# Patient Record
Sex: FEMALE | Race: WHITE | NOT HISPANIC OR LATINO | Employment: OTHER | URBAN - METROPOLITAN AREA
[De-identification: names, ages, dates, MRNs, and addresses within clinical notes are randomized per-mention and may not be internally consistent; named-entity substitution may affect disease eponyms.]

---

## 2017-01-09 ENCOUNTER — COMMUNICATION - HEALTHEAST (OUTPATIENT)
Dept: NURSING | Facility: CLINIC | Age: 72
End: 2017-01-09

## 2017-01-10 ENCOUNTER — COMMUNICATION - HEALTHEAST (OUTPATIENT)
Dept: INTERNAL MEDICINE | Facility: CLINIC | Age: 72
End: 2017-01-10

## 2017-01-10 DIAGNOSIS — E87.6 HYPOPOTASSEMIA: ICD-10-CM

## 2017-01-11 ENCOUNTER — AMBULATORY - HEALTHEAST (OUTPATIENT)
Dept: LAB | Facility: CLINIC | Age: 72
End: 2017-01-11

## 2017-01-11 ENCOUNTER — COMMUNICATION - HEALTHEAST (OUTPATIENT)
Dept: INTERNAL MEDICINE | Facility: CLINIC | Age: 72
End: 2017-01-11

## 2017-01-11 DIAGNOSIS — I48.0 PAROXYSMAL ATRIAL FIBRILLATION (H): ICD-10-CM

## 2017-01-11 DIAGNOSIS — I48.91 ATRIAL FIBRILLATION (H): ICD-10-CM

## 2017-02-05 ENCOUNTER — COMMUNICATION - HEALTHEAST (OUTPATIENT)
Dept: INTERNAL MEDICINE | Facility: CLINIC | Age: 72
End: 2017-02-05

## 2017-02-05 DIAGNOSIS — E78.5 HYPERLIPIDEMIA: ICD-10-CM

## 2017-02-15 ENCOUNTER — AMBULATORY - HEALTHEAST (OUTPATIENT)
Dept: LAB | Facility: CLINIC | Age: 72
End: 2017-02-15

## 2017-02-15 ENCOUNTER — COMMUNICATION - HEALTHEAST (OUTPATIENT)
Dept: INTERNAL MEDICINE | Facility: CLINIC | Age: 72
End: 2017-02-15

## 2017-02-15 DIAGNOSIS — I89.0 ACQUIRED LYMPHEDEMA OF LEG: ICD-10-CM

## 2017-02-15 DIAGNOSIS — I48.91 ATRIAL FIBRILLATION (H): ICD-10-CM

## 2017-02-15 DIAGNOSIS — I48.0 PAROXYSMAL ATRIAL FIBRILLATION (H): ICD-10-CM

## 2017-02-15 DIAGNOSIS — I87.303 VENOUS HYPERTENSION OF LOWER EXTREMITY, BILATERAL: ICD-10-CM

## 2017-02-15 DIAGNOSIS — I89.0 OTHER LYMPHEDEMA: ICD-10-CM

## 2017-02-20 ENCOUNTER — COMMUNICATION - HEALTHEAST (OUTPATIENT)
Dept: INTERNAL MEDICINE | Facility: CLINIC | Age: 72
End: 2017-02-20

## 2017-02-20 DIAGNOSIS — I48.0 PAROXYSMAL ATRIAL FIBRILLATION (H): ICD-10-CM

## 2017-03-15 ENCOUNTER — COMMUNICATION - HEALTHEAST (OUTPATIENT)
Dept: INTERNAL MEDICINE | Facility: CLINIC | Age: 72
End: 2017-03-15

## 2017-03-15 ENCOUNTER — AMBULATORY - HEALTHEAST (OUTPATIENT)
Dept: LAB | Facility: CLINIC | Age: 72
End: 2017-03-15

## 2017-03-15 DIAGNOSIS — I48.0 PAROXYSMAL ATRIAL FIBRILLATION (H): ICD-10-CM

## 2017-03-29 ENCOUNTER — COMMUNICATION - HEALTHEAST (OUTPATIENT)
Dept: INTERNAL MEDICINE | Facility: CLINIC | Age: 72
End: 2017-03-29

## 2017-03-29 ENCOUNTER — AMBULATORY - HEALTHEAST (OUTPATIENT)
Dept: LAB | Facility: CLINIC | Age: 72
End: 2017-03-29

## 2017-03-29 DIAGNOSIS — I48.0 PAROXYSMAL ATRIAL FIBRILLATION (H): ICD-10-CM

## 2017-04-05 ENCOUNTER — COMMUNICATION - HEALTHEAST (OUTPATIENT)
Dept: INTERNAL MEDICINE | Facility: CLINIC | Age: 72
End: 2017-04-05

## 2017-04-05 DIAGNOSIS — E11.9 DIABETES (H): ICD-10-CM

## 2017-04-11 ENCOUNTER — COMMUNICATION - HEALTHEAST (OUTPATIENT)
Dept: INTERNAL MEDICINE | Facility: CLINIC | Age: 72
End: 2017-04-11

## 2017-04-11 DIAGNOSIS — M89.9 DISORDER OF BONE AND CARTILAGE, UNSPECIFIED: ICD-10-CM

## 2017-04-11 DIAGNOSIS — M94.9 DISORDER OF BONE AND CARTILAGE, UNSPECIFIED: ICD-10-CM

## 2017-04-12 ENCOUNTER — AMBULATORY - HEALTHEAST (OUTPATIENT)
Dept: LAB | Facility: CLINIC | Age: 72
End: 2017-04-12

## 2017-04-12 ENCOUNTER — COMMUNICATION - HEALTHEAST (OUTPATIENT)
Dept: INTERNAL MEDICINE | Facility: CLINIC | Age: 72
End: 2017-04-12

## 2017-04-12 DIAGNOSIS — I48.0 PAROXYSMAL ATRIAL FIBRILLATION (H): ICD-10-CM

## 2017-04-24 ENCOUNTER — COMMUNICATION - HEALTHEAST (OUTPATIENT)
Dept: INTERNAL MEDICINE | Facility: CLINIC | Age: 72
End: 2017-04-24

## 2017-04-24 DIAGNOSIS — I48.91 ATRIAL FIBRILLATION (H): ICD-10-CM

## 2017-04-26 ENCOUNTER — AMBULATORY - HEALTHEAST (OUTPATIENT)
Dept: LAB | Facility: CLINIC | Age: 72
End: 2017-04-26

## 2017-04-26 ENCOUNTER — COMMUNICATION - HEALTHEAST (OUTPATIENT)
Dept: INTERNAL MEDICINE | Facility: CLINIC | Age: 72
End: 2017-04-26

## 2017-04-26 DIAGNOSIS — I48.0 PAROXYSMAL ATRIAL FIBRILLATION (H): ICD-10-CM

## 2017-04-26 DIAGNOSIS — I48.91 ATRIAL FIBRILLATION (H): ICD-10-CM

## 2017-05-05 ENCOUNTER — COMMUNICATION - HEALTHEAST (OUTPATIENT)
Dept: INTERNAL MEDICINE | Facility: CLINIC | Age: 72
End: 2017-05-05

## 2017-05-05 ENCOUNTER — AMBULATORY - HEALTHEAST (OUTPATIENT)
Dept: LAB | Facility: CLINIC | Age: 72
End: 2017-05-05

## 2017-05-05 DIAGNOSIS — I48.91 ATRIAL FIBRILLATION (H): ICD-10-CM

## 2017-05-05 DIAGNOSIS — I48.0 PAROXYSMAL ATRIAL FIBRILLATION (H): ICD-10-CM

## 2017-05-09 ENCOUNTER — AMBULATORY - HEALTHEAST (OUTPATIENT)
Dept: LAB | Facility: CLINIC | Age: 72
End: 2017-05-09

## 2017-05-09 ENCOUNTER — COMMUNICATION - HEALTHEAST (OUTPATIENT)
Dept: INTERNAL MEDICINE | Facility: CLINIC | Age: 72
End: 2017-05-09

## 2017-05-09 DIAGNOSIS — I48.91 ATRIAL FIBRILLATION (H): ICD-10-CM

## 2017-05-09 DIAGNOSIS — I48.0 PAROXYSMAL ATRIAL FIBRILLATION (H): ICD-10-CM

## 2017-05-12 ENCOUNTER — RECORDS - HEALTHEAST (OUTPATIENT)
Dept: ADMINISTRATIVE | Facility: OTHER | Age: 72
End: 2017-05-12

## 2017-05-17 ENCOUNTER — COMMUNICATION - HEALTHEAST (OUTPATIENT)
Dept: INTERNAL MEDICINE | Facility: CLINIC | Age: 72
End: 2017-05-17

## 2017-05-17 DIAGNOSIS — E78.5 HYPERLIPIDEMIA: ICD-10-CM

## 2017-05-23 ENCOUNTER — COMMUNICATION - HEALTHEAST (OUTPATIENT)
Dept: INTERNAL MEDICINE | Facility: CLINIC | Age: 72
End: 2017-05-23

## 2017-05-23 ENCOUNTER — AMBULATORY - HEALTHEAST (OUTPATIENT)
Dept: LAB | Facility: CLINIC | Age: 72
End: 2017-05-23

## 2017-05-23 DIAGNOSIS — I48.91 ATRIAL FIBRILLATION (H): ICD-10-CM

## 2017-05-23 DIAGNOSIS — I48.0 PAROXYSMAL ATRIAL FIBRILLATION (H): ICD-10-CM

## 2017-06-06 ENCOUNTER — COMMUNICATION - HEALTHEAST (OUTPATIENT)
Dept: INTERNAL MEDICINE | Facility: CLINIC | Age: 72
End: 2017-06-06

## 2017-06-06 DIAGNOSIS — I48.91 ATRIAL FIBRILLATION (H): ICD-10-CM

## 2017-06-13 ENCOUNTER — COMMUNICATION - HEALTHEAST (OUTPATIENT)
Dept: INTERNAL MEDICINE | Facility: CLINIC | Age: 72
End: 2017-06-13

## 2017-06-13 ENCOUNTER — AMBULATORY - HEALTHEAST (OUTPATIENT)
Dept: LAB | Facility: CLINIC | Age: 72
End: 2017-06-13

## 2017-06-13 DIAGNOSIS — I48.0 PAROXYSMAL ATRIAL FIBRILLATION (H): ICD-10-CM

## 2017-06-13 DIAGNOSIS — I48.91 ATRIAL FIBRILLATION (H): ICD-10-CM

## 2017-07-10 ENCOUNTER — COMMUNICATION - HEALTHEAST (OUTPATIENT)
Dept: NURSING | Facility: CLINIC | Age: 72
End: 2017-07-10

## 2017-07-11 ENCOUNTER — COMMUNICATION - HEALTHEAST (OUTPATIENT)
Dept: INTERNAL MEDICINE | Facility: CLINIC | Age: 72
End: 2017-07-11

## 2017-07-11 DIAGNOSIS — M89.9 DISORDER OF BONE AND CARTILAGE, UNSPECIFIED: ICD-10-CM

## 2017-07-11 DIAGNOSIS — E11.9 DIABETES (H): ICD-10-CM

## 2017-07-11 DIAGNOSIS — M94.9 DISORDER OF BONE AND CARTILAGE, UNSPECIFIED: ICD-10-CM

## 2017-07-14 ENCOUNTER — AMBULATORY - HEALTHEAST (OUTPATIENT)
Dept: LAB | Facility: CLINIC | Age: 72
End: 2017-07-14

## 2017-07-14 ENCOUNTER — COMMUNICATION - HEALTHEAST (OUTPATIENT)
Dept: FAMILY MEDICINE | Facility: CLINIC | Age: 72
End: 2017-07-14

## 2017-07-14 DIAGNOSIS — I48.91 ATRIAL FIBRILLATION (H): ICD-10-CM

## 2017-07-14 DIAGNOSIS — I48.0 PAROXYSMAL ATRIAL FIBRILLATION (H): ICD-10-CM

## 2017-08-04 ENCOUNTER — OFFICE VISIT - HEALTHEAST (OUTPATIENT)
Dept: CARDIOLOGY | Facility: CLINIC | Age: 72
End: 2017-08-04

## 2017-08-04 DIAGNOSIS — I10 ESSENTIAL HYPERTENSION: ICD-10-CM

## 2017-08-04 DIAGNOSIS — I48.0 PAROXYSMAL ATRIAL FIBRILLATION (H): ICD-10-CM

## 2017-08-04 ASSESSMENT — MIFFLIN-ST. JEOR: SCORE: 1898.85

## 2017-08-08 LAB
ATRIAL RATE - MUSE: 250 BPM
DIASTOLIC BLOOD PRESSURE - MUSE: NORMAL MMHG
INTERPRETATION ECG - MUSE: NORMAL
P AXIS - MUSE: NORMAL DEGREES
PR INTERVAL - MUSE: NORMAL MS
QRS DURATION - MUSE: 136 MS
QT - MUSE: 426 MS
QTC - MUSE: 469 MS
R AXIS - MUSE: 52 DEGREES
SYSTOLIC BLOOD PRESSURE - MUSE: NORMAL MMHG
T AXIS - MUSE: -63 DEGREES
VENTRICULAR RATE- MUSE: 73 BPM

## 2017-08-10 ENCOUNTER — HOSPITAL ENCOUNTER (OUTPATIENT)
Dept: CARDIOLOGY | Facility: HOSPITAL | Age: 72
Discharge: HOME | End: 2017-08-10
Attending: INTERNAL MEDICINE

## 2017-08-10 DIAGNOSIS — I48.0 PAROXYSMAL ATRIAL FIBRILLATION (H): ICD-10-CM

## 2017-08-18 ENCOUNTER — COMMUNICATION - HEALTHEAST (OUTPATIENT)
Dept: NURSING | Facility: CLINIC | Age: 72
End: 2017-08-18

## 2017-08-29 ENCOUNTER — AMBULATORY - HEALTHEAST (OUTPATIENT)
Dept: LAB | Facility: CLINIC | Age: 72
End: 2017-08-29

## 2017-08-29 ENCOUNTER — COMMUNICATION - HEALTHEAST (OUTPATIENT)
Dept: INTERNAL MEDICINE | Facility: CLINIC | Age: 72
End: 2017-08-29

## 2017-08-29 DIAGNOSIS — I48.0 PAROXYSMAL ATRIAL FIBRILLATION (H): ICD-10-CM

## 2017-08-29 DIAGNOSIS — I48.91 ATRIAL FIBRILLATION (H): ICD-10-CM

## 2017-09-12 ENCOUNTER — AMBULATORY - HEALTHEAST (OUTPATIENT)
Dept: LAB | Facility: CLINIC | Age: 72
End: 2017-09-12

## 2017-09-12 ENCOUNTER — COMMUNICATION - HEALTHEAST (OUTPATIENT)
Dept: INTERNAL MEDICINE | Facility: CLINIC | Age: 72
End: 2017-09-12

## 2017-09-12 DIAGNOSIS — I48.91 ATRIAL FIBRILLATION (H): ICD-10-CM

## 2017-09-12 DIAGNOSIS — I48.0 PAROXYSMAL ATRIAL FIBRILLATION (H): ICD-10-CM

## 2017-09-13 ENCOUNTER — RECORDS - HEALTHEAST (OUTPATIENT)
Dept: MAMMOGRAPHY | Facility: CLINIC | Age: 72
End: 2017-09-13

## 2017-09-13 DIAGNOSIS — Z12.31 ENCOUNTER FOR SCREENING MAMMOGRAM FOR MALIGNANT NEOPLASM OF BREAST: ICD-10-CM

## 2017-09-16 ENCOUNTER — COMMUNICATION - HEALTHEAST (OUTPATIENT)
Dept: CARDIOLOGY | Facility: CLINIC | Age: 72
End: 2017-09-16

## 2017-09-16 DIAGNOSIS — I48.91 ATRIAL FIBRILLATION (H): ICD-10-CM

## 2017-10-08 ENCOUNTER — COMMUNICATION - HEALTHEAST (OUTPATIENT)
Dept: INTERNAL MEDICINE | Facility: CLINIC | Age: 72
End: 2017-10-08

## 2017-10-08 DIAGNOSIS — E11.9 DIABETES (H): ICD-10-CM

## 2017-10-08 DIAGNOSIS — M94.9 DISORDER OF BONE AND CARTILAGE: ICD-10-CM

## 2017-10-08 DIAGNOSIS — M89.9 DISORDER OF BONE AND CARTILAGE: ICD-10-CM

## 2017-10-10 ENCOUNTER — COMMUNICATION - HEALTHEAST (OUTPATIENT)
Dept: INTERNAL MEDICINE | Facility: CLINIC | Age: 72
End: 2017-10-10

## 2017-10-10 ENCOUNTER — AMBULATORY - HEALTHEAST (OUTPATIENT)
Dept: LAB | Facility: CLINIC | Age: 72
End: 2017-10-10

## 2017-10-10 DIAGNOSIS — I48.91 ATRIAL FIBRILLATION (H): ICD-10-CM

## 2017-10-10 DIAGNOSIS — I48.0 PAROXYSMAL ATRIAL FIBRILLATION (H): ICD-10-CM

## 2017-10-24 ENCOUNTER — COMMUNICATION - HEALTHEAST (OUTPATIENT)
Dept: INTERNAL MEDICINE | Facility: CLINIC | Age: 72
End: 2017-10-24

## 2017-10-24 ENCOUNTER — AMBULATORY - HEALTHEAST (OUTPATIENT)
Dept: LAB | Facility: CLINIC | Age: 72
End: 2017-10-24

## 2017-10-24 DIAGNOSIS — I48.91 ATRIAL FIBRILLATION (H): ICD-10-CM

## 2017-10-24 DIAGNOSIS — I48.0 PAROXYSMAL ATRIAL FIBRILLATION (H): ICD-10-CM

## 2017-11-09 ENCOUNTER — AMBULATORY - HEALTHEAST (OUTPATIENT)
Dept: LAB | Facility: CLINIC | Age: 72
End: 2017-11-09

## 2017-11-09 ENCOUNTER — COMMUNICATION - HEALTHEAST (OUTPATIENT)
Dept: INTERNAL MEDICINE | Facility: CLINIC | Age: 72
End: 2017-11-09

## 2017-11-09 DIAGNOSIS — I48.91 ATRIAL FIBRILLATION (H): ICD-10-CM

## 2017-11-09 DIAGNOSIS — I48.0 PAROXYSMAL ATRIAL FIBRILLATION (H): ICD-10-CM

## 2017-11-22 ENCOUNTER — AMBULATORY - HEALTHEAST (OUTPATIENT)
Dept: LAB | Facility: CLINIC | Age: 72
End: 2017-11-22

## 2017-11-22 ENCOUNTER — COMMUNICATION - HEALTHEAST (OUTPATIENT)
Dept: INTERNAL MEDICINE | Facility: CLINIC | Age: 72
End: 2017-11-22

## 2017-11-22 DIAGNOSIS — I48.0 PAROXYSMAL ATRIAL FIBRILLATION (H): ICD-10-CM

## 2017-11-22 DIAGNOSIS — I48.91 ATRIAL FIBRILLATION (H): ICD-10-CM

## 2017-12-05 ENCOUNTER — COMMUNICATION - HEALTHEAST (OUTPATIENT)
Dept: INTERNAL MEDICINE | Facility: CLINIC | Age: 72
End: 2017-12-05

## 2017-12-05 DIAGNOSIS — E78.5 HYPERLIPIDEMIA: ICD-10-CM

## 2017-12-06 ENCOUNTER — COMMUNICATION - HEALTHEAST (OUTPATIENT)
Dept: INTERNAL MEDICINE | Facility: CLINIC | Age: 72
End: 2017-12-06

## 2017-12-06 ENCOUNTER — AMBULATORY - HEALTHEAST (OUTPATIENT)
Dept: LAB | Facility: CLINIC | Age: 72
End: 2017-12-06

## 2017-12-06 DIAGNOSIS — I48.91 ATRIAL FIBRILLATION (H): ICD-10-CM

## 2017-12-06 DIAGNOSIS — I48.0 PAROXYSMAL ATRIAL FIBRILLATION (H): ICD-10-CM

## 2017-12-13 ENCOUNTER — OFFICE VISIT - HEALTHEAST (OUTPATIENT)
Dept: INTERNAL MEDICINE | Facility: CLINIC | Age: 72
End: 2017-12-13

## 2017-12-13 DIAGNOSIS — N18.1 TYPE 2 DIABETES MELLITUS WITH STAGE 1 CHRONIC KIDNEY DISEASE, WITHOUT LONG-TERM CURRENT USE OF INSULIN (H): ICD-10-CM

## 2017-12-13 DIAGNOSIS — E11.22 TYPE 2 DIABETES MELLITUS WITH STAGE 1 CHRONIC KIDNEY DISEASE, WITHOUT LONG-TERM CURRENT USE OF INSULIN (H): ICD-10-CM

## 2017-12-13 DIAGNOSIS — I10 ESSENTIAL HYPERTENSION: ICD-10-CM

## 2017-12-13 LAB
CHOLEST SERPL-MCNC: 125 MG/DL
FASTING STATUS PATIENT QL REPORTED: NO
HBA1C MFR BLD: 5.3 % (ref 3.5–6)
HDLC SERPL-MCNC: 30 MG/DL
LDLC SERPL CALC-MCNC: 56 MG/DL
TRIGL SERPL-MCNC: 193 MG/DL

## 2017-12-14 ENCOUNTER — COMMUNICATION - HEALTHEAST (OUTPATIENT)
Dept: INTERNAL MEDICINE | Facility: CLINIC | Age: 72
End: 2017-12-14

## 2018-01-02 ENCOUNTER — COMMUNICATION - HEALTHEAST (OUTPATIENT)
Dept: INTERNAL MEDICINE | Facility: CLINIC | Age: 73
End: 2018-01-02

## 2018-01-02 DIAGNOSIS — I48.91 ATRIAL FIBRILLATION (H): ICD-10-CM

## 2018-01-02 DIAGNOSIS — E87.6 HYPOPOTASSEMIA: ICD-10-CM

## 2018-01-02 DIAGNOSIS — E11.9 DIABETES (H): ICD-10-CM

## 2018-01-03 ENCOUNTER — COMMUNICATION - HEALTHEAST (OUTPATIENT)
Dept: INTERNAL MEDICINE | Facility: CLINIC | Age: 73
End: 2018-01-03

## 2018-01-03 DIAGNOSIS — I48.91 ATRIAL FIBRILLATION (H): ICD-10-CM

## 2018-01-05 ENCOUNTER — COMMUNICATION - HEALTHEAST (OUTPATIENT)
Dept: INTERNAL MEDICINE | Facility: CLINIC | Age: 73
End: 2018-01-05

## 2018-01-09 ENCOUNTER — COMMUNICATION - HEALTHEAST (OUTPATIENT)
Dept: INTERNAL MEDICINE | Facility: CLINIC | Age: 73
End: 2018-01-09

## 2018-01-09 ENCOUNTER — AMBULATORY - HEALTHEAST (OUTPATIENT)
Dept: LAB | Facility: CLINIC | Age: 73
End: 2018-01-09

## 2018-01-09 DIAGNOSIS — I48.91 ATRIAL FIBRILLATION (H): ICD-10-CM

## 2018-01-09 LAB — INR PPP: 2.6 (ref 0.9–1.1)

## 2018-01-14 ENCOUNTER — COMMUNICATION - HEALTHEAST (OUTPATIENT)
Dept: INTERNAL MEDICINE | Facility: CLINIC | Age: 73
End: 2018-01-14

## 2018-01-14 DIAGNOSIS — M94.9 DISORDER OF BONE AND CARTILAGE: ICD-10-CM

## 2018-01-14 DIAGNOSIS — M89.9 DISORDER OF BONE AND CARTILAGE: ICD-10-CM

## 2018-01-25 ENCOUNTER — COMMUNICATION - HEALTHEAST (OUTPATIENT)
Dept: INTERNAL MEDICINE | Facility: CLINIC | Age: 73
End: 2018-01-25

## 2018-01-25 DIAGNOSIS — E87.6 HYPOPOTASSEMIA: ICD-10-CM

## 2018-02-06 ENCOUNTER — COMMUNICATION - HEALTHEAST (OUTPATIENT)
Dept: INTERNAL MEDICINE | Facility: CLINIC | Age: 73
End: 2018-02-06

## 2018-02-06 ENCOUNTER — AMBULATORY - HEALTHEAST (OUTPATIENT)
Dept: LAB | Facility: CLINIC | Age: 73
End: 2018-02-06

## 2018-02-06 DIAGNOSIS — I48.91 ATRIAL FIBRILLATION (H): ICD-10-CM

## 2018-02-06 LAB — INR PPP: 2.5 (ref 0.9–1.1)

## 2018-02-08 ENCOUNTER — COMMUNICATION - HEALTHEAST (OUTPATIENT)
Dept: INTERNAL MEDICINE | Facility: CLINIC | Age: 73
End: 2018-02-08

## 2018-02-08 DIAGNOSIS — I87.303 VENOUS HYPERTENSION OF LOWER EXTREMITY, BILATERAL: ICD-10-CM

## 2018-02-08 DIAGNOSIS — I89.0 ACQUIRED LYMPHEDEMA OF LEG: ICD-10-CM

## 2018-03-06 ENCOUNTER — COMMUNICATION - HEALTHEAST (OUTPATIENT)
Dept: INTERNAL MEDICINE | Facility: CLINIC | Age: 73
End: 2018-03-06

## 2018-03-06 DIAGNOSIS — E78.5 HYPERLIPIDEMIA: ICD-10-CM

## 2018-03-07 ENCOUNTER — AMBULATORY - HEALTHEAST (OUTPATIENT)
Dept: LAB | Facility: CLINIC | Age: 73
End: 2018-03-07

## 2018-03-07 ENCOUNTER — COMMUNICATION - HEALTHEAST (OUTPATIENT)
Dept: NURSING | Facility: CLINIC | Age: 73
End: 2018-03-07

## 2018-03-07 DIAGNOSIS — I48.91 ATRIAL FIBRILLATION (H): ICD-10-CM

## 2018-03-07 LAB — INR PPP: 2.9 (ref 0.9–1.1)

## 2018-03-09 ENCOUNTER — RECORDS - HEALTHEAST (OUTPATIENT)
Dept: ADMINISTRATIVE | Facility: OTHER | Age: 73
End: 2018-03-09

## 2018-03-21 ENCOUNTER — COMMUNICATION - HEALTHEAST (OUTPATIENT)
Dept: CARDIOLOGY | Facility: CLINIC | Age: 73
End: 2018-03-21

## 2018-03-21 DIAGNOSIS — I48.91 ATRIAL FIBRILLATION (H): ICD-10-CM

## 2018-03-30 ENCOUNTER — COMMUNICATION - HEALTHEAST (OUTPATIENT)
Dept: INTERNAL MEDICINE | Facility: CLINIC | Age: 73
End: 2018-03-30

## 2018-03-30 DIAGNOSIS — E11.9 DIABETES (H): ICD-10-CM

## 2018-04-10 ENCOUNTER — COMMUNICATION - HEALTHEAST (OUTPATIENT)
Dept: INTERNAL MEDICINE | Facility: CLINIC | Age: 73
End: 2018-04-10

## 2018-04-10 DIAGNOSIS — M94.9 DISORDER OF BONE AND CARTILAGE: ICD-10-CM

## 2018-04-10 DIAGNOSIS — M89.9 DISORDER OF BONE AND CARTILAGE: ICD-10-CM

## 2018-04-19 ENCOUNTER — AMBULATORY - HEALTHEAST (OUTPATIENT)
Dept: LAB | Facility: CLINIC | Age: 73
End: 2018-04-19

## 2018-04-19 ENCOUNTER — COMMUNICATION - HEALTHEAST (OUTPATIENT)
Dept: ANTICOAGULATION | Facility: CLINIC | Age: 73
End: 2018-04-19

## 2018-04-19 DIAGNOSIS — I48.91 ATRIAL FIBRILLATION (H): ICD-10-CM

## 2018-04-19 LAB — INR PPP: 2.9 (ref 0.9–1.1)

## 2018-05-17 ENCOUNTER — COMMUNICATION - HEALTHEAST (OUTPATIENT)
Dept: INTERNAL MEDICINE | Facility: CLINIC | Age: 73
End: 2018-05-17

## 2018-05-17 DIAGNOSIS — E87.6 HYPOPOTASSEMIA: ICD-10-CM

## 2018-05-31 ENCOUNTER — COMMUNICATION - HEALTHEAST (OUTPATIENT)
Dept: ANTICOAGULATION | Facility: CLINIC | Age: 73
End: 2018-05-31

## 2018-05-31 ENCOUNTER — AMBULATORY - HEALTHEAST (OUTPATIENT)
Dept: LAB | Facility: CLINIC | Age: 73
End: 2018-05-31

## 2018-05-31 DIAGNOSIS — I48.91 ATRIAL FIBRILLATION (H): ICD-10-CM

## 2018-05-31 LAB — INR PPP: 3.8 (ref 0.9–1.1)

## 2018-06-01 ENCOUNTER — COMMUNICATION - HEALTHEAST (OUTPATIENT)
Dept: INTERNAL MEDICINE | Facility: CLINIC | Age: 73
End: 2018-06-01

## 2018-06-01 DIAGNOSIS — E78.5 HYPERLIPIDEMIA: ICD-10-CM

## 2018-06-18 ENCOUNTER — COMMUNICATION - HEALTHEAST (OUTPATIENT)
Dept: INTERNAL MEDICINE | Facility: CLINIC | Age: 73
End: 2018-06-18

## 2018-06-18 ENCOUNTER — AMBULATORY - HEALTHEAST (OUTPATIENT)
Dept: LAB | Facility: CLINIC | Age: 73
End: 2018-06-18

## 2018-06-18 ENCOUNTER — COMMUNICATION - HEALTHEAST (OUTPATIENT)
Dept: ANTICOAGULATION | Facility: CLINIC | Age: 73
End: 2018-06-18

## 2018-06-18 ENCOUNTER — OFFICE VISIT - HEALTHEAST (OUTPATIENT)
Dept: INTERNAL MEDICINE | Facility: CLINIC | Age: 73
End: 2018-06-18

## 2018-06-18 DIAGNOSIS — E11.22 TYPE 2 DIABETES MELLITUS WITH STAGE 1 CHRONIC KIDNEY DISEASE, WITHOUT LONG-TERM CURRENT USE OF INSULIN (H): ICD-10-CM

## 2018-06-18 DIAGNOSIS — I48.91 ATRIAL FIBRILLATION (H): ICD-10-CM

## 2018-06-18 DIAGNOSIS — N18.1 TYPE 2 DIABETES MELLITUS WITH STAGE 1 CHRONIC KIDNEY DISEASE, WITHOUT LONG-TERM CURRENT USE OF INSULIN (H): ICD-10-CM

## 2018-06-18 LAB
HBA1C MFR BLD: 5.5 % (ref 3.5–6)
INR PPP: 2.7 (ref 0.9–1.1)

## 2018-07-09 ENCOUNTER — COMMUNICATION - HEALTHEAST (OUTPATIENT)
Dept: INTERNAL MEDICINE | Facility: CLINIC | Age: 73
End: 2018-07-09

## 2018-07-10 ENCOUNTER — AMBULATORY - HEALTHEAST (OUTPATIENT)
Dept: LAB | Facility: CLINIC | Age: 73
End: 2018-07-10

## 2018-07-10 ENCOUNTER — COMMUNICATION - HEALTHEAST (OUTPATIENT)
Dept: ANTICOAGULATION | Facility: CLINIC | Age: 73
End: 2018-07-10

## 2018-07-10 DIAGNOSIS — I48.91 ATRIAL FIBRILLATION (H): ICD-10-CM

## 2018-07-10 LAB — INR PPP: 2.9 (ref 0.9–1.1)

## 2018-07-29 ENCOUNTER — COMMUNICATION - HEALTHEAST (OUTPATIENT)
Dept: INTERNAL MEDICINE | Facility: CLINIC | Age: 73
End: 2018-07-29

## 2018-07-29 DIAGNOSIS — I48.91 ATRIAL FIBRILLATION (H): ICD-10-CM

## 2018-08-14 ENCOUNTER — COMMUNICATION - HEALTHEAST (OUTPATIENT)
Dept: ANTICOAGULATION | Facility: CLINIC | Age: 73
End: 2018-08-14

## 2018-08-16 ENCOUNTER — COMMUNICATION - HEALTHEAST (OUTPATIENT)
Dept: ANTICOAGULATION | Facility: CLINIC | Age: 73
End: 2018-08-16

## 2018-08-16 ENCOUNTER — AMBULATORY - HEALTHEAST (OUTPATIENT)
Dept: LAB | Facility: CLINIC | Age: 73
End: 2018-08-16

## 2018-08-16 DIAGNOSIS — I48.91 ATRIAL FIBRILLATION (H): ICD-10-CM

## 2018-08-16 LAB — INR PPP: 3.7 (ref 0.9–1.1)

## 2018-08-30 ENCOUNTER — AMBULATORY - HEALTHEAST (OUTPATIENT)
Dept: LAB | Facility: CLINIC | Age: 73
End: 2018-08-30

## 2018-08-30 ENCOUNTER — COMMUNICATION - HEALTHEAST (OUTPATIENT)
Dept: ANTICOAGULATION | Facility: CLINIC | Age: 73
End: 2018-08-30

## 2018-08-30 DIAGNOSIS — I48.91 ATRIAL FIBRILLATION (H): ICD-10-CM

## 2018-08-30 LAB — INR PPP: 2.2 (ref 0.9–1.1)

## 2018-09-12 ENCOUNTER — COMMUNICATION - HEALTHEAST (OUTPATIENT)
Dept: ANTICOAGULATION | Facility: CLINIC | Age: 73
End: 2018-09-12

## 2018-09-12 ENCOUNTER — AMBULATORY - HEALTHEAST (OUTPATIENT)
Dept: LAB | Facility: CLINIC | Age: 73
End: 2018-09-12

## 2018-09-12 DIAGNOSIS — I48.91 ATRIAL FIBRILLATION (H): ICD-10-CM

## 2018-09-12 LAB — INR PPP: 1.8 (ref 0.9–1.1)

## 2018-09-14 ENCOUNTER — COMMUNICATION - HEALTHEAST (OUTPATIENT)
Dept: CARDIOLOGY | Facility: CLINIC | Age: 73
End: 2018-09-14

## 2018-09-14 DIAGNOSIS — I48.91 ATRIAL FIBRILLATION (H): ICD-10-CM

## 2018-09-20 ENCOUNTER — RECORDS - HEALTHEAST (OUTPATIENT)
Dept: MAMMOGRAPHY | Facility: CLINIC | Age: 73
End: 2018-09-20

## 2018-09-20 DIAGNOSIS — Z12.31 ENCOUNTER FOR SCREENING MAMMOGRAM FOR MALIGNANT NEOPLASM OF BREAST: ICD-10-CM

## 2018-09-26 ENCOUNTER — AMBULATORY - HEALTHEAST (OUTPATIENT)
Dept: LAB | Facility: CLINIC | Age: 73
End: 2018-09-26

## 2018-09-26 ENCOUNTER — COMMUNICATION - HEALTHEAST (OUTPATIENT)
Dept: ANTICOAGULATION | Facility: CLINIC | Age: 73
End: 2018-09-26

## 2018-09-26 DIAGNOSIS — I48.91 ATRIAL FIBRILLATION (H): ICD-10-CM

## 2018-09-26 LAB — INR PPP: 3.6 (ref 0.9–1.1)

## 2018-09-29 ENCOUNTER — COMMUNICATION - HEALTHEAST (OUTPATIENT)
Dept: INTERNAL MEDICINE | Facility: CLINIC | Age: 73
End: 2018-09-29

## 2018-09-29 DIAGNOSIS — M89.9 DISORDER OF BONE AND CARTILAGE: ICD-10-CM

## 2018-09-29 DIAGNOSIS — M94.9 DISORDER OF BONE AND CARTILAGE: ICD-10-CM

## 2018-10-17 ENCOUNTER — AMBULATORY - HEALTHEAST (OUTPATIENT)
Dept: LAB | Facility: CLINIC | Age: 73
End: 2018-10-17

## 2018-10-17 ENCOUNTER — COMMUNICATION - HEALTHEAST (OUTPATIENT)
Dept: ANTICOAGULATION | Facility: CLINIC | Age: 73
End: 2018-10-17

## 2018-10-17 DIAGNOSIS — I48.91 ATRIAL FIBRILLATION (H): ICD-10-CM

## 2018-10-17 LAB — INR PPP: 1.9 (ref 0.9–1.1)

## 2018-10-31 ENCOUNTER — COMMUNICATION - HEALTHEAST (OUTPATIENT)
Dept: ANTICOAGULATION | Facility: CLINIC | Age: 73
End: 2018-10-31

## 2018-10-31 ENCOUNTER — AMBULATORY - HEALTHEAST (OUTPATIENT)
Dept: LAB | Facility: CLINIC | Age: 73
End: 2018-10-31

## 2018-10-31 DIAGNOSIS — I48.91 ATRIAL FIBRILLATION (H): ICD-10-CM

## 2018-10-31 LAB — INR PPP: 2.4 (ref 0.9–1.1)

## 2018-11-10 ENCOUNTER — COMMUNICATION - HEALTHEAST (OUTPATIENT)
Dept: INTERNAL MEDICINE | Facility: CLINIC | Age: 73
End: 2018-11-10

## 2018-11-10 DIAGNOSIS — I48.91 ATRIAL FIBRILLATION (H): ICD-10-CM

## 2018-11-14 ENCOUNTER — AMBULATORY - HEALTHEAST (OUTPATIENT)
Dept: LAB | Facility: CLINIC | Age: 73
End: 2018-11-14

## 2018-11-14 ENCOUNTER — COMMUNICATION - HEALTHEAST (OUTPATIENT)
Dept: ANTICOAGULATION | Facility: CLINIC | Age: 73
End: 2018-11-14

## 2018-11-14 DIAGNOSIS — I48.91 ATRIAL FIBRILLATION (H): ICD-10-CM

## 2018-11-14 LAB — INR PPP: 3.6 (ref 0.9–1.1)

## 2018-11-16 ENCOUNTER — COMMUNICATION - HEALTHEAST (OUTPATIENT)
Dept: INTERNAL MEDICINE | Facility: CLINIC | Age: 73
End: 2018-11-16

## 2018-11-16 DIAGNOSIS — E78.5 HYPERLIPIDEMIA: ICD-10-CM

## 2018-11-16 DIAGNOSIS — I89.0 ACQUIRED LYMPHEDEMA OF LEG: ICD-10-CM

## 2018-11-16 DIAGNOSIS — I87.303 VENOUS HYPERTENSION OF LOWER EXTREMITY, BILATERAL: ICD-10-CM

## 2018-11-26 ENCOUNTER — COMMUNICATION - HEALTHEAST (OUTPATIENT)
Dept: ANTICOAGULATION | Facility: CLINIC | Age: 73
End: 2018-11-26

## 2018-11-26 DIAGNOSIS — I48.91 ATRIAL FIBRILLATION (H): ICD-10-CM

## 2018-11-28 ENCOUNTER — AMBULATORY - HEALTHEAST (OUTPATIENT)
Dept: LAB | Facility: CLINIC | Age: 73
End: 2018-11-28

## 2018-11-28 ENCOUNTER — COMMUNICATION - HEALTHEAST (OUTPATIENT)
Dept: ANTICOAGULATION | Facility: CLINIC | Age: 73
End: 2018-11-28

## 2018-11-28 DIAGNOSIS — I48.91 ATRIAL FIBRILLATION (H): ICD-10-CM

## 2018-11-28 LAB — INR PPP: 2.6 (ref 0.9–1.1)

## 2018-12-04 ENCOUNTER — OFFICE VISIT - HEALTHEAST (OUTPATIENT)
Dept: CARDIOLOGY | Facility: CLINIC | Age: 73
End: 2018-12-04

## 2018-12-04 DIAGNOSIS — I48.0 PAROXYSMAL ATRIAL FIBRILLATION (H): ICD-10-CM

## 2018-12-04 DIAGNOSIS — I10 ESSENTIAL HYPERTENSION: ICD-10-CM

## 2018-12-04 LAB
ATRIAL RATE - MUSE: 68 BPM
DIASTOLIC BLOOD PRESSURE - MUSE: NORMAL MMHG
INTERPRETATION ECG - MUSE: NORMAL
P AXIS - MUSE: NORMAL DEGREES
PR INTERVAL - MUSE: NORMAL MS
QRS DURATION - MUSE: 132 MS
QT - MUSE: 420 MS
QTC - MUSE: 469 MS
R AXIS - MUSE: 37 DEGREES
SYSTOLIC BLOOD PRESSURE - MUSE: NORMAL MMHG
T AXIS - MUSE: -71 DEGREES
VENTRICULAR RATE- MUSE: 75 BPM

## 2018-12-04 ASSESSMENT — MIFFLIN-ST. JEOR: SCORE: 1917.9

## 2018-12-10 ENCOUNTER — HOSPITAL ENCOUNTER (OUTPATIENT)
Dept: CARDIOLOGY | Facility: HOSPITAL | Age: 73
Discharge: HOME | End: 2018-12-10
Attending: INTERNAL MEDICINE

## 2018-12-10 DIAGNOSIS — I48.0 PAROXYSMAL ATRIAL FIBRILLATION (H): ICD-10-CM

## 2018-12-10 ASSESSMENT — MIFFLIN-ST. JEOR: SCORE: 1917.9

## 2018-12-11 LAB
AORTIC ROOT: 3.9 CM
ASCENDING AORTA: 3.9 CM
BSA FOR ECHO PROCEDURE: 2.56 M2
CV BLOOD PRESSURE: ABNORMAL MMHG
CV ECHO HEIGHT: 66 IN
CV ECHO WEIGHT: 310 LBS
DOP CALC LVOT AREA: 3.46 CM2
DOP CALC LVOT DIAMETER: 2.1 CM
DOP CALC LVOT PEAK VEL: 91.7 CM/S
DOP CALC LVOT STROKE VOLUME: 57.8 CM3
DOP CALCLVOT PEAK VEL VTI: 16.7 CM
EJECTION FRACTION: 58 % (ref 55–75)
FRACTIONAL SHORTENING: 29.4 % (ref 28–44)
INTERVENTRICULAR SEPTUM IN END DIASTOLE: 1.19 CM (ref 0.6–0.9)
IVS/PW RATIO: 0.9
LA AREA 1: 33 CM2
LA AREA 2: 21 CM2
LEFT ATRIUM LENGTH: 7.2 CM
LEFT ATRIUM SIZE: 4.9 CM
LEFT ATRIUM VOLUME INDEX: 32 ML/M2
LEFT ATRIUM VOLUME: 81.8 ML
LEFT VENTRICLE CARDIAC INDEX: 2 L/MIN/M2
LEFT VENTRICLE CARDIAC OUTPUT: 5.1 L/MIN
LEFT VENTRICLE DIASTOLIC VOLUME INDEX: 33.8 CM3/M2 (ref 29–61)
LEFT VENTRICLE DIASTOLIC VOLUME: 86.5 CM3 (ref 46–106)
LEFT VENTRICLE HEART RATE: 88 BPM
LEFT VENTRICLE MASS INDEX: 103.8 G/M2
LEFT VENTRICLE SYSTOLIC VOLUME INDEX: 14.3 CM3/M2 (ref 8–24)
LEFT VENTRICLE SYSTOLIC VOLUME: 36.6 CM3 (ref 14–42)
LEFT VENTRICULAR INTERNAL DIMENSION IN DIASTOLE: 5.21 CM (ref 3.8–5.2)
LEFT VENTRICULAR INTERNAL DIMENSION IN SYSTOLE: 3.68 CM (ref 2.2–3.5)
LEFT VENTRICULAR MASS: 265.7 G
LEFT VENTRICULAR OUTFLOW TRACT MEAN GRADIENT: 1 MMHG
LEFT VENTRICULAR OUTFLOW TRACT MEAN VELOCITY: 55.6 CM/S
LEFT VENTRICULAR OUTFLOW TRACT PEAK GRADIENT: 3 MMHG
LEFT VENTRICULAR POSTERIOR WALL IN END DIASTOLE: 1.32 CM (ref 0.6–0.9)
LV STROKE VOLUME INDEX: 22.6 ML/M2
MITRAL VALVE DECELERATION SLOPE: 7640 MM/S2
MITRAL VALVE PRESSURE HALF-TIME: 46 MS
MV AVERAGE E/E' RATIO: 11 CM/S
MV DECELERATION TIME: 159 MS
MV E'TISSUE VEL-LAT: 14.1 CM/S
MV E'TISSUE VEL-MED: 7.72 CM/S
MV LATERAL E/E' RATIO: 8.5
MV MEDIAL E/E' RATIO: 15.5
MV PEAK E VELOCITY: 120 CM/S
MV VALVE AREA PRESSURE 1/2 METHOD: 4.8 CM2
NUC REST DIASTOLIC VOLUME INDEX: 4960 LBS
NUC REST SYSTOLIC VOLUME INDEX: 66 IN
TRICUSPID VALVE ANULAR PLANE SYSTOLIC EXCURSION: 2.2 CM

## 2018-12-12 ENCOUNTER — COMMUNICATION - HEALTHEAST (OUTPATIENT)
Dept: ANTICOAGULATION | Facility: CLINIC | Age: 73
End: 2018-12-12

## 2018-12-12 ENCOUNTER — AMBULATORY - HEALTHEAST (OUTPATIENT)
Dept: LAB | Facility: CLINIC | Age: 73
End: 2018-12-12

## 2018-12-12 DIAGNOSIS — I48.91 ATRIAL FIBRILLATION (H): ICD-10-CM

## 2018-12-12 LAB — INR PPP: 2.7 (ref 0.9–1.1)

## 2018-12-14 ENCOUNTER — COMMUNICATION - HEALTHEAST (OUTPATIENT)
Dept: INTERNAL MEDICINE | Facility: CLINIC | Age: 73
End: 2018-12-14

## 2018-12-14 ENCOUNTER — COMMUNICATION - HEALTHEAST (OUTPATIENT)
Dept: CARDIOLOGY | Facility: CLINIC | Age: 73
End: 2018-12-14

## 2018-12-14 DIAGNOSIS — I48.91 ATRIAL FIBRILLATION (H): ICD-10-CM

## 2018-12-31 ENCOUNTER — COMMUNICATION - HEALTHEAST (OUTPATIENT)
Dept: INTERNAL MEDICINE | Facility: CLINIC | Age: 73
End: 2018-12-31

## 2018-12-31 DIAGNOSIS — E87.6 HYPOPOTASSEMIA: ICD-10-CM

## 2019-01-01 ENCOUNTER — COMMUNICATION - HEALTHEAST (OUTPATIENT)
Dept: ONCOLOGY | Facility: HOSPITAL | Age: 74
End: 2019-01-01

## 2019-01-01 ENCOUNTER — AMBULATORY - HEALTHEAST (OUTPATIENT)
Dept: ONCOLOGY | Facility: HOSPITAL | Age: 74
End: 2019-01-01

## 2019-01-01 ENCOUNTER — OFFICE VISIT - HEALTHEAST (OUTPATIENT)
Dept: GERIATRICS | Facility: CLINIC | Age: 74
End: 2019-01-01

## 2019-01-01 ENCOUNTER — COMMUNICATION - HEALTHEAST (OUTPATIENT)
Dept: ANTICOAGULATION | Facility: CLINIC | Age: 74
End: 2019-01-01

## 2019-01-01 ENCOUNTER — RECORDS - HEALTHEAST (OUTPATIENT)
Dept: LAB | Facility: CLINIC | Age: 74
End: 2019-01-01

## 2019-01-01 ENCOUNTER — HOSPITAL ENCOUNTER (OUTPATIENT)
Dept: SURGERY | Facility: CLINIC | Age: 74
Discharge: HOME | End: 2019-12-13
Attending: SPECIALIST

## 2019-01-01 DIAGNOSIS — I48.91 ATRIAL FIBRILLATION, UNSPECIFIED TYPE (H): ICD-10-CM

## 2019-01-01 DIAGNOSIS — T14.8XXA HEMATOMA: ICD-10-CM

## 2019-01-01 DIAGNOSIS — Z17.0 MALIGNANT NEOPLASM OF UPPER-OUTER QUADRANT OF LEFT BREAST IN FEMALE, ESTROGEN RECEPTOR POSITIVE (H): ICD-10-CM

## 2019-01-01 DIAGNOSIS — R63.5 WEIGHT GAIN: ICD-10-CM

## 2019-01-01 DIAGNOSIS — R06.2 WHEEZING: ICD-10-CM

## 2019-01-01 DIAGNOSIS — R60.9 EDEMA, UNSPECIFIED TYPE: ICD-10-CM

## 2019-01-01 DIAGNOSIS — Z79.899 ENCOUNTER FOR MONITORING CARDIOTOXIC DRUG THERAPY: ICD-10-CM

## 2019-01-01 DIAGNOSIS — Z51.81 ENCOUNTER FOR MONITORING CARDIOTOXIC DRUG THERAPY: ICD-10-CM

## 2019-01-01 DIAGNOSIS — Z98.890 STATUS POST LEFT BREAST LUMPECTOMY: ICD-10-CM

## 2019-01-01 DIAGNOSIS — R06.02 SOB (SHORTNESS OF BREATH): ICD-10-CM

## 2019-01-01 DIAGNOSIS — R52 PAIN MANAGEMENT: ICD-10-CM

## 2019-01-01 DIAGNOSIS — C50.412 MALIGNANT NEOPLASM OF UPPER-OUTER QUADRANT OF LEFT BREAST IN FEMALE, ESTROGEN RECEPTOR POSITIVE (H): ICD-10-CM

## 2019-01-01 DIAGNOSIS — I87.8 VENOUS STASIS: ICD-10-CM

## 2019-01-01 DIAGNOSIS — R06.09 DYSPNEA ON EXERTION: ICD-10-CM

## 2019-01-01 DIAGNOSIS — Z48.89 POSTOPERATIVE VISIT: ICD-10-CM

## 2019-01-01 DIAGNOSIS — R21 RASH: ICD-10-CM

## 2019-01-01 LAB
ANION GAP SERPL CALCULATED.3IONS-SCNC: 10 MMOL/L (ref 5–18)
BUN SERPL-MCNC: 17 MG/DL (ref 8–28)
CALCIUM SERPL-MCNC: 9.3 MG/DL (ref 8.5–10.5)
CHLORIDE BLD-SCNC: 104 MMOL/L (ref 98–107)
CO2 SERPL-SCNC: 27 MMOL/L (ref 22–31)
CREAT SERPL-MCNC: 0.8 MG/DL (ref 0.6–1.1)
GFR SERPL CREATININE-BSD FRML MDRD: >60 ML/MIN/1.73M2
GLUCOSE BLD-MCNC: 131 MG/DL (ref 70–125)
HGB BLD-MCNC: 11.5 G/DL (ref 12–16)
INR PPP: 1.55 (ref 0.9–1.1)
INR PPP: 1.62 (ref 0.9–1.1)
INR PPP: 1.98 (ref 0.9–1.1)
POTASSIUM BLD-SCNC: 4.1 MMOL/L (ref 3.5–5)
SODIUM SERPL-SCNC: 141 MMOL/L (ref 136–145)

## 2019-01-09 ENCOUNTER — AMBULATORY - HEALTHEAST (OUTPATIENT)
Dept: LAB | Facility: CLINIC | Age: 74
End: 2019-01-09

## 2019-01-09 ENCOUNTER — COMMUNICATION - HEALTHEAST (OUTPATIENT)
Dept: ANTICOAGULATION | Facility: CLINIC | Age: 74
End: 2019-01-09

## 2019-01-09 DIAGNOSIS — I48.91 ATRIAL FIBRILLATION (H): ICD-10-CM

## 2019-01-09 LAB — INR PPP: 2.1 (ref 0.9–1.1)

## 2019-02-06 ENCOUNTER — AMBULATORY - HEALTHEAST (OUTPATIENT)
Dept: LAB | Facility: CLINIC | Age: 74
End: 2019-02-06

## 2019-02-06 ENCOUNTER — COMMUNICATION - HEALTHEAST (OUTPATIENT)
Dept: ANTICOAGULATION | Facility: CLINIC | Age: 74
End: 2019-02-06

## 2019-02-06 DIAGNOSIS — I48.91 ATRIAL FIBRILLATION (H): ICD-10-CM

## 2019-02-06 LAB — INR PPP: 2 (ref 0.9–1.1)

## 2019-02-10 ENCOUNTER — COMMUNICATION - HEALTHEAST (OUTPATIENT)
Dept: INTERNAL MEDICINE | Facility: CLINIC | Age: 74
End: 2019-02-10

## 2019-02-10 DIAGNOSIS — M94.9 DISORDER OF BONE AND CARTILAGE: ICD-10-CM

## 2019-02-10 DIAGNOSIS — M89.9 DISORDER OF BONE AND CARTILAGE: ICD-10-CM

## 2019-02-14 ENCOUNTER — COMMUNICATION - HEALTHEAST (OUTPATIENT)
Dept: INTERNAL MEDICINE | Facility: CLINIC | Age: 74
End: 2019-02-14

## 2019-02-14 DIAGNOSIS — M94.9 DISORDER OF BONE AND CARTILAGE: ICD-10-CM

## 2019-02-14 DIAGNOSIS — M89.9 DISORDER OF BONE AND CARTILAGE: ICD-10-CM

## 2019-03-06 ENCOUNTER — AMBULATORY - HEALTHEAST (OUTPATIENT)
Dept: LAB | Facility: CLINIC | Age: 74
End: 2019-03-06

## 2019-03-06 ENCOUNTER — COMMUNICATION - HEALTHEAST (OUTPATIENT)
Dept: ANTICOAGULATION | Facility: CLINIC | Age: 74
End: 2019-03-06

## 2019-03-06 DIAGNOSIS — I48.91 ATRIAL FIBRILLATION (H): ICD-10-CM

## 2019-03-06 LAB — INR PPP: 3.9 (ref 0.9–1.1)

## 2019-03-08 ENCOUNTER — RECORDS - HEALTHEAST (OUTPATIENT)
Dept: ADMINISTRATIVE | Facility: OTHER | Age: 74
End: 2019-03-08

## 2019-03-08 LAB — RETINOPATHY: NEGATIVE

## 2019-03-14 ENCOUNTER — RECORDS - HEALTHEAST (OUTPATIENT)
Dept: HEALTH INFORMATION MANAGEMENT | Facility: CLINIC | Age: 74
End: 2019-03-14

## 2019-03-20 ENCOUNTER — COMMUNICATION - HEALTHEAST (OUTPATIENT)
Dept: ANTICOAGULATION | Facility: CLINIC | Age: 74
End: 2019-03-20

## 2019-03-20 ENCOUNTER — AMBULATORY - HEALTHEAST (OUTPATIENT)
Dept: LAB | Facility: CLINIC | Age: 74
End: 2019-03-20

## 2019-03-20 DIAGNOSIS — I48.91 ATRIAL FIBRILLATION (H): ICD-10-CM

## 2019-03-20 LAB — INR PPP: 2.9 (ref 0.9–1.1)

## 2019-04-03 ENCOUNTER — AMBULATORY - HEALTHEAST (OUTPATIENT)
Dept: LAB | Facility: CLINIC | Age: 74
End: 2019-04-03

## 2019-04-03 ENCOUNTER — COMMUNICATION - HEALTHEAST (OUTPATIENT)
Dept: INTERNAL MEDICINE | Facility: CLINIC | Age: 74
End: 2019-04-03

## 2019-04-03 ENCOUNTER — COMMUNICATION - HEALTHEAST (OUTPATIENT)
Dept: ANTICOAGULATION | Facility: CLINIC | Age: 74
End: 2019-04-03

## 2019-04-03 DIAGNOSIS — I48.91 ATRIAL FIBRILLATION (H): ICD-10-CM

## 2019-04-03 DIAGNOSIS — E11.9 TYPE 2 DIABETES MELLITUS (H): ICD-10-CM

## 2019-04-03 LAB
CREAT UR-MCNC: 35 MG/DL
HBA1C MFR BLD: 5.9 % (ref 3.5–6)
INR PPP: 2.3 (ref 0.9–1.1)
MICROALBUMIN UR-MCNC: <0.5 MG/DL (ref 0–1.99)
MICROALBUMIN/CREAT UR: NORMAL MG/G

## 2019-05-01 ENCOUNTER — AMBULATORY - HEALTHEAST (OUTPATIENT)
Dept: LAB | Facility: CLINIC | Age: 74
End: 2019-05-01

## 2019-05-01 ENCOUNTER — COMMUNICATION - HEALTHEAST (OUTPATIENT)
Dept: ANTICOAGULATION | Facility: CLINIC | Age: 74
End: 2019-05-01

## 2019-05-01 DIAGNOSIS — I48.91 ATRIAL FIBRILLATION (H): ICD-10-CM

## 2019-05-01 LAB — INR PPP: 3.4 (ref 0.9–1.1)

## 2019-05-15 ENCOUNTER — AMBULATORY - HEALTHEAST (OUTPATIENT)
Dept: LAB | Facility: CLINIC | Age: 74
End: 2019-05-15

## 2019-05-15 ENCOUNTER — COMMUNICATION - HEALTHEAST (OUTPATIENT)
Dept: ANTICOAGULATION | Facility: CLINIC | Age: 74
End: 2019-05-15

## 2019-05-15 DIAGNOSIS — I48.91 ATRIAL FIBRILLATION (H): ICD-10-CM

## 2019-05-15 LAB — INR PPP: 2.7 (ref 0.9–1.1)

## 2019-05-29 ENCOUNTER — COMMUNICATION - HEALTHEAST (OUTPATIENT)
Dept: ANTICOAGULATION | Facility: CLINIC | Age: 74
End: 2019-05-29

## 2019-05-29 ENCOUNTER — AMBULATORY - HEALTHEAST (OUTPATIENT)
Dept: LAB | Facility: CLINIC | Age: 74
End: 2019-05-29

## 2019-05-29 DIAGNOSIS — I48.91 ATRIAL FIBRILLATION (H): ICD-10-CM

## 2019-05-29 LAB — INR PPP: 2.2 (ref 0.9–1.1)

## 2019-05-30 ENCOUNTER — COMMUNICATION - HEALTHEAST (OUTPATIENT)
Dept: INTERNAL MEDICINE | Facility: CLINIC | Age: 74
End: 2019-05-30

## 2019-05-30 DIAGNOSIS — I48.91 ATRIAL FIBRILLATION (H): ICD-10-CM

## 2019-06-15 ENCOUNTER — COMMUNICATION - HEALTHEAST (OUTPATIENT)
Dept: INTERNAL MEDICINE | Facility: CLINIC | Age: 74
End: 2019-06-15

## 2019-06-15 DIAGNOSIS — M89.9 DISORDER OF BONE AND CARTILAGE: ICD-10-CM

## 2019-06-15 DIAGNOSIS — M94.9 DISORDER OF BONE AND CARTILAGE: ICD-10-CM

## 2019-06-25 ENCOUNTER — COMMUNICATION - HEALTHEAST (OUTPATIENT)
Dept: INTERNAL MEDICINE | Facility: CLINIC | Age: 74
End: 2019-06-25

## 2019-06-26 ENCOUNTER — RECORDS - HEALTHEAST (OUTPATIENT)
Dept: ADMINISTRATIVE | Facility: OTHER | Age: 74
End: 2019-06-26

## 2019-06-26 ENCOUNTER — RECORDS - HEALTHEAST (OUTPATIENT)
Dept: GENERAL RADIOLOGY | Facility: CLINIC | Age: 74
End: 2019-06-26

## 2019-06-26 ENCOUNTER — OFFICE VISIT - HEALTHEAST (OUTPATIENT)
Dept: INTERNAL MEDICINE | Facility: CLINIC | Age: 74
End: 2019-06-26

## 2019-06-26 ENCOUNTER — COMMUNICATION - HEALTHEAST (OUTPATIENT)
Dept: ANTICOAGULATION | Facility: CLINIC | Age: 74
End: 2019-06-26

## 2019-06-26 DIAGNOSIS — R06.00 DYSPNEA, UNSPECIFIED: ICD-10-CM

## 2019-06-26 DIAGNOSIS — I48.91 ATRIAL FIBRILLATION (H): ICD-10-CM

## 2019-06-26 DIAGNOSIS — R06.00 DYSPNEA, UNSPECIFIED TYPE: ICD-10-CM

## 2019-06-26 DIAGNOSIS — Z01.818 ENCOUNTER FOR OTHER PREPROCEDURAL EXAMINATION: ICD-10-CM

## 2019-06-26 DIAGNOSIS — N39.46 MIXED INCONTINENCE URGE AND STRESS (MALE)(FEMALE): ICD-10-CM

## 2019-06-26 DIAGNOSIS — Z01.818 PRE-OP EVALUATION: ICD-10-CM

## 2019-06-26 LAB
ALBUMIN SERPL-MCNC: 3.6 G/DL (ref 3.5–5)
ALP SERPL-CCNC: 84 U/L (ref 45–120)
ALT SERPL W P-5'-P-CCNC: 26 U/L (ref 0–45)
ANION GAP SERPL CALCULATED.3IONS-SCNC: 12 MMOL/L (ref 5–18)
AST SERPL W P-5'-P-CCNC: 20 U/L (ref 0–40)
ATRIAL RATE - MUSE: 340 BPM
BILIRUB SERPL-MCNC: 0.8 MG/DL (ref 0–1)
BNP SERPL-MCNC: 88 PG/ML (ref 0–130)
BUN SERPL-MCNC: 15 MG/DL (ref 8–28)
CALCIUM SERPL-MCNC: 9.9 MG/DL (ref 8.5–10.5)
CHLORIDE BLD-SCNC: 103 MMOL/L (ref 98–107)
CO2 SERPL-SCNC: 27 MMOL/L (ref 22–31)
CREAT SERPL-MCNC: 0.93 MG/DL (ref 0.6–1.1)
DIASTOLIC BLOOD PRESSURE - MUSE: NORMAL MMHG
ERYTHROCYTE [DISTWIDTH] IN BLOOD BY AUTOMATED COUNT: 13.5 % (ref 11–14.5)
GFR SERPL CREATININE-BSD FRML MDRD: 59 ML/MIN/1.73M2
GLUCOSE BLD-MCNC: 119 MG/DL (ref 70–125)
HCT VFR BLD AUTO: 46.5 % (ref 35–47)
HGB BLD-MCNC: 15.1 G/DL (ref 12–16)
INR PPP: 2.8 (ref 0.9–1.1)
INTERPRETATION ECG - MUSE: NORMAL
MCH RBC QN AUTO: 28.4 PG (ref 27–34)
MCHC RBC AUTO-ENTMCNC: 32.5 G/DL (ref 32–36)
MCV RBC AUTO: 87 FL (ref 80–100)
P AXIS - MUSE: NORMAL DEGREES
PLATELET # BLD AUTO: 157 THOU/UL (ref 140–440)
PMV BLD AUTO: 9.2 FL (ref 7–10)
POTASSIUM BLD-SCNC: 4.6 MMOL/L (ref 3.5–5)
PR INTERVAL - MUSE: NORMAL MS
PROT SERPL-MCNC: 6.4 G/DL (ref 6–8)
QRS DURATION - MUSE: 136 MS
QT - MUSE: 418 MS
QTC - MUSE: 466 MS
R AXIS - MUSE: 58 DEGREES
RBC # BLD AUTO: 5.32 MILL/UL (ref 3.8–5.4)
SODIUM SERPL-SCNC: 142 MMOL/L (ref 136–145)
SYSTOLIC BLOOD PRESSURE - MUSE: NORMAL MMHG
T AXIS - MUSE: -51 DEGREES
TSH SERPL DL<=0.005 MIU/L-ACNC: 2.37 UIU/ML (ref 0.3–5)
VENTRICULAR RATE- MUSE: 75 BPM
WBC: 11.6 THOU/UL (ref 4–11)

## 2019-06-27 ENCOUNTER — OFFICE VISIT - HEALTHEAST (OUTPATIENT)
Dept: CARDIOLOGY | Facility: CLINIC | Age: 74
End: 2019-06-27

## 2019-06-27 ENCOUNTER — COMMUNICATION - HEALTHEAST (OUTPATIENT)
Dept: CARDIOLOGY | Facility: CLINIC | Age: 74
End: 2019-06-27

## 2019-06-27 ENCOUNTER — COMMUNICATION - HEALTHEAST (OUTPATIENT)
Dept: INTERNAL MEDICINE | Facility: CLINIC | Age: 74
End: 2019-06-27

## 2019-06-27 DIAGNOSIS — R06.02 SOB (SHORTNESS OF BREATH): ICD-10-CM

## 2019-06-27 DIAGNOSIS — I10 ESSENTIAL HYPERTENSION: ICD-10-CM

## 2019-06-27 DIAGNOSIS — I48.20 CHRONIC ATRIAL FIBRILLATION (H): ICD-10-CM

## 2019-06-27 ASSESSMENT — MIFFLIN-ST. JEOR: SCORE: 1985.94

## 2019-07-02 ENCOUNTER — HOSPITAL ENCOUNTER (OUTPATIENT)
Dept: NUCLEAR MEDICINE | Facility: HOSPITAL | Age: 74
Discharge: HOME | End: 2019-07-02
Attending: INTERNAL MEDICINE

## 2019-07-02 ENCOUNTER — HOSPITAL ENCOUNTER (OUTPATIENT)
Dept: CARDIOLOGY | Facility: HOSPITAL | Age: 74
Discharge: HOME | End: 2019-07-02
Attending: INTERNAL MEDICINE

## 2019-07-02 DIAGNOSIS — R06.02 SOB (SHORTNESS OF BREATH): ICD-10-CM

## 2019-07-02 DIAGNOSIS — I48.20 CHRONIC ATRIAL FIBRILLATION (H): ICD-10-CM

## 2019-07-02 ASSESSMENT — MIFFLIN-ST. JEOR: SCORE: 2015.43

## 2019-07-03 ENCOUNTER — HOSPITAL ENCOUNTER (OUTPATIENT)
Dept: NUCLEAR MEDICINE | Facility: HOSPITAL | Age: 74
Discharge: HOME | End: 2019-07-03
Attending: INTERNAL MEDICINE

## 2019-07-03 LAB
CV STRESS CURRENT BP HE: NORMAL
CV STRESS CURRENT HR HE: 54
CV STRESS CURRENT HR HE: 78
CV STRESS CURRENT HR HE: 81
CV STRESS CURRENT HR HE: 82
CV STRESS CURRENT HR HE: 82
CV STRESS CURRENT HR HE: 83
CV STRESS CURRENT HR HE: 84
CV STRESS CURRENT HR HE: 85
CV STRESS CURRENT HR HE: 86
CV STRESS CURRENT HR HE: 86
CV STRESS CURRENT HR HE: 87
CV STRESS CURRENT HR HE: 89
CV STRESS CURRENT HR HE: 90
CV STRESS CURRENT HR HE: 92
CV STRESS CURRENT HR HE: 92
CV STRESS CURRENT HR HE: 99
CV STRESS DEVIATION TIME HE: NORMAL
CV STRESS ECHO PERCENT HR HE: NORMAL
CV STRESS EXERCISE STAGE HE: NORMAL
CV STRESS FINAL RESTING BP HE: NORMAL
CV STRESS FINAL RESTING HR HE: 82
CV STRESS MAX HR HE: 99
CV STRESS MAX TREADMILL GRADE HE: 0
CV STRESS MAX TREADMILL SPEED HE: 0
CV STRESS PEAK DIA BP HE: NORMAL
CV STRESS PEAK SYS BP HE: NORMAL
CV STRESS PHASE HE: NORMAL
CV STRESS PROTOCOL HE: NORMAL
CV STRESS RESTING PT POSITION HE: NORMAL
CV STRESS RESTING PT POSITION HE: NORMAL
CV STRESS ST DEVIATION AMOUNT HE: NORMAL
CV STRESS ST DEVIATION ELEVATION HE: NORMAL
CV STRESS ST EVELATION AMOUNT HE: NORMAL
CV STRESS TEST TYPE HE: NORMAL
CV STRESS TOTAL STAGE TIME MIN 1 HE: NORMAL
NUC STRESS EJECTION FRACTION: 75 %
STRESS ECHO BASELINE BP: NORMAL
STRESS ECHO BASELINE HR: 77
STRESS ECHO CALCULATED PERCENT HR: 67 %
STRESS ECHO LAST STRESS BP: NORMAL
STRESS ECHO LAST STRESS HR: 89

## 2019-07-05 ENCOUNTER — AMBULATORY - HEALTHEAST (OUTPATIENT)
Dept: CARDIOLOGY | Facility: CLINIC | Age: 74
End: 2019-07-05

## 2019-07-05 ENCOUNTER — COMMUNICATION - HEALTHEAST (OUTPATIENT)
Dept: INTERNAL MEDICINE | Facility: CLINIC | Age: 74
End: 2019-07-05

## 2019-07-05 DIAGNOSIS — I48.91 ATRIAL FIBRILLATION (H): ICD-10-CM

## 2019-07-10 ENCOUNTER — AMBULATORY - HEALTHEAST (OUTPATIENT)
Dept: LAB | Facility: CLINIC | Age: 74
End: 2019-07-10

## 2019-07-10 ENCOUNTER — COMMUNICATION - HEALTHEAST (OUTPATIENT)
Dept: ANTICOAGULATION | Facility: CLINIC | Age: 74
End: 2019-07-10

## 2019-07-10 DIAGNOSIS — I48.91 ATRIAL FIBRILLATION (H): ICD-10-CM

## 2019-07-10 LAB — INR PPP: 2.6 (ref 0.9–1.1)

## 2019-07-19 ENCOUNTER — HOSPITAL ENCOUNTER (OUTPATIENT)
Dept: CARDIOLOGY | Facility: HOSPITAL | Age: 74
Discharge: HOME | End: 2019-07-19
Attending: INTERNAL MEDICINE

## 2019-07-19 DIAGNOSIS — I48.91 ATRIAL FIBRILLATION (H): ICD-10-CM

## 2019-07-22 ENCOUNTER — COMMUNICATION - HEALTHEAST (OUTPATIENT)
Dept: ANTICOAGULATION | Facility: CLINIC | Age: 74
End: 2019-07-22

## 2019-07-22 ENCOUNTER — OFFICE VISIT - HEALTHEAST (OUTPATIENT)
Dept: INTERNAL MEDICINE | Facility: CLINIC | Age: 74
End: 2019-07-22

## 2019-07-22 DIAGNOSIS — E78.2 MIXED HYPERLIPIDEMIA: ICD-10-CM

## 2019-07-22 DIAGNOSIS — R59.0 MEDIASTINAL LYMPHADENOPATHY: ICD-10-CM

## 2019-07-22 DIAGNOSIS — I48.91 ATRIAL FIBRILLATION (H): ICD-10-CM

## 2019-07-22 DIAGNOSIS — Z01.818 PREOP EXAM FOR INTERNAL MEDICINE: ICD-10-CM

## 2019-07-22 DIAGNOSIS — I48.20 CHRONIC ATRIAL FIBRILLATION (H): ICD-10-CM

## 2019-07-22 DIAGNOSIS — Z12.31 VISIT FOR SCREENING MAMMOGRAM: ICD-10-CM

## 2019-07-22 LAB
CHOLEST SERPL-MCNC: 124 MG/DL
FASTING STATUS PATIENT QL REPORTED: NO
HDLC SERPL-MCNC: 34 MG/DL
INR PPP: 3.1 (ref 0.9–1.1)
LDLC SERPL CALC-MCNC: 58 MG/DL
TRIGL SERPL-MCNC: 162 MG/DL

## 2019-07-22 ASSESSMENT — MIFFLIN-ST. JEOR: SCORE: 1959.86

## 2019-07-23 ENCOUNTER — AMBULATORY - HEALTHEAST (OUTPATIENT)
Dept: OTHER | Facility: CLINIC | Age: 74
End: 2019-07-23

## 2019-07-23 ENCOUNTER — COMMUNICATION - HEALTHEAST (OUTPATIENT)
Dept: INTERNAL MEDICINE | Facility: CLINIC | Age: 74
End: 2019-07-23

## 2019-07-24 ASSESSMENT — MIFFLIN-ST. JEOR: SCORE: 1959.86

## 2019-07-25 ENCOUNTER — ANESTHESIA - HEALTHEAST (OUTPATIENT)
Dept: SURGERY | Facility: HOSPITAL | Age: 74
End: 2019-07-25

## 2019-07-25 ENCOUNTER — SURGERY - HEALTHEAST (OUTPATIENT)
Dept: SURGERY | Facility: HOSPITAL | Age: 74
End: 2019-07-25

## 2019-07-25 ENCOUNTER — COMMUNICATION - HEALTHEAST (OUTPATIENT)
Dept: ANTICOAGULATION | Facility: CLINIC | Age: 74
End: 2019-07-25

## 2019-07-25 DIAGNOSIS — I48.91 ATRIAL FIBRILLATION (H): ICD-10-CM

## 2019-07-30 ENCOUNTER — AMBULATORY - HEALTHEAST (OUTPATIENT)
Dept: LAB | Facility: CLINIC | Age: 74
End: 2019-07-30

## 2019-07-30 ENCOUNTER — COMMUNICATION - HEALTHEAST (OUTPATIENT)
Dept: ANTICOAGULATION | Facility: CLINIC | Age: 74
End: 2019-07-30

## 2019-07-30 DIAGNOSIS — I48.91 ATRIAL FIBRILLATION (H): ICD-10-CM

## 2019-07-30 LAB — INR PPP: 2.7 (ref 0.9–1.1)

## 2019-07-31 ENCOUNTER — AMBULATORY - HEALTHEAST (OUTPATIENT)
Dept: CARDIOLOGY | Facility: CLINIC | Age: 74
End: 2019-07-31

## 2019-07-31 DIAGNOSIS — I48.20 CHRONIC ATRIAL FIBRILLATION (H): ICD-10-CM

## 2019-08-01 ENCOUNTER — HOSPITAL ENCOUNTER (OUTPATIENT)
Dept: CT IMAGING | Facility: HOSPITAL | Age: 74
Discharge: HOME | End: 2019-08-01
Attending: INTERNAL MEDICINE

## 2019-08-01 DIAGNOSIS — R59.0 MEDIASTINAL LYMPHADENOPATHY: ICD-10-CM

## 2019-08-07 ENCOUNTER — COMMUNICATION - HEALTHEAST (OUTPATIENT)
Dept: ANTICOAGULATION | Facility: CLINIC | Age: 74
End: 2019-08-07

## 2019-08-07 ENCOUNTER — COMMUNICATION - HEALTHEAST (OUTPATIENT)
Dept: INTERNAL MEDICINE | Facility: CLINIC | Age: 74
End: 2019-08-07

## 2019-08-07 ENCOUNTER — AMBULATORY - HEALTHEAST (OUTPATIENT)
Dept: LAB | Facility: CLINIC | Age: 74
End: 2019-08-07

## 2019-08-07 DIAGNOSIS — I48.91 ATRIAL FIBRILLATION (H): ICD-10-CM

## 2019-08-07 DIAGNOSIS — N63.20 MASS OF LEFT BREAST: ICD-10-CM

## 2019-08-07 DIAGNOSIS — R91.8 OTHER NONSPECIFIC ABNORMAL FINDING OF LUNG FIELD: ICD-10-CM

## 2019-08-07 DIAGNOSIS — N63.0 BREAST NODULE: ICD-10-CM

## 2019-08-07 DIAGNOSIS — J98.59 LESION OF MEDIASTINUM: ICD-10-CM

## 2019-08-07 DIAGNOSIS — N64.89 OTHER SPECIFIED DISORDERS OF BREAST: ICD-10-CM

## 2019-08-07 LAB — INR PPP: 2.6 (ref 0.9–1.1)

## 2019-08-08 ENCOUNTER — COMMUNICATION - HEALTHEAST (OUTPATIENT)
Dept: ONCOLOGY | Facility: HOSPITAL | Age: 74
End: 2019-08-08

## 2019-08-10 ENCOUNTER — COMMUNICATION - HEALTHEAST (OUTPATIENT)
Dept: INTERNAL MEDICINE | Facility: CLINIC | Age: 74
End: 2019-08-10

## 2019-08-10 DIAGNOSIS — I89.0 ACQUIRED LYMPHEDEMA OF LEG: ICD-10-CM

## 2019-08-10 DIAGNOSIS — I87.303 VENOUS HYPERTENSION OF LOWER EXTREMITY, BILATERAL: ICD-10-CM

## 2019-08-12 ENCOUNTER — HOSPITAL ENCOUNTER (OUTPATIENT)
Dept: MAMMOGRAPHY | Facility: CLINIC | Age: 74
Discharge: HOME | End: 2019-08-12
Attending: INTERNAL MEDICINE

## 2019-08-12 DIAGNOSIS — N63.0 BREAST NODULE: ICD-10-CM

## 2019-08-12 DIAGNOSIS — N63.20 MASS OF LEFT BREAST: ICD-10-CM

## 2019-08-12 DIAGNOSIS — N64.89 OTHER SPECIFIED DISORDERS OF BREAST: ICD-10-CM

## 2019-08-15 ENCOUNTER — HOSPITAL ENCOUNTER (OUTPATIENT)
Dept: PET IMAGING | Facility: HOSPITAL | Age: 74
Discharge: HOME | End: 2019-08-15
Attending: INTERNAL MEDICINE

## 2019-08-15 DIAGNOSIS — J98.59 LESION OF MEDIASTINUM: ICD-10-CM

## 2019-08-15 DIAGNOSIS — R91.8 OTHER NONSPECIFIC ABNORMAL FINDING OF LUNG FIELD: ICD-10-CM

## 2019-08-15 LAB — GLUCOSE BLDC GLUCOMTR-MCNC: 115 MG/DL (ref 70–139)

## 2019-08-15 ASSESSMENT — MIFFLIN-ST. JEOR: SCORE: 1972.33

## 2019-08-16 ENCOUNTER — HOSPITAL ENCOUNTER (OUTPATIENT)
Dept: MAMMOGRAPHY | Facility: CLINIC | Age: 74
Discharge: HOME | End: 2019-08-16
Attending: INTERNAL MEDICINE

## 2019-08-16 ENCOUNTER — HOSPITAL ENCOUNTER (OUTPATIENT)
Dept: MAMMOGRAPHY | Facility: CLINIC | Age: 74
Discharge: HOME | End: 2019-08-16
Attending: INTERNAL MEDICINE | Admitting: RADIOLOGY

## 2019-08-16 DIAGNOSIS — N63.0 BREAST NODULE: ICD-10-CM

## 2019-08-16 DIAGNOSIS — R92.8 OTHER ABNORMAL AND INCONCLUSIVE FINDINGS ON DIAGNOSTIC IMAGING OF BREAST: ICD-10-CM

## 2019-08-16 DIAGNOSIS — N63.20 LEFT BREAST MASS: ICD-10-CM

## 2019-08-16 DIAGNOSIS — N64.89 OTHER SPECIFIED DISORDERS OF BREAST: ICD-10-CM

## 2019-08-19 ENCOUNTER — COMMUNICATION - HEALTHEAST (OUTPATIENT)
Dept: INTERNAL MEDICINE | Facility: CLINIC | Age: 74
End: 2019-08-19

## 2019-08-19 DIAGNOSIS — E78.5 HYPERLIPIDEMIA: ICD-10-CM

## 2019-08-20 ENCOUNTER — COMMUNICATION - HEALTHEAST (OUTPATIENT)
Dept: ONCOLOGY | Facility: HOSPITAL | Age: 74
End: 2019-08-20

## 2019-08-21 ENCOUNTER — AMBULATORY - HEALTHEAST (OUTPATIENT)
Dept: INFUSION THERAPY | Facility: HOSPITAL | Age: 74
End: 2019-08-21

## 2019-08-21 ENCOUNTER — AMBULATORY - HEALTHEAST (OUTPATIENT)
Dept: ONCOLOGY | Facility: HOSPITAL | Age: 74
End: 2019-08-21

## 2019-08-21 ENCOUNTER — OFFICE VISIT - HEALTHEAST (OUTPATIENT)
Dept: ONCOLOGY | Facility: HOSPITAL | Age: 74
End: 2019-08-21

## 2019-08-21 ENCOUNTER — COMMUNICATION - HEALTHEAST (OUTPATIENT)
Dept: INTERNAL MEDICINE | Facility: CLINIC | Age: 74
End: 2019-08-21

## 2019-08-21 DIAGNOSIS — I48.91 ATRIAL FIBRILLATION (H): ICD-10-CM

## 2019-08-21 DIAGNOSIS — C50.412 MALIGNANT NEOPLASM OF UPPER-OUTER QUADRANT OF LEFT BREAST IN FEMALE, ESTROGEN RECEPTOR POSITIVE (H): ICD-10-CM

## 2019-08-21 DIAGNOSIS — Z17.0 MALIGNANT NEOPLASM OF UPPER-OUTER QUADRANT OF LEFT BREAST IN FEMALE, ESTROGEN RECEPTOR POSITIVE (H): ICD-10-CM

## 2019-08-21 DIAGNOSIS — N63.0 BREAST NODULE: ICD-10-CM

## 2019-08-21 DIAGNOSIS — J98.59 LESION OF MEDIASTINUM: ICD-10-CM

## 2019-08-21 LAB — CEA SERPL-MCNC: 2.6 NG/ML (ref 0–3)

## 2019-08-21 ASSESSMENT — MIFFLIN-ST. JEOR: SCORE: 1975.51

## 2019-08-22 ENCOUNTER — COMMUNICATION - HEALTHEAST (OUTPATIENT)
Dept: INTERNAL MEDICINE | Facility: CLINIC | Age: 74
End: 2019-08-22

## 2019-08-22 DIAGNOSIS — I48.91 ATRIAL FIBRILLATION (H): ICD-10-CM

## 2019-08-22 LAB — CANCER AG27-29 SERPL-ACNC: 31 U/ML (ref 0–39)

## 2019-08-23 ENCOUNTER — COMMUNICATION - HEALTHEAST (OUTPATIENT)
Dept: ONCOLOGY | Facility: HOSPITAL | Age: 74
End: 2019-08-23

## 2019-08-28 ENCOUNTER — COMMUNICATION - HEALTHEAST (OUTPATIENT)
Dept: ANTICOAGULATION | Facility: CLINIC | Age: 74
End: 2019-08-28

## 2019-08-28 ENCOUNTER — HOSPITAL ENCOUNTER (OUTPATIENT)
Dept: INTERVENTIONAL RADIOLOGY/VASCULAR | Facility: HOSPITAL | Age: 74
Setting detail: RADIATION/ONCOLOGY SERIES
Discharge: STILL ADMITTED OR ADMINISTRATIVE | End: 2019-08-28
Attending: INTERNAL MEDICINE

## 2019-08-28 DIAGNOSIS — Z17.0 MALIGNANT NEOPLASM OF UPPER-OUTER QUADRANT OF LEFT BREAST IN FEMALE, ESTROGEN RECEPTOR POSITIVE (H): ICD-10-CM

## 2019-08-28 DIAGNOSIS — I48.91 ATRIAL FIBRILLATION (H): ICD-10-CM

## 2019-08-28 DIAGNOSIS — C50.412 MALIGNANT NEOPLASM OF UPPER-OUTER QUADRANT OF LEFT BREAST IN FEMALE, ESTROGEN RECEPTOR POSITIVE (H): ICD-10-CM

## 2019-08-28 LAB — INR PPP: 1.52 (ref 0.9–1.1)

## 2019-08-29 ENCOUNTER — AMBULATORY - HEALTHEAST (OUTPATIENT)
Dept: ONCOLOGY | Facility: HOSPITAL | Age: 74
End: 2019-08-29

## 2019-08-29 ENCOUNTER — AMBULATORY - HEALTHEAST (OUTPATIENT)
Dept: INFUSION THERAPY | Facility: HOSPITAL | Age: 74
End: 2019-08-29

## 2019-08-29 DIAGNOSIS — C50.412 MALIGNANT NEOPLASM OF UPPER-OUTER QUADRANT OF LEFT BREAST IN FEMALE, ESTROGEN RECEPTOR POSITIVE (H): ICD-10-CM

## 2019-08-29 DIAGNOSIS — Z17.0 MALIGNANT NEOPLASM OF UPPER-OUTER QUADRANT OF LEFT BREAST IN FEMALE, ESTROGEN RECEPTOR POSITIVE (H): ICD-10-CM

## 2019-08-30 ENCOUNTER — COMMUNICATION - HEALTHEAST (OUTPATIENT)
Dept: ONCOLOGY | Facility: HOSPITAL | Age: 74
End: 2019-08-30

## 2019-08-30 ENCOUNTER — HOSPITAL ENCOUNTER (OUTPATIENT)
Dept: CT IMAGING | Facility: HOSPITAL | Age: 74
Setting detail: RADIATION/ONCOLOGY SERIES
Discharge: STILL ADMITTED OR ADMINISTRATIVE | End: 2019-08-30
Attending: INTERNAL MEDICINE

## 2019-08-30 ENCOUNTER — COMMUNICATION - HEALTHEAST (OUTPATIENT)
Dept: ANTICOAGULATION | Facility: CLINIC | Age: 74
End: 2019-08-30

## 2019-08-30 DIAGNOSIS — J98.59 LESION OF MEDIASTINUM: ICD-10-CM

## 2019-08-30 DIAGNOSIS — N63.0 BREAST NODULE: ICD-10-CM

## 2019-08-30 DIAGNOSIS — C50.412 MALIGNANT NEOPLASM OF UPPER-OUTER QUADRANT OF LEFT BREAST IN FEMALE, ESTROGEN RECEPTOR POSITIVE (H): ICD-10-CM

## 2019-08-30 DIAGNOSIS — Z17.0 MALIGNANT NEOPLASM OF UPPER-OUTER QUADRANT OF LEFT BREAST IN FEMALE, ESTROGEN RECEPTOR POSITIVE (H): ICD-10-CM

## 2019-08-30 LAB
HGB BLD-MCNC: 14.1 G/DL (ref 12–16)
INR PPP: 1.21 (ref 0.9–1.1)
PLATELET # BLD AUTO: 136 THOU/UL (ref 140–440)

## 2019-09-02 ENCOUNTER — COMMUNICATION - HEALTHEAST (OUTPATIENT)
Dept: INTERNAL MEDICINE | Facility: CLINIC | Age: 74
End: 2019-09-02

## 2019-09-02 DIAGNOSIS — E87.6 HYPOPOTASSEMIA: ICD-10-CM

## 2019-09-04 LAB
CAP COMMENT: ABNORMAL
LAB AP CHARGES (HE HISTORICAL CONVERSION): ABNORMAL
LAB AP CHARGES (HE HISTORICAL CONVERSION): NORMAL
LAB AP INITIAL CYTO EVAL (HE HISTORICAL CONVERSION): ABNORMAL
LAB MED GENERAL PATH INTERP (HE HISTORICAL CONVERSION): ABNORMAL
PATH REPORT.COMMENTS IMP SPEC: ABNORMAL
PATH REPORT.COMMENTS IMP SPEC: ABNORMAL
PATH REPORT.COMMENTS IMP SPEC: NORMAL
PATH REPORT.FINAL DX SPEC: ABNORMAL
PATH REPORT.FINAL DX SPEC: NORMAL
PATH REPORT.MICROSCOPIC SPEC OTHER STN: ABNORMAL
PATH REPORT.MICROSCOPIC SPEC OTHER STN: ABNORMAL
PATH REPORT.MICROSCOPIC SPEC OTHER STN: NORMAL
PATH REPORT.RELEVANT HX SPEC: ABNORMAL
RESULT FLAG (HE HISTORICAL CONVERSION): NORMAL
SPECIMEN DESCRIPTION: ABNORMAL

## 2019-09-05 ENCOUNTER — OFFICE VISIT - HEALTHEAST (OUTPATIENT)
Dept: PULMONOLOGY | Facility: OTHER | Age: 74
End: 2019-09-05

## 2019-09-05 DIAGNOSIS — J32.9 CHRONIC SINUSITIS, UNSPECIFIED LOCATION: ICD-10-CM

## 2019-09-05 DIAGNOSIS — R06.09 DYSPNEA ON EXERTION: ICD-10-CM

## 2019-09-05 DIAGNOSIS — G47.33 OSA (OBSTRUCTIVE SLEEP APNEA): ICD-10-CM

## 2019-09-05 ASSESSMENT — MIFFLIN-ST. JEOR: SCORE: 1970.07

## 2019-09-06 ENCOUNTER — AMBULATORY - HEALTHEAST (OUTPATIENT)
Dept: LAB | Facility: CLINIC | Age: 74
End: 2019-09-06

## 2019-09-06 ENCOUNTER — COMMUNICATION - HEALTHEAST (OUTPATIENT)
Dept: ANTICOAGULATION | Facility: CLINIC | Age: 74
End: 2019-09-06

## 2019-09-06 DIAGNOSIS — I48.91 ATRIAL FIBRILLATION (H): ICD-10-CM

## 2019-09-06 LAB — INR PPP: 1.5 (ref 0.9–1.1)

## 2019-09-10 ENCOUNTER — COMMUNICATION - HEALTHEAST (OUTPATIENT)
Dept: ANTICOAGULATION | Facility: CLINIC | Age: 74
End: 2019-09-10

## 2019-09-10 ENCOUNTER — OFFICE VISIT - HEALTHEAST (OUTPATIENT)
Dept: INTERNAL MEDICINE | Facility: CLINIC | Age: 74
End: 2019-09-10

## 2019-09-10 DIAGNOSIS — C50.412 MALIGNANT NEOPLASM OF UPPER-OUTER QUADRANT OF LEFT BREAST IN FEMALE, ESTROGEN RECEPTOR POSITIVE (H): ICD-10-CM

## 2019-09-10 DIAGNOSIS — Z23 FLU VACCINE NEED: ICD-10-CM

## 2019-09-10 DIAGNOSIS — N18.1 TYPE 2 DIABETES MELLITUS WITH STAGE 1 CHRONIC KIDNEY DISEASE, WITHOUT LONG-TERM CURRENT USE OF INSULIN (H): ICD-10-CM

## 2019-09-10 DIAGNOSIS — E11.22 TYPE 2 DIABETES MELLITUS WITH STAGE 1 CHRONIC KIDNEY DISEASE, WITHOUT LONG-TERM CURRENT USE OF INSULIN (H): ICD-10-CM

## 2019-09-10 DIAGNOSIS — I48.91 ATRIAL FIBRILLATION (H): ICD-10-CM

## 2019-09-10 DIAGNOSIS — Z17.0 MALIGNANT NEOPLASM OF UPPER-OUTER QUADRANT OF LEFT BREAST IN FEMALE, ESTROGEN RECEPTOR POSITIVE (H): ICD-10-CM

## 2019-09-10 LAB — INR PPP: 2.1 (ref 0.9–1.1)

## 2019-09-10 ASSESSMENT — MIFFLIN-ST. JEOR: SCORE: 1972.05

## 2019-09-11 ENCOUNTER — INFUSION - HEALTHEAST (OUTPATIENT)
Dept: INFUSION THERAPY | Facility: HOSPITAL | Age: 74
End: 2019-09-11

## 2019-09-11 ENCOUNTER — OFFICE VISIT - HEALTHEAST (OUTPATIENT)
Dept: ONCOLOGY | Facility: HOSPITAL | Age: 74
End: 2019-09-11

## 2019-09-11 ENCOUNTER — AMBULATORY - HEALTHEAST (OUTPATIENT)
Dept: INFUSION THERAPY | Facility: HOSPITAL | Age: 74
End: 2019-09-11

## 2019-09-11 ENCOUNTER — COMMUNICATION - HEALTHEAST (OUTPATIENT)
Dept: INFUSION THERAPY | Facility: HOSPITAL | Age: 74
End: 2019-09-11

## 2019-09-11 ENCOUNTER — AMBULATORY - HEALTHEAST (OUTPATIENT)
Dept: ONCOLOGY | Facility: HOSPITAL | Age: 74
End: 2019-09-11

## 2019-09-11 DIAGNOSIS — C50.412 MALIGNANT NEOPLASM OF UPPER-OUTER QUADRANT OF LEFT BREAST IN FEMALE, ESTROGEN RECEPTOR POSITIVE (H): ICD-10-CM

## 2019-09-11 DIAGNOSIS — Z17.0 MALIGNANT NEOPLASM OF UPPER-OUTER QUADRANT OF LEFT BREAST IN FEMALE, ESTROGEN RECEPTOR POSITIVE (H): ICD-10-CM

## 2019-09-11 DIAGNOSIS — E11.22 TYPE 2 DIABETES MELLITUS WITH STAGE 1 CHRONIC KIDNEY DISEASE, WITHOUT LONG-TERM CURRENT USE OF INSULIN (H): ICD-10-CM

## 2019-09-11 DIAGNOSIS — N18.1 TYPE 2 DIABETES MELLITUS WITH STAGE 1 CHRONIC KIDNEY DISEASE, WITHOUT LONG-TERM CURRENT USE OF INSULIN (H): ICD-10-CM

## 2019-09-11 LAB
ALBUMIN SERPL-MCNC: 3.6 G/DL (ref 3.5–5)
ALP SERPL-CCNC: 85 U/L (ref 45–120)
ALT SERPL W P-5'-P-CCNC: 26 U/L (ref 0–45)
ANION GAP SERPL CALCULATED.3IONS-SCNC: 10 MMOL/L (ref 5–18)
AST SERPL W P-5'-P-CCNC: 20 U/L (ref 0–40)
BASOPHILS # BLD AUTO: 0.1 THOU/UL (ref 0–0.2)
BASOPHILS NFR BLD AUTO: 0 % (ref 0–2)
BILIRUB SERPL-MCNC: 0.7 MG/DL (ref 0–1)
BUN SERPL-MCNC: 17 MG/DL (ref 8–28)
CALCIUM SERPL-MCNC: 9.4 MG/DL (ref 8.5–10.5)
CHLORIDE BLD-SCNC: 105 MMOL/L (ref 98–107)
CO2 SERPL-SCNC: 27 MMOL/L (ref 22–31)
CREAT SERPL-MCNC: 1.01 MG/DL (ref 0.6–1.1)
EOSINOPHIL # BLD AUTO: 0.2 THOU/UL (ref 0–0.4)
EOSINOPHIL NFR BLD AUTO: 1 % (ref 0–6)
ERYTHROCYTE [DISTWIDTH] IN BLOOD BY AUTOMATED COUNT: 16.3 % (ref 11–14.5)
GFR SERPL CREATININE-BSD FRML MDRD: 54 ML/MIN/1.73M2
GLUCOSE BLD-MCNC: 141 MG/DL (ref 70–125)
HBA1C MFR BLD: 6.5 % (ref 4.2–6.1)
HCT VFR BLD AUTO: 47.1 % (ref 35–47)
HGB BLD-MCNC: 14.9 G/DL (ref 12–16)
LYMPHOCYTES # BLD AUTO: 1.5 THOU/UL (ref 0.8–4.4)
LYMPHOCYTES NFR BLD AUTO: 13 % (ref 20–40)
MCH RBC QN AUTO: 27.7 PG (ref 27–34)
MCHC RBC AUTO-ENTMCNC: 31.6 G/DL (ref 32–36)
MCV RBC AUTO: 88 FL (ref 80–100)
MONOCYTES # BLD AUTO: 1.1 THOU/UL (ref 0–0.9)
MONOCYTES NFR BLD AUTO: 10 % (ref 2–10)
NEUTROPHILS # BLD AUTO: 8.9 THOU/UL (ref 2–7.7)
NEUTROPHILS NFR BLD AUTO: 76 % (ref 50–70)
PLATELET # BLD AUTO: 147 THOU/UL (ref 140–440)
PMV BLD AUTO: 11.4 FL (ref 8.5–12.5)
POTASSIUM BLD-SCNC: 4 MMOL/L (ref 3.5–5)
PROT SERPL-MCNC: 6.9 G/DL (ref 6–8)
RBC # BLD AUTO: 5.38 MILL/UL (ref 3.8–5.4)
SODIUM SERPL-SCNC: 142 MMOL/L (ref 136–145)
WBC: 11.7 THOU/UL (ref 4–11)

## 2019-09-12 ENCOUNTER — COMMUNICATION - HEALTHEAST (OUTPATIENT)
Dept: INTERNAL MEDICINE | Facility: CLINIC | Age: 74
End: 2019-09-12

## 2019-09-18 ENCOUNTER — AMBULATORY - HEALTHEAST (OUTPATIENT)
Dept: ONCOLOGY | Facility: HOSPITAL | Age: 74
End: 2019-09-18

## 2019-09-18 ENCOUNTER — INFUSION - HEALTHEAST (OUTPATIENT)
Dept: INFUSION THERAPY | Facility: HOSPITAL | Age: 74
End: 2019-09-18

## 2019-09-18 DIAGNOSIS — C50.412 MALIGNANT NEOPLASM OF UPPER-OUTER QUADRANT OF LEFT BREAST IN FEMALE, ESTROGEN RECEPTOR POSITIVE (H): ICD-10-CM

## 2019-09-18 DIAGNOSIS — Z17.0 MALIGNANT NEOPLASM OF UPPER-OUTER QUADRANT OF LEFT BREAST IN FEMALE, ESTROGEN RECEPTOR POSITIVE (H): ICD-10-CM

## 2019-09-18 LAB
BASOPHILS # BLD AUTO: 0 THOU/UL (ref 0–0.2)
BASOPHILS NFR BLD AUTO: 0 % (ref 0–2)
EOSINOPHIL # BLD AUTO: 0.2 THOU/UL (ref 0–0.4)
EOSINOPHIL NFR BLD AUTO: 2 % (ref 0–6)
ERYTHROCYTE [DISTWIDTH] IN BLOOD BY AUTOMATED COUNT: 16 % (ref 11–14.5)
HCT VFR BLD AUTO: 42.2 % (ref 35–47)
HGB BLD-MCNC: 13.2 G/DL (ref 12–16)
LYMPHOCYTES # BLD AUTO: 1.4 THOU/UL (ref 0.8–4.4)
LYMPHOCYTES NFR BLD AUTO: 16 % (ref 20–40)
MCH RBC QN AUTO: 27.3 PG (ref 27–34)
MCHC RBC AUTO-ENTMCNC: 31.3 G/DL (ref 32–36)
MCV RBC AUTO: 87 FL (ref 80–100)
MONOCYTES # BLD AUTO: 0.4 THOU/UL (ref 0–0.9)
MONOCYTES NFR BLD AUTO: 5 % (ref 2–10)
NEUTROPHILS # BLD AUTO: 6.8 THOU/UL (ref 2–7.7)
NEUTROPHILS NFR BLD AUTO: 76 % (ref 50–70)
PLATELET # BLD AUTO: 162 THOU/UL (ref 140–440)
PMV BLD AUTO: 10.9 FL (ref 8.5–12.5)
RBC # BLD AUTO: 4.83 MILL/UL (ref 3.8–5.4)
WBC: 8.9 THOU/UL (ref 4–11)

## 2019-09-24 ENCOUNTER — COMMUNICATION - HEALTHEAST (OUTPATIENT)
Dept: ANTICOAGULATION | Facility: CLINIC | Age: 74
End: 2019-09-24

## 2019-09-24 ENCOUNTER — AMBULATORY - HEALTHEAST (OUTPATIENT)
Dept: LAB | Facility: CLINIC | Age: 74
End: 2019-09-24

## 2019-09-24 DIAGNOSIS — I48.91 ATRIAL FIBRILLATION (H): ICD-10-CM

## 2019-09-24 LAB — INR PPP: 1.8 (ref 0.9–1.1)

## 2019-09-25 ENCOUNTER — INFUSION - HEALTHEAST (OUTPATIENT)
Dept: INFUSION THERAPY | Facility: HOSPITAL | Age: 74
End: 2019-09-25

## 2019-09-25 ENCOUNTER — COMMUNICATION - HEALTHEAST (OUTPATIENT)
Dept: ONCOLOGY | Facility: HOSPITAL | Age: 74
End: 2019-09-25

## 2019-09-25 DIAGNOSIS — R39.9 UTI SYMPTOMS: ICD-10-CM

## 2019-09-25 DIAGNOSIS — I48.91 ATRIAL FIBRILLATION (H): ICD-10-CM

## 2019-09-25 DIAGNOSIS — Z17.0 MALIGNANT NEOPLASM OF UPPER-OUTER QUADRANT OF LEFT BREAST IN FEMALE, ESTROGEN RECEPTOR POSITIVE (H): ICD-10-CM

## 2019-09-25 DIAGNOSIS — C50.412 MALIGNANT NEOPLASM OF UPPER-OUTER QUADRANT OF LEFT BREAST IN FEMALE, ESTROGEN RECEPTOR POSITIVE (H): ICD-10-CM

## 2019-09-25 LAB
ALBUMIN UR-MCNC: NEGATIVE MG/DL
APPEARANCE UR: CLEAR
BACTERIA #/AREA URNS HPF: ABNORMAL HPF
BASOPHILS # BLD AUTO: 0 THOU/UL (ref 0–0.2)
BASOPHILS NFR BLD AUTO: 1 % (ref 0–2)
BILIRUB UR QL STRIP: NEGATIVE
COLOR UR AUTO: YELLOW
EOSINOPHIL # BLD AUTO: 0.1 THOU/UL (ref 0–0.4)
EOSINOPHIL NFR BLD AUTO: 2 % (ref 0–6)
ERYTHROCYTE [DISTWIDTH] IN BLOOD BY AUTOMATED COUNT: 17.3 % (ref 11–14.5)
GLUCOSE UR STRIP-MCNC: NEGATIVE MG/DL
HCT VFR BLD AUTO: 42.2 % (ref 35–47)
HGB BLD-MCNC: 13.5 G/DL (ref 12–16)
HGB UR QL STRIP: ABNORMAL
KETONES UR STRIP-MCNC: NEGATIVE MG/DL
LEUKOCYTE ESTERASE UR QL STRIP: NEGATIVE
LYMPHOCYTES # BLD AUTO: 1.3 THOU/UL (ref 0.8–4.4)
LYMPHOCYTES NFR BLD AUTO: 22 % (ref 20–40)
MCH RBC QN AUTO: 28 PG (ref 27–34)
MCHC RBC AUTO-ENTMCNC: 32 G/DL (ref 32–36)
MCV RBC AUTO: 88 FL (ref 80–100)
MONOCYTES # BLD AUTO: 0.4 THOU/UL (ref 0–0.9)
MONOCYTES NFR BLD AUTO: 6 % (ref 2–10)
MUCOUS THREADS #/AREA URNS LPF: ABNORMAL LPF
NEUTROPHILS # BLD AUTO: 4.2 THOU/UL (ref 2–7.7)
NEUTROPHILS NFR BLD AUTO: 69 % (ref 50–70)
NITRATE UR QL: NEGATIVE
PH UR STRIP: 5.5 [PH] (ref 4.5–8)
PLATELET # BLD AUTO: 183 THOU/UL (ref 140–440)
PMV BLD AUTO: 11.1 FL (ref 8.5–12.5)
RBC # BLD AUTO: 4.82 MILL/UL (ref 3.8–5.4)
RBC #/AREA URNS AUTO: ABNORMAL HPF
SP GR UR STRIP: 1 (ref 1–1.03)
SQUAMOUS #/AREA URNS AUTO: ABNORMAL LPF
UROBILINOGEN UR STRIP-ACNC: ABNORMAL
WBC #/AREA URNS AUTO: ABNORMAL HPF
WBC: 6.2 THOU/UL (ref 4–11)

## 2019-09-27 ENCOUNTER — COMMUNICATION - HEALTHEAST (OUTPATIENT)
Dept: PULMONOLOGY | Facility: OTHER | Age: 74
End: 2019-09-27

## 2019-09-27 ENCOUNTER — HOSPITAL ENCOUNTER (OUTPATIENT)
Dept: RESPIRATORY THERAPY | Facility: HOSPITAL | Age: 74
Discharge: HOME | End: 2019-09-27
Attending: INTERNAL MEDICINE

## 2019-09-27 DIAGNOSIS — R06.09 DYSPNEA ON EXERTION: ICD-10-CM

## 2019-09-27 DIAGNOSIS — R06.09 OTHER FORMS OF DYSPNEA: ICD-10-CM

## 2019-09-30 ENCOUNTER — COMMUNICATION - HEALTHEAST (OUTPATIENT)
Dept: PULMONOLOGY | Facility: OTHER | Age: 74
End: 2019-09-30

## 2019-10-01 ENCOUNTER — COMMUNICATION - HEALTHEAST (OUTPATIENT)
Dept: ONCOLOGY | Facility: HOSPITAL | Age: 74
End: 2019-10-01

## 2019-10-01 ENCOUNTER — HOSPITAL ENCOUNTER (OUTPATIENT)
Dept: SURGERY | Facility: CLINIC | Age: 74
Discharge: HOME | End: 2019-10-01
Attending: SPECIALIST

## 2019-10-01 DIAGNOSIS — C50.412 MALIGNANT NEOPLASM OF UPPER-OUTER QUADRANT OF LEFT BREAST IN FEMALE, ESTROGEN RECEPTOR POSITIVE (H): ICD-10-CM

## 2019-10-01 DIAGNOSIS — Z17.0 MALIGNANT NEOPLASM OF UPPER-OUTER QUADRANT OF LEFT BREAST IN FEMALE, ESTROGEN RECEPTOR POSITIVE (H): ICD-10-CM

## 2019-10-01 ASSESSMENT — MIFFLIN-ST. JEOR: SCORE: 1961.54

## 2019-10-02 ENCOUNTER — INFUSION - HEALTHEAST (OUTPATIENT)
Dept: INFUSION THERAPY | Facility: HOSPITAL | Age: 74
End: 2019-10-02

## 2019-10-02 ENCOUNTER — OFFICE VISIT - HEALTHEAST (OUTPATIENT)
Dept: ONCOLOGY | Facility: HOSPITAL | Age: 74
End: 2019-10-02

## 2019-10-02 ENCOUNTER — AMBULATORY - HEALTHEAST (OUTPATIENT)
Dept: ONCOLOGY | Facility: HOSPITAL | Age: 74
End: 2019-10-02

## 2019-10-02 ENCOUNTER — AMBULATORY - HEALTHEAST (OUTPATIENT)
Dept: INFUSION THERAPY | Facility: HOSPITAL | Age: 74
End: 2019-10-02

## 2019-10-02 ENCOUNTER — COMMUNICATION - HEALTHEAST (OUTPATIENT)
Dept: ANTICOAGULATION | Facility: CLINIC | Age: 74
End: 2019-10-02

## 2019-10-02 DIAGNOSIS — C50.412 MALIGNANT NEOPLASM OF UPPER-OUTER QUADRANT OF LEFT BREAST IN FEMALE, ESTROGEN RECEPTOR POSITIVE (H): ICD-10-CM

## 2019-10-02 DIAGNOSIS — Z17.0 MALIGNANT NEOPLASM OF UPPER-OUTER QUADRANT OF LEFT BREAST IN FEMALE, ESTROGEN RECEPTOR POSITIVE (H): ICD-10-CM

## 2019-10-02 DIAGNOSIS — I48.91 ATRIAL FIBRILLATION (H): ICD-10-CM

## 2019-10-02 LAB
ALBUMIN SERPL-MCNC: 3.3 G/DL (ref 3.5–5)
ALP SERPL-CCNC: 74 U/L (ref 45–120)
ALT SERPL W P-5'-P-CCNC: 26 U/L (ref 0–45)
ANION GAP SERPL CALCULATED.3IONS-SCNC: 8 MMOL/L (ref 5–18)
AST SERPL W P-5'-P-CCNC: 14 U/L (ref 0–40)
BASOPHILS # BLD AUTO: 0 THOU/UL (ref 0–0.2)
BASOPHILS NFR BLD AUTO: 1 % (ref 0–2)
BILIRUB SERPL-MCNC: 0.8 MG/DL (ref 0–1)
BUN SERPL-MCNC: 10 MG/DL (ref 8–28)
CALCIUM SERPL-MCNC: 9.2 MG/DL (ref 8.5–10.5)
CHLORIDE BLD-SCNC: 105 MMOL/L (ref 98–107)
CO2 SERPL-SCNC: 28 MMOL/L (ref 22–31)
CREAT SERPL-MCNC: 0.93 MG/DL (ref 0.6–1.1)
EOSINOPHIL # BLD AUTO: 0.1 THOU/UL (ref 0–0.4)
EOSINOPHIL NFR BLD AUTO: 2 % (ref 0–6)
ERYTHROCYTE [DISTWIDTH] IN BLOOD BY AUTOMATED COUNT: 18 % (ref 11–14.5)
GFR SERPL CREATININE-BSD FRML MDRD: 59 ML/MIN/1.73M2
GLUCOSE BLD-MCNC: 132 MG/DL (ref 70–125)
HCT VFR BLD AUTO: 41.9 % (ref 35–47)
HGB BLD-MCNC: 13.1 G/DL (ref 12–16)
INR PPP: 2.17 (ref 0.9–1.1)
LYMPHOCYTES # BLD AUTO: 1.2 THOU/UL (ref 0.8–4.4)
LYMPHOCYTES NFR BLD AUTO: 21 % (ref 20–40)
MCH RBC QN AUTO: 27.5 PG (ref 27–34)
MCHC RBC AUTO-ENTMCNC: 31.3 G/DL (ref 32–36)
MCV RBC AUTO: 88 FL (ref 80–100)
MONOCYTES # BLD AUTO: 0.5 THOU/UL (ref 0–0.9)
MONOCYTES NFR BLD AUTO: 8 % (ref 2–10)
NEUTROPHILS # BLD AUTO: 3.8 THOU/UL (ref 2–7.7)
NEUTROPHILS NFR BLD AUTO: 67 % (ref 50–70)
PLATELET # BLD AUTO: 240 THOU/UL (ref 140–440)
PMV BLD AUTO: 11 FL (ref 8.5–12.5)
POTASSIUM BLD-SCNC: 3.8 MMOL/L (ref 3.5–5)
PROT SERPL-MCNC: 6.2 G/DL (ref 6–8)
RBC # BLD AUTO: 4.76 MILL/UL (ref 3.8–5.4)
SODIUM SERPL-SCNC: 141 MMOL/L (ref 136–145)
WBC: 5.7 THOU/UL (ref 4–11)

## 2019-10-02 ASSESSMENT — MIFFLIN-ST. JEOR: SCORE: 1960.54

## 2019-10-07 ENCOUNTER — COMMUNICATION - HEALTHEAST (OUTPATIENT)
Dept: ANTICOAGULATION | Facility: CLINIC | Age: 74
End: 2019-10-07

## 2019-10-07 DIAGNOSIS — I48.91 ATRIAL FIBRILLATION (H): ICD-10-CM

## 2019-10-09 ENCOUNTER — INFUSION - HEALTHEAST (OUTPATIENT)
Dept: INFUSION THERAPY | Facility: HOSPITAL | Age: 74
End: 2019-10-09

## 2019-10-09 DIAGNOSIS — Z17.0 MALIGNANT NEOPLASM OF UPPER-OUTER QUADRANT OF LEFT BREAST IN FEMALE, ESTROGEN RECEPTOR POSITIVE (H): ICD-10-CM

## 2019-10-09 DIAGNOSIS — C50.412 MALIGNANT NEOPLASM OF UPPER-OUTER QUADRANT OF LEFT BREAST IN FEMALE, ESTROGEN RECEPTOR POSITIVE (H): ICD-10-CM

## 2019-10-09 LAB
BASOPHILS # BLD AUTO: 0.1 THOU/UL (ref 0–0.2)
BASOPHILS NFR BLD AUTO: 1 % (ref 0–2)
EOSINOPHIL # BLD AUTO: 0.1 THOU/UL (ref 0–0.4)
EOSINOPHIL NFR BLD AUTO: 2 % (ref 0–6)
ERYTHROCYTE [DISTWIDTH] IN BLOOD BY AUTOMATED COUNT: 18.9 % (ref 11–14.5)
HCT VFR BLD AUTO: 40.4 % (ref 35–47)
HGB BLD-MCNC: 12.9 G/DL (ref 12–16)
LYMPHOCYTES # BLD AUTO: 1 THOU/UL (ref 0.8–4.4)
LYMPHOCYTES NFR BLD AUTO: 16 % (ref 20–40)
MCH RBC QN AUTO: 28.2 PG (ref 27–34)
MCHC RBC AUTO-ENTMCNC: 31.9 G/DL (ref 32–36)
MCV RBC AUTO: 88 FL (ref 80–100)
MONOCYTES # BLD AUTO: 0.4 THOU/UL (ref 0–0.9)
MONOCYTES NFR BLD AUTO: 7 % (ref 2–10)
NEUTROPHILS # BLD AUTO: 4.5 THOU/UL (ref 2–7.7)
NEUTROPHILS NFR BLD AUTO: 71 % (ref 50–70)
PLATELET # BLD AUTO: 205 THOU/UL (ref 140–440)
PMV BLD AUTO: 10.6 FL (ref 8.5–12.5)
RBC # BLD AUTO: 4.58 MILL/UL (ref 3.8–5.4)
WBC: 6.3 THOU/UL (ref 4–11)

## 2019-10-15 ENCOUNTER — COMMUNICATION - HEALTHEAST (OUTPATIENT)
Dept: ONCOLOGY | Facility: HOSPITAL | Age: 74
End: 2019-10-15

## 2019-10-16 ENCOUNTER — INFUSION - HEALTHEAST (OUTPATIENT)
Dept: INFUSION THERAPY | Facility: HOSPITAL | Age: 74
End: 2019-10-16

## 2019-10-16 ENCOUNTER — COMMUNICATION - HEALTHEAST (OUTPATIENT)
Dept: ANTICOAGULATION | Facility: CLINIC | Age: 74
End: 2019-10-16

## 2019-10-16 DIAGNOSIS — I48.91 ATRIAL FIBRILLATION (H): ICD-10-CM

## 2019-10-16 DIAGNOSIS — C50.412 MALIGNANT NEOPLASM OF UPPER-OUTER QUADRANT OF LEFT BREAST IN FEMALE, ESTROGEN RECEPTOR POSITIVE (H): ICD-10-CM

## 2019-10-16 DIAGNOSIS — Z17.0 MALIGNANT NEOPLASM OF UPPER-OUTER QUADRANT OF LEFT BREAST IN FEMALE, ESTROGEN RECEPTOR POSITIVE (H): ICD-10-CM

## 2019-10-16 LAB
ALBUMIN SERPL-MCNC: 3.2 G/DL (ref 3.5–5)
ALP SERPL-CCNC: 67 U/L (ref 45–120)
ALT SERPL W P-5'-P-CCNC: 26 U/L (ref 0–45)
ANION GAP SERPL CALCULATED.3IONS-SCNC: 11 MMOL/L (ref 5–18)
AST SERPL W P-5'-P-CCNC: 19 U/L (ref 0–40)
BASOPHILS # BLD AUTO: 0 THOU/UL (ref 0–0.2)
BASOPHILS NFR BLD AUTO: 0 % (ref 0–2)
BILIRUB SERPL-MCNC: 0.8 MG/DL (ref 0–1)
BUN SERPL-MCNC: 11 MG/DL (ref 8–28)
CALCIUM SERPL-MCNC: 9.2 MG/DL (ref 8.5–10.5)
CHLORIDE BLD-SCNC: 104 MMOL/L (ref 98–107)
CO2 SERPL-SCNC: 24 MMOL/L (ref 22–31)
CREAT SERPL-MCNC: 0.95 MG/DL (ref 0.6–1.1)
EOSINOPHIL COUNT (ABSOLUTE): 0.1 THOU/UL (ref 0–0.4)
EOSINOPHIL NFR BLD AUTO: 2 % (ref 0–6)
ERYTHROCYTE [DISTWIDTH] IN BLOOD BY AUTOMATED COUNT: 20.4 % (ref 11–14.5)
GFR SERPL CREATININE-BSD FRML MDRD: 58 ML/MIN/1.73M2
GLUCOSE BLD-MCNC: 148 MG/DL (ref 70–125)
HCT VFR BLD AUTO: 40.1 % (ref 35–47)
HGB BLD-MCNC: 13 G/DL (ref 12–16)
INR PPP: 2.31 (ref 0.9–1.1)
LYMPHOCYTES # BLD AUTO: 0.7 THOU/UL (ref 0.8–4.4)
LYMPHOCYTES NFR BLD AUTO: 10 % (ref 20–40)
MANUAL NRBC PER 100 CELLS: 4
MCH RBC QN AUTO: 28.3 PG (ref 27–34)
MCHC RBC AUTO-ENTMCNC: 32.4 G/DL (ref 32–36)
MCV RBC AUTO: 87 FL (ref 80–100)
MONOCYTES # BLD AUTO: 0.4 THOU/UL (ref 0–0.9)
MONOCYTES NFR BLD AUTO: 6 % (ref 2–10)
MYELOCYTES (ABSOLUTE): 0 THOU/UL
MYELOCYTES NFR BLD MANUAL: 1 %
OVALOCYTES: ABNORMAL
PLAT MORPH BLD: NORMAL
PLATELET # BLD AUTO: 214 THOU/UL (ref 140–440)
PMV BLD AUTO: 10.4 FL (ref 8.5–12.5)
POLYCHROMASIA BLD QL SMEAR: ABNORMAL
POTASSIUM BLD-SCNC: 3.4 MMOL/L (ref 3.5–5)
PROT SERPL-MCNC: 6.1 G/DL (ref 6–8)
RBC # BLD AUTO: 4.59 MILL/UL (ref 3.8–5.4)
SODIUM SERPL-SCNC: 139 MMOL/L (ref 136–145)
TOTAL NEUTROPHILS-ABS(DIFF): 6.1 THOU/UL (ref 2–7.7)
TOTAL NEUTROPHILS-REL(DIFF): 83 % (ref 50–70)
WBC: 7.4 THOU/UL (ref 4–11)

## 2019-10-21 ENCOUNTER — OFFICE VISIT - HEALTHEAST (OUTPATIENT)
Dept: SLEEP MEDICINE | Facility: CLINIC | Age: 74
End: 2019-10-21

## 2019-10-21 DIAGNOSIS — G47.33 OBSTRUCTIVE SLEEP APNEA (ADULT) (PEDIATRIC): ICD-10-CM

## 2019-10-21 ASSESSMENT — MIFFLIN-ST. JEOR: SCORE: 1938.32

## 2019-10-23 ENCOUNTER — AMBULATORY - HEALTHEAST (OUTPATIENT)
Dept: ONCOLOGY | Facility: HOSPITAL | Age: 74
End: 2019-10-23

## 2019-10-23 ENCOUNTER — OFFICE VISIT - HEALTHEAST (OUTPATIENT)
Dept: ONCOLOGY | Facility: HOSPITAL | Age: 74
End: 2019-10-23

## 2019-10-23 ENCOUNTER — INFUSION - HEALTHEAST (OUTPATIENT)
Dept: INFUSION THERAPY | Facility: HOSPITAL | Age: 74
End: 2019-10-23

## 2019-10-23 ENCOUNTER — COMMUNICATION - HEALTHEAST (OUTPATIENT)
Dept: ONCOLOGY | Facility: HOSPITAL | Age: 74
End: 2019-10-23

## 2019-10-23 ENCOUNTER — AMBULATORY - HEALTHEAST (OUTPATIENT)
Dept: INFUSION THERAPY | Facility: HOSPITAL | Age: 74
End: 2019-10-23

## 2019-10-23 DIAGNOSIS — C50.412 MALIGNANT NEOPLASM OF UPPER-OUTER QUADRANT OF LEFT BREAST IN FEMALE, ESTROGEN RECEPTOR POSITIVE (H): ICD-10-CM

## 2019-10-23 DIAGNOSIS — Z17.0 MALIGNANT NEOPLASM OF UPPER-OUTER QUADRANT OF LEFT BREAST IN FEMALE, ESTROGEN RECEPTOR POSITIVE (H): ICD-10-CM

## 2019-10-23 LAB
ALBUMIN SERPL-MCNC: 3.3 G/DL (ref 3.5–5)
ALP SERPL-CCNC: 72 U/L (ref 45–120)
ALT SERPL W P-5'-P-CCNC: 23 U/L (ref 0–45)
ANION GAP SERPL CALCULATED.3IONS-SCNC: 11 MMOL/L (ref 5–18)
AST SERPL W P-5'-P-CCNC: 16 U/L (ref 0–40)
BASOPHILS # BLD AUTO: 0.1 THOU/UL (ref 0–0.2)
BASOPHILS NFR BLD AUTO: 1 % (ref 0–2)
BILIRUB SERPL-MCNC: 0.9 MG/DL (ref 0–1)
BUN SERPL-MCNC: 11 MG/DL (ref 8–28)
CALCIUM SERPL-MCNC: 9.5 MG/DL (ref 8.5–10.5)
CHLORIDE BLD-SCNC: 101 MMOL/L (ref 98–107)
CO2 SERPL-SCNC: 30 MMOL/L (ref 22–31)
CREAT SERPL-MCNC: 0.96 MG/DL (ref 0.6–1.1)
EOSINOPHIL # BLD AUTO: 0.1 THOU/UL (ref 0–0.4)
EOSINOPHIL NFR BLD AUTO: 1 % (ref 0–6)
ERYTHROCYTE [DISTWIDTH] IN BLOOD BY AUTOMATED COUNT: 20.9 % (ref 11–14.5)
GFR SERPL CREATININE-BSD FRML MDRD: 57 ML/MIN/1.73M2
GLUCOSE BLD-MCNC: 162 MG/DL (ref 70–125)
HCT VFR BLD AUTO: 40.5 % (ref 35–47)
HGB BLD-MCNC: 12.9 G/DL (ref 12–16)
LYMPHOCYTES # BLD AUTO: 1.2 THOU/UL (ref 0.8–4.4)
LYMPHOCYTES NFR BLD AUTO: 14 % (ref 20–40)
MCH RBC QN AUTO: 28.4 PG (ref 27–34)
MCHC RBC AUTO-ENTMCNC: 31.9 G/DL (ref 32–36)
MCV RBC AUTO: 89 FL (ref 80–100)
MONOCYTES # BLD AUTO: 0.6 THOU/UL (ref 0–0.9)
MONOCYTES NFR BLD AUTO: 7 % (ref 2–10)
NEUTROPHILS # BLD AUTO: 6.5 THOU/UL (ref 2–7.7)
NEUTROPHILS NFR BLD AUTO: 75 % (ref 50–70)
PLAT MORPH BLD: NORMAL
PLATELET # BLD AUTO: 234 THOU/UL (ref 140–440)
PMV BLD AUTO: 10.4 FL (ref 8.5–12.5)
POLYCHROMASIA BLD QL SMEAR: ABNORMAL
POTASSIUM BLD-SCNC: 3.1 MMOL/L (ref 3.5–5)
PROT SERPL-MCNC: 6.1 G/DL (ref 6–8)
RBC # BLD AUTO: 4.54 MILL/UL (ref 3.8–5.4)
SODIUM SERPL-SCNC: 142 MMOL/L (ref 136–145)
WBC: 8.7 THOU/UL (ref 4–11)

## 2019-10-23 ASSESSMENT — MIFFLIN-ST. JEOR: SCORE: 1940.59

## 2019-10-24 ENCOUNTER — COMMUNICATION - HEALTHEAST (OUTPATIENT)
Dept: ONCOLOGY | Facility: HOSPITAL | Age: 74
End: 2019-10-24

## 2019-10-25 ENCOUNTER — AMBULATORY - HEALTHEAST (OUTPATIENT)
Dept: SLEEP MEDICINE | Facility: CLINIC | Age: 74
End: 2019-10-25

## 2019-10-30 ENCOUNTER — COMMUNICATION - HEALTHEAST (OUTPATIENT)
Dept: ANTICOAGULATION | Facility: CLINIC | Age: 74
End: 2019-10-30

## 2019-10-30 ENCOUNTER — INFUSION - HEALTHEAST (OUTPATIENT)
Dept: INFUSION THERAPY | Facility: HOSPITAL | Age: 74
End: 2019-10-30

## 2019-10-30 DIAGNOSIS — C50.412 MALIGNANT NEOPLASM OF UPPER-OUTER QUADRANT OF LEFT BREAST IN FEMALE, ESTROGEN RECEPTOR POSITIVE (H): ICD-10-CM

## 2019-10-30 DIAGNOSIS — Z17.0 MALIGNANT NEOPLASM OF UPPER-OUTER QUADRANT OF LEFT BREAST IN FEMALE, ESTROGEN RECEPTOR POSITIVE (H): ICD-10-CM

## 2019-10-30 DIAGNOSIS — I48.91 ATRIAL FIBRILLATION (H): ICD-10-CM

## 2019-10-30 LAB
BASOPHILS # BLD AUTO: 0 THOU/UL (ref 0–0.2)
BASOPHILS NFR BLD AUTO: 0 % (ref 0–2)
EOSINOPHIL COUNT (ABSOLUTE): 0.3 THOU/UL (ref 0–0.4)
EOSINOPHIL NFR BLD AUTO: 4 % (ref 0–6)
ERYTHROCYTE [DISTWIDTH] IN BLOOD BY AUTOMATED COUNT: 22.3 % (ref 11–14.5)
HCT VFR BLD AUTO: 38 % (ref 35–47)
HGB BLD-MCNC: 11.9 G/DL (ref 12–16)
INR PPP: 2.12 (ref 0.9–1.1)
LYMPHOCYTES # BLD AUTO: 1.3 THOU/UL (ref 0.8–4.4)
LYMPHOCYTES NFR BLD AUTO: 17 % (ref 20–40)
MANUAL NRBC PER 100 CELLS: 2
MCH RBC QN AUTO: 29 PG (ref 27–34)
MCHC RBC AUTO-ENTMCNC: 31.3 G/DL (ref 32–36)
MCV RBC AUTO: 93 FL (ref 80–100)
MONOCYTES # BLD AUTO: 0.5 THOU/UL (ref 0–0.9)
MONOCYTES NFR BLD AUTO: 6 % (ref 2–10)
PLAT MORPH BLD: NORMAL
PLATELET # BLD AUTO: 228 THOU/UL (ref 140–440)
PMV BLD AUTO: 11.2 FL (ref 8.5–12.5)
POLYCHROMASIA BLD QL SMEAR: ABNORMAL
RBC # BLD AUTO: 4.11 MILL/UL (ref 3.8–5.4)
TOTAL NEUTROPHILS-ABS(DIFF): 5.8 THOU/UL (ref 2–7.7)
TOTAL NEUTROPHILS-REL(DIFF): 74 % (ref 50–70)
WBC: 7.8 THOU/UL (ref 4–11)

## 2019-11-06 ENCOUNTER — INFUSION - HEALTHEAST (OUTPATIENT)
Dept: INFUSION THERAPY | Facility: HOSPITAL | Age: 74
End: 2019-11-06

## 2019-11-06 ENCOUNTER — COMMUNICATION - HEALTHEAST (OUTPATIENT)
Dept: ANTICOAGULATION | Facility: CLINIC | Age: 74
End: 2019-11-06

## 2019-11-06 DIAGNOSIS — C50.412 MALIGNANT NEOPLASM OF UPPER-OUTER QUADRANT OF LEFT BREAST IN FEMALE, ESTROGEN RECEPTOR POSITIVE (H): ICD-10-CM

## 2019-11-06 DIAGNOSIS — I48.91 ATRIAL FIBRILLATION (H): ICD-10-CM

## 2019-11-06 DIAGNOSIS — Z17.0 MALIGNANT NEOPLASM OF UPPER-OUTER QUADRANT OF LEFT BREAST IN FEMALE, ESTROGEN RECEPTOR POSITIVE (H): ICD-10-CM

## 2019-11-06 LAB
BASOPHILS # BLD AUTO: 0.1 THOU/UL (ref 0–0.2)
BASOPHILS NFR BLD AUTO: 2 % (ref 0–2)
EOSINOPHIL COUNT (ABSOLUTE): 0.2 THOU/UL (ref 0–0.4)
EOSINOPHIL NFR BLD AUTO: 3 % (ref 0–6)
ERYTHROCYTE [DISTWIDTH] IN BLOOD BY AUTOMATED COUNT: 23.3 % (ref 11–14.5)
HCT VFR BLD AUTO: 38.4 % (ref 35–47)
HGB BLD-MCNC: 12.1 G/DL (ref 12–16)
INR PPP: 2.87 (ref 0.9–1.1)
LYMPHOCYTES # BLD AUTO: 0.9 THOU/UL (ref 0.8–4.4)
LYMPHOCYTES NFR BLD AUTO: 14 % (ref 20–40)
MANUAL NRBC PER 100 CELLS: 2
MCH RBC QN AUTO: 29.4 PG (ref 27–34)
MCHC RBC AUTO-ENTMCNC: 31.5 G/DL (ref 32–36)
MCV RBC AUTO: 93 FL (ref 80–100)
MONOCYTES # BLD AUTO: 0.4 THOU/UL (ref 0–0.9)
MONOCYTES NFR BLD AUTO: 7 % (ref 2–10)
OVALOCYTES: ABNORMAL
PLAT MORPH BLD: NORMAL
PLATELET # BLD AUTO: 200 THOU/UL (ref 140–440)
PMV BLD AUTO: 10.3 FL (ref 8.5–12.5)
POLYCHROMASIA BLD QL SMEAR: ABNORMAL
RBC # BLD AUTO: 4.12 MILL/UL (ref 3.8–5.4)
TOTAL NEUTROPHILS-ABS(DIFF): 4.6 THOU/UL (ref 2–7.7)
TOTAL NEUTROPHILS-REL(DIFF): 76 % (ref 50–70)
WBC: 6.1 THOU/UL (ref 4–11)

## 2019-11-11 ENCOUNTER — COMMUNICATION - HEALTHEAST (OUTPATIENT)
Dept: ADMINISTRATIVE | Facility: HOSPITAL | Age: 74
End: 2019-11-11

## 2019-11-12 ENCOUNTER — COMMUNICATION - HEALTHEAST (OUTPATIENT)
Dept: ANTICOAGULATION | Facility: CLINIC | Age: 74
End: 2019-11-12

## 2019-11-12 ENCOUNTER — AMBULATORY - HEALTHEAST (OUTPATIENT)
Dept: INFUSION THERAPY | Facility: HOSPITAL | Age: 74
End: 2019-11-12

## 2019-11-12 DIAGNOSIS — I48.91 ATRIAL FIBRILLATION (H): ICD-10-CM

## 2019-11-12 DIAGNOSIS — Z17.0 MALIGNANT NEOPLASM OF UPPER-OUTER QUADRANT OF LEFT BREAST IN FEMALE, ESTROGEN RECEPTOR POSITIVE (H): ICD-10-CM

## 2019-11-12 DIAGNOSIS — C50.412 MALIGNANT NEOPLASM OF UPPER-OUTER QUADRANT OF LEFT BREAST IN FEMALE, ESTROGEN RECEPTOR POSITIVE (H): ICD-10-CM

## 2019-11-12 LAB
ALBUMIN SERPL-MCNC: 3.2 G/DL (ref 3.5–5)
ALP SERPL-CCNC: 68 U/L (ref 45–120)
ALT SERPL W P-5'-P-CCNC: 18 U/L (ref 0–45)
ANION GAP SERPL CALCULATED.3IONS-SCNC: 8 MMOL/L (ref 5–18)
AST SERPL W P-5'-P-CCNC: 15 U/L (ref 0–40)
BASOPHILS # BLD AUTO: 0.1 THOU/UL (ref 0–0.2)
BASOPHILS NFR BLD AUTO: 1 % (ref 0–2)
BILIRUB SERPL-MCNC: 1 MG/DL (ref 0–1)
BUN SERPL-MCNC: 17 MG/DL (ref 8–28)
CALCIUM SERPL-MCNC: 9.5 MG/DL (ref 8.5–10.5)
CHLORIDE BLD-SCNC: 108 MMOL/L (ref 98–107)
CO2 SERPL-SCNC: 26 MMOL/L (ref 22–31)
CREAT SERPL-MCNC: 0.96 MG/DL (ref 0.6–1.1)
EOSINOPHIL # BLD AUTO: 0 THOU/UL (ref 0–0.4)
EOSINOPHIL NFR BLD AUTO: 1 % (ref 0–6)
ERYTHROCYTE [DISTWIDTH] IN BLOOD BY AUTOMATED COUNT: 24.2 % (ref 11–14.5)
GFR SERPL CREATININE-BSD FRML MDRD: 57 ML/MIN/1.73M2
GLUCOSE BLD-MCNC: 107 MG/DL (ref 70–125)
HCT VFR BLD AUTO: 36.5 % (ref 35–47)
HGB BLD-MCNC: 11.6 G/DL (ref 12–16)
INR PPP: 1.72 (ref 0.9–1.1)
LYMPHOCYTES # BLD AUTO: 1.3 THOU/UL (ref 0.8–4.4)
LYMPHOCYTES NFR BLD AUTO: 19 % (ref 20–40)
MCH RBC QN AUTO: 29.8 PG (ref 27–34)
MCHC RBC AUTO-ENTMCNC: 31.8 G/DL (ref 32–36)
MCV RBC AUTO: 94 FL (ref 80–100)
MONOCYTES # BLD AUTO: 0.6 THOU/UL (ref 0–0.9)
MONOCYTES NFR BLD AUTO: 9 % (ref 2–10)
NEUTROPHILS # BLD AUTO: 4.6 THOU/UL (ref 2–7.7)
NEUTROPHILS NFR BLD AUTO: 69 % (ref 50–70)
OVALOCYTES: ABNORMAL
PLAT MORPH BLD: NORMAL
PLATELET # BLD AUTO: 209 THOU/UL (ref 140–440)
PMV BLD AUTO: 10.5 FL (ref 8.5–12.5)
POLYCHROMASIA BLD QL SMEAR: ABNORMAL
POTASSIUM BLD-SCNC: 3.9 MMOL/L (ref 3.5–5)
PROT SERPL-MCNC: 6.1 G/DL (ref 6–8)
RBC # BLD AUTO: 3.89 MILL/UL (ref 3.8–5.4)
SODIUM SERPL-SCNC: 142 MMOL/L (ref 136–145)
WBC: 6.7 THOU/UL (ref 4–11)

## 2019-11-13 ENCOUNTER — INFUSION - HEALTHEAST (OUTPATIENT)
Dept: INFUSION THERAPY | Facility: HOSPITAL | Age: 74
End: 2019-11-13

## 2019-11-13 ENCOUNTER — OFFICE VISIT - HEALTHEAST (OUTPATIENT)
Dept: ONCOLOGY | Facility: HOSPITAL | Age: 74
End: 2019-11-13

## 2019-11-13 DIAGNOSIS — Z17.0 MALIGNANT NEOPLASM OF UPPER-OUTER QUADRANT OF LEFT BREAST IN FEMALE, ESTROGEN RECEPTOR POSITIVE (H): ICD-10-CM

## 2019-11-13 DIAGNOSIS — C50.412 MALIGNANT NEOPLASM OF UPPER-OUTER QUADRANT OF LEFT BREAST IN FEMALE, ESTROGEN RECEPTOR POSITIVE (H): ICD-10-CM

## 2019-11-14 ENCOUNTER — COMMUNICATION - HEALTHEAST (OUTPATIENT)
Dept: SURGERY | Facility: CLINIC | Age: 74
End: 2019-11-14

## 2019-11-14 ENCOUNTER — COMMUNICATION - HEALTHEAST (OUTPATIENT)
Dept: CARDIOLOGY | Facility: CLINIC | Age: 74
End: 2019-11-14

## 2019-11-19 ENCOUNTER — OFFICE VISIT - HEALTHEAST (OUTPATIENT)
Dept: CARDIOLOGY | Facility: CLINIC | Age: 74
End: 2019-11-19

## 2019-11-19 DIAGNOSIS — I10 ESSENTIAL HYPERTENSION: ICD-10-CM

## 2019-11-19 DIAGNOSIS — I48.20 CHRONIC ATRIAL FIBRILLATION (H): ICD-10-CM

## 2019-11-19 ASSESSMENT — MIFFLIN-ST. JEOR: SCORE: 1906.57

## 2019-11-20 ENCOUNTER — COMMUNICATION - HEALTHEAST (OUTPATIENT)
Dept: ANTICOAGULATION | Facility: CLINIC | Age: 74
End: 2019-11-20

## 2019-11-20 ENCOUNTER — INFUSION - HEALTHEAST (OUTPATIENT)
Dept: INFUSION THERAPY | Facility: HOSPITAL | Age: 74
End: 2019-11-20

## 2019-11-20 DIAGNOSIS — Z17.0 MALIGNANT NEOPLASM OF UPPER-OUTER QUADRANT OF LEFT BREAST IN FEMALE, ESTROGEN RECEPTOR POSITIVE (H): ICD-10-CM

## 2019-11-20 DIAGNOSIS — C50.412 MALIGNANT NEOPLASM OF UPPER-OUTER QUADRANT OF LEFT BREAST IN FEMALE, ESTROGEN RECEPTOR POSITIVE (H): ICD-10-CM

## 2019-11-20 DIAGNOSIS — I48.91 ATRIAL FIBRILLATION (H): ICD-10-CM

## 2019-11-20 LAB
BASOPHILS # BLD AUTO: 0 THOU/UL (ref 0–0.2)
BASOPHILS NFR BLD AUTO: 0 % (ref 0–2)
EOSINOPHIL COUNT (ABSOLUTE): 0 THOU/UL (ref 0–0.4)
EOSINOPHIL NFR BLD AUTO: 0 % (ref 0–6)
ERYTHROCYTE [DISTWIDTH] IN BLOOD BY AUTOMATED COUNT: 24.1 % (ref 11–14.5)
HCT VFR BLD AUTO: 37.9 % (ref 35–47)
HGB BLD-MCNC: 12.2 G/DL (ref 12–16)
INR PPP: 2.19 (ref 0.9–1.1)
LYMPHOCYTES # BLD AUTO: 1 THOU/UL (ref 0.8–4.4)
LYMPHOCYTES NFR BLD AUTO: 12 % (ref 20–40)
MCH RBC QN AUTO: 30.1 PG (ref 27–34)
MCHC RBC AUTO-ENTMCNC: 32.2 G/DL (ref 32–36)
MCV RBC AUTO: 94 FL (ref 80–100)
MONOCYTES # BLD AUTO: 0.8 THOU/UL (ref 0–0.9)
MONOCYTES NFR BLD AUTO: 9 % (ref 2–10)
OVALOCYTES: ABNORMAL
PLAT MORPH BLD: NORMAL
PLATELET # BLD AUTO: 223 THOU/UL (ref 140–440)
PMV BLD AUTO: 10.7 FL (ref 8.5–12.5)
POLYCHROMASIA BLD QL SMEAR: ABNORMAL
RBC # BLD AUTO: 4.05 MILL/UL (ref 3.8–5.4)
TOTAL NEUTROPHILS-ABS(DIFF): 6.8 THOU/UL (ref 2–7.7)
TOTAL NEUTROPHILS-REL(DIFF): 79 % (ref 50–70)
WBC: 8.6 THOU/UL (ref 4–11)

## 2019-11-22 ENCOUNTER — OFFICE VISIT - HEALTHEAST (OUTPATIENT)
Dept: INTERNAL MEDICINE | Facility: CLINIC | Age: 74
End: 2019-11-22

## 2019-11-22 ENCOUNTER — COMMUNICATION - HEALTHEAST (OUTPATIENT)
Dept: INTERNAL MEDICINE | Facility: CLINIC | Age: 74
End: 2019-11-22

## 2019-11-22 DIAGNOSIS — N18.1 TYPE 2 DIABETES MELLITUS WITH STAGE 1 CHRONIC KIDNEY DISEASE, WITHOUT LONG-TERM CURRENT USE OF INSULIN (H): ICD-10-CM

## 2019-11-22 DIAGNOSIS — R06.02 SOB (SHORTNESS OF BREATH): ICD-10-CM

## 2019-11-22 DIAGNOSIS — G47.33 OBSTRUCTIVE SLEEP APNEA (ADULT) (PEDIATRIC): ICD-10-CM

## 2019-11-22 DIAGNOSIS — E11.22 TYPE 2 DIABETES MELLITUS WITH STAGE 1 CHRONIC KIDNEY DISEASE, WITHOUT LONG-TERM CURRENT USE OF INSULIN (H): ICD-10-CM

## 2019-11-22 DIAGNOSIS — Z01.818 PREOP EXAM FOR INTERNAL MEDICINE: ICD-10-CM

## 2019-11-22 DIAGNOSIS — I48.91 ATRIAL FIBRILLATION, UNSPECIFIED TYPE (H): ICD-10-CM

## 2019-11-22 ASSESSMENT — MIFFLIN-ST. JEOR: SCORE: 1930.95

## 2019-11-25 ENCOUNTER — COMMUNICATION - HEALTHEAST (OUTPATIENT)
Dept: ONCOLOGY | Facility: HOSPITAL | Age: 74
End: 2019-11-25

## 2019-11-26 ENCOUNTER — COMMUNICATION - HEALTHEAST (OUTPATIENT)
Dept: INTERNAL MEDICINE | Facility: CLINIC | Age: 74
End: 2019-11-26

## 2019-11-27 ENCOUNTER — COMMUNICATION - HEALTHEAST (OUTPATIENT)
Dept: CARDIOLOGY | Facility: CLINIC | Age: 74
End: 2019-11-27

## 2019-11-27 ENCOUNTER — COMMUNICATION - HEALTHEAST (OUTPATIENT)
Dept: ANTICOAGULATION | Facility: CLINIC | Age: 74
End: 2019-11-27

## 2019-11-27 ENCOUNTER — COMMUNICATION - HEALTHEAST (OUTPATIENT)
Dept: INTERNAL MEDICINE | Facility: CLINIC | Age: 74
End: 2019-11-27

## 2019-11-27 ENCOUNTER — INFUSION - HEALTHEAST (OUTPATIENT)
Dept: INFUSION THERAPY | Facility: HOSPITAL | Age: 74
End: 2019-11-27

## 2019-11-27 DIAGNOSIS — Z17.0 MALIGNANT NEOPLASM OF UPPER-OUTER QUADRANT OF LEFT BREAST IN FEMALE, ESTROGEN RECEPTOR POSITIVE (H): ICD-10-CM

## 2019-11-27 DIAGNOSIS — I48.91 ATRIAL FIBRILLATION (H): ICD-10-CM

## 2019-11-27 DIAGNOSIS — C50.412 MALIGNANT NEOPLASM OF UPPER-OUTER QUADRANT OF LEFT BREAST IN FEMALE, ESTROGEN RECEPTOR POSITIVE (H): ICD-10-CM

## 2019-11-27 DIAGNOSIS — I48.91 ATRIAL FIBRILLATION, UNSPECIFIED TYPE (H): ICD-10-CM

## 2019-11-27 LAB
BASOPHILS # BLD AUTO: 0 THOU/UL (ref 0–0.2)
BASOPHILS NFR BLD AUTO: 0 % (ref 0–2)
EOSINOPHIL COUNT (ABSOLUTE): 0 THOU/UL (ref 0–0.4)
EOSINOPHIL NFR BLD AUTO: 0 % (ref 0–6)
ERYTHROCYTE [DISTWIDTH] IN BLOOD BY AUTOMATED COUNT: 23.8 % (ref 11–14.5)
HCT VFR BLD AUTO: 35 % (ref 35–47)
HGB BLD-MCNC: 11.5 G/DL (ref 12–16)
INR PPP: 2.89 (ref 0.9–1.1)
LYMPHOCYTES # BLD AUTO: 1.3 THOU/UL (ref 0.8–4.4)
LYMPHOCYTES NFR BLD AUTO: 15 % (ref 20–40)
MANUAL NRBC PER 100 CELLS: 1
MCH RBC QN AUTO: 30.8 PG (ref 27–34)
MCHC RBC AUTO-ENTMCNC: 32.9 G/DL (ref 32–36)
MCV RBC AUTO: 94 FL (ref 80–100)
MONOCYTES # BLD AUTO: 1 THOU/UL (ref 0–0.9)
MONOCYTES NFR BLD AUTO: 11 % (ref 2–10)
OVALOCYTES: ABNORMAL
PLAT MORPH BLD: NORMAL
PLATELET # BLD AUTO: 242 THOU/UL (ref 140–440)
PMV BLD AUTO: 11 FL (ref 8.5–12.5)
POLYCHROMASIA BLD QL SMEAR: ABNORMAL
RBC # BLD AUTO: 3.73 MILL/UL (ref 3.8–5.4)
TOTAL NEUTROPHILS-ABS(DIFF): 6.4 THOU/UL (ref 2–7.7)
TOTAL NEUTROPHILS-REL(DIFF): 74 % (ref 50–70)
WBC: 8.7 THOU/UL (ref 4–11)

## 2019-12-02 ENCOUNTER — SURGERY - HEALTHEAST (OUTPATIENT)
Dept: SURGERY | Facility: HOSPITAL | Age: 74
End: 2019-12-02

## 2019-12-02 ENCOUNTER — HOSPITAL ENCOUNTER (OUTPATIENT)
Dept: MAMMOGRAPHY | Facility: CLINIC | Age: 74
Discharge: HOME | End: 2019-12-02
Attending: SPECIALIST | Admitting: SPECIALIST

## 2019-12-02 ENCOUNTER — ANESTHESIA - HEALTHEAST (OUTPATIENT)
Dept: SURGERY | Facility: HOSPITAL | Age: 74
End: 2019-12-02

## 2019-12-02 ENCOUNTER — HOSPITAL ENCOUNTER (OUTPATIENT)
Dept: NUCLEAR MEDICINE | Facility: HOSPITAL | Age: 74
Discharge: HOME | End: 2019-12-02
Attending: SPECIALIST

## 2019-12-02 DIAGNOSIS — Z17.0 MALIGNANT NEOPLASM OF UPPER-OUTER QUADRANT OF LEFT BREAST IN FEMALE, ESTROGEN RECEPTOR POSITIVE (H): ICD-10-CM

## 2019-12-02 DIAGNOSIS — C50.412 MALIGNANT NEOPLASM OF UPPER-OUTER QUADRANT OF LEFT BREAST IN FEMALE, ESTROGEN RECEPTOR POSITIVE (H): ICD-10-CM

## 2019-12-02 ASSESSMENT — MIFFLIN-ST. JEOR: SCORE: 1930.95

## 2019-12-03 ASSESSMENT — MIFFLIN-ST. JEOR: SCORE: 1954.19

## 2019-12-04 ASSESSMENT — MIFFLIN-ST. JEOR: SCORE: 1973.25

## 2019-12-05 LAB
LAB AP CHARGES (HE HISTORICAL CONVERSION): ABNORMAL
PATH REPORT.ADDENDUM SPEC: ABNORMAL
PATH REPORT.COMMENTS IMP SPEC: ABNORMAL
PATH REPORT.COMMENTS IMP SPEC: ABNORMAL
PATH REPORT.FINAL DX SPEC: ABNORMAL
PATH REPORT.GROSS SPEC: ABNORMAL
PATH REPORT.MICROSCOPIC SPEC OTHER STN: ABNORMAL
PATH REPORT.MICROSCOPIC SPEC OTHER STN: ABNORMAL
PATH REPORT.RELEVANT HX SPEC: ABNORMAL
RESULT FLAG (HE HISTORICAL CONVERSION): ABNORMAL

## 2019-12-05 ASSESSMENT — MIFFLIN-ST. JEOR
SCORE: 1982.77
SCORE: 2003.64

## 2020-01-01 ENCOUNTER — COMMUNICATION - HEALTHEAST (OUTPATIENT)
Dept: ANTICOAGULATION | Facility: CLINIC | Age: 75
End: 2020-01-01

## 2020-01-01 ENCOUNTER — INFUSION - HEALTHEAST (OUTPATIENT)
Dept: INFUSION THERAPY | Facility: HOSPITAL | Age: 75
End: 2020-01-01

## 2020-01-01 ENCOUNTER — OFFICE VISIT - HEALTHEAST (OUTPATIENT)
Dept: ONCOLOGY | Facility: HOSPITAL | Age: 75
End: 2020-01-01

## 2020-01-01 ENCOUNTER — HOSPITAL ENCOUNTER (EMERGENCY)
Facility: CLINIC | Age: 75
Discharge: ANOTHER HOSPITAL | End: 2020-12-02
Attending: EMERGENCY MEDICINE | Admitting: EMERGENCY MEDICINE
Payer: MEDICARE

## 2020-01-01 ENCOUNTER — RECORDS - HEALTHEAST (OUTPATIENT)
Dept: ADMINISTRATIVE | Facility: OTHER | Age: 75
End: 2020-01-01

## 2020-01-01 ENCOUNTER — OFFICE VISIT - HEALTHEAST (OUTPATIENT)
Dept: GERIATRICS | Facility: CLINIC | Age: 75
End: 2020-01-01

## 2020-01-01 ENCOUNTER — RECORDS - HEALTHEAST (OUTPATIENT)
Dept: HEALTH INFORMATION MANAGEMENT | Facility: CLINIC | Age: 75
End: 2020-01-01

## 2020-01-01 ENCOUNTER — HOSPITAL ENCOUNTER (OUTPATIENT)
Dept: CARDIOLOGY | Facility: HOSPITAL | Age: 75
Discharge: HOME | End: 2020-03-25

## 2020-01-01 ENCOUNTER — COMMUNICATION - HEALTHEAST (OUTPATIENT)
Dept: INTERNAL MEDICINE | Facility: CLINIC | Age: 75
End: 2020-01-01

## 2020-01-01 ENCOUNTER — AMBULATORY - HEALTHEAST (OUTPATIENT)
Dept: INFUSION THERAPY | Facility: HOSPITAL | Age: 75
End: 2020-01-01

## 2020-01-01 ENCOUNTER — AMBULATORY - HEALTHEAST (OUTPATIENT)
Dept: PULMONOLOGY | Facility: OTHER | Age: 75
End: 2020-01-01

## 2020-01-01 ENCOUNTER — RECORDS - HEALTHEAST (OUTPATIENT)
Dept: LAB | Facility: CLINIC | Age: 75
End: 2020-01-01

## 2020-01-01 ENCOUNTER — AMBULATORY - HEALTHEAST (OUTPATIENT)
Dept: GERIATRICS | Facility: CLINIC | Age: 75
End: 2020-01-01

## 2020-01-01 ENCOUNTER — COMMUNICATION - HEALTHEAST (OUTPATIENT)
Dept: ONCOLOGY | Facility: HOSPITAL | Age: 75
End: 2020-01-01

## 2020-01-01 ENCOUNTER — HOSPITAL ENCOUNTER (OUTPATIENT)
Dept: MAMMOGRAPHY | Facility: CLINIC | Age: 75
Discharge: HOME | End: 2020-07-16
Attending: SPECIALIST

## 2020-01-01 ENCOUNTER — OFFICE VISIT - HEALTHEAST (OUTPATIENT)
Dept: INTERNAL MEDICINE | Facility: CLINIC | Age: 75
End: 2020-01-01

## 2020-01-01 ENCOUNTER — APPOINTMENT (OUTPATIENT)
Dept: GENERAL RADIOLOGY | Facility: CLINIC | Age: 75
End: 2020-01-01
Attending: EMERGENCY MEDICINE
Payer: MEDICARE

## 2020-01-01 ENCOUNTER — AMBULATORY - HEALTHEAST (OUTPATIENT)
Dept: ONCOLOGY | Facility: HOSPITAL | Age: 75
End: 2020-01-01

## 2020-01-01 ENCOUNTER — COMMUNICATION - HEALTHEAST (OUTPATIENT)
Dept: SCHEDULING | Facility: CLINIC | Age: 75
End: 2020-01-01

## 2020-01-01 ENCOUNTER — TELEPHONE (OUTPATIENT)
Dept: NURSING | Facility: CLINIC | Age: 75
End: 2020-01-01

## 2020-01-01 ENCOUNTER — HOSPITAL ENCOUNTER (OUTPATIENT)
Dept: NUCLEAR MEDICINE | Facility: HOSPITAL | Age: 75
Setting detail: RADIATION/ONCOLOGY SERIES
Discharge: STILL ADMITTED OR ADMINISTRATIVE | End: 2020-05-18
Attending: INTERNAL MEDICINE

## 2020-01-01 ENCOUNTER — HOSPITAL ENCOUNTER (OUTPATIENT)
Dept: NUCLEAR MEDICINE | Facility: HOSPITAL | Age: 75
Setting detail: RADIATION/ONCOLOGY SERIES
Discharge: STILL ADMITTED OR ADMINISTRATIVE | End: 2020-11-02
Attending: INTERNAL MEDICINE

## 2020-01-01 ENCOUNTER — COMMUNICATION - HEALTHEAST (OUTPATIENT)
Dept: GERIATRICS | Facility: CLINIC | Age: 75
End: 2020-01-01

## 2020-01-01 ENCOUNTER — COMMUNICATION - HEALTHEAST (OUTPATIENT)
Dept: SLEEP MEDICINE | Facility: CLINIC | Age: 75
End: 2020-01-01

## 2020-01-01 ENCOUNTER — AMBULATORY - HEALTHEAST (OUTPATIENT)
Dept: FAMILY MEDICINE | Facility: CLINIC | Age: 75
End: 2020-01-01

## 2020-01-01 ENCOUNTER — APPOINTMENT (OUTPATIENT)
Dept: CT IMAGING | Facility: CLINIC | Age: 75
End: 2020-01-01
Attending: EMERGENCY MEDICINE
Payer: MEDICARE

## 2020-01-01 ENCOUNTER — HOSPITAL ENCOUNTER (OUTPATIENT)
Dept: NUCLEAR MEDICINE | Facility: HOSPITAL | Age: 75
Setting detail: RADIATION/ONCOLOGY SERIES
Discharge: STILL ADMITTED OR ADMINISTRATIVE | End: 2020-05-07
Attending: INTERNAL MEDICINE

## 2020-01-01 ENCOUNTER — COMMUNICATION - HEALTHEAST (OUTPATIENT)
Dept: ONCOLOGY | Facility: CLINIC | Age: 75
End: 2020-01-01

## 2020-01-01 VITALS
TEMPERATURE: 99.6 F | OXYGEN SATURATION: 92 % | HEART RATE: 103 BPM | DIASTOLIC BLOOD PRESSURE: 59 MMHG | SYSTOLIC BLOOD PRESSURE: 121 MMHG | RESPIRATION RATE: 13 BRPM

## 2020-01-01 DIAGNOSIS — Z17.0 MALIGNANT NEOPLASM OF UPPER-OUTER QUADRANT OF LEFT BREAST IN FEMALE, ESTROGEN RECEPTOR POSITIVE (H): ICD-10-CM

## 2020-01-01 DIAGNOSIS — I48.91 ATRIAL FIBRILLATION (H): ICD-10-CM

## 2020-01-01 DIAGNOSIS — N39.0 URINARY TRACT INFECTION WITHOUT HEMATURIA, SITE UNSPECIFIED: ICD-10-CM

## 2020-01-01 DIAGNOSIS — R63.5 WEIGHT GAIN WITH EDEMA: ICD-10-CM

## 2020-01-01 DIAGNOSIS — I50.22 CHRONIC SYSTOLIC HEART FAILURE (H): ICD-10-CM

## 2020-01-01 DIAGNOSIS — Z51.81 ENCOUNTER FOR MONITORING CARDIOTOXIC DRUG THERAPY: ICD-10-CM

## 2020-01-01 DIAGNOSIS — C50.412 MALIGNANT NEOPLASM OF UPPER-OUTER QUADRANT OF LEFT BREAST IN FEMALE, ESTROGEN RECEPTOR POSITIVE (H): ICD-10-CM

## 2020-01-01 DIAGNOSIS — I50.43 CHF (CONGESTIVE HEART FAILURE), NYHA CLASS I, ACUTE ON CHRONIC, COMBINED (H): ICD-10-CM

## 2020-01-01 DIAGNOSIS — N18.1 TYPE 2 DIABETES MELLITUS WITH STAGE 1 CHRONIC KIDNEY DISEASE, WITHOUT LONG-TERM CURRENT USE OF INSULIN (H): ICD-10-CM

## 2020-01-01 DIAGNOSIS — R60.0 BILATERAL LEG EDEMA: ICD-10-CM

## 2020-01-01 DIAGNOSIS — I10 ESSENTIAL HYPERTENSION: ICD-10-CM

## 2020-01-01 DIAGNOSIS — R53.81 PHYSICAL DECONDITIONING: ICD-10-CM

## 2020-01-01 DIAGNOSIS — Z79.899 ENCOUNTER FOR MONITORING CARDIOTOXIC DRUG THERAPY: ICD-10-CM

## 2020-01-01 DIAGNOSIS — Z79.01 ANTICOAGULATION MANAGEMENT ENCOUNTER: ICD-10-CM

## 2020-01-01 DIAGNOSIS — E11.22 TYPE 2 DIABETES MELLITUS WITH STAGE 1 CHRONIC KIDNEY DISEASE, WITHOUT LONG-TERM CURRENT USE OF INSULIN (H): ICD-10-CM

## 2020-01-01 DIAGNOSIS — R94.31 ABNORMAL ELECTROCARDIOGRAM (ECG) (EKG): ICD-10-CM

## 2020-01-01 DIAGNOSIS — Z98.890 STATUS POST LEFT BREAST LUMPECTOMY: ICD-10-CM

## 2020-01-01 DIAGNOSIS — R06.02 SHORTNESS OF BREATH: ICD-10-CM

## 2020-01-01 DIAGNOSIS — I48.91 ATRIAL FIBRILLATION, UNSPECIFIED TYPE (H): ICD-10-CM

## 2020-01-01 DIAGNOSIS — G47.33 OSA (OBSTRUCTIVE SLEEP APNEA): ICD-10-CM

## 2020-01-01 DIAGNOSIS — M79.2 NEUROPATHIC PAIN: ICD-10-CM

## 2020-01-01 DIAGNOSIS — D49.89 THYMOMA: ICD-10-CM

## 2020-01-01 DIAGNOSIS — R05.9 COUGH: ICD-10-CM

## 2020-01-01 DIAGNOSIS — I48.0 PAROXYSMAL ATRIAL FIBRILLATION (H): ICD-10-CM

## 2020-01-01 DIAGNOSIS — E66.01 MORBID OBESITY (H): ICD-10-CM

## 2020-01-01 DIAGNOSIS — E87.6 HYPOPOTASSEMIA: ICD-10-CM

## 2020-01-01 DIAGNOSIS — R20.9 ABNORMAL SENSATION OF UPPER EXTREMITY: ICD-10-CM

## 2020-01-01 DIAGNOSIS — Z79.01 ON WARFARIN THERAPY: ICD-10-CM

## 2020-01-01 DIAGNOSIS — J18.9 PNEUMONIA DUE TO INFECTIOUS ORGANISM, UNSPECIFIED LATERALITY, UNSPECIFIED PART OF LUNG: ICD-10-CM

## 2020-01-01 DIAGNOSIS — R21 RASH: ICD-10-CM

## 2020-01-01 DIAGNOSIS — I50.20 SYSTOLIC HEART FAILURE, UNSPECIFIED HF CHRONICITY (H): ICD-10-CM

## 2020-01-01 DIAGNOSIS — Z51.81 ANTICOAGULATION MANAGEMENT ENCOUNTER: ICD-10-CM

## 2020-01-01 DIAGNOSIS — I87.8 VENOUS STASIS: ICD-10-CM

## 2020-01-01 DIAGNOSIS — R60.9 EDEMA, UNSPECIFIED TYPE: ICD-10-CM

## 2020-01-01 DIAGNOSIS — Z51.81 ENCOUNTER FOR THERAPEUTIC DRUG MONITORING: ICD-10-CM
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
## 2020-01-01 DIAGNOSIS — A41.9 SEPSIS, DUE TO UNSPECIFIED ORGANISM, UNSPECIFIED WHETHER ACUTE ORGAN DYSFUNCTION PRESENT (H): ICD-10-CM

## 2020-01-01 DIAGNOSIS — R60.9 WEIGHT GAIN WITH EDEMA: ICD-10-CM

## 2020-01-01 DIAGNOSIS — K21.00 GASTROESOPHAGEAL REFLUX DISEASE WITH ESOPHAGITIS: ICD-10-CM

## 2020-01-01 DIAGNOSIS — J96.01 ACUTE RESPIRATORY FAILURE WITH HYPOXIA (H): ICD-10-CM

## 2020-01-01 DIAGNOSIS — I48.91 ATRIAL FIBRILLATION (H): Primary | ICD-10-CM

## 2020-01-01 DIAGNOSIS — L29.9 ITCHING: ICD-10-CM

## 2020-01-01 DIAGNOSIS — E78.5 HYPERLIPIDEMIA: ICD-10-CM

## 2020-01-01 DIAGNOSIS — A49.01 STAPH AUREUS INFECTION: ICD-10-CM

## 2020-01-01 DIAGNOSIS — E11.69 TYPE 2 DIABETES MELLITUS WITH OTHER SPECIFIED COMPLICATION, WITHOUT LONG-TERM CURRENT USE OF INSULIN (H): ICD-10-CM

## 2020-01-01 DIAGNOSIS — J95.821 POSTOPERATIVE RESPIRATORY FAILURE (H): ICD-10-CM

## 2020-01-01 DIAGNOSIS — J32.9 CHRONIC SINUSITIS, UNSPECIFIED LOCATION: ICD-10-CM

## 2020-01-01 DIAGNOSIS — R06.2 WHEEZING: ICD-10-CM

## 2020-01-01 DIAGNOSIS — R63.5 WEIGHT GAIN: ICD-10-CM

## 2020-01-01 DIAGNOSIS — H02.401 PTOSIS OF RIGHT EYELID: ICD-10-CM

## 2020-01-01 DIAGNOSIS — F51.02 ADJUSTMENT INSOMNIA: ICD-10-CM

## 2020-01-01 DIAGNOSIS — G93.41 SEPTIC ENCEPHALOPATHY: ICD-10-CM

## 2020-01-01 DIAGNOSIS — N39.41 URGE INCONTINENCE OF URINE: ICD-10-CM

## 2020-01-01 DIAGNOSIS — J18.9 HAP (HOSPITAL-ACQUIRED PNEUMONIA): ICD-10-CM

## 2020-01-01 LAB
ALBUMIN SERPL-MCNC: 3.4 G/DL (ref 3.4–5)
ALBUMIN SERPL-MCNC: 3.4 G/DL (ref 3.5–5)
ALBUMIN SERPL-MCNC: 3.5 G/DL (ref 3.5–5)
ALBUMIN SERPL-MCNC: 3.6 G/DL (ref 3.5–5)
ALBUMIN SERPL-MCNC: 3.6 G/DL (ref 3.5–5)
ALBUMIN SERPL-MCNC: 3.7 G/DL (ref 3.5–5)
ALBUMIN SERPL-MCNC: 3.7 G/DL (ref 3.5–5)
ALBUMIN SERPL-MCNC: 3.9 G/DL (ref 3.5–5)
ALBUMIN SERPL-MCNC: 3.9 G/DL (ref 3.5–5)
ALBUMIN UR-MCNC: 100 MG/DL
ALP SERPL-CCNC: 74 U/L (ref 40–150)
ALP SERPL-CCNC: 75 U/L (ref 45–120)
ALP SERPL-CCNC: 77 U/L (ref 45–120)
ALP SERPL-CCNC: 79 U/L (ref 45–120)
ALP SERPL-CCNC: 79 U/L (ref 45–120)
ALP SERPL-CCNC: 82 U/L (ref 45–120)
ALP SERPL-CCNC: 85 U/L (ref 45–120)
ALT SERPL W P-5'-P-CCNC: 14 U/L (ref 0–45)
ALT SERPL W P-5'-P-CCNC: 21 U/L (ref 0–45)
ALT SERPL W P-5'-P-CCNC: 23 U/L (ref 0–45)
ALT SERPL W P-5'-P-CCNC: 24 U/L (ref 0–45)
ALT SERPL W P-5'-P-CCNC: 26 U/L (ref 0–45)
ALT SERPL W P-5'-P-CCNC: 28 U/L (ref 0–45)
ALT SERPL W P-5'-P-CCNC: 34 U/L (ref 0–45)
ALT SERPL W P-5'-P-CCNC: 40 U/L (ref 0–50)
ALT SERPL W P-5'-P-CCNC: 48 U/L (ref 0–45)
ANION GAP SERPL CALCULATED.3IONS-SCNC: 10 MMOL/L (ref 5–18)
ANION GAP SERPL CALCULATED.3IONS-SCNC: 11 MMOL/L (ref 5–18)
ANION GAP SERPL CALCULATED.3IONS-SCNC: 4 MMOL/L (ref 3–14)
ANION GAP SERPL CALCULATED.3IONS-SCNC: 6 MMOL/L (ref 5–18)
ANION GAP SERPL CALCULATED.3IONS-SCNC: 6 MMOL/L (ref 5–18)
ANION GAP SERPL CALCULATED.3IONS-SCNC: 7 MMOL/L (ref 5–18)
ANION GAP SERPL CALCULATED.3IONS-SCNC: 8 MMOL/L (ref 5–18)
ANION GAP SERPL CALCULATED.3IONS-SCNC: 9 MMOL/L (ref 5–18)
AORTIC ROOT: 3.9 CM
AORTIC VALVE MEAN VELOCITY: 83.3 CM/S
APPEARANCE UR: ABNORMAL
ASCENDING AORTA: 3.7 CM
AST SERPL W P-5'-P-CCNC: 16 U/L (ref 0–40)
AST SERPL W P-5'-P-CCNC: 20 U/L (ref 0–40)
AST SERPL W P-5'-P-CCNC: 22 U/L (ref 0–40)
AST SERPL W P-5'-P-CCNC: 23 U/L (ref 0–40)
AST SERPL W P-5'-P-CCNC: 23 U/L (ref 0–40)
AST SERPL W P-5'-P-CCNC: 25 U/L (ref 0–40)
AST SERPL W P-5'-P-CCNC: 30 U/L (ref 0–40)
AST SERPL W P-5'-P-CCNC: 31 U/L (ref 0–45)
AST SERPL W P-5'-P-CCNC: 40 U/L (ref 0–40)
AV MEAN GRADIENT: 3 MMHG
AV PEAK GRADIENT: 4.3 MMHG
BACTERIA #/AREA URNS HPF: ABNORMAL /HPF
BACTERIA SPEC CULT: ABNORMAL
BASOPHILS # BLD AUTO: 0 THOU/UL (ref 0–0.2)
BASOPHILS # BLD AUTO: 0.1 10E9/L (ref 0–0.2)
BASOPHILS # BLD AUTO: 0.1 THOU/UL (ref 0–0.2)
BASOPHILS NFR BLD AUTO: 0 % (ref 0–2)
BASOPHILS NFR BLD AUTO: 0.2 %
BILIRUB SERPL-MCNC: 0.6 MG/DL (ref 0–1)
BILIRUB SERPL-MCNC: 0.7 MG/DL (ref 0–1)
BILIRUB SERPL-MCNC: 0.8 MG/DL (ref 0–1)
BILIRUB SERPL-MCNC: 0.8 MG/DL (ref 0–1)
BILIRUB SERPL-MCNC: 1 MG/DL (ref 0–1)
BILIRUB SERPL-MCNC: 1.1 MG/DL (ref 0–1)
BILIRUB SERPL-MCNC: 1.4 MG/DL (ref 0.2–1.3)
BILIRUB UR QL STRIP: NEGATIVE
BNP SERPL-MCNC: 121 PG/ML (ref 0–130)
BNP SERPL-MCNC: 59 PG/ML (ref 0–133)
BSA FOR ECHO PROCEDURE: 2.61 M2
BUN SERPL-MCNC: 10 MG/DL (ref 8–28)
BUN SERPL-MCNC: 10 MG/DL (ref 8–28)
BUN SERPL-MCNC: 11 MG/DL (ref 8–28)
BUN SERPL-MCNC: 13 MG/DL (ref 8–28)
BUN SERPL-MCNC: 14 MG/DL (ref 8–28)
BUN SERPL-MCNC: 16 MG/DL (ref 8–28)
BUN SERPL-MCNC: 17 MG/DL (ref 8–28)
BUN SERPL-MCNC: 18 MG/DL (ref 8–28)
BUN SERPL-MCNC: 23 MG/DL (ref 8–28)
BUN SERPL-MCNC: 24 MG/DL (ref 8–28)
BUN SERPL-MCNC: 26 MG/DL (ref 8–28)
BUN SERPL-MCNC: 27 MG/DL (ref 8–28)
BUN SERPL-MCNC: 31 MG/DL (ref 8–28)
BUN SERPL-MCNC: 33 MG/DL (ref 8–28)
BUN SERPL-MCNC: 35 MG/DL (ref 7–30)
BUN SERPL-MCNC: 9 MG/DL (ref 8–28)
CALCIUM SERPL-MCNC: 10.2 MG/DL (ref 8.5–10.5)
CALCIUM SERPL-MCNC: 10.3 MG/DL (ref 8.5–10.5)
CALCIUM SERPL-MCNC: 10.3 MG/DL (ref 8.5–10.5)
CALCIUM SERPL-MCNC: 8.9 MG/DL (ref 8.5–10.5)
CALCIUM SERPL-MCNC: 8.9 MG/DL (ref 8.5–10.5)
CALCIUM SERPL-MCNC: 9 MG/DL (ref 8.5–10.5)
CALCIUM SERPL-MCNC: 9 MG/DL (ref 8.5–10.5)
CALCIUM SERPL-MCNC: 9.1 MG/DL (ref 8.5–10.5)
CALCIUM SERPL-MCNC: 9.1 MG/DL (ref 8.5–10.5)
CALCIUM SERPL-MCNC: 9.2 MG/DL (ref 8.5–10.1)
CALCIUM SERPL-MCNC: 9.2 MG/DL (ref 8.5–10.5)
CALCIUM SERPL-MCNC: 9.2 MG/DL (ref 8.5–10.5)
CALCIUM SERPL-MCNC: 9.3 MG/DL (ref 8.5–10.5)
CALCIUM SERPL-MCNC: 9.5 MG/DL (ref 8.5–10.5)
CALCIUM SERPL-MCNC: 9.5 MG/DL (ref 8.5–10.5)
CALCIUM SERPL-MCNC: 9.6 MG/DL (ref 8.5–10.5)
CALCIUM SERPL-MCNC: 9.9 MG/DL (ref 8.5–10.5)
CAPILLARY BLOOD COLLECTION: NORMAL
CHLORIDE BLD-SCNC: 100 MMOL/L (ref 98–107)
CHLORIDE BLD-SCNC: 101 MMOL/L (ref 98–107)
CHLORIDE BLD-SCNC: 102 MMOL/L (ref 98–107)
CHLORIDE BLD-SCNC: 103 MMOL/L (ref 98–107)
CHLORIDE BLD-SCNC: 104 MMOL/L (ref 98–107)
CHLORIDE BLD-SCNC: 105 MMOL/L (ref 98–107)
CHLORIDE BLD-SCNC: 106 MMOL/L (ref 98–107)
CHLORIDE BLD-SCNC: 106 MMOL/L (ref 98–107)
CHLORIDE BLD-SCNC: 108 MMOL/L (ref 98–107)
CHLORIDE BLD-SCNC: 99 MMOL/L (ref 98–107)
CHLORIDE SERPL-SCNC: 109 MMOL/L (ref 94–109)
CO2 SERPL-SCNC: 27 MMOL/L (ref 22–31)
CO2 SERPL-SCNC: 27 MMOL/L (ref 22–31)
CO2 SERPL-SCNC: 28 MMOL/L (ref 22–31)
CO2 SERPL-SCNC: 29 MMOL/L (ref 20–32)
CO2 SERPL-SCNC: 29 MMOL/L (ref 22–31)
CO2 SERPL-SCNC: 30 MMOL/L (ref 22–31)
CO2 SERPL-SCNC: 31 MMOL/L (ref 22–31)
CO2 SERPL-SCNC: 32 MMOL/L (ref 22–31)
CO2 SERPL-SCNC: 33 MMOL/L (ref 22–31)
CO2 SERPL-SCNC: 33 MMOL/L (ref 22–31)
COLOR UR AUTO: YELLOW
CREAT SERPL-MCNC: 0.92 MG/DL (ref 0.6–1.1)
CREAT SERPL-MCNC: 0.93 MG/DL (ref 0.6–1.1)
CREAT SERPL-MCNC: 0.96 MG/DL (ref 0.6–1.1)
CREAT SERPL-MCNC: 0.98 MG/DL (ref 0.6–1.1)
CREAT SERPL-MCNC: 0.98 MG/DL (ref 0.6–1.1)
CREAT SERPL-MCNC: 0.99 MG/DL (ref 0.6–1.1)
CREAT SERPL-MCNC: 0.99 MG/DL (ref 0.6–1.1)
CREAT SERPL-MCNC: 1 MG/DL (ref 0.6–1.1)
CREAT SERPL-MCNC: 1 MG/DL (ref 0.6–1.1)
CREAT SERPL-MCNC: 1.02 MG/DL (ref 0.6–1.1)
CREAT SERPL-MCNC: 1.02 MG/DL (ref 0.6–1.1)
CREAT SERPL-MCNC: 1.04 MG/DL (ref 0.6–1.1)
CREAT SERPL-MCNC: 1.05 MG/DL (ref 0.6–1.1)
CREAT SERPL-MCNC: 1.06 MG/DL (ref 0.6–1.1)
CREAT SERPL-MCNC: 1.08 MG/DL (ref 0.6–1.1)
CREAT SERPL-MCNC: 1.08 MG/DL (ref 0.6–1.1)
CREAT SERPL-MCNC: 1.1 MG/DL (ref 0.6–1.1)
CREAT SERPL-MCNC: 1.13 MG/DL (ref 0.6–1.1)
CREAT SERPL-MCNC: 1.22 MG/DL (ref 0.6–1.1)
CREAT SERPL-MCNC: 1.36 MG/DL (ref 0.52–1.04)
CREAT UR-MCNC: 21.1 MG/DL
CV BLOOD PRESSURE: NORMAL MMHG
CV ECHO HEIGHT: 66 IN
CV ECHO WEIGHT: 322 LBS
DIFFERENTIAL METHOD BLD: ABNORMAL
DOP CALC AO PEAK VEL: 104 CM/S
DOP CALC AO VTI: 21.6 CM
DOP CALC LVOT AREA: 3.46 CM2
DOP CALC LVOT DIAMETER: 2.1 CM
EOSINOPHIL # BLD AUTO: 0 10E9/L (ref 0–0.7)
EOSINOPHIL # BLD AUTO: 0.2 THOU/UL (ref 0–0.4)
EOSINOPHIL # BLD AUTO: 0.3 THOU/UL (ref 0–0.4)
EOSINOPHIL # BLD AUTO: 0.4 THOU/UL (ref 0–0.4)
EOSINOPHIL NFR BLD AUTO: 0 %
EOSINOPHIL NFR BLD AUTO: 2 % (ref 0–6)
EOSINOPHIL NFR BLD AUTO: 3 % (ref 0–6)
EOSINOPHIL NFR BLD AUTO: 3 % (ref 0–6)
ERYTHROCYTE [DISTWIDTH] IN BLOOD BY AUTOMATED COUNT: 17.4 % (ref 11–14.5)
ERYTHROCYTE [DISTWIDTH] IN BLOOD BY AUTOMATED COUNT: 17.4 % (ref 11–14.5)
ERYTHROCYTE [DISTWIDTH] IN BLOOD BY AUTOMATED COUNT: 17.9 % (ref 11–14.5)
ERYTHROCYTE [DISTWIDTH] IN BLOOD BY AUTOMATED COUNT: 18.2 % (ref 11–14.5)
ERYTHROCYTE [DISTWIDTH] IN BLOOD BY AUTOMATED COUNT: 18.6 % (ref 11–14.5)
ERYTHROCYTE [DISTWIDTH] IN BLOOD BY AUTOMATED COUNT: 18.7 % (ref 11–14.5)
ERYTHROCYTE [DISTWIDTH] IN BLOOD BY AUTOMATED COUNT: 18.9 % (ref 11–14.5)
ERYTHROCYTE [DISTWIDTH] IN BLOOD BY AUTOMATED COUNT: 18.9 % (ref 11–14.5)
ERYTHROCYTE [DISTWIDTH] IN BLOOD BY AUTOMATED COUNT: 19.2 % (ref 10–15)
ERYTHROCYTE [DISTWIDTH] IN BLOOD BY AUTOMATED COUNT: 20.7 % (ref 11–14.5)
ERYTHROCYTE [DISTWIDTH] IN BLOOD BY AUTOMATED COUNT: 21.9 % (ref 11–14.5)
ERYTHROCYTE [DISTWIDTH] IN BLOOD BY AUTOMATED COUNT: 22 % (ref 11–14.5)
GFR SERPL CREATININE-BSD FRML MDRD: 38 ML/MIN/{1.73_M2}
GFR SERPL CREATININE-BSD FRML MDRD: 43 ML/MIN/1.73M2
GFR SERPL CREATININE-BSD FRML MDRD: 47 ML/MIN/1.73M2
GFR SERPL CREATININE-BSD FRML MDRD: 49 ML/MIN/1.73M2
GFR SERPL CREATININE-BSD FRML MDRD: 50 ML/MIN/1.73M2
GFR SERPL CREATININE-BSD FRML MDRD: 50 ML/MIN/1.73M2
GFR SERPL CREATININE-BSD FRML MDRD: 51 ML/MIN/1.73M2
GFR SERPL CREATININE-BSD FRML MDRD: 51 ML/MIN/1.73M2
GFR SERPL CREATININE-BSD FRML MDRD: 52 ML/MIN/1.73M2
GFR SERPL CREATININE-BSD FRML MDRD: 53 ML/MIN/1.73M2
GFR SERPL CREATININE-BSD FRML MDRD: 53 ML/MIN/1.73M2
GFR SERPL CREATININE-BSD FRML MDRD: 54 ML/MIN/1.73M2
GFR SERPL CREATININE-BSD FRML MDRD: 54 ML/MIN/1.73M2
GFR SERPL CREATININE-BSD FRML MDRD: 55 ML/MIN/1.73M2
GFR SERPL CREATININE-BSD FRML MDRD: 55 ML/MIN/1.73M2
GFR SERPL CREATININE-BSD FRML MDRD: 56 ML/MIN/1.73M2
GFR SERPL CREATININE-BSD FRML MDRD: 56 ML/MIN/1.73M2
GFR SERPL CREATININE-BSD FRML MDRD: 57 ML/MIN/1.73M2
GFR SERPL CREATININE-BSD FRML MDRD: 59 ML/MIN/1.73M2
GFR SERPL CREATININE-BSD FRML MDRD: 60 ML/MIN/1.73M2
GLUCOSE BLD-MCNC: 100 MG/DL (ref 70–125)
GLUCOSE BLD-MCNC: 100 MG/DL (ref 70–125)
GLUCOSE BLD-MCNC: 102 MG/DL (ref 70–125)
GLUCOSE BLD-MCNC: 102 MG/DL (ref 70–125)
GLUCOSE BLD-MCNC: 103 MG/DL (ref 70–125)
GLUCOSE BLD-MCNC: 105 MG/DL (ref 70–125)
GLUCOSE BLD-MCNC: 108 MG/DL (ref 70–125)
GLUCOSE BLD-MCNC: 123 MG/DL (ref 70–125)
GLUCOSE BLD-MCNC: 123 MG/DL (ref 70–125)
GLUCOSE BLD-MCNC: 124 MG/DL (ref 70–125)
GLUCOSE BLD-MCNC: 125 MG/DL (ref 70–125)
GLUCOSE BLD-MCNC: 125 MG/DL (ref 70–125)
GLUCOSE BLD-MCNC: 132 MG/DL (ref 70–125)
GLUCOSE BLD-MCNC: 134 MG/DL (ref 70–125)
GLUCOSE BLD-MCNC: 142 MG/DL (ref 70–125)
GLUCOSE BLD-MCNC: 146 MG/DL (ref 70–125)
GLUCOSE BLD-MCNC: 149 MG/DL (ref 70–125)
GLUCOSE SERPL-MCNC: 143 MG/DL (ref 70–99)
GLUCOSE UR STRIP-MCNC: NEGATIVE MG/DL
HBA1C MFR BLD: 5.6 %
HBA1C MFR BLD: 5.6 % (ref 4.2–6.1)
HCT VFR BLD AUTO: 38.3 % (ref 35–47)
HCT VFR BLD AUTO: 39 % (ref 35–47)
HCT VFR BLD AUTO: 39.5 % (ref 35–47)
HCT VFR BLD AUTO: 40.2 % (ref 35–47)
HCT VFR BLD AUTO: 41.6 % (ref 35–47)
HCT VFR BLD AUTO: 43.4 % (ref 35–47)
HCT VFR BLD AUTO: 43.8 % (ref 35–47)
HCT VFR BLD AUTO: 44.5 % (ref 35–47)
HCT VFR BLD AUTO: 44.8 % (ref 35–47)
HCT VFR BLD AUTO: 45.2 % (ref 35–47)
HCT VFR BLD AUTO: 45.2 % (ref 35–47)
HCT VFR BLD AUTO: 48.5 % (ref 35–47)
HGB BLD-MCNC: 11.1 G/DL (ref 12–16)
HGB BLD-MCNC: 11.5 G/DL (ref 12–16)
HGB BLD-MCNC: 11.5 G/DL (ref 12–16)
HGB BLD-MCNC: 12.5 G/DL (ref 12–16)
HGB BLD-MCNC: 13.1 G/DL (ref 12–16)
HGB BLD-MCNC: 13.1 G/DL (ref 12–16)
HGB BLD-MCNC: 13.3 G/DL (ref 12–16)
HGB BLD-MCNC: 13.3 G/DL (ref 12–16)
HGB BLD-MCNC: 13.6 G/DL (ref 12–16)
HGB BLD-MCNC: 14 G/DL (ref 12–16)
HGB BLD-MCNC: 14.2 G/DL (ref 12–16)
HGB BLD-MCNC: 14.7 G/DL (ref 11.7–15.7)
HGB UR QL STRIP: ABNORMAL
IMM GRANULOCYTES # BLD: 0 THOU/UL
IMM GRANULOCYTES # BLD: 0 THOU/UL
IMM GRANULOCYTES # BLD: 0.1 THOU/UL
IMM GRANULOCYTES # BLD: 0.5 10E9/L (ref 0–0.4)
IMM GRANULOCYTES NFR BLD: 0 %
IMM GRANULOCYTES NFR BLD: 0 %
IMM GRANULOCYTES NFR BLD: 1 %
IMM GRANULOCYTES NFR BLD: 1.3 %
INR PPP: 1.06 (ref 0.9–1.1)
INR PPP: 1.12 (ref 0.9–1.1)
INR PPP: 1.37 (ref 0.9–1.1)
INR PPP: 1.57 (ref 0.9–1.1)
INR PPP: 1.61 (ref 0.9–1.1)
INR PPP: 1.62 (ref 0.9–1.1)
INR PPP: 1.63 (ref 0.9–1.1)
INR PPP: 1.7 (ref 0.9–1.1)
INR PPP: 1.71 (ref 0.9–1.1)
INR PPP: 1.84 (ref 0.9–1.1)
INR PPP: 1.96 (ref 0.86–1.14)
INR PPP: 2.01 (ref 0.9–1.1)
INR PPP: 2.03 (ref 0.9–1.1)
INR PPP: 2.04 (ref 0.9–1.1)
INR PPP: 2.19 (ref 0.9–1.1)
INR PPP: 2.2 (ref 0.9–1.1)
INR PPP: 2.2 (ref 0.9–1.1)
INR PPP: 2.4 (ref 0.86–1.14)
INR PPP: 2.4 (ref 0.9–1.1)
INR PPP: 2.5 (ref 0.9–1.1)
INR PPP: 2.6 (ref 0.86–1.14)
INR PPP: 2.6 (ref 0.9–1.1)
INR PPP: 2.64 (ref 0.9–1.1)
INR PPP: 2.75 (ref 0.9–1.1)
INR PPP: 2.76 (ref 0.9–1.1)
INR PPP: 2.9 (ref 0.9–1.1)
INR PPP: 3 (ref 0.9–1.1)
INR PPP: 3.44 (ref 0.9–1.1)
KETONES UR STRIP-MCNC: NEGATIVE MG/DL
LA AREA 1: 37.3 CM2
LABORATORY COMMENT REPORT: NORMAL
LACTATE BLD-SCNC: 1.8 MMOL/L (ref 0.7–2)
LACTATE BLD-SCNC: 3.7 MMOL/L (ref 0.7–2)
LDH SERPL L TO P-CCNC: 212 U/L (ref 125–220)
LEFT ATRIUM LENGTH: 7.73 CM
LEUKOCYTE ESTERASE UR QL STRIP: ABNORMAL
LYMPHOCYTES # BLD AUTO: 0.7 10E9/L (ref 0.8–5.3)
LYMPHOCYTES # BLD AUTO: 1.1 THOU/UL (ref 0.8–4.4)
LYMPHOCYTES # BLD AUTO: 1.3 THOU/UL (ref 0.8–4.4)
LYMPHOCYTES # BLD AUTO: 1.4 THOU/UL (ref 0.8–4.4)
LYMPHOCYTES NFR BLD AUTO: 1.8 %
LYMPHOCYTES NFR BLD AUTO: 12 % (ref 20–40)
LYMPHOCYTES NFR BLD AUTO: 12 % (ref 20–40)
LYMPHOCYTES NFR BLD AUTO: 13 % (ref 20–40)
LYMPHOCYTES NFR BLD AUTO: 14 % (ref 20–40)
LYMPHOCYTES NFR BLD AUTO: 9 % (ref 20–40)
Lab: ABNORMAL
Lab: ABNORMAL
MCH RBC QN AUTO: 24.2 PG (ref 27–34)
MCH RBC QN AUTO: 24.5 PG (ref 27–34)
MCH RBC QN AUTO: 25 PG (ref 27–34)
MCH RBC QN AUTO: 25.5 PG (ref 27–34)
MCH RBC QN AUTO: 25.8 PG (ref 27–34)
MCH RBC QN AUTO: 25.9 PG (ref 27–34)
MCH RBC QN AUTO: 26.2 PG (ref 27–34)
MCH RBC QN AUTO: 26.3 PG (ref 26.5–33)
MCH RBC QN AUTO: 26.3 PG (ref 27–34)
MCH RBC QN AUTO: 26.3 PG (ref 27–34)
MCH RBC QN AUTO: 26.7 PG (ref 27–34)
MCH RBC QN AUTO: 28.2 PG (ref 27–34)
MCHC RBC AUTO-ENTMCNC: 29 G/DL (ref 32–36)
MCHC RBC AUTO-ENTMCNC: 29.1 G/DL (ref 32–36)
MCHC RBC AUTO-ENTMCNC: 29.5 G/DL (ref 32–36)
MCHC RBC AUTO-ENTMCNC: 29.9 G/DL (ref 32–36)
MCHC RBC AUTO-ENTMCNC: 30.2 G/DL (ref 32–36)
MCHC RBC AUTO-ENTMCNC: 30.3 G/DL (ref 31.5–36.5)
MCHC RBC AUTO-ENTMCNC: 30.4 G/DL (ref 32–36)
MCHC RBC AUTO-ENTMCNC: 30.4 G/DL (ref 32–36)
MCHC RBC AUTO-ENTMCNC: 31 G/DL (ref 32–36)
MCHC RBC AUTO-ENTMCNC: 31.1 G/DL (ref 32–36)
MCHC RBC AUTO-ENTMCNC: 31.4 G/DL (ref 32–36)
MCHC RBC AUTO-ENTMCNC: 31.5 G/DL (ref 32–36)
MCV RBC AUTO: 78 FL (ref 80–100)
MCV RBC AUTO: 80 FL (ref 80–100)
MCV RBC AUTO: 82 FL (ref 80–100)
MCV RBC AUTO: 83 FL (ref 80–100)
MCV RBC AUTO: 84 FL (ref 80–100)
MCV RBC AUTO: 85 FL (ref 80–100)
MCV RBC AUTO: 85 FL (ref 80–100)
MCV RBC AUTO: 86 FL (ref 80–100)
MCV RBC AUTO: 87 FL (ref 78–100)
MCV RBC AUTO: 88 FL (ref 80–100)
MCV RBC AUTO: 92 FL (ref 80–100)
MCV RBC AUTO: 97 FL (ref 80–100)
MICROALBUMIN UR-MCNC: <0.5 MG/DL (ref 0–1.99)
MICROALBUMIN/CREAT UR: NORMAL MG/G{CREAT}
MONOCYTES # BLD AUTO: 0.9 THOU/UL (ref 0–0.9)
MONOCYTES # BLD AUTO: 0.9 THOU/UL (ref 0–0.9)
MONOCYTES # BLD AUTO: 1 THOU/UL (ref 0–0.9)
MONOCYTES # BLD AUTO: 1.1 THOU/UL (ref 0–0.9)
MONOCYTES # BLD AUTO: 1.2 THOU/UL (ref 0–0.9)
MONOCYTES # BLD AUTO: 1.6 10E9/L (ref 0–1.3)
MONOCYTES NFR BLD AUTO: 10 % (ref 2–10)
MONOCYTES NFR BLD AUTO: 10 % (ref 2–10)
MONOCYTES NFR BLD AUTO: 4 %
MONOCYTES NFR BLD AUTO: 9 % (ref 2–10)
MUCOUS THREADS #/AREA URNS LPF: PRESENT /LPF
MUGA LV EJECTION FRACTION: 63 %
MUGA LV EJECTION FRACTION: 63.9 %
MUGA PRIOR EJECTION FRACTION: 64 %
MV AVERAGE E/E' RATIO: 11.3 CM/S
MV DECELERATION TIME: 216 MS
MV E'TISSUE VEL-LAT: 9.86 CM/S
MV E'TISSUE VEL-MED: 5.72 CM/S
MV LATERAL E/E' RATIO: 8.9
MV MEDIAL E/E' RATIO: 15.3
MV PEAK E VELOCITY: 87.7 CM/S
NEUTROPHILS # BLD AUTO: 37.7 10E9/L (ref 1.6–8.3)
NEUTROPHILS # BLD AUTO: 7.2 THOU/UL (ref 2–7.7)
NEUTROPHILS # BLD AUTO: 7.5 THOU/UL (ref 2–7.7)
NEUTROPHILS # BLD AUTO: 7.6 THOU/UL (ref 2–7.7)
NEUTROPHILS # BLD AUTO: 8.2 THOU/UL (ref 2–7.7)
NEUTROPHILS # BLD AUTO: 8.3 THOU/UL (ref 2–7.7)
NEUTROPHILS # BLD AUTO: 8.5 THOU/UL (ref 2–7.7)
NEUTROPHILS # BLD AUTO: 9.2 THOU/UL (ref 2–7.7)
NEUTROPHILS NFR BLD AUTO: 74 % (ref 50–70)
NEUTROPHILS NFR BLD AUTO: 75 % (ref 50–70)
NEUTROPHILS NFR BLD AUTO: 76 % (ref 50–70)
NEUTROPHILS NFR BLD AUTO: 76 % (ref 50–70)
NEUTROPHILS NFR BLD AUTO: 80 % (ref 50–70)
NEUTROPHILS NFR BLD AUTO: 92.7 %
NITRATE UR QL: NEGATIVE
NRBC # BLD AUTO: 0 10*3/UL
NRBC BLD AUTO-RTO: 0 /100
NUC REST DIASTOLIC VOLUME INDEX: 5152 LBS
NUC REST SYSTOLIC VOLUME INDEX: 66 IN
OVALOCYTES: ABNORMAL
PH UR STRIP: 5 PH (ref 5–7)
PLAT MORPH BLD: NORMAL
PLATELET # BLD AUTO: 132 10E9/L (ref 150–450)
PLATELET # BLD AUTO: 133 THOU/UL (ref 140–440)
PLATELET # BLD AUTO: 134 THOU/UL (ref 140–440)
PLATELET # BLD AUTO: 138 THOU/UL (ref 140–440)
PLATELET # BLD AUTO: 139 THOU/UL (ref 140–440)
PLATELET # BLD AUTO: 141 THOU/UL (ref 140–440)
PLATELET # BLD AUTO: 142 THOU/UL (ref 140–440)
PLATELET # BLD AUTO: 145 THOU/UL (ref 140–440)
PLATELET # BLD AUTO: 172 THOU/UL (ref 140–440)
PLATELET # BLD AUTO: 176 THOU/UL (ref 140–440)
PLATELET # BLD AUTO: 184 THOU/UL (ref 140–440)
PLATELET # BLD AUTO: 212 THOU/UL (ref 140–440)
PMV BLD AUTO: 10.7 FL (ref 8.5–12.5)
PMV BLD AUTO: 10.8 FL (ref 8.5–12.5)
PMV BLD AUTO: 10.8 FL (ref 8.5–12.5)
PMV BLD AUTO: 11.1 FL (ref 8.5–12.5)
PMV BLD AUTO: 11.2 FL (ref 8.5–12.5)
PMV BLD AUTO: 11.3 FL (ref 8.5–12.5)
PMV BLD AUTO: 11.3 FL (ref 8.5–12.5)
PMV BLD AUTO: 11.6 FL (ref 8.5–12.5)
PMV BLD AUTO: ABNORMAL FL
POTASSIUM BLD-SCNC: 3.7 MMOL/L (ref 3.5–5)
POTASSIUM BLD-SCNC: 3.8 MMOL/L (ref 3.5–5)
POTASSIUM BLD-SCNC: 3.9 MMOL/L (ref 3.5–5)
POTASSIUM BLD-SCNC: 4 MMOL/L (ref 3.5–5)
POTASSIUM BLD-SCNC: 4.1 MMOL/L (ref 3.5–5)
POTASSIUM BLD-SCNC: 4.4 MMOL/L (ref 3.5–5)
POTASSIUM BLD-SCNC: 4.7 MMOL/L (ref 3.5–5)
POTASSIUM BLD-SCNC: 4.8 MMOL/L (ref 3.5–5)
POTASSIUM SERPL-SCNC: 4.1 MMOL/L (ref 3.4–5.3)
PROT SERPL-MCNC: 6.5 G/DL (ref 6–8)
PROT SERPL-MCNC: 6.6 G/DL (ref 6–8)
PROT SERPL-MCNC: 6.7 G/DL (ref 6–8)
PROT SERPL-MCNC: 6.9 G/DL (ref 6–8)
PROT SERPL-MCNC: 7 G/DL (ref 6–8)
PROT SERPL-MCNC: 7.1 G/DL (ref 6–8)
PROT SERPL-MCNC: 7.3 G/DL (ref 6.8–8.8)
RBC # BLD AUTO: 4.08 MILL/UL (ref 3.8–5.4)
RBC # BLD AUTO: 4.15 MILL/UL (ref 3.8–5.4)
RBC # BLD AUTO: 4.6 MILL/UL (ref 3.8–5.4)
RBC # BLD AUTO: 4.9 MILL/UL (ref 3.8–5.4)
RBC # BLD AUTO: 4.99 MILL/UL (ref 3.8–5.4)
RBC # BLD AUTO: 5.05 MILL/UL (ref 3.8–5.4)
RBC # BLD AUTO: 5.15 MILL/UL (ref 3.8–5.4)
RBC # BLD AUTO: 5.19 MILL/UL (ref 3.8–5.4)
RBC # BLD AUTO: 5.4 MILL/UL (ref 3.8–5.4)
RBC # BLD AUTO: 5.41 MILL/UL (ref 3.8–5.4)
RBC # BLD AUTO: 5.58 10E12/L (ref 3.8–5.2)
RBC # BLD AUTO: 5.8 MILL/UL (ref 3.8–5.4)
RBC #/AREA URNS AUTO: 6 /HPF (ref 0–2)
SARS-COV-2 RNA SPEC QL NAA+PROBE: NEGATIVE
SARS-COV-2 RNA SPEC QL NAA+PROBE: NORMAL
SODIUM SERPL-SCNC: 139 MMOL/L (ref 136–145)
SODIUM SERPL-SCNC: 140 MMOL/L (ref 136–145)
SODIUM SERPL-SCNC: 141 MMOL/L (ref 136–145)
SODIUM SERPL-SCNC: 142 MMOL/L (ref 133–144)
SODIUM SERPL-SCNC: 142 MMOL/L (ref 136–145)
SODIUM SERPL-SCNC: 143 MMOL/L (ref 136–145)
SODIUM SERPL-SCNC: 144 MMOL/L (ref 136–145)
SOURCE: ABNORMAL
SP GR UR STRIP: 1.02 (ref 1–1.03)
SPECIMEN SOURCE: ABNORMAL
SPECIMEN SOURCE: NORMAL
SPECIMEN SOURCE: NORMAL
TEAR DROP: ABNORMAL
TRICUSPID VALVE ANULAR PLANE SYSTOLIC EXCURSION: 1.9 CM
TROPONIN I SERPL-MCNC: 0.36 UG/L (ref 0–0.04)
UROBILINOGEN UR STRIP-MCNC: 0 MG/DL (ref 0–2)
WBC # BLD AUTO: 40.7 10E9/L (ref 4–11)
WBC #/AREA URNS AUTO: 163 /HPF (ref 0–5)
WBC CLUMPS #/AREA URNS HPF: PRESENT /HPF
WBC: 10.2 THOU/UL (ref 4–11)
WBC: 10.3 THOU/UL (ref 4–11)
WBC: 10.7 THOU/UL (ref 4–11)
WBC: 10.9 THOU/UL (ref 4–11)
WBC: 10.9 THOU/UL (ref 4–11)
WBC: 11.2 THOU/UL (ref 4–11)
WBC: 11.3 THOU/UL (ref 4–11)
WBC: 11.6 THOU/UL (ref 4–11)
WBC: 9.7 THOU/UL (ref 4–11)

## 2020-01-01 PROCEDURE — 96375 TX/PRO/DX INJ NEW DRUG ADDON: CPT | Performed by: EMERGENCY MEDICINE

## 2020-01-01 PROCEDURE — 87077 CULTURE AEROBIC IDENTIFY: CPT | Performed by: EMERGENCY MEDICINE

## 2020-01-01 PROCEDURE — 85025 COMPLETE CBC W/AUTO DIFF WBC: CPT | Performed by: EMERGENCY MEDICINE

## 2020-01-01 PROCEDURE — 99291 CRITICAL CARE FIRST HOUR: CPT | Mod: 25 | Performed by: EMERGENCY MEDICINE

## 2020-01-01 PROCEDURE — 36415 COLL VENOUS BLD VENIPUNCTURE: CPT | Performed by: INTERNAL MEDICINE

## 2020-01-01 PROCEDURE — 85610 PROTHROMBIN TIME: CPT | Performed by: INTERNAL MEDICINE

## 2020-01-01 PROCEDURE — 250N000011 HC RX IP 250 OP 636: Performed by: EMERGENCY MEDICINE

## 2020-01-01 PROCEDURE — C9803 HOPD COVID-19 SPEC COLLECT: HCPCS | Performed by: EMERGENCY MEDICINE

## 2020-01-01 PROCEDURE — 93010 ELECTROCARDIOGRAM REPORT: CPT | Performed by: EMERGENCY MEDICINE

## 2020-01-01 PROCEDURE — 87186 SC STD MICRODIL/AGAR DIL: CPT | Performed by: EMERGENCY MEDICINE

## 2020-01-01 PROCEDURE — 93005 ELECTROCARDIOGRAM TRACING: CPT | Performed by: EMERGENCY MEDICINE

## 2020-01-01 PROCEDURE — 71045 X-RAY EXAM CHEST 1 VIEW: CPT

## 2020-01-01 PROCEDURE — 87800 DETECT AGNT MULT DNA DIREC: CPT | Performed by: EMERGENCY MEDICINE

## 2020-01-01 PROCEDURE — 87040 BLOOD CULTURE FOR BACTERIA: CPT | Performed by: EMERGENCY MEDICINE

## 2020-01-01 PROCEDURE — 87088 URINE BACTERIA CULTURE: CPT | Performed by: EMERGENCY MEDICINE

## 2020-01-01 PROCEDURE — 74176 CT ABD & PELVIS W/O CONTRAST: CPT

## 2020-01-01 PROCEDURE — 81001 URINALYSIS AUTO W/SCOPE: CPT | Performed by: EMERGENCY MEDICINE

## 2020-01-01 PROCEDURE — 99292 CRITICAL CARE ADDL 30 MIN: CPT | Performed by: EMERGENCY MEDICINE

## 2020-01-01 PROCEDURE — 250N000013 HC RX MED GY IP 250 OP 250 PS 637: Mod: GY | Performed by: EMERGENCY MEDICINE

## 2020-01-01 PROCEDURE — U0003 INFECTIOUS AGENT DETECTION BY NUCLEIC ACID (DNA OR RNA); SEVERE ACUTE RESPIRATORY SYNDROME CORONAVIRUS 2 (SARS-COV-2) (CORONAVIRUS DISEASE [COVID-19]), AMPLIFIED PROBE TECHNIQUE, MAKING USE OF HIGH THROUGHPUT TECHNOLOGIES AS DESCRIBED BY CMS-2020-01-R: HCPCS | Performed by: EMERGENCY MEDICINE

## 2020-01-01 PROCEDURE — 96365 THER/PROPH/DIAG IV INF INIT: CPT | Performed by: EMERGENCY MEDICINE

## 2020-01-01 PROCEDURE — 87086 URINE CULTURE/COLONY COUNT: CPT | Performed by: EMERGENCY MEDICINE

## 2020-01-01 PROCEDURE — 36416 COLLJ CAPILLARY BLOOD SPEC: CPT | Performed by: INTERNAL MEDICINE

## 2020-01-01 PROCEDURE — 85610 PROTHROMBIN TIME: CPT | Performed by: EMERGENCY MEDICINE

## 2020-01-01 PROCEDURE — 84484 ASSAY OF TROPONIN QUANT: CPT | Performed by: EMERGENCY MEDICINE

## 2020-01-01 PROCEDURE — 258N000003 HC RX IP 258 OP 636: Performed by: EMERGENCY MEDICINE

## 2020-01-01 PROCEDURE — 80053 COMPREHEN METABOLIC PANEL: CPT | Performed by: EMERGENCY MEDICINE

## 2020-01-01 PROCEDURE — 96376 TX/PRO/DX INJ SAME DRUG ADON: CPT | Performed by: EMERGENCY MEDICINE

## 2020-01-01 PROCEDURE — 83605 ASSAY OF LACTIC ACID: CPT | Performed by: EMERGENCY MEDICINE

## 2020-01-01 PROCEDURE — 96361 HYDRATE IV INFUSION ADD-ON: CPT | Performed by: EMERGENCY MEDICINE

## 2020-01-01 RX ORDER — HALOPERIDOL 5 MG/ML
2 INJECTION INTRAMUSCULAR ONCE
Status: COMPLETED | OUTPATIENT
Start: 2020-01-01 | End: 2020-01-01

## 2020-01-01 RX ORDER — ACETAMINOPHEN 500 MG
1000 TABLET ORAL ONCE
Status: COMPLETED | OUTPATIENT
Start: 2020-01-01 | End: 2020-01-01

## 2020-01-01 RX ORDER — CEFTRIAXONE SODIUM 1 G/50ML
1 INJECTION, SOLUTION INTRAVENOUS ONCE
Status: COMPLETED | OUTPATIENT
Start: 2020-01-01 | End: 2020-01-01

## 2020-01-01 RX ORDER — ASPIRIN 81 MG/1
324 TABLET, CHEWABLE ORAL ONCE
Status: COMPLETED | OUTPATIENT
Start: 2020-01-01 | End: 2020-01-01

## 2020-01-01 RX ADMIN — SODIUM CHLORIDE 1000 ML: 9 INJECTION, SOLUTION INTRAVENOUS at 13:29

## 2020-01-01 RX ADMIN — CEFTRIAXONE SODIUM 1 G: 1 INJECTION, SOLUTION INTRAVENOUS at 13:41

## 2020-01-01 RX ADMIN — ACETAMINOPHEN 1000 MG: 500 TABLET, FILM COATED ORAL at 14:01

## 2020-01-01 RX ADMIN — SODIUM CHLORIDE, POTASSIUM CHLORIDE, SODIUM LACTATE AND CALCIUM CHLORIDE 1000 ML: 600; 310; 30; 20 INJECTION, SOLUTION INTRAVENOUS at 16:04

## 2020-01-01 RX ADMIN — HALOPERIDOL LACTATE 2 MG: 5 INJECTION, SOLUTION INTRAMUSCULAR at 15:44

## 2020-01-01 RX ADMIN — ASPIRIN 81 MG 324 MG: 81 TABLET ORAL at 15:42

## 2020-01-01 RX ADMIN — HALOPERIDOL LACTATE 2 MG: 5 INJECTION, SOLUTION INTRAMUSCULAR at 14:09

## 2020-01-01 RX ADMIN — MIDAZOLAM 2 MG: 1 INJECTION INTRAMUSCULAR; INTRAVENOUS at 15:44

## 2020-01-01 RX ADMIN — SODIUM CHLORIDE, POTASSIUM CHLORIDE, SODIUM LACTATE AND CALCIUM CHLORIDE 1000 ML: 600; 310; 30; 20 INJECTION, SOLUTION INTRAVENOUS at 17:08

## 2020-01-01 ASSESSMENT — MIFFLIN-ST. JEOR
SCORE: 1972.33
SCORE: 1845.33
SCORE: 1865.28
SCORE: 2046.04

## 2020-01-01 ASSESSMENT — PATIENT HEALTH QUESTIONNAIRE - PHQ9: SUM OF ALL RESPONSES TO PHQ QUESTIONS 1-9: 14

## 2020-11-24 NOTE — TELEPHONE ENCOUNTER
Patient calls in requesting an apt for INR states she goes to [place]. Lab double booked apt. Polo Wagner Rn  [facility]

## 2020-12-02 NOTE — PROGRESS NOTES
Carpio catheter placed. Urine sent to lab. Yeast rash in pannus as well as groin. Inner thighs are red, warm. Left > right.. Antibiotic and IVF infusing. Pt calmer after Haldol. Restraints in place. Blood cultures drawn.

## 2020-12-02 NOTE — ED TRIAGE NOTES
"Patient resident of Assisted Living, while doing rounds this morning staff noted increased confusion. Staff contacted patient's family to have them speak with patient to assess mentation. Patient's family called 9-1-1 after speaking with patient. Patient found to be confused, unable to follow commands, incontinent of urine. EMS noted \"lots\" of empty [employer] energy drink and multiple small ETOH bottles as well.   "

## 2020-12-02 NOTE — ED NOTES
Spoke with Luz (POA and care provider). She was to  patient for coumadin appointment today but she did not answer the phone. Last saw patient on Thursday, was normal at that time.

## 2020-12-03 NOTE — ED NOTES
Called Luz (family member) to give her an update but was unable to get a hold of her. Left a message that she will be transferred to [facility].

## 2020-12-03 NOTE — ED PROVIDER NOTES
History     Chief Complaint   Patient presents with     Altered Mental Status     not responding normally, foul smelling urinel.      HPI  Jennifer Galvin is a 75 year old female who presents from home with altered mental status, her sister-in-law had not heard from her since 11/29.  When she did talk to her today she sounded confused.  Upon EMS arrival patient unable to really give history.  There were reportedly several small empty alcohol bottles.  Upon arrival here patient again unable to give history.  She can follow some simple commands and withdraws purposefully to painful stimuli.    Allergies:  No Known Allergies    Problem List:    There are no active problems to display for this patient.       Past Medical History:    No past medical history on file.  Paroxysmal A-fib (HC)   Taking Coumadin   FLAKITA on CPAP       HTN (hypertension)       Hyperlipidemia       Stress incontinence   urgence and stress   Depression       Lymphedema       Anticoagulated   Inr 2.6 on 10-01-13   Obesity       Adenocarcinoma of uterus (HC)       Macular degeneration       Vitamin D deficiency       Tremor       Joint pain   localized in the knee   Leukocytosis       Hyperglycemia       Osteopenia       Polyp of intestine         Past Surgical History:    No past surgical history on file.  breast excision of calcification 4/5/2011   left     cataract removal with IOL implant 10/07/2013 Right right eye Dr. Astorga     CARDIOVERSION 7/30/2012   elective external     TONSIL AND ADENOIDECTOMY 4/15/2014        interstim stage i 8/26/2014        hysterectomy, adenocarcinoma May 2014        interstem stage 2 9/11/2014        VITRECTOMY 01/15/2015 Right Dr Ortega         Family History:    No family history on file.    Social History:  Marital Status:  Single [1]  Social History     Tobacco Use     Smoking status: Not on file   Substance Use Topics     Alcohol use: Not on file     Drug use: Not on file        Medications:    No current  outpatient medications on file.        Review of Systems  Unobtainable secondary to patient condition  Physical Exam   BP: (!) 126/103  Pulse: 96  Temp: 101.4  F (38.6  C)  Resp: 28  SpO2: 95 %      Physical Exam  Ill-appearing, tachypneic, skin pale warm and dry, able to follow simple commands, able to tell me her name but not answer other questions.  Head atraumatic normocephalic  Conjunctiva noninjected, oropharynx dry and tacky no lesions appreciated  No cervical adenopathy   Neck supple full   Lungs clear to auscultation  Heart regular no murmur  Abdomen soft no obvious tenderness, morbid obesity  Strength and sensation grossly intact throughout the extremities, nonpurposeful movements at times  lower extremities with edema, chronic stasis changes, mild erythema medial left thigh      ED Course        Procedures  EKG is atrial fibrillation rate 120, right bundle branch block, diffuse nonspecific T wave abnormality, read by Dr. Vikram Bueno             Critical Care time:  was 60 minutes for this patient excluding procedures.     The patient has signs of Severe Sepsis        If one the following conditions is present, a 30 mL/kg bolus is recommended as part of the 6 hour bundle (IBW can be used for BMI >30, or document refusal/contraindication):      1.   Initial hypotension  defined as 2 bps < 90 or map < 65 in the 6hrs before or 6hrs after time zero.     2.  Lactate >4.     The patient has signs of Severe Sepsis as evidenced by:    1. 2 SIRS criteria, AND  2. Suspected infection, AND   3. Organ dysfunction: Lactic Acidosis with value >2.0    Time severe sepsis diagnosis confirmed:   12/02/20 as this was the time when Lactate resulted, and the level was > 2.0    3 Hour Severe Sepsis Bundle Completion:    1. Initial Lactic Acid Result:   Recent Labs   Lab Test 12/02/20  1714   LACT 1.8     2. Blood Cultures before Antibiotics: Yes  3. Broad Spectrum Antibiotics Administered:  yes       Anti-infectives (From  admission through now)    Start     Dose/Rate Route Frequency Ordered Stop    12/02/20 1320  cefTRIAXone in d5w (ROCEPHIN) intermittent infusion 1 g      1 g  over 30 Minutes Intravenous ONCE 12/02/20 1318 12/02/20 1430          4. Fluid volume administered in ED:  Obese patient (BMI >30); 30 mL/kg bolus based on IBW given (see amount below).    BMI Readings from Last 1 Encounters:   No data found for BMI     30 mL/kg fluids based on weight: Patient actual weight not available.  30 mL/kg fluids based on IBW (must be >= 60 inches tall): Patient ideal weight not available.             Total fluid in 3000 cc plus  Severe Sepsis reassessment:  1. Repeat Lactic Acid Level: 1.8    2. MAP>65 after initial IVF bolus, will continue to monitor fluid status and vital signs    I attest to having performed a repeat sepsis exam and assessment of perfusion at  and the results demonstrate improved perfusion.  Patient is reevaluated multiple times during her prolonged stay in the emergency department.  She maintains normal blood pressure, heart rate is elevated at times, maximum 130 bpm in the context of atrial fibrillation with RVR.         Results for orders placed or performed during the hospital encounter of 12/02/20 (from the past 24 hour(s))   Comprehensive metabolic panel   Result Value Ref Range    Sodium 142 133 - 144 mmol/L    Potassium 4.1 3.4 - 5.3 mmol/L    Chloride 109 94 - 109 mmol/L    Carbon Dioxide 29 20 - 32 mmol/L    Anion Gap 4 3 - 14 mmol/L    Glucose 143 (H) 70 - 99 mg/dL    Urea Nitrogen 35 (H) 7 - 30 mg/dL    Creatinine 1.36 (H) 0.52 - 1.04 mg/dL    GFR Estimate 38 (L) >60 mL/min/[1.73_m2]    GFR Estimate If Black 44 (L) >60 mL/min/[1.73_m2]    Calcium 9.2 8.5 - 10.1 mg/dL    Bilirubin Total 1.4 (H) 0.2 - 1.3 mg/dL    Albumin 3.4 3.4 - 5.0 g/dL    Protein Total 7.3 6.8 - 8.8 g/dL    Alkaline Phosphatase 74 40 - 150 U/L    ALT 40 0 - 50 U/L    AST 31 0 - 45 U/L   CBC with platelets differential   Result Value  Ref Range    WBC 40.7 (H) 4.0 - 11.0 10e9/L    RBC Count 5.58 (H) 3.8 - 5.2 10e12/L    Hemoglobin 14.7 11.7 - 15.7 g/dL    Hematocrit 48.5 (H) 35.0 - 47.0 %    MCV 87 78 - 100 fl    MCH 26.3 (L) 26.5 - 33.0 pg    MCHC 30.3 (L) 31.5 - 36.5 g/dL    RDW 19.2 (H) 10.0 - 15.0 %    Platelet Count 132 (L) 150 - 450 10e9/L    Diff Method Automated Method     % Neutrophils 92.7 %    % Lymphocytes 1.8 %    % Monocytes 4.0 %    % Eosinophils 0.0 %    % Basophils 0.2 %    % Immature Granulocytes 1.3 %    Nucleated RBCs 0 0 /100    Absolute Neutrophil 37.7 (H) 1.6 - 8.3 10e9/L    Absolute Lymphocytes 0.7 (L) 0.8 - 5.3 10e9/L    Absolute Monocytes 1.6 (H) 0.0 - 1.3 10e9/L    Absolute Eosinophils 0.0 0.0 - 0.7 10e9/L    Absolute Basophils 0.1 0.0 - 0.2 10e9/L    Abs Immature Granulocytes 0.5 (H) 0 - 0.4 10e9/L    Absolute Nucleated RBC 0.0    Lactate for Sepsis Protocol   Result Value Ref Range    Lactate for Sepsis Protocol 3.7 (H) 0.7 - 2.0 mmol/L   INR   Result Value Ref Range    INR 1.96 (H) 0.86 - 1.14   Troponin I   Result Value Ref Range    Troponin I ES 0.360 (HH) 0.000 - 0.045 ug/L   Blood culture    Specimen: Blood   Result Value Ref Range    Specimen Description Blood     Special Requests Right Arm     Culture Micro No growth after 5 hours    Symptomatic COVID-19 Virus (Coronavirus) by PCR    Specimen: Nasopharyngeal   Result Value Ref Range    COVID-19 Virus PCR to [facility] - Source Nasopharyngeal     COVID-19 Virus PCR to [facility] - Result       Test received-See reflex to IDDL test SARS CoV2 (COVID-19) Virus RT-PCR   Blood culture    Specimen: Blood    Left Arm   Result Value Ref Range    Specimen Description Blood Left Arm     Culture Micro No growth after 3 hours    Urine Culture    Specimen: Catheterized Urine   Result Value Ref Range    Specimen Description Catheterized Urine     Special Requests Specimen received in preservative     Culture Micro PENDING    UA reflex to Microscopic   Result Value Ref Range     Color Urine Yellow     Appearance Urine Cloudy     Glucose Urine Negative NEG^Negative mg/dL    Bilirubin Urine Negative NEG^Negative    Ketones Urine Negative NEG^Negative mg/dL    Specific Gravity Urine 1.023 1.003 - 1.035    Blood Urine Small (A) NEG^Negative    pH Urine 5.0 5.0 - 7.0 pH    Protein Albumin Urine 100 (A) NEG^Negative mg/dL    Urobilinogen mg/dL 0.0 0.0 - 2.0 mg/dL    Nitrite Urine Negative NEG^Negative    Leukocyte Esterase Urine Large (A) NEG^Negative    Source Catheterized Urine     RBC Urine 6 (H) 0 - 2 /HPF    WBC Urine 163 (H) 0 - 5 /HPF    WBC Clumps Present (A) NEG^Negative /HPF    Bacteria Urine Few (A) NEG^Negative /HPF    Mucous Urine Present (A) NEG^Negative /LPF   XR Chest Port 1 View    Narrative    XR CHEST PORT 1 VW  12/2/2020 2:33 PM       INDICATION: Sepsis  COMPARISON: None       Impression    IMPRESSION: ] IJ port. Cardiomegaly with pulmonary vascular  congestion. Minimal linear atelectasis or scarring in the left  midlung. The lungs are otherwise clear. No definite pneumonia.    IVETTE ROBLES MD   Lactic acid whole blood   Result Value Ref Range    Lactic Acid 1.8 0.7 - 2.0 mmol/L   CT Abdomen Pelvis w/o Contrast    Narrative    EXAM: CT ABDOMEN PELVIS W/O CONTRAST  LOCATION: [facility]  DATE/TIME: 12/2/2020 7:53 PM    INDICATION: Sepsis. UTI. Question obstruction. Encephalopathy.  COMPARISON: None.  TECHNIQUE: CT scan of the abdomen and pelvis was performed without IV contrast. Multiplanar reformats were obtained. Dose reduction techniques were used.  CONTRAST: None.    FINDINGS:   LOWER CHEST: Negative.    HEPATOBILIARY: Mild motion artifact. There are likely small stones present within the gallbladder but no definite inflammatory gallbladder wall thickening.    PANCREAS: Normal.    SPLEEN: Normal.    ADRENAL GLANDS: There is an ill-defined round mass occupying the left adrenal bed presumably relating to an adrenal mass. The margins are indistinct in part due  to the mild motion artifact, the lesion measures approximately 6 x 5.5 cm and is an admixture   of intermediate and low attenuation soft tissue with Hounsfield units approximately 0. This may represent an angiomyolipoma or an evolving adrenal hemorrhage.  Right adrenal gland normal.    KIDNEYS/BLADDER: No stones or hydronephrosis. Normal size kidneys. 4.5 cm right adrenal cyst.    Bladder decompressed with Carpio catheter present.    BOWEL: Diverticulosis of the colon. No acute inflammatory change. No obstruction.     LYMPH NODES: There are multiple mildly prominent left periaortic and pelvic lymph nodes; largest periaortic node near left renal hilum measuring 2.2 x 1.4 cm on axial image 94, left external iliac lymph node measures 2.9 x 1.7 cm on image 180. Multiple   additional modestly prominent lymph nodes present along left pelvic sidewall with mild surrounding soft tissue stranding. Minimal prominence of right pelvic nodes.  There is also mild left inguinal adenopathy.    VASCULATURE: Moderate diffuse atherosclerotic calcifications.    PELVIC ORGANS: Hysterectomy. No adnexal lesion.    MUSCULOSKELETAL: Right sided InterStim present degenerative changes throughout spine. No suspicious lesion.      Impression    IMPRESSION:   1.  No hydronephrosis or suspicious urinary tract lesion, bladder collapsed with Carpio catheter present.  2.  A 6 cm left adrenal mass is indeterminate but mostly low density suggesting evolving hemorrhage or possible angiomyolipoma. Recommend a short-term follow-up exam in one month to reevaluate.  3.  Modest left periaortic and pelvic adenopathy could be reactive if the patient has an inflammatory process involving left lower extremity. However, hematologic malignancy also possible. Suggest follow-up exam in 3-4 months.         Medications   sodium chloride (PF) 0.9% PF flush 3 mL (has no administration in time range)   sodium chloride (PF) 0.9% PF flush 3 mL (3 mLs Intravenous Not Given  12/2/20 1430)   0.9% sodium chloride BOLUS (0 mLs Intravenous Stopped 12/2/20 1429)   cefTRIAXone in d5w (ROCEPHIN) intermittent infusion 1 g (0 g Intravenous Stopped 12/2/20 1430)   acetaminophen (TYLENOL) tablet 1,000 mg (1,000 mg Oral Given 12/2/20 1401)   lactated ringers BOLUS 1,000 mL (0 mLs Intravenous Stopped 12/2/20 1704)   haloperidol lactate (HALDOL) injection 2 mg (2 mg Intravenous Given 12/2/20 1409)   haloperidol lactate (HALDOL) injection 2 mg (2 mg Intravenous Given 12/2/20 1544)   midazolam (VERSED) injection 2 mg (2 mg Intravenous Given 12/2/20 1544)   aspirin (ASA) chewable tablet 324 mg (324 mg Oral Given 12/2/20 1542)   lactated ringers BOLUS 1,000 mL (0 mLs Intravenous Stopped 12/2/20 1808)       Assessments & Plan (with Medical Decision Making)  75-year-old female presents with altered mental status.  She is febrile.  She has evidence of infected urine, significant leukocytosis with a white count of 40,000 with left shift.  She is some erythema to the left thigh which is not warm and does not appear tender, lower extremities show chronic venous stasis changes and lymphedema.  Lungs remain clear throughout stay, although patient tachypneic.  She is septic with lactate of 3.7, this is repeated after 3000 cc fluid bolus and normalized to 1.8.  She has associated encephalopathy, pulling at lines.  She remains in soft restraints upper extremities.  Blood pressures remain within normal limits.  She has atrial fibrillation which is chronic, with chronic anticoagulation warfarin, INR 1.94.  Patient requires admission to Center with either ICU or stepdown bed, none available here.  Ultimately was able to find a bed at [facility], Dr. Dewey excepting physician.  CT scan abdomen pelvis significant for left adrenal mass, mass versus hemorrhage is the read by radiology.  Left pelvic adenopathy.  No evidence for urinary tract obstruction.  Overall diagnosis is severe sepsis, septic  encephalopathy, urinary tract infection.     I have reviewed the nursing notes.    I have reviewed the findings, diagnosis, plan and need for follow up with the patient.     Critical Care Addendum    My initial assessment, based on my review of prehospital provider report, review of nursing observations, review of vital signs, focused history, physical exam, review of cardiac rhythm monitor, 12 lead ECG analysis and discussion with Patient's sister in law, established that Jennifer Galvin has altered mental status, which requires immediate intervention, and therefore she is critically ill.     After the initial assessment, the care team initiated multiple lab tests, initiated IV fluid administration and initiated medication therapy with Haldol, ceftriaxone, aspirin to provide stabilization care. Due to the critical nature of this patient, I reassessed nursing observations, vital signs, physical exam, review of cardiac rhythm monitor, 12 lead ECG analysis, mental status and respiratory status multiple times prior to her disposition.     Time also spent performing documentation, discussion with family to obtain medical information for decision making, reviewing test results, discussion with consultants, coordination of care and and And arranging transfer.     Critical care time (excluding teaching time and procedures): 60 minutes.     New Prescriptions    No medications on file       Final diagnoses:   Sepsis, due to unspecified organism, unspecified whether acute organ dysfunction present (H)   Septic encephalopathy   Urinary tract infection without hematuria, site unspecified       12/2/2020   [facility] EMERGENCY DEPT     Vikram Bueno MD  12/02/20 2035

## 2021-05-26 VITALS
DIASTOLIC BLOOD PRESSURE: 63 MMHG | SYSTOLIC BLOOD PRESSURE: 110 MMHG | TEMPERATURE: 98.4 F | OXYGEN SATURATION: 95 % | HEART RATE: 67 BPM

## 2021-05-26 VITALS
HEART RATE: 105 BPM | TEMPERATURE: 98.2 F | DIASTOLIC BLOOD PRESSURE: 78 MMHG | OXYGEN SATURATION: 91 % | SYSTOLIC BLOOD PRESSURE: 118 MMHG

## 2021-05-26 VITALS
TEMPERATURE: 97.5 F | OXYGEN SATURATION: 96 % | HEART RATE: 95 BPM | SYSTOLIC BLOOD PRESSURE: 134 MMHG | DIASTOLIC BLOOD PRESSURE: 72 MMHG

## 2021-05-26 VITALS
DIASTOLIC BLOOD PRESSURE: 61 MMHG | TEMPERATURE: 97.9 F | OXYGEN SATURATION: 93 % | HEART RATE: 125 BPM | SYSTOLIC BLOOD PRESSURE: 119 MMHG

## 2021-05-26 VITALS
HEART RATE: 15 BPM | DIASTOLIC BLOOD PRESSURE: 70 MMHG | SYSTOLIC BLOOD PRESSURE: 115 MMHG | OXYGEN SATURATION: 93 % | TEMPERATURE: 97.9 F

## 2021-05-26 VITALS
SYSTOLIC BLOOD PRESSURE: 136 MMHG | DIASTOLIC BLOOD PRESSURE: 72 MMHG | OXYGEN SATURATION: 92 % | TEMPERATURE: 97.9 F | HEART RATE: 82 BPM

## 2021-05-26 VITALS
DIASTOLIC BLOOD PRESSURE: 89 MMHG | OXYGEN SATURATION: 93 % | SYSTOLIC BLOOD PRESSURE: 150 MMHG | TEMPERATURE: 98.6 F | HEART RATE: 108 BPM

## 2021-05-26 VITALS
TEMPERATURE: 98.2 F | DIASTOLIC BLOOD PRESSURE: 75 MMHG | OXYGEN SATURATION: 94 % | SYSTOLIC BLOOD PRESSURE: 103 MMHG | HEART RATE: 94 BPM

## 2021-05-26 VITALS
TEMPERATURE: 97.8 F | HEART RATE: 110 BPM | DIASTOLIC BLOOD PRESSURE: 61 MMHG | OXYGEN SATURATION: 96 % | SYSTOLIC BLOOD PRESSURE: 114 MMHG

## 2021-05-26 VITALS — RESPIRATION RATE: 18 BRPM

## 2021-05-26 ASSESSMENT — PATIENT HEALTH QUESTIONNAIRE - PHQ9: SUM OF ALL RESPONSES TO PHQ QUESTIONS 1-9: 14

## 2021-05-27 VITALS
DIASTOLIC BLOOD PRESSURE: 74 MMHG | OXYGEN SATURATION: 92 % | HEART RATE: 80 BPM | SYSTOLIC BLOOD PRESSURE: 112 MMHG | TEMPERATURE: 97.9 F

## 2021-05-27 VITALS
DIASTOLIC BLOOD PRESSURE: 67 MMHG | OXYGEN SATURATION: 93 % | SYSTOLIC BLOOD PRESSURE: 115 MMHG | TEMPERATURE: 97.7 F | HEART RATE: 71 BPM

## 2021-05-27 NOTE — TELEPHONE ENCOUNTER
ANTICOAGULATION  MANAGEMENT    Assessment     Today's INR result of 2.3 is Therapeutic (goal INR of 2.0-3.0)        Warfarin taken as previously instructed    No new diet changes affecting INR    No new medication/supplements affecting INR    Continues to tolerate warfarin with no reported s/s of bleeding or thromboembolism     Previous INR was Therapeutic    Plan:     Spoke with Jennifer regarding INR result and instructed:     Warfarin Dosing Instructions:  Continue current warfarin dose 2.5 mg daily on mon/wed/fri; and 5 mg daily rest of week  (0 % change)    Instructed patient to follow up no later than: one month      Jennifer verbalizes understanding and agrees to warfarin dosing plan.    Instructed to call the [facility] for any changes, questions or concerns. (#[phone])   ?   Ibrahima Trimble RN    Subjective/Objective:      Jennifer Galvin, a 73 y.o. female is on warfarin.     Jennifer reports:     Home warfarin dose: as updated on anticoagulation calendar per template     Missed doses: No     Medication changes:  No     S/S of bleeding or thromboembolism:  No     New Injury or illness:  No     Changes in diet or alcohol consumption:  No     Upcoming surgery, procedure or cardioversion:  No    Anticoagulation Episode Summary     Current INR goal:   2.0-3.0   TTR:   64.8 % (4.3 y)   Next INR check:   5/1/2019   INR from last check:   2.30 (4/3/2019)   Weekly max warfarin dose:      Target end date:      INR check location:      Preferred lab:      Send INR reminders to:   ANTICOAGULATION POOL A (WBY,WBE,MID,RSC)    Indications    Paroxysmal Atrial Fibrillation [I48.91]           Comments:            Anticoagulation Care Providers     Provider Role Specialty Phone number    Cordell Ceja DO Referring Internal Medicine [phone]

## 2021-05-28 NOTE — TELEPHONE ENCOUNTER
ANTICOAGULATION  MANAGEMENT    Assessment     Today's INR result of 3.4 is Supratherapeutic (goal INR of 2.0-3.0)        Warfarin taken as previously instructed    No new diet changes affecting INR    No new medication/supplements affecting INR    Continues to tolerate warfarin with no reported s/s of bleeding or thromboembolism     Previous INR was Therapeutic    Plan:     Spoke with Jennifer regarding INR result and instructed:     Warfarin Dosing Instructions:  hold today then continue current warfarin dose 2.5 mg daily on mon/wed/fri; and 5 mg daily rest of week  (0 % change)    Instructed patient to follow up no later than: two weeks      Jennifer verbalizes understanding and agrees to warfarin dosing plan.    Instructed to call the [facility] for any changes, questions or concerns. (#[phone])   ?   Ibrahima Trimble RN    Subjective/Objective:      Jennifer Galvin, a 73 y.o. female is on warfarin.     Jennifer reports:     Home warfarin dose: as updated on anticoagulation calendar per template     Missed doses: No     Medication changes:  No     S/S of bleeding or thromboembolism:  No     New Injury or illness:  No     Changes in diet or alcohol consumption:  No     Upcoming surgery, procedure or cardioversion:  No    Anticoagulation Episode Summary     Current INR goal:   2.0-3.0   TTR:   64.8 % (4.4 y)   Next INR check:   5/15/2019   INR from last check:   3.40! (5/1/2019)   Weekly max warfarin dose:      Target end date:      INR check location:      Preferred lab:      Send INR reminders to:   ANTICOAGULATION POOL A (WBY,WBE,MID,RSC)    Indications    Paroxysmal Atrial Fibrillation [I48.91]           Comments:            Anticoagulation Care Providers     Provider Role Specialty Phone number    Cordell Ceja DO Referring Internal Medicine [phone]

## 2021-05-28 NOTE — TELEPHONE ENCOUNTER
ANTICOAGULATION  MANAGEMENT    Assessment     Today's INR result of 2.7 is Therapeutic (goal INR of 2.0-3.0)        Warfarin taken as previously instructed    No new diet changes affecting INR    No new medication/supplements affecting INR    Continues to tolerate warfarin with no reported s/s of bleeding or thromboembolism     Previous INR was Therapeutic    Plan:     Spoke with Jennifer regarding INR result and instructed:     Warfarin Dosing Instructions:  Continue current warfarin dose 2.5 mg daily on mon/wed/fri; and 5 mg daily rest of week  (0 % change)    Instructed patient to follow up no later than: two weeks      Jennifer verbalizes understanding and agrees to warfarin dosing plan.    Instructed to call the [facility] for any changes, questions or concerns. (#[phone])   ?   Ibrahima Trimble RN    Subjective/Objective:      Jennifer Galvin, a 73 y.o. female is on warfarin.     Jennifer reports:     Home warfarin dose: as updated on anticoagulation calendar per template     Missed doses: No     Medication changes:  No     S/S of bleeding or thromboembolism:  No     New Injury or illness:  No     Changes in diet or alcohol consumption:  No     Upcoming surgery, procedure or cardioversion:  No    Anticoagulation Episode Summary     Current INR goal:   2.0-3.0   TTR:   64.6 % (4.4 y)   Next INR check:   5/29/2019   INR from last check:   2.70 (5/15/2019)   Weekly max warfarin dose:      Target end date:      INR check location:      Preferred lab:      Send INR reminders to:   ANTICOAGULATION POOL A (WBY,WBE,MID,RSC)    Indications    Paroxysmal Atrial Fibrillation [I48.91]           Comments:            Anticoagulation Care Providers     Provider Role Specialty Phone number    Cordell Ceja DO Referring Internal Medicine [phone]

## 2021-05-29 NOTE — TELEPHONE ENCOUNTER
Due to be seen    Rx renewed per Medication Renewal Policy. Medication was last renewed on 2/12/19.    Lianne Perales, [facility] Triage/Med Refill 6/17/2019     Requested Prescriptions   Pending Prescriptions Disp Refills     metoprolol tartrate (LOPRESSOR) 25 MG tablet [Pharmacy Med Name: METOPROLOL TARTRATE 25MG TABLETS] 30 tablet 0     Sig: TAKE 1 TABLET BY MOUTH EVERY MORNING       Beta-Blockers Refill Protocol Passed - 6/15/2019  1:06 PM        Passed - PCP or prescribing provider visit in past 12 months or next 3 months     Last office visit with prescriber/PCP: 6/18/2018 Cordell Ceja DO OR same dept: 6/18/2018 Cordell Ceja DO OR same specialty: 6/18/2018 Cordell Ceja DO  Last physical: 10/25/2016 Last MTM visit: Visit date not found   Next visit within 3 mo: Visit date not found  Next physical within 3 mo: Visit date not found  Prescriber OR PCP: Cordell Ceja DO  Last diagnosis associated with med order: 1. Disorder of bone and cartilage  - metoprolol tartrate (LOPRESSOR) 25 MG tablet [Pharmacy Med Name: METOPROLOL TARTRATE 25MG TABLETS]; TAKE 1 TABLET BY MOUTH EVERY MORNING  Dispense: 30 tablet; Refill: 0    If protocol passes may refill for 12 months if within 3 months of last provider visit (or a total of 15 months).             Passed - Blood pressure filed in past 12 months     BP Readings from Last 1 Encounters:   12/10/18 133/83

## 2021-05-29 NOTE — TELEPHONE ENCOUNTER
Who is calling:  patient  Reason for Call:  Patient states she did not get the message regarding her 5-29-19 INR. Please call patient back, thank you.  Date of last appointment with primary care: 6-18-18  Okay to leave a detailed message: Yes

## 2021-05-29 NOTE — TELEPHONE ENCOUNTER
ANTICOAGULATION  MANAGEMENT    Assessment     Today's INR result of 2.5 is Therapeutic (goal INR of 2.0-3.0)        Warfarin taken as previously instructed    No new diet changes affecting INR    No new medication/supplements affecting INR    Continues to tolerate warfarin with no reported s/s of bleeding or thromboembolism     Previous INR was Therapeutic    Plan:     Left a detailed message for Jennifer regarding INR result and instructed:     Warfarin Dosing Instructions:  Continue current warfarin dose 2.5 mg daily on mon/wed/fri; and 5 mg daily rest of week  (0 % change)    Instructed patient to follow up no later than: one month      Instructed to call the [facility] for any changes, questions or concerns. (#[phone])   ?   Ibrahima Trimble RN    Subjective/Objective:      Jennifer Galvin, a 74 y.o. female is on warfarin.     Jennifer reports:     Home warfarin dose: as updated on anticoagulation calendar per template     Missed doses: No     Medication changes:  No     S/S of bleeding or thromboembolism:  No     New Injury or illness:  No     Changes in diet or alcohol consumption:  No     Upcoming surgery, procedure or cardioversion:  No    Anticoagulation Episode Summary     Current INR goal:   2.0-3.0   TTR:   64.9 % (4.4 y)   Next INR check:   6/26/2019   INR from last check:   2.20 (5/29/2019)   Weekly max warfarin dose:      Target end date:      INR check location:      Preferred lab:      Send INR reminders to:   [facility]    Indications    Paroxysmal Atrial Fibrillation [I48.91]           Comments:            Anticoagulation Care Providers     Provider Role Specialty Phone number    Cordell Ceja DO Referring Internal Medicine [phone]

## 2021-05-30 NOTE — ANESTHESIA POSTPROCEDURE EVALUATION
Patient: Jennifer MORALES BATTERY EXCHANGE  Anesthesia type: MAC    Patient location: PACU  Last vitals:   Vitals Value Taken Time   /62 7/25/2019  3:27 PM   Temp 37  C (98.6  F) 7/25/2019  3:27 PM   Pulse 98 7/25/2019  3:27 PM   Resp 14 7/25/2019  3:27 PM   SpO2 94 % 7/25/2019  3:27 PM     Post vital signs: stable  Level of consciousness: alert, conversing and eating  Post-anesthesia pain: pain controlled  Post-anesthesia nausea and vomiting: no  Pulmonary: room air  Cardiovascular: stable and blood pressure at baseline  Hydration: adequate  Anesthetic events: no    QCDR Measures:  ASA# 11 - Radha-op Cardiac Arrest: ASA11B - Patient did NOT experience unanticipated cardiac arrest  ASA# 12 - Radha-op Mortality Rate: ASA12B - Patient did NOT die  ASA# 13 - PACU Re-Intubation Rate: ASA13B - Patient did NOT require a new airway mgmt  ASA# 10 - Composite Anes Safety: ASA10A - No serious adverse event    Additional Notes:

## 2021-05-30 NOTE — TELEPHONE ENCOUNTER
Pre-Op Appt Request  When is the surgery? :  7/1/19  Where is the surgery?:   ?  Who is the surgeon? :  Dr. Wilson  What type of surgery is being done?: bladder stem  Provider Preference: PCP only  How soon do you need to be seen?: Friday would work the best  Waitlist offered?: No  Okay to leave a detailed message:  Yes

## 2021-05-30 NOTE — ANESTHESIA PREPROCEDURE EVALUATION
Anesthesia Evaluation      Patient summary reviewed   No history of anesthetic complications     Airway   Mallampati: II  Neck ROM: full   Pulmonary - normal exam   (+) sleep apnea (Poor compliance),                          Cardiovascular - normal exam  (+) hypertension, CAD, dysrhythmias, ,     ECG reviewed        Neuro/Psych - negative ROS     Endo/Other    (+) diabetes mellitus type 2, obesity (BMI 51),      GI/Hepatic/Renal - negative ROS      Other findings: Results for JOHANNY SANDERS (MRN [id number]) as of 7/25/2019 12:58    7/22/2019 12:31  INR: 3.10 (H)   Fair dentition      Dental                           Anesthesia Plan  Planned anesthetic: MAC  Fent  propofol ggt  Decadron/zofran  Patient waware that she might have some recall of anesthetic events  ASA 3     Anesthetic plan and risks discussed with: patient  Anesthesia plan special considerations: antiemetics,   Post-op plan: routine recovery

## 2021-05-30 NOTE — PATIENT INSTRUCTIONS - HE
Hold desmopressin night before procedure, hold lasix and vesicare starting the night before procedure. Continue zocor, sotalol, metoprolol as usual. No nsaids within 5 days of procedure.

## 2021-05-30 NOTE — ANESTHESIA CARE TRANSFER NOTE
Last vitals:   Vitals:    07/25/19 1527   BP: 109/62   Pulse: 98   Resp: 14   Temp: 37  C (98.6  F)   SpO2: 94%     Patient's level of consciousness is awake and alert. Denies pain. Denies nausea.   Spontaneous respirations: yes  Maintains airway independently: yes  Dentition unchanged: yes  Oropharynx: oropharynx clear of all foreign objects    QCDR Measures:  ASA# 20 - Surgical Safety Checklist: WHO surgical safety checklist completed prior to induction    PQRS# 430 - Adult PONV Prevention: 4558F - Pt received => 2 anti-emetic agents (different classes) preop & intraop  ASA# 8 - Peds PONV Prevention: NA - Not pediatric patient, not GA or 2 or more risk factors NOT present  PQRS# 424 - Radha-op Temp Management: 4559F - At least one body temp DOCUMENTED => 35.5C or 95.9F within required timeframe  PQRS# 426 - PACU Transfer Protocol: - Transfer of care checklist used  ASA# 14 - Acute Post-op Pain: ASA14B - Patient did NOT experience pain >= 7 out of 10

## 2021-05-30 NOTE — TELEPHONE ENCOUNTER
ANTICOAGULATION  MANAGEMENT    Assessment     Today's INR result of 2.7 is Therapeutic (goal INR of 2.0-3.0)        Warfarin taken as previously instructed    No new diet changes affecting INR    Interaction between zpak and warfarin may be affecting INR 7/26-7/30    Continues to tolerate warfarin with no reported s/s of bleeding or thromboembolism     Previous INR was Therapeutic    Plan:     Left a detailed message for Jennifer regarding INR result and instructed:     Warfarin Dosing Instructions:  Continue current warfarin dose 2.5 mg daily on mon/wed/fri; and 5 mg daily rest of week  (0 % change)    Instructed patient to follow up no later than: one week  .    Instructed to call the [facility] for any changes, questions or concerns. (#[phone])   ?   Ibrahima Trimble RN    Subjective/Objective:      Jennifer Galvin, a 74 y.o. female is on warfarin.     Jennifer reports:     Home warfarin dose: as updated on anticoagulation calendar per template     Missed doses: No     Medication changes:  No     S/S of bleeding or thromboembolism:  No     New Injury or illness:  No     Changes in diet or alcohol consumption:  No     Upcoming surgery, procedure or cardioversion:  No    Anticoagulation Episode Summary     Current INR goal:   2.0-3.0   TTR:   66.0 % (4.6 y)   Next INR check:   8/7/2019   INR from last check:   2.70 (7/30/2019)   Weekly max warfarin dose:      Target end date:      INR check location:      Preferred lab:      Send INR reminders to:   [facility]    Indications    Paroxysmal Atrial Fibrillation [I48.91]           Comments:            Anticoagulation Care Providers     Provider Role Specialty Phone number    Cordell Ceja DO Referring Internal Medicine [phone]

## 2021-05-30 NOTE — TELEPHONE ENCOUNTER
ANTICOAGULATION  MANAGEMENT    Assessment     Today's INR result of 2.6 is Therapeutic (goal INR of 2.0-3.0)        Warfarin taken as previously instructed    No new diet changes affecting INR    No new medication/supplements affecting INR    Continues to tolerate warfarin with no reported s/s of bleeding or thromboembolism     Previous INR was Therapeutic    Plan:     Spoke with Jennifer regarding INR result and instructed:     Warfarin Dosing Instructions:  Continue current warfarin dose 2.5 mg daily on mon/wed/fri; and 5 mg daily rest of week  (0 % change)    Instructed patient to follow up no later than: two weeks 7/22  with pre op for bladderstim battery change out on 7/25      Jennifer verbalizes understanding and agrees to warfarin dosing plan.    Instructed to call the [facility] for any changes, questions or concerns. (#[phone])   ?   Ibrahima Trimble RN    Subjective/Objective:      Jenniferlakisha Galvin, a 74 y.o. female is on warfarin.     Jennifer reports:     Home warfarin dose: as updated on anticoagulation calendar per template     Missed doses: No     Medication changes:  No     S/S of bleeding or thromboembolism:  No     New Injury or illness:  No     Changes in diet or alcohol consumption:  No     Upcoming surgery, procedure or cardioversion:  No    Anticoagulation Episode Summary     Current INR goal:   2.0-3.0   TTR:   65.8 % (4.6 y)   Next INR check:   8/7/2019   INR from last check:   2.60 (7/10/2019)   Weekly max warfarin dose:      Target end date:      INR check location:      Preferred lab:      Send INR reminders to:   [facility]    Indications    Paroxysmal Atrial Fibrillation [I48.91]           Comments:            Anticoagulation Care Providers     Provider Role Specialty Phone number    Cordell Ceja DO Referring Internal Medicine [phone]

## 2021-05-30 NOTE — TELEPHONE ENCOUNTER
ANTICOAGULATION  MANAGEMENT - BPA Interacting Medication Review    Interacting medication(s): Azithromycin with warfarin.    Duration: 5 days, 7/25- to 7/30    New medication?:  Yes, interaction per Micromedex, may increase INR and risk of bleeding.    Plan:    Left a detailed message for Jennifer regarding potential interaction.     Warfarin instructions: continue current warfarin dose    Follow up INR recommended in:  7/29     Anticoagulation calendar updated    Ibrahima Trimble RN

## 2021-05-30 NOTE — TELEPHONE ENCOUNTER
Patient Returning Call  Reason for call:  Calling [facility] back  Information relayed to patient:  Not yet charted  Patient has additional questions:  Yes  If YES, what are your questions/concerns:  Dosing/results  Okay to leave a detailed message?: Yes

## 2021-05-30 NOTE — TELEPHONE ENCOUNTER
Provider Communication  Who is calling:  Ia  Facility in which provider is associated:  [facility]  Reason for call:  Caller returning [facility] call. Caller stated per Dr. Bill Wilson, the patient's warfarin dosing does not need to be adjusted for the procedure the patient is going to have done.  Urgency for return call:  No return call needed, unless [facility] has further questions.  Okay to leave detailed message?:  Yes

## 2021-05-30 NOTE — TELEPHONE ENCOUNTER
Left message at surgeon's office asking if they require an inr level for the procedure  [phone]

## 2021-05-30 NOTE — PROGRESS NOTES
Preoperative Exam    Scheduled Procedure: INTERSTIM BATTERY EXCHANGE  200 UNITS OF CYSTOSCOPIC BOTOX  Surgery Date:  7/25/19  Surgery Location: [facility], fax [phone]    Surgeon:  Bill Wilson MD    Assessment/Plan:     1. Preop exam for internal medicine  Jennifer Galvin is a 74-year-old woman who presents for updated preop prior to the battery exchange for the bladder stimulator device and Botox injection.  Both procedures are low risk, no additional testing is truly needed.  She had a negative work-up through the rapid access clinic, negative stress test, echocardiogram, Holter monitor is currently pending.  --Hold Lasix the morning of surgery, but be sure to continue metoprolol  --Okay to continue warfarin    2. Chronic atrial fibrillation (H)  - metoprolol tartrate (LOPRESSOR) 50 MG tablet; Take 1 tablet (50 mg total) by mouth daily before breakfast.    3. Paroxysmal Atrial Fibrillation  She was advised that she can continue the warfarin, will check INR to make sure it is not too high  - INR    4. Visit for screening mammogram  - Mammo Screening Bilateral; Future    5. Mixed hyperlipidemia  She was advised to check fasting cholesterol, this can be done in the near future  - Lipid Cascade    6. Mediastinal lymphadenopathy  Unclear what to make of the incidental tracer intake in the mediastinum during the nuclear stress test.  I think we should do a chest CT to make sure there is no adenopathy.  Likewise, update breast cancer screening.  - CT Chest Without Contrast; Future        Surgical Procedure Risk: Low (reported cardiac risk generally < 1%)  Uses CPAP  Have you had prior anesthesia?: Yes  Have you or any family members had a previous anesthesia reaction:  No  Do you or any family members have a history of a clotting or bleeding disorder?: Yes: self  Cardiac Risk Assessment: no increased risk for major cardiac complications    APPROVAL GIVEN to proceed with proposed procedure, without  further diagnostic evaluation      Functional Status: Independent  Patient plans to recover at home with family.     Subjective:      Ms. Galvin is a 74-year-old woman here for preop evaluation.  She had a preop in June which was done for exchange of bladder stimulator and Botox injection.  At that time due to worsening shortness of breath, chest x-ray was performed, referred to rapid access clinic as well.  The chest x-ray was negative, and the stress test which was ordered at the rapid access clinic visit was negative.  There was a small area of tracer uptake incidentally noted in the mediastinum.  She has recently completed a Holter monitor study as well.    The procedure is now scheduled on 7/25 with Dr. Wilson for the same above procedure, to be performed at [facility].    Unfortunately, her significant other had strokes in both hemispheres, has affected speech, swallowing, and strength.  He is currently admitted to [facility].      All other systems reviewed and are negative, other than those listed in the HPI.    Pertinent History  Do you have difficulty breathing or chest pain after walking up a flight of stairs: Yes: SOB  History of obstructive sleep apnea: Yes: uses Cpap  Steroid use in the last 6 months: No  Frequent Aspirin/NSAID use: No  Prior Blood Transfusion: No  Prior Blood Transfusion Reaction: No  If for some reason prior to, during or after the procedure, if it is medically indicated, would you be willing to have a blood transfusion?:  There is no transfusion refusal.    Current Outpatient Medications   Medication Sig Dispense Refill     calcium-vitamin D (CALCIUM-VITAMIN D) 500 mg(1,250mg) -200 unit per tablet Take 1 tablet by mouth 2 (two) times a day with meals.       cholecalciferol, vitamin D3, 1,000 unit tablet Take 2,000 Units by mouth daily.        furosemide (LASIX) 40 MG tablet Take 1 tablet (40 mg total) by mouth 2 (two) times a day. 180 tablet 2     glucosamine-chondroitin  500-400 mg tablet Take 1 tablet by mouth 2 (two) times a day.        metoprolol tartrate (LOPRESSOR) 50 MG tablet Take 1 tablet (50 mg total) by mouth daily before breakfast.       OMEGA-3/DHA/EPA/FISH OIL (FISH OIL-OMEGA-3 FATTY ACIDS) 300-1,000 mg capsule Take 2 g by mouth 2 times a day at 6:00 am and 4:00 pm.       potassium chloride (K-DUR,KLOR-CON) 10 MEQ tablet TAKE TWO TABLETS BY MOUTH TWICE DAILY 360 tablet 1     simvastatin (ZOCOR) 40 MG tablet Take 1 tablet (40 mg total) by mouth daily with supper. 90 tablet 2     VESICARE 10 mg tablet Take 1 tablet by mouth daily.  12     vitamin A-vitamin C-vit E-min (OCUVITE) Tab tablet Take 1 tablet by mouth 2 (two) times a day.       warfarin (COUMADIN/JANTOVEN) 5 MG tablet TAKE ONE-HALF TO ONE TABLET BY MOUTH DAILY AS DIRECTED. ADJUST DOSE PER INR RESULT 90 tablet 1     No current facility-administered medications for this visit.         Allergies   Allergen Reactions     Aspirin      Pt currently on WArfarin     Penicillins      Boils         Patient Active Problem List   Diagnosis     Macular Degeneration     Vitamin D Deficiency     Abnormal involuntary movement     Joint Pain, Localized In The Knee     Obstructive Sleep Apnea     Hyperlipidemia     Major Depression, Single Episode In Remission     Hypertension     Lymphedema     Paroxysmal Atrial Fibrillation     Osteopenia     Urge And Stress Incontinence     Adenocarcinoma In Situ Of The Uterus     Venous stasis     Acquired lymphedema of leg     History of uterine cancer     Diabetes mellitus, type 2 (H)     Venous hypertension of lower extremity, bilateral     BMI 50.0-59.9, adult (H)     Aspirin contraindicated     Elective surgery     Lesion of mediastinum       Past Medical History:   Diagnosis Date     (Lower) Leg Localized Swelling     Created by Conversion      Adenocarcinoma In Situ Of The Uterus     Created by Conversion      Backache     Created by [facility] Annotation: Feb 29 2012  12:14PM - Opal Helm: Referred to  [facility] 9/11, given TENS unit.      Benign Polyps Of The Large Intestine     Created by Conversion      Coronary artery disease      Diabetes mellitus (H)      Fatigue     Created by Conversion      Hyperglycemia     Created by Conversion      Hyperlipidemia     Created by Conversion      Hypertension     Created by Conversion      Joint Pain, Localized In The Knee     Created by Conversion      Lymphedema     Created by Conversion      Macular Degeneration     Created by Conversion [employer] [facility] Annotation: Apr 5 2011 12:22PM - Tien Guzman: Followed by  Ophthalmologist, Dr. Avilez.      Major Depression, Single Episode In Remission     Created by Conversion      Obesity     Created by Conversion      Obstructive Sleep Apnea     CPAP     Osteopenia     Created by Conversion      Paroxysmal Atrial Fibrillation     Created by Conversion [employer] [facility] Annotation: Nov 28 2012 10:36PM - Shruthi Dhaliwal: Found incidential  on exam on May 7th, [id number] score of 1 for HTNsymptomatic and hospitalized  x2 in July, 2012.CV X 2 in 2012Chronic Sotalol Rx      Postmenopausal bleeding     Created by Conversion      Tachycardia     Created by Conversion      Tremor     Created by Conversion      Urge And Stress Incontinence     Created by Conversion      Urinary Frequency More Than Twice At Night (Nocturia)     Created by Conversion      Urine Tests Nonspecific Abnormal Findings     Created by Conversion      Vitamin D Deficiency     Created by Conversion        Past Surgical History:   Procedure Laterality Date     BREAST BIOPSY Left     beningn      CATARACT EXTRACTION  10/17/13     COLONOSCOPY N/A 4/29/2015    Procedure: COLONOSCOPY WITH POLYPECTOMY;  Surgeon: Too Ureña MD;  Location: [facility] [place];  Service:      COLONOSCOPY N/A 11/9/2016    Procedure: COLONOSCOPY;  Surgeon: Ayden Dela Cruz MD;  Location: [facility] [place];  Service:      ENDOMETRIAL BIOPSY  4/2014      HYSTERECTOMY  May 2014     Interstem - Stage 2  2014     Interstim Stage I  2014     post urinary stimulator implantation  14     WV BIOPSY OF BREAST, INCISIONAL      Description: Incisional Breast Biopsy;  Recorded: 2011;     WV CARDIOVERSION ELECTIVE ARRHYTHMIA EXTERNAL      Description: Elective Cardioversion External;  Recorded: 2012;     WV REMOVE TONSILS/ADENOIDS,<11 Y/O      Description: Tonsillectomy With Adenoidectomy;  Recorded: 04/15/2014;     WV SLING OPER STRES INCONTINENCE N/A 11/3/2016    Procedure: PUBOVAGINAL SLING WITH CONCHITA WITH CYSTOSCOPY;  Surgeon: Bill Wilson MD;  [place] [facility] [place];  Service: Gynecology     VITRECTOMY Right 1/15/15       Social History     Socioeconomic History     Marital status: Single     Spouse name: Not on file     Number of children: 0     Years of education: Not on file     Highest education level: Not on file   Occupational History     Occupation: Retired RN     Employer: [employer]   Social Needs     Financial resource strain: Not on file     Food insecurity:     Worry: Not on file     Inability: Not on file     Transportation needs:     Medical: Not on file     Non-medical: Not on file   Tobacco Use     Smoking status: Former Smoker     Types: Cigarettes     Last attempt to quit: 2005     Years since quittin.5     Smokeless tobacco: Never Used   Substance and Sexual Activity     Alcohol use: Yes     Alcohol/week: 3.0 oz     Types: 5 Cans of beer per week     Drug use: No     Sexual activity: Yes     Partners: Male     Birth control/protection: Surgical, Post-menopausal   Lifestyle     Physical activity:     Days per week: Not on file     Minutes per session: Not on file     Stress: Not on file   Relationships     Social connections:     Talks on phone: Not on file     Gets together: Not on file     Attends Mandaen service: Not on file     Active member of club or organization: Not on file     Attends  "meetings of clubs or organizations: Not on file     Relationship status: Not on file     Intimate partner violence:     Fear of current or ex partner: Not on file     Emotionally abused: Not on file     Physically abused: Not on file     Forced sexual activity: Not on file   Other Topics Concern     Not on file   Social History Narrative    Lives alone single family home that she owns, no children and is retired.       Patient Care Team:  Cordell Ceja DO as PCP - General (Internal Medicine)          Objective:     Vitals:    07/22/19 1157   BP: 132/86   Pulse: 100   SpO2: 95%   Weight: (!) 321 lb (145.6 kg)   Height: 5' 5.5\" (1.664 m)         Physical Exam:    General: alert, no distress  HEENT: sclerae anicteric, moist oral mucosa  Heart: Regular rate and rhythm, no murmurs.  No pretibial edema.    Lungs: Clear to auscultation bilat  Gastrointestinal: abdomen is non-distended.    Skin: warm/dry, no rashes  Neuro: no gross abnormalities  Psychiatric: Pleasant affect     Patient Instructions   Take metoprolol the morning of surgery    Hold lasix the morning of    Continue warfarin    Check INR today        Labs:  No labs were ordered during this visit    Immunization History   Administered Date(s) Administered     Influenza high dose, seasonal 09/16/2014, 11/04/2015     Influenza, inj, historic,unspecified 10/15/2010, 01/17/2012     Influenza, seasonal,quad inj 6-35 mos 10/10/2012, 10/01/2013     Influenza,seasonal, Inj IIV3 10/15/2010, 01/17/2012, 10/10/2012, 10/01/2013     Pneumo Conj 13-V (2010&after) 07/22/2015     Pneumo Polysac 23-V 05/07/2012     Tdap 04/20/2011     ZOSTER, LIVE 05/07/2012           Electronically signed by Cordell Ceja DO 07/22/19 11:53 AM  "

## 2021-05-30 NOTE — TELEPHONE ENCOUNTER
RN cannot approve Refill Request    RN can NOT refill this medication med is not covered by policy/route to provider. Last office visit: 6/18/2018 Cordell Ceja DO Last Physical: 10/25/2016 Last MTM visit: Visit date not found Last visit same specialty: 6/18/2018 Cordell Ceja DO.  Next visit within 3 mo: Visit date not found  Next physical within 3 mo: Visit date not found      Radha Sue, [facility] Triage/Med Refill 7/6/2019    Requested Prescriptions   Pending Prescriptions Disp Refills     warfarin (COUMADIN/JANTOVEN) 5 MG tablet [Pharmacy Med Name: WARFARIN SOD 5MG TABLETS (PEACH)] 90 tablet 0     Sig: TAKE ONE-HALF TO ONE TABLET BY MOUTH DAILY AS DIRECTED. ADJUST DOSE PER INR RESULT       Warfarin Refill Protocol  Failed - 7/5/2019  5:09 PM        Failed -  Route to appropriate pool/provider     Last Anticoagulation Summary:   Anticoagulation Episode Summary     Current INR goal:   2.0-3.0   TTR:   65.5 % (4.5 y)   Next INR check:   7/10/2019   INR from last check:   2.80 (6/26/2019)   Weekly max warfarin dose:      Target end date:      INR check location:      Preferred lab:      Send INR reminders to:   [facility]    Indications    Paroxysmal Atrial Fibrillation [I48.91]           Comments:            Anticoagulation Care Providers     Provider Role Specialty Phone number    Cordell Ceja DO Referring Internal Medicine [phone]                Passed - Provider visit in last year     Last office visit with prescriber/PCP: 6/18/2018 Cordell Ceja DO OR same dept: Visit date not found OR same specialty: 6/18/2018 Cordell Ceja DO  Last physical: 10/25/2016 Last MTM visit: Visit date not found    Next appt within 3 mo: Visit date not found Next physical within 3 mo: Visit date not found  Prescriber OR PCP: Cordell Ceja DO  Last diagnosis associated with med order: 1. Paroxysmal Atrial Fibrillation  - warfarin (COUMADIN/JANTOVEN) 5 MG tablet  [Pharmacy Med Name: WARFARIN SOD 5MG TABLETS (PEACH)]; TAKE ONE-HALF TO ONE TABLET BY MOUTH DAILY AS DIRECTED. ADJUST DOSE PER INR RESULT  Dispense: 90 tablet; Refill: 0    If protocol passes may refill for 6 months if within 3 months of last provider visit (or a total of 9 months).

## 2021-05-30 NOTE — TELEPHONE ENCOUNTER
Orders being requested: Hold and Bridging for Bladder stent procedure and Botox injection 7/1, Monday  Reason service is needed/diagnosis: Bladder Stent and Botox injection  When are orders needed by: advise today 6/25  Where to send Orders: Phone:  [phone]   Okay to leave detailed message?  Yes

## 2021-05-30 NOTE — TELEPHONE ENCOUNTER
Talked with Jennifer, surgery is rescheduled for 7/25 to allow for cardiac stress testing. Will do next inr in two weeks

## 2021-05-30 NOTE — TELEPHONE ENCOUNTER
ANTICOAGULATION  MANAGEMENT    Assessment     Today's INR result of 2.8 is Therapeutic (goal INR of 2.0-3.0)        Warfarin taken as previously instructed    No new diet changes affecting INR    No new medication/supplements affecting INR    Continues to tolerate warfarin with no reported s/s of bleeding or thromboembolism     Previous INR was Therapeutic     Pre op today for interstim battery and cytoscopic botox procedure scheduled 7/1. is going to see cardiology tomorrow re dyspnea and for advise on anticoag for procedure. In the meanwhile, I've asked Dr Wilson's office to advise on inr for procedure     per phone note from Dr Wilson 6/25; no adjustment to warfarin needed    Plan:     Spoke with Jennifer regarding INR result and instructed:     Warfarin Dosing Instructions:  Continue current warfarin dose 2.5 mg daily on mon/wed/fri; and 5 mg daily rest of week  (0 % change)    Instructed patient to follow up no later than: two weeks, pending advise from cardiology      Jennifer verbalizes understanding and agrees to warfarin dosing plan.    Instructed to call the [facility] for any changes, questions or concerns. (#[phone])   ?   Ibrahima Trimble RN    Subjective/Objective:      Jennifer Galvin, a 74 y.o. female is on warfarin.     Jennifer reports:     Home warfarin dose: as updated on anticoagulation calendar per template     Missed doses: No     Medication changes:  No     S/S of bleeding or thromboembolism:  No     New Injury or illness:  No     Changes in diet or alcohol consumption:  No     Upcoming surgery, procedure or cardioversion:  No    Anticoagulation Episode Summary     Current INR goal:   2.0-3.0   TTR:   65.5 % (4.5 y)   Next INR check:   7/10/2019   INR from last check:   2.80 (6/26/2019)   Weekly max warfarin dose:      Target end date:      INR check location:      Preferred lab:      Send INR reminders to:   [facility]    Indications    Paroxysmal Atrial Fibrillation [I48.91]            Comments:            Anticoagulation Care Providers     Provider Role Specialty Phone number    Cordell Ceja DO Referring Internal Medicine [phone]

## 2021-05-30 NOTE — PROGRESS NOTES
Preoperative Exam    Scheduled Procedure: bladder stem   Surgery Date:  7/1/19  Surgery Location: [facility], fax [phone]    Surgeon:  Dr. Wilson     Assessment/Plan:     1. Dyspnea, unspecified type  EKG was read and reviewed, quite similar to previous.  We will have her follow-up with cardiology for cardiac clearance and recommendations regarding anticoagulation.  Uncertain cause of dyspnea, could be due to deconditioning versus pulmonary.  We will get a chest x-ray.  Could consider pulmonary function test if basic work-up is negative.  - Electrocardiogram Perform - Clinic  - Comprehensive Metabolic Panel  - HM2(CBC w/o Differential)  - XR Chest 2 Views; Future  - Thyroid Stimulating Hormone (TSH)  - BNP(B-type Natriuretic Peptide)  - [facility]    2. Pre-op evaluation     - Electrocardiogram Perform - Clinic  - Comprehensive Metabolic Panel  - HM2(CBC w/o Differential)  - XR Chest 2 Views; Future  - Thyroid Stimulating Hormone (TSH)  - BNP(B-type Natriuretic Peptide)  - [facility]ulatory referral to [facility]    3. Urge And Stress Incontinence        Surgical Procedure Risk: Low (reported cardiac risk generally < 1%)  Have you had prior anesthesia?: Yes  Have you or any family members had a previous anesthesia reaction:  No  Do you or any family members have a history of a clotting or bleeding disorder?: Yes: pt is currently taking warfarin    Cardiac Risk Assessment: possible increased risk because of dyspnea    Patient is not approved for surgery Worsening dyspnea over the last year, she has not discussed this with her cardiologist, I would recommend cardiology evaluation prior to the procedure all the procedure itself is low risk.  It sounds like the anticoagulation team is looking into managing the warfarin.  It will be helpful for cardiology to weigh in on this as well.    Has FLAKITA    Functional Status: Independent  Patient plans to recover at home with  family.     Subjective:      Jennifer Galvin is a 74 y.o. female who presents for a preoperative consultation.  Has a bladder stimulator, bladder needs to be changed and he will be doing a botox injection.    Has had dyspnea this been worsening over the last year, she did not discuss this with her cardiologist.  No chest pain, no orthopnea or paroxysmal nocturnal dyspnea.  Dyspnea is worse with exertion.  Legs do seem to be swelling more than previous.     All other systems reviewed and are negative, other than those listed in the HPI.    Pertinent History  Do you have difficulty breathing or chest pain after walking up a flight of stairs: Yes:  pt states difficult breathing    Has been this way for about a year.  History of obstructive sleep apnea: Yes: pt states she is suppose to use cpap machine but she doesn't   Steroid use in the last 6 months: No  Frequent Aspirin/NSAID use: No  Prior Blood Transfusion: No  Prior Blood Transfusion Reaction: No  If for some reason prior to, during or after the procedure, if it is medically indicated, would you be willing to have a blood transfusion?:  There is no transfusion refusal.     Use alcohol: 4/week.  Use tobacco: no  Use illicit drugs: no  History of kidney or liver disease: no  History of  MI or angina: no  History of irregular heartbeat: afib  History of CVA: no  History of epilepsy: no  History of CHF: no  History of asthma or COPD: no  History Diabetes: yes    Last tetanus:2011    Supposed to use cpap but doesn't.        Current Outpatient Medications   Medication Sig Dispense Refill     calcium-vitamin D (CALCIUM-VITAMIN D) 500 mg(1,250mg) -200 unit per tablet Take 1 tablet by mouth 2 (two) times a day with meals.       cholecalciferol, vitamin D3, 1,000 unit tablet Take 2,000 Units by mouth daily.        desmopressin (DDAVP) 0.1 MG tablet Take 0.1 mg by mouth bedtime.       furosemide (LASIX) 40 MG tablet Take 1 tablet (40 mg total) by mouth 2 (two) times a day.  180 tablet 2     glucosamine-chondroitin 500-400 mg tablet Take 1 tablet by mouth 2 (two) times a day.        metoprolol tartrate (LOPRESSOR) 25 MG tablet TAKE 1 TABLET BY MOUTH EVERY MORNING 90 tablet 0     metoprolol tartrate (LOPRESSOR) 25 MG tablet TAKE 1 TABLET BY MOUTH EVERY MORNING 90 tablet 2     metoprolol tartrate (LOPRESSOR) 25 MG tablet TAKE 1 TABLET BY MOUTH EVERY MORNING 30 tablet 0     OMEGA-3/DHA/EPA/FISH OIL (FISH OIL-OMEGA-3 FATTY ACIDS) 300-1,000 mg capsule Take 2 g by mouth 2 times a day at 6:00 am and 4:00 pm.       potassium chloride (K-DUR,KLOR-CON) 10 MEQ tablet TAKE TWO TABLETS BY MOUTH TWICE DAILY 360 tablet 1     potassium chloride (K-DUR,KLOR-CON) 10 MEQ tablet TAKE 2 TABLETS BY MOUTH TWICE DAILY 360 tablet 3     simvastatin (ZOCOR) 40 MG tablet Take 1 tablet (40 mg total) by mouth daily with supper. 90 tablet 2     sotalol (BETAPACE) 80 MG tablet TAKE 1 TABLET BY MOUTH TWICE DAILY. 180 tablet 3     VESICARE 10 mg tablet Take 1 tablet by mouth daily.  12     vitamin A-vitamin C-vit E-min (OCUVITE) Tab tablet Take 1 tablet by mouth 2 (two) times a day.       warfarin (COUMADIN/JANTOVEN) 5 MG tablet TAKE ONE-HALF TO ONE TABLET BY MOUTH DAILY AS DIRECTED. ADJUST DOSE PER INR RESULT 90 tablet 1     No current facility-administered medications for this visit.         Allergies   Allergen Reactions     Aspirin      Pt currently on WArfarin     Penicillins      Boils         Patient Active Problem List   Diagnosis     Macular Degeneration     Vitamin D Deficiency     Abnormal involuntary movement     Joint Pain, Localized In The Knee     Obstructive Sleep Apnea     Hyperlipidemia     Major Depression, Single Episode In Remission     Hypertension     Lymphedema     Paroxysmal Atrial Fibrillation     Osteopenia     Urge And Stress Incontinence     Adenocarcinoma In Situ Of The Uterus     Venous stasis     Acquired lymphedema of leg     History of uterine cancer     Diabetes mellitus, type 2 (H)      Venous hypertension of lower extremity, bilateral     BMI 50.0-59.9, adult (H)     Aspirin contraindicated     Elective surgery       Past Medical History:   Diagnosis Date     (Lower) Leg Localized Swelling     Created by Conversion      Adenocarcinoma In Situ Of The Uterus     Created by Conversion      Backache     Created by Conversion Yamisee Annotation: Feb 29 2012 12:14PM - Jermaine Helmice: Referred to  [facility] 9/11, given TENS unit.      Benign Polyps Of The Large Intestine     Created by Conversion      Coronary artery disease      Diabetes mellitus (H)      Fatigue     Created by Conversion      Hyperglycemia     Created by Conversion      Hyperlipidemia     Created by Conversion      Hypertension     Created by Conversion      Joint Pain, Localized In The Knee     Created by Conversion      Lymphedema     Created by Conversion      Macular Degeneration     Created by Conversion Yamisee Annotation: Apr 5 2011 12:22PM - Tien Guzman: Followed by  Ophthalmologist, Dr. Avilez.      Major Depression, Single Episode In Remission     Created by Conversion      Obesity     Created by Conversion      Obstructive Sleep Apnea     CPAP     Osteopenia     Created by Conversion      Paroxysmal Atrial Fibrillation     Created by Conversion Yamisee Annotation: Nov 28 2012 10:36PM - Shruthi Dhaliwal: Found incidential  on exam on May 7th, [id number] score of 1 for HTNsymptomatic and hospitalized  x2 in July, 2012.CV X 2 in 2012Chronic Sotalol Rx      Postmenopausal bleeding     Created by Conversion      Tachycardia     Created by Conversion      Tremor     Created by Conversion      Urge And Stress Incontinence     Created by Conversion      Urinary Frequency More Than Twice At Night (Nocturia)     Created by Conversion      Urine Tests Nonspecific Abnormal Findings     Created by Conversion      Vitamin D Deficiency     Created by Conversion        Past Surgical History:   Procedure Laterality Date      BREAST BIOPSY Left     beningn      CATARACT EXTRACTION  10/17/13     COLONOSCOPY N/A 2015    Procedure: COLONOSCOPY WITH POLYPECTOMY;  Surgeon: Too Ureña MD;  Location: [facility];  Service:      COLONOSCOPY N/A 2016    Procedure: COLONOSCOPY;  Surgeon: Ayden Dela Cruz MD;  Location: [facility];  Service:      ENDOMETRIAL BIOPSY  2014     HYSTERECTOMY  May 2014     Interstem - Stage 2  2014     Interstim Stage I  2014     post urinary stimulator implantation  14     RI BIOPSY OF BREAST, INCISIONAL      Description: Incisional Breast Biopsy;  Recorded: 2011;     RI CARDIOVERSION ELECTIVE ARRHYTHMIA EXTERNAL      Description: Elective Cardioversion External;  Recorded: 2012;     RI REMOVE TONSILS/ADENOIDS,<13 Y/O      Description: Tonsillectomy With Adenoidectomy;  Recorded: 04/15/2014;     RI SLING OPER STRES INCONTINENCE N/A 11/3/2016    Procedure: PUBOVAGINAL SLING WITH CONCHITA WITH CYSTOSCOPY;  Surgeon: Bill Wilson MD;  Location: [facility];  Service: Gynecology     VITRECTOMY Right 1/15/15       Social History     Socioeconomic History     Marital status: Single     Spouse name: Not on file     Number of children: 0     Years of education: Not on file     Highest education level: Not on file   Occupational History     Occupation: Retired RN     Employer: [employer]   Social Needs     Financial resource strain: Not on file     Food insecurity:     Worry: Not on file     Inability: Not on file     Transportation needs:     Medical: Not on file     Non-medical: Not on file   Tobacco Use     Smoking status: Former Smoker     Types: Cigarettes     Last attempt to quit: 2005     Years since quittin.4     Smokeless tobacco: Never Used   Substance and Sexual Activity     Alcohol use: Yes     Alcohol/week: 3.0 oz     Types: 5 Cans of beer per week     Drug use: No     Sexual activity: Yes     Partners: Male     Birth  control/protection: Surgical, Post-menopausal   Lifestyle     Physical activity:     Days per week: Not on file     Minutes per session: Not on file     Stress: Not on file   Relationships     Social connections:     Talks on phone: Not on file     Gets together: Not on file     Attends [employer] service: Not on file     Active member of club or organization: Not on file     Attends meetings of clubs or organizations: Not on file     Relationship status: Not on file     Intimate partner violence:     Fear of current or ex partner: Not on file     Emotionally abused: Not on file     Physically abused: Not on file     Forced sexual activity: Not on file   Other Topics Concern     Not on file   Social History Narrative    Lives alone single family home that she owns, no children and is retired.             Objective:     Vitals:    06/26/19 1107   Weight: (!) 328 lb 8 oz (149 kg)   PainSc: 0-No pain         Physical Exam:  Gen: NAD, conversant, appears age, well-kempt  Skin: warm, dry, no rash, pallor cyanosis  HENT: normocephalic atraumatic, MMM, no oral lesions, otorrhea, rhinorrhea. TM's normal bilaterally.  Eyes: non-icteric, extra-ocular movements intact, PERRL, conjunctivae not injected. Holding eyes open comfortably, no drainage.  CV: NRRR no m/r/g, no JVD.  Resp: CTAB no w/r/r, normal respiratory effort  GI: soft, non-tender, non-distended. No masses.  MSK: no muscle or joint swelling.  Neuro: no dysarthria or gross asymmetry  Psych: full affect, oriented x 3  Lymph: No significant cervical lymphadenopathy  Hematologic: No petechiae or purpura.  Bilateral nonpitting 2+ edema (vs. Adipose) lower legs. Veins not prominent.  No warmth or tenderness, distally neurovascularly intact.    There are no Patient Instructions on file for this visit.        Labs:      Immunization History   Administered Date(s) Administered     Influenza high dose, seasonal 09/16/2014, 11/04/2015     Influenza, inj, historic,unspecified  10/15/2010, 01/17/2012     Influenza, seasonal,quad inj 6-35 mos 10/10/2012, 10/01/2013     Influenza,seasonal, Inj IIV3 10/15/2010, 01/17/2012, 10/10/2012, 10/01/2013     Pneumo Conj 13-V (2010&after) 07/22/2015     Pneumo Polysac 23-V 05/07/2012     Tdap 04/20/2011     ZOSTER, LIVE 05/07/2012           Electronically signed by Dalton Silveira MD 06/26/19 11:08 AM

## 2021-05-30 NOTE — PATIENT INSTRUCTIONS - HE
Take metoprolol the morning of surgery    Hold lasix the morning of    Continue warfarin    Check INR today

## 2021-05-30 NOTE — TELEPHONE ENCOUNTER
----- Message from Dalton Silveira MD sent at 6/26/2019  8:44 PM CDT -----  Xray and lab were overall unremarkable.  Follow up with cardiology.

## 2021-05-30 NOTE — PROGRESS NOTES
"Pt had 3 sec pause @ 1:10 Stage 2 with c/o \"dizziness\"/resolved @ 1:15. Holter monitor placed a day early per pt request at end of test per 7/3 order.  " \"I think I'm going through DT's. I drink a lot. \"

## 2021-05-30 NOTE — TELEPHONE ENCOUNTER
ANTICOAGULATION  MANAGEMENT    Assessment     Today's INR result of 3.1 is Supratherapeutic (goal INR of 2.0-3.0)        Warfarin taken as previously instructed    No new diet changes affecting INR    No new medication/supplements affecting INR    Continues to tolerate warfarin with no reported s/s of bleeding or thromboembolism     Previous INR was Therapeutic    Plan:     Spoke with Jennifer regarding INR result and instructed:     Warfarin Dosing Instructions:  Continue current warfarin dose 2.5 mg daily on mon/wed/fri; and 5 mg daily rest of week  (0 % change)    Instructed patient to follow up no later than: two weeks      Jennifer verbalizes understanding and agrees to warfarin dosing plan.    Instructed to call the [facility] for any changes, questions or concerns. (#[phone])   ?   Ibrahima Trimble RN    Subjective/Objective:      Jenniferlakisha Galvin, a 74 y.o. female is on warfarin.     Jennifer reports:     Home warfarin dose: as updated on anticoagulation calendar per template     Missed doses: No     Medication changes:  No     S/S of bleeding or thromboembolism:  No     New Injury or illness:  No     Changes in diet or alcohol consumption:  No     Upcoming surgery, procedure or cardioversion:  No    Anticoagulation Episode Summary     Current INR goal:   2.0-3.0   TTR:   65.9 % (4.6 y)   Next INR check:   8/5/2019   INR from last check:   3.10! (7/22/2019)   Weekly max warfarin dose:      Target end date:      INR check location:      Preferred lab:      Send INR reminders to:   [facility]    Indications    Paroxysmal Atrial Fibrillation [I48.91]           Comments:            Anticoagulation Care Providers     Provider Role Specialty Phone number    Cordell Ceja DO Referring Internal Medicine [phone]

## 2021-05-31 VITALS — BODY MASS INDEX: 47.09 KG/M2 | WEIGHT: 293 LBS | HEIGHT: 66 IN

## 2021-05-31 VITALS — WEIGHT: 293 LBS | BODY MASS INDEX: 48.45 KG/M2

## 2021-05-31 NOTE — TELEPHONE ENCOUNTER
ANTICOAGULATION  MANAGEMENT    Assessment     Today's INR result of 1.5 is Subtherapeutic (goal INR of 2.0-3.0)        Warfarin taken as previously instructed instructed to hold through 8/30 for lung biopsy and port placement today    No new diet changes affecting INR    No new medication/supplements affecting INR    Continues to tolerate warfarin with no reported s/s of bleeding or thromboembolism     Previous INR was Therapeutic    Plan:     Spoke with Jennifer regarding INR result and instructed:     Warfarin Dosing Instructions: will continue hold through 8/30 resuming usual dose with surgeon permission on 8/31  2.5 mg daily on mon/wed/fri; and 5 mg daily rest of week  (0 % change)    Instructed patient to follow up no later than:  9/6    Jennifer verbalizes understanding and agrees to warfarin dosing plan.      Instructed to call the [facility] for any changes, questions or concerns. (#[phone])   ?   Ibrahima Trimble RN    Subjective/Objective:      Jennifer HURD Kamar, a 74 y.o. female is on warfarin.     Jennifer reports:     Home warfarin dose: as updated on anticoagulation calendar per template     Missed doses: No     Medication changes:  No     S/S of bleeding or thromboembolism:  No     New Injury or illness:  No     Changes in diet or alcohol consumption:  No     Upcoming surgery, procedure or cardioversion:  No    Anticoagulation Episode Summary     Current INR goal:   2.0-3.0   TTR:   66.0 % (4.7 y)   Next INR check:   9/6/2019   INR from last check:   1.52! (8/28/2019)   Weekly max warfarin dose:      Target end date:      INR check location:      Preferred lab:      Send INR reminders to:   [facility]    Indications    Paroxysmal Atrial Fibrillation [I48.91]           Comments:            Anticoagulation Care Providers     Provider Role Specialty Phone number    Cordell Ceja DO Referring Internal Medicine [phone]

## 2021-05-31 NOTE — TELEPHONE ENCOUNTER
Refill Approved    Rx renewed per Medication Renewal Policy. Medication was last renewed on 5/17/18.    Lianne Perales, [facility] Triage/Med Refill 9/3/2019     Requested Prescriptions   Pending Prescriptions Disp Refills     potassium chloride (K-DUR,KLOR-CON) 10 MEQ tablet [Pharmacy Med Name: POTASSIUM CL MICRO 10MEQ ER TABS] 360 tablet 0     Sig: TAKE 2 TABLETS BY MOUTH TWICE DAILY       Potassium Supplements Refill Protocol Passed - 9/2/2019  4:31 PM        Passed - PCP or prescribing provider visit in past 12 months       Last office visit with prescriber/PCP: 6/18/2018 Cordell Ceja DO OR same dept: Visit date not found OR same specialty: 6/18/2018 Cordell Ceja DO  Last physical: 7/22/2019 Last MTM visit: Visit date not found   Next visit within 3 mo: Visit date not found  Next physical within 3 mo: Visit date not found  Prescriber OR PCP: Cordell Ceja DO  Last diagnosis associated with med order: 1. Hypopotassemia  - potassium chloride (K-DUR,KLOR-CON) 10 MEQ tablet [Pharmacy Med Name: POTASSIUM CL MICRO 10MEQ ER TABS]; TAKE 2 TABLETS BY MOUTH TWICE DAILY  Dispense: 360 tablet; Refill: 0    If protocol passes may refill for 12 months if within 3 months of last provider visit (or a total of 15 months).             Passed - Potassium level in last 12 months     Lab Results   Component Value Date    Potassium 4.6 06/26/2019

## 2021-05-31 NOTE — TELEPHONE ENCOUNTER
ANTICOAGULATION  MANAGEMENT    Assessment     Today's INR result of 2.6 is Therapeutic (goal INR of 2.0-3.0)        Warfarin taken as previously instructed    No new diet changes affecting INR    No new medication/supplements affecting INR    Continues to tolerate warfarin with no reported s/s of bleeding or thromboembolism     Previous INR was Therapeutic    Plan:     Spoke with Jennifer regarding INR result and instructed:     Warfarin Dosing Instructions:  Continue current warfarin dose 2.5 mg daily on mon/wed/fri; and 5 mg daily rest of week  (0 % change)    Instructed patient to follow up no later than: one month      Tawny verbalizes understanding and agrees to warfarin dosing plan.    Instructed to call the [facility] for any changes, questions or concerns. (#[phone])   ?   Ibrahima Trimble RN    Subjective/Objective:      Jennifer Galvin, a 74 y.o. female is on warfarin.     Jennifer reports:     Home warfarin dose: as updated on anticoagulation calendar per template     Missed doses: No     Medication changes:  No     S/S of bleeding or thromboembolism:  No     New Injury or illness:  No     Changes in diet or alcohol consumption:  No     Upcoming surgery, procedure or cardioversion:  No    Anticoagulation Episode Summary     Current INR goal:   2.0-3.0   TTR:   66.2 % (4.6 y)   Next INR check:   9/4/2019   INR from last check:   2.60 (8/7/2019)   Weekly max warfarin dose:      Target end date:      INR check location:      Preferred lab:      Send INR reminders to:   ABHISHEK MIDWAY    Indications    Paroxysmal Atrial Fibrillation [I48.91]           Comments:            Anticoagulation Care Providers     Provider Role Specialty Phone number    Cordell Ceja DO Referring Internal Medicine [phone]

## 2021-05-31 NOTE — TELEPHONE ENCOUNTER
Who is calling:  Juliana from [facility]    Reason for Call:  Na is calling because the patient is scheduled for a port to be placed on 8/28/19 at [place] and the recommended INR level is 3.0 or below.  The patient's last INR from 8/7/19 was 2.6 and [facility] would like to either hold the coumadin or to help maintain this current level.  They ask that the patient is called and advised.    Okay to leave a detailed message: No return call to [facility] - please call and advise the patient.

## 2021-05-31 NOTE — TELEPHONE ENCOUNTER
Who is calling:  María Elena  Reason for Call:  Patient is having a lung biopsy on 8/30/19. Patient needs to hold coumadin. Please call patient back with information. Cell [phone]  Date of last appointment with primary care: 7/22/19  Okay to leave a detailed message: Yes

## 2021-05-31 NOTE — H&P (VIEW-ONLY)
Consults   [facility] Hematology and Oncology Consult Note    Patient: Jennifer Galvin  MRN: [id number]  Date of Service: 08/21/2019  Requesting provider: Dr. Cordell Ceja MD.      Reason for Visit     1. Malignant neoplasm of upper-outer quadrant of left breast in female, estrogen receptor positive (H)  Ambulatory referral to Pulmonology    CT Lung Biopsy    CA 27.29, Breast Tumor Marker()    CEA (Carcinoembryonic Antigen)    IR Port Placement 5+ Years    Education (Chemo Class)    CC OFFICE VISIT LONG    Infusion Appointment    Infusion Appointment    Infusion Appointment    CA 27.29, Breast Tumor Marker()    CEA (Carcinoembryonic Antigen)   2. Lesion of mediastinum  Ambulatory referral to Pulmonology    CT Lung Biopsy    CA 27.29, Breast Tumor Marker()    CEA (Carcinoembryonic Antigen)    CA 27.29, Breast Tumor Marker()    CEA (Carcinoembryonic Antigen)   3. Breast nodule  Ambulatory referral to Pulmonology    CT Lung Biopsy    CA 27.29, Breast Tumor Marker()    CEA (Carcinoembryonic Antigen)    CA 27.29, Breast Tumor Marker()    CEA (Carcinoembryonic Antigen)         Assessment     1.  A very complicated at the same time very pleasant 74-year-old woman with what looks like a stage Ib CA breast left side ER positive LA positive as well as HER-2/arcelia 3+.  2.  Anterior mediastinal mass which is growing slowly over the past 7 to 8 years.  3.  Shortness of breath of unclear etiology.  4.  Other medical issues including weight gain etc.  5.  Atrial fibrillation on anticoagulation.  6.  Mild anxiety.    Plan     1.  As for his breast cancer is concerned she needs neoadjuvant chemotherapy with weekly carboplatin paclitaxel.  Following that she will have surgery.  Then depending upon the size she we can consider radiation.  After that she would need to be on anastrozole for about 5 years.  2.  For anterior mediastinal mass we will do a biopsy.  3.  Referred to pulmonology for shortness of  breath.  4.  Advised the patient to exercise more.  5.  Follow-up with Dr. Dela Cruz for other medical issues.  6.  Discussed with the patient that she will be scheduled for Port-A-Cath as well if she is willing to do that.  It can be done at the same time they do the anterior mediastinal mass biopsy.    Staging History     Cancer Staging  Malignant neoplasm of upper-outer quadrant of left breast in female, estrogen receptor positive (H)  Staging form: Breast, AJCC 8th Edition  - Clinical: Stage IB (cT2, cN0, cM0, G1, ER+, MT+, HER2+) - Signed by Roly Villalba MD on 8/21/2019      History     Patient is a very pleasant 74-year-old woman postmenopausal who has been referred to me by Dr. Cordell Ceja for evaluation of newly diagnosed breast cancer.  There is breast cancer is located on her left breast measures 2.3 cm in size in the upper outer quadrant ER positive MT positive as well as HER-2/arcelia 3+ on immunohistochemistry.  It is not palpable.  Detected incidentally.  Her presentation started with shortness of breath for which she was sent to cardiology for a stress test.  The stress test showed some uptake in the mediastinum as well as in the breast.  Therefore she had a CT scan of the chest which showed that she had a 3.2 cm mass in the mediastinum located anteriorly.  This was initially detected in 2012 but had grown from 1.9 cm at the time to 3.2 cm now.  No other abnormality noted.  Differential is thymoma/lymphoma.  She also had a mammogram which showed that she had a lesion in her left breast 2.3 cm in size.  No lymph adenopathy noted in the axillary area.  She then had a ultrasound-guided biopsy of the left breast which showed this invasive ductal carcinoma.    She is here to discuss her next course of action.  Her main complaint is shortness of breath which is on exertion.  She has been struggling with this shortness of breath for past few months.  She attributes it to her being overweight but her friend  who came accompanied with her stated that she has been overweight for many years and this is not a new gain of weight.  The patient also has been under a lot of stress due to her significant other's illness.    Past Medical History     Past Medical History:   Diagnosis Date     (Lower) Leg Localized Swelling     Created by Conversion      Adenocarcinoma In Situ Of The Uterus     Created by Conversion      Backache     Created by Conversion [facility] Annotation: Feb 29 2012 12:14PM - Opal Helm: Referred to  [facility] 9/11, given TENS unit.      Benign Polyps Of The Large Intestine     Created by Conversion      Breast cancer (H)      Cancer (H)     uterine     Coronary artery disease      Diabetes mellitus (H)      Fatigue     Created by Conversion      Hyperglycemia     Created by Conversion      Hyperlipidemia     Created by Conversion      Hypertension     Created by Conversion      Joint Pain, Localized In The Knee     Created by Conversion      Lesion of mediastinum      Lymphedema     Created by Conversion      Macular Degeneration     Created by Conversion [facility] Annotation: Apr 5 2011 12:22PM - Tien Guzman: Followed by  Ophthalmologist, Dr. Avilez.      Major Depression, Single Episode In Remission     Created by Conversion      Obesity     Created by Conversion      Obstructive Sleep Apnea     CPAP     Osteopenia     Created by Conversion      Paroxysmal Atrial Fibrillation     Created by Conversion [employer] [facility] Annotation: Nov 28 2012 10:36PM - Shruthi Dhaliwal: Found incidential  on exam on May 7th, [id number] score of 1 for HTNsymptomatic and hospitalized  x2 in July, 2012.CV X 2 in 2012Chronic Sotalol Rx      Postmenopausal bleeding     Created by Conversion      Skin cancer      Tachycardia     Created by Conversion      Tremor     Created by Conversion      Urge And Stress Incontinence     Created by Conversion      Urinary Frequency More Than Twice At Night (Nocturia)     Created by  Conversion      Urine Tests Nonspecific Abnormal Findings     Created by Conversion      Vitamin D Deficiency     Created by Conversion        Past Social History     Social History     Socioeconomic History     Marital status: Single     Spouse name: Not on file     Number of children: 0     Years of education: Not on file     Highest education level: Not on file   Occupational History     Occupation: Retired RN     Employer: [employer]   Social Needs     Financial resource strain: Not on file     Food insecurity:     Worry: Not on file     Inability: Not on file     Transportation needs:     Medical: Not on file     Non-medical: Not on file   Tobacco Use     Smoking status: Former Smoker     Packs/day: 3.00     Years: 40.00     Pack years: 120.00     Types: Cigarettes     Last attempt to quit: 2005     Years since quittin.6     Smokeless tobacco: Never Used   Substance and Sexual Activity     Alcohol use: Yes     Alcohol/week: 3.6 oz     Types: 6 Cans of beer per week     Drug use: No     Sexual activity: Never     Partners: Male     Birth control/protection: Surgical, Post-menopausal   Lifestyle     Physical activity:     Days per week: Not on file     Minutes per session: Not on file     Stress: Not on file   Relationships     Social connections:     Talks on phone: Not on file     Gets together: Not on file     Attends Sikhism service: Not on file     Active member of club or organization: Not on file     Attends meetings of clubs or organizations: Not on file     Relationship status: Not on file     Intimate partner violence:     Fear of current or ex partner: Not on file     Emotionally abused: Not on file     Physically abused: Not on file     Forced sexual activity: Not on file   Other Topics Concern     Not on file   Social History Narrative    Lives alone single family home that she owns, no children and is retired.       Family History     Family History   Problem Relation Age of  Onset     Hypertension Father      Pneumonia Father      Esophageal cancer Brother      Cancer Brother      Stroke Mother      Alzheimer's disease Brother      Cancer Brother      Prostate cancer Brother      Cancer Nephew      Colon cancer Nephew      Cancer Paternal cousin        Medication List     Prior to Admission medications    Medication Sig Start Date End Date Taking? Authorizing Provider   calcium-vitamin D (CALCIUM-VITAMIN D) 500 mg(1,250mg) -200 unit per tablet Take 1 tablet by mouth 2 (two) times a day with meals.   Yes PROVIDER, HISTORICAL   cholecalciferol, vitamin D3, 1,000 unit tablet Take 2,000 Units by mouth daily.    Yes PROVIDER, HISTORICAL   furosemide (LASIX) 40 MG tablet TAKE 1 TABLET(40 MG) BY MOUTH TWICE DAILY 8/10/19  Yes Cordell Ceja,    glucosamine-chondroitin 500-400 mg tablet Take 1 tablet by mouth 2 (two) times a day.    Yes PROVIDER, HISTORICAL   metoprolol tartrate (LOPRESSOR) 25 MG tablet Take 1 tablet (25 mg total) by mouth daily. 7/31/19  Yes Myron Block MD   OMEGA-3/DHA/EPA/FISH OIL (FISH OIL-OMEGA-3 FATTY ACIDS) 300-1,000 mg capsule Take 2 g by mouth 2 times a day at 6:00 am and 4:00 pm.   Yes PROVIDER, HISTORICAL   oxyCODONE-acetaminophen (PERCOCET/ENDOCET) 5-325 mg per tablet Take 1 tablet by mouth every 6 (six) hours as needed for pain. 7/25/19  Yes Bill Wilson MD   potassium chloride (K-DUR,KLOR-CON) 10 MEQ tablet TAKE TWO TABLETS BY MOUTH TWICE DAILY 5/17/18  Yes Cordell Ceja DO   simvastatin (ZOCOR) 20 MG tablet Take 1 tablet (20 mg total) by mouth daily with supper. 8/19/19  Yes Cordell Ceja DO   vitamin A-vitamin C-vit E-min (OCUVITE) Tab tablet Take 1 tablet by mouth 2 (two) times a day.   Yes PROVIDER, HISTORICAL   warfarin (COUMADIN/JANTOVEN) 5 MG tablet TAKE ONE-HALF TO ONE TABLET BY MOUTH DAILY AS DIRECTED. ADJUST DOSE PER INR RESULT  Patient taking differently: 2.5 mg on Mondays, Wednesday and Friday, 5 mg every  other day of week 7/8/19  Yes Cordell Ceja,        Allergies     Allergies   Allergen Reactions     Aspirin      Pt currently on WArfarin     Penicillins      Boils         Review of Systems   A comprehensive review of 14 systems was done. Pertinent findings noted here and in history of present illness. All the rest negative.     General  General (WDL): Exceptions to WDL  ENT  ENT (WDL): Exceptions to WDL  Sinus Congestion/Drainage: Yes - Chronic (Greater than 3 months)  Respiratory  Respiratory (WDL): Exceptions to WDL  Dyspnea: Yes - Chronic (Greater than 3 months)  Cardiovascular  Cardiovascular (WDL): Exceptions to WDL  Irregular Heart Beat: Yes - Chronic (Greater than 3 months)(a-fib)  Endocrine  Endocrine (WDL): Exceptions to WDL  Excessive Urination: Yes - Chronic (Greater than 3 months)  Gastrointestinal  Gastrointestinal (WDL): All gastrointestinal elements are within defined limits  Musculoskeletal  Musculoskeletal (WDL): Exceptions to WDL  Range of Motion Limitation: Yes - Chronic (Greater than 3 months)  Joint pain: Yes - Chronic (Greater than 3 months)  Back Pain: Yes - Recent (Less than 3 months)  Muscle pain or stiffness: Yes - Chronic (Greater than 3 months)  Recent fall: Yes - Recent (Less than 3 months)  Assistive device: Yes - Recent (Less than 3 months)(walker)  Neurological  Neurological (WDL): All neurological elements are within defined limits  Dominant Hand: Right  Psychological/Emotional  Psychological/Emotional (WDL): Exceptions to WDL  Depression: Yes - Recent (Less than 3 months)  Anxiety: Yes - Recent (Less than 3 months)  Hematological/Lymphatic  Hematological/Lymphatic (WDL): All hematological/lymphatic elements are within defined limits  Dermatological  Dermatologic (WDL): Exceptions to WDL  Healing Incision: Yes - Recent (Less than 3 months)  Genitourinary/Reproductive  Genitourinary/Reproductive (WDL): Exceptions to WDL  Urinary Frequency: Yes - Chronic (Greater than 3  "months)  Urinary Incontinence: Yes - Chronic (Greater than 3 months)  Urination more than 2 times a night: Yes - Chronic (Greater than 3 months)  Urinary Urgency: Yes - Chronic (Greater than 3 months)  Reproductive (Females only)     Pain  Currently in Pain: No/denies    Physical Exam     Recent Vitals 8/21/2019   Height 5' 5\"   Weight 326 lbs 3 oz   BSA (m2) 2.61 m2   /81   Pulse 105   Temp 98   Temp src 1   SpO2 -   Some recent data might be hidden       Constitutional: Oriented to person, place, and time and appears well-developed but overweight.   HEENT:  Normocephalic and atraumatic.  Eyes: Conjunctivae and EOM are normal. Pupils are equal, round, and reactive to light. No discharge.  No scleral icterus. Nose normal. Mouth/Throat: Oropharynx is clear and moist. No oropharyngeal exudate.    NECK: Normal range of motion. Neck supple. No JVD present. No tracheal deviation present. No thyromegaly present.   CARDIOVASCULAR: She has irregularly irregular rate and rhythm and intact distal pulses.  Exam reveals no gallop and no friction rub.  Systolic murmur present.  She does have bilateral lower extremity edema  at least 2+  PULMONARY: Effort normal and breath sounds normal. No respiratory distress.No Wheezing or rales.  ABDOMEN: Soft. Bowel sounds are normal. No distension and no mass.  There is no tenderness. There is no rebound and no guarding. No HSM.  MUSCULOSKELETAL: Normal range of motion. No edema and no tenderness. Mild kyphosis, no tenderness.  LYMPH NODES: Has no cervical, supraclavicular, axillary and groin adenopathy.   NEUROLOGICAL: Alert and oriented to person, place, and time. No cranial nerve deficit.  Normal muscle tone. Coordination normal.   GENITOURINARY: Deferred exam.  SKIN: Skin is warm and dry. No rash noted. No erythema. No pallor.   EXTREMITIES: No cyanosis, no clubbing, but she does have 2+-3+ bilateral lower extremity edema. No Deformity.  PSYCHIATRIC: Normal mood, affect and " behavior.      Lab Results       Lab Results   Component Value Date    ALBUMIN 3.6 06/26/2019    ALT 26 06/26/2019    AST 20 06/26/2019    BUN 15 06/26/2019    CALCIUM 9.9 06/26/2019     06/26/2019    CHOL 124 07/22/2019    CO2 27 06/26/2019    CREATININE 0.93 06/26/2019    GFRAA >60 06/26/2019    GFRNONAA 59 (L) 06/26/2019     06/26/2019    HCT 46.5 06/26/2019    WBC 11.6 (H) 06/26/2019    HGB 14.3 07/25/2019     06/26/2019    K 4.6 06/26/2019     06/26/2019     Recent Results (from the past 240 hour(s))   POCT Glucose   Result Value Ref Range    Glucose 115 70 - 139 mg/dL   Surgical Pathology Exam   Result Value Ref Range    Case Report       Surgical Pathology                                Case: [id number]                                    Authorizing Provider:  Cordell Ceja DO   Collected:           08/16/2019 1340              Ordering Location:     [facility]   Received:            08/16/2019 1500                                     [place]                                                                    Pathologist:           Lucrecia Nelson MD                                                         Specimen:    Breast, Left, 12:00 zone 2                                                                 Final Diagnosis       BREAST, LEFT, 12:00, ZONE 2, ULTRASOUND-GUIDED CORE BIOPSIES OF MASS:    1) INVASIVE DUCTAL CARCINOMA, WITH APOCRINE FEATURES:        a) GRADE: BEE GRADE I (of III)        b) SCORE: 5 (OF 9)    2) 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 (DCIS):        a) NUCLEAR rdGrdRrdArdDrdErd:rd rd3rd b) PATTERNS: SOLID AND CRIBRIFORM     3) ADDITIONAL FINDINGS:         1) ASSOCIATED STROMAL FIBROSIS        2) OCCASIONAL MICROCALCIFICATIONS PRESENT    4) ADDITIONAL STUDIES:        a) ESTROGEN RECEPTOR: STRONGLY POSITIVE (>95%; 3+)        b) PROGESTERONE RECEPTOR: POSITIVE (75%; 3+)        c) HER-2/VICKI: POSITIVE (3+)    MCSS    Comment       Dr. Migdalia Flor has  reviewed this case and concurs with the diagnosis.    Blue ink is confirmed. Total formalin fixation time: 48:35    All controls stain appropriately.    Microscopic Description       Microscopic examination performed, substantiating the above diagnosis.    Clinical Information       Clinical history:  Left breast mass   Reason for procedure:  Left breast mass     Core Breast Biopsy  Method:  Ultrasound    Breast:  Left      Site:  12 o'clock; zone 2   Number of Cores:  3   Indication:  Mass    Pre-test Suspicion:  Intermediate   Time of Collection:  1340    Time Placed in Formalin:  1340    Radiologist:  Deonna Aguila MD    Gross Description       The specimen is received in formalin (placed in formalin @1340), identified with the patient's name,  Jennifer Galvin, and identified as left breast. It consists of three 0.6 to 1.4 x 0.1 cm cylindrical fragments of soft and firm white and pink tissue. The tissue is marked with blue ink. TE-1C KLW:sg    Special Stains       Note - Estrogen and Progesterone Receptor Immunoperoxidase Stains:  These stains were done using the following criteria:    Specimen fixative: Formalin-fixed paraffin-embedded sections    Detection system: Biotin-free multimer-based technology detection system (A V.E.T.S.c.a.r.e.)    Retrieval method: CC1 pretreatment; a [place]orgie-based buffer with a slightly basic PH used at an elevated     temperature (95+5 degrees C)    Clone:  Estrogen receptor-SP1, Rabbit monoclonal ([place])     Progesterone receptor-1E2, Rabbit monoclonal ([place])    Scoring method: Intensity of nuclear stain, strong vs weak vs absent; % of cells stained    Note - HER-2/arcelia Immunoperoxidase Stain:  This stain was done using the following criteria:    Specimen fixative: Formalin-fixed paraffin-embedded sections    Detection system: Biotin-free multimer-based technology detection system (A V.E.T.S.c.a.r.e.)    Retrieval method: CC1 pretreatment; a [place]-based buffer with a slightly basic PH used at an  elevated     temperature (95 plus or  minus 5 degrees C)    Clone:  4B5, Rabbit monoclonal ([place] [facility])    Scoring method: IHC 3+ - Positive (Circumferential membrane staining that is complete, intense, and     within greater than 10% of tumor cells)       IHC 2+ - Equivocal (Circumferential Membrane staining that is incomplete and/or     weak/moderate and within greater than 10% of tumor cells or complete and     circumferential membrane staining that is intense and less than or equal to 10% of tumor     cells)       IHC1+ - Negative (Incomplete membrane staining that is faint/barely perceptible     and within greater than 10% of tumor cells       IHC 0 - Negative (No staining is Observed or Membrane staining that is incomplete and     is faint/barely perceptible and within less than or equal to 10% of tumor cells)    REFERENCES:  1) Roland MENDEZ et al: Recommendations for Human Epidermal Growth Factor Receptor 2 Testing in Breast Cancer.      American Society of Clinical Oncology/College of American Pathologists  Clinical Practice Guideline Update.      Arch Pathol Lab Med. 2014;138: 241-256     * HER-2/arcelia stain performed using the [place] [facility]'s FDA approved methodology.    Charges       CPT: 08851, 88360 x3  ICD-10: C50.912    cc:  Deonna Aguila MD    Result Flag Malignant (!) Normal           Imaging Results     Ct Chest Without Contrast    Result Date: 8/1/2019  EXAM: CT CHEST WO CONTRAST LOCATION: [facility] DATE/TIME: 8/1/2019 1:52 PM INDICATION: Evaluate for adenopathy mediastinum, abnormal stress test. COMPARISON: Nuclear medicine cardiac stress test on 7/2/2019 and CTA chest on 7/23/2012. TECHNIQUE: Helical images were obtained through the chest. Multiplanar reformats were obtained. Dose reduction techniques were used. CONTRAST: None. FINDINGS: LUNGS AND PLEURA: Mild dependent atelectasis. There is mosaic attenuation of the lungs in the lung bases, likely representing air trapping.  Lungs are otherwise clear. MEDIASTINUM: 3.2 x 2.3 cm well-circumscribed anterior mediastinal soft tissue nodule. This corresponds with the abnormality on the nuclear medicine examination. This has increased in size from 2012 when it measured 1.9 x 1.6 cm. There are scattered small  mediastinal lymph nodes. Moderate coronary artery calcification. LIMITED UPPER ABDOMEN: There is a 5 cm left adrenal fat-density mass consistent with a myelolipoma. This has increased in size from previous 3.3 cm. Presumed left renal cyst. MUSCULOSKELETAL: 1.7 cm possible nodule in the upper left breast on series 4, image 68.     CONCLUSION: 1.  3.2 cm anterior mediastinal soft tissue nodule, increased in size from 1.9 cm in 2012. This corresponds to the abnormality on recent stress test. Differential diagnosis includes thymoma and lymphoma. Consider PET scan to further evaluate. 2.  Left breast nodule, indeterminant and not seen on prior mammogram. Recommend diagnostic mammogram and ultrasound for further evaluation.    Mammo Diagnostic Bilateral    Result Date: 8/13/2019  EXAM: MAMMO DIAGNOSTIC 3D BILATERAL, US BREAST LIMITED (FOCAL) LEFT LOCATION: [facility] DATE/TIME: 8/12/2019 1:18 PM INDICATION: Circumscribed lesion seen in the left breast on CT of the chest. COMPARISON: CT of the chest from 8/1/2019 and prior mammograms from 9/20/2018, 9/13/2017, 6/15/2016, 5/27/2015. MAMMOGRAPHIC FINDINGS: Bilateral full-field digital diagnostic mammograms performed. The breast tissue is heterogeneously dense which may obscure small masses. Scattered benign-appearing round and chunky calcifications are again seen in both breasts. The  right breast appears stable and benign. In the left breast in the 12:00 position in zone 2 there is a partially-circumscribed and partially obscured asymmetry; it is not seen on the prior mammograms. Images evaluated with the assistance of CAD. Breast tomosynthesis was used in  interpretation. ULTRASOUND FINDINGS: Targeted left breast ultrasound was performed by both the ultrasonographer and the radiologist. In the 12:00 position in zone 2 there was a hypoechoic lesion with predominantly circumscribed and a few lobulated margins measuring 2.3 x 1.4 x 1.3 cm, correlating with the mammographic abnormality.     1.  The lesion seen in the 12:00 position in zone 2 correlates with the abnormality seen on CT and on mammography. While it likely represents a benign fibroadenoma, biopsy is recommended for tissue confirmation. The findings and recommendations were discussed with the patient at the time of exam. ACR BI-RADS Category 4: Suspicious. Ultrasound-guided biopsy of the mass in the 12:00 position is recommended. Biopsy will be performed on Friday after she has her PET/CT on Thursday.    Mammo Post Clip Placement Left    Addendum Date: 8/20/2019    Pathology shows DCIS and invasive ductal carcinoma. This is consistent with imaging findings. A verbal report was given to the patient. Surgical consultation is recommended.    Result Date: 8/16/2019  US Breast Core Biopsy Left Mammo Post Clip Placement Left INDICATION: Left breast mass. PROCEDURE: Informed consent was obtained from the patient. Ultrasound was used to localize the mass at the 12 o'clock position of the left breast, zone 2.  The skin was marked, then prepped and draped in a sterile fashion. 1% lidocaine was used for local anesthesia and additional deep anesthesia achieved using lidocaine with epinephrine.  Under direct sonographic guidance, a 14-gauge coaxial needle was used to obtain 3 core biopsies. A biopsy marking clip was then placed. Digital mammograms performed in a separate room, using separate equipment, to document clip placement, demonstrate the clip in appropriate position. The patient tolerated this well; there are no immediate complications. IMPRESSION: 1. Ultrasound-guided biopsy of mass in the left breast. Pathology  pending. 2. Post procedure mammogram for marker placement     Us Breast Core Biopsy Left    Addendum Date: 8/20/2019    Pathology shows DCIS and invasive ductal carcinoma. This is consistent with imaging findings. A verbal report was given to the patient. Surgical consultation is recommended.    Result Date: 8/16/2019  US Breast Core Biopsy Left Mammo Post Clip Placement Left INDICATION: Left breast mass. PROCEDURE: Informed consent was obtained from the patient. Ultrasound was used to localize the mass at the 12 o'clock position of the left breast, zone 2.  The skin was marked, then prepped and draped in a sterile fashion. 1% lidocaine was used for local anesthesia and additional deep anesthesia achieved using lidocaine with epinephrine.  Under direct sonographic guidance, a 14-gauge coaxial needle was used to obtain 3 core biopsies. A biopsy marking clip was then placed. Digital mammograms performed in a separate room, using separate equipment, to document clip placement, demonstrate the clip in appropriate position. The patient tolerated this well; there are no immediate complications. IMPRESSION: 1. Ultrasound-guided biopsy of mass in the left breast. Pathology pending. 2. Post procedure mammogram for marker placement     Nm Pet Ct Skull To Mid Thigh    Result Date: 8/15/2019  EXAM: NM PET CT SKULL TO MID THIGH LOCATION: [facility] DATE/TIME: 8/15/2019 2:25 PM INDICATION: Initial treatment strategy and staging of a nonspecific abnormal findings of the lung fields. Lesion of the mediastinum. COMPARISON: Thoracic CT dated 08/01/2019 TECHNIQUE: Serum glucose level 115 mg/dL. One hour post intravenous administration of 12.9 mCi F-18 FDG, PET imaging was performed from the skull base to the mid thighs utilizing attenuation correction with concurrent axial CT and PET/CT image fusion. Dose reduction techniques were used. FINDINGS: Mildly FDG avid (max SUV 4.3) mass in the anterior mediastinum measuring 3.3 x  2.1 cm. Mildly FDG avid (max SUV 3.8) lesion in the left breast correlating with the mass identified on recent mammogram and ultrasound dated 08/12/2019. Moderate coronary artery calcium. Left adrenal myelolipoma. Right renal cyst. InterStim device with lead traversing the right S4-S5 neural foramina. Sigmoid diverticulosis.     1. Mildly hypermetabolic anterior mediastinal mass suspicious for active neoplasm. 2. Mildly hypermetabolic left breast lesion, which is scheduled for biopsy.    Us Breast Limited (focal) Left    Result Date: 8/13/2019  EXAM: MAMMO DIAGNOSTIC 3D BILATERAL, US BREAST LIMITED (FOCAL) LEFT LOCATION: [facility] OUTPATIENT [facility] DATE/TIME: 8/12/2019 1:18 PM INDICATION: Circumscribed lesion seen in the left breast on CT of the chest. COMPARISON: CT of the chest from 8/1/2019 and prior mammograms from 9/20/2018, 9/13/2017, 6/15/2016, 5/27/2015. MAMMOGRAPHIC FINDINGS: Bilateral full-field digital diagnostic mammograms performed. The breast tissue is heterogeneously dense which may obscure small masses. Scattered benign-appearing round and chunky calcifications are again seen in both breasts. The  right breast appears stable and benign. In the left breast in the 12:00 position in zone 2 there is a partially-circumscribed and partially obscured asymmetry; it is not seen on the prior mammograms. Images evaluated with the assistance of CAD. Breast tomosynthesis was used in interpretation. ULTRASOUND FINDINGS: Targeted left breast ultrasound was performed by both the ultrasonographer and the radiologist. In the 12:00 position in zone 2 there was a hypoechoic lesion with predominantly circumscribed and a few lobulated margins measuring 2.3 x 1.4 x 1.3 cm, correlating with the mammographic abnormality.     1.  The lesion seen in the 12:00 position in zone 2 correlates with the abnormality seen on CT and on mammography. While it likely represents a benign fibroadenoma, biopsy is recommended for  tissue confirmation. The findings and recommendations were discussed with the patient at the time of exam. ACR BI-RADS Category 4: Suspicious. Ultrasound-guided biopsy of the mass in the 12:00 position is recommended. Biopsy will be performed on Friday after she has her PET/CT on Thursday.          Total time spent was 60 minutes, more than half of it was in face-to-face counseling regarding disease state, review of available images, laboratory values, pathological reports, treatment, options, their side effects and management.    Roly Villalba MD

## 2021-05-31 NOTE — PRE-PROCEDURE
Written consent obtained?: Yes  Risks and benefits: Risks, benefits and alternatives were discussed  Consent given by: patient  Expected level of sedation: moderate  ASA Class: Class 2- mild systemic disease, no acute problems, no functional limitations  Mallampati: Grade 2- soft palate, base of uvula, tonsillar pillars, and portion of posterior pharyngeal wall visible  Patient states understanding of procedure being performed: Yes  Patient's understanding of procedure matches consent: Yes  Procedure consent matches procedure scheduled: Yes  Appropriately NPO: yes  Lungs: lungs clear with good breath sounds bilaterally  Heart: normal heart sounds and rate  History & Physical reviewed: History and physical reviewed and no updates needed  Statement of review: I have reviewed the lab findings, diagnostic data, medications, and the plan for sedation

## 2021-05-31 NOTE — TELEPHONE ENCOUNTER
I spoke with Jennifer on the telephone, explained to the 3.2 cm anterior mediastinum lymph node as concerning, needing additional work-up, namely PET CT scan.  There is also a left breast nodule which was not seen on prior mammogram about 1 year ago, diagnostic mammogram with ultrasound is recommended for this.    I have reached out to the oncology clinic, ran the story by them, PET/CT was recommended as well as oncology follow-up, referral request was entered into the chart as well.    Jennifer did not have any additional questions at the time our conversation noted today.

## 2021-05-31 NOTE — TELEPHONE ENCOUNTER
ACN called and spoke with pt. Informed her to start holding warfarin on 8/25 for lung biopsy on 8/30. Resume warfarin the next day which is 8/31. Recheck INR 9/6. Pt verbalized understanding.

## 2021-05-31 NOTE — PROGRESS NOTES
Met with Jennifer today as she completed chemo class.  Presented her with noemi newton, port pillow, and [employer] folder.  We reviewed the contents of the folder and discussed options for obtaining a wig.  Emotional support and encouragement provided.  Jennifer became quiet tearful at the thought of losing her hair.  She continues to shoulder the stress of her long-term partner being hospitalized.  She feels he is angry with her because she cannot bring him to her home, and he has stopped allowing her to feed him.  Support provided and calls welcomed. Fabi Morton RN

## 2021-05-31 NOTE — TELEPHONE ENCOUNTER
Ok to hold without bridge, can resume 24 h after procedure or if instructed otherwise from provider doing procedure

## 2021-05-31 NOTE — TELEPHONE ENCOUNTER
Telephoned and spoke with Jennifer to share pathology results from left breast US-guided core biopsies of mass performed on 8/16/19 at [facility].   We discussed breast anatomy, histologic type, and estrogen and progesterone receptors.    Jennifer is scheduled to see medical oncologist Roly Villalba MD tomorrow for previous concerning mediastinal mass.  Dr. Villalba would like to delay breast surgeon consult until we have more definitive information about mediastinal mass.  We reviewed the appointment time and location for med onc consult.      Provided emotional support and encouragement to Jennifer.  She is already going through a stressful time as significant other suffered a stroke in early August and is hospitalized currently at [facility].  She has his daughter and her sister-in-law to lean on.  We did discuss the importance of having someone attend the med onc consult if she can.      I will provide breast cancer resources in person tomorrow at med onc consult.  Fabi Morton RN

## 2021-05-31 NOTE — PROGRESS NOTES
Met with Jennifer and her sister-in-law Luz to welcome her to clinic and introduce the role of navigation in our setting.  Jennifer has had many psychosocial stressors recently.  Her brother, Luz's ,  in April of this year.  Damien, Jennifer's significant other of 40 years, suffered a stroke in early 2019 and has now suffered a second stroke.  He is currently at [facility] and palliative care is becoming involved.    I gave her a class calendar for support groups and pointed out Breast Cancer Support Group especially.  Made Jennifer and Luz aware of the availability of Jenny Cordova, oncology psychotherapist, and gave them her bio card and business card.      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  She is clear she cannot care for Damien at home.      We discussed the findings of her breast biopsy and the significance of the mediastinal mass seen on imaging.  I did give her a copy of breast biopsy path report and reviewed that with her.  We did not discuss the HER2 positive feature at this point or its significance.      She will work on her healthcare directive, and I am available to facilitate or to notarize.    Emotional support provided and calls welcomed. Fabi Morton RN

## 2021-05-31 NOTE — TELEPHONE ENCOUNTER
Telephoned and spoke with Jennifer to check in after her recent medical oncology consult with Dr. Villalba.  She asked about medications he was prescribing and I directed her to the after-visit summary he gave her.  I did discuss the likelihood of hair loss being 87% with Taxol.  I offered to bring her wig resources folder at chemo class on 8/28.  Fabi Morton RN

## 2021-05-31 NOTE — TELEPHONE ENCOUNTER
Telephoned and left voice message for Jennifer inquiring about household income to consider eligibility for [facility], and [facility] [employer] program.  For single member household she would need to have a maximum income of $24,280.  Fabi Morton RN

## 2021-05-31 NOTE — PROGRESS NOTES
The patient attended chemotherapy class today.  The patient was educated on:  -[facility] Educational Classes and Support Groups  -Chemotherapy: what it is and how it works  -Described a typical day at the treatment center and what the patient can expect  -Discussed port access and functions of the port   -Chemotherapy side effects and appropriate management of these side effects. SE discussed included and not limited to: Fatigue, nausea and vomiting, constipation, diarrhea, mucositis, alopecia, peripheral neuropathy, pain, anemia, thrombocytopenia, and neutropenia.    -Reasons to call the physician, including, fever>100.5, shaking chills, unusual bleeding or bruising, shortness of breath, vomiting>12hours, nausea >24 hours, unable to eat/drink >24 hours, painful urination, blood in urine or stool, soreness at the IV site, and painful mouth sores.  The  triage phone number was given to the patient and the patient was encouraged to call with any questions.    The patient was given a tour of the infusion center.  All questions were addressed to the best of my abilities and the patient was encouraged to call with any questions.  Time Spent:75 minutes    Sonya Bonilla, ElisD and Rena Del Angel, PharmD Student 8/29/2019, 4:13 PM

## 2021-05-31 NOTE — PRE-PROCEDURE
Procedure Name: port placement   Date/Time: 8/28/2019 10:36 AM  Written consent obtained?: Yes  Risks and benefits: Risks, benefits and alternatives were discussed  Consent given by: patient  Expected level of sedation: moderate  ASA Class: Class 2- mild systemic disease, no acute problems, no functional limitations  Mallampati: Grade 3- soft palate visible, posterior pharyngeal wall not visible  Patient states understanding of procedure being performed: Yes  Patient's understanding of procedure matches consent: Yes  Appropriately NPO: yes  Lungs: lungs clear with good breath sounds bilaterally  Heart: normal heart sounds and rate  History & Physical reviewed: History and physical reviewed and no updates needed  Statement of review: I have reviewed the lab findings, diagnostic data, medications, and the plan for sedation

## 2021-05-31 NOTE — CONSULTS
Consults   [facility] Hematology and Oncology Consult Note    Patient: Jennifer Galvin  MRN: [id number]  Date of Service: 08/21/2019  Requesting provider: Dr. Cordell Ceja MD.      Reason for Visit     1. Malignant neoplasm of upper-outer quadrant of left breast in female, estrogen receptor positive (H)  Ambulatory referral to Pulmonology    CT Lung Biopsy    CA 27.29, Breast Tumor Marker()    CEA (Carcinoembryonic Antigen)    IR Port Placement 5+ Years    Education (Chemo Class)    CC OFFICE VISIT LONG    Infusion Appointment    Infusion Appointment    Infusion Appointment    CA 27.29, Breast Tumor Marker()    CEA (Carcinoembryonic Antigen)   2. Lesion of mediastinum  Ambulatory referral to Pulmonology    CT Lung Biopsy    CA 27.29, Breast Tumor Marker()    CEA (Carcinoembryonic Antigen)    CA 27.29, Breast Tumor Marker()    CEA (Carcinoembryonic Antigen)   3. Breast nodule  Ambulatory referral to Pulmonology    CT Lung Biopsy    CA 27.29, Breast Tumor Marker()    CEA (Carcinoembryonic Antigen)    CA 27.29, Breast Tumor Marker()    CEA (Carcinoembryonic Antigen)         Assessment     1.  A very complicated at the same time very pleasant 74-year-old woman with what looks like a stage Ib CA breast left side ER positive PA positive as well as HER-2/arcelia 3+.  2.  Anterior mediastinal mass which is growing slowly over the past 7 to 8 years.  3.  Shortness of breath of unclear etiology.  4.  Other medical issues including weight gain etc.  5.  Atrial fibrillation on anticoagulation.  6.  Mild anxiety.    Plan     1.  As for his breast cancer is concerned she needs neoadjuvant chemotherapy with weekly carboplatin paclitaxel.  Following that she will have surgery.  Then depending upon the size she we can consider radiation.  After that she would need to be on anastrozole for about 5 years.  2.  For anterior mediastinal mass we will do a biopsy.  3.  Referred to pulmonology for shortness of  breath.  4.  Advised the patient to exercise more.  5.  Follow-up with Dr. Dela Cruz for other medical issues.  6.  Discussed with the patient that she will be scheduled for Port-A-Cath as well if she is willing to do that.  It can be done at the same time they do the anterior mediastinal mass biopsy.    Staging History     Cancer Staging  Malignant neoplasm of upper-outer quadrant of left breast in female, estrogen receptor positive (H)  Staging form: Breast, AJCC 8th Edition  - Clinical: Stage IB (cT2, cN0, cM0, G1, ER+, GA+, HER2+) - Signed by Roly Villalba MD on 8/21/2019      History     Patient is a very pleasant 74-year-old woman postmenopausal who has been referred to me by Dr. Cordell Ceja for evaluation of newly diagnosed breast cancer.  There is breast cancer is located on her left breast measures 2.3 cm in size in the upper outer quadrant ER positive GA positive as well as HER-2/arcelia 3+ on immunohistochemistry.  It is not palpable.  Detected incidentally.  Her presentation started with shortness of breath for which she was sent to cardiology for a stress test.  The stress test showed some uptake in the mediastinum as well as in the breast.  Therefore she had a CT scan of the chest which showed that she had a 3.2 cm mass in the mediastinum located anteriorly.  This was initially detected in 2012 but had grown from 1.9 cm at the time to 3.2 cm now.  No other abnormality noted.  Differential is thymoma/lymphoma.  She also had a mammogram which showed that she had a lesion in her left breast 2.3 cm in size.  No lymph adenopathy noted in the axillary area.  She then had a ultrasound-guided biopsy of the left breast which showed this invasive ductal carcinoma.    She is here to discuss her next course of action.  Her main complaint is shortness of breath which is on exertion.  She has been struggling with this shortness of breath for past few months.  She attributes it to her being overweight but her friend  who came accompanied with her stated that she has been overweight for many years and this is not a new gain of weight.  The patient also has been under a lot of stress due to her significant other's illness.    Past Medical History     Past Medical History:   Diagnosis Date     (Lower) Leg Localized Swelling     Created by Conversion      Adenocarcinoma In Situ Of The Uterus     Created by Conversion      Backache     Created by Conversion [email or web address] Annotation: Feb 29 2012 12:14PM - Opal Helm: Referred to  [facility] 9/11, given TENS unit.      Benign Polyps Of The Large Intestine     Created by Conversion      Breast cancer (H)      Cancer (H)     uterine     Coronary artery disease      Diabetes mellitus (H)      Fatigue     Created by Conversion      Hyperglycemia     Created by Conversion      Hyperlipidemia     Created by Conversion      Hypertension     Created by Conversion      Joint Pain, Localized In The Knee     Created by Conversion      Lesion of mediastinum      Lymphedema     Created by Conversion      Macular Degeneration     Created by Conversion [facility] Annotation: Apr 5 2011 12:22PM - Tien Guzman: Followed by  Ophthalmologist, Dr. Avilez.      Major Depression, Single Episode In Remission     Created by Conversion      Obesity     Created by Conversion      Obstructive Sleep Apnea     CPAP     Osteopenia     Created by Conversion      Paroxysmal Atrial Fibrillation     Created by Conversion [email or web address] Annotation: Nov 28 2012 10:36PM - Shruthi Dhaliwal: Found incidential  on exam on May 7th, [id number] score of 1 for HTNsymptomatic and hospitalized  x2 in July, 2012.CV X 2 in 2012Chronic Sotalol Rx      Postmenopausal bleeding     Created by Conversion      Skin cancer      Tachycardia     Created by Conversion      Tremor     Created by Conversion      Urge And Stress Incontinence     Created by Conversion      Urinary Frequency More Than Twice At Night (Nocturia)     Created by  Conversion      Urine Tests Nonspecific Abnormal Findings     Created by Conversion      Vitamin D Deficiency     Created by Conversion        Past Social History     Social History     Socioeconomic History     Marital status: Single     Spouse name: Not on file     Number of children: 0     Years of education: Not on file     Highest education level: Not on file   Occupational History     Occupation: Retired RN     Employer: [employer]   Social Needs     Financial resource strain: Not on file     Food insecurity:     Worry: Not on file     Inability: Not on file     Transportation needs:     Medical: Not on file     Non-medical: Not on file   Tobacco Use     Smoking status: Former Smoker     Packs/day: 3.00     Years: 40.00     Pack years: 120.00     Types: Cigarettes     Last attempt to quit: 2005     Years since quittin.6     Smokeless tobacco: Never Used   Substance and Sexual Activity     Alcohol use: Yes     Alcohol/week: 3.6 oz     Types: 6 Cans of beer per week     Drug use: No     Sexual activity: Never     Partners: Male     Birth control/protection: Surgical, Post-menopausal   Lifestyle     Physical activity:     Days per week: Not on file     Minutes per session: Not on file     Stress: Not on file   Relationships     Social connections:     Talks on phone: Not on file     Gets together: Not on file     Attends Tenriism service: Not on file     Active member of club or organization: Not on file     Attends meetings of clubs or organizations: Not on file     Relationship status: Not on file     Intimate partner violence:     Fear of current or ex partner: Not on file     Emotionally abused: Not on file     Physically abused: Not on file     Forced sexual activity: Not on file   Other Topics Concern     Not on file   Social History Narrative    Lives alone single family home that she owns, no children and is retired.       Family History     Family History   Problem Relation Age of  Onset     Hypertension Father      Pneumonia Father      Esophageal cancer Brother      Cancer Brother      Stroke Mother      Alzheimer's disease Brother      Cancer Brother      Prostate cancer Brother      Cancer Nephew      Colon cancer Nephew      Cancer Paternal cousin        Medication List     Prior to Admission medications    Medication Sig Start Date End Date Taking? Authorizing Provider   calcium-vitamin D (CALCIUM-VITAMIN D) 500 mg(1,250mg) -200 unit per tablet Take 1 tablet by mouth 2 (two) times a day with meals.   Yes PROVIDER, HISTORICAL   cholecalciferol, vitamin D3, 1,000 unit tablet Take 2,000 Units by mouth daily.    Yes PROVIDER, HISTORICAL   furosemide (LASIX) 40 MG tablet TAKE 1 TABLET(40 MG) BY MOUTH TWICE DAILY 8/10/19  Yes Cordell Ceja,    glucosamine-chondroitin 500-400 mg tablet Take 1 tablet by mouth 2 (two) times a day.    Yes PROVIDER, HISTORICAL   metoprolol tartrate (LOPRESSOR) 25 MG tablet Take 1 tablet (25 mg total) by mouth daily. 7/31/19  Yes Myron Block MD   OMEGA-3/DHA/EPA/FISH OIL (FISH OIL-OMEGA-3 FATTY ACIDS) 300-1,000 mg capsule Take 2 g by mouth 2 times a day at 6:00 am and 4:00 pm.   Yes PROVIDER, HISTORICAL   oxyCODONE-acetaminophen (PERCOCET/ENDOCET) 5-325 mg per tablet Take 1 tablet by mouth every 6 (six) hours as needed for pain. 7/25/19  Yes Bill Wilson MD   potassium chloride (K-DUR,KLOR-CON) 10 MEQ tablet TAKE TWO TABLETS BY MOUTH TWICE DAILY 5/17/18  Yes Cordell Ceja DO   simvastatin (ZOCOR) 20 MG tablet Take 1 tablet (20 mg total) by mouth daily with supper. 8/19/19  Yes Cordell Ceja DO   vitamin A-vitamin C-vit E-min (OCUVITE) Tab tablet Take 1 tablet by mouth 2 (two) times a day.   Yes PROVIDER, HISTORICAL   warfarin (COUMADIN/JANTOVEN) 5 MG tablet TAKE ONE-HALF TO ONE TABLET BY MOUTH DAILY AS DIRECTED. ADJUST DOSE PER INR RESULT  Patient taking differently: 2.5 mg on Mondays, Wednesday and Friday, 5 mg every  other day of week 7/8/19  Yes Cordell Ceja,        Allergies     Allergies   Allergen Reactions     Aspirin      Pt currently on WArfarin     Penicillins      Boils         Review of Systems   A comprehensive review of 14 systems was done. Pertinent findings noted here and in history of present illness. All the rest negative.     General  General (WDL): Exceptions to WDL  ENT  ENT (WDL): Exceptions to WDL  Sinus Congestion/Drainage: Yes - Chronic (Greater than 3 months)  Respiratory  Respiratory (WDL): Exceptions to WDL  Dyspnea: Yes - Chronic (Greater than 3 months)  Cardiovascular  Cardiovascular (WDL): Exceptions to WDL  Irregular Heart Beat: Yes - Chronic (Greater than 3 months)(a-fib)  Endocrine  Endocrine (WDL): Exceptions to WDL  Excessive Urination: Yes - Chronic (Greater than 3 months)  Gastrointestinal  Gastrointestinal (WDL): All gastrointestinal elements are within defined limits  Musculoskeletal  Musculoskeletal (WDL): Exceptions to WDL  Range of Motion Limitation: Yes - Chronic (Greater than 3 months)  Joint pain: Yes - Chronic (Greater than 3 months)  Back Pain: Yes - Recent (Less than 3 months)  Muscle pain or stiffness: Yes - Chronic (Greater than 3 months)  Recent fall: Yes - Recent (Less than 3 months)  Assistive device: Yes - Recent (Less than 3 months)(walker)  Neurological  Neurological (WDL): All neurological elements are within defined limits  Dominant Hand: Right  Psychological/Emotional  Psychological/Emotional (WDL): Exceptions to WDL  Depression: Yes - Recent (Less than 3 months)  Anxiety: Yes - Recent (Less than 3 months)  Hematological/Lymphatic  Hematological/Lymphatic (WDL): All hematological/lymphatic elements are within defined limits  Dermatological  Dermatologic (WDL): Exceptions to WDL  Healing Incision: Yes - Recent (Less than 3 months)  Genitourinary/Reproductive  Genitourinary/Reproductive (WDL): Exceptions to WDL  Urinary Frequency: Yes - Chronic (Greater than 3  "months)  Urinary Incontinence: Yes - Chronic (Greater than 3 months)  Urination more than 2 times a night: Yes - Chronic (Greater than 3 months)  Urinary Urgency: Yes - Chronic (Greater than 3 months)  Reproductive (Females only)     Pain  Currently in Pain: No/denies    Physical Exam     Recent Vitals 8/21/2019   Height 5' 5\"   Weight 326 lbs 3 oz   BSA (m2) 2.61 m2   /81   Pulse 105   Temp 98   Temp src 1   SpO2 -   Some recent data might be hidden       Constitutional: Oriented to person, place, and time and appears well-developed but overweight.   HEENT:  Normocephalic and atraumatic.  Eyes: Conjunctivae and EOM are normal. Pupils are equal, round, and reactive to light. No discharge.  No scleral icterus. Nose normal. Mouth/Throat: Oropharynx is clear and moist. No oropharyngeal exudate.    NECK: Normal range of motion. Neck supple. No JVD present. No tracheal deviation present. No thyromegaly present.   CARDIOVASCULAR: She has irregularly irregular rate and rhythm and intact distal pulses.  Exam reveals no gallop and no friction rub.  Systolic murmur present.  She does have bilateral lower extremity edema  at least 2+  PULMONARY: Effort normal and breath sounds normal. No respiratory distress.No Wheezing or rales.  ABDOMEN: Soft. Bowel sounds are normal. No distension and no mass.  There is no tenderness. There is no rebound and no guarding. No HSM.  MUSCULOSKELETAL: Normal range of motion. No edema and no tenderness. Mild kyphosis, no tenderness.  LYMPH NODES: Has no cervical, supraclavicular, axillary and groin adenopathy.   NEUROLOGICAL: Alert and oriented to person, place, and time. No cranial nerve deficit.  Normal muscle tone. Coordination normal.   GENITOURINARY: Deferred exam.  SKIN: Skin is warm and dry. No rash noted. No erythema. No pallor.   EXTREMITIES: No cyanosis, no clubbing, but she does have 2+-3+ bilateral lower extremity edema. No Deformity.  PSYCHIATRIC: Normal mood, affect and " behavior.      Lab Results       Lab Results   Component Value Date    ALBUMIN 3.6 06/26/2019    ALT 26 06/26/2019    AST 20 06/26/2019    BUN 15 06/26/2019    CALCIUM 9.9 06/26/2019     06/26/2019    CHOL 124 07/22/2019    CO2 27 06/26/2019    CREATININE 0.93 06/26/2019    GFRAA >60 06/26/2019    GFRNONAA 59 (L) 06/26/2019     06/26/2019    HCT 46.5 06/26/2019    WBC 11.6 (H) 06/26/2019    HGB 14.3 07/25/2019     06/26/2019    K 4.6 06/26/2019     06/26/2019     Recent Results (from the past 240 hour(s))   POCT Glucose   Result Value Ref Range    Glucose 115 70 - 139 mg/dL   Surgical Pathology Exam   Result Value Ref Range    Case Report       Surgical Pathology                                Case: [id number]                                    Authorizing Provider:  Cordell Ceja DO   Collected:           08/16/2019 1340              Ordering Location:     [facility]   Received:            08/16/2019 1500                                     [place]                                                                    Pathologist:           Lucrecia Nelson MD                                                         Specimen:    Breast, Left, 12:00 zone 2                                                                 Final Diagnosis       BREAST, LEFT, 12:00, ZONE 2, ULTRASOUND-GUIDED CORE BIOPSIES OF MASS:    1) INVASIVE DUCTAL CARCINOMA, WITH APOCRINE FEATURES:        a) GRADE: BEE GRADE I (of III)        b) SCORE: 5 (OF 9)    2) 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 (DCIS):        a) NUCLEAR rdGrdRrdArdDrdErd:rd rd3rd b) PATTERNS: SOLID AND CRIBRIFORM     3) ADDITIONAL FINDINGS:         1) ASSOCIATED STROMAL FIBROSIS        2) OCCASIONAL MICROCALCIFICATIONS PRESENT    4) ADDITIONAL STUDIES:        a) ESTROGEN RECEPTOR: STRONGLY POSITIVE (>95%; 3+)        b) PROGESTERONE RECEPTOR: POSITIVE (75%; 3+)        c) HER-2/VICKI: POSITIVE (3+)    MCSS    Comment       Dr. Migdalia Flor has  reviewed this case and concurs with the diagnosis.    Blue ink is confirmed. Total formalin fixation time: 48:35    All controls stain appropriately.    Microscopic Description       Microscopic examination performed, substantiating the above diagnosis.    Clinical Information       Clinical history:  Left breast mass   Reason for procedure:  Left breast mass     Core Breast Biopsy  Method:  Ultrasound    Breast:  Left      Site:  12 o'clock; zone 2   Number of Cores:  3   Indication:  Mass    Pre-test Suspicion:  Intermediate   Time of Collection:  1340    Time Placed in Formalin:  1340    Radiologist:  Deonna Aguila MD    Gross Description       The specimen is received in formalin (placed in formalin @1340), identified with the patient's name,  Jennifer Galvin, and identified as left breast. It consists of three 0.6 to 1.4 x 0.1 cm cylindrical fragments of soft and firm white and pink tissue. The tissue is marked with blue ink. TE-1C KLW:sg    Special Stains       Note - Estrogen and Progesterone Receptor Immunoperoxidase Stains:  These stains were done using the following criteria:    Specimen fixative: Formalin-fixed paraffin-embedded sections    Detection system: Biotin-free multimer-based technology detection system (Siminars)    Retrieval method: CC1 pretreatment; a georgie-based buffer with a slightly basic PH used at an elevated     temperature (95+5 degrees C)    Clone:  Estrogen receptor-SP1, Rabbit monoclonal (Aromas)     Progesterone receptor-1E2, Rabbit monoclonal (Aromas)    Scoring method: Intensity of nuclear stain, strong vs weak vs absent; % of cells stained    Note - HER-2/arcelia Immunoperoxidase Stain:  This stain was done using the following criteria:    Specimen fixative: Formalin-fixed paraffin-embedded sections    Detection system: Biotin-free multimer-based technology detection system (Siminars)    Retrieval method: CC1 pretreatment; a georgie-based buffer with a slightly basic PH used at an  elevated     temperature (95 plus or  minus 5 degrees C)    Clone:  4B5, Rabbit monoclonal ([employer])    Scoring method: IHC 3+ - Positive (Circumferential membrane staining that is complete, intense, and     within greater than 10% of tumor cells)       IHC 2+ - Equivocal (Circumferential Membrane staining that is incomplete and/or     weak/moderate and within greater than 10% of tumor cells or complete and     circumferential membrane staining that is intense and less than or equal to 10% of tumor     cells)       IHC1+ - Negative (Incomplete membrane staining that is faint/barely perceptible     and within greater than 10% of tumor cells       IHC 0 - Negative (No staining is Observed or Membrane staining that is incomplete and     is faint/barely perceptible and within less than or equal to 10% of tumor cells)    REFERENCES:  1) Roland MENDEZ et al: Recommendations for Human Epidermal Growth Factor Receptor 2 Testing in Breast Cancer.      American Society of Clinical Oncology/College of American Pathologists  Clinical Practice Guideline Update.      Arch Pathol Lab Med. 2014;138: 241-256     * HER-2/arcelia stain performed using the [employer]'s FDA approved methodology.    Charges       CPT: 61782, 88360 x3  ICD-10: C50.912    cc:  Deonna Aguila MD    Result Flag Malignant (!) Normal           Imaging Results     Ct Chest Without Contrast    Result Date: 8/1/2019  EXAM: CT CHEST WO CONTRAST LOCATION: [facility] DATE/TIME: 8/1/2019 1:52 PM INDICATION: Evaluate for adenopathy mediastinum, abnormal stress test. COMPARISON: Nuclear medicine cardiac stress test on 7/2/2019 and CTA chest on 7/23/2012. TECHNIQUE: Helical images were obtained through the chest. Multiplanar reformats were obtained. Dose reduction techniques were used. CONTRAST: None. FINDINGS: LUNGS AND PLEURA: Mild dependent atelectasis. There is mosaic attenuation of the lungs in the lung bases, likely representing air trapping.  Lungs are otherwise clear. MEDIASTINUM: 3.2 x 2.3 cm well-circumscribed anterior mediastinal soft tissue nodule. This corresponds with the abnormality on the nuclear medicine examination. This has increased in size from 2012 when it measured 1.9 x 1.6 cm. There are scattered small  mediastinal lymph nodes. Moderate coronary artery calcification. LIMITED UPPER ABDOMEN: There is a 5 cm left adrenal fat-density mass consistent with a myelolipoma. This has increased in size from previous 3.3 cm. Presumed left renal cyst. MUSCULOSKELETAL: 1.7 cm possible nodule in the upper left breast on series 4, image 68.     CONCLUSION: 1.  3.2 cm anterior mediastinal soft tissue nodule, increased in size from 1.9 cm in 2012. This corresponds to the abnormality on recent stress test. Differential diagnosis includes thymoma and lymphoma. Consider PET scan to further evaluate. 2.  Left breast nodule, indeterminant and not seen on prior mammogram. Recommend diagnostic mammogram and ultrasound for further evaluation.    Mammo Diagnostic Bilateral    Result Date: 8/13/2019  EXAM: MAMMO DIAGNOSTIC 3D BILATERAL, US BREAST LIMITED (FOCAL) LEFT LOCATION: [facility] DATE/TIME: 8/12/2019 1:18 PM INDICATION: Circumscribed lesion seen in the left breast on CT of the chest. COMPARISON: CT of the chest from 8/1/2019 and prior mammograms from 9/20/2018, 9/13/2017, 6/15/2016, 5/27/2015. MAMMOGRAPHIC FINDINGS: Bilateral full-field digital diagnostic mammograms performed. The breast tissue is heterogeneously dense which may obscure small masses. Scattered benign-appearing round and chunky calcifications are again seen in both breasts. The  right breast appears stable and benign. In the left breast in the 12:00 position in zone 2 there is a partially-circumscribed and partially obscured asymmetry; it is not seen on the prior mammograms. Images evaluated with the assistance of CAD. Breast tomosynthesis was used in  interpretation. ULTRASOUND FINDINGS: Targeted left breast ultrasound was performed by both the ultrasonographer and the radiologist. In the 12:00 position in zone 2 there was a hypoechoic lesion with predominantly circumscribed and a few lobulated margins measuring 2.3 x 1.4 x 1.3 cm, correlating with the mammographic abnormality.     1.  The lesion seen in the 12:00 position in zone 2 correlates with the abnormality seen on CT and on mammography. While it likely represents a benign fibroadenoma, biopsy is recommended for tissue confirmation. The findings and recommendations were discussed with the patient at the time of exam. ACR BI-RADS Category 4: Suspicious. Ultrasound-guided biopsy of the mass in the 12:00 position is recommended. Biopsy will be performed on Friday after she has her PET/CT on Thursday.    Mammo Post Clip Placement Left    Addendum Date: 8/20/2019    Pathology shows DCIS and invasive ductal carcinoma. This is consistent with imaging findings. A verbal report was given to the patient. Surgical consultation is recommended.    Result Date: 8/16/2019  US Breast Core Biopsy Left Mammo Post Clip Placement Left INDICATION: Left breast mass. PROCEDURE: Informed consent was obtained from the patient. Ultrasound was used to localize the mass at the 12 o'clock position of the left breast, zone 2.  The skin was marked, then prepped and draped in a sterile fashion. 1% lidocaine was used for local anesthesia and additional deep anesthesia achieved using lidocaine with epinephrine.  Under direct sonographic guidance, a 14-gauge coaxial needle was used to obtain 3 core biopsies. A biopsy marking clip was then placed. Digital mammograms performed in a separate room, using separate equipment, to document clip placement, demonstrate the clip in appropriate position. The patient tolerated this well; there are no immediate complications. IMPRESSION: 1. Ultrasound-guided biopsy of mass in the left breast. Pathology  pending. 2. Post procedure mammogram for marker placement     Us Breast Core Biopsy Left    Addendum Date: 8/20/2019    Pathology shows DCIS and invasive ductal carcinoma. This is consistent with imaging findings. A verbal report was given to the patient. Surgical consultation is recommended.    Result Date: 8/16/2019  US Breast Core Biopsy Left Mammo Post Clip Placement Left INDICATION: Left breast mass. PROCEDURE: Informed consent was obtained from the patient. Ultrasound was used to localize the mass at the 12 o'clock position of the left breast, zone 2.  The skin was marked, then prepped and draped in a sterile fashion. 1% lidocaine was used for local anesthesia and additional deep anesthesia achieved using lidocaine with epinephrine.  Under direct sonographic guidance, a 14-gauge coaxial needle was used to obtain 3 core biopsies. A biopsy marking clip was then placed. Digital mammograms performed in a separate room, using separate equipment, to document clip placement, demonstrate the clip in appropriate position. The patient tolerated this well; there are no immediate complications. IMPRESSION: 1. Ultrasound-guided biopsy of mass in the left breast. Pathology pending. 2. Post procedure mammogram for marker placement     Nm Pet Ct Skull To Mid Thigh    Result Date: 8/15/2019  EXAM: NM PET CT SKULL TO MID THIGH LOCATION: [facility] DATE/TIME: 8/15/2019 2:25 PM INDICATION: Initial treatment strategy and staging of a nonspecific abnormal findings of the lung fields. Lesion of the mediastinum. COMPARISON: Thoracic CT dated 08/01/2019 TECHNIQUE: Serum glucose level 115 mg/dL. One hour post intravenous administration of 12.9 mCi F-18 FDG, PET imaging was performed from the skull base to the mid thighs utilizing attenuation correction with concurrent axial CT and PET/CT image fusion. Dose reduction techniques were used. FINDINGS: Mildly FDG avid (max SUV 4.3) mass in the anterior mediastinum measuring 3.3 x  2.1 cm. Mildly FDG avid (max SUV 3.8) lesion in the left breast correlating with the mass identified on recent mammogram and ultrasound dated 08/12/2019. Moderate coronary artery calcium. Left adrenal myelolipoma. Right renal cyst. InterStim device with lead traversing the right S4-S5 neural foramina. Sigmoid diverticulosis.     1. Mildly hypermetabolic anterior mediastinal mass suspicious for active neoplasm. 2. Mildly hypermetabolic left breast lesion, which is scheduled for biopsy.    Us Breast Limited (focal) Left    Result Date: 8/13/2019  EXAM: MAMMO DIAGNOSTIC 3D BILATERAL, US BREAST LIMITED (FOCAL) LEFT LOCATION: [facility] OUTPATIENT SERVICES DATE/TIME: 8/12/2019 1:18 PM INDICATION: Circumscribed lesion seen in the left breast on CT of the chest. COMPARISON: CT of the chest from 8/1/2019 and prior mammograms from 9/20/2018, 9/13/2017, 6/15/2016, 5/27/2015. MAMMOGRAPHIC FINDINGS: Bilateral full-field digital diagnostic mammograms performed. The breast tissue is heterogeneously dense which may obscure small masses. Scattered benign-appearing round and chunky calcifications are again seen in both breasts. The  right breast appears stable and benign. In the left breast in the 12:00 position in zone 2 there is a partially-circumscribed and partially obscured asymmetry; it is not seen on the prior mammograms. Images evaluated with the assistance of CAD. Breast tomosynthesis was used in interpretation. ULTRASOUND FINDINGS: Targeted left breast ultrasound was performed by both the ultrasonographer and the radiologist. In the 12:00 position in zone 2 there was a hypoechoic lesion with predominantly circumscribed and a few lobulated margins measuring 2.3 x 1.4 x 1.3 cm, correlating with the mammographic abnormality.     1.  The lesion seen in the 12:00 position in zone 2 correlates with the abnormality seen on CT and on mammography. While it likely represents a benign fibroadenoma, biopsy is recommended for  tissue confirmation. The findings and recommendations were discussed with the patient at the time of exam. ACR BI-RADS Category 4: Suspicious. Ultrasound-guided biopsy of the mass in the 12:00 position is recommended. Biopsy will be performed on Friday after she has her PET/CT on Thursday.          Total time spent was 60 minutes, more than half of it was in face-to-face counseling regarding disease state, review of available images, laboratory values, pathological reports, treatment, options, their side effects and management.    Roly Villalba MD

## 2021-05-31 NOTE — TELEPHONE ENCOUNTER
ACN called and spoke with María Elena.     Patient is having lung biopsy on 8/30 and they would like to hold warfarin 5 days before and restart warfarin the next day after procedure.     Does PCP agree with hold order above? Is bridging needed?

## 2021-05-31 NOTE — TELEPHONE ENCOUNTER
[facility] Radiology needs INR to be 3.0 or below for port placement on 8/28. ACN called pt today and instructed pt to continue with current warfarin dose. Will recheck INR two days before to make sure it's in range. INR appt is made for 8/26. Both Na from Radiology and Jennifer verbalized understating.

## 2021-05-31 NOTE — TELEPHONE ENCOUNTER
Refill Approved    Rx renewed per Medication Renewal Policy. Medication was last renewed on 11/18/18  OV 7/22/19 .    Della Milton, [facility] Triage/Med Refill 8/10/2019     Requested Prescriptions   Pending Prescriptions Disp Refills     furosemide (LASIX) 40 MG tablet [Pharmacy Med Name: FUROSEMIDE 40MG TABLETS] 180 tablet 0     Sig: TAKE 1 TABLET(40 MG) BY MOUTH TWICE DAILY       Diuretics/Combination Diuretics Refill Protocol  Passed - 8/10/2019 10:12 AM        Passed - Visit with PCP or prescribing provider visit in past 12 months     Last office visit with prescriber/PCP: 6/18/2018 Cordell Ceja DO OR same dept: Visit date not found OR same specialty: 6/18/2018 Cordell Ceja DO  Last physical: 7/22/2019 Last MTM visit: Visit date not found   Next visit within 3 mo: Visit date not found  Next physical within 3 mo: Visit date not found  Prescriber OR PCP: Cordell Ceja DO  Last diagnosis associated with med order: 1. Acquired lymphedema of leg  - furosemide (LASIX) 40 MG tablet [Pharmacy Med Name: FUROSEMIDE 40MG TABLETS]; TAKE 1 TABLET(40 MG) BY MOUTH TWICE DAILY  Dispense: 180 tablet; Refill: 0    2. Venous hypertension of lower extremity, bilateral  - furosemide (LASIX) 40 MG tablet [Pharmacy Med Name: FUROSEMIDE 40MG TABLETS]; TAKE 1 TABLET(40 MG) BY MOUTH TWICE DAILY  Dispense: 180 tablet; Refill: 0    If protocol passes may refill for 12 months if within 3 months of last provider visit (or a total of 15 months).             Passed - Serum Potassium in past 12 months      Lab Results   Component Value Date    Potassium 4.6 06/26/2019             Passed - Serum Sodium in past 12 months      Lab Results   Component Value Date    Sodium 142 06/26/2019             Passed - Blood pressure on file in past 12 months     BP Readings from Last 1 Encounters:   07/25/19 110/66             Passed - Serum Creatinine in past 12 months      Creatinine   Date Value Ref Range Status    06/26/2019 0.93 0.60 - 1.10 mg/dL Final

## 2021-06-01 VITALS — WEIGHT: 293 LBS | BODY MASS INDEX: 48.57 KG/M2

## 2021-06-01 NOTE — TELEPHONE ENCOUNTER
Pulmonary Telephone Note    PFT shows obesity-related concurrent reduction in FVC and FEV1, with no evidence of airflow obstruction, normal total lung capacity, and normal DLCO. Increased exercise and repeat sleep study are my primary recommendations. Called Ms. Galvin and discussed this. She is waiting to hear from scheduling regarding the sleep study. I will notify the clinic staff here to assist with scheduling this. Encouraged her to call any time with questions or concerning symptoms.    Adalid Ennis MD  Pulmonary and Critical Care Medicine  Critical access hospital  Cell [phone]  Office [phone]  Pager [phone]

## 2021-06-01 NOTE — PROGRESS NOTES
Pulmonary Clinic Outpatient Consultation    Assessment and Plan:   74 year old female former smoker with a history of severe obesity, stage Ib left breast cancer (ER+/MS+/HER-2/arcelia+), thymoma, vitamin D deficiency, urge/stress incontinence, tremor, postmenopausal bleeding and uterine adenocarcinoma in situ s/p JEROME-BSO, paroxysmal AF, ssteopenia, FLAKITA on CPAP, macular degeneration, chronic bilateral leg edema, HTN, dyslipidemia, DM, CAD, colon polyps, chronic back and bilateral knee pain,  presenting for evaluation of chronic exertional dyspnea.    Chronic exertional dyspnea: Present for at least 6-12 months. High risk of deconditioning and obesity-associated restrictive respiratory physiology. No signs or symptoms suggestive of thymoma-associated myasthenia gravis. Also with high likelihood of progression of FLAKITA, possible OHS; uses CPAP consistently with oronasal mask but last sleep study was >10 years ago, with no recent follow-up or servicing of device. Has nasal and sinus congestion that occasionally interferes with CPAP.    Plan:  - pulmonary function testing  - split-night sleep study  - I will call her with results  - no indication for serologic work-up of myasthenia gravis at this point  - nasal fluticasone one spray in each nostril daily and loratadine 10 mg daily for chronic rhinosinusitis interfering with CPAP  - ongoing follow-up with Dr. Villalba  - follow-up with me in 3 months  - encouraged her to call any time with questions or concerning symptoms    I appreciate the opportunity to participate in the care of Ms. Galvin. Please feel free to contact me at any time.    CCx: chronic exertional dyspnea, FLAKITA, rhinosinusitis    HPI: 74 year old female former smoker with a history of severe obesity, stage Ib left breast cancer (ER+/MS+/HER-2/arcelia+), thymoma, vitamin D deficiency, urge/stress incontinence, tremor, postmenopausal bleeding and uterine adenocarcinoma in situ s/p JEROME-BSO, paroxysmal AF, ssteopenia, FLAKITA  on CPAP, macular degeneration, chronic bilateral leg edema, HTN, dyslipidemia, DM, CAD, colon polyps, chronic back and bilateral knee pain,  presenting for evaluation of chronic exertional dyspnea. Present for at least 6-12 months. High risk of deconditioning and obesity-associated restrictive respiratory physiology. No signs or symptoms suggestive of thymoma-associated myasthenia gravis. Also with high likelihood of progression of FLAKITA, possible OHS; uses CPAP consistently with oronasal mask but last sleep study was >10 years ago, with no recent follow-up or servicing of device. Has nasal and sinus congestion that occasionally interferes with CPAP. She started smoking at age 18, up to 3 ppd, quit in 2010. Denies diplopia, muscle weakness, paresthesias, altered sensation. No morning headache. Occasional daytime naps. Has dyspnea for 6-12 months, especially with exertion. No wheezing or cough. No chest tightness or chest pain. No childhood asthma. Has not required inhaler therapy. Uses a wheeled walker and this has helped her breathing. No FHx of lung disease. No toxin/fume exposures other than cigarettes. Has not pursued regular exercise since [facility].    ROS:  A 12-system review was obtained and was negative with the exception of the symptoms endorsed in the history of present illness.    PMH:  Vitamin D deficiency  Thymoma  stage Ib left breast cancer (ER+/ID+/HER-2/arcelia+)  Urge/stress incontinence  Tremor  Postmenopausal bleeding and uterine adenocarcinoma in situ s/p JEROME-BSO  PAF  Osteopenia  FLAKITA on CPAP  Obesity with BMI 54.1  Macular degeneration  Chronic bilateral leg edema  HTN  DL  DM  CAD  Colon polyps  Back pain  Bilateral knee pain    PSH:  Past Surgical History:   Procedure Laterality Date     BREAST BIOPSY Left     Benign     CATARACT EXTRACTION  10/17/13     COLONOSCOPY N/A 4/29/2015    Procedure: COLONOSCOPY WITH POLYPECTOMY;  Surgeon: Too Ureña MD;  Location: [facility];  Service:       COLONOSCOPY N/A 11/9/2016    Procedure: COLONOSCOPY;  Surgeon: Ayden Dela Cruz MD;  Location: [facility];  Service:      CT BIOPSY LUNG  8/30/2019     ENDOMETRIAL BIOPSY  4/2014     HYSTERECTOMY  2014     Interstem - Stage 2  09/11/2014     Interstim Stage I  08/26/2014     IR PORT PLACEMENT >5 YEARS  8/28/2019     OOPHORECTOMY Bilateral 2014     post urinary stimulator implantation  8/26/14     SD BIOPSY OF BREAST, INCISIONAL      Description: Incisional Breast Biopsy;  Recorded: 04/05/2011;     SD CARDIOVERSION ELECTIVE ARRHYTHMIA EXTERNAL      Description: Elective Cardioversion External;  Recorded: 07/30/2012;     SD REMOVE TONSILS/ADENOIDS,<11 Y/O      Description: Tonsillectomy With Adenoidectomy;  Recorded: 04/15/2014;     SD SLING OPER STRES INCONTINENCE N/A 11/3/2016    Procedure: PUBOVAGINAL SLING WITH CONCHITA WITH CYSTOSCOPY;  Surgeon: Bill Wilson MD;  Location: [facility];  Service: Gynecology     US BREAST CORE BIOPSY LEFT Left 8/16/2019     VITRECTOMY Right 1/15/15       Allergies:  Allergies   Allergen Reactions     Aspirin      Pt currently on WArfarin     Penicillins      Boils         Family HX:  Family History   Problem Relation Age of Onset     Hypertension Father      Pneumonia Father      Esophageal cancer Brother      Cancer Brother      Stroke Mother      Alzheimer's disease Brother      Cancer Brother      Prostate cancer Brother      Cancer Nephew      Colon cancer Nephew      Cancer Paternal cousin        Social Hx:  Social History     Socioeconomic History     Marital status: Single     Spouse name: Not on file     Number of children: 0     Years of education: Not on file     Highest education level: Not on file   Occupational History     Occupation: Retired RN     Employer: [employer]   Social Needs     Financial resource strain: Not on file     Food insecurity:     Worry: Not on file     Inability: Not on file     Transportation needs:     Medical: Not  on file     Non-medical: Not on file   Tobacco Use     Smoking status: Former Smoker     Packs/day: 3.00     Years: 40.00     Pack years: 120.00     Types: Cigarettes     Last attempt to quit: 2005     Years since quittin.6     Smokeless tobacco: Never Used   Substance and Sexual Activity     Alcohol use: Yes     Alcohol/week: 3.6 oz     Types: 6 Cans of beer per week     Drug use: No     Sexual activity: Never     Partners: Male     Birth control/protection: Surgical, Post-menopausal   Lifestyle     Physical activity:     Days per week: Not on file     Minutes per session: Not on file     Stress: Not on file   Relationships     Social connections:     Talks on phone: Not on file     Gets together: Not on file     Attends Worship service: Not on file     Active member of club or organization: Not on file     Attends meetings of clubs or organizations: Not on file     Relationship status: Not on file     Intimate partner violence:     Fear of current or ex partner: Not on file     Emotionally abused: Not on file     Physically abused: Not on file     Forced sexual activity: Not on file   Other Topics Concern     Not on file   Social History Narrative    Lives alone single family home that she owns, no children and is retired.       Current Meds:  Current Outpatient Medications   Medication Sig Dispense Refill     calcium-vitamin D (CALCIUM-VITAMIN D) 500 mg(1,250mg) -200 unit per tablet Take 1 tablet by mouth 2 (two) times a day with meals.       cholecalciferol, vitamin D3, 1,000 unit tablet Take 2,000 Units by mouth daily.        fesoterodine (TOVIAZ) 4 mg Tb24 ER tablet Take 4 mg by mouth daily.       furosemide (LASIX) 40 MG tablet TAKE 1 TABLET(40 MG) BY MOUTH TWICE DAILY 180 tablet 3     glucosamine-chondroitin 500-400 mg tablet Take 1 tablet by mouth 2 (two) times a day.        metoprolol tartrate (LOPRESSOR) 25 MG tablet Take 1 tablet (25 mg total) by mouth daily.       OMEGA-3/DHA/EPA/FISH OIL  "(FISH OIL-OMEGA-3 FATTY ACIDS) 300-1,000 mg capsule Take 2 g by mouth 2 times a day at 6:00 am and 4:00 pm.       oxyCODONE-acetaminophen (PERCOCET/ENDOCET) 5-325 mg per tablet Take 1 tablet by mouth every 6 (six) hours as needed for pain. 12 tablet 0     potassium chloride (K-DUR,KLOR-CON) 10 MEQ tablet TAKE TWO TABLETS BY MOUTH TWICE DAILY 360 tablet 1     potassium chloride (K-DUR,KLOR-CON) 10 MEQ tablet TAKE 2 TABLETS BY MOUTH TWICE DAILY 360 tablet 3     simvastatin (ZOCOR) 20 MG tablet Take 1 tablet (20 mg total) by mouth daily with supper. 90 tablet 3     vitamin A-vitamin C-vit E-min (OCUVITE) Tab tablet Take 1 tablet by mouth 2 (two) times a day.       warfarin (COUMADIN/JANTOVEN) 5 MG tablet TAKE ONE-HALF TO ONE TABLET BY MOUTH DAILY AS DIRECTED. ADJUST DOSE PER INR RESULT (Patient taking differently: 2.5 mg on Mondays, Wednesday and Friday, 5 mg every other day of week) 90 tablet 1     fluticasone propionate (FLONASE ALLERGY RELIEF) 50 mcg/actuation nasal spray One spray in each nostril daily 16 g 12     loratadine (CLARITIN) 10 mg tablet Take 1 tablet (10 mg total) by mouth daily. 30 tablet 11     No current facility-administered medications for this visit.        Physical Exam:  /80   Pulse 77   Resp 18   Ht 5' 5\" (1.651 m)   Wt (!) 325 lb (147.4 kg)   SpO2 90%   Breastfeeding? No   BMI 54.08 kg/m    Gen: alert, oriented, no distress  HEENT: nasal turbinates are unremarkable, no oropharyngeal lesions, no cervical or supraclavicular lymphadenopathy  CV: RRR, no M/G/R  Resp: CTAB, no focal crackles or wheezes  Abd: soft, nontender, no palpable organomegaly  Skin: no apparent rashes  Ext: 1+ bilateral leg pitting edema  Neuro: alert, nonfocal    Labs:  reviewed    Imaging studies:  CT chest (8/1/19):  - images directly reviewed, formal interpretation follows:  FINDINGS:   LUNGS AND PLEURA: Mild dependent atelectasis. There is mosaic attenuation of the lungs in the lung bases, likely " representing air trapping. Lungs are otherwise clear.     MEDIASTINUM: 3.2 x 2.3 cm well-circumscribed anterior mediastinal soft tissue nodule. This corresponds with the abnormality on the nuclear medicine examination. This has increased in size from 2012 when it measured 1.9 x 1.6 cm. There are scattered small   mediastinal lymph nodes. Moderate coronary artery calcification.     LIMITED UPPER ABDOMEN: There is a 5 cm left adrenal fat-density mass consistent with a myelolipoma. This has increased in size from previous 3.3 cm. Presumed left renal cyst.     MUSCULOSKELETAL: 1.7 cm possible nodule in the upper left breast on series 4, image 68.     IMPRESSION:   CONCLUSION:   1.  3.2 cm anterior mediastinal soft tissue nodule, increased in size from 1.9 cm in 2012. This corresponds to the abnormality on recent stress test. Differential diagnosis includes thymoma and lymphoma. Consider PET scan to further evaluate.  2.  Left breast nodule, indeterminant and not seen on prior mammogram. Recommend diagnostic mammogram and ultrasound for further evaluation.    PET-CT (8/15/19):  - images directly reviewed, formal interpretation follows:  FINDINGS: Mildly FDG avid (max SUV 4.3) mass in the anterior mediastinum measuring 3.3 x 2.1 cm.     Mildly FDG avid (max SUV 3.8) lesion in the left breast correlating with the mass identified on recent mammogram and ultrasound dated 08/12/2019.     Moderate coronary artery calcium. Left adrenal myelolipoma. Right renal cyst. InterStim device with lead traversing the right S4-S5 neural foramina. Sigmoid diverticulosis.     IMPRESSION:   1. Mildly hypermetabolic anterior mediastinal mass suspicious for active neoplasm.     2. Mildly hypermetabolic left breast lesion, which is scheduled for biopsy.    TTE (12/10/18):  1. Normal left ventricular size and systolic performance with a visually estimated ejection fraction of 55%.   2. No significant valvular heart disease is identified on this  study.   3. Mild right ventricular enlargement with borderline reduced right ventricular systolic performance.  4. Mild to moderate biatrial enlargement.  5. There is mild enlargement of the proximal ascending aorta.    Pharmacologic MPI (7/3/19):    The pharmacologic nuclear stress test is negative for inducible myocardial ischemia or infarction.    The  images demonstrate breast attenuation    The left ventricular ejection fraction is 75%.    There is a small area of tracer uptake incidentally noted in the mediastinum of uncertain significance. Consider dedicated imaging to screen for breast and/or lung malignancy    The images of 1/16/2014 were unavailable for comparison review.    Adalid Ennis MD  [facility]  Cell [phone]  Office [phone]  Pager [phone]

## 2021-06-01 NOTE — PROGRESS NOTES
Pt here for first treatment weekly taxol/ herceptin after seeing MD. Port accessed without incident and all medications reviewed prior to administration. herceptin infused over 90 minutes. Upon completion port flushed and deaccessed. Compazine and emla cream ordered and pt aware of directions. Pt d/c using walker to lobby with family. Pt aware of treatment plan.  
Yes

## 2021-06-01 NOTE — PROGRESS NOTES
Form placed in Providers in-box to be completed.     Met with Jennifer to check in and inquire about her needs.  She did confirm her income and she is not eligible for [employer] based on current income.  I reminded her of the [employer] certificate program through [employer] (included in [employer] folder).  Fabi Morton RN

## 2021-06-01 NOTE — PROGRESS NOTES
[facility] Note    Jennifer Galvin   74 y.o. female    Date of Visit: 9/10/2019  Chief Complaint   Patient presents with     Form     Handicapped Parking       ASSESSMENT/PLAN  1. Type 2 diabetes mellitus with stage 1 chronic kidney disease, without long-term current use of insulin (H)  Glycosylated Hemoglobin A1c   2. Flu vaccine need  Influenza High Dose,Seasonal,PF 65+ Yrs   3. Malignant neoplasm of upper-outer quadrant of left breast in female, estrogen receptor positive (H)       ---------------------------------------------    1. Type II diabetes with CKD: will submit labs so that she is able to have labs checked at future vitis.   Will check IRN today     2. Handicap parking information was completed today during the visit given her decreased mobility secondary.    3. Noted recent diagnosis of breast cancer, stage Ib.  Starting chemotherapy tomorrow    4.  Thymoma, appears not to be carcinoma.      Return in about 3 months (around 12/10/2019) for Recheck.      SUBJECTIVE    Jennifer Galvin 75yo female presents for paperwork for handicap parking. Since the last visit she has had imaging, seen the oncologist and had a port placed. She begins chemo tomorrow for breast cancer.   She is now using a walker to get around.       ROS:   Per HPI, all other systems negative     Medications, allergies, and problem list were reviewed and updated    Patient Active Problem List   Diagnosis     Macular Degeneration     Vitamin D Deficiency     Abnormal involuntary movement     Joint Pain, Localized In The Knee     Obstructive Sleep Apnea     Hyperlipidemia     Major Depression, Single Episode In Remission     Hypertension     Lymphedema     Paroxysmal Atrial Fibrillation     Osteopenia     Urge And Stress Incontinence     Adenocarcinoma In Situ Of The Uterus     Venous stasis     Acquired lymphedema of leg     History of uterine cancer     Diabetes mellitus, type 2 (H)     Venous hypertension of lower extremity,  bilateral     BMI 50.0-59.9, adult (H)     Aspirin contraindicated     Elective surgery     Lesion of mediastinum     Follow-up examination following surgery     Breast nodule     Malignant neoplasm of upper-outer quadrant of left breast in female, estrogen receptor positive (H)     Past Medical History:   Diagnosis Date     (Lower) Leg Localized Swelling     Created by Conversion      Adenocarcinoma In Situ Of The Uterus     Created by Conversion      Backache     Created by Conversion [facility] Annotation: Feb 29 2012 12:14PM - Opal Helm: Referred to  [facility] 9/11, given TENS unit.      Benign Polyps Of The Large Intestine     Created by Conversion      Breast cancer (H)      Cancer (H)     uterine     Coronary artery disease      Diabetes mellitus (H)      Fatigue     Created by Conversion      Hyperglycemia     Created by Conversion      Hyperlipidemia     Created by Conversion      Hypertension     Created by Conversion      Joint Pain, Localized In The Knee     Created by Conversion      Lesion of mediastinum      Lymphedema     Created by Conversion      Macular Degeneration     Created by Conversion [facility] Annotation: Apr 5 2011 12:22PM - Tien Guzman: Followed by  Ophthalmologist, Dr. Avilez.      Major Depression, Single Episode In Remission     Created by Conversion      Obesity     Created by Conversion      Obstructive Sleep Apnea     CPAP     Osteopenia     Created by Conversion      Paroxysmal Atrial Fibrillation     Created by Conversion [facility] Annotation: Nov 28 2012 10:36PM - Shruthi Dhaliwal: Found incidential  on exam on May 7th, [id number] score of 1 for HTNsymptomatic and hospitalized  x2 in July, 2012.CV X 2 in 2012Chronic Sotalol Rx      Postmenopausal bleeding     Created by Conversion      Skin cancer      Tachycardia     Created by Conversion      Tremor     Created by Conversion      Urge And Stress Incontinence     Created by Conversion      Urinary Frequency More  Than Twice At Night (Nocturia)     Created by Conversion      Urine Tests Nonspecific Abnormal Findings     Created by Conversion      Vitamin D Deficiency     Created by Conversion      Current Outpatient Medications   Medication Sig Dispense Refill     calcium-vitamin D (CALCIUM-VITAMIN D) 500 mg(1,250mg) -200 unit per tablet Take 1 tablet by mouth 2 (two) times a day with meals.       cholecalciferol, vitamin D3, 1,000 unit tablet Take 2,000 Units by mouth daily.        fesoterodine (TOVIAZ) 4 mg Tb24 ER tablet Take 4 mg by mouth daily.       fluticasone propionate (FLONASE ALLERGY RELIEF) 50 mcg/actuation nasal spray One spray in each nostril daily 16 g 12     furosemide (LASIX) 40 MG tablet TAKE 1 TABLET(40 MG) BY MOUTH TWICE DAILY 180 tablet 3     glucosamine-chondroitin 500-400 mg tablet Take 1 tablet by mouth 2 (two) times a day.        loratadine (CLARITIN) 10 mg tablet Take 1 tablet (10 mg total) by mouth daily. 30 tablet 11     metoprolol tartrate (LOPRESSOR) 25 MG tablet Take 1 tablet (25 mg total) by mouth daily.       OMEGA-3/DHA/EPA/FISH OIL (FISH OIL-OMEGA-3 FATTY ACIDS) 300-1,000 mg capsule Take 2 g by mouth 2 times a day at 6:00 am and 4:00 pm.       oxyCODONE-acetaminophen (PERCOCET/ENDOCET) 5-325 mg per tablet Take 1 tablet by mouth every 6 (six) hours as needed for pain. 12 tablet 0     potassium chloride (K-DUR,KLOR-CON) 10 MEQ tablet TAKE TWO TABLETS BY MOUTH TWICE DAILY 360 tablet 1     potassium chloride (K-DUR,KLOR-CON) 10 MEQ tablet TAKE 2 TABLETS BY MOUTH TWICE DAILY 360 tablet 3     simvastatin (ZOCOR) 20 MG tablet Take 1 tablet (20 mg total) by mouth daily with supper. 90 tablet 3     vitamin A-vitamin C-vit E-min (OCUVITE) Tab tablet Take 1 tablet by mouth 2 (two) times a day.       warfarin (COUMADIN/JANTOVEN) 5 MG tablet TAKE ONE-HALF TO ONE TABLET BY MOUTH DAILY AS DIRECTED. ADJUST DOSE PER INR RESULT (Patient taking differently: 2.5 mg on Mondays, Wednesday and Friday, 5 mg every  "other day of week) 90 tablet 1     No current facility-administered medications for this visit.      Allergies   Allergen Reactions     Aspirin      Pt currently on WArfarin     Penicillins      Boils         EXAM  Vitals:    09/10/19 1421   BP: 104/62   Patient Site: Left Arm   Patient Position: Sitting   Cuff Size: Adult Large   Pulse: (!) 102   SpO2: 96%   Weight: (!) 325 lb 7 oz (147.6 kg)   Height: 5' 5\" (1.651 m)         General: Alert, no distress  Neurologic: Walks with the assistance of a walker    Results reviewed: Reviewed oncology note, pulmonary notes, plans for chemotherapy for newly diagnosed breast cancer, sleep study, pulmonary function tests.    A1c ordered, draw at next blood check    Data points: 3    Cordell Ceja,   Internal Medicine  [facility]      "

## 2021-06-01 NOTE — PROGRESS NOTES
"[facility] consult with Dr. Ferrera regarding her breast cancer.  She is accompanied by her sister in law for consult.  She is currently a patient of Dr. Perez.  She has had three doses of her Taxol and is starting to lose her hair. She feels pretty distressed about this.  She said she had talked with Fabi, her nurse navigator, about the free wig program, but never got back to her with her income.  She said she will check into this tonight.  I told her I will send Fabi a message that she is still interested in the free wig program.  RN assessment and EMR update.  /78 (Patient Site: Left Arm, Patient Position: Sitting)   Pulse 94   Resp 16   Ht 5' 5\" (1.651 m)   Wt (!) 323 lb 1.9 oz (146.6 kg)   SpO2 94%   BMI 53.77 kg/m  .  Patient given RN and Dr. Ferrera's cards.  She met with Dr. Ferrera, see dictation for details.  She will plan a left wire loc lumpectomy with sentinel node biopsy.  Pre and post op teaching complete.  Walked her to [facility] for surgery scheduling.  Plan follow up post op.  Rn time 22 mins  "

## 2021-06-01 NOTE — PROGRESS NOTES
[facility] Progress Note    Patient: Jennifer Galvin  MRN: [id number]  Date of Service: 9/11/2019        Reason for visit      1. Malignant neoplasm of upper-outer quadrant of left breast in female, estrogen receptor positive (H)    2.  Thymoma    Assessment     1.  A very complicated at the same time very pleasant 74 y.o. woman with what looks like a stage Ib CA breast left side ER positive ID positive as well as HER-2/arcelia 3+.  2.  Anterior mediastinal mass which is growing slowly over the past 7 to 8 years.  Biopsy is consistent with thymoma.  3.  Shortness of breath of unclear etiology.  She has seen pulmonology.  4.  Other medical issues including weight gain etc.  5.  Atrial fibrillation on anticoagulation.  6.  Mild anxiety.      Plan     1.  Start her on neoadjuvant chemotherapy with paclitaxel and Herceptin.  She will get it for 12 weeks.  Herceptin will be continued for a total of 1 year.  2.  This will be followed up with surgery.  3.  She will also require anastrozole for adjuvant hormonal therapy.  4.  I think she will also need her thymoma excision.  I think that might be causing some of her weakness symptoms.  5.  Follow-up with pulmonology regarding her shortness of breath.    Clinical stage      Cancer Staging  Malignant neoplasm of upper-outer quadrant of left breast in female, estrogen receptor positive (H)  Staging form: Breast, AJCC 8th Edition  - Clinical: Stage IB (cT2, cN0, cM0, G1, ER+, ID+, HER2+) - Signed by Roly Villalba MD on 8/21/2019      History     Jennifer Galvin is a very pleasant 74 y.o. old female with a history of newly diagnosed breast cancer.  There is breast cancer is located on her left breast measures 2.3 cm in size in the upper outer quadrant ER positive ID positive as well as HER-2/arcelia 3+ on immunohistochemistry.  It is not palpable.  Detected incidentally in late July early August 2019.  Her presentation started with shortness of breath for which she was  sent to cardiology for a stress test.  The stress test showed some uptake in the mediastinum as well as in the breast.  Therefore she had a CT scan of the chest which showed that she had a 3.2 cm mass in the mediastinum located anteriorly.  This was initially detected in 2012 but had grown from 1.9 cm at the time to 3.2 cm now.  No other abnormality noted.  Differential is thymoma/lymphoma.  She also had a mammogram which showed that she had a lesion in her left breast 2.3 cm in size.  No lymph adenopathy noted in the axillary area.  She then had a ultrasound-guided biopsy of the left breast which showed this invasive ductal carcinoma.     She was subsequently seen by me.  We had her undergo CT-guided biopsy of the mediastinal mass which has come back as thymoma.  She is also seen pulmonology for shortness of breath.  She is here to consider start of her yovani-adjuvant chemotherapy with Taxol and Herceptin.    Past Medical History     Past Medical History:   Diagnosis Date     (Lower) Leg Localized Swelling     Created by Conversion      Adenocarcinoma In Situ Of The Uterus     Created by Conversion      Backache     Created by Conversion [employer] [facility] Annotation: Feb 29 2012 12:14PM - Opal Helm: Referred to  [facility] 9/11, given TENS unit.      Benign Polyps Of The Large Intestine     Created by Conversion      Breast cancer (H)      Cancer (H)     uterine     Coronary artery disease      Diabetes mellitus (H)      Fatigue     Created by Conversion      Hyperglycemia     Created by Conversion      Hyperlipidemia     Created by Conversion      Hypertension     Created by Conversion      Joint Pain, Localized In The Knee     Created by Conversion      Lesion of mediastinum      Lymphedema     Created by Conversion      Macular Degeneration     Created by Conversion [employer] [facility] Annotation: Apr 5 2011 12:22PM - Tien Guzman: Followed by  Ophthalmologist, Dr. Avilez.      Major Depression, Single Episode In  Remission     Created by Conversion      Obesity     Created by Conversion      Obstructive Sleep Apnea     CPAP     Osteopenia     Created by Conversion      Paroxysmal Atrial Fibrillation     Created by Conversion [employer] [facility] Annotation: Nov 28 2012 10:36PM - Shruthi Dhaliwal: Found incidential  on exam on May 7th, [id number] score of 1 for HTNsymptomatic and hospitalized  x2 in July, 2012.CV X 2 in 2012Chronic Sotalol Rx      Postmenopausal bleeding     Created by Conversion      Skin cancer      Tachycardia     Created by Conversion      Tremor     Created by Conversion      Urge And Stress Incontinence     Created by Conversion      Urinary Frequency More Than Twice At Night (Nocturia)     Created by Conversion      Urine Tests Nonspecific Abnormal Findings     Created by Conversion      Vitamin D Deficiency     Created by Conversion            Review of Systems   Constitutional  Constitutional (WDL): All constitutional elements are within defined limits  Neurosensory  Neurosensory (WDL): Exceptions to WDL  Ataxia: Moderate symptoms, limiting instrumental ADL(tremor/ using a walker)  Cardiovascular  Cardiovascular (WDL): Exceptions to WDL(irregular HR)  Edema: Yes  Edema Limbs: 5 - 10% inter-limb discrepancy in volume or circumference at point of greatest visible difference, swelling or obscuration of anatomic architecture on close inspection(lower legs)  Pulmonary  Respiratory (WDL): Exceptions to WDL  Dyspnea: Shortness of breath with minimal exertion, limiting instrumental ADL  Gastrointestinal  Gastrointestinal (WDL): All gastrointestinal elements are within defined limits  Genitourinary  Genitourinary (WDL): Exceptions to WDL(incontinence)  Integumentary  Integumentary (WDL): Exceptions to WDL(some bug bites upper left arm)  Pruritus: Mild or localized, topical intervention indicated(upper left arm)  Patient Coping  Patient Coping: Accepting  Accompanied by  Accompanied by: Family Member    ECOG performance  status and Distress Assessment      ECOG Performance: 1   ECOG Performance Status: 1    Distress Assessment  Distress Assessment Score: 5(loss of hair):     Pain Status  Currently in Pain: No/denies        Vital Signs     Vitals:    09/11/19 0941   BP: 119/84   Pulse: 97   Temp: 97.5  F (36.4  C)   SpO2: 94%       Physical Exam     GENERAL: No acute distress. Cooperative in conversation.   HEENT: Pupils are equal, round and reactive. Oral mucosa is clean and intact. No ulcerations or mucositis noted. No bleeding noted.  RESP:Chest symmetric lungs are clear bilaterally per auscultation. Regular respiratory rate. No wheezes or rhonchi.  CV: Normal S1 S2 Regular, rate and rhythm. No murmurs.  ABD: Nondistended, soft, nontender. Positive bowel sounds. No organomegaly.   EXTREMITIES: No lower extremity edema.   NEURO: Non- focal. Alert and oriented x3.  Cranial nerves appear intact.  PSYCH: Within normal limits. No depression or anxiety.  SKIN: Warm dry intact.    LYMPH NODES: Bilateral cervical, supraclavicular, axillary lymph node examination was done.  Negative for any palpable adenopathy.      Lab Results     Results for orders placed or performed in visit on 09/11/19   Comprehensive Metabolic Panel   Result Value Ref Range    Sodium 142 136 - 145 mmol/L    Potassium 4.0 3.5 - 5.0 mmol/L    Chloride 105 98 - 107 mmol/L    CO2 27 22 - 31 mmol/L    Anion Gap, Calculation 10 5 - 18 mmol/L    Glucose 141 (H) 70 - 125 mg/dL    BUN 17 8 - 28 mg/dL    Creatinine 1.01 0.60 - 1.10 mg/dL    GFR MDRD Af Amer >60 >60 mL/min/1.73m2    GFR MDRD Non Af Amer 54 (L) >60 mL/min/1.73m2    Bilirubin, Total 0.7 0.0 - 1.0 mg/dL    Calcium 9.4 8.5 - 10.5 mg/dL    Protein, Total 6.9 6.0 - 8.0 g/dL    Albumin 3.6 3.5 - 5.0 g/dL    Alkaline Phosphatase 85 45 - 120 U/L    AST 20 0 - 40 U/L    ALT 26 0 - 45 U/L   HM1 (CBC with Diff)   Result Value Ref Range    WBC 11.7 (H) 4.0 - 11.0 thou/uL    RBC 5.38 3.80 - 5.40 mill/uL    Hemoglobin 14.9  12.0 - 16.0 g/dL    Hematocrit 47.1 (H) 35.0 - 47.0 %    MCV 88 80 - 100 fL    MCH 27.7 27.0 - 34.0 pg    MCHC 31.6 (L) 32.0 - 36.0 g/dL    RDW 16.3 (H) 11.0 - 14.5 %    Platelets 147 140 - 440 thou/uL    MPV 11.4 8.5 - 12.5 fL    Neutrophils % 76 (H) 50 - 70 %    Lymphocytes % 13 (L) 20 - 40 %    Monocytes % 10 2 - 10 %    Eosinophils % 1 0 - 6 %    Basophils % 0 0 - 2 %    Neutrophils Absolute 8.9 (H) 2.0 - 7.7 thou/uL    Lymphocytes Absolute 1.5 0.8 - 4.4 thou/uL    Monocytes Absolute 1.1 (H) 0.0 - 0.9 thou/uL    Eosinophils Absolute 0.2 0.0 - 0.4 thou/uL    Basophils Absolute 0.1 0.0 - 0.2 thou/uL         Imaging Results     Mammo Diagnostic Bilateral    Result Date: 8/13/2019  EXAM: MAMMO DIAGNOSTIC 3D BILATERAL, US BREAST LIMITED (FOCAL) LEFT LOCATION: [facility] OUTPATIENT SERVICES DATE/TIME: 8/12/2019 1:18 PM INDICATION: Circumscribed lesion seen in the left breast on CT of the chest. COMPARISON: CT of the chest from 8/1/2019 and prior mammograms from 9/20/2018, 9/13/2017, 6/15/2016, 5/27/2015. MAMMOGRAPHIC FINDINGS: Bilateral full-field digital diagnostic mammograms performed. The breast tissue is heterogeneously dense which may obscure small masses. Scattered benign-appearing round and chunky calcifications are again seen in both breasts. The  right breast appears stable and benign. In the left breast in the 12:00 position in zone 2 there is a partially-circumscribed and partially obscured asymmetry; it is not seen on the prior mammograms. Images evaluated with the assistance of CAD. Breast tomosynthesis was used in interpretation. ULTRASOUND FINDINGS: Targeted left breast ultrasound was performed by both the ultrasonographer and the radiologist. In the 12:00 position in zone 2 there was a hypoechoic lesion with predominantly circumscribed and a few lobulated margins measuring 2.3 x 1.4 x 1.3 cm, correlating with the mammographic abnormality.     1.  The lesion seen in the 12:00 position in zone 2  correlates with the abnormality seen on CT and on mammography. While it likely represents a benign fibroadenoma, biopsy is recommended for tissue confirmation. The findings and recommendations were discussed with the patient at the time of exam. ACR BI-RADS Category 4: Suspicious. Ultrasound-guided biopsy of the mass in the 12:00 position is recommended. Biopsy will be performed on Friday after she has her PET/CT on Thursday.    Ir Port Placement 5+ Years    Result Date: 8/28/2019  LOCATION: [facility] DATE: 8/28/2019 PROCEDURE: IMPLANTABLE VENOUS PORT PLACEMENT 1.  Implantable venous access port placement. 2.  Ultrasound guidance for vascular access. A permanent image was stored. 3.  Fluoroscopic guidance for central venous access device placement. INTERVENTIONAL RADIOLOGIST: Cris Spears MD INDICATION: Breast cancer, plan for port placement. CONSENT: The risks, benefits and alternatives of the procedure were discussed with the patient or representative in detail. All questions were answered. Informed consent was given to proceed with the procedure. MODERATE SEDATION: Versed 2 mg IV; Fentanyl 50 mcg IV. During the time out, immediately prior to the administration of medications, the patient was reassessed for adequacy to receive conscious sedation.  Under physician supervision, Versed and fentanyl were administered for moderate sedation. Pulse oximetry, heart rate and blood pressure were continuously monitored by an independent trained observer. The physician spent 30 minutes of face-to-face sedation time with the patient. CONTRAST: None. ANTIBIOTICS: Clindamycin 900 mg ADDITIONAL MEDICATIONS: None. FLUOROSCOPIC TIME: 0.2 minutes. RADIATION DOSE: Air Kerma: 14 mGy. COMPLICATIONS: No immediate complications. UNIVERSAL PROTOCOL: The operative site was marked and any prior imaging was reviewed. Required items including blood products, implants, devices and special equipment was made available. Patient  identity was confirmed either verbally, with demographic information, hospital assigned identification or other identification markers. A timeout was performed immediately prior to the procedure. STERILE BARRIER TECHNIQUE: Maximum sterile barrier technique was used. Cutaneous antisepsis was performed at the operative site with application of 2% chlorhexidine and large sterile drape. Prior to the procedure, the  and assistant performed hand hygiene and wore hat, mask, sterile gown, and sterile gloves during the entire procedure. PROCEDURE:  Using real-time ultrasound guidance the right internal jugular vein was accessed. A subcutaneous pocket was created and irrigated with sterile normal saline. The catheter tubing was tunneled in an antegrade fashion from the port pocket to the dermatotomy  site. Over a guidewire, a peel-away sheath was advanced with fluoroscopic monitoring. Through the peel-away sheath, the catheter was advanced until the tip was at the cavoatrial junction. Positioning of the distal tip was confirmed with a radiograph from the in room fluoroscopic unit. The catheter was cut to length and attached firmly to the port. The port pocket incision was closed with layered absorbable suture and surgical glue. The dermatotomy site was closed with surgical glue. FINDINGS: Ultrasound shows an anechoic and compressible jugular vein. At the completion of the study, the port tip lies at the cavoatrial junction.     Successful power-injectable venous port placement. CPT codes for physician reference only: 53808 06752 97932 [id number] [id number] x 1     Ct Lung Biopsy    Result Date: 8/30/2019  EXAM: 1. PERCUTANEOUS BIOPSY ANTERIOR MEDIASTINAL MASS 2. CT GUIDANCE 3. CONSCIOUS SEDATION LOCATION: [facility] DATE/TIME: 8/30/2019 11:11 AM INDICATION: Biopsy the anterior mediastinal mass TECHNIQUE: Dose reduction techniques were used. PROCEDURE: Informed consent obtained. Site marked. Prior images reviewed.  Required items made available. Patient identity confirmed verbally and with arm band. Patient reevaluated immediately before administering sedation. Universal protocol was followed. Time out performed. The site was prepped and draped in sterile fashion. 7 mL of 1% lidocaine was infused into the local soft tissues. Using standard technique and under direct CT guidance, a 18-gauge biopsy device was used to obtain three core biopsies. Tissue was submitted to Pathology. The pathologist requested aspirates for RPMI. The samples were adequate by preliminary review by a pathologist. The patient tolerated the procedure well. No immediate complications. RADIOLOGIC SUPERVISION AND INTERPRETATION: CT GUIDANCE: Images demonstrate the biopsy needle to be in good position. SEDATION: Versed 1 mg. Fentanyl 75 mcg. The procedure was performed with administration intravenous conscious sedation with appropriate preoperative, intraoperative, and postoperative evaluation. 30 minutes of supervised face to face conscious sedation time was provided by a radiology nurse under my direct supervision.     CONCLUSION: 1.  Successful CT-guided biopsy anterior mediastinal mass. Reference CPT Codes: 92588, 95996, 97758, 05998    Mammo Post Clip Placement Left    Addendum Date: 8/20/2019    Pathology shows DCIS and invasive ductal carcinoma. This is consistent with imaging findings. A verbal report was given to the patient. Surgical consultation is recommended.    Result Date: 8/27/2019  US Breast Core Biopsy Left Mammo Post Clip Placement Left INDICATION: Left breast mass. PROCEDURE: Informed consent was obtained from the patient. Ultrasound was used to localize the mass at the 12 o'clock position of the left breast, zone 2.  The skin was marked, then prepped and draped in a sterile fashion. 1% lidocaine was used for local anesthesia and additional deep anesthesia achieved using lidocaine with epinephrine.  Under direct sonographic guidance, a  14-gauge coaxial needle was used to obtain 3 core biopsies. A biopsy marking clip was then placed. Digital mammograms performed in a separate room, using separate equipment, to document clip placement, demonstrate the clip in appropriate position. The patient tolerated this well; there are no immediate complications. IMPRESSION: 1. Ultrasound-guided biopsy of mass in the left breast. Pathology pending. 2. Post procedure mammogram for marker placement     Us Breast Core Biopsy Left    Addendum Date: 8/20/2019    Pathology shows DCIS and invasive ductal carcinoma. This is consistent with imaging findings. A verbal report was given to the patient. Surgical consultation is recommended.    Result Date: 8/16/2019  US Breast Core Biopsy Left Mammo Post Clip Placement Left INDICATION: Left breast mass. PROCEDURE: Informed consent was obtained from the patient. Ultrasound was used to localize the mass at the 12 o'clock position of the left breast, zone 2.  The skin was marked, then prepped and draped in a sterile fashion. 1% lidocaine was used for local anesthesia and additional deep anesthesia achieved using lidocaine with epinephrine.  Under direct sonographic guidance, a 14-gauge coaxial needle was used to obtain 3 core biopsies. A biopsy marking clip was then placed. Digital mammograms performed in a separate room, using separate equipment, to document clip placement, demonstrate the clip in appropriate position. The patient tolerated this well; there are no immediate complications. IMPRESSION: 1. Ultrasound-guided biopsy of mass in the left breast. Pathology pending. 2. Post procedure mammogram for marker placement     Nm Pet Ct Skull To Mid Thigh    Result Date: 8/15/2019  EXAM: NM PET CT SKULL TO MID THIGH LOCATION: [facility] DATE/TIME: 8/15/2019 2:25 PM INDICATION: Initial treatment strategy and staging of a nonspecific abnormal findings of the lung fields. Lesion of the mediastinum. COMPARISON: Thoracic CT  dated 08/01/2019 TECHNIQUE: Serum glucose level 115 mg/dL. One hour post intravenous administration of 12.9 mCi F-18 FDG, PET imaging was performed from the skull base to the mid thighs utilizing attenuation correction with concurrent axial CT and PET/CT image fusion. Dose reduction techniques were used. FINDINGS: Mildly FDG avid (max SUV 4.3) mass in the anterior mediastinum measuring 3.3 x 2.1 cm. Mildly FDG avid (max SUV 3.8) lesion in the left breast correlating with the mass identified on recent mammogram and ultrasound dated 08/12/2019. Moderate coronary artery calcium. Left adrenal myelolipoma. Right renal cyst. InterStim device with lead traversing the right S4-S5 neural foramina. Sigmoid diverticulosis.     1. Mildly hypermetabolic anterior mediastinal mass suspicious for active neoplasm. 2. Mildly hypermetabolic left breast lesion, which is scheduled for biopsy.    Us Breast Limited (focal) Left    Result Date: 8/13/2019  EXAM: MAMMO DIAGNOSTIC 3D BILATERAL, US BREAST LIMITED (FOCAL) LEFT LOCATION: [facility] OUTPATIENT SERVICES DATE/TIME: 8/12/2019 1:18 PM INDICATION: Circumscribed lesion seen in the left breast on CT of the chest. COMPARISON: CT of the chest from 8/1/2019 and prior mammograms from 9/20/2018, 9/13/2017, 6/15/2016, 5/27/2015. MAMMOGRAPHIC FINDINGS: Bilateral full-field digital diagnostic mammograms performed. The breast tissue is heterogeneously dense which may obscure small masses. Scattered benign-appearing round and chunky calcifications are again seen in both breasts. The  right breast appears stable and benign. In the left breast in the 12:00 position in zone 2 there is a partially-circumscribed and partially obscured asymmetry; it is not seen on the prior mammograms. Images evaluated with the assistance of CAD. Breast tomosynthesis was used in interpretation. ULTRASOUND FINDINGS: Targeted left breast ultrasound was performed by both the ultrasonographer and the radiologist.  In the 12:00 position in zone 2 there was a hypoechoic lesion with predominantly circumscribed and a few lobulated margins measuring 2.3 x 1.4 x 1.3 cm, correlating with the mammographic abnormality.     1.  The lesion seen in the 12:00 position in zone 2 correlates with the abnormality seen on CT and on mammography. While it likely represents a benign fibroadenoma, biopsy is recommended for tissue confirmation. The findings and recommendations were discussed with the patient at the time of exam. ACR BI-RADS Category 4: Suspicious. Ultrasound-guided biopsy of the mass in the 12:00 position is recommended. Biopsy will be performed on Friday after she has her PET/CT on Thursday.        Roly Villalba MD

## 2021-06-01 NOTE — PROGRESS NOTES
Met with Jennifer in chemo infusion to check in and inquire about her needs.  She is quite sleepy from pre-meds and we visited only briefly.  She has not retrieved the household income for [facility] purposes, but intends to do so.      Dr. Villalba has asked for Jennifer to progress to surgical consult with Dr. Ferrera in the next few weeks for discussion about breast surgery and excision of thymoma  Fabi Morton RN

## 2021-06-01 NOTE — PROGRESS NOTES
Patient is here for labs, provider and treatment for Malignant neoplasm of upper-outer quadrant of left breast in female, estrogen receptor positive.

## 2021-06-01 NOTE — TELEPHONE ENCOUNTER
ANTICOAGULATION  MANAGEMENT    Assessment     Today's INR result of 1.8 is Subtherapeutic (goal INR of 2.0-3.0)        Warfarin taken as previously instructed    No new diet changes affecting INR    No new medication/supplements affecting INR    Continues to tolerate warfarin with no reported s/s of bleeding or thromboembolism     Previous INR was Therapeutic    Plan:     Spoke with Jennifer regarding INR result and instructed:     Warfarin Dosing Instructions:  Change warfarin dose to 2.5 mg daily on Mon, Fri; and 5 mg daily rest of week  (9.1 % change)    Instructed patient to follow up no later than: 1-2 weeks    Education provided: target INR goal and significance of current INR result, importance of following up for INR monitoring at instructed interval, Strategies to improve compliance (pillbox, alarm, calendar, etc) and importance of notifying clinic for changes in medications    Jennifer verbalizes understanding and agrees to warfarin dosing plan.    Instructed to call the [facility] for any changes, questions or concerns. (#[phone])   ?   Franca Delvalle RN    Subjective/Objective:      Jennifer Galvin, a 74 y.o. female is on warfarin.     Jennifer reports:     Home warfarin dose: verbally confirmed home dose with Jennifer and updated on anticoagulation calendar     Missed doses: No     Medication changes:  No     S/S of bleeding or thromboembolism:  No     New Injury or illness:  No     Changes in diet or alcohol consumption:  No     Upcoming surgery, procedure or cardioversion:  Yes: possible breast surgery/procedure, meets with Dr. Ferrera next week and will discuss at that visit    Anticoagulation Episode Summary     Current INR goal:   2.0-3.0   TTR:   70.7 %   Next INR check:   10/8/2019   INR from last check:   1.80! (9/24/2019)   Weekly max warfarin dose:      Target end date:      INR check location:      Preferred lab:      Send INR reminders to:   [facility]    Indications    Paroxysmal Atrial  Fibrillation [I48.91]           Comments:            Anticoagulation Care Providers     Provider Role Specialty Phone number    Cordell Ceja DO Referring Internal Medicine [phone]

## 2021-06-01 NOTE — TELEPHONE ENCOUNTER
ANTICOAGULATION  MANAGEMENT    Assessment     Today's INR result of 2.1 is Therapeutic (goal INR of 2.0-3.0)        Warfarin taken as previously instructed    No new diet changes affecting INR    No new medication/supplements affecting INR    Continues to tolerate warfarin with no reported s/s of bleeding or thromboembolism     Previous INR was Subtherapeutic    Plan:     Spoke with Jennifer regarding INR result and instructed:     Warfarin Dosing Instructions:  Continue current warfarin dose 2.5 mg daily on mon/fri; and 5 mg daily rest of week      Instructed patient to follow up no later than: two weeks with onc inf visit if possible      Jennifer verbalizes understanding and agrees to warfarin dosing plan.    Instructed to call the [facility] for any changes, questions or concerns. (#[phone])   ?   Ibrahima Trimble RN    Subjective/Objective:      Jennifer Galvin, a 74 y.o. female is on warfarin.     Jennifer reports:     Home warfarin dose: verbally confirmed home dose with Jennifer and updated on anticoagulation calendar     Missed doses: No     Medication changes:  No     S/S of bleeding or thromboembolism:  No     New Injury or illness:  No     Changes in diet or alcohol consumption:  No     Upcoming surgery, procedure or cardioversion:  No    Anticoagulation Episode Summary     Current INR goal:   2.0-3.0   TTR:   71.7 %   Next INR check:   10/16/2019   INR from last check:   2.17 (10/2/2019)   Weekly max warfarin dose:      Target end date:      INR check location:      Preferred lab:      Send INR reminders to:   ABHISHEK MIDWAY    Indications    Paroxysmal Atrial Fibrillation [I48.91]           Comments:            Anticoagulation Care Providers     Provider Role Specialty Phone number    Cordell Ceja DO Referring Internal Medicine [phone]

## 2021-06-01 NOTE — PROGRESS NOTES
Pt came into infusion clinic for her treatment as ordered. Labs Reviewed. Medications explained to pt who verbalized understanding. Port patent throughout infusion. Pt tolerated infusion with no complications. Pt left infusion clinic via ambulatory and will RTC as sched.

## 2021-06-01 NOTE — TELEPHONE ENCOUNTER
Telephoned and spoke with Jennifer to follow up on report from [facility] nurse of her interest in [facility].  Jennifer will look up her income and we can discuss it when she comes to clinic tomorrow. Fabi Morton RN

## 2021-06-01 NOTE — TELEPHONE ENCOUNTER
ANTICOAGULATION  MANAGEMENT    Assessment     Today's INR result of 2.1 is Therapeutic (goal INR of 2.0-3.0)        Warfarin taken as previously instructed    No new diet changes affecting INR    No new medication/supplements affecting INR    Continues to tolerate warfarin with no reported s/s of bleeding or thromboembolism     Previous INR was Subtherapeutic    Plan:     Spoke with Jennifer regarding INR result and instructed:     Warfarin Dosing Instructions:  Continue current warfarin dose 2.5 mg daily on mon/wed/fri; and 5 mg daily rest of week  (0 % change)    Instructed patient to follow up no later than: two weeks    Jennifer verbalizes understanding and agrees to warfarin dosing plan.    Instructed to call the [facility] for any changes, questions or concerns. (#[phone])   ?   Ibrahima Trimble RN    Subjective/Objective:      Jennifer VICKEY Kamar, a 74 y.o. female is on warfarin.     Jennifer reports:     Home warfarin dose: as updated on anticoagulation calendar per template     Missed doses: No     Medication changes:  No     S/S of bleeding or thromboembolism:  No     New Injury or illness:  No     Changes in diet or alcohol consumption:  No     Upcoming surgery, procedure or cardioversion:  No    Anticoagulation Episode Summary     Current INR goal:   2.0-3.0   TTR:   71.5 %   Next INR check:   9/24/2019   INR from last check:   2.10 (9/10/2019)   Weekly max warfarin dose:      Target end date:      INR check location:      Preferred lab:      Send INR reminders to:   ABHISHEK MIDWAY    Indications    Paroxysmal Atrial Fibrillation [I48.91]           Comments:            Anticoagulation Care Providers     Provider Role Specialty Phone number    Cordell Ceja DO Referring Internal Medicine [phone]

## 2021-06-01 NOTE — PROGRESS NOTES
Met with Jennifer in chemo infusion to check in and assist her to schedule appointment with Dr. Sara Ferrera as ordered by Dr. Villalba.  This will be her first consult with breast surgeon, and Dr. Villalba would also like excision or mediastinal mass consistent with thymoma evaluated for possible excision.  Appointment set up for 10/1/19 at 1:20 (1:00 arrival) and printout given to patient with appointment details. Fabi Morton RN

## 2021-06-01 NOTE — PROGRESS NOTES
Jennifer came to chemo infusion this morning for cycle 1 day 15 treatment with Trastuzumab and Paclitaxel.  VSS.  Pt assessed and is feeling well today except she is having some UTI symtoms.  Urine sample obtained and sent for uai. Port was accessed with good blood return and lab drawn.  Lab results noted and patient met provision for treatment.  She received premedication and treatment as ordered and she tolerated it well while in clinic today.  Port was flushed with ns, heparinized then de-accessed and site covered.  Jennifer d/c from clinic ambulatory using her walker accompanied by her sister in law.

## 2021-06-01 NOTE — PROGRESS NOTES
This is a 74 y.o.  female who I'm asked to see by Dr. Roly Villalba for evaluation of a left breast cancer.  This was picked up When she had a CT scan of the chest done for her heart.  She then had a mammogram and ultrasound done.  The patient cannot feel a mass.  A needle biopsy was done which shows an invasive ductal carcinoma.  It is estrogen receptor positive, progesterone receptor positive and HER-2 positive.  She was already a patient of Dr. Villalba was so saw him and was started on Taxol and Herceptin.  She has about 4 or 5 more doses to go.  She also has a history of a thymoma that is growing.    She has no family history of breast cancer.      PAST MEDICAL HISTORY:  Past Medical History:   Diagnosis Date     (Lower) Leg Localized Swelling     Created by Conversion      Adenocarcinoma In Situ Of The Uterus     Created by Conversion      Backache     Created by Conversion [facility] Annotation: Feb 29 2012 12:14PM - Opal Helm: Referred to  [facility] 9/11, given TENS unit.      Benign Polyps Of The Large Intestine     Created by Conversion      Breast cancer (H)      Cancer (H)     uterine     Coronary artery disease      Diabetes mellitus (H)      Fatigue     Created by Conversion      Hyperglycemia     Created by Conversion      Hyperlipidemia     Created by Conversion      Hypertension     Created by Conversion      Joint Pain, Localized In The Knee     Created by Conversion      Lesion of mediastinum      Lymphedema     Created by Conversion      Macular Degeneration     Created by Conversion [facility] Annotation: Apr 5 2011 12:22PM - Tien Guzman: Followed by  Ophthalmologist, Dr. Avilez.      Major Depression, Single Episode In Remission     Created by Conversion      Obesity     Created by Conversion      Obstructive Sleep Apnea     CPAP     Osteopenia     Created by Conversion      Paroxysmal Atrial Fibrillation     Created by Conversion [facility] Annotation: Nov 28 2012 10:36PM -  Madhuri Shruthi: Found incidential  on exam on May 7th, [id number] score of 1 for HTNsymptomatic and hospitalized  x2 in July, 2012.CV X 2 in 2012Chronic Sotalol Rx      Postmenopausal bleeding     Created by Conversion      Skin cancer      Tachycardia     Created by Conversion      Tremor     Created by Conversion      Urge And Stress Incontinence     Created by Conversion      Urinary Frequency More Than Twice At Night (Nocturia)     Created by Conversion      Urine Tests Nonspecific Abnormal Findings     Created by Conversion      Vitamin D Deficiency     Created by Conversion      Past Surgical History:   Procedure Laterality Date     BREAST BIOPSY Left     Benign     CATARACT EXTRACTION  10/17/13     COLONOSCOPY N/A 4/29/2015    Procedure: COLONOSCOPY WITH POLYPECTOMY;  Surgeon: Too Ureña MD;  Location: [facility];  Service:      COLONOSCOPY N/A 11/9/2016    Procedure: COLONOSCOPY;  Surgeon: Ayden Dela Cruz MD;  Location: [facility];  Service:      CT BIOPSY LUNG  8/30/2019     ENDOMETRIAL BIOPSY  4/2014     HYSTERECTOMY  2014     Interstem - Stage 2  09/11/2014     Interstim Stage I  08/26/2014     IR PORT PLACEMENT >5 YEARS  8/28/2019     OOPHORECTOMY Bilateral 2014     post urinary stimulator implantation  8/26/14     WY BIOPSY OF BREAST, INCISIONAL      Description: Incisional Breast Biopsy;  Recorded: 04/05/2011;     WY CARDIOVERSION ELECTIVE ARRHYTHMIA EXTERNAL      Description: Elective Cardioversion External;  Recorded: 07/30/2012;     WY REMOVE TONSILS/ADENOIDS,<13 Y/O      Description: Tonsillectomy With Adenoidectomy;  Recorded: 04/15/2014;     WY SLING OPER STRES INCONTINENCE N/A 11/3/2016    Procedure: PUBOVAGINAL SLING WITH CONCHITA WITH CYSTOSCOPY;  Surgeon: Bill Wilson MD;  Location: [facility];  Service: Gynecology     US BREAST CORE BIOPSY LEFT Left 8/16/2019     VITRECTOMY Right 1/15/15       Medications:    Current Outpatient Medications:      calcium-vitamin D  (CALCIUM-VITAMIN D) 500 mg(1,250mg) -200 unit per tablet, Take 1 tablet by mouth 2 (two) times a day with meals., Disp: , Rfl:      cholecalciferol, vitamin D3, 1,000 unit tablet, Take 2,000 Units by mouth daily. , Disp: , Rfl:      fesoterodine (TOVIAZ) 4 mg Tb24 ER tablet, Take 4 mg by mouth daily., Disp: , Rfl:      fluticasone propionate (FLONASE ALLERGY RELIEF) 50 mcg/actuation nasal spray, One spray in each nostril daily, Disp: 16 g, Rfl: 12     furosemide (LASIX) 40 MG tablet, TAKE 1 TABLET(40 MG) BY MOUTH TWICE DAILY, Disp: 180 tablet, Rfl: 3     glucosamine-chondroitin 500-400 mg tablet, Take 1 tablet by mouth 2 (two) times a day. , Disp: , Rfl:      lidocaine-prilocaine (EMLA) cream, Place over port 30 min. before being accessed., Disp: 30 g, Rfl: 1     loratadine (CLARITIN) 10 mg tablet, Take 1 tablet (10 mg total) by mouth daily., Disp: 30 tablet, Rfl: 11     metoprolol tartrate (LOPRESSOR) 25 MG tablet, Take 1 tablet (25 mg total) by mouth daily., Disp: , Rfl:      OMEGA-3/DHA/EPA/FISH OIL (FISH OIL-OMEGA-3 FATTY ACIDS) 300-1,000 mg capsule, Take 2 g by mouth 2 times a day at 6:00 am and 4:00 pm., Disp: , Rfl:      oxyCODONE-acetaminophen (PERCOCET/ENDOCET) 5-325 mg per tablet, Take 1 tablet by mouth every 6 (six) hours as needed for pain., Disp: 12 tablet, Rfl: 0     potassium chloride (K-DUR,KLOR-CON) 10 MEQ tablet, TAKE 2 TABLETS BY MOUTH TWICE DAILY, Disp: 360 tablet, Rfl: 3     prochlorperazine (COMPAZINE) 10 MG tablet, TAKE 1 TABLET(10 MG) BY MOUTH EVERY 6 HOURS AS NEEDED FOR NAUSEA, Disp: 90 tablet, Rfl: 3     simvastatin (ZOCOR) 20 MG tablet, Take 1 tablet (20 mg total) by mouth daily with supper., Disp: 90 tablet, Rfl: 3     vitamin A-vitamin C-vit E-min (OCUVITE) Tab tablet, Take 1 tablet by mouth 2 (two) times a day., Disp: , Rfl:      warfarin (COUMADIN/JANTOVEN) 5 MG tablet, TAKE ONE-HALF TO ONE TABLET BY MOUTH DAILY AS DIRECTED. ADJUST DOSE PER INR RESULT (Patient taking differently: 2.5  "mg on Mondays, Wednesday and Friday, 5 mg every other day of week), Disp: 90 tablet, Rfl: 1    Allergies:  Allergies   Allergen Reactions     Aspirin      Pt currently on WArfarin     Penicillins      Boils         Social History:   reports that she quit smoking about 14 years ago. Her smoking use included cigarettes. She has a 120.00 pack-year smoking history. She has never used smokeless tobacco. She reports current alcohol use of about 6.0 standard drinks of alcohol per week. She reports that she does not use drugs.    ROS:  A 12 point comprehensive review of systems was negative except as noted.    Physical Exam  /78 (Patient Site: Left Arm, Patient Position: Sitting)   Pulse 94   Resp 16   Ht 5' 5\" (1.651 m)   Wt (!) 323 lb 1.9 oz (146.6 kg)   SpO2 94%   BMI 53.77 kg/m    General:alert, appears stated age, cooperative and morbidly obese  Lungs:clear to auscultation bilaterally  CV:Somewhat irregular rhythm  Breasts: Huge, significantly ptotic breasts.  No palpable masses in either breast.  Lymph Nodes:no axillary nodes palpated  Neuro:Grossly normal  Musculoskeletal: Normal range of motion of her upper extremities.  Skin: Normal skin turgor.  Psych: She has a very calm demeanor    Reviewed her mammograms and ultrasound and pathology.     Impression: Left Breast Cancer. Clinically T1, N0.  Discussed the surgical options for treatment of breast cancer which generally are a lumpectomy (partial mastectomy) combined with radiation versus a mastectomy.  Explained that the survival benefit is the same for both.  The difference is in local recurrence risk.  The patient is a Excellent candidate for a lumpectomy.  With her significant other medical problems, I would definitely recommend a lumpectomy over mastectomy.  Discussed SLN biopsy.  The procedure and rationale were explained.  She asked about her thymoma.  I feel that this would be best addressed by a thoracic surgeon such as Dr. Franco.  Discussed " timing of surgery.  She needs to finish her 12 cycles of Taxol before surgery.  Wait about 2 to 3 weeks after last dose.  Because of her morbid obesity, the surgery will need to be at the hospital but the wire localization will need to be done at the breast center and explained this to her.      Plan: We'll schedule for a left  lumpectomy with wire localization with sentinel lymph node biopsy.  This is typically an outpatient surgery.  She does have significant risk of bleeding because she is on Coumadin.  Also with her obesity puts her at high risk for complications.  She will need to be off her Coumadin for 5 days preoperatively.  The risks and benefits of surgery were explained.  Also talked about expected recovery time.

## 2021-06-01 NOTE — TELEPHONE ENCOUNTER
UA results were reviewed with Dr. Villalba. He indicates that it is nothing of any concern. She is to increase her fluid intake. I call Jennifer to report this. She verbalized understanding and appreciation of our conversation.

## 2021-06-01 NOTE — PROGRESS NOTES
[facility] Hematology and Oncology Progress Note    Patient: Jennifer Galvin  MRN: [id number]  Date of Service: 10/02/2019        Reason for Visit    Chief Complaint   Patient presents with     HE Cancer     Malignant neoplasm of upper-outer quadrant of left breast in female, estrogen receptor positive        Assessment and Plan  Cancer Staging  Malignant neoplasm of upper-outer quadrant of left breast in female, estrogen receptor positive (H)  Staging form: Breast, AJCC 8th Edition  - Clinical: Stage IB (cT2, cN0, cM0, G1, ER+, KY+, HER2+) - Signed by Roly Villalba MD on 8/21/2019    1.  Breast cancer, triple positive, stage I: Patient is been started on neoadjuvant chemotherapy with weekly Taxol and Herceptin.  She has had 3 weeks of that and will start week for today.  She is tolerating it well.  She will continue the current regimen and will be seen every 3 weeks during chemo.  She did meet with Dr. Ferrera and the tentative schedule is for surgery to happen on December 2.    2.  Anterior mediastinal mass, thymoma: This likely should be excised.  Apparently is been slowly growing over 7 to 8 years.  Dr. Villalba feels that this may be causing some of her week symptoms so we will refer to Dr. Romero once her breast cancer surgery is done.    3.  Insomnia: Patient states this is been a long-standing issue but it seems to be getting worse.  She says she is actually sleeping a lot during the day and then is not sleeping well at night.  I told her to try to focus on staying awake more during the day.  I told her may be to have a couple of days where she is out of the house where she has to stay awake and then may be she will sleep better at night.  She can try a little Benadryl at night as needed as well.      ECOG Performance   ECOG Performance Status: 1     Distress Assessment  Distress Assessment Score: 3(visit related)    Pain  Currently in Pain: No/denies      Problem List    1. Malignant neoplasm of upper-outer  quadrant of left breast in female, estrogen receptor positive (H)  CC OFFICE VISIT LONG    CC OFFICE VISIT LONG    Infusion Appointment    Infusion Appointment    Infusion Appointment        ______________________________________________________________________________    History of Present Illness    Jennifer Galvin is a very pleasant 74 y.o. old female with a history of newly diagnosed breast cancer.  There is breast cancer is located on her left breast measures 2.3 cm in size in the upper outer quadrant ER positive VT positive as well as HER-2/arcelia 3+ on immunohistochemistry.  It is not palpable.  Detected incidentally in late July early August 2019.  Her presentation started with shortness of breath for which she was sent to cardiology for a stress test.  The stress test showed some uptake in the mediastinum as well as in the breast.  Therefore she had a CT scan of the chest which showed that she had a 3.2 cm mass in the mediastinum located anteriorly.  This was initially detected in 2012 but had grown from 1.9 cm at the time to 3.2 cm now.  No other abnormality noted.  Differential is thymoma/lymphoma.  She also had a mammogram which showed that she had a lesion in her left breast 2.3 cm in size.  No lymph adenopathy noted in the axillary area.  She then had a ultrasound-guided biopsy of the left breast which showed this invasive ductal carcinoma.     We had her undergo CT-guided biopsy of the mediastinal mass which has come back as thymoma.  She started neoadjuvant treatment with Taxol and Herceptin.  She is tolerating this fine.  She has had 3 weeks.  She cannot feel the mass so it unclear if she is responding clinically.  There which is a little upsetting.  Her only other big issue is that she is having sleep issues which really is not new.    Pain Status  Currently in Pain: No/denies    Review of Systems    Oncology Nurse Assessment/CMA Intake: Constitutional  Constitutional (WDL): Exceptions to WDL  Fatigue:  Concerns(relieved with rest)  Neurosensory  Neurosensory (WDL): Exceptions to WDL  Ataxia: Concerns(using walker; feels steady)  Eye   Eye Disorder (WDL): All eye disorder elements are within defined limits  Ear  Ear Disorder (WDL): All ear disorder elements are within defined limits  Cardiovascular  Cardiovascular (WDL): Exceptions to WDL  Edema: Concerns(ankles/feet)  Pulmonary  Respiratory (WDL): Exceptions to WDL  Dyspnea: Concerns(with activity)  Gastrointestinal  Gastrointestinal (WDL): All gastrointestinal elements are within defined limits  Genitourinary  Genitourinary (WDL): All genitourinary elements are within defined limits  Lymphatic  Lymph (WDL): All lymph disorder elements are within defined limits  Musculoskeletal and Connective Tissue  Musculoskeletal and Connetive Tissue Disorders (WDL): Exceptions to WDL  Arthralgia: Concerns(knees)  Integumentary  Integumentary (WDL): Exceptions to WDL  Alopecia: Concerns(hair thinning)  Patient Coping  Patient Coping: Accepting;Open/discussion  Accompanied by  Accompanied by: Family Member  Oral Chemo Adherence         Past History  Past Medical History:   Diagnosis Date     (Lower) Leg Localized Swelling     Created by Conversion      Adenocarcinoma In Situ Of The Uterus     Created by Conversion      Backache     Created by Conversion [facility] Annotation: Feb 29 2012 12:14PM - Opal Helm: Referred to  [facility] 9/11, given TENS unit.      Benign Polyps Of The Large Intestine     Created by Conversion      Breast cancer (H)      Cancer (H)     uterine     Coronary artery disease      Diabetes mellitus (H)      Fatigue     Created by Conversion      Hyperglycemia     Created by Conversion      Hyperlipidemia     Created by Conversion      Hypertension     Created by Conversion      Joint Pain, Localized In The Knee     Created by Conversion      Lesion of mediastinum      Lymphedema     Created by Conversion      Macular Degeneration     Created by  "Conversion [facility] Annotation: Apr 5 2011 12:22PM - Tien Guzman: Followed by  Ophthalmologist, Dr. Avilez.      Major Depression, Single Episode In Remission     Created by Conversion      Obesity     Created by Conversion      Obstructive Sleep Apnea     CPAP     Osteopenia     Created by Conversion      Paroxysmal Atrial Fibrillation     Created by Conversion [facility] Annotation: Nov 28 2012 10:36PM - Shruthi Dhaliwal: Found incidential  on exam on May 7th, [id number] score of 1 for HTNsymptomatic and hospitalized  x2 in July, 2012.CV X 2 in 2012Chronic Sotalol Rx      Postmenopausal bleeding     Created by Conversion      Skin cancer      Tachycardia     Created by Conversion      Tremor     Created by Conversion      Urge And Stress Incontinence     Created by Conversion      Urinary Frequency More Than Twice At Night (Nocturia)     Created by Conversion      Urine Tests Nonspecific Abnormal Findings     Created by Conversion      Vitamin D Deficiency     Created by Conversion        PHYSICAL EXAM:  /67   Pulse 81   Temp 97.7  F (36.5  C) (Oral)   Ht 5' 5\" (1.651 m)   Wt (!) 322 lb 14.4 oz (146.5 kg)   SpO2 93%   BMI 53.73 kg/m    GENERAL: no acute distress. Cooperative in conversation. Here with sister-in-law.  HEENT: pupils are equal, round and reactive. Oromucosa is clean and intact. No ulcerations or mucositis noted. No bleeding noted.  Hair is thinning.  RESP: lungs are clear bilaterally per auscultation. Regular respiratory rate. No wheezes or rhonchi.  CV: Regular, rate and rhythm. No murmurs.  ABD: soft, nontender. Positive bowel sounds. No organomegaly.   MUSCULOSKELETAL: Bilateral lower extremity swelling which is chronic.   NEURO: non focal. Alert and oriented x3.   PSYCH: within normal limits. No depression or anxiety.  SKIN: warm dry intact   LYMPH: no cervical, supraclavicular or axillary lymphadenopathy  BREAST: Deferred.  Just had breast exam by Dr. Ferrera last week and she did " not feel any tumor.      Lab Results    Recent Results (from the past 168 hour(s))   Comprehensive Metabolic Panel   Result Value Ref Range    Sodium 141 136 - 145 mmol/L    Potassium 3.8 3.5 - 5.0 mmol/L    Chloride 105 98 - 107 mmol/L    CO2 28 22 - 31 mmol/L    Anion Gap, Calculation 8 5 - 18 mmol/L    Glucose 132 (H) 70 - 125 mg/dL    BUN 10 8 - 28 mg/dL    Creatinine 0.93 0.60 - 1.10 mg/dL    GFR MDRD Af Amer >60 >60 mL/min/1.73m2    GFR MDRD Non Af Amer 59 (L) >60 mL/min/1.73m2    Bilirubin, Total 0.8 0.0 - 1.0 mg/dL    Calcium 9.2 8.5 - 10.5 mg/dL    Protein, Total 6.2 6.0 - 8.0 g/dL    Albumin 3.3 (L) 3.5 - 5.0 g/dL    Alkaline Phosphatase 74 45 - 120 U/L    AST 14 0 - 40 U/L    ALT 26 0 - 45 U/L   HM1 (CBC with Diff)   Result Value Ref Range    WBC 5.7 4.0 - 11.0 thou/uL    RBC 4.76 3.80 - 5.40 mill/uL    Hemoglobin 13.1 12.0 - 16.0 g/dL    Hematocrit 41.9 35.0 - 47.0 %    MCV 88 80 - 100 fL    MCH 27.5 27.0 - 34.0 pg    MCHC 31.3 (L) 32.0 - 36.0 g/dL    RDW 18.0 (H) 11.0 - 14.5 %    Platelets 240 140 - 440 thou/uL    MPV 11.0 8.5 - 12.5 fL    Neutrophils % 67 50 - 70 %    Lymphocytes % 21 20 - 40 %    Monocytes % 8 2 - 10 %    Eosinophils % 2 0 - 6 %    Basophils % 1 0 - 2 %    Neutrophils Absolute 3.8 2.0 - 7.7 thou/uL    Lymphocytes Absolute 1.2 0.8 - 4.4 thou/uL    Monocytes Absolute 0.5 0.0 - 0.9 thou/uL    Eosinophils Absolute 0.1 0.0 - 0.4 thou/uL    Basophils Absolute 0.0 0.0 - 0.2 thou/uL   INR   Result Value Ref Range    INR 2.17 (H) 0.90 - 1.10       Imaging    No results found.      Signed by: Meg Walsh, CNP

## 2021-06-01 NOTE — PATIENT INSTRUCTIONS - HE
It was good to meet you in clinic today. This is what we discussed:    1. We will get lung function tests and I will call you with the results.  2. We will get a sleep study done and I will call you with the results.  3. Use Flonase one spray in each nostril daily.  4. Take loratadine (Claritin) one pill daily.  5. I will see you in about 3 months.  6. Call any time with questions or concerning symptoms.    Adalid Ennis MD  Pulmonary and Critical Care Medicine  [facility]  Office [phone]

## 2021-06-01 NOTE — PROGRESS NOTES
"Pt ambulates to infusion center using walker for labs and treatment.  IVAD accessed, labs drawn et sent w/results noted.  Pt c/o being \"sore\" since last treatment, although it is \"getting better\".  She has no other new concerns today.  Treatment administered as ordered without difficulty.  IVAD flushed w/NS et heparin and needle deaccessed.  Pt left clinic stable to lobby.  Plan RTC as scheduled.  "

## 2021-06-01 NOTE — TELEPHONE ENCOUNTER
I was given Jennifer's information with a question of why she has not been contacted about scheduling her sleep study yet.  I found out that it is waiting on the direct approval of dr. SILVERMAN and insurance varication and when that is sorted out they will al her to schedule.

## 2021-06-01 NOTE — PROGRESS NOTES
RESPIRATORY CARE NOTE     Patient Name: Jennifer Galvin  Today's Date: 9/27/2019     Complete PFT done. Pt performed tests with good effort. Test results meet ATS standards for repeatability and acceptability.  Albuterol 2.5 mg nebulizer used for bronchodilation. Results scanned into epic. Pt left in no distress.       ALIZA XavierT

## 2021-06-02 ENCOUNTER — RECORDS - HEALTHEAST (OUTPATIENT)
Dept: ADMINISTRATIVE | Facility: CLINIC | Age: 76
End: 2021-06-02

## 2021-06-02 VITALS — WEIGHT: 293 LBS | HEIGHT: 66 IN | BODY MASS INDEX: 47.09 KG/M2

## 2021-06-02 VITALS — BODY MASS INDEX: 47.09 KG/M2 | WEIGHT: 293 LBS | HEIGHT: 66 IN

## 2021-06-02 NOTE — TELEPHONE ENCOUNTER
ANTICOAGULATION  MANAGEMENT    Assessment     Today's INR result of 2.1 is Therapeutic (goal INR of 2.0-3.0)      Left message asking Jennifer to call with any changes in her warfarin dose, diet or othermedicines or with any questions or concerns    Potential interaction between abx and warfarin which may affect subsequent INRs starting levoquin x 7 days for uri sx    Continues to tolerate warfarin with no reported s/s of bleeding or thromboembolism     Previous INR was Therapeutic    Plan:     Left a detailed message for Jennifer regarding INR result and instructed:     Warfarin Dosing Instructions:  Continue current warfarin dose 2.5 mg daily on mon/fri; and 5 mg daily rest of week  (0 % change)    Instructed patient to follow up no later than: one week at onc visit    Instructed to call the [facility] for any changes, questions or concerns. (#[phone])   ?   Ibrahima Trimble RN    Subjective/Objective:      Jennifer Galvin, a 74 y.o. female is on warfarin.        Anticoagulation Episode Summary     Current INR goal:   2.0-3.0   TTR:   73.3 %   Next INR check:   11/6/2019   INR from last check:   2.12 (10/30/2019)   Weekly max warfarin dose:      Target end date:      INR check location:      Preferred lab:      Send INR reminders to:   [facility]    Indications    Paroxysmal Atrial Fibrillation [I48.91]           Comments:            Anticoagulation Care Providers     Provider Role Specialty Phone number    Cordell Ceja DO Referring Internal Medicine [phone]

## 2021-06-02 NOTE — PROGRESS NOTES
Pt came into infusion clinic for her treatment as ordered. Labs Reviewed. Medications explained to pt who verbalized understanding. Port patent throughout infusion. Pt tolerated infusion with no complications. Pt has been c/o cough and shortness of breath x4 weeks. Spoke with Meg GARCIA who ordered Levaquin for pt. Pt was informed of this. Per Meg, if no improvement after Antibx, pt will need chest xray. Pt was told to call if cough/SOB worsens of fever develops. Pt verbalized understanding of this.Pt left infusion clinic via ambulatory and will RTC as sched.

## 2021-06-02 NOTE — PROGRESS NOTES
Pt here for taxol, herceptin. Port accessed easily and no problems with infusion as directed. Pt does note she is very tired and has some pimples near her chin. Pt will monitor. Upon completion port flushed and deaccessed. Pt d/c using walker to lobby with her friend. Pt aware of treatment plan.

## 2021-06-02 NOTE — PROGRESS NOTES
[facility] Hematology and Oncology Progress Note    Patient: Jennifer Galvin  MRN: [id number]  Date of Service: 10/23/2019        Reason for Visit    Chief Complaint   Patient presents with     HE Cancer     Malignant neoplasm of upper-outer quadrant of left breast in female, estrogen receptor positive       Assessment and Plan  Cancer Staging  Malignant neoplasm of upper-outer quadrant of left breast in female, estrogen receptor positive (H)  Staging form: Breast, AJCC 8th Edition  - Clinical: Stage IB (cT2, cN0, cM0, G1, ER+, NH+, HER2+) - Signed by Roly Villalba MD on 8/21/2019    1.  Breast cancer, triple positive, stage I: Patient is been started on neoadjuvant chemotherapy with weekly Taxol and Herceptin.  Today is week 7.  He is doing fine with the chemo.  She did meet with Dr. Ferrera and the tentative schedule is for surgery to happen on December 2.  If she will be finishing her chemo about a week prior to that.  I did tell patient to call Dr. Ferrera's office to make sure they are okay with that.  So far her counts have been fine and she is tolerating it so I think it would be okay.    2.  Anterior mediastinal mass, thymoma: This likely should be excised.  Apparently is been slowly growing over 7 to 8 years.  Dr. Villalba feels that this may be causing some of her week symptoms so we will refer to Dr. Romero once her breast cancer surgery is done.    3.  URI: Going on for about a week and a half.  She states that it is slowly getting better.  She still has some cough.  Other signs of bacterial infection.  White blood cell count is normal.  Continue to just do symptomatic management.    4.  Hypokalemia: She states that she is been out of her potassium for about 3 weeks now due to an insurance issue.  She says she now has that figured out and will start taking it again today.  Continue to follow in the next week or 2.      ECOG Performance   ECOG Performance Status: 1     Distress Assessment  Distress  Assessment Score: 5(significant other's health )    Pain  Currently in Pain: No/denies      Problem List    1. Malignant neoplasm of upper-outer quadrant of left breast in female, estrogen receptor positive (H)  CC OFFICE VISIT LONG    CC OFFICE VISIT LONG    Infusion Appointment    Infusion Appointment    Infusion Appointment        ______________________________________________________________________________    History of Present Illness    Jennifer Galvin is a very pleasant 74 y.o. old female with a history of newly diagnosed breast cancer.  There is breast cancer is located on her left breast measures 2.3 cm in size in the upper outer quadrant ER positive TN positive as well as HER-2/arcelia 3+ on immunohistochemistry.  It is not palpable.  Detected incidentally in late July early August 2019.  Her presentation started with shortness of breath for which she was sent to cardiology for a stress test.  The stress test showed some uptake in the mediastinum as well as in the breast.  Therefore she had a CT scan of the chest which showed that she had a 3.2 cm mass in the mediastinum located anteriorly.  This was initially detected in 2012 but had grown from 1.9 cm at the time to 3.2 cm now.  No other abnormality noted.  Differential is thymoma/lymphoma.  She also had a mammogram which showed that she had a lesion in her left breast 2.3 cm in size.  No lymph adenopathy noted in the axillary area.  She then had a ultrasound-guided biopsy of the left breast which showed this invasive ductal carcinoma.     We had her undergo CT-guided biopsy of the mediastinal mass which has come back as thymoma.  She started neoadjuvant treatment with Taxol and Herceptin.  She is tolerating this fine.  She has had 6 weeks.  She states she is doing okay with the treatment.  Her biggest issue is that she had a cold about a week and a half ago and said it was the worst cold she ever had.  Overall she says it is slowly getting better.    Pain  Status  Currently in Pain: No/denies    Review of Systems    Oncology Nurse Assessment/CMA Intake: Constitutional  Constitutional (WDL): Exceptions to WDL  Fatigue: Concerns(not relieved with rest)  Neurosensory  Neurosensory (WDL): Exceptions to WDL  Ataxia: Concerns(using wheeled walker; feels steady with use)  Eye   Eye Disorder (WDL): All eye disorder elements are within defined limits  Ear  Ear Disorder (WDL): All ear disorder elements are within defined limits  Cardiovascular  Cardiovascular (WDL): Exceptions to WDL  Edema: Concerns(stable in feet/ankles)  Pulmonary  Respiratory (WDL): Exceptions to WDL  Cough: Concerns(dry cough)  Dyspnea: Concerns(with activity)  Gastrointestinal  Gastrointestinal (WDL): Exceptions to WDL  Dehydration: Concerns  Dry Mouth: Concerns  Genitourinary  Genitourinary (WDL): All genitourinary elements are within defined limits  Lymphatic  Lymph (WDL): All lymph disorder elements are within defined limits  Musculoskeletal and Connective Tissue  Musculoskeletal and Connetive Tissue Disorders (WDL): Exceptions to WDL  Arthralgia: Concerns(knees, occ in hips)  Muscle Weakness : Concerns  Integumentary  Integumentary (WDL): Exceptions to WDL(rash on arms)  Alopecia: Concerns  Patient Coping  Patient Coping: Accepting;Open/discussion  Accompanied by  Accompanied by: Family Member  Oral Chemo Adherence         Past History  Past Medical History:   Diagnosis Date     (Lower) Leg Localized Swelling     Created by Conversion      Adenocarcinoma In Situ Of The Uterus     Created by Conversion      Backache     Created by Conversion [facility] Annotation: Feb 29 2012 12:14PM - Opal Helm: Referred to  [facility] 9/11, given TENS unit.      Benign Polyps Of The Large Intestine     Created by Conversion      Breast cancer (H)      Cancer (H)     uterine     Coronary artery disease      Diabetes mellitus (H)      Fatigue     Created by Conversion      Hyperglycemia     Created by Conversion  "     Hyperlipidemia     Created by Conversion      Hypertension     Created by Conversion      Joint Pain, Localized In The Knee     Created by Conversion      Lesion of mediastinum      Lymphedema     Created by Conversion      Macular Degeneration     Created by Conversion [facility] Annotation: Apr 5 2011 12:22PM - Tien Guzman: Followed by  Ophthalmologist, Dr. Avilez.      Major Depression, Single Episode In Remission     Created by Conversion      Obesity     Created by Conversion      Obstructive Sleep Apnea     CPAP     Osteopenia     Created by Conversion      Paroxysmal Atrial Fibrillation     Created by Conversion [facility] Annotation: Nov 28 2012 10:36PM - Shruthi Dhaliwal: Found incidential  on exam on May 7th, [id number] score of 1 for HTNsymptomatic and hospitalized  x2 in July, 2012.CV X 2 in 2012Chronic Sotalol Rx      Postmenopausal bleeding     Created by Conversion      Skin cancer      Tachycardia     Created by Conversion      Tremor     Created by Conversion      Urge And Stress Incontinence     Created by Conversion      Urinary Frequency More Than Twice At Night (Nocturia)     Created by Conversion      Urine Tests Nonspecific Abnormal Findings     Created by Conversion      Vitamin D Deficiency     Created by Conversion        PHYSICAL EXAM:  /56   Pulse 91   Temp 98.1  F (36.7  C) (Oral)   Ht 5' 5\" (1.651 m)   Wt (!) 318 lb 8 oz (144.5 kg)   SpO2 94%   BMI 53.00 kg/m    GENERAL: no acute distress. Cooperative in conversation. Here with sister-in-law.  HEENT: pupils are equal, round and reactive. Oromucosa is clean and intact. No ulcerations or mucositis noted. No bleeding noted.  Hair is thinning.  RESP: lungs are clear bilaterally per auscultation. Regular respiratory rate. No wheezes or rhonchi.  CV: Regular, rate and rhythm. No murmurs.  ABD: soft, nontender. Positive bowel sounds. No organomegaly.   MUSCULOSKELETAL: Bilateral lower extremity swelling which is chronic. "   NEURO: non focal. Alert and oriented x3.   PSYCH: within normal limits. No depression or anxiety.  SKIN: warm dry intact   LYMPH: no cervical, supraclavicular or axillary lymphadenopathy  BREAST: Deferred.       Lab Results    Recent Results (from the past 168 hour(s))   Comprehensive Metabolic Panel   Result Value Ref Range    Sodium 142 136 - 145 mmol/L    Potassium 3.1 (L) 3.5 - 5.0 mmol/L    Chloride 101 98 - 107 mmol/L    CO2 30 22 - 31 mmol/L    Anion Gap, Calculation 11 5 - 18 mmol/L    Glucose 162 (H) 70 - 125 mg/dL    BUN 11 8 - 28 mg/dL    Creatinine 0.96 0.60 - 1.10 mg/dL    GFR MDRD Af Amer >60 >60 mL/min/1.73m2    GFR MDRD Non Af Amer 57 (L) >60 mL/min/1.73m2    Bilirubin, Total 0.9 0.0 - 1.0 mg/dL    Calcium 9.5 8.5 - 10.5 mg/dL    Protein, Total 6.1 6.0 - 8.0 g/dL    Albumin 3.3 (L) 3.5 - 5.0 g/dL    Alkaline Phosphatase 72 45 - 120 U/L    AST 16 0 - 40 U/L    ALT 23 0 - 45 U/L   HM1 (CBC with Diff)   Result Value Ref Range    WBC 8.7 4.0 - 11.0 thou/uL    RBC 4.54 3.80 - 5.40 mill/uL    Hemoglobin 12.9 12.0 - 16.0 g/dL    Hematocrit 40.5 35.0 - 47.0 %    MCV 89 80 - 100 fL    MCH 28.4 27.0 - 34.0 pg    MCHC 31.9 (L) 32.0 - 36.0 g/dL    RDW 20.9 (H) 11.0 - 14.5 %    Platelets 234 140 - 440 thou/uL    MPV 10.4 8.5 - 12.5 fL    Neutrophils % 75 (H) 50 - 70 %    Lymphocytes % 14 (L) 20 - 40 %    Monocytes % 7 2 - 10 %    Eosinophils % 1 0 - 6 %    Basophils % 1 0 - 2 %    Neutrophils Absolute 6.5 2.0 - 7.7 thou/uL    Lymphocytes Absolute 1.2 0.8 - 4.4 thou/uL    Monocytes Absolute 0.6 0.0 - 0.9 thou/uL    Eosinophils Absolute 0.1 0.0 - 0.4 thou/uL    Basophils Absolute 0.1 0.0 - 0.2 thou/uL   Manual Differential   Result Value Ref Range    Platelet Estimate Normal Normal    Polychromasia 1+ (!) Negative       Imaging    No results found.      Signed by: Meg Walsh, CNP

## 2021-06-02 NOTE — TELEPHONE ENCOUNTER
Who is calling:  Patient   Reason for Call:  The patient is returning a call to [facility] regarding today's INR.    Okay to leave a detailed message: Yes

## 2021-06-02 NOTE — TELEPHONE ENCOUNTER
Anticoagulation Annual Referral Renewal Review    Jennifer Galvin's chart reviewed for annual renewal of referral to anticoagulation monitoring.        Criteria for anticoagulation nurse and/or pharmacist renewal met   Warfarin indication: Atrial Fibrillation Yes, per indication   Current with INR monitoring/compliant Yes Yes   Date of last office visit 9/10/19 Yes, had office visit within last year   Time in Therapeutic Range (TTR) 73.5 % Yes, TTR > 60%       Jennifer Galvin met all criteria for anticoagulation management program initiated renewal.  New INR standing orders and anticoagulation referral renewal placed.      Ibrahima Trimble RN  3:29 PM

## 2021-06-02 NOTE — PROGRESS NOTES
Met with Jennifer and sister-in-law Luz to check in and inquire about her needs.  Per staff report she is experiencing stress from pressure from Damien, her significant other, who is now at a care center.  Jennifer continues to assert herself in maintaining boundaries and putting her own needs first.    We discussed [employer] and [employer] as possible resources.  Currently Luz's son and daughter-in-law who live close to Jennifer have been helping with cleaning and they deliver meals for her.  Jennifer and Luz decline these resources at present, but I will mail applications/instructions and encourage them to apply since the process can take a while.  Fabi Morton RN

## 2021-06-02 NOTE — PROGRESS NOTES
"Pt here for week 6 of weekly taxol. She is c/o cols sx \"almost like bronchitis\" no fevers and sputum is yellowish. Also having some diarrhea. Labs noted and above reviewed with MAYA. Pt instructed to continue OTC cold medicine and if sx don't improve by this weekend she should give us a call. Pt also instructed to try some imodium. No problem with infusion given as directed. Upon completion port flushed and deaccessed. Pt d/c using walker to lobby with her sister in law.  "

## 2021-06-02 NOTE — PROGRESS NOTES
Dear  Marcial, Cordell Chauhan, Do  [address] [place] [address]    Thank you for the opportunity to participate in the care of  Jennifer Galvin.    Jennifer Galvin is sent by Adalid Ennis MD for a sleep consultation regarding sleep apnea and possible progression.     She has a history of FLAKITA, morbid obesity, DM2, uterine cancer, depression, atrial fibrillation, and breast cancer undergoing treatment.      Diagnosed with FLAKITA in 2003.    She told her Pulmonologist and our staff that she is wearing CPAP nightly, but on review of her machine it has ~6000 hours and it is from 2003 > 15 years old, so she is averaging no more than 400 hours per year (or around 1 hour per day on average). On review of this data she confirms use is around maybe once per week due to nasal congestion when she wears CPAP.    Device is set at 13 cmH2O with nasal mask.  She thinks she was using [employer] but hasn't gotten supplies in 6 - 7 years.    Rooming 10/21/2019   Usual bedtime 11pm-12am   Sleep Latency up to 60 minutes   Awakenings 4-5 times   Wake Up Time 6am   Weekends same   Energy Drinks no   Coffee no   Cola Dt Coke 2 qd   Difficulty falling asleep Yes   Difficulty staying asleep No   Excessive daytime tiredness Yes   Excessive daytime sleepiness Yes   Dozing off while driving No   Shift Worker No   Sleep Walking? No   Sleep Talking? No   Kicking or punching? No   Restless legs symptoms No     Epworths Sleepiness Scale 10/21/2019   Sitting and reading 0   Watching TV 2   Sitting, inactive in a public place (e.g. a theatre or a meeting) 0   As a passenger in a car for an hour without a break 0   Lying down to rest in the afternoon when circumstances permit 3   Sitting and talking to someone 0   Sitting quietly after a lunch without alcohol 0   In a car, while stopped for a few minutes in traffic 0   Total score 5     STOP BANG 10/21/2019   Do you snore loudly (louder than talking or loud enough to be heard through closed doors)?  1   Do you often feel tired, fatigued, or sleepy during daytime? 1   Has anyone observed you stop breathing in your sleep? 1   Do you have or are you being treated for high blood pressure? 1   BMI more than 35 kg/m2 1   Age over 50 years old? 1   Neck circumference greater than 16 inches? 1   Gender male? 0   Total Score 7      Sleeps fine. Longstanding night owl.  Has trouble falling asleep but in bed about 1 - 1.5 hours early and will watch TV or read for hours.  Napping a lot since starting chemotherapy.   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      Snores loudly and has witnessed apneas.   Patient was counseled on the importance of driving while alert, to pull over if drowsy, or nap before getting into the vehicle if sleepy.    Lives with no one. Significant other had a stroke and is in assisted living.  Has 1 pet, a bird.     Past Medical History:  Past Medical History:   Diagnosis Date     (Lower) Leg Localized Swelling     Created by Conversion      Adenocarcinoma In Situ Of The Uterus     Created by Conversion      Backache     Created by Conversion [email or web address] [facility] Annotation: Feb 29 2012 12:14PM - Opal Helm: Referred to  [facility] 9/11, given TENS unit.      Benign Polyps Of The Large Intestine     Created by Conversion      Breast cancer (H)      Cancer (H)     uterine     Coronary artery disease      Diabetes mellitus (H)      Fatigue     Created by Conversion      Hyperglycemia     Created by Conversion      Hyperlipidemia     Created by Conversion      Hypertension     Created by Conversion      Joint Pain, Localized In The Knee     Created by Conversion      Lesion of mediastinum      Lymphedema     Created by Conversion      Macular Degeneration     Created by Conversion [email or web address] [facility] Annotation: Apr 5 2011 12:22PM - Tien Guzman: Followed by  Ophthalmologist, Dr. Avilez.      Major Depression, Single Episode In Remission     Created by Conversion      Obesity     Created by Conversion      Obstructive Sleep  Apnea     CPAP     Osteopenia     Created by Conversion      Paroxysmal Atrial Fibrillation     Created by [employer] Annotation: Nov 28 2012 10:36PM - Shruthi Dhaliwal: Found incidential  on exam on May 7th, [id number] score of 1 for HTNsymptomatic and hospitalized  x2 in July, 2012.CV X 2 in 2012Chronic Sotalol Rx      Postmenopausal bleeding     Created by Conversion      Skin cancer      Tachycardia     Created by Conversion      Tremor     Created by Conversion      Urge And Stress Incontinence     Created by Conversion      Urinary Frequency More Than Twice At Night (Nocturia)     Created by Conversion      Urine Tests Nonspecific Abnormal Findings     Created by Conversion      Vitamin D Deficiency     Created by Conversion        Problem List:  Patient Active Problem List    Diagnosis Date Noted     Malignant neoplasm of upper-outer quadrant of left breast in female, estrogen receptor positive (H) 08/21/2019     Breast nodule 08/07/2019     Overview Note:     Left breast-- incidental on CT 2019       Follow-up examination following surgery 07/25/2019     Lesion of mediastinum 07/05/2019     Overview Note:     Incidentally discovered on nuclear stress test, CT scan shows 3.2 cm anterior mediastinum nodule.         Elective surgery 11/03/2016     Aspirin contraindicated 09/27/2016     BMI 50.0-59.9, adult (H) 12/21/2015     Diabetes mellitus, type 2 (H) 10/28/2015     Venous hypertension of lower extremity, bilateral 10/28/2015     Venous stasis 07/29/2015     Acquired lymphedema of leg 07/29/2015     History of uterine cancer 07/29/2015     Adenocarcinoma In Situ Of The Uterus      Overview Note:     Created by Conversion         Urge And Stress Incontinence      Overview Note:     Created by Conversion         Macular Degeneration      Overview Note:     Created by [employer] Annotation: Apr 5 2011 12:22PM - Tien Guzman: Followed by   Ophthalmologist, Dr. Avilez.    Replacement  Utility updated for latest IMO load       Vitamin D Deficiency      Overview Note:     Created by Conversion    Replacement Utility updated for latest IMO load       Abnormal involuntary movement      Overview Note:     Created by Conversion         Joint Pain, Localized In The Knee      Overview Note:     Created by Conversion         Obstructive Sleep Apnea      Overview Note:     Created by Conversion  [facility] Annotation: Aug  2 2012  3:29PM - Tristan Opal: on CPAP         Hyperlipidemia      Overview Note:     Created by Conversion         Major Depression, Single Episode In Remission      Overview Note:     Created by Conversion         Hypertension      Overview Note:     Created by Conversion    Replacement Utility updated for latest IMO load       Lymphedema      Overview Note:     Created by Conversion         Paroxysmal Atrial Fibrillation      Overview Note:     Created by Conversion  [facility] Annotation: Nov 28 2012 10:36PM - Shruthi Dhaliwal: Found incidential   on exam on May 7th, [id number] score of 1 for HTNsymptomatic and hospitalized   x2 in July, 2012.CV X 2 in 2012Chronic Sotalol Rx         Osteopenia      Overview Note:     Created by Conversion    Replacement Utility updated for latest IMO load          Past Surgical History:  Past Surgical History:   Procedure Laterality Date     BREAST BIOPSY Left     Benign     CATARACT EXTRACTION  10/17/13     COLONOSCOPY N/A 4/29/2015    Procedure: COLONOSCOPY WITH POLYPECTOMY;  Surgeon: Too Ureña MD;  Location: [facility] OR;  Service:      COLONOSCOPY N/A 11/9/2016    Procedure: COLONOSCOPY;  Surgeon: Ayden Dela Cruz MD;  Location: [facility] OR;  Service:      CT BIOPSY LUNG  8/30/2019     ENDOMETRIAL BIOPSY  4/2014     HYSTERECTOMY  2014     Interstem - Stage 2  09/11/2014     Interstim Stage I  08/26/2014     IR PORT PLACEMENT >5 YEARS  8/28/2019     OOPHORECTOMY Bilateral 2014     post urinary stimulator implantation  8/26/14      KY BIOPSY OF BREAST, INCISIONAL      Description: Incisional Breast Biopsy;  Recorded: 04/05/2011;     KY CARDIOVERSION ELECTIVE ARRHYTHMIA EXTERNAL      Description: Elective Cardioversion External;  Recorded: 07/30/2012;     KY REMOVE TONSILS/ADENOIDS,<11 Y/O      Description: Tonsillectomy With Adenoidectomy;  Recorded: 04/15/2014;     KY SLING OPER STRES INCONTINENCE N/A 11/3/2016    Procedure: PUBOVAGINAL SLING WITH CONCHITA WITH CYSTOSCOPY;  Surgeon: Bill Wilson MD;  Location: [facility];  Service: Gynecology     US BREAST CORE BIOPSY LEFT Left 8/16/2019     VITRECTOMY Right 1/15/15        Meds:  Current Outpatient Medications   Medication Sig Dispense Refill     calcium-vitamin D (CALCIUM-VITAMIN D) 500 mg(1,250mg) -200 unit per tablet Take 1 tablet by mouth 2 (two) times a day with meals.       cholecalciferol, vitamin D3, 1,000 unit tablet Take 2,000 Units by mouth daily.        fesoterodine (TOVIAZ) 4 mg Tb24 ER tablet Take 4 mg by mouth daily.       fluticasone propionate (FLONASE ALLERGY RELIEF) 50 mcg/actuation nasal spray One spray in each nostril daily 16 g 12     furosemide (LASIX) 40 MG tablet TAKE 1 TABLET(40 MG) BY MOUTH TWICE DAILY 180 tablet 3     glucosamine-chondroitin 500-400 mg tablet Take 1 tablet by mouth 2 (two) times a day.        lidocaine-prilocaine (EMLA) cream Place over port 30 min. before being accessed. 30 g 1     loratadine (CLARITIN) 10 mg tablet Take 1 tablet (10 mg total) by mouth daily. 30 tablet 11     metoprolol tartrate (LOPRESSOR) 25 MG tablet Take 1 tablet (25 mg total) by mouth daily.       OMEGA-3/DHA/EPA/FISH OIL (FISH OIL-OMEGA-3 FATTY ACIDS) 300-1,000 mg capsule Take 2 g by mouth 2 times a day at 6:00 am and 4:00 pm.       oxyCODONE-acetaminophen (PERCOCET/ENDOCET) 5-325 mg per tablet Take 1 tablet by mouth every 6 (six) hours as needed for pain. 12 tablet 0     potassium chloride (K-DUR,KLOR-CON) 10 MEQ tablet TAKE 2 TABLETS BY MOUTH TWICE DAILY 360  tablet 3     prochlorperazine (COMPAZINE) 10 MG tablet TAKE 1 TABLET(10 MG) BY MOUTH EVERY 6 HOURS AS NEEDED FOR NAUSEA 90 tablet 3     simvastatin (ZOCOR) 20 MG tablet Take 1 tablet (20 mg total) by mouth daily with supper. 90 tablet 3     vitamin A-vitamin C-vit E-min (OCUVITE) Tab tablet Take 1 tablet by mouth 2 (two) times a day.       warfarin (COUMADIN/JANTOVEN) 5 MG tablet TAKE ONE-HALF TO ONE TABLET BY MOUTH DAILY AS DIRECTED. ADJUST DOSE PER INR RESULT (Patient taking differently: 2.5 mg on , Wednesday and Friday, 5 mg every other day of week) 90 tablet 1     No current facility-administered medications for this visit.         Allergies:  Aspirin and Penicillins     Social History:  Social History     Socioeconomic History     Marital status: Single     Spouse name: Not on file     Number of children: 0     Years of education: Not on file     Highest education level: Not on file   Occupational History     Occupation: Retired RN     Employer: [employer]   Social Needs     Financial resource strain: Not on file     Food insecurity:     Worry: Not on file     Inability: Not on file     Transportation needs:     Medical: Not on file     Non-medical: Not on file   Tobacco Use     Smoking status: Former Smoker     Packs/day: 3.00     Years: 40.00     Pack years: 120.00     Types: Cigarettes     Last attempt to quit: 2005     Years since quittin.8     Smokeless tobacco: Never Used   Substance and Sexual Activity     Alcohol use: Yes     Alcohol/week: 6.0 standard drinks     Types: 6 Cans of beer per week     Drug use: No     Sexual activity: Never     Partners: Male     Birth control/protection: Surgical, Post-menopausal   Lifestyle     Physical activity:     Days per week: Not on file     Minutes per session: Not on file     Stress: Not on file   Relationships     Social connections:     Talks on phone: Not on file     Gets together: Not on file     Attends Jew service: Not on  "file     Active member of club or organization: Not on file     Attends meetings of clubs or organizations: Not on file     Relationship status: Not on file     Intimate partner violence:     Fear of current or ex partner: Not on file     Emotionally abused: Not on file     Physically abused: Not on file     Forced sexual activity: Not on file   Other Topics Concern     Not on file   Social History Narrative    Lives alone single family home that she owns, no children and is retired.        Family History:  Family History   Problem Relation Age of Onset     Hypertension Father      Pneumonia Father      Esophageal cancer Brother      Cancer Brother      Stroke Mother      Alzheimer's disease Brother      Cancer Brother      Prostate cancer Brother      Cancer Nephew      Colon cancer Nephew      Cancer Paternal cousin         Review of Systems: Cough; shortness of breath; weakness; cold intolerance; prior diarrhea on chemo.  Appetite good.  A complete review of systems reviewed by me is negative with the exeption of what has been mentioned in the history of present illness.    Physical Exam:  /72   Pulse 98   Ht 5' 5\" (1.651 m)   Wt (!) 318 lb (144.2 kg)   SpO2 (!) 88%   BMI 52.92 kg/m    General appearance: No apparent distress, well groomed.  Head: Normocephalic, atraumatic.  Musculoskeletal: No edema noted.  No clubbing or cyanosis.  Skin: Warm, dry, intact.  Neurologic: Alert and oriented to person, place and time   Gait is slow with 4WW.  Psychiatric: Affect tired.  Mood \"considering everything, good.\"     Labs/Studies:  Lab Results   Component Value Date    WBC 7.4 10/16/2019    HGB 13.0 10/16/2019    HCT 40.1 10/16/2019    MCV 87 10/16/2019     10/16/2019         Chemistry        Component Value Date/Time     10/16/2019 0945    K 3.4 (L) 10/16/2019 0945     10/16/2019 0945    CO2 24 10/16/2019 0945    BUN 11 10/16/2019 0945    CREATININE 0.95 10/16/2019 0945     (H) " 10/16/2019 0945        Component Value Date/Time    CALCIUM 9.2 10/16/2019 0945    ALKPHOS 67 10/16/2019 0945    AST 19 10/16/2019 0945    ALT 26 10/16/2019 0945    BILITOT 0.8 10/16/2019 0945            No results found for: FERRITIN  Lab Results   Component Value Date    TSH 2.37 06/26/2019     Lab Results   Component Value Date    HGBA1C 6.5 (H) 09/11/2019     2D ECHO reviewed: Date: 12/10/2018  eLVEF 55%.  Decreased RVSF; biatrial enlargement.      PFTs 9/27/2019 - Full Pulmonary Function Test is abnormal. Spirometry suggests mild restriction, but lung volumes were normal.  There is no hyperinflation.  There is no air-trapping.  Diffusion capacity when corrected for hemoglobin is normal.  Per Dr. Ennis's phone note suggestive of possible obesity-related hypoventilation.    Assessment and Plan:  1. Obstructive Sleep Apnea  Original plan was to get download or get new machine and then check residual and nocturnal oximetry.  However, given inconsistent use at best and history, she does not appear to ever have actually qualified while on medicare.  She will, unfortunately, need to restart testing process. Given morbid obesity and PFTs suggesting possible restrictive component, she may require bilevel rather than CPAP.    Will proceed with Split with TCM and start as new patient.      2. BMI 50.0-59.9, adult (H)  We discussed the link between obesity, sleep apnea, and health outcomes.  Patient was encouraged to decrease caloric intake and increase activity levels to try to move towards a normal weight.  She was encouraged to discuss further strategies with her primary care provider.        Patient verbalized understanding of these issues, agrees with the plan and all questions were answered today. Patient was given an opportuntity to voice any other symptoms or concerns not listed above. Patient did not have any other symptoms or concerns.      Quentin Mcneil MD  ABI Board Certified in Internal Medicine  and Sleep Medicine  [facility].

## 2021-06-02 NOTE — TELEPHONE ENCOUNTER
Telephoned and spoke with Jennifer to discuss a referral to Jenny Cordova, our oncology psychotherapist. She declined at this time but expressed appreciation for the reminder.  Fabi Morton RN

## 2021-06-02 NOTE — TELEPHONE ENCOUNTER
ANTICOAGULATION  MANAGEMENT - BPA Interacting Medication Review    Interacting medication(s): Levofloxacin with warfarin.    Duration: 7 days 10/30-11/6 for uri sx    New medication?:  Yes, interaction per Micromedex, may increase INR and risk of bleeding.    Plan:    Left a detailed message for Jennifer regarding potential interaction.     Warfarin instructions: continue current warfarin dose    Follow up INR recommended in:  Within the week    Anticoagulation calendar updated    Ibrahima Trimble RN

## 2021-06-02 NOTE — TELEPHONE ENCOUNTER
ANTICOAGULATION  MANAGEMENT    Assessment     Today's INR result of 2.3 is Therapeutic (goal INR of 2.0-3.0)        Warfarin taken as previously instructed    No new diet changes affecting INR    No new medication/supplements affecting INR    Continues to tolerate warfarin with no reported s/s of bleeding or thromboembolism     Previous INR was Therapeutic    Plan:     Left a detailed message for Jennifer regarding INR result and instructed:     Warfarin Dosing Instructions:  Continue current warfarin dose 2.5 mg daily on mon/fri; and 5 mg daily rest of week  (0 % change)    Instructed patient to follow up no later than: 3 weeks with onc visit      Instructed to call the [facility] for any changes, questions or concerns. (#[phone])   ?   Ibrahima Trimble RN    Subjective/Objective:      Jennifer Galvin, a 74 y.o. female is on warfarin.     Jennifer reports:     Home warfarin dose: as updated on anticoagulation calendar per template     Missed doses: No     Medication changes:  No     S/S of bleeding or thromboembolism:  No     New Injury or illness:  No     Changes in diet or alcohol consumption:  No     Upcoming surgery, procedure or cardioversion:  No    Anticoagulation Episode Summary     Current INR goal:   2.0-3.0   TTR:   72.2 %   Next INR check:   11/6/2019   INR from last check:   2.31 (10/16/2019)   Weekly max warfarin dose:      Target end date:      INR check location:      Preferred lab:      Send INR reminders to:   [facility]    Indications    Paroxysmal Atrial Fibrillation [I48.91]           Comments:            Anticoagulation Care Providers     Provider Role Specialty Phone number    Cordell Ceja DO Referring Internal Medicine [phone]

## 2021-06-02 NOTE — TELEPHONE ENCOUNTER
Jennifer calls today to let us know that she has had cold symptoms since Saturday.  She is wondering if she should get treatment tomorrow.  She has congestion, a cough and had some diarrhea over the weekend but not today.  She has not had a fever.  Per Meg GARCIA, she is OK to have her treatment if she feels she wants to come in but she can also delay 1 week if she doesn't feel well enough to come in.  Patient will call back if she wants to cancel her appointment.

## 2021-06-03 VITALS
DIASTOLIC BLOOD PRESSURE: 78 MMHG | HEART RATE: 80 BPM | WEIGHT: 293 LBS | SYSTOLIC BLOOD PRESSURE: 117 MMHG | TEMPERATURE: 97.4 F | BODY MASS INDEX: 58.74 KG/M2 | OXYGEN SATURATION: 92 % | RESPIRATION RATE: 18 BRPM

## 2021-06-03 VITALS
HEART RATE: 112 BPM | DIASTOLIC BLOOD PRESSURE: 86 MMHG | BODY MASS INDEX: 48.82 KG/M2 | SYSTOLIC BLOOD PRESSURE: 134 MMHG | WEIGHT: 293 LBS | HEIGHT: 65 IN | OXYGEN SATURATION: 95 %

## 2021-06-03 VITALS
SYSTOLIC BLOOD PRESSURE: 149 MMHG | DIASTOLIC BLOOD PRESSURE: 93 MMHG | RESPIRATION RATE: 16 BRPM | TEMPERATURE: 97.4 F | BODY MASS INDEX: 56.25 KG/M2 | OXYGEN SATURATION: 94 % | HEART RATE: 118 BPM | WEIGHT: 293 LBS

## 2021-06-03 VITALS — BODY MASS INDEX: 45.99 KG/M2 | WEIGHT: 293 LBS | HEIGHT: 67 IN

## 2021-06-03 VITALS
OXYGEN SATURATION: 94 % | BODY MASS INDEX: 54.39 KG/M2 | RESPIRATION RATE: 18 BRPM | HEART RATE: 81 BPM | TEMPERATURE: 98.7 F | DIASTOLIC BLOOD PRESSURE: 58 MMHG | SYSTOLIC BLOOD PRESSURE: 100 MMHG | WEIGHT: 293 LBS

## 2021-06-03 VITALS
OXYGEN SATURATION: 94 % | WEIGHT: 293 LBS | RESPIRATION RATE: 22 BRPM | SYSTOLIC BLOOD PRESSURE: 108 MMHG | HEART RATE: 116 BPM | DIASTOLIC BLOOD PRESSURE: 61 MMHG | BODY MASS INDEX: 57.38 KG/M2

## 2021-06-03 VITALS
OXYGEN SATURATION: 96 % | HEIGHT: 65 IN | DIASTOLIC BLOOD PRESSURE: 62 MMHG | HEART RATE: 102 BPM | BODY MASS INDEX: 48.82 KG/M2 | SYSTOLIC BLOOD PRESSURE: 104 MMHG | WEIGHT: 293 LBS

## 2021-06-03 VITALS
OXYGEN SATURATION: 93 % | DIASTOLIC BLOOD PRESSURE: 67 MMHG | WEIGHT: 293 LBS | BODY MASS INDEX: 48.82 KG/M2 | TEMPERATURE: 97.7 F | HEART RATE: 81 BPM | HEIGHT: 65 IN | SYSTOLIC BLOOD PRESSURE: 117 MMHG

## 2021-06-03 VITALS
RESPIRATION RATE: 18 BRPM | SYSTOLIC BLOOD PRESSURE: 128 MMHG | OXYGEN SATURATION: 90 % | BODY MASS INDEX: 48.82 KG/M2 | HEIGHT: 65 IN | DIASTOLIC BLOOD PRESSURE: 80 MMHG | WEIGHT: 293 LBS | HEART RATE: 77 BPM

## 2021-06-03 VITALS
HEART RATE: 98 BPM | WEIGHT: 293 LBS | DIASTOLIC BLOOD PRESSURE: 72 MMHG | OXYGEN SATURATION: 88 % | HEIGHT: 65 IN | BODY MASS INDEX: 48.82 KG/M2 | SYSTOLIC BLOOD PRESSURE: 120 MMHG

## 2021-06-03 VITALS
BODY MASS INDEX: 53.08 KG/M2 | DIASTOLIC BLOOD PRESSURE: 84 MMHG | HEART RATE: 124 BPM | TEMPERATURE: 97 F | WEIGHT: 293 LBS | SYSTOLIC BLOOD PRESSURE: 144 MMHG

## 2021-06-03 VITALS — WEIGHT: 293 LBS | BODY MASS INDEX: 48.82 KG/M2 | HEIGHT: 65 IN

## 2021-06-03 VITALS
TEMPERATURE: 97.7 F | OXYGEN SATURATION: 97 % | RESPIRATION RATE: 20 BRPM | BODY MASS INDEX: 55.33 KG/M2 | WEIGHT: 293 LBS | SYSTOLIC BLOOD PRESSURE: 104 MMHG | HEART RATE: 94 BPM | DIASTOLIC BLOOD PRESSURE: 74 MMHG

## 2021-06-03 VITALS
HEART RATE: 97 BPM | DIASTOLIC BLOOD PRESSURE: 84 MMHG | BODY MASS INDEX: 54.23 KG/M2 | WEIGHT: 293 LBS | OXYGEN SATURATION: 94 % | TEMPERATURE: 97.5 F | SYSTOLIC BLOOD PRESSURE: 119 MMHG

## 2021-06-03 VITALS — BODY MASS INDEX: 47.09 KG/M2 | HEIGHT: 66 IN | WEIGHT: 293 LBS

## 2021-06-03 VITALS — HEIGHT: 66 IN | BODY MASS INDEX: 47.09 KG/M2 | WEIGHT: 293 LBS

## 2021-06-03 VITALS
WEIGHT: 293 LBS | SYSTOLIC BLOOD PRESSURE: 123 MMHG | DIASTOLIC BLOOD PRESSURE: 56 MMHG | TEMPERATURE: 98.1 F | HEART RATE: 91 BPM | BODY MASS INDEX: 48.82 KG/M2 | OXYGEN SATURATION: 94 % | HEIGHT: 65 IN

## 2021-06-03 VITALS
RESPIRATION RATE: 24 BRPM | WEIGHT: 293 LBS | BODY MASS INDEX: 48.82 KG/M2 | HEIGHT: 65 IN | HEART RATE: 115 BPM | DIASTOLIC BLOOD PRESSURE: 80 MMHG | SYSTOLIC BLOOD PRESSURE: 128 MMHG

## 2021-06-03 VITALS — HEIGHT: 65 IN | WEIGHT: 293 LBS | BODY MASS INDEX: 48.82 KG/M2

## 2021-06-03 VITALS — BODY MASS INDEX: 48.82 KG/M2 | HEIGHT: 65 IN | WEIGHT: 293 LBS

## 2021-06-03 VITALS
DIASTOLIC BLOOD PRESSURE: 94 MMHG | OXYGEN SATURATION: 92 % | TEMPERATURE: 98 F | SYSTOLIC BLOOD PRESSURE: 122 MMHG | RESPIRATION RATE: 18 BRPM | HEART RATE: 107 BPM | BODY MASS INDEX: 55.25 KG/M2 | WEIGHT: 293 LBS

## 2021-06-03 VITALS — BODY MASS INDEX: 53.02 KG/M2 | WEIGHT: 293 LBS

## 2021-06-03 VITALS — HEIGHT: 66 IN | WEIGHT: 293 LBS | BODY MASS INDEX: 47.09 KG/M2

## 2021-06-03 NOTE — TELEPHONE ENCOUNTER
----- Message from Cordell Ceja DO sent at 11/27/2019  1:04 PM CST -----  Please make sure Jennifer knows that her chest x-ray was normal, no sign of pneumonia

## 2021-06-03 NOTE — TELEPHONE ENCOUNTER
Please let Jennifer know that the chest x-ray is normal, no change since the last one done this past summer apart from the port that was put in since the last x-ray.  No sign of pneumonia.

## 2021-06-03 NOTE — ANESTHESIA POSTPROCEDURE EVALUATION
Patient: Jennifer Galvin  Left lumpectomy after wire localization with sentinel lymph node biopsy  Anesthesia type: general    Patient location: PACU  Last vitals:   Vitals Value Taken Time   /66 12/2/2019  1:10 PM   Temp 36.7  C (98  F) 12/2/2019  1:10 PM   Pulse 100 12/2/2019  1:29 PM   Resp 25 12/2/2019  1:10 PM   SpO2 96 % 12/2/2019  1:10 PM   Vitals shown include unvalidated device data.  Post vital signs: Tachycardic and tachypneic. Accessory muscle use for respiration.    Level of consciousness: awake and responds to simple questions  Post-anesthesia pain: pain controlled  Post-anesthesia nausea and vomiting: no  Pulmonary: nasal cannula, 4L to maintain sats, tachypneic, coughing, labored breathing  Cardiovascular: stable, blood pressure at baseline and tachycardic  Hydration: adequate  Anesthetic events: no    QCDR Measures:  ASA# 11 - Radha-op Cardiac Arrest: ASA11B - Patient did NOT experience unanticipated cardiac arrest  ASA# 12 - Radha-op Mortality Rate: ASA12B - Patient did NOT die  ASA# 13 - PACU Re-Intubation Rate: ASA13B - Patient did NOT require a new airway mgmt  ASA# 10 - Composite Anes Safety: ASA10A - No serious adverse event    Additional Notes: Decided to admit patient for observation as cough, tachypnea, and lower sats were concerning, along with tachycardia. Hospitalist desired patient to be admitted to tele unit which was accomplished. Plan communicated to Dr. Ferrera and she was in agreement.

## 2021-06-03 NOTE — ANESTHESIA PREPROCEDURE EVALUATION
Anesthesia Evaluation      Patient summary reviewed   No history of anesthetic complications     Airway   Mallampati: III  Neck ROM: full   Pulmonary - normal exam   (+) sleep apnea (Poor compliance), wheezes,                          Cardiovascular   (+) hypertension, CAD, dysrhythmias, ,     ECG reviewed  Rate: abnormal,      PE comment: tachycardia,     Neuro/Psych - negative ROS     Endo/Other    (+) diabetes mellitus type 2, obesity (BMI 51),      GI/Hepatic/Renal - negative ROS      Other findings: Fair dentition      Dental                           Anesthesia Plan  Planned anesthetic: general endotracheal  Pre-op: duoneb inhaler  Intra-op: 30mg ketamine with indication, decadron/zofran/propofol gtt for anti-emetic, fentanyl, ketorolac if ok with surgeon. Albuterol prior to extubation.     Patient with chronic cough for about 6 weeks. Cxr on 11/22/2019 showed:  New right IJ port. Borderline heart size with normal pulmonary vascularity. The lungs and pleural spaces are clear. No change from previous.    Intermittent bilateral wheeze in pre-op, will give neb prior to proceeding to OR.   ASA 3   Induction: intravenous   Anesthetic plan and risks discussed with: patient  Anesthesia plan special considerations: antiemetics,   Post-op plan: routine recovery

## 2021-06-03 NOTE — TELEPHONE ENCOUNTER
----- Message from Emeli Graham sent at 11/14/2019  2:40 PM CST -----  Regarding: RANJIT Pt  General phone call:    Caller: Patient   Primary cardiologist: Umair   Detailed reason for call: Increased heart rate. Please call back  Best phone number: [phone]  Best time to contact: any   Ok to leave a detailed message? yes  Device? no    Additional Info:

## 2021-06-03 NOTE — TELEPHONE ENCOUNTER
Return call to Pre-op RN - informed Luz of Dr. Block's response which should be viewable in chart - understanding verbalized.   mg

## 2021-06-03 NOTE — TELEPHONE ENCOUNTER
Spoke with Luz and relayed message per pre op note:  Approved for lumpectomy and sentinel LN biopsy.     Luz understanding.

## 2021-06-03 NOTE — TELEPHONE ENCOUNTER
FYI - Status Update  Who is Calling: [facility]    Update:     Patient has an upcoming surgery, however two days before the pre op patient developed shortness of breath and saw her Onc, whom then sent her to her Cardio.   See office notes 11/13/19 and 11/19/19 with outcome.  [facility] needs an ok from PCP to proceed with surgery.    Okay to leave a detailed message?:  Yes     Please call [facility] regarding to pending surg and how to proceed.

## 2021-06-03 NOTE — PROGRESS NOTES
Pt ambulates to infusion center for labs and treatment.  Pt assessment done and was found to have the following issues: increased weakness (pt reports falling yesterday while trying to reach something off a bottom shelf), increased fatigue, dry eyes, increased neuropathy of her fingers, nail bed changes.  Dr. Campuzano notified and decision was made to cancel taxol for today.  IVAD accessed, labs drawn et sent w/results noted.  Herceptin infused as ordered without difficulty.  IVAD flushed w/NS et heparin and needle deaccessed.  Pt left clinic stable to lobby accompanied by a friend.

## 2021-06-03 NOTE — TELEPHONE ENCOUNTER
ANTICOAGULATION  MANAGEMENT    Assessment     Today's INR result of 2.8 is Therapeutic (goal INR of 2.0-3.0)        Warfarin taken as previously instructed    No new diet changes affecting INR    No new medication/supplements affecting INR    Continues to tolerate warfarin with no reported s/s of bleeding or thromboembolism     Previous INR was Therapeutic     Lumpectomy 12/2 will hold warfarin 5 days prior, no bridge per pre op    Plan:     Spoke with Jennifer regarding INR result and instructed:     Warfarin Dosing Instructions:  begin hold for surgery 12/2. after surgery with surgeon ok, then continue current warfarin dose 2.5 mg daily on mon/fri; and 5 mg daily rest of week  (0 % change)    Instructed patient to follow up no later than: with post op visit 12/10      Jennifer verbalizes understanding and agrees to warfarin dosing plan.    Instructed to call the [facility] for any changes, questions or concerns. (#[phone])   ?   Ibrahima Trimble RN    Subjective/Objective:      Jenniferlakisha Galvin, a 74 y.o. female is on warfarin.     Jennifer reports:     Home warfarin dose: as updated on anticoagulation calendar per template     Missed doses: No     Medication changes:  No     S/S of bleeding or thromboembolism:  No     New Injury or illness:  No     Changes in diet or alcohol consumption:  No     Upcoming surgery, procedure or cardioversion:  No    Anticoagulation Episode Summary     Current INR goal:   2.0-3.0   TTR:   75.8 % (1 y)   Next INR check:   12/10/2019   INR from last check:   2.89 (11/27/2019)   Weekly max warfarin dose:      Target end date:      INR check location:      Preferred lab:      Send INR reminders to:   ABHISHEK MIDWAY    Indications    Paroxysmal Atrial Fibrillation [I48.91]           Comments:            Anticoagulation Care Providers     Provider Role Specialty Phone number    Cordell Ceja DO Referring Internal Medicine [phone]

## 2021-06-03 NOTE — TELEPHONE ENCOUNTER
Patient Returning Call  Reason for call:  results  Information relayed to patient:  The writer read the following to patient per Dr Ceja: Please let Jennifer know that the chest x-ray is normal, no change since the last one done this past summer apart from the port that was put in since the last x-ray.  No sign of pneumonia  Patient has additional questions:  No  If YES, what are your questions/concerns:  States she is doing ok.    Okay to leave a detailed message?: No call back needed

## 2021-06-03 NOTE — PROGRESS NOTES
[facility] Progress Note    Patient: Jennifer Galvin  MRN: [id number]  Date of Service: 11/13/2019        Reason for visit      1. Malignant neoplasm of upper-outer quadrant of left breast in female, estrogen receptor positive (H)    2.  Thymoma    Assessment     1.  A very complicated at the same time very pleasant 74 y.o. woman with what looks like a stage Ib CA breast left side ER positive CT positive as well as HER-2/arcelia 3+.  Currently status post 3 cycles of neoadjuvant weekly paclitaxel with Herceptin.  2.  Anterior mediastinal mass which is growing slowly over the past 7 to 8 years.  Biopsy is consistent with thymoma.  3.  Shortness of breath of unclear etiology.  She has seen pulmonology.  I think her shortness of breath could be due to cardiology reasons.  We may have to have her it reviewed by Dr. Alexey Limon.  4.  Other medical issues including weight gain etc.  5.  Atrial fibrillation on anticoagulation.  6.  Mild anxiety.      Plan     1.  Continue neoadjuvant chemotherapy with paclitaxel and Herceptin.  She will get it for 12 weeks.  Herceptin will be continued for a total of 1 year.  2.  This will be followed up with surgery.  3.  She will also require anastrozole for adjuvant hormonal therapy.  4.  I think she will also need her thymoma excision.  I think that might be causing some of her weakness symptoms.  5.  Due to her tachycardia I am going to get in touch with Dr. Alexey Limon or his clinic and try to get Jennifer seen by them sooner than later.    Clinical stage      Cancer Staging  Malignant neoplasm of upper-outer quadrant of left breast in female, estrogen receptor positive (H)  Staging form: Breast, AJCC 8th Edition  - Clinical: Stage IB (cT2, cN0, cM0, G1, ER+, CT+, HER2+) - Signed by Roly Villalba MD on 8/21/2019      History     Jennifer Galvin is a very pleasant 74 y.o. old female with a history of newly diagnosed breast cancer.  There is breast cancer is located on her left  breast measures 2.3 cm in size in the upper outer quadrant ER positive AL positive as well as HER-2/arcelia 3+ on immunohistochemistry.  It is not palpable.  Detected incidentally in late July early August 2019.  Her presentation started with shortness of breath for which she was sent to cardiology for a stress test.  The stress test showed some uptake in the mediastinum as well as in the breast.  Therefore she had a CT scan of the chest which showed that she had a 3.2 cm mass in the mediastinum located anteriorly.  This was initially detected in 2012 but had grown from 1.9 cm at the time to 3.2 cm now.  No other abnormality noted.  Differential is thymoma/lymphoma.  She also had a mammogram which showed that she had a lesion in her left breast 2.3 cm in size.  No lymph adenopathy noted in the axillary area.  She then had a ultrasound-guided biopsy of the left breast which showed this invasive ductal carcinoma.     She was subsequently seen by me.  We had her undergo CT-guided biopsy of the mediastinal mass which has come back as thymoma.  She is also seen pulmonology for shortness of breath.      Jennifer was started on neoadjuvant chemotherapy with weekly paclitaxel and Herceptin on 4 September 2019.  Her shortness of breath has really not changed during this time.  In fact she might be slightly worse.  She had significant of coughing which is still going on, initially was productive and was given antibiotics and now it is nonproductive.  Some runny nose is associated with it.  She is extremely fatigued.    Past Medical History     Past Medical History:   Diagnosis Date     (Lower) Leg Localized Swelling     Created by Conversion      Adenocarcinoma In Situ Of The Uterus     Created by Conversion      Backache     Created by Conversion [facility] Annotation: Feb 29 2012 12:14PM - Opal Helm: Referred to  [facility] 9/11, given TENS unit.      Benign Polyps Of The Large Intestine     Created by Conversion       Breast cancer (H)      Cancer (H)     uterine     Coronary artery disease      Diabetes mellitus (H)      Fatigue     Created by Conversion      Hyperglycemia     Created by Conversion      Hyperlipidemia     Created by Conversion      Hypertension     Created by Conversion      Joint Pain, Localized In The Knee     Created by Conversion      Lesion of mediastinum      Lymphedema     Created by Conversion      Macular Degeneration     Created by Conversion [facility] Annotation: Apr 5 2011 12:22PM - Tien Guzman: Followed by  Ophthalmologist, Dr. Avilez.      Major Depression, Single Episode In Remission     Created by Conversion      Obesity     Created by Conversion      Obstructive Sleep Apnea     CPAP     Osteopenia     Created by Conversion      Paroxysmal Atrial Fibrillation     Created by Conversion [facility] Annotation: Nov 28 2012 10:36PM - Shruthi Dhaliwal: Found incidential  on exam on May 7th, [id number] score of 1 for HTNsymptomatic and hospitalized  x2 in July, 2012.CV X 2 in 2012Chronic Sotalol Rx      Postmenopausal bleeding     Created by Conversion      Skin cancer      Tachycardia     Created by Conversion      Tremor     Created by Conversion      Urge And Stress Incontinence     Created by Conversion      Urinary Frequency More Than Twice At Night (Nocturia)     Created by Conversion      Urine Tests Nonspecific Abnormal Findings     Created by Conversion      Vitamin D Deficiency     Created by Conversion            Review of Systems   Constitutional  Constitutional (WDL): Exceptions to WDL  Fatigue: Fatigue not relieved by rest - Limiting instrumental ADL  Neurosensory  Neurosensory (WDL): Exceptions to WDL  Peripheral Motor Neuropathy: Asymptomatic, clinical or diagnostic observations only, intervention not indicated(hands)  Ataxia: Moderate symptoms, limiting instrumental ADL(using a walker)  Peripheral Sensory Neuropathy: Moderate symptoms, limiting instrumental ADL(fingertips and  heels)  Cardiovascular  Cardiovascular (WDL): Exceptions to WDL()  Edema: Yes  Edema Limbs: 5 - 10% inter-limb discrepancy in volume or circumference at point of greatest visible difference, swelling or obscuration of anatomic architecture on close inspection(legs)  Pulmonary  Respiratory (WDL): Exceptions to WDL  Cough: Mild symptoms, nonprescription intervention indicated(dry)  Dyspnea: Shortness of breath with minimal exertion, limiting instrumental ADL  Gastrointestinal  Gastrointestinal (WDL): All gastrointestinal elements are within defined limits  Genitourinary  Genitourinary (WDL): All genitourinary elements are within defined limits  Integumentary  Integumentary (WDL): Exceptions to WDL(arms have open sores)  Alopecia: Hair loss of >50% normal for that individual that is readily apparent to others, a wig or hair piece is necessary if the patient desires to completely camouflage the hair loss, associated with psychosocial impact  Pruritus: Intense or widespread, intermittent, skin changes from scratching (e.g., edema, papulation, excoriations, lichenification, oozing/crusts), oral intervention indicated, limiting instrumental ADL  Patient Coping  Patient Coping: Accepting  Accompanied by  Accompanied by: Family Member(sister)    ECOG performance status and Distress Assessment      ECOG Performance: 1   ECOG Performance Status: 2    Distress Assessment  Distress Assessment Score: 5:     Pain Status  Currently in Pain: No/denies        Vital Signs     Vitals:    11/13/19 1043   BP: 144/84   Pulse: (!) 124   Temp: 97  F (36.1  C)       Physical Exam     GENERAL: No acute distress. Cooperative in conversation.   HEENT: Pupils are equal, round and reactive. Oral mucosa is clean and intact. No ulcerations or mucositis noted. No bleeding noted.  RESP:Chest symmetric lungs are clear bilaterally per auscultation. Regular respiratory rate. No wheezes or rhonchi.  CV: Normal S1 S2 Regular, rate and rhythm. No  murmurs.  ABD: Nondistended, soft, nontender. Positive bowel sounds. No organomegaly.   EXTREMITIES: No lower extremity edema.   NEURO: Non- focal. Alert and oriented x3.  Cranial nerves appear intact.  PSYCH: Within normal limits. No depression or anxiety.  SKIN: Warm dry intact.    LYMPH NODES: Bilateral cervical, supraclavicular, axillary lymph node examination was done.  Negative for any palpable adenopathy.      Lab Results     Results for orders placed or performed in visit on 11/12/19   Comprehensive Metabolic Panel   Result Value Ref Range    Sodium 142 136 - 145 mmol/L    Potassium 3.9 3.5 - 5.0 mmol/L    Chloride 108 (H) 98 - 107 mmol/L    CO2 26 22 - 31 mmol/L    Anion Gap, Calculation 8 5 - 18 mmol/L    Glucose 107 70 - 125 mg/dL    BUN 17 8 - 28 mg/dL    Creatinine 0.96 0.60 - 1.10 mg/dL    GFR MDRD Af Amer >60 >60 mL/min/1.73m2    GFR MDRD Non Af Amer 57 (L) >60 mL/min/1.73m2    Bilirubin, Total 1.0 0.0 - 1.0 mg/dL    Calcium 9.5 8.5 - 10.5 mg/dL    Protein, Total 6.1 6.0 - 8.0 g/dL    Albumin 3.2 (L) 3.5 - 5.0 g/dL    Alkaline Phosphatase 68 45 - 120 U/L    AST 15 0 - 40 U/L    ALT 18 0 - 45 U/L   HM1 (CBC with Diff)   Result Value Ref Range    WBC 6.7 4.0 - 11.0 thou/uL    RBC 3.89 3.80 - 5.40 mill/uL    Hemoglobin 11.6 (L) 12.0 - 16.0 g/dL    Hematocrit 36.5 35.0 - 47.0 %    MCV 94 80 - 100 fL    MCH 29.8 27.0 - 34.0 pg    MCHC 31.8 (L) 32.0 - 36.0 g/dL    RDW 24.2 (H) 11.0 - 14.5 %    Platelets 209 140 - 440 thou/uL    MPV 10.5 8.5 - 12.5 fL    Neutrophils % 69 50 - 70 %    Lymphocytes % 19 (L) 20 - 40 %    Monocytes % 9 2 - 10 %    Eosinophils % 1 0 - 6 %    Basophils % 1 0 - 2 %    Neutrophils Absolute 4.6 2.0 - 7.7 thou/uL    Lymphocytes Absolute 1.3 0.8 - 4.4 thou/uL    Monocytes Absolute 0.6 0.0 - 0.9 thou/uL    Eosinophils Absolute 0.0 0.0 - 0.4 thou/uL    Basophils Absolute 0.1 0.0 - 0.2 thou/uL   INR   Result Value Ref Range    INR 1.72 (H) 0.90 - 1.10   Manual Differential   Result Value  Ref Range    Platelet Estimate Normal Normal    Ovalocytes 1+ (!) Negative    Polychromasia 1+ (!) Negative         Imaging Results     No results found.      Roly Villalba MD

## 2021-06-03 NOTE — TELEPHONE ENCOUNTER
ANTICOAGULATION  MANAGEMENT    Assessment     Today's INR result of 2.8 is Therapeutic (goal INR of 2.0-3.0)        Less warfarin taken than instructed which may be affecting INR took 2.5 mg wed    No new diet changes affecting INR    No new medication/supplements affecting INR    Continues to tolerate warfarin with no reported s/s of bleeding or thromboembolism     Previous INR was Therapeutic    Plan:     Spoke with Jennifer regarding INR result and instructed:     Warfarin Dosing Instructions:  Continue current warfarin dose 2.5 mg daily on mon/wed/fri; and 5 mg daily rest of week     Instructed patient to follow up no later than: 11/13 with infusion visit      Jennifer verbalizes understanding and agrees to warfarin dosing plan.    Instructed to call the [facility] for any changes, questions or concerns. (#[phone])   ?   Ibrahima Trimble RN    Subjective/Objective:      Jennifer Galvin, a 74 y.o. female is on warfarin.     Jennifer reports:     Home warfarin dose: as updated on anticoagulation calendar per template     Missed doses: No     Medication changes:  No     S/S of bleeding or thromboembolism:  No     New Injury or illness:  No     Changes in diet or alcohol consumption:  No     Upcoming surgery, procedure or cardioversion:  No    Anticoagulation Episode Summary     Current INR goal:   2.0-3.0   TTR:   73.3 %   Next INR check:   11/13/2019   INR from last check:   2.87 (11/6/2019)   Weekly max warfarin dose:      Target end date:      INR check location:      Preferred lab:      Send INR reminders to:   ABHISHEK MIDWAY    Indications    Paroxysmal Atrial Fibrillation [I48.91]           Comments:            Anticoagulation Care Providers     Provider Role Specialty Phone number    Cordell Ceja DO Referring Internal Medicine [phone]

## 2021-06-03 NOTE — TELEPHONE ENCOUNTER
ANTICOAGULATION  MANAGEMENT    Assessment     Today's INR result of 1.72 is Subtherapeutic (goal INR of 2.0-3.0)        Warfarin taken as previously instructed    No new diet changes affecting INR    No new medication/supplements affecting INR finished levoquin. sx resolving    Continues to tolerate warfarin with no reported s/s of bleeding or thromboembolism     Previous INR was Therapeutic    Plan:     Spoke with Jennifer regarding INR result and instructed:     Warfarin Dosing Instructions:  Change warfarin dose to 2.5 mg daily on mon/fri; and 5 mg daily rest of week (9% change)    Instructed patient to follow up no later than: one week with cancer care visit      Jennifer verbalizes understanding and agrees to warfarin dosing plan.    Instructed to call the [facility] for any changes, questions or concerns. (#[phone])   ?   Ibrahima Trimble RN    Subjective/Objective:      Jennifer VICKEY Kamar, a 74 y.o. female is on warfarin.     Jennifer reports:     Home warfarin dose: as updated on anticoagulation calendar per template     Missed doses: No     Medication changes:  No     S/S of bleeding or thromboembolism:  No     New Injury or illness:  No     Changes in diet or alcohol consumption:  No     Upcoming surgery, procedure or cardioversion:  No    Anticoagulation Episode Summary     Current INR goal:   2.0-3.0   TTR:   74.1 %   Next INR check:   11/20/2019   INR from last check:   1.72! (11/12/2019)   Weekly max warfarin dose:      Target end date:      INR check location:      Preferred lab:      Send INR reminders to:   ABHISHEK MIDWAY    Indications    Paroxysmal Atrial Fibrillation [I48.91]           Comments:            Anticoagulation Care Providers     Provider Role Specialty Phone number    Cordell Ceja DO Referring Internal Medicine [phone]

## 2021-06-03 NOTE — PROGRESS NOTES
Pt came into infusion clinic for her treatment as ordered. Labs Reviewed. Medications explained to pt who verbalized understanding. Port patent throughout infusion. Pt tolerated infusion with no complications. Pt states she still has a significant rash,  Pt was told to call or report to ED for worsening symptoms. Pt left infusion clinic via ambulatory and will RTC as sched.

## 2021-06-03 NOTE — TELEPHONE ENCOUNTER
Dr. Block,  Please see patient concerns below.  Dr. Villalba is requesting she be seen sooner rather then later.  Message sent to schedulers for follow up with you or to schedule in [facility] if you are booked out too far.   Do you have any new orders or recommendations?  Thank you.    ----------------------  Patient calling at Dr. Villalba's prompting to report her heart rate is running over 100. Per 11/13/19 clinic visit with Dr. Villalba- pulse 124 B/P 144/84.  Patient is currently undergoing chemotherapy and has an upper respiratory infection.  She has worsening shortness of breath, fatigue and is generally not feeling well.   She denies fever, lightheadedness, dizziness, palpitations, chest pain/pressure and syncope.   She is drinking fluids in an attempt to stay hydrated.    Reviewed signs and symptoms of when to seek urgent medical care.

## 2021-06-03 NOTE — PROGRESS NOTES
Pt came into infusion clinic for her treatment as ordered. Labs Reviewed. Pt states she finished her Antibx yesterday and her cough is improving. Pt also states her rash/itching has gotten significantly worse. Pt has scratched so much he open areas. Pt says she has tried Benadryl cream and it has not helped. This was reported to Meg GARCIA who said pt should try Zyrtec, Pepcid, and Hydrocortisone cream. Pt is already on Claritin per her Pulmonologist but said she will get the other medications. Pt also states she has been taking her Potassium as ordered for thew past 2 weeks. Port patent throughout infusion. Pt tolerated infusion with no complications. Pt was told to call if no improvement/worsening rash. Pt left infusion clinic via ambulatory and will RTC as sched.

## 2021-06-03 NOTE — TELEPHONE ENCOUNTER
Dr. Block,  Please review clinic note 11/19/19.  Do you want patient to have a nuclear perfusion study prior to lumpectomy?  She is scheduled for surgery 12/2/19.  Message sent to the schedulers to call patient for repeat Holter.  Any other recommendations?  Thank you,  Kathy  ----------------------------------------------------------  Patient seen in clinic by Dr. Block 11/19/19 for atrial fibrillation and shortness of breath.  Metoprolol tartrate increased to 25 mg twice a day.  Repeat Holter monitor not done as of yet.    Per 11/19/19 clinic note:  Follow-up and further recommendations pending nuclear perfusion study results.    Message will be sent to Dr. Block for further orders or recommendations.

## 2021-06-03 NOTE — PROGRESS NOTES
Preoperative Exam    Scheduled Procedure: Left Lumpectomy with [place] Lymph Node Biopsy  Surgery Date:  12/2/19  Surgery Location: [facility], fax [phone]    Surgeon:  Dr. Ferrera    Assessment/Plan:     1. Preop exam for internal medicine  Approved for lumpectomy and sentinel LN biopsy.   Chest x-ray done for ongoing but improving cough (negative)  Low risk surgery, no additional workup needed  Hold warfarin 5 days preop without bridge, no need to bridge after  Hold ASA/NSAIDs    2. Obstructive Sleep Apnea  Bring home CPAP    3. Type 2 diabetes mellitus with stage 1 chronic kidney disease, without long-term current use of insulin (H)  Well controlled, A1c <7    4. SOB (shortness of breath)  See above, no sign of heart failure or pneumonia  - XR Chest 2 Views    5. Atrial fibrillation, unspecified type (H)  See above, ok to hold warfarin short term for surgery        Surgical Procedure Risk: Low (reported cardiac risk generally < 1%)  Have you had prior anesthesia?: Yes  Have you or any family members had a previous anesthesia reaction:  No  Do you or any family members have a history of a clotting or bleeding disorder?: No  Cardiac Risk Assessment: no increased risk for major cardiac complications    APPROVAL GIVEN to proceed with proposed procedure, without further diagnostic evaluation    Please Note:  Patient uses a CPAP machine.    Functional Status: Partially Dependent:   Patient plans to recover at home with family.     Subjective:      Jennifer Galvin is a 74 y.o. female who presents for a preoperative consultation.      She has been going through chemotherapy and has upcoming lumpectomy with sentinel LN biopsy.    She has been dealing with cold symptoms x 2 months: dry cough, getting better.  Not much sinus drainage, no shortness of breath.     Last chemo on Weds, 2d ago.      All other systems reviewed and are negative, other than those listed in the HPI.    Pertinent History  Do you have  difficulty breathing or chest pain after walking up a flight of stairs: Yes:  shortness of breath, she can't walk very far without a walker.  This has been since getting a cold  History of obstructive sleep apnea: Yes: has CPAP, uses intermittently. Sleep study upcoming.    Steroid use in the last 6 months: Yes: with chemo  Frequent Aspirin/NSAID use: No  Prior Blood Transfusion: No  Prior Blood Transfusion Reaction: No  If for some reason prior to, during or after the procedure, if it is medically indicated, would you be willing to have a blood transfusion?:  There is no transfusion refusal.    Current Outpatient Medications   Medication Sig Dispense Refill     calcium-vitamin D (CALCIUM-VITAMIN D) 500 mg(1,250mg) -200 unit per tablet Take 1 tablet by mouth 2 (two) times a day with meals.       cholecalciferol, vitamin D3, 1,000 unit tablet Take 2,000 Units by mouth daily.        diphenhydrAMINE (BENADRYL) 2 % cream Apply 1 application topically 3 (three) times a day as needed for itching.       diphenhydrAMINE (BENADRYL) 25 mg capsule Take 25 mg by mouth every 6 (six) hours as needed for itching.       famotidine (PEPCID) 20 MG tablet Take 20 mg by mouth daily.       fesoterodine (TOVIAZ) 4 mg Tb24 ER tablet Take 4 mg by mouth daily.       fluticasone propionate (FLONASE ALLERGY RELIEF) 50 mcg/actuation nasal spray One spray in each nostril daily 16 g 12     furosemide (LASIX) 40 MG tablet TAKE 1 TABLET(40 MG) BY MOUTH TWICE DAILY 180 tablet 3     glucosamine-chondroitin 500-400 mg tablet Take 1 tablet by mouth 2 (two) times a day.        hydrocortisone 1 % cream Apply 1 application topically as needed.       lidocaine-prilocaine (EMLA) cream Place over port 30 min. before being accessed. 30 g 1     loratadine (CLARITIN) 10 mg tablet Take 1 tablet (10 mg total) by mouth daily. 30 tablet 11     metoprolol tartrate (LOPRESSOR) 25 MG tablet Take 1 tablet (25 mg total) by mouth 2 (two) times a day.  0      OMEGA-3/DHA/EPA/FISH OIL (FISH OIL-OMEGA-3 FATTY ACIDS) 300-1,000 mg capsule Take 2 g by mouth 2 times a day at 6:00 am and 4:00 pm.       oxyCODONE-acetaminophen (PERCOCET/ENDOCET) 5-325 mg per tablet Take 1 tablet by mouth every 6 (six) hours as needed for pain. 12 tablet 0     potassium chloride (K-DUR,KLOR-CON) 10 MEQ tablet TAKE 2 TABLETS BY MOUTH TWICE DAILY 360 tablet 3     prochlorperazine (COMPAZINE) 10 MG tablet TAKE 1 TABLET(10 MG) BY MOUTH EVERY 6 HOURS AS NEEDED FOR NAUSEA 90 tablet 3     simvastatin (ZOCOR) 20 MG tablet Take 1 tablet (20 mg total) by mouth daily with supper. 90 tablet 3     vitamin A-vitamin C-vit E-min (OCUVITE) Tab tablet Take 1 tablet by mouth 2 (two) times a day.       warfarin (COUMADIN/JANTOVEN) 5 MG tablet TAKE ONE-HALF TO ONE TABLET BY MOUTH DAILY AS DIRECTED. ADJUST DOSE PER INR RESULT (Patient taking differently: 2.5 mg on Mondays, Wednesday and Friday, 5 mg every other day of week) 90 tablet 1     No current facility-administered medications for this visit.         Allergies   Allergen Reactions     Aspirin      Pt currently on WArfarin     Penicillins      Boils         Patient Active Problem List   Diagnosis     Macular Degeneration     Vitamin D Deficiency     Abnormal involuntary movement     Joint Pain, Localized In The Knee     Obstructive Sleep Apnea     Hyperlipidemia     Major Depression, Single Episode In Remission     Hypertension     Lymphedema     Paroxysmal Atrial Fibrillation     Osteopenia     Urge And Stress Incontinence     Adenocarcinoma In Situ Of The Uterus     Venous stasis     Acquired lymphedema of leg     History of uterine cancer     Diabetes mellitus, type 2 (H)     Venous hypertension of lower extremity, bilateral     BMI 50.0-59.9, adult (H)     Aspirin contraindicated     Elective surgery     Lesion of mediastinum     Follow-up examination following surgery     Breast nodule     Malignant neoplasm of upper-outer quadrant of left breast in  female, estrogen receptor positive (H)       Past Medical History:   Diagnosis Date     (Lower) Leg Localized Swelling     Created by Conversion      Adenocarcinoma In Situ Of The Uterus     Created by Conversion      Backache     Created by Conversion [employer] Annotation: Feb 29 2012 12:14PM - Opal Helm: Referred to  [facility] 9/11, given TENS unit.      Benign Polyps Of The Large Intestine     Created by Conversion      Breast cancer (H)      Cancer (H)     uterine     Coronary artery disease      Diabetes mellitus (H)      Fatigue     Created by Conversion      Hyperglycemia     Created by Conversion      Hyperlipidemia     Created by Conversion      Hypertension     Created by Conversion      Joint Pain, Localized In The Knee     Created by Conversion      Lesion of mediastinum      Lymphedema     Created by Conversion      Macular Degeneration     Created by Conversion [employer] [facility] Annotation: Apr 5 2011 12:22PM - Tien Guzman: Followed by  Ophthalmologist, Dr. Avilez.      Major Depression, Single Episode In Remission     Created by Conversion      Obesity     Created by Conversion      Obstructive Sleep Apnea     CPAP     Osteopenia     Created by Conversion      Paroxysmal Atrial Fibrillation     Created by Conversion [employer] Annotation: Nov 28 2012 10:36PM - Shruthi Dhaliwal: Found incidential  on exam on May 7th, [id number] score of 1 for HTNsymptomatic and hospitalized  x2 in July, 2012.CV X 2 in 2012Chronic Sotalol Rx      Postmenopausal bleeding     Created by Conversion      Skin cancer      Tachycardia     Created by Conversion      Tremor     Created by Conversion      Urge And Stress Incontinence     Created by Conversion      Urinary Frequency More Than Twice At Night (Nocturia)     Created by Conversion      Urine Tests Nonspecific Abnormal Findings     Created by Conversion      Vitamin D Deficiency     Created by Conversion        Past Surgical History:   Procedure Laterality Date      BREAST BIOPSY Left     Benign     CATARACT EXTRACTION  10/17/13     COLONOSCOPY N/A 2015    Procedure: COLONOSCOPY WITH POLYPECTOMY;  Surgeon: Too Ureña MD;  [place] [facility] [place]:      COLONOSCOPY N/A 2016    Procedure: COLONOSCOPY;  Surgeon: Ayden Dela Cruz MD;  Location: [facility] OR;  Service:      CT BIOPSY LUNG  2019     ENDOMETRIAL BIOPSY  2014     HYSTERECTOMY  2014     Interstem - Stage 2  2014     Interstim Stage I  2014     IR PORT PLACEMENT >5 YEARS  2019     OOPHORECTOMY Bilateral      post urinary stimulator implantation  14     SC BIOPSY OF BREAST, INCISIONAL      Description: Incisional Breast Biopsy;  Recorded: 2011;     SC CARDIOVERSION ELECTIVE ARRHYTHMIA EXTERNAL      Description: Elective Cardioversion External;  Recorded: 2012;     SC REMOVE TONSILS/ADENOIDS,<11 Y/O      Description: Tonsillectomy With Adenoidectomy;  Recorded: 04/15/2014;     SC SLING OPER STRES INCONTINENCE N/A 11/3/2016    Procedure: PUBOVAGINAL SLING WITH CONCHITA WITH CYSTOSCOPY;  Surgeon: Bill Wilson MD;  [place] [facility] [place];  Service: Gynecology     US BREAST CORE BIOPSY LEFT Left 2019     VITRECTOMY Right 1/15/15       Social History     Socioeconomic History     Marital status: Single     Spouse name: Not on file     Number of children: 0     Years of education: Not on file     Highest education level: Not on file   Occupational History     Occupation: Retired RN     Employer: [employer]   Social Needs     Financial resource strain: Not on file     Food insecurity:     Worry: Not on file     Inability: Not on file     Transportation needs:     Medical: Not on file     Non-medical: Not on file   Tobacco Use     Smoking status: Former Smoker     Packs/day: 3.00     Years: 40.00     Pack years: 120.00     Types: Cigarettes     Last attempt to quit: 2005     Years since quittin.8     Smokeless  "tobacco: Never Used   Substance and Sexual Activity     Alcohol use: Not Currently     Drug use: No     Sexual activity: Never     Partners: Male     Birth control/protection: Surgical, Post-menopausal   Lifestyle     Physical activity:     Days per week: Not on file     Minutes per session: Not on file     Stress: Not on file   Relationships     Social connections:     Talks on phone: Not on file     Gets together: Not on file     Attends Nondenominational service: Not on file     Active member of club or organization: Not on file     Attends meetings of clubs or organizations: Not on file     Relationship status: Not on file     Intimate partner violence:     Fear of current or ex partner: Not on file     Emotionally abused: Not on file     Physically abused: Not on file     Forced sexual activity: Not on file   Other Topics Concern     Not on file   Social History Narrative    Lives alone single family home that she owns, no children and is retired.       Patient Care Team:  Cordell Ceja DO as PCP - General (Internal Medicine)  Cordell Ceja DO as Assigned PCP  Roly Villalba MD as Physician (Hematology and Oncology)  Fabi Morton RN as Oncology Nurse Navigator (Hematology and Oncology)          Objective:     Vitals:    11/22/19 1141   BP: 134/86   Pulse: (!) 112   SpO2: 95%   Weight: (!) 316 lb 6 oz (143.5 kg)   Height: 5' 5\" (1.651 m)         Physical Exam:    General: alert, no distress  HEENT: sclerae anicteric, moist oral mucosa  Heart: Regular rate and rhythm, no murmurs.  No pretibial edema.    Lungs: Clear to auscultation bilat  Gastrointestinal: abdomen is non-distended.    Skin: warm/dry, no rashes  Neuro: no gross abnormalities  Psychiatric: Pleasant affect     There are no Patient Instructions on file for this visit.        Labs:  Lab Results   Component Value Date    HGBA1C 6.5 (H) 09/11/2019        Immunization History   Administered Date(s) Administered     Influenza high " dose,seasonal,PF, 65+ yrs 09/16/2014, 11/04/2015, 09/10/2019     Influenza, inj, historic,unspecified 10/15/2010, 01/17/2012     Influenza, seasonal,quad inj 6-35 mos 10/10/2012, 10/01/2013, 09/10/2019     Influenza,seasonal, Inj IIV3 10/15/2010, 01/17/2012, 10/10/2012, 10/01/2013     Pneumo Conj 13-V (2010&after) 07/22/2015     Pneumo Polysac 23-V 05/07/2012     Tdap 04/20/2011     ZOSTER, LIVE 05/07/2012           Electronically signed by Cordell Ceja DO 11/22/19 11:44 AM

## 2021-06-03 NOTE — PATIENT INSTRUCTIONS - HE
Bring CPAP     On the morning of surgery, don't take Lasix/furosemide    5 days before surgery, hold warfarin.  Restart the day after surgery    Do not take aspirin or supplements seven days prior to your surgery  Do not take NSAIDs (Advil, Aleve, ibuprofen, naproxen, etc.) five days prior to your surgery  Tylenol/acetaminophen is safe around the time of surgery

## 2021-06-03 NOTE — ANESTHESIA CARE TRANSFER NOTE
Last vitals:   Vitals:    12/02/19 1131   BP: 117/64   Pulse: (!) 115   Resp: 15   Temp: 36.3  C (97.3  F)   SpO2: 92%     Patient's level of consciousness is awake  Spontaneous respirations: yes  Maintains airway independently: yes  Dentition unchanged: yes  Oropharynx: oropharynx clear of all foreign objects    QCDR Measures:  ASA# 20 - Surgical Safety Checklist: WHO surgical safety checklist completed prior to induction    PQRS# 430 - Adult PONV Prevention: 4558F - Pt received => 2 anti-emetic agents (different classes) preop & intraop  ASA# 8 - Peds PONV Prevention: NA - Not pediatric patient, not GA or 2 or more risk factors NOT present  PQRS# 424 - Radha-op Temp Management: 4559F - At least one body temp DOCUMENTED => 35.5C or 95.9F within required timeframe  PQRS# 426 - PACU Transfer Protocol: - Transfer of care checklist used  ASA# 14 - Acute Post-op Pain: ASA14B - Patient did NOT experience pain >= 7 out of 10

## 2021-06-03 NOTE — TELEPHONE ENCOUNTER
Patient called, wondering if she needs a pre op physical with her primary prior to her surgery with Dr. Ferrera on 12-2-19.  I told her yes, she needs a preop to be done within 30 days of her surgery date.  Support provided, invited calls.

## 2021-06-04 VITALS
HEART RATE: 84 BPM | OXYGEN SATURATION: 93 % | TEMPERATURE: 98 F | WEIGHT: 293 LBS | DIASTOLIC BLOOD PRESSURE: 84 MMHG | BODY MASS INDEX: 54.88 KG/M2 | RESPIRATION RATE: 19 BRPM | SYSTOLIC BLOOD PRESSURE: 106 MMHG

## 2021-06-04 VITALS
TEMPERATURE: 97.7 F | DIASTOLIC BLOOD PRESSURE: 72 MMHG | BODY MASS INDEX: 46.76 KG/M2 | OXYGEN SATURATION: 94 % | SYSTOLIC BLOOD PRESSURE: 130 MMHG | WEIGHT: 289.7 LBS | HEART RATE: 63 BPM

## 2021-06-04 VITALS
TEMPERATURE: 97.4 F | SYSTOLIC BLOOD PRESSURE: 130 MMHG | WEIGHT: 293 LBS | BODY MASS INDEX: 56.3 KG/M2 | RESPIRATION RATE: 18 BRPM | OXYGEN SATURATION: 94 % | DIASTOLIC BLOOD PRESSURE: 86 MMHG | HEART RATE: 95 BPM

## 2021-06-04 VITALS
RESPIRATION RATE: 22 BRPM | TEMPERATURE: 97.5 F | WEIGHT: 293 LBS | BODY MASS INDEX: 56.33 KG/M2 | OXYGEN SATURATION: 91 % | HEART RATE: 98 BPM | DIASTOLIC BLOOD PRESSURE: 89 MMHG | SYSTOLIC BLOOD PRESSURE: 119 MMHG

## 2021-06-04 VITALS
SYSTOLIC BLOOD PRESSURE: 119 MMHG | RESPIRATION RATE: 24 BRPM | WEIGHT: 293 LBS | OXYGEN SATURATION: 94 % | HEART RATE: 91 BPM | DIASTOLIC BLOOD PRESSURE: 71 MMHG | BODY MASS INDEX: 56.33 KG/M2 | TEMPERATURE: 98.5 F

## 2021-06-04 VITALS
SYSTOLIC BLOOD PRESSURE: 113 MMHG | DIASTOLIC BLOOD PRESSURE: 67 MMHG | TEMPERATURE: 98 F | BODY MASS INDEX: 54.55 KG/M2 | OXYGEN SATURATION: 92 % | RESPIRATION RATE: 18 BRPM | WEIGHT: 293 LBS | HEART RATE: 113 BPM

## 2021-06-04 VITALS
HEART RATE: 80 BPM | SYSTOLIC BLOOD PRESSURE: 108 MMHG | DIASTOLIC BLOOD PRESSURE: 78 MMHG | BODY MASS INDEX: 54.88 KG/M2 | WEIGHT: 293 LBS | TEMPERATURE: 96.6 F | OXYGEN SATURATION: 90 % | RESPIRATION RATE: 18 BRPM

## 2021-06-04 VITALS
SYSTOLIC BLOOD PRESSURE: 133 MMHG | OXYGEN SATURATION: 95 % | BODY MASS INDEX: 48.53 KG/M2 | HEART RATE: 104 BPM | WEIGHT: 293 LBS | DIASTOLIC BLOOD PRESSURE: 68 MMHG | TEMPERATURE: 98.4 F

## 2021-06-04 VITALS
TEMPERATURE: 97.8 F | OXYGEN SATURATION: 93 % | SYSTOLIC BLOOD PRESSURE: 116 MMHG | WEIGHT: 293 LBS | DIASTOLIC BLOOD PRESSURE: 77 MMHG | BODY MASS INDEX: 48.91 KG/M2 | HEART RATE: 82 BPM

## 2021-06-04 VITALS
SYSTOLIC BLOOD PRESSURE: 113 MMHG | OXYGEN SATURATION: 94 % | HEIGHT: 66 IN | BODY MASS INDEX: 47.09 KG/M2 | TEMPERATURE: 98.5 F | WEIGHT: 293 LBS | HEART RATE: 75 BPM | DIASTOLIC BLOOD PRESSURE: 65 MMHG

## 2021-06-04 VITALS — WEIGHT: 293 LBS | BODY MASS INDEX: 47.09 KG/M2 | HEIGHT: 66 IN

## 2021-06-04 VITALS
TEMPERATURE: 98.3 F | BODY MASS INDEX: 54.47 KG/M2 | DIASTOLIC BLOOD PRESSURE: 58 MMHG | HEART RATE: 88 BPM | WEIGHT: 293 LBS | OXYGEN SATURATION: 91 % | RESPIRATION RATE: 20 BRPM | SYSTOLIC BLOOD PRESSURE: 110 MMHG

## 2021-06-04 VITALS
SYSTOLIC BLOOD PRESSURE: 114 MMHG | WEIGHT: 293 LBS | HEART RATE: 87 BPM | BODY MASS INDEX: 49.58 KG/M2 | DIASTOLIC BLOOD PRESSURE: 61 MMHG | TEMPERATURE: 97.5 F | OXYGEN SATURATION: 93 %

## 2021-06-04 VITALS
TEMPERATURE: 98.5 F | SYSTOLIC BLOOD PRESSURE: 142 MMHG | HEART RATE: 99 BPM | DIASTOLIC BLOOD PRESSURE: 80 MMHG | OXYGEN SATURATION: 92 % | WEIGHT: 293 LBS | BODY MASS INDEX: 55.35 KG/M2 | RESPIRATION RATE: 20 BRPM

## 2021-06-04 VITALS
WEIGHT: 280 LBS | BODY MASS INDEX: 45.19 KG/M2 | SYSTOLIC BLOOD PRESSURE: 99 MMHG | DIASTOLIC BLOOD PRESSURE: 69 MMHG | HEART RATE: 87 BPM

## 2021-06-04 VITALS
DIASTOLIC BLOOD PRESSURE: 86 MMHG | HEART RATE: 73 BPM | SYSTOLIC BLOOD PRESSURE: 130 MMHG | BODY MASS INDEX: 48.81 KG/M2 | WEIGHT: 293 LBS | TEMPERATURE: 97.5 F | OXYGEN SATURATION: 93 %

## 2021-06-04 VITALS
OXYGEN SATURATION: 96 % | WEIGHT: 293 LBS | SYSTOLIC BLOOD PRESSURE: 117 MMHG | HEART RATE: 100 BPM | TEMPERATURE: 97.9 F | DIASTOLIC BLOOD PRESSURE: 80 MMHG | BODY MASS INDEX: 51.99 KG/M2

## 2021-06-04 VITALS
HEART RATE: 79 BPM | BODY MASS INDEX: 49.13 KG/M2 | TEMPERATURE: 97.8 F | DIASTOLIC BLOOD PRESSURE: 75 MMHG | WEIGHT: 293 LBS | SYSTOLIC BLOOD PRESSURE: 126 MMHG | OXYGEN SATURATION: 93 %

## 2021-06-04 VITALS
WEIGHT: 293 LBS | SYSTOLIC BLOOD PRESSURE: 102 MMHG | DIASTOLIC BLOOD PRESSURE: 60 MMHG | TEMPERATURE: 98.9 F | OXYGEN SATURATION: 94 % | HEART RATE: 93 BPM | RESPIRATION RATE: 18 BRPM | BODY MASS INDEX: 54.65 KG/M2

## 2021-06-04 VITALS
TEMPERATURE: 97.3 F | RESPIRATION RATE: 16 BRPM | BODY MASS INDEX: 55.56 KG/M2 | SYSTOLIC BLOOD PRESSURE: 110 MMHG | OXYGEN SATURATION: 94 % | WEIGHT: 293 LBS | DIASTOLIC BLOOD PRESSURE: 72 MMHG | HEART RATE: 88 BPM

## 2021-06-04 VITALS
BODY MASS INDEX: 54.8 KG/M2 | OXYGEN SATURATION: 97 % | HEART RATE: 63 BPM | SYSTOLIC BLOOD PRESSURE: 126 MMHG | TEMPERATURE: 97.9 F | RESPIRATION RATE: 18 BRPM | WEIGHT: 293 LBS | DIASTOLIC BLOOD PRESSURE: 74 MMHG

## 2021-06-04 VITALS
TEMPERATURE: 98.2 F | HEART RATE: 89 BPM | OXYGEN SATURATION: 97 % | RESPIRATION RATE: 18 BRPM | SYSTOLIC BLOOD PRESSURE: 110 MMHG | BODY MASS INDEX: 55.04 KG/M2 | DIASTOLIC BLOOD PRESSURE: 74 MMHG | WEIGHT: 293 LBS

## 2021-06-04 VITALS
WEIGHT: 293 LBS | RESPIRATION RATE: 18 BRPM | OXYGEN SATURATION: 94 % | SYSTOLIC BLOOD PRESSURE: 137 MMHG | DIASTOLIC BLOOD PRESSURE: 78 MMHG | BODY MASS INDEX: 54.8 KG/M2 | TEMPERATURE: 98.3 F | HEART RATE: 119 BPM

## 2021-06-04 VITALS
OXYGEN SATURATION: 93 % | BODY MASS INDEX: 47.09 KG/M2 | DIASTOLIC BLOOD PRESSURE: 62 MMHG | WEIGHT: 293 LBS | HEART RATE: 100 BPM | SYSTOLIC BLOOD PRESSURE: 136 MMHG | HEIGHT: 66 IN

## 2021-06-04 VITALS
OXYGEN SATURATION: 91 % | SYSTOLIC BLOOD PRESSURE: 126 MMHG | DIASTOLIC BLOOD PRESSURE: 75 MMHG | HEART RATE: 93 BPM | TEMPERATURE: 98 F | BODY MASS INDEX: 55.77 KG/M2 | WEIGHT: 293 LBS | RESPIRATION RATE: 18 BRPM

## 2021-06-04 NOTE — PROGRESS NOTES
Visited with Jennifer today in .  She has been inpatient since left lumpectomy on 12/2.  She does anticipate discharge to TCU possibly today.  She will not be at the same facility as her significant other Damien.    Jennifer shared her thoughts and feelings about respiratory complications and need to go to TCU for now.  She is optimistic she can make progress and be home soon.    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  Emotional support and encouragement provided. Fabi Morton RN

## 2021-06-04 NOTE — PROGRESS NOTES
[facility]    Facility:   [facility] [place] [facility] [[id number]]   Code Status: FULL CODE      CHIEF COMPLAINT/REASON FOR VISIT:  Chief Complaint   Patient presents with     Review Of Multiple Medical Conditions       HISTORY:      HPI: Jennifer is a 74 y.o. female undergoing physical and occupational therapy at [facility] transitional care unit. , she is with history of severe obesity, stage Ib left breast cancer, I normal, vitamin D deficiency, urge stress incontinence, tremor, postmenopausal bleeding and uterine adenocarcinoma in situ status post JEROME/BSO, atrial fibrillation, osteopenia, FLAKITA, macular degeneration, chronic bilateral leg edema, hypertension, diabetes mellitus, CAD, chronic back and bilateral knee pain who presented to [facility] for scheduled left breast lumpectomy And sentinel lymph node biopsy.  Per the records she became hypoxic in the PACU and noted to be persistently hypoxic to 4 L of oxygen which is a change from her baseline.  She does have FLAKITA but does not use her CPAP machine.    Today she is seen for reports of her other toenail noted to be lifting also.  New orders were given and patient will have a podiatry consult.  She denied any chest pain or shortness of breath.  She also denied incisional pain.  She is with a left breast incision that is Steri-Stripped clean dry and intact with a large amount of bruising around it.  Last hemoglobin hemoglobin was 11.5 BMP within normal limits.  She was noted to have a Petersen in her right upper chest that is not accessed at this time.  She is using oxygen 1 to 2 L PRN to keep sats greater than 90%.  Her weight is down 2.5 pounds in the last day.  She also reports she is on a bio IV medication related to her cancer every 3 weeks for the next year.  Currently this medication is on hold in the transitional care unit and the nurse manager will follow-up to see if this is something that needs to be done  while she is in the TCU.    Past Medical History:   Diagnosis Date     (Lower) Leg Localized Swelling     Created by Conversion      Adenocarcinoma In Situ Of The Uterus     Created by Conversion      Backache     Created by Conversion [employer] Annotation: Feb 29 2012 12:14PM - Opal Helm: Referred to  [facility] 9/11, given TENS unit.      Benign Polyps Of The Large Intestine     Created by Conversion      Breast cancer (H)      Cancer (H)     uterine     Chronic kidney disease     stage 1 per H&P 11/22/2019     Coronary artery disease      Diabetes mellitus (H)     type 2     Fatigue     Created by Conversion      Hyperglycemia     Created by Conversion      Hyperlipidemia     Created by Conversion      Hypertension     Created by Conversion      Joint Pain, Localized In The Knee     Created by Conversion      Lesion of mediastinum      Lymphedema     Created by Conversion      Macular Degeneration     Created by Conversion [employer] [facility] Annotation: Apr 5 2011 12:22PM - Tien Guzman: Followed by  Ophthalmologist, Dr. Avilez.      Major Depression, Single Episode In Remission     Created by Conversion      Obesity     Created by Conversion      Obstructive Sleep Apnea     CPAP     Osteopenia     Created by Conversion      Paroxysmal Atrial Fibrillation     Created by Conversion [employer] Annotation: Nov 28 2012 10:36PM - Shruthi Dhaliwal: Found incidential  on exam on May 7th, [id number] score of 1 for HTNsymptomatic and hospitalized  x2 in July, 2012.CV X 2 in 2012Chronic Sotalol Rx      Postmenopausal bleeding     Created by Conversion      Skin cancer      Tachycardia     Created by Conversion      Tremor     Created by Conversion      Urge And Stress Incontinence     Created by Conversion      Urinary Frequency More Than Twice At Night (Nocturia)     Created by Conversion      Urine Tests Nonspecific Abnormal Findings     Created by Conversion      Vitamin D Deficiency     Created by Conversion               Family History   Problem Relation Age of Onset     Hypertension Father      Pneumonia Father      Esophageal cancer Brother      Cancer Brother      Stroke Mother      Alzheimer's disease Brother      Cancer Brother      Prostate cancer Brother      Cancer Nephew      Colon cancer Nephew      Cancer Paternal cousin      Social History     Socioeconomic History     Marital status: Single     Spouse name: Not on file     Number of children: 0     Years of education: Not on file     Highest education level: Not on file   Occupational History     Occupation: Retired RN     Employer: [employer]   Social Needs     Financial resource strain: Not on file     Food insecurity:     Worry: Not on file     Inability: Not on file     Transportation needs:     Medical: Not on file     Non-medical: Not on file   Tobacco Use     Smoking status: Former Smoker     Packs/day: 3.00     Years: 40.00     Pack years: 120.00     Types: Cigarettes     Last attempt to quit: 2005     Years since quittin.9     Smokeless tobacco: Never Used   Substance and Sexual Activity     Alcohol use: Not Currently     Drug use: No     Sexual activity: Never     Partners: Male     Birth control/protection: Surgical, Post-menopausal   Lifestyle     Physical activity:     Days per week: Not on file     Minutes per session: Not on file     Stress: Not on file   Relationships     Social connections:     Talks on phone: Not on file     Gets together: Not on file     Attends Roman [employer] service: Not on file     Active member of club or organization: Not on file     Attends meetings of clubs or organizations: Not on file     Relationship status: Not on file     Intimate partner violence:     Fear of current or ex partner: Not on file     Emotionally abused: Not on file     Physically abused: Not on file     Forced sexual activity: Not on file   Other Topics Concern     Not on file   Social History Narrative    Lives alone single family home  that she owns, no children and is retired.         Review of Systems   Constitutional: Positive for activity change. Negative for appetite change, fatigue and fever.   HENT: Negative for congestion.    Respiratory: Negative for cough, shortness of breath and wheezing.    Cardiovascular: Positive for leg swelling. Negative for chest pain.        Lymphedema   Gastrointestinal: Negative for abdominal distention, abdominal pain, constipation, diarrhea and nausea.   Genitourinary: Negative for dysuria.   Musculoskeletal: Positive for arthralgias. Negative for back pain.   Skin: Positive for wound. Negative for color change.        Bruising around left breast surgical incision  Left surgical breast incision that is Dermabond with Steri-Strips clean dry and intact.   Neurological: Negative for dizziness.   Psychiatric/Behavioral: Negative for agitation, behavioral problems and confusion.       Vitals:    12/10/19 0953   BP: 104/74   Pulse: 94   Resp: 20   Temp: 97.7  F (36.5  C)   SpO2: 97%   Weight: (!) 332 lb 8 oz (150.8 kg)       Physical Exam  Constitutional:       Appearance: She is well-developed.      Comments: Pleasant woman in no acute distress   HENT:      Head: Normocephalic.   Eyes:      Conjunctiva/sclera: Conjunctivae normal.   Neck:      Musculoskeletal: Normal range of motion.   Cardiovascular:      Rate and Rhythm: Normal rate and regular rhythm.      Heart sounds: Normal heart sounds. No murmur.   Pulmonary:      Effort: No respiratory distress.      Breath sounds: Normal breath sounds. No wheezing or rales.   Abdominal:      General: Bowel sounds are normal. There is no distension.      Palpations: Abdomen is soft.      Tenderness: There is no abdominal tenderness.   Musculoskeletal: Normal range of motion.      Right lower leg: Edema present.      Left lower leg: Edema present.      Comments: Patient with a right great toenail that has almost completely lifted off and hanging on by the cuticle.    Skin:     General: Skin is warm.      Comments: Surgical incision left breast Dermabond with Steri-Strips and large amount of bruising around the incision.   Neurological:      Mental Status: She is alert and oriented to person, place, and time.   Psychiatric:         Behavior: Behavior normal.           LABS:   Recent Results (from the past 240 hour(s))   POCT Glucose   Result Value Ref Range    Glucose 143 (H) 70 - 139 mg/dL   Surgical Pathology Exam   Result Value Ref Range    Case Report       Surgical Pathology                                Case: [id number]                                    Authorizing Provider:  Sara Ferrera MD        Collected:           12/02/2019 1058              Ordering Location:     [facility] OR     Received:            12/02/2019 1126              Pathologist:           Lucrecia Nelson MD                                                         Specimens:   A) - Breast, Lumpectomy, Left, long stitch lateral; short stitch superior; double                   stitch deep                                                                                         B) - Lymph Node(s) [place], Breast, Left                                                  Final Diagnosis       A) BREAST, LEFT, WIRE LOCALIZATION AND LUMPECTOMY:       1) 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 (DCIS):           a) NUCLEAR rdGrdRrdArdDrdErd:rd rd3rd b) PATTERNS: SOLID AND CRIBRIFORM            c) SIZE: 21 x 17 x 15 MM           d) MARGINS: NEGATIVE; NEAREST MARGIN - 5 MM           e) REPEAT HORMONE RECEPTOR ANALYSIS              1) ESTROGEN RECEPTOR:  STRONGLY POSITIVE (>95%; 3+)              2) PROGESTERONE RECEPTOR:  POSITIVE (80%; 3+)       2) RESIDUAL INVASIVE CARCINOMA NOT IDENTIFIED        3) TUMOR ARISING IN FIBROADENOMA AND INVOLVING ADJACENT SCLEROSING ADENOSIS       4) PRIOR BIOPSY SITE PRESENT        4) PROLIFERATIVE FIBROCYSTIC CHANGES WITH STROMAL FIBROSIS, USUAL DUCTAL            HYPERPLASIA AND SCLEROSING  ADENOSIS          PATHOLOGIC STAGE: ypTis (DCIS) (sn) pN0 (i+)         See staging parameters below     B) SENTINEL LYMPH NODE, LEFT BREAST, SENTINEL LYMPH NODE BIOPSY:        - ISOLATED CYTOKERATIN(+) TUMOR CELLS PRESENT IN ONE OF THREE LYMPH NODES (1 OF 3)    MCSS    Comment       See addendum to previous biopsy ([id number]) for additional staging information (pretreatment).    Immunohistochemical controls stain appropriately. Calponin and p63 staining of multiple sections confirms the presence of in-situ carcinoma. Definitive foci of residual invasive carcinoma are not identified.    Synoptic Report       INVASIVE CARCINOMA OF THE BREAST: Resection  (Breast.Invasive - All Specimens)      8th Edition - Protocol posted: 2/27/2019      CLINICAL      Clinical History:    Prior presurgical (neoadjuvant) therapy for this diagnosis of invasive carcinoma       SPECIMEN      Procedure:    Excision (less than total mastectomy)       Specimen Laterality:    Left       TUMOR      Clock Position of Tumor Site:    12 o'clock       Histologic Type:    No residual invasive carcinoma       Histologic Grade ([place] Histologic Score):    No residual invasive carcinoma       Tumor Size:    No residual invasive carcinoma       Ductal Carcinoma In Situ (DCIS):    Present         :    Positive for extensive intraductal component (EIC)         Size (Extent) of DCIS:    Estimated size (extent) of DCIS is at least (Millimeters): 21 mm          Additional Dimension (Millimeters):    17 mm          Additional Dimension (Millimeters):    15 mm        Architectural Patterns:    Cribriform         Architectural Patterns:    Solid         Nuclear Grade:    Grade II (intermediate)         Necrosis:    Not identified       Tumor Extent:          Lymphovascular Invasion:    Not identified       Dermal Lymphovascular Invasion:    No skin present       Microcalcifications:    Not identified       Treatment Effect in the Breast:    No definite  response to presurgical therapy in the invasive carcinoma       Treatment Effect in the Lymph Nodes:    No definite response to presurgical therapy in metastatic carcinoma       MARGINS      DCIS Margins:    Uninvolved by DCIS         Distance from Closest Margin (Millimeters):    5 mm        Closest Margin:    Superior         Distance from Other Margins:              Anterior Margin (Millimeters):    14 mm          Posterior Margin (Millimeters):    14 mm          Superior Margin (Millimeters):    5 mm          Inferior Margin (Millimeters):    21 mm          Medial Margin (Millimeters):    10 mm          Lateral Margin (Millimeters):    14 mm      LYMPH NODES      Regional Lymph Nodes:    Involved by tumor cells         Number of Lymph Nodes with Macrometastases (> 2 mm):    0         Number of Lymph Nodes with Micrometastases (> 0.2 mm to 2 mm and / or > 200 cells):    0         Number of Lymph Nodes with Isolated Tumor Cells (<= 0.2 mm or <= 200 cells):    1         Extranodal Extension:    Not identified         Number of Lymph Nodes Examined:    3         Number of [place] Nodes Examined:    3       PATHOLOGIC STAGE CLASSIFICATION (pTNM, AJCC 8th Edition)      TNM Descriptors:    y (post-treatment)       Primary Tumor (pT):    pTis (DCIS)       Regional Lymph Nodes (pN):            Modifier:    (sn): Only sentinel node(s) evaluated.         Category (pN):    pN0 (i+)       ADDITIONAL FINDINGS      Additional Pathologic Findings:    DCIS arising in fibroadenoma and involving areas of sclerosing adenosis       Microscopic Description       Microscopic examination performed, substantiating the above diagnosis.    Clinical Information       Pre-op Diagnosis:  Malignant neoplasm of upper-outer quadrant of left breast in female, estrogen receptor positive (H) [C50.412, Z17.0]    Gross Description       A) Received in formalin labeled with the patient's name, Jennifer Galvin and left breast lumpectomy is an oriented,  61 gram, 6.5 cm from superior to inferior, 5.4 cm from anterior to posterior and 3.9 cm from medial to lateral portion of yellow-white to pink fibrofatty tissue. The specimen is received oriented with a long stitch designating the lateral margin, a short stitch designating the superior margin, and a double stitch designating the deep-posterior margin, per the surgeon. There is an anterior-laterally inserted gray metallic localizing wire. The specimen is differentially inked blue on the anterior margin, black on the posterior margin, yellow on the medial margin and green on the lateral margin. The specimen is serial sectioned from superior to inferior.    Sectioning through the mid-superior aspect of the specimen displays an oblong 2.1 x 1.7 x 1.5 cm, tan-white to pink-gray, indurated nodule. This nodule extends from superior to inferior, medial to lateral and anterior to  posterior, respectively. There is a superiorly located site of previous biopsy from which a gelatinous substance and a helical mammotome clip are removed. This nodule is 0.5 cm from the superior margin, 0.7 cm from the anterior margin, 2.4 cm from the posterior margin, 0.4 cm from the lateral margin, 1.6 cm from the medial margin and is 2.1 cm from the inferior margin. The gross lesion is submitted in its entirety for evaluation from superior to inferior to include the previous site of biopsy.     The remaining cut surface displays predominantly unremarkable yellow lobulated fat and thin to focally dense bands of fibrous tissue. No additional suspicious lesions are identified.    RS-20C    SUMMARY OF CASSETTES: A1-3) Superior margin, shaved and perpendicularly sectioned; A4-5) Inferior margin, shaved and perpendicularly sectioned; A6-7) Representative from a single cross-section; A8-9) Representative from a single cross-section; A10-14) One full cross-section; A15-16) Representative  from a single cross-section; A17) Representative from a single  cross-section; A18-20) One full cross-section of grossly unremarkable tissue between the lesion and the inferior margin.    Note: The specimen is collected and placed into formalin @1058, 12/02/19. JULIOR:viji   Total formalin fixation time: 11:02. KLW:anastasia     B) The specimen is received in formalin and is identified as left sentinel lymph node, breast. It consists of three fragments of adipose tissue measuring 5.5 x 4.5 x 1.4 cm in aggregate dimension. There are three palpable 0.9- to 2.2-cm lymph nodes. Cut section shows a fatty central hilum without grossly suspicious lesions. RS    SUMMARY OF SECTIONS: B1) One lymph node - bisected; B2-3) One lymph node - serially sectioned; B4-5) One lymph node - serially sectioned. KLW:manoj    Special Stains       Note - Estrogen and Progesterone Receptor Immunoperoxidase Stains:  These stains were done using the following criteria:    Specimen fixative: Formalin-fixed paraffin-embedded sections    Detection system: Biotin-free multimer-based technology detection system ([email or web address])    Retrieval method: CC1 pretreatment; a georgie-based buffer with a slightly basic PH used at an elevated     temperature (95+5 degrees C)    Clone:  Estrogen receptor-SP1, Rabbit monoclonal ([place])     Progesterone receptor-1E2, Rabbit monoclonal ([place])    Scoring method: Intensity of nuclear stain, strong vs weak vs absent; % of cells stained    Charges       CPT: 37913 ×2, 06785 ×2, 79467, 77072 ×2  ICD-10: D05.12    Result Flag Malignant (!) Normal   POCT Glucose   Result Value Ref Range    Glucose 121 70 - 139 mg/dL   Platelet Count   Result Value Ref Range    Platelets 236 140 - 440 thou/uL   Basic Metabolic Panel   Result Value Ref Range    Sodium 137 136 - 145 mmol/L    Potassium 4.4 3.5 - 5.0 mmol/L    Chloride 103 98 - 107 mmol/L    CO2 25 22 - 31 mmol/L    Anion Gap, Calculation 9 5 - 18 mmol/L    Glucose 145 (H) 70 - 125 mg/dL    Calcium 9.0 8.5 - 10.5 mg/dL    BUN 14 8 - 28 mg/dL     Creatinine 0.94 0.60 - 1.10 mg/dL    GFR MDRD Af Amer >60 >60 mL/min/1.73m2    GFR MDRD Non Af Amer 58 (L) >60 mL/min/1.73m2   BNP(B-type Natriuretic Peptide)   Result Value Ref Range    BNP 45 0 - 130 pg/mL   HM2(CBC w/o Differential)   Result Value Ref Range    WBC 12.9 (H) 4.0 - 11.0 thou/uL    RBC 3.63 (L) 3.80 - 5.40 mill/uL    Hemoglobin 10.6 (L) 12.0 - 16.0 g/dL    Hematocrit 34.9 (L) 35.0 - 47.0 %    MCV 96 80 - 100 fL    MCH 29.2 27.0 - 34.0 pg    MCHC 30.4 (L) 32.0 - 36.0 g/dL    RDW 22.8 (H) 11.0 - 14.5 %    Platelets 253 140 - 440 thou/uL    MPV 10.4 8.5 - 12.5 fL   Protime-INR   Result Value Ref Range    INR 1.36 (H) 0.90 - 1.10   Lactic Acid   Result Value Ref Range    Lactic Acid 2.4 (H) 0.5 - 2.2 mmol/L   Blood culture from PERIPHERAL SITE   Result Value Ref Range    Anaerobic Blood Culture Bottle No Growth No Growth, No organisms seen, bottle returned to instrument, Specimen not received, No Growth at 24 hours, No Growth at 48 hours, No Growth at 72 hours, No Growth at 96 hours, No Growth at 120 hours    Aerobic Blood Culture Bottle No Growth No Growth, No organisms seen, bottle returned to instrument, Specimen not received, No Growth at 24 hours, No Growth at 120 hours, No Growth at 48 hours, No Growth at 72 hours, No Growth at 96 hours   Lactic Acid   Result Value Ref Range    Lactic Acid 1.6 0.5 - 2.2 mmol/L   Platelet Count - every other day x 3   Result Value Ref Range    Platelets 225 140 - 440 thou/uL   HM2(CBC w/o Differential)   Result Value Ref Range    WBC 14.8 (H) 4.0 - 11.0 thou/uL    RBC 3.44 (L) 3.80 - 5.40 mill/uL    Hemoglobin 10.2 (L) 12.0 - 16.0 g/dL    Hematocrit 33.7 (L) 35.0 - 47.0 %    MCV 98 80 - 100 fL    MCH 29.7 27.0 - 34.0 pg    MCHC 30.3 (L) 32.0 - 36.0 g/dL    RDW 23.1 (H) 11.0 - 14.5 %    Platelets 232 140 - 440 thou/uL    MPV 10.1 8.5 - 12.5 fL   Basic Metabolic Panel   Result Value Ref Range    Sodium 140 136 - 145 mmol/L    Potassium 3.9 3.5 - 5.0 mmol/L    Chloride  105 98 - 107 mmol/L    CO2 28 22 - 31 mmol/L    Anion Gap, Calculation 7 5 - 18 mmol/L    Glucose 129 (H) 70 - 125 mg/dL    Calcium 9.4 8.5 - 10.5 mg/dL    BUN 15 8 - 28 mg/dL    Creatinine 0.82 0.60 - 1.10 mg/dL    GFR MDRD Af Amer >60 >60 mL/min/1.73m2    GFR MDRD Non Af Amer >60 >60 mL/min/1.73m2   POCT Glucose   Result Value Ref Range    Glucose 188 (H) 70 - 139 mg/dL   POCT Glucose   Result Value Ref Range    Glucose 206 (H) 70 - 139 mg/dL   Basic Metabolic Panel   Result Value Ref Range    Sodium 140 136 - 145 mmol/L    Potassium 3.9 3.5 - 5.0 mmol/L    Chloride 104 98 - 107 mmol/L    CO2 29 22 - 31 mmol/L    Anion Gap, Calculation 7 5 - 18 mmol/L    Glucose 119 70 - 125 mg/dL    Calcium 9.2 8.5 - 10.5 mg/dL    BUN 17 8 - 28 mg/dL    Creatinine 0.82 0.60 - 1.10 mg/dL    GFR MDRD Af Amer >60 >60 mL/min/1.73m2    GFR MDRD Non Af Amer >60 >60 mL/min/1.73m2   INR   Result Value Ref Range    INR 1.25 (H) 0.90 - 1.10   HM2(CBC w/o Differential)   Result Value Ref Range    WBC 16.4 (H) 4.0 - 11.0 thou/uL    RBC 3.56 (L) 3.80 - 5.40 mill/uL    Hemoglobin 10.6 (L) 12.0 - 16.0 g/dL    Hematocrit 34.6 (L) 35.0 - 47.0 %    MCV 97 80 - 100 fL    MCH 29.8 27.0 - 34.0 pg    MCHC 30.6 (L) 32.0 - 36.0 g/dL    RDW 22.5 (H) 11.0 - 14.5 %    Platelets 225 140 - 440 thou/uL    MPV 10.2 8.5 - 12.5 fL   POCT Glucose   Result Value Ref Range    Glucose 121 70 - 139 mg/dL   Lactic Acid   Result Value Ref Range    Lactic Acid 0.7 0.5 - 2.2 mmol/L   POCT Glucose   Result Value Ref Range    Glucose 214 (H) 70 - 139 mg/dL   Basic Metabolic Panel   Result Value Ref Range    Sodium 141 136 - 145 mmol/L    Potassium 4.1 3.5 - 5.0 mmol/L    Chloride 104 98 - 107 mmol/L    CO2 27 22 - 31 mmol/L    Anion Gap, Calculation 10 5 - 18 mmol/L    Glucose 131 (H) 70 - 125 mg/dL    Calcium 9.3 8.5 - 10.5 mg/dL    BUN 17 8 - 28 mg/dL    Creatinine 0.80 0.60 - 1.10 mg/dL    GFR MDRD Af Amer >60 >60 mL/min/1.73m2    GFR MDRD Non Af Amer >60 >60  mL/min/1.73m2   Hemoglobin   Result Value Ref Range    Hemoglobin 11.5 (L) 12.0 - 16.0 g/dL   INR   Result Value Ref Range    INR 1.62 (H) 0.90 - 1.10     Current Outpatient Medications   Medication Sig     albuterol (PROVENTIL) 2.5 mg /3 mL (0.083 %) nebulizer solution Take 3 mL (2.5 mg total) by nebulization every 4 (four) hours as needed for wheezing.     calcium-vitamin D (CALCIUM-VITAMIN D) 500 mg(1,250mg) -200 unit per tablet Take 1 tablet by mouth 2 (two) times a day with meals.     cholecalciferol, vitamin D3, 1,000 unit tablet Take 2,000 Units by mouth daily.      diphenhydrAMINE (BENADRYL) 2 % cream Apply 1 application topically 3 (three) times a day as needed for itching.     diphenhydrAMINE (BENADRYL) 25 mg capsule Take 25 mg by mouth every 6 (six) hours as needed for itching.     fesoterodine (TOVIAZ) 8 mg Tb24 ER tablet Take 8 mg by mouth daily.      fluticasone propionate (FLONASE ALLERGY RELIEF) 50 mcg/actuation nasal spray One spray in each nostril daily (Patient taking differently: 1 spray into each nostril at bedtime. One spray in each nostril daily)     furosemide (LASIX) 40 MG tablet TAKE 1 TABLET(40 MG) BY MOUTH TWICE DAILY     glucosamine-chondroitin 500-400 mg tablet Take 1 tablet by mouth 2 (two) times a day.      HYDROcodone-acetaminophen 5-325 mg per tablet Take 1 tablet by mouth every 4 (four) hours as needed for pain.     hydrocortisone 1 % cream Apply 1 application topically as needed.     lidocaine-prilocaine (EMLA) cream Place over port 30 min. before being accessed.     loratadine (CLARITIN) 10 mg tablet Take 1 tablet (10 mg total) by mouth daily.     metoprolol tartrate (LOPRESSOR) 25 MG tablet Take 1 tablet (25 mg total) by mouth 2 (two) times a day.     OMEGA-3/DHA/EPA/FISH OIL (FISH OIL-OMEGA-3 FATTY ACIDS) 300-1,000 mg capsule Take 2 g by mouth 2 times a day at 6:00 am and 4:00 pm.     omeprazole (PRILOSEC) 20 MG capsule Take 20 mg by mouth daily before breakfast.     polyvinyl  alcohol-povidon,PF, (REFRESH CLASSIC) 1.4-0.6 % Dpet ophthalmic dropperette Administer 2 drops to both eyes 3 (three) times a day.     potassium chloride (K-DUR,KLOR-CON) 10 MEQ tablet TAKE 2 TABLETS BY MOUTH TWICE DAILY (Patient taking differently: Take 20 mEq by mouth 2 (two) times a day. )     prochlorperazine (COMPAZINE) 10 MG tablet TAKE 1 TABLET(10 MG) BY MOUTH EVERY 6 HOURS AS NEEDED FOR NAUSEA     simvastatin (ZOCOR) 20 MG tablet Take 1 tablet (20 mg total) by mouth daily with supper.     vitamin A-vitamin C-vit E-min (OCUVITE) Tab tablet Take 1 tablet by mouth 2 (two) times a day.     warfarin ANTICOAGULANT (COUMADIN/JANTOVEN) 2.5 MG tablet Take 5mg (1 tablet) daily with INR check 12/9       ASSESSMENT:      ICD-10-CM    1. Atrial fibrillation, unspecified type (H) I48.91    2. Venous stasis I87.8    3. Status post left breast lumpectomy Z98.890    4. Pain management R52        PLAN:    Atrial fibrillation-continue Coumadin,  metoprolol recently increased to 37.5 twice daily    Venous stasis lower extremities compression to lower extremities on during the day and off at night.    Status post left breast lumpectomy monitor surgical incision for signs and symptoms of infection    Pain management Norco PRN anticoagulation    Right great toenail injury bacitracin to right great toe twice daily, cover with a bandage and podiatry to see patient.  She was also noted to have a left great toenail that was lifting.  New orders for foot soaks twice a day and bacitracin twice daily and podiatry consult    Rash under breast nystatin powder twice daily until healed and then as needed twice daily    Shortness of breath oxygen 1 to 2 L PRN to keep sats greater than 90%    Anticoagulation management continue Coumadin and adjust per INRs    Debility PT OT    Patient to receive benign fusion related to her cancer every 3 weeks.  Nurse manager to follow-up to see if this is something that needs to be done while she is in the  TCU or for can resume after discharge.    Electronically signed by: Sara Mayes CNP

## 2021-06-04 NOTE — PROGRESS NOTES
In for follow-up of her left lumpectomy  with sentinel lymph node biopsy.  She is feeling well.  She is having very minimal pain.   She had a fairly complicated postoperative course and was in the hospital for several days postoperatively with hypoxia.  I do not think this was related to her surgery.  I think this was her baseline.    Physical exam:  Appears well.  Does not appear in any discomfort.  Breasts: Incisions are healing nicely with no signs of infection.  No swelling.    Pathology: The tumor was 2.1 cm.  The margins are negative.  1 of 3 lymph nodes was positive just for cytokeratin stain positive cells.    Impression: Postop visit. Doing well.  Because there is residual tumor, she is definitely going to need to continue her chemo.  This will likely just be Herceptin going forward.  I think given the rather abrupt onset of this hypoxia and shortness of breath, we should repeat her MUGA scan just to be sure this is not related any new congestive heart failure.  A MUGA has already been ordered by Dr. Villalba.  We will see if we can get that set up sooner than later.    Plan: We will get her set up for the MUGA scan.  She will follow-up with Dr. Villalba soon we will also put through the referral to radiation oncology.

## 2021-06-04 NOTE — PROGRESS NOTES
[facility]    Facility:   [facility] [[id number]]   Code Status: FULL CODE      CHIEF COMPLAINT/REASON FOR VISIT:  Chief Complaint   Patient presents with     Review Of Multiple Medical Conditions       HISTORY:      HPI: Jennifer is a 74 y.o. female undergoing physical and occupational therapy at [facility] transitional care unit. , she is with history of severe obesity, stage Ib left breast cancer, I normal, vitamin D deficiency, urge stress incontinence, tremor, postmenopausal bleeding and uterine adenocarcinoma in situ status post JEROME/BSO, atrial fibrillation, osteopenia, FLAKITA, macular degeneration, chronic bilateral leg edema, hypertension, diabetes mellitus, CAD, chronic back and bilateral knee pain who presented to [facility] for scheduled left breast lumpectomy And sentinel lymph node biopsy.  Per the records she became hypoxic in the PACU and noted to be persistently hypoxic to 4 L of oxygen which is a change from her baseline.  She does have FLAKITA but does not use her CPAP machine. She was recently sent back to the hospital from December 14-17 th  for left axilla pain and found to have a left axillary  hematoma related to her recent lumpectomy. She was seen by general surgery who recommended resuming warfarin and providing modest infection prophylaxis.She was also treated at this time with cefdinir for a UTI.    Today she is seen after returning from a recent hospitalization for hematoma left axilla related to her recent lumpectomy.  Her weights were noted to be elevated 14 pounds in the last 4 days.  She tells me she does not believe this is accurate however she did have crackles bilateral lower lobes and she is with lymphedema lower extremities.  Currently her legs are wrapped in lymph wraps that were done in the hospital and they will be removed in the morning for a podiatry appointment.  She does have 2 toenails that are fallen off on her  bilateral great toes.  Lymph wraps to be resumed when she returns per therapy.  She continues set to have intermittent tachycardia and her metoprolol was increased again to 50 mg twice daily.  She was also given 3 extra days of increased Lasix due to the weight gain . .  She is maintaining her sats at 92 to 93% however she does appear short of breath when talking.  She is refusing to wear the oxygen.  She does have significant bruising around lumpectomy site and left axilla.   She does have a Petersen in her right upper chest that is not accessed at this time.     Past Medical History:   Diagnosis Date     (Lower) Leg Localized Swelling     Created by Conversion      Adenocarcinoma In Situ Of The Uterus     Created by Conversion      Backache     Created by Conversion [facility] Annotation: Feb 29 2012 12:14PM - Opal Helm: Referred to  [facility] 9/11, given TENS unit.      Benign Polyps Of The Large Intestine     Created by Conversion      Breast cancer (H)      Cancer (H)     uterine     Chronic kidney disease     stage 1 per H&P 11/22/2019     Coronary artery disease      Diabetes mellitus (H)     type 2     Fatigue     Created by Conversion      Hyperglycemia     Created by Conversion      Hyperlipidemia     Created by Conversion      Hypertension     Created by Conversion      Joint Pain, Localized In The Knee     Created by Conversion      Lesion of mediastinum      Lymphedema     Created by Conversion      Macular Degeneration     Created by Conversion [facility] Annotation: Apr 5 2011 12:22PM - Tien Guzman: Followed by  Ophthalmologist, Dr. Avilez.      Major Depression, Single Episode In Remission     Created by Conversion      Obesity     Created by Conversion      Obstructive Sleep Apnea     CPAP     Osteopenia     Created by Conversion      Paroxysmal Atrial Fibrillation     Created by Conversion [facility] Annotation: Nov 28 2012 10:36PM - Shruthi Dhaliwal: Found incidential  on exam on May 7th,  [id number] score of 1 for HTNsymptomatic and hospitalized  x2 in .CV X 2 in 2012Chronic Sotalol Rx      Postmenopausal bleeding     Created by Conversion      Skin cancer      Tachycardia     Created by Conversion      Tremor     Created by Conversion      Urge And Stress Incontinence     Created by Conversion      Urinary Frequency More Than Twice At Night (Nocturia)     Created by Conversion      Urine Tests Nonspecific Abnormal Findings     Created by Conversion      Vitamin D Deficiency     Created by Conversion              Family History   Problem Relation Age of Onset     Hypertension Father      Pneumonia Father      Esophageal cancer Brother      Cancer Brother      Stroke Mother      Alzheimer's disease Brother      Cancer Brother      Prostate cancer Brother      Cancer Nephew      Colon cancer Nephew      Cancer Paternal cousin      Social History     Socioeconomic History     Marital status: Single     Spouse name: Not on file     Number of children: 0     Years of education: Not on file     Highest education level: Not on file   Occupational History     Occupation: Retired RN     Employer: [employer]   Social Needs     Financial resource strain: Not on file     Food insecurity:     Worry: Not on file     Inability: Not on file     Transportation needs:     Medical: Not on file     Non-medical: Not on file   Tobacco Use     Smoking status: Former Smoker     Packs/day: 3.00     Years: 40.00     Pack years: 120.00     Types: Cigarettes     Last attempt to quit: 2005     Years since quittin.9     Smokeless tobacco: Never Used   Substance and Sexual Activity     Alcohol use: Not Currently     Drug use: No     Sexual activity: Never     Partners: Male     Birth control/protection: Surgical, Post-menopausal   Lifestyle     Physical activity:     Days per week: Not on file     Minutes per session: Not on file     Stress: Not on file   Relationships     Social connections:      Talks on phone: Not on file     Gets together: Not on file     Attends Mandaeism service: Not on file     Active member of club or organization: Not on file     Attends meetings of clubs or organizations: Not on file     Relationship status: Not on file     Intimate partner violence:     Fear of current or ex partner: Not on file     Emotionally abused: Not on file     Physically abused: Not on file     Forced sexual activity: Not on file   Other Topics Concern     Not on file   Social History Narrative    Lives alone single family home that she owns, no children and is retired.  Boyfriend used to live with her, however due to stroke he is in his own care facility.  She is currently at [facility]         Review of Systems   Constitutional: Positive for activity change. Negative for appetite change, fatigue and fever.   HENT: Negative for congestion.    Respiratory: Positive for shortness of breath. Negative for cough and wheezing.    Cardiovascular: Positive for leg swelling. Negative for chest pain.        Lymphedema   Gastrointestinal: Negative for abdominal distention, abdominal pain, constipation, diarrhea and nausea.   Genitourinary: Negative for dysuria.   Musculoskeletal: Positive for arthralgias. Negative for back pain.   Skin: Positive for rash and wound. Negative for color change.        Bruising around left breast surgical incision  Left surgical breast incision that is Dermabond   Patient with a rash left upper thigh and medial left thigh   Neurological: Negative for dizziness.   Psychiatric/Behavioral: Negative for agitation, behavioral problems and confusion.       Vitals:    12/18/19 1200   BP: 117/78   Pulse: 80   Resp: 18   Temp: 97.4  F (36.3  C)   SpO2: 92%   Weight: (!) 353 lb (160.1 kg)       Physical Exam  Constitutional:       Appearance: She is well-developed.      Comments: Pleasant woman in no acute distress   HENT:      Head: Normocephalic.   Eyes:      Conjunctiva/sclera:  Conjunctivae normal.   Neck:      Musculoskeletal: Normal range of motion.   Cardiovascular:      Rate and Rhythm: Normal rate and regular rhythm.      Heart sounds: Normal heart sounds. No murmur.   Pulmonary:      Effort: No respiratory distress.      Breath sounds: Normal breath sounds. No wheezing or rales.   Abdominal:      General: Bowel sounds are normal. There is no distension.      Palpations: Abdomen is soft.      Tenderness: There is no abdominal tenderness.   Musculoskeletal: Normal range of motion.      Right lower leg: Edema present.      Left lower leg: Edema present.      Comments: Patient with a right and left  great toenails that has almost completely lifted off and hanging on by the cuticle.podiatry consult 12/19/19   Skin:     General: Skin is warm.      Comments: Surgical incision left breast Dermabond and large amount of bruising around the incision.   Neurological:      Mental Status: She is alert and oriented to person, place, and time.   Psychiatric:         Behavior: Behavior normal.           LABS:   Recent Results (from the past 240 hour(s))   Basic Metabolic Panel   Result Value Ref Range    Sodium 141 136 - 145 mmol/L    Potassium 4.1 3.5 - 5.0 mmol/L    Chloride 104 98 - 107 mmol/L    CO2 27 22 - 31 mmol/L    Anion Gap, Calculation 10 5 - 18 mmol/L    Glucose 131 (H) 70 - 125 mg/dL    Calcium 9.3 8.5 - 10.5 mg/dL    BUN 17 8 - 28 mg/dL    Creatinine 0.80 0.60 - 1.10 mg/dL    GFR MDRD Af Amer >60 >60 mL/min/1.73m2    GFR MDRD Non Af Amer >60 >60 mL/min/1.73m2   Hemoglobin   Result Value Ref Range    Hemoglobin 11.5 (L) 12.0 - 16.0 g/dL   INR   Result Value Ref Range    INR 1.62 (H) 0.90 - 1.10   INR   Result Value Ref Range    INR 1.98 (H) 0.90 - 1.10   ECG 12 lead with MUSE   Result Value Ref Range    SYSTOLIC BLOOD PRESSURE      DIASTOLIC BLOOD PRESSURE      VENTRICULAR RATE 94 BPM    ATRIAL RATE 107 BPM    P-R INTERVAL      QRS DURATION 126 ms    Q-T INTERVAL 358 ms    QTC  CALCULATION (BEZET) 447 ms    P Axis      R AXIS 39 degrees    T AXIS -25 degrees    MUSE DIAGNOSIS       Atrial fibrillation  Right bundle branch block  Abnormal ECG  When compared with ECG of 26-JUN-2019 11:38,  No significant change was found  Confirmed by SEE ED PROVIDER NOTE FOR, ECG INTERPRETATION ([id number]),  DOMINIQUE DE LUNA ([id number]) on 12/16/2019 8:02:13 AM     Comprehensive Metabolic Panel   Result Value Ref Range    Sodium 135 (L) 136 - 145 mmol/L    Potassium 4.8 3.5 - 5.0 mmol/L    Chloride 99 98 - 107 mmol/L    CO2 28 22 - 31 mmol/L    Anion Gap, Calculation 8 5 - 18 mmol/L    Glucose 274 (H) 70 - 125 mg/dL    BUN 12 8 - 28 mg/dL    Creatinine 1.08 0.60 - 1.10 mg/dL    GFR MDRD Af Amer 60 (L) >60 mL/min/1.73m2    GFR MDRD Non Af Amer 50 (L) >60 mL/min/1.73m2    Bilirubin, Total 0.7 0.0 - 1.0 mg/dL    Calcium 8.4 (L) 8.5 - 10.5 mg/dL    Protein, Total 5.6 (L) 6.0 - 8.0 g/dL    Albumin 2.4 (L) 3.5 - 5.0 g/dL    Alkaline Phosphatase 70 45 - 120 U/L    AST 13 0 - 40 U/L    ALT 24 0 - 45 U/L   INR   Result Value Ref Range    INR 2.36 (H) 0.90 - 1.10   Lactic Acid   Result Value Ref Range    Lactic Acid 1.9 0.5 - 2.2 mmol/L   Troponin I   Result Value Ref Range    Troponin I 0.01 0.00 - 0.29 ng/mL   HM1 (CBC with Diff)   Result Value Ref Range    WBC 19.7 (H) 4.0 - 11.0 thou/uL    RBC 3.21 (L) 3.80 - 5.40 mill/uL    Hemoglobin 9.5 (L) 12.0 - 16.0 g/dL    Hematocrit 31.0 (L) 35.0 - 47.0 %    MCV 97 80 - 100 fL    MCH 29.6 27.0 - 34.0 pg    MCHC 30.6 (L) 32.0 - 36.0 g/dL    RDW 21.3 (H) 11.0 - 14.5 %    Platelets 168 140 - 440 thou/uL    MPV 10.8 8.5 - 12.5 fL    Neutrophils % 86 (H) 50 - 70 %    Lymphocytes % 4 (L) 20 - 40 %    Monocytes % 8 2 - 10 %    Eosinophils % 1 0 - 6 %    Basophils % 0 0 - 2 %    Neutrophils Absolute 16.9 (H) 2.0 - 7.7 thou/uL    Lymphocytes Absolute 0.8 0.8 - 4.4 thou/uL    Monocytes Absolute 1.5 (H) 0.0 - 0.9 thou/uL    Eosinophils Absolute 0.3 0.0 - 0.4 thou/uL    Basophils Absolute  0.0 0.0 - 0.2 thou/uL   BNP(B-type Natriuretic Peptide)   Result Value Ref Range    BNP 42 0 - 130 pg/mL   Manual Differential   Result Value Ref Range    Platelet Estimate Normal Normal    Ovalocytes 1+ (!) Negative    Polychromasia 1+ (!) Negative   Urinalysis-UC if Indicated   Result Value Ref Range    Color, UA Yellow Colorless, Yellow, Straw, Light Yellow    Clarity, UA Clear Clear    Glucose, UA Negative Negative    Bilirubin, UA Negative Negative    Ketones, UA Negative Negative, 60 mg/dL    Specific Gravity, UA 1.015 1.001 - 1.030    Blood, UA Negative Negative    pH, UA 5.0 4.5 - 8.0    Protein, UA Negative Negative mg/dL    Urobilinogen, UA <2.0 E.U./dL <2.0 E.U./dL, 2.0 E.U./dL    Nitrite, UA Negative Negative    Leukocytes, UA Moderate (!) Negative    Bacteria, UA Moderate (!) None Seen hpf    RBC, UA 0-2 None Seen, 0-2 hpf    WBC, UA 5-10 (!) None Seen, 0-5 hpf    Squam Epithel, UA 0-5 None Seen, 0-5 lpf    WBC Clumps Present (!) None Seen    Mucus, UA Moderate (!) None Seen lpf    Hyaline Casts, UA 10-25 (!) 0-5, None Seen lpf   Culture, Urine   Result Value Ref Range    Culture 50,000-100,000 col/ml Citrobacter freundii (!)        Susceptibility    Citrobacter freundii - JAYDEN     Amoxicillin + Clavulanate >16/8 Resistant      Ampicillin >16 Resistant      Cefazolin >16 Resistant      Cefepime <=1 Sensitive      Ceftriaxone <=1 Sensitive      Ciprofloxacin <=0.5 Sensitive      Gentamicin <=2 Sensitive      Levofloxacin <=1 Sensitive      Meropenem <=0.25 Sensitive      Nitrofurantoin <=16 Sensitive      Tetracycline <=2 Sensitive      Tobramycin <=2 Sensitive      Trimethoprim + Sulfamethoxazole <=0.5 Sensitive    POCT Glucose   Result Value Ref Range    Glucose 211 (H) 70 - 139 mg/dL   POCT Glucose   Result Value Ref Range    Glucose 155 (H) 70 - 139 mg/dL   POCT Glucose   Result Value Ref Range    Glucose 130 70 - 139 mg/dL   INR - early am (tomorrow am)   Result Value Ref Range    INR 2.34 (H) 0.90 -  1.10   Basic metabolic panel   Result Value Ref Range    Sodium 137 136 - 145 mmol/L    Potassium 4.5 3.5 - 5.0 mmol/L    Chloride 101 98 - 107 mmol/L    CO2 31 22 - 31 mmol/L    Anion Gap, Calculation 5 5 - 18 mmol/L    Glucose 148 (H) 70 - 125 mg/dL    Calcium 8.6 8.5 - 10.5 mg/dL    BUN 11 8 - 28 mg/dL    Creatinine 0.83 0.60 - 1.10 mg/dL    GFR MDRD Af Amer >60 >60 mL/min/1.73m2    GFR MDRD Non Af Amer >60 >60 mL/min/1.73m2   HM1 (CBC with Diff)   Result Value Ref Range    WBC 19.1 (H) 4.0 - 11.0 thou/uL    RBC 3.43 (L) 3.80 - 5.40 mill/uL    Hemoglobin 10.0 (L) 12.0 - 16.0 g/dL    Hematocrit 33.0 (L) 35.0 - 47.0 %    MCV 96 80 - 100 fL    MCH 29.2 27.0 - 34.0 pg    MCHC 30.3 (L) 32.0 - 36.0 g/dL    RDW 21.1 (H) 11.0 - 14.5 %    Platelets 177 140 - 440 thou/uL    MPV 10.4 8.5 - 12.5 fL    Neutrophils % 85 (H) 50 - 70 %    Lymphocytes % 3 (L) 20 - 40 %    Monocytes % 8 2 - 10 %    Eosinophils % 3 0 - 6 %    Basophils % 0 0 - 2 %    Neutrophils Absolute 16.2 (H) 2.0 - 7.7 thou/uL    Lymphocytes Absolute 0.6 (L) 0.8 - 4.4 thou/uL    Monocytes Absolute 1.6 (H) 0.0 - 0.9 thou/uL    Eosinophils Absolute 0.6 (H) 0.0 - 0.4 thou/uL    Basophils Absolute 0.0 0.0 - 0.2 thou/uL   Manual Differential   Result Value Ref Range    Platelet Estimate Normal Normal    Ovalocytes 1+ (!) Negative    Polychromasia 1+ (!) Negative   POCT Glucose   Result Value Ref Range    Glucose 134 70 - 139 mg/dL   POCT Glucose   Result Value Ref Range    Glucose 175 (H) 70 - 139 mg/dL   POCT Glucose   Result Value Ref Range    Glucose 201 (H) 70 - 139 mg/dL   Lactic Acid   Result Value Ref Range    Lactic Acid 1.6 0.5 - 2.2 mmol/L   INR   Result Value Ref Range    INR 1.85 (H) 0.90 - 1.10   HM2(CBC w/o Differential)   Result Value Ref Range    WBC 14.6 (H) 4.0 - 11.0 thou/uL    RBC 2.94 (L) 3.80 - 5.40 mill/uL    Hemoglobin 8.5 (L) 12.0 - 16.0 g/dL    Hematocrit 28.9 (L) 35.0 - 47.0 %    MCV 98 80 - 100 fL    MCH 28.9 27.0 - 34.0 pg    MCHC 29.4  (L) 32.0 - 36.0 g/dL    RDW 21.2 (H) 11.0 - 14.5 %    Platelets 158 140 - 440 thou/uL    MPV 11.1 8.5 - 12.5 fL   POCT Glucose   Result Value Ref Range    Glucose 140 (H) 70 - 139 mg/dL   POCT Glucose   Result Value Ref Range    Glucose 132 70 - 139 mg/dL   Hemoglobin   Result Value Ref Range    Hemoglobin 8.4 (L) 12.0 - 16.0 g/dL   POCT Glucose   Result Value Ref Range    Glucose 125 70 - 139 mg/dL   POCT Glucose   Result Value Ref Range    Glucose 158 (H) 70 - 139 mg/dL   Lactic Acid   Result Value Ref Range    Lactic Acid 0.8 0.5 - 2.2 mmol/L   HM2(CBC w/o Differential)   Result Value Ref Range    WBC 12.4 (H) 4.0 - 11.0 thou/uL    RBC 2.78 (L) 3.80 - 5.40 mill/uL    Hemoglobin 8.2 (L) 12.0 - 16.0 g/dL    Hematocrit 27.4 (L) 35.0 - 47.0 %    MCV 99 80 - 100 fL    MCH 29.5 27.0 - 34.0 pg    MCHC 29.9 (L) 32.0 - 36.0 g/dL    RDW 20.6 (H) 11.0 - 14.5 %    Platelets 164 140 - 440 thou/uL    MPV 10.3 8.5 - 12.5 fL   INR   Result Value Ref Range    INR 1.57 (H) 0.90 - 1.10   POCT Glucose   Result Value Ref Range    Glucose 125 70 - 139 mg/dL   POCT Glucose   Result Value Ref Range    Glucose 127 70 - 139 mg/dL   INR   Result Value Ref Range    INR 1.55 (H) 0.90 - 1.10     Current Outpatient Medications   Medication Sig     albuterol (PROVENTIL) 2.5 mg /3 mL (0.083 %) nebulizer solution Take 3 mL (2.5 mg total) by nebulization every 4 (four) hours as needed for wheezing.     bacitracin ointment Apply 1 application topically 2 (two) times a day. After epsom salt foot soak. To bilateral great toes for toenail falling off, until seen by Podiatry     calcium-vitamin D (CALCIUM-VITAMIN D) 500 mg(1,250mg) -200 unit per tablet Take 1 tablet by mouth 2 (two) times a day with meals.     cefdinir (OMNICEF) 300 MG capsule Take 1 capsule (300 mg total) by mouth 2 (two) times a day for 5 days.     cholecalciferol, vitamin D3, 1,000 unit tablet Take 2,000 Units by mouth daily.      diphenhydrAMINE (BENADRYL) 2 % cream Apply 1  application topically 3 (three) times a day as needed for itching.     diphenhydrAMINE (BENADRYL) 25 mg capsule Take 25 mg by mouth every 6 (six) hours as needed for itching.     fesoterodine (TOVIAZ) 8 mg Tb24 ER tablet Take 8 mg by mouth daily.      fluticasone propionate (FLONASE ALLERGY RELIEF) 50 mcg/actuation nasal spray One spray in each nostril daily     furosemide (LASIX) 40 MG tablet TAKE 1 TABLET(40 MG) BY MOUTH TWICE DAILY     glucosamine-chondroitin 500-400 mg tablet Take 1 tablet by mouth 2 (two) times a day with meals.      HYDROcodone-acetaminophen 5-325 mg per tablet Take 1 tablet by mouth every 4 (four) hours as needed for pain.     lidocaine-prilocaine (EMLA) cream Place over port 30 min. before being accessed.     loratadine (CLARITIN) 10 mg tablet Take 1 tablet (10 mg total) by mouth daily.     metoprolol tartrate (LOPRESSOR) 25 MG tablet Take 37.5 mg by mouth 2 (two) times a day. Hold for SBP < 110     mv-min/FA/vit K/lycop/lut/zeax (OCUVITE EYE PLUS MULTI ORAL) Take 1 tablet by mouth 2 (two) times a day with meals.     nystatin (MYCOSTATIN) powder Apply 1 application topically 2 (two) times a day. Under breasts for rash, until healed. Then PRN.     OMEGA-3/DHA/EPA/FISH OIL (FISH OIL-OMEGA-3 FATTY ACIDS) 300-1,000 mg capsule Take 2 g by mouth 2 times a day at 6:00 am and 4:00 pm.     omeprazole (PRILOSEC) 20 MG capsule Take 20 mg by mouth daily before breakfast.     polyvinyl alcohol-povidon,PF, (REFRESH CLASSIC) 1.4-0.6 % Dpet ophthalmic dropperette Administer 2 drops to both eyes 3 (three) times a day.     potassium chloride (K-DUR,KLOR-CON) 10 MEQ tablet TAKE 2 TABLETS BY MOUTH TWICE DAILY     prochlorperazine (COMPAZINE) 10 MG tablet TAKE 1 TABLET(10 MG) BY MOUTH EVERY 6 HOURS AS NEEDED FOR NAUSEA     simvastatin (ZOCOR) 20 MG tablet Take 1 tablet (20 mg total) by mouth daily with supper.     triamcinolone (KENALOG) 0.1 % cream Apply 1 application topically 2 (two) times a day. To left leg  for rash, until healed     warfarin ANTICOAGULANT (COUMADIN/JANTOVEN) 2.5 MG tablet Take 2 tablets (5 mg total) by mouth daily.       ASSESSMENT:      ICD-10-CM    1. Weight gain R63.5    2. Edema, unspecified type R60.9    3. Hematoma T14.8XXA        PLAN:      Rash left upper thigh triamcinolone twice daily    Atrial fibrillation-continue Coumadin,  metoprolol recently increased to 50 mg  twice daily    Venous stasis lower extremities ace wraps prior to hospitalization, came back to facility with lymph wraps and Therapy to resume them.     Status post left breast lumpectomy monitor surgical incision for signs and symptoms of infection-recent left axilla hematoma.     Pain management Norco PRN anticoagulation    Right/Left  great toenail injury bacitracin to bilateral  great toe twice daily, cover with a bandage and podiatry to see patient.  Foot soaks two times a day with epsom salt prior to bacitracin  twice a day  and podiatry consult    Rash under breast nystatin powder twice daily until healed and then as needed twice daily    Shortness of breath oxygen 1 to 2 L PRN to keep sats greater than 90%, currently refusing to wear it.     Anticoagulation management continue Coumadin and adjust per INRs    Debility PT OT    Patient to receive benign fusion related to her cancer every 3 weeks.  Nurse manager to follow-up to see if this is something that needs to be done while she is in the TCU or for can resume after discharge.    Electronically signed by: Sara Mayes CNP

## 2021-06-04 NOTE — TELEPHONE ENCOUNTER
ANTICOAGULATION  MANAGEMENT: Discharge Review    Jennifer Galvin chart reviewed for anticoagulation continuity of care    Hospital admission on  12/14-30 for sob.    Discharge disposition: TCU    INR Results:       Recent labs: (last 7 days)     12/25/19  0636 12/26/19  0654 12/27/19  0617 12/29/19  0535 12/30/19  0556   INR 2.23* 2.36* 2.39* 2.79* 2.95*       Warfarin inpatient management: home regimen continued    Warfarin discharge instructions: home regimen continued     Medication Changes Affecting Anticoagulation: No    Additional Factors Affecting Anticoagulation: No    Plan     No adjustment to anticoagulation plan needed      ACM will resume monitoring upon discharge from TCU    Anticoagulation calendar updated    Ibrahima Trimble RN

## 2021-06-04 NOTE — PROGRESS NOTES
[facility]    Facility:   [facility] [[id number]]   Code Status: FULL CODE      CHIEF COMPLAINT/REASON FOR VISIT:  Chief Complaint   Patient presents with     Problem Visit     rash       HISTORY:      HPI: Jennifer is a 74 y.o. female undergoing physical and occupational therapy at [facility] transitional care unit. , she is with history of severe obesity, stage Ib left breast cancer, I normal, vitamin D deficiency, urge stress incontinence, tremor, postmenopausal bleeding and uterine adenocarcinoma in situ status post JEROME/BSO, atrial fibrillation, osteopenia, FLAKITA, macular degeneration, chronic bilateral leg edema, hypertension, diabetes mellitus, CAD, chronic back and bilateral knee pain who presented to [facility] for scheduled left breast lumpectomy And sentinel lymph node biopsy.  Per the records she became hypoxic in the PACU and noted to be persistently hypoxic to 4 L of oxygen which is a change from her baseline.  She does have FLAKITA but does not use her CPAP machine.    Today she is seen for reports left upper thigh and medial.  she denied any chest pain or shortness of breath.  She also denied incisional pain.  She is with a left breast incision that is Steri-Stripped clean dry and intact with a large amount of bruising around it.  Last hemoglobin hemoglobin was 11.5 BMP within normal limits.  She does have a Petersen in her right upper chest that is not accessed at this time.  She is using oxygen 1 to 2 L PRN to keep sats greater than 90%.    She also reports she is on a bio IV medication related to her cancer every 3 weeks for the next year.  Currently this medication is on hold in the transitional care unit and the nurse manager will follow-up to see if this is something that needs to be done while she is in the TCU.    Past Medical History:   Diagnosis Date     (Lower) Leg Localized Swelling     Created by Conversion      Adenocarcinoma In Situ  Of The Uterus     Created by Conversion      Backache     Created by Conversion [employer] Annotation: Feb 29 2012 12:14PM - Opal Helm: Referred to  [facility] 9/11, given TENS unit.      Benign Polyps Of The Large Intestine     Created by Conversion      Breast cancer (H)      Cancer (H)     uterine     Chronic kidney disease     stage 1 per H&P 11/22/2019     Coronary artery disease      Diabetes mellitus (H)     type 2     Fatigue     Created by Conversion      Hyperglycemia     Created by Conversion      Hyperlipidemia     Created by Conversion      Hypertension     Created by Conversion      Joint Pain, Localized In The Knee     Created by Conversion      Lesion of mediastinum      Lymphedema     Created by Conversion      Macular Degeneration     Created by Conversion [facility] Annotation: Apr 5 2011 12:22PM - Tien Guzman: Followed by  Ophthalmologist, Dr. Avilez.      Major Depression, Single Episode In Remission     Created by Conversion      Obesity     Created by Conversion      Obstructive Sleep Apnea     CPAP     Osteopenia     Created by Conversion      Paroxysmal Atrial Fibrillation     Created by Conversion [employer] Annotation: Nov 28 2012 10:36PM - Shruthi Dhaliwal: Found incidential  on exam on May 7th, [id number] score of 1 for HTNsymptomatic and hospitalized  x2 in July, 2012.CV X 2 in 2012Chronic Sotalol Rx      Postmenopausal bleeding     Created by Conversion      Skin cancer      Tachycardia     Created by Conversion      Tremor     Created by Conversion      Urge And Stress Incontinence     Created by Conversion      Urinary Frequency More Than Twice At Night (Nocturia)     Created by Conversion      Urine Tests Nonspecific Abnormal Findings     Created by Conversion      Vitamin D Deficiency     Created by Conversion              Family History   Problem Relation Age of Onset     Hypertension Father      Pneumonia Father      Esophageal cancer Brother      Cancer Brother       Stroke Mother      Alzheimer's disease Brother      Cancer Brother      Prostate cancer Brother      Cancer Nephew      Colon cancer Nephew      Cancer Paternal cousin      Social History     Socioeconomic History     Marital status: Single     Spouse name: Not on file     Number of children: 0     Years of education: Not on file     Highest education level: Not on file   Occupational History     Occupation: Retired RN     Employer: [employer]   Social Needs     Financial resource strain: Not on file     Food insecurity:     Worry: Not on file     Inability: Not on file     Transportation needs:     Medical: Not on file     Non-medical: Not on file   Tobacco Use     Smoking status: Former Smoker     Packs/day: 3.00     Years: 40.00     Pack years: 120.00     Types: Cigarettes     Last attempt to quit: 2005     Years since quittin.9     Smokeless tobacco: Never Used   Substance and Sexual Activity     Alcohol use: Not Currently     Drug use: No     Sexual activity: Never     Partners: Male     Birth control/protection: Surgical, Post-menopausal   Lifestyle     Physical activity:     Days per week: Not on file     Minutes per session: Not on file     Stress: Not on file   Relationships     Social connections:     Talks on phone: Not on file     Gets together: Not on file     Attends Druze service: Not on file     Active member of club or organization: Not on file     Attends meetings of clubs or organizations: Not on file     Relationship status: Not on file     Intimate partner violence:     Fear of current or ex partner: Not on file     Emotionally abused: Not on file     Physically abused: Not on file     Forced sexual activity: Not on file   Other Topics Concern     Not on file   Social History Narrative    Lives alone single family home that she owns, no children and is retired.         Review of Systems   Constitutional: Positive for activity change. Negative for appetite change, fatigue  and fever.   HENT: Negative for congestion.    Respiratory: Negative for cough, shortness of breath and wheezing.    Cardiovascular: Positive for leg swelling. Negative for chest pain.        Lymphedema   Gastrointestinal: Negative for abdominal distention, abdominal pain, constipation, diarrhea and nausea.   Genitourinary: Negative for dysuria.   Musculoskeletal: Positive for arthralgias. Negative for back pain.   Skin: Positive for rash and wound. Negative for color change.        Bruising around left breast surgical incision  Left surgical breast incision that is Dermabond with Steri-Strips clean dry and intact.    Patient with a rash left upper thigh and medial left thigh   Neurological: Negative for dizziness.   Psychiatric/Behavioral: Negative for agitation, behavioral problems and confusion.       Vitals:    12/12/19 0945   BP: (!) 149/93   Pulse: (!) 118   Resp: 16   Temp: 97.4  F (36.3  C)   SpO2: 94%   Weight: (!) 338 lb (153.3 kg)       Physical Exam  Constitutional:       Appearance: She is well-developed.      Comments: Pleasant woman in no acute distress   HENT:      Head: Normocephalic.   Eyes:      Conjunctiva/sclera: Conjunctivae normal.   Neck:      Musculoskeletal: Normal range of motion.   Cardiovascular:      Rate and Rhythm: Normal rate and regular rhythm.      Heart sounds: Normal heart sounds. No murmur.   Pulmonary:      Effort: No respiratory distress.      Breath sounds: Normal breath sounds. No wheezing or rales.   Abdominal:      General: Bowel sounds are normal. There is no distension.      Palpations: Abdomen is soft.      Tenderness: There is no abdominal tenderness.   Musculoskeletal: Normal range of motion.      Right lower leg: Edema present.      Left lower leg: Edema present.      Comments: Patient with a right great toenail that has almost completely lifted off and hanging on by the cuticle.   Skin:     General: Skin is warm.      Comments: Surgical incision left breast  Dermabond with Steri-Strips and large amount of bruising around the incision.   Neurological:      Mental Status: She is alert and oriented to person, place, and time.   Psychiatric:         Behavior: Behavior normal.           LABS:   Recent Results (from the past 240 hour(s))   Surgical Pathology Exam   Result Value Ref Range    Case Report       Surgical Pathology                                Case: [id number]                                    Authorizing Provider:  Sara Ferrera MD        Collected:           12/02/2019 1058              Ordering Location:     [facility] OR     Received:            12/02/2019 1126              Pathologist:           Lucrecia Nelson MD                                                         Specimens:   A) - Breast, Lumpectomy, Left, long stitch lateral; short stitch superior; double                   stitch deep                                                                                         B) - Lymph Node(s) [place], Breast, Left                                                  Final Diagnosis       A) BREAST, LEFT, WIRE LOCALIZATION AND LUMPECTOMY:       1) 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 (DCIS):           a) NUCLEAR stGstRstAstDstEst:st st1st b) PATTERNS: SOLID AND CRIBRIFORM            c) SIZE: 21 x 17 x 15 MM           d) MARGINS: NEGATIVE; NEAREST MARGIN - 5 MM           e) REPEAT HORMONE RECEPTOR ANALYSIS              1) ESTROGEN RECEPTOR:  STRONGLY POSITIVE (>95%; 3+)              2) PROGESTERONE RECEPTOR:  POSITIVE (80%; 3+)       2) RESIDUAL INVASIVE CARCINOMA NOT IDENTIFIED        3) TUMOR ARISING IN FIBROADENOMA AND INVOLVING ADJACENT SCLEROSING ADENOSIS       4) PRIOR BIOPSY SITE PRESENT        4) PROLIFERATIVE FIBROCYSTIC CHANGES WITH STROMAL FIBROSIS, USUAL DUCTAL            HYPERPLASIA AND SCLEROSING ADENOSIS          PATHOLOGIC STAGE: ypTis (DCIS) (sn) pN0 (i+)         See staging parameters below     B) SENTINEL LYMPH NODE, LEFT BREAST, SENTINEL LYMPH  NODE BIOPSY:        - ISOLATED CYTOKERATIN(+) TUMOR CELLS PRESENT IN ONE OF THREE LYMPH NODES (1 OF 3)    MCSS    Comment       See addendum to previous biopsy ([id number]) for additional staging information (pretreatment).    Immunohistochemical controls stain appropriately. Calponin and p63 staining of multiple sections confirms the presence of in-situ carcinoma. Definitive foci of residual invasive carcinoma are not identified.    Synoptic Report       INVASIVE CARCINOMA OF THE BREAST: Resection  (Breast.Invasive - All Specimens)      8th Edition - Protocol posted: 2/27/2019      CLINICAL      Clinical History:    Prior presurgical (neoadjuvant) therapy for this diagnosis of invasive carcinoma       SPECIMEN      Procedure:    Excision (less than total mastectomy)       Specimen Laterality:    Left       TUMOR      Clock Position of Tumor Site:    12 o'clock       Histologic Type:    No residual invasive carcinoma       Histologic Grade (René Histologic Score):    No residual invasive carcinoma       Tumor Size:    No residual invasive carcinoma       Ductal Carcinoma In Situ (DCIS):    Present         :    Positive for extensive intraductal component (EIC)         Size (Extent) of DCIS:    Estimated size (extent) of DCIS is at least (Millimeters): 21 mm          Additional Dimension (Millimeters):    17 mm          Additional Dimension (Millimeters):    15 mm        Architectural Patterns:    Cribriform         Architectural Patterns:    Solid         Nuclear Grade:    Grade II (intermediate)         Necrosis:    Not identified       Tumor Extent:          Lymphovascular Invasion:    Not identified       Dermal Lymphovascular Invasion:    No skin present       Microcalcifications:    Not identified       Treatment Effect in the Breast:    No definite response to presurgical therapy in the invasive carcinoma       Treatment Effect in the Lymph Nodes:    No definite response to presurgical therapy in  metastatic carcinoma       MARGINS      DCIS Margins:    Uninvolved by DCIS         Distance from Closest Margin (Millimeters):    5 mm        Closest Margin:    Superior         Distance from Other Margins:              Anterior Margin (Millimeters):    14 mm          Posterior Margin (Millimeters):    14 mm          Superior Margin (Millimeters):    5 mm          Inferior Margin (Millimeters):    21 mm          Medial Margin (Millimeters):    10 mm          Lateral Margin (Millimeters):    14 mm      LYMPH NODES      Regional Lymph Nodes:    Involved by tumor cells         Number of Lymph Nodes with Macrometastases (> 2 mm):    0         Number of Lymph Nodes with Micrometastases (> 0.2 mm to 2 mm and / or > 200 cells):    0         Number of Lymph Nodes with Isolated Tumor Cells (<= 0.2 mm or <= 200 cells):    1         Extranodal Extension:    Not identified         Number of Lymph Nodes Examined:    3         Number of Michael Nodes Examined:    3       PATHOLOGIC STAGE CLASSIFICATION (pTNM, AJCC 8th Edition)      TNM Descriptors:    y (post-treatment)       Primary Tumor (pT):    pTis (DCIS)       Regional Lymph Nodes (pN):            Modifier:    (sn): Only sentinel node(s) evaluated.         Category (pN):    pN0 (i+)       ADDITIONAL FINDINGS      Additional Pathologic Findings:    DCIS arising in fibroadenoma and involving areas of sclerosing adenosis       Microscopic Description       Microscopic examination performed, substantiating the above diagnosis.    Clinical Information       Pre-op Diagnosis:  Malignant neoplasm of upper-outer quadrant of left breast in female, estrogen receptor positive (H) [C50.412, Z17.0]    Gross Description       A) Received in formalin labeled with the patient's name, Jennifer Galvin and left breast lumpectomy is an oriented, 61 gram, 6.5 cm from superior to inferior, 5.4 cm from anterior to posterior and 3.9 cm from medial to lateral portion of yellow-white to pink  fibrofatty tissue. The specimen is received oriented with a long stitch designating the lateral margin, a short stitch designating the superior margin, and a double stitch designating the deep-posterior margin, per the surgeon. There is an anterior-laterally inserted gray metallic localizing wire. The specimen is differentially inked blue on the anterior margin, black on the posterior margin, yellow on the medial margin and green on the lateral margin. The specimen is serial sectioned from superior to inferior.    Sectioning through the mid-superior aspect of the specimen displays an oblong 2.1 x 1.7 x 1.5 cm, tan-white to pink-gray, indurated nodule. This nodule extends from superior to inferior, medial to lateral and anterior to  posterior, respectively. There is a superiorly located site of previous biopsy from which a gelatinous substance and a helical mammotome clip are removed. This nodule is 0.5 cm from the superior margin, 0.7 cm from the anterior margin, 2.4 cm from the posterior margin, 0.4 cm from the lateral margin, 1.6 cm from the medial margin and is 2.1 cm from the inferior margin. The gross lesion is submitted in its entirety for evaluation from superior to inferior to include the previous site of biopsy.     The remaining cut surface displays predominantly unremarkable yellow lobulated fat and thin to focally dense bands of fibrous tissue. No additional suspicious lesions are identified.    RS-20C    SUMMARY OF CASSETTES: A1-3) Superior margin, shaved and perpendicularly sectioned; A4-5) Inferior margin, shaved and perpendicularly sectioned; A6-7) Representative from a single cross-section; A8-9) Representative from a single cross-section; A10-14) One full cross-section; A15-16) Representative  from a single cross-section; A17) Representative from a single cross-section; A18-20) One full cross-section of grossly unremarkable tissue between the lesion and the inferior margin.    Note: The specimen is  collected and placed into formalin @1058, 12/02/19. RJR:viji   Total formalin fixation time: 11:02. KLW:anastasia     B) The specimen is received in formalin and is identified as left sentinel lymph node, breast. It consists of three fragments of adipose tissue measuring 5.5 x 4.5 x 1.4 cm in aggregate dimension. There are three palpable 0.9- to 2.2-cm lymph nodes. Cut section shows a fatty central hilum without grossly suspicious lesions. RS    SUMMARY OF SECTIONS: B1) One lymph node - bisected; B2-3) One lymph node - serially sectioned; B4-5) One lymph node - serially sectioned. KLW:murrayj    Special Stains       Note - Estrogen and Progesterone Receptor Immunoperoxidase Stains:  These stains were done using the following criteria:    Specimen fixative: Formalin-fixed paraffin-embedded sections    Detection system: Biotin-free multimer-based technology detection system ([employer])    Retrieval method: CC1 pretreatment; a georgie-based buffer with a slightly basic PH used at an elevated     temperature (95+5 degrees C)    Clone:  Estrogen receptor-SP1, Rabbit monoclonal ([place])     Progesterone receptor-1E2, Rabbit monoclonal ([place])    Scoring method: Intensity of nuclear stain, strong vs weak vs absent; % of cells stained    Charges       CPT: 19981 ×2, 60327 ×2, 43741, 38120 ×2  ICD-10: D05.12    Result Flag Malignant (!) Normal   POCT Glucose   Result Value Ref Range    Glucose 121 70 - 139 mg/dL   Platelet Count   Result Value Ref Range    Platelets 236 140 - 440 thou/uL   Basic Metabolic Panel   Result Value Ref Range    Sodium 137 136 - 145 mmol/L    Potassium 4.4 3.5 - 5.0 mmol/L    Chloride 103 98 - 107 mmol/L    CO2 25 22 - 31 mmol/L    Anion Gap, Calculation 9 5 - 18 mmol/L    Glucose 145 (H) 70 - 125 mg/dL    Calcium 9.0 8.5 - 10.5 mg/dL    BUN 14 8 - 28 mg/dL    Creatinine 0.94 0.60 - 1.10 mg/dL    GFR MDRD Af Amer >60 >60 mL/min/1.73m2    GFR MDRD Non Af Amer 58 (L) >60 mL/min/1.73m2   BNP(B-type Natriuretic  Peptide)   Result Value Ref Range    BNP 45 0 - 130 pg/mL   HM2(CBC w/o Differential)   Result Value Ref Range    WBC 12.9 (H) 4.0 - 11.0 thou/uL    RBC 3.63 (L) 3.80 - 5.40 mill/uL    Hemoglobin 10.6 (L) 12.0 - 16.0 g/dL    Hematocrit 34.9 (L) 35.0 - 47.0 %    MCV 96 80 - 100 fL    MCH 29.2 27.0 - 34.0 pg    MCHC 30.4 (L) 32.0 - 36.0 g/dL    RDW 22.8 (H) 11.0 - 14.5 %    Platelets 253 140 - 440 thou/uL    MPV 10.4 8.5 - 12.5 fL   Protime-INR   Result Value Ref Range    INR 1.36 (H) 0.90 - 1.10   Lactic Acid   Result Value Ref Range    Lactic Acid 2.4 (H) 0.5 - 2.2 mmol/L   Blood culture from PERIPHERAL SITE   Result Value Ref Range    Anaerobic Blood Culture Bottle No Growth No Growth, No organisms seen, bottle returned to instrument, Specimen not received, No Growth at 24 hours, No Growth at 48 hours, No Growth at 72 hours, No Growth at 96 hours, No Growth at 120 hours    Aerobic Blood Culture Bottle No Growth No Growth, No organisms seen, bottle returned to instrument, Specimen not received, No Growth at 24 hours, No Growth at 120 hours, No Growth at 48 hours, No Growth at 72 hours, No Growth at 96 hours   Lactic Acid   Result Value Ref Range    Lactic Acid 1.6 0.5 - 2.2 mmol/L   Platelet Count - every other day x 3   Result Value Ref Range    Platelets 225 140 - 440 thou/uL   HM2(CBC w/o Differential)   Result Value Ref Range    WBC 14.8 (H) 4.0 - 11.0 thou/uL    RBC 3.44 (L) 3.80 - 5.40 mill/uL    Hemoglobin 10.2 (L) 12.0 - 16.0 g/dL    Hematocrit 33.7 (L) 35.0 - 47.0 %    MCV 98 80 - 100 fL    MCH 29.7 27.0 - 34.0 pg    MCHC 30.3 (L) 32.0 - 36.0 g/dL    RDW 23.1 (H) 11.0 - 14.5 %    Platelets 232 140 - 440 thou/uL    MPV 10.1 8.5 - 12.5 fL   Basic Metabolic Panel   Result Value Ref Range    Sodium 140 136 - 145 mmol/L    Potassium 3.9 3.5 - 5.0 mmol/L    Chloride 105 98 - 107 mmol/L    CO2 28 22 - 31 mmol/L    Anion Gap, Calculation 7 5 - 18 mmol/L    Glucose 129 (H) 70 - 125 mg/dL    Calcium 9.4 8.5 - 10.5  mg/dL    BUN 15 8 - 28 mg/dL    Creatinine 0.82 0.60 - 1.10 mg/dL    GFR MDRD Af Amer >60 >60 mL/min/1.73m2    GFR MDRD Non Af Amer >60 >60 mL/min/1.73m2   POCT Glucose   Result Value Ref Range    Glucose 188 (H) 70 - 139 mg/dL   POCT Glucose   Result Value Ref Range    Glucose 206 (H) 70 - 139 mg/dL   Basic Metabolic Panel   Result Value Ref Range    Sodium 140 136 - 145 mmol/L    Potassium 3.9 3.5 - 5.0 mmol/L    Chloride 104 98 - 107 mmol/L    CO2 29 22 - 31 mmol/L    Anion Gap, Calculation 7 5 - 18 mmol/L    Glucose 119 70 - 125 mg/dL    Calcium 9.2 8.5 - 10.5 mg/dL    BUN 17 8 - 28 mg/dL    Creatinine 0.82 0.60 - 1.10 mg/dL    GFR MDRD Af Amer >60 >60 mL/min/1.73m2    GFR MDRD Non Af Amer >60 >60 mL/min/1.73m2   INR   Result Value Ref Range    INR 1.25 (H) 0.90 - 1.10   HM2(CBC w/o Differential)   Result Value Ref Range    WBC 16.4 (H) 4.0 - 11.0 thou/uL    RBC 3.56 (L) 3.80 - 5.40 mill/uL    Hemoglobin 10.6 (L) 12.0 - 16.0 g/dL    Hematocrit 34.6 (L) 35.0 - 47.0 %    MCV 97 80 - 100 fL    MCH 29.8 27.0 - 34.0 pg    MCHC 30.6 (L) 32.0 - 36.0 g/dL    RDW 22.5 (H) 11.0 - 14.5 %    Platelets 225 140 - 440 thou/uL    MPV 10.2 8.5 - 12.5 fL   POCT Glucose   Result Value Ref Range    Glucose 121 70 - 139 mg/dL   Lactic Acid   Result Value Ref Range    Lactic Acid 0.7 0.5 - 2.2 mmol/L   POCT Glucose   Result Value Ref Range    Glucose 214 (H) 70 - 139 mg/dL   Basic Metabolic Panel   Result Value Ref Range    Sodium 141 136 - 145 mmol/L    Potassium 4.1 3.5 - 5.0 mmol/L    Chloride 104 98 - 107 mmol/L    CO2 27 22 - 31 mmol/L    Anion Gap, Calculation 10 5 - 18 mmol/L    Glucose 131 (H) 70 - 125 mg/dL    Calcium 9.3 8.5 - 10.5 mg/dL    BUN 17 8 - 28 mg/dL    Creatinine 0.80 0.60 - 1.10 mg/dL    GFR MDRD Af Amer >60 >60 mL/min/1.73m2    GFR MDRD Non Af Amer >60 >60 mL/min/1.73m2   Hemoglobin   Result Value Ref Range    Hemoglobin 11.5 (L) 12.0 - 16.0 g/dL   INR   Result Value Ref Range    INR 1.62 (H) 0.90 - 1.10   INR    Result Value Ref Range    INR 1.98 (H) 0.90 - 1.10     Current Outpatient Medications   Medication Sig     albuterol (PROVENTIL) 2.5 mg /3 mL (0.083 %) nebulizer solution Take 3 mL (2.5 mg total) by nebulization every 4 (four) hours as needed for wheezing.     calcium-vitamin D (CALCIUM-VITAMIN D) 500 mg(1,250mg) -200 unit per tablet Take 1 tablet by mouth 2 (two) times a day with meals.     cholecalciferol, vitamin D3, 1,000 unit tablet Take 2,000 Units by mouth daily.      diphenhydrAMINE (BENADRYL) 2 % cream Apply 1 application topically 3 (three) times a day as needed for itching.     diphenhydrAMINE (BENADRYL) 25 mg capsule Take 25 mg by mouth every 6 (six) hours as needed for itching.     fesoterodine (TOVIAZ) 8 mg Tb24 ER tablet Take 8 mg by mouth daily.      fluticasone propionate (FLONASE ALLERGY RELIEF) 50 mcg/actuation nasal spray One spray in each nostril daily (Patient taking differently: 1 spray into each nostril at bedtime. One spray in each nostril daily)     furosemide (LASIX) 40 MG tablet TAKE 1 TABLET(40 MG) BY MOUTH TWICE DAILY     glucosamine-chondroitin 500-400 mg tablet Take 1 tablet by mouth 2 (two) times a day.      HYDROcodone-acetaminophen 5-325 mg per tablet Take 1 tablet by mouth every 4 (four) hours as needed for pain.     hydrocortisone 1 % cream Apply 1 application topically as needed.     lidocaine-prilocaine (EMLA) cream Place over port 30 min. before being accessed.     loratadine (CLARITIN) 10 mg tablet Take 1 tablet (10 mg total) by mouth daily.     metoprolol tartrate (LOPRESSOR) 25 MG tablet Take 1 tablet (25 mg total) by mouth 2 (two) times a day.     OMEGA-3/DHA/EPA/FISH OIL (FISH OIL-OMEGA-3 FATTY ACIDS) 300-1,000 mg capsule Take 2 g by mouth 2 times a day at 6:00 am and 4:00 pm.     omeprazole (PRILOSEC) 20 MG capsule Take 20 mg by mouth daily before breakfast.     polyvinyl alcohol-kathleenvidon,PF, (REFRESH CLASSIC) 1.4-0.6 % Dpet ophthalmic dropperette Administer 2 drops to  both eyes 3 (three) times a day.     potassium chloride (K-DUR,KLOR-CON) 10 MEQ tablet TAKE 2 TABLETS BY MOUTH TWICE DAILY (Patient taking differently: Take 20 mEq by mouth 2 (two) times a day. )     prochlorperazine (COMPAZINE) 10 MG tablet TAKE 1 TABLET(10 MG) BY MOUTH EVERY 6 HOURS AS NEEDED FOR NAUSEA     simvastatin (ZOCOR) 20 MG tablet Take 1 tablet (20 mg total) by mouth daily with supper.     vitamin A-vitamin C-vit E-min (OCUVITE) Tab tablet Take 1 tablet by mouth 2 (two) times a day.     warfarin ANTICOAGULANT (COUMADIN/JANTOVEN) 2.5 MG tablet Take 5mg (1 tablet) daily with INR check 12/9       ASSESSMENT:      ICD-10-CM    1. Venous stasis I87.8    2. Rash R21        PLAN:      Rash left upper thigh triamcinolone twice daily    Atrial fibrillation-continue Coumadin,  metoprolol recently increased to 37.5 twice daily    Venous stasis lower extremities compression to lower extremities on during the day and off at night.    Status post left breast lumpectomy monitor surgical incision for signs and symptoms of infection    Pain management Norco PRN anticoagulation    Right/Left  great toenail injury bacitracin to bilateral  great toe twice daily, cover with a bandage and podiatry to see patient.  Foot soaks two times a day with epsom salt prior to bacitracin  twice a day  and podiatry consult    Rash under breast nystatin powder twice daily until healed and then as needed twice daily    Shortness of breath oxygen 1 to 2 L PRN to keep sats greater than 90%    Anticoagulation management continue Coumadin and adjust per INRs    Debility PT OT    Patient to receive benign fusion related to her cancer every 3 weeks.  Nurse manager to follow-up to see if this is something that needs to be done while she is in the TCU or for can resume after discharge.    Electronically signed by: Sara Mayes CNP

## 2021-06-04 NOTE — PROGRESS NOTES
Met with Jennifer in [id number] where she is still inpatient since 12/14/19.  She is in improved spirits since our last visit just before Maverick.  She is however questioning possible discharge tomorrow to TCU.  She is concerned that bilateral lower extremity erythema is not fully resolved.  Fabi Morton RN

## 2021-06-04 NOTE — TELEPHONE ENCOUNTER
ANTICOAGULATION  MANAGEMENT: Discharge Review    Jennifer Galvin chart reviewed for anticoagulation continuity of care    Outpatient surgery/procedure on  12/2 for breast lumectomy.    Discharge disposition: TCU    INR Results:       Recent labs: (last 7 days)     12/03/19  1217 12/06/19  0655   INR 1.36* 1.25*       Warfarin inpatient management: home regimen continued    Warfarin discharge instructions: home regimen continued     Medication Changes Affecting Anticoagulation: No    Additional Factors Affecting Anticoagulation: No    Plan     No adjustment to anticoagulation plan needed      ACM will resume monitoring upon discharge from TCU    Anticoagulation calendar updated    Ibrahima Trimble RN

## 2021-06-04 NOTE — TELEPHONE ENCOUNTER
Merly,  at the patient's TCU calls in wondering if our office plans on giving the patient Herceptin in an outpatient infusion setting while she is in the TCU.  I asked her if this was something that was possible if needed to be and she states that they would have to check with the bang that be with the [facility] codes to see if this is okay.  They wanted to check with the physicians here in our office to see if it was necessary.  Since Dr. Villalba is out of the office, I talked with Meg Walsh CNP who states that we do not want this delayed any longer than 3 weeks.  Patient was due on 12/4/2019 so we would not want this going any longer than 12/25/2019.  We are only open 1/2-day on Christmas eve and closed on Maverick day.  When I called Merly back she was not available so a detailed message was left on her secure voicemail letting her know the above information and stating that since it is a holiday that week we would prefer that she does not go any longer than 12/23/2019.  I asked that if she has any further questions to please give us a call back.  I did give her the direct nurse triage line and let her know that this is not for patient use.    Maday Green RN

## 2021-06-04 NOTE — PROGRESS NOTES
[facility]    Facility:   [facility] [[id number]]   Code Status: FULL CODE      CHIEF COMPLAINT/REASON FOR VISIT:  Chief Complaint   Patient presents with     Review Of Multiple Medical Conditions       HISTORY:      HPI: Jennifer is a 74 y.o. female undergoing physical and occupational therapy at [facility] transitional care unit. , she is with history of severe obesity, stage Ib left breast cancer, I normal, vitamin D deficiency, urge stress incontinence, tremor, postmenopausal bleeding and uterine adenocarcinoma in situ status post JEROME/BSO, atrial fibrillation, osteopenia, FLAKITA, macular degeneration, chronic bilateral leg edema, hypertension, diabetes mellitus, CAD, chronic back and bilateral knee pain who presented to [facility] for scheduled left breast lumpectomy And sentinel lymph node biopsy.  Per the records she became hypoxic in the PACU and noted to be persistently hypoxic to 4 L of oxygen which is a change from her baseline.  She does have FLAKITA but does not use her CPAP machine.    Today she is seen for first routine visit to review multiple medical issues and review of chart.  She denied any chest pain or shortness of breath.  She also denied incisional pain.  She is with a left breast incision that is Steri-Stripped clean dry and intact with a large amount of bruising around it.  Labs drawn today and her hemoglobin was 11.5 BMP within normal limits.  Her INR was subtherapeutic at 1.62 and her Coumadin was adjusted.  She was noted to have a Petersen in her right upper chest that is not accessed at this time.  She is using oxygen 1 to 2 L PRN to keep sats greater than 90%.  When I met with her today she had the oxygen off and her sats were checked and she was 92% on room air however she did appear a little short of breath and put the oxygen back on.  Her weight was reviewed she is down 5 pounds.  She is also noted to have a right great toenail  that is completely lifted up and hanging on by just the cuticle along the base.  She reports she bumped it on her wheelchair just recently.  New wound care orders were for the and I will have her seen by podiatry.  I have ordered for the in-house podiatrist to see her if he is available this week otherwise she will go out for podiatry consult.  Her Pepcid was changed to omeprazole and nystatin powder was ordered twice daily for a rash under her breast.      Past Medical History:   Diagnosis Date     (Lower) Leg Localized Swelling     Created by Conversion      Adenocarcinoma In Situ Of The Uterus     Created by Conversion      Backache     Created by [facility] Annotation: Feb 29 2012 12:14PM - Opal Helm: Referred to  [facility] 9/11, given TENS unit.      Benign Polyps Of The Large Intestine     Created by Conversion      Breast cancer (H)      Cancer (H)     uterine     Chronic kidney disease     stage 1 per H&P 11/22/2019     Coronary artery disease      Diabetes mellitus (H)     type 2     Fatigue     Created by Conversion      Hyperglycemia     Created by Conversion      Hyperlipidemia     Created by Conversion      Hypertension     Created by Conversion      Joint Pain, Localized In The Knee     Created by Conversion      Lesion of mediastinum      Lymphedema     Created by Conversion      Macular Degeneration     Created by [facility] Annotation: Apr 5 2011 12:22PM - Tien Guzman: Followed by  Ophthalmologist, Dr. Avilez.      Major Depression, Single Episode In Remission     Created by Conversion      Obesity     Created by Conversion      Obstructive Sleep Apnea     CPAP     Osteopenia     Created by Conversion      Paroxysmal Atrial Fibrillation     Created by [facility] Annotation: Nov 28 2012 10:36PM - Shruthi Dhaliwal: Found incidential  on exam on May 7th, [id number] score of 1 for HTNsymptomatic and hospitalized  x2 in July, 2012.CV X 2 in 2012Chronic Sotalol  Rx      Postmenopausal bleeding     Created by Conversion      Skin cancer      Tachycardia     Created by Conversion      Tremor     Created by Conversion      Urge And Stress Incontinence     Created by Conversion      Urinary Frequency More Than Twice At Night (Nocturia)     Created by Conversion      Urine Tests Nonspecific Abnormal Findings     Created by Conversion      Vitamin D Deficiency     Created by Conversion              Family History   Problem Relation Age of Onset     Hypertension Father      Pneumonia Father      Esophageal cancer Brother      Cancer Brother      Stroke Mother      Alzheimer's disease Brother      Cancer Brother      Prostate cancer Brother      Cancer Nephew      Colon cancer Nephew      Cancer Paternal cousin      Social History     Socioeconomic History     Marital status: Single     Spouse name: Not on file     Number of children: 0     Years of education: Not on file     Highest education level: Not on file   Occupational History     Occupation: Retired RN     Employer: [employer]   Social Needs     Financial resource strain: Not on file     Food insecurity:     Worry: Not on file     Inability: Not on file     Transportation needs:     Medical: Not on file     Non-medical: Not on file   Tobacco Use     Smoking status: Former Smoker     Packs/day: 3.00     Years: 40.00     Pack years: 120.00     Types: Cigarettes     Last attempt to quit: 2005     Years since quittin.9     Smokeless tobacco: Never Used   Substance and Sexual Activity     Alcohol use: Not Currently     Drug use: No     Sexual activity: Never     Partners: Male     Birth control/protection: Surgical, Post-menopausal   Lifestyle     Physical activity:     Days per week: Not on file     Minutes per session: Not on file     Stress: Not on file   Relationships     Social connections:     Talks on phone: Not on file     Gets together: Not on file     Attends Holiness service: Not on file      Active member of club or organization: Not on file     Attends meetings of clubs or organizations: Not on file     Relationship status: Not on file     Intimate partner violence:     Fear of current or ex partner: Not on file     Emotionally abused: Not on file     Physically abused: Not on file     Forced sexual activity: Not on file   Other Topics Concern     Not on file   Social History Narrative    Lives alone single family home that she owns, no children and is retired.         Review of Systems   Constitutional: Positive for activity change. Negative for appetite change, fatigue and fever.   HENT: Negative for congestion.    Respiratory: Negative for cough, shortness of breath and wheezing.    Cardiovascular: Positive for leg swelling. Negative for chest pain.        Lymphedema   Gastrointestinal: Negative for abdominal distention, abdominal pain, constipation, diarrhea and nausea.   Genitourinary: Negative for dysuria.   Musculoskeletal: Positive for arthralgias. Negative for back pain.   Skin: Positive for wound. Negative for color change.        Bruising around left breast surgical incision  Left surgical breast incision that is Dermabond with Steri-Strips clean dry and intact.   Neurological: Negative for dizziness.   Psychiatric/Behavioral: Negative for agitation, behavioral problems and confusion.       Vitals:    12/09/19 0929   BP: (!) 122/94   Pulse: (!) 107   Resp: 18   Temp: 98  F (36.7  C)   SpO2: 92%   Weight: (!) 332 lb (150.6 kg)       Physical Exam  Constitutional:       Appearance: She is well-developed.      Comments: Pleasant woman in no acute distress   HENT:      Head: Normocephalic.   Eyes:      Conjunctiva/sclera: Conjunctivae normal.   Neck:      Musculoskeletal: Normal range of motion.   Cardiovascular:      Rate and Rhythm: Normal rate and regular rhythm.      Heart sounds: Normal heart sounds. No murmur.   Pulmonary:      Effort: No respiratory distress.      Breath sounds: Normal  breath sounds. No wheezing or rales.   Abdominal:      General: Bowel sounds are normal. There is no distension.      Palpations: Abdomen is soft.      Tenderness: There is no abdominal tenderness.   Musculoskeletal: Normal range of motion.      Right lower leg: Edema present.      Left lower leg: Edema present.      Comments: Patient with a right great toenail that has almost completely lifted off and hanging on by the cuticle.   Skin:     General: Skin is warm.      Comments: Surgical incision left breast Dermabond with Steri-Strips and large amount of bruising around the incision.   Neurological:      Mental Status: She is alert and oriented to person, place, and time.   Psychiatric:         Behavior: Behavior normal.           LABS:   Recent Results (from the past 240 hour(s))   POCT Glucose   Result Value Ref Range    Glucose 143 (H) 70 - 139 mg/dL   Surgical Pathology Exam   Result Value Ref Range    Case Report       Surgical Pathology                                Case: [id number]                                    Authorizing Provider:  Sara Ferrera MD        Collected:           12/02/2019 1058              Ordering Location:     [facility] OR     Received:            12/02/2019 1126              Pathologist:           Lucrecia Nelson MD                                                         Specimens:   A) - Breast, Lumpectomy, Left, long stitch lateral; short stitch superior; double                   stitch deep                                                                                         B) - Lymph Node(s) [place], Breast, Left                                                  Final Diagnosis       A) BREAST, LEFT, WIRE LOCALIZATION AND LUMPECTOMY:       1) 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 (DCIS):           a) NUCLEAR rdGrdRrdArdDrdErd:rd rd3rd b) PATTERNS: SOLID AND CRIBRIFORM            c) SIZE: 21 x 17 x 15 MM           d) MARGINS: NEGATIVE; NEAREST MARGIN - 5 MM           e) REPEAT HORMONE  RECEPTOR ANALYSIS              1) ESTROGEN RECEPTOR:  STRONGLY POSITIVE (>95%; 3+)              2) PROGESTERONE RECEPTOR:  POSITIVE (80%; 3+)       2) RESIDUAL INVASIVE CARCINOMA NOT IDENTIFIED        3) TUMOR ARISING IN FIBROADENOMA AND INVOLVING ADJACENT SCLEROSING ADENOSIS       4) PRIOR BIOPSY SITE PRESENT        4) PROLIFERATIVE FIBROCYSTIC CHANGES WITH STROMAL FIBROSIS, USUAL DUCTAL            HYPERPLASIA AND SCLEROSING ADENOSIS          PATHOLOGIC STAGE: ypTis (DCIS) (sn) pN0 (i+)         See staging parameters below     B) SENTINEL LYMPH NODE, LEFT BREAST, SENTINEL LYMPH NODE BIOPSY:        - ISOLATED CYTOKERATIN(+) TUMOR CELLS PRESENT IN ONE OF THREE LYMPH NODES (1 OF 3)    MCSS    Comment       See addendum to previous biopsy ([id number]) for additional staging information (pretreatment).    Immunohistochemical controls stain appropriately. Calponin and p63 staining of multiple sections confirms the presence of in-situ carcinoma. Definitive foci of residual invasive carcinoma are not identified.    Synoptic Report       INVASIVE CARCINOMA OF THE BREAST: Resection  (Breast.Invasive - All Specimens)      8th Edition - Protocol posted: 2/27/2019      CLINICAL      Clinical History:    Prior presurgical (neoadjuvant) therapy for this diagnosis of invasive carcinoma       SPECIMEN      Procedure:    Excision (less than total mastectomy)       Specimen Laterality:    Left       TUMOR      Clock Position of Tumor Site:    12 o'clock       Histologic Type:    No residual invasive carcinoma       Histologic Grade (René Histologic Score):    No residual invasive carcinoma       Tumor Size:    No residual invasive carcinoma       Ductal Carcinoma In Situ (DCIS):    Present         :    Positive for extensive intraductal component (EIC)         Size (Extent) of DCIS:    Estimated size (extent) of DCIS is at least (Millimeters): 21 mm          Additional Dimension (Millimeters):    17 mm          Additional  Dimension (Millimeters):    15 mm        Architectural Patterns:    Cribriform         Architectural Patterns:    Solid         Nuclear Grade:    Grade II (intermediate)         Necrosis:    Not identified       Tumor Extent:          Lymphovascular Invasion:    Not identified       Dermal Lymphovascular Invasion:    No skin present       Microcalcifications:    Not identified       Treatment Effect in the Breast:    No definite response to presurgical therapy in the invasive carcinoma       Treatment Effect in the Lymph Nodes:    No definite response to presurgical therapy in metastatic carcinoma       MARGINS      DCIS Margins:    Uninvolved by DCIS         Distance from Closest Margin (Millimeters):    5 mm        Closest Margin:    Superior         Distance from Other Margins:              Anterior Margin (Millimeters):    14 mm          Posterior Margin (Millimeters):    14 mm          Superior Margin (Millimeters):    5 mm          Inferior Margin (Millimeters):    21 mm          Medial Margin (Millimeters):    10 mm          Lateral Margin (Millimeters):    14 mm      LYMPH NODES      Regional Lymph Nodes:    Involved by tumor cells         Number of Lymph Nodes with Macrometastases (> 2 mm):    0         Number of Lymph Nodes with Micrometastases (> 0.2 mm to 2 mm and / or > 200 cells):    0         Number of Lymph Nodes with Isolated Tumor Cells (<= 0.2 mm or <= 200 cells):    1         Extranodal Extension:    Not identified         Number of Lymph Nodes Examined:    3         Number of Hitchcock Nodes Examined:    3       PATHOLOGIC STAGE CLASSIFICATION (pTNM, AJCC 8th Edition)      TNM Descriptors:    y (post-treatment)       Primary Tumor (pT):    pTis (DCIS)       Regional Lymph Nodes (pN):            Modifier:    (sn): Only sentinel node(s) evaluated.         Category (pN):    pN0 (i+)       ADDITIONAL FINDINGS      Additional Pathologic Findings:    DCIS arising in fibroadenoma and involving areas of  sclerosing adenosis       Microscopic Description       Microscopic examination performed, substantiating the above diagnosis.    Clinical Information       Pre-op Diagnosis:  Malignant neoplasm of upper-outer quadrant of left breast in female, estrogen receptor positive (H) [C50.412, Z17.0]    Gross Description       A) Received in formalin labeled with the patient's name, Jennifer Galvin and left breast lumpectomy is an oriented, 61 gram, 6.5 cm from superior to inferior, 5.4 cm from anterior to posterior and 3.9 cm from medial to lateral portion of yellow-white to pink fibrofatty tissue. The specimen is received oriented with a long stitch designating the lateral margin, a short stitch designating the superior margin, and a double stitch designating the deep-posterior margin, per the surgeon. There is an anterior-laterally inserted gray metallic localizing wire. The specimen is differentially inked blue on the anterior margin, black on the posterior margin, yellow on the medial margin and green on the lateral margin. The specimen is serial sectioned from superior to inferior.    Sectioning through the mid-superior aspect of the specimen displays an oblong 2.1 x 1.7 x 1.5 cm, tan-white to pink-gray, indurated nodule. This nodule extends from superior to inferior, medial to lateral and anterior to  posterior, respectively. There is a superiorly located site of previous biopsy from which a gelatinous substance and a helical mammotome clip are removed. This nodule is 0.5 cm from the superior margin, 0.7 cm from the anterior margin, 2.4 cm from the posterior margin, 0.4 cm from the lateral margin, 1.6 cm from the medial margin and is 2.1 cm from the inferior margin. The gross lesion is submitted in its entirety for evaluation from superior to inferior to include the previous site of biopsy.     The remaining cut surface displays predominantly unremarkable yellow lobulated fat and thin to focally dense bands of fibrous  tissue. No additional suspicious lesions are identified.    RS-20C    SUMMARY OF CASSETTES: A1-3) Superior margin, shaved and perpendicularly sectioned; A4-5) Inferior margin, shaved and perpendicularly sectioned; A6-7) Representative from a single cross-section; A8-9) Representative from a single cross-section; A10-14) One full cross-section; A15-16) Representative  from a single cross-section; A17) Representative from a single cross-section; A18-20) One full cross-section of grossly unremarkable tissue between the lesion and the inferior margin.    Note: The specimen is collected and placed into formalin @1058, 12/02/19. JULIOR:viji   Total formalin fixation time: 11:02. KLW:anastasia     B) The specimen is received in formalin and is identified as left sentinel lymph node, breast. It consists of three fragments of adipose tissue measuring 5.5 x 4.5 x 1.4 cm in aggregate dimension. There are three palpable 0.9- to 2.2-cm lymph nodes. Cut section shows a fatty central hilum without grossly suspicious lesions. RS    SUMMARY OF SECTIONS: B1) One lymph node - bisected; B2-3) One lymph node - serially sectioned; B4-5) One lymph node - serially sectioned. KLW:manoj    Special Stains       Note - Estrogen and Progesterone Receptor Immunoperoxidase Stains:  These stains were done using the following criteria:    Specimen fixative: Formalin-fixed paraffin-embedded sections    Detection system: Biotin-free multimer-based technology detection system (NEURONIX)    Retrieval method: CC1 pretreatment; a georgie-based buffer with a slightly basic PH used at an elevated     temperature (95+5 degrees C)    Clone:  Estrogen receptor-SP1, Rabbit monoclonal ([place])     Progesterone receptor-1E2, Rabbit monoclonal ([place])    Scoring method: Intensity of nuclear stain, strong vs weak vs absent; % of cells stained    Charges       CPT: 60401 ×2, 03368 ×2, 27274, 32187 ×2  ICD-10: D05.12    Result Flag Malignant (!) Normal   POCT Glucose   Result  Value Ref Range    Glucose 121 70 - 139 mg/dL   Platelet Count   Result Value Ref Range    Platelets 236 140 - 440 thou/uL   Basic Metabolic Panel   Result Value Ref Range    Sodium 137 136 - 145 mmol/L    Potassium 4.4 3.5 - 5.0 mmol/L    Chloride 103 98 - 107 mmol/L    CO2 25 22 - 31 mmol/L    Anion Gap, Calculation 9 5 - 18 mmol/L    Glucose 145 (H) 70 - 125 mg/dL    Calcium 9.0 8.5 - 10.5 mg/dL    BUN 14 8 - 28 mg/dL    Creatinine 0.94 0.60 - 1.10 mg/dL    GFR MDRD Af Amer >60 >60 mL/min/1.73m2    GFR MDRD Non Af Amer 58 (L) >60 mL/min/1.73m2   BNP(B-type Natriuretic Peptide)   Result Value Ref Range    BNP 45 0 - 130 pg/mL   HM2(CBC w/o Differential)   Result Value Ref Range    WBC 12.9 (H) 4.0 - 11.0 thou/uL    RBC 3.63 (L) 3.80 - 5.40 mill/uL    Hemoglobin 10.6 (L) 12.0 - 16.0 g/dL    Hematocrit 34.9 (L) 35.0 - 47.0 %    MCV 96 80 - 100 fL    MCH 29.2 27.0 - 34.0 pg    MCHC 30.4 (L) 32.0 - 36.0 g/dL    RDW 22.8 (H) 11.0 - 14.5 %    Platelets 253 140 - 440 thou/uL    MPV 10.4 8.5 - 12.5 fL   Protime-INR   Result Value Ref Range    INR 1.36 (H) 0.90 - 1.10   Lactic Acid   Result Value Ref Range    Lactic Acid 2.4 (H) 0.5 - 2.2 mmol/L   Blood culture from PERIPHERAL SITE   Result Value Ref Range    Anaerobic Blood Culture Bottle No Growth No Growth, No organisms seen, bottle returned to instrument, Specimen not received, No Growth at 24 hours, No Growth at 48 hours, No Growth at 72 hours, No Growth at 96 hours, No Growth at 120 hours    Aerobic Blood Culture Bottle No Growth No Growth, No organisms seen, bottle returned to instrument, Specimen not received, No Growth at 24 hours, No Growth at 120 hours, No Growth at 48 hours, No Growth at 72 hours, No Growth at 96 hours   Lactic Acid   Result Value Ref Range    Lactic Acid 1.6 0.5 - 2.2 mmol/L   Platelet Count - every other day x 3   Result Value Ref Range    Platelets 225 140 - 440 thou/uL   HM2(CBC w/o Differential)   Result Value Ref Range    WBC 14.8 (H) 4.0  - 11.0 thou/uL    RBC 3.44 (L) 3.80 - 5.40 mill/uL    Hemoglobin 10.2 (L) 12.0 - 16.0 g/dL    Hematocrit 33.7 (L) 35.0 - 47.0 %    MCV 98 80 - 100 fL    MCH 29.7 27.0 - 34.0 pg    MCHC 30.3 (L) 32.0 - 36.0 g/dL    RDW 23.1 (H) 11.0 - 14.5 %    Platelets 232 140 - 440 thou/uL    MPV 10.1 8.5 - 12.5 fL   Basic Metabolic Panel   Result Value Ref Range    Sodium 140 136 - 145 mmol/L    Potassium 3.9 3.5 - 5.0 mmol/L    Chloride 105 98 - 107 mmol/L    CO2 28 22 - 31 mmol/L    Anion Gap, Calculation 7 5 - 18 mmol/L    Glucose 129 (H) 70 - 125 mg/dL    Calcium 9.4 8.5 - 10.5 mg/dL    BUN 15 8 - 28 mg/dL    Creatinine 0.82 0.60 - 1.10 mg/dL    GFR MDRD Af Amer >60 >60 mL/min/1.73m2    GFR MDRD Non Af Amer >60 >60 mL/min/1.73m2   POCT Glucose   Result Value Ref Range    Glucose 188 (H) 70 - 139 mg/dL   POCT Glucose   Result Value Ref Range    Glucose 206 (H) 70 - 139 mg/dL   Basic Metabolic Panel   Result Value Ref Range    Sodium 140 136 - 145 mmol/L    Potassium 3.9 3.5 - 5.0 mmol/L    Chloride 104 98 - 107 mmol/L    CO2 29 22 - 31 mmol/L    Anion Gap, Calculation 7 5 - 18 mmol/L    Glucose 119 70 - 125 mg/dL    Calcium 9.2 8.5 - 10.5 mg/dL    BUN 17 8 - 28 mg/dL    Creatinine 0.82 0.60 - 1.10 mg/dL    GFR MDRD Af Amer >60 >60 mL/min/1.73m2    GFR MDRD Non Af Amer >60 >60 mL/min/1.73m2   INR   Result Value Ref Range    INR 1.25 (H) 0.90 - 1.10   HM2(CBC w/o Differential)   Result Value Ref Range    WBC 16.4 (H) 4.0 - 11.0 thou/uL    RBC 3.56 (L) 3.80 - 5.40 mill/uL    Hemoglobin 10.6 (L) 12.0 - 16.0 g/dL    Hematocrit 34.6 (L) 35.0 - 47.0 %    MCV 97 80 - 100 fL    MCH 29.8 27.0 - 34.0 pg    MCHC 30.6 (L) 32.0 - 36.0 g/dL    RDW 22.5 (H) 11.0 - 14.5 %    Platelets 225 140 - 440 thou/uL    MPV 10.2 8.5 - 12.5 fL   POCT Glucose   Result Value Ref Range    Glucose 121 70 - 139 mg/dL   Lactic Acid   Result Value Ref Range    Lactic Acid 0.7 0.5 - 2.2 mmol/L   POCT Glucose   Result Value Ref Range    Glucose 214 (H) 70 - 139  mg/dL   Basic Metabolic Panel   Result Value Ref Range    Sodium 141 136 - 145 mmol/L    Potassium 4.1 3.5 - 5.0 mmol/L    Chloride 104 98 - 107 mmol/L    CO2 27 22 - 31 mmol/L    Anion Gap, Calculation 10 5 - 18 mmol/L    Glucose 131 (H) 70 - 125 mg/dL    Calcium 9.3 8.5 - 10.5 mg/dL    BUN 17 8 - 28 mg/dL    Creatinine 0.80 0.60 - 1.10 mg/dL    GFR MDRD Af Amer >60 >60 mL/min/1.73m2    GFR MDRD Non Af Amer >60 >60 mL/min/1.73m2   Hemoglobin   Result Value Ref Range    Hemoglobin 11.5 (L) 12.0 - 16.0 g/dL   INR   Result Value Ref Range    INR 1.62 (H) 0.90 - 1.10     Current Outpatient Medications   Medication Sig     albuterol (PROVENTIL) 2.5 mg /3 mL (0.083 %) nebulizer solution Take 3 mL (2.5 mg total) by nebulization every 4 (four) hours as needed for wheezing.     calcium-vitamin D (CALCIUM-VITAMIN D) 500 mg(1,250mg) -200 unit per tablet Take 1 tablet by mouth 2 (two) times a day with meals.     cholecalciferol, vitamin D3, 1,000 unit tablet Take 2,000 Units by mouth daily.      diphenhydrAMINE (BENADRYL) 2 % cream Apply 1 application topically 3 (three) times a day as needed for itching.     diphenhydrAMINE (BENADRYL) 25 mg capsule Take 25 mg by mouth every 6 (six) hours as needed for itching.     fesoterodine (TOVIAZ) 8 mg Tb24 ER tablet Take 8 mg by mouth daily.      fluticasone propionate (FLONASE ALLERGY RELIEF) 50 mcg/actuation nasal spray One spray in each nostril daily (Patient taking differently: 1 spray into each nostril at bedtime. One spray in each nostril daily)     furosemide (LASIX) 40 MG tablet TAKE 1 TABLET(40 MG) BY MOUTH TWICE DAILY     glucosamine-chondroitin 500-400 mg tablet Take 1 tablet by mouth 2 (two) times a day.      HYDROcodone-acetaminophen 5-325 mg per tablet Take 1 tablet by mouth every 4 (four) hours as needed for pain.     lidocaine-prilocaine (EMLA) cream Place over port 30 min. before being accessed.     loratadine (CLARITIN) 10 mg tablet Take 1 tablet (10 mg total) by mouth  daily.     metoprolol tartrate (LOPRESSOR) 25 MG tablet Take 1 tablet (25 mg total) by mouth 2 (two) times a day.     OMEGA-3/DHA/EPA/FISH OIL (FISH OIL-OMEGA-3 FATTY ACIDS) 300-1,000 mg capsule Take 2 g by mouth 2 times a day at 6:00 am and 4:00 pm.     omeprazole (PRILOSEC) 20 MG capsule Take 20 mg by mouth daily before breakfast.     polyvinyl alcohol-povidon,PF, (REFRESH CLASSIC) 1.4-0.6 % Dpet ophthalmic dropperette Administer 2 drops to both eyes 3 (three) times a day.     potassium chloride (K-DUR,KLOR-CON) 10 MEQ tablet TAKE 2 TABLETS BY MOUTH TWICE DAILY (Patient taking differently: Take 20 mEq by mouth 2 (two) times a day. )     prochlorperazine (COMPAZINE) 10 MG tablet TAKE 1 TABLET(10 MG) BY MOUTH EVERY 6 HOURS AS NEEDED FOR NAUSEA     simvastatin (ZOCOR) 20 MG tablet Take 1 tablet (20 mg total) by mouth daily with supper.     vitamin A-vitamin C-vit E-min (OCUVITE) Tab tablet Take 1 tablet by mouth 2 (two) times a day.     hydrocortisone 1 % cream Apply 1 application topically as needed.     warfarin ANTICOAGULANT (COUMADIN/JANTOVEN) 2.5 MG tablet Take 5mg (1 tablet) daily with INR check 12/9       ASSESSMENT:      ICD-10-CM    1. Atrial fibrillation, unspecified type (H) I48.91    2. Venous stasis I87.8    3. Status post left breast lumpectomy Z98.890    4. Pain management R52        PLAN:    Atrial fibrillation-continue Coumadin, increase metoprolol to 37.5 twice daily    Venous stasis lower extremities compression to lower extremities on during the day and off at night.    Status post left breast lumpectomy monitor surgical incision for signs and symptoms of infection    Pain management Norco PRN anticoagulation    Right great toenail injury bacitracin to right great toe twice daily, cover with a bandage and podiatry to see patient    Rash under breast nystatin powder twice daily until healed and then as needed twice daily    Shortness of breath oxygen 1 to 2 L PRN to keep sats greater than  90%    Anticoagulation management continue Coumadin and adjust per INRs    Debility PT OT    Electronically signed by: Sara Mayes CNP

## 2021-06-04 NOTE — TELEPHONE ENCOUNTER
ANTICOAGULATION  MANAGEMENT: Discharge Review    Jennifer Galvin chart reviewed for anticoagulation continuity of care    Hospital admission on  12/17 for post op hematoma.      INR Results:       Recent labs: (last 7 days)     12/14/19  2044 12/15/19  0615 12/16/19  0638 12/17/19  0702 12/18/19  0540   INR 2.36* 2.34* 1.85* 1.57* 1.55*       Warfarin inpatient management: less warfarin administered than maintenance regimen    Warfarin discharge instructions: home regimen continued     Medication Changes Affecting Anticoagulation: No    Additional Factors Affecting Anticoagulation: Yes: hematoma  hgb    Plan     No adjustment to anticoagulation plan needed      ACM will resume monitoring upon discharge from TCU    Anticoagulation calendar updated    Ibrahima Trimble RN

## 2021-06-04 NOTE — TELEPHONE ENCOUNTER
Patient calls in today and leaves a very muffled hard to understand voicemail on the nurse triage line.  I see that she is at [facility] in [place] so I called that facility.  She was just discharged from the hospital on 12/6 and admitted to the TCU on the same day.  They do not have an expected discharge date from TCU yet.  Jennifer was wondering when she could restart her treatment.  She was due on 12/4 for Herceptin so I will touch base with Dr. Linares to see when he would like her to resume.  The TCU will also look into their policy to see if she can get this while she is there.  We will recheck out to each other when we have an answer.    Maday Green RN

## 2021-06-04 NOTE — PROGRESS NOTES
[facility]    Facility:   [facility] [[id number]]   Code Status: FULL CODE      CHIEF COMPLAINT/REASON FOR VISIT:  Chief Complaint   Patient presents with     Problem Visit     SOB       HISTORY:      HPI: Jennifer is a 74 y.o. female undergoing physical and occupational therapy at [facility] transitional care unit. , she is with history of severe obesity, stage Ib left breast cancer, I normal, vitamin D deficiency, urge stress incontinence, tremor, postmenopausal bleeding and uterine adenocarcinoma in situ status post JEROME/BSO, atrial fibrillation, osteopenia, FLAKITA, macular degeneration, chronic bilateral leg edema, hypertension, diabetes mellitus, CAD, chronic back and bilateral knee pain who presented to [facility] for scheduled left breast lumpectomy And sentinel lymph node biopsy.  Per the records she became hypoxic in the PACU and noted to be persistently hypoxic to 4 L of oxygen which is a change from her baseline.  She does have FLAKITA but does not use her CPAP machine. She was recently sent back to the hospital from December 14-17 th  for left axilla pain and found to have a left axillary  hematoma related to her recent lumpectomy. She was seen by general surgery who recommended resuming warfarin and providing modest infection prophylaxis.She was also treated at this time with cefdinir for a UTI.    Today she was leaving with a friend for a podiatry appointment and she made it  to the entrance out and became extremely shortness of breath. Her oxygen was increased to 3l NC and her sats were 94%. She also appeared to be hyperventilating. Her RR was between 22-30 and she was  noted to have scattered expiratory wheezes,  She was brought back to her room and given a neb with minimal relief. She reported she was having a hard time taking a deep breath. Her weights have also increased. She showed a significant weight gain within 4 days and today she was up  another 2.5 pounds despite increasing lasix. Her legs were tight and 3-4+ pitting edema. She was sent to [facility] for further evaluation.     Past Medical History:   Diagnosis Date     (Lower) Leg Localized Swelling     Created by Conversion      Adenocarcinoma In Situ Of The Uterus     Created by Conversion      Backache     Created by Conversion [employer] Annotation: Feb 29 2012 12:14PM - Opal Helm: Referred to  [facility] 9/11, given TENS unit.      Benign Polyps Of The Large Intestine     Created by Conversion      Breast cancer (H)      Cancer (H)     uterine     Chronic kidney disease     stage 1 per H&P 11/22/2019     Coronary artery disease      Diabetes mellitus (H)     type 2     Fatigue     Created by Conversion      Hyperglycemia     Created by Conversion      Hyperlipidemia     Created by Conversion      Hypertension     Created by Conversion      Joint Pain, Localized In The Knee     Created by Conversion      Lesion of mediastinum      Lymphedema     Created by Conversion      Macular Degeneration     Created by [employer] Annotation: Apr 5 2011 12:22PM - Tien Guzman: Followed by  Ophthalmologist, Dr. Avilez.      Major Depression, Single Episode In Remission     Created by Conversion      Obesity     Created by Conversion      Obstructive Sleep Apnea     CPAP     Osteopenia     Created by Conversion      Paroxysmal Atrial Fibrillation     Created by [employer]version [employer] Annotation: Nov 28 2012 10:36PM - Shruthi Dhaliwal: Found incidential  on exam on May 7th, [id number] score of 1 for HTNsymptomatic and hospitalized  x2 in July, 2012.CV X 2 in 2012Chronic Sotalol Rx      Postmenopausal bleeding     Created by Conversion      Skin cancer      Tachycardia     Created by Conversion      Tremor     Created by Conversion      Urge And Stress Incontinence     Created by Conversion      Urinary Frequency More Than Twice At Night (Nocturia)     Created by Conversion      Urine  Tests Nonspecific Abnormal Findings     Created by Conversion      Vitamin D Deficiency     Created by Conversion              Family History   Problem Relation Age of Onset     Hypertension Father      Pneumonia Father      Esophageal cancer Brother      Cancer Brother      Stroke Mother      Alzheimer's disease Brother      Cancer Brother      Prostate cancer Brother      Cancer Nephew      Colon cancer Nephew      Cancer Paternal cousin      Social History     Socioeconomic History     Marital status: Single     Spouse name: Not on file     Number of children: 0     Years of education: Not on file     Highest education level: Not on file   Occupational History     Occupation: Retired RN     Employer: [employer]   Social Needs     Financial resource strain: Not on file     Food insecurity:     Worry: Not on file     Inability: Not on file     Transportation needs:     Medical: Not on file     Non-medical: Not on file   Tobacco Use     Smoking status: Former Smoker     Packs/day: 3.00     Years: 40.00     Pack years: 120.00     Types: Cigarettes     Last attempt to quit: 2005     Years since quittin.9     Smokeless tobacco: Never Used   Substance and Sexual Activity     Alcohol use: Not Currently     Drug use: No     Sexual activity: Never     Partners: Male     Birth control/protection: Surgical, Post-menopausal   Lifestyle     Physical activity:     Days per week: Not on file     Minutes per session: Not on file     Stress: Not on file   Relationships     Social connections:     Talks on phone: Not on file     Gets together: Not on file     Attends [employer] service: Not on file     Active member of club or organization: Not on file     Attends meetings of clubs or organizations: Not on file     Relationship status: Not on file     Intimate partner violence:     Fear of current or ex partner: Not on file     Emotionally abused: Not on file     Physically abused: Not on file     Forced sexual  activity: Not on file   Other Topics Concern     Not on file   Social History Narrative    Lives alone single family home that she owns, no children and is retired.  Boyfriend used to live with her, however due to stroke he is in his own care facility.  She is currently at [facility]         Review of Systems   Constitutional: Positive for activity change. Negative for appetite change, fatigue and fever.   HENT: Negative for congestion.    Respiratory: Positive for shortness of breath. Negative for cough and wheezing.    Cardiovascular: Positive for leg swelling. Negative for chest pain.        Lymphedema   Gastrointestinal: Negative for abdominal distention, abdominal pain, constipation, diarrhea and nausea.   Genitourinary: Negative for dysuria.   Musculoskeletal: Positive for arthralgias. Negative for back pain.   Skin: Positive for rash and wound. Negative for color change.        Bruising around left breast surgical incision  Left surgical breast incision that is Dermabond   Patient with a rash left upper thigh and medial left thigh   Neurological: Negative for dizziness.   Psychiatric/Behavioral: Negative for agitation, behavioral problems and confusion.       Vitals:    12/19/19 1129   BP: 108/61   Pulse: (!) 116   Resp: 22   SpO2: 94%   Weight: (!) 355 lb 8 oz (161.3 kg)       Physical Exam  Constitutional:       Appearance: She is well-developed.      Comments: Pleasant woman in no acute distress   HENT:      Head: Normocephalic.   Eyes:      Conjunctiva/sclera: Conjunctivae normal.   Neck:      Musculoskeletal: Normal range of motion.   Cardiovascular:      Rate and Rhythm: Normal rate and regular rhythm.      Heart sounds: Normal heart sounds. No murmur.   Pulmonary:      Effort: No respiratory distress.      Breath sounds: Wheezing present. No rales.   Abdominal:      General: Bowel sounds are normal. There is no distension.      Palpations: Abdomen is soft.      Tenderness: There is no  abdominal tenderness.   Musculoskeletal: Normal range of motion.      Right lower leg: Edema present.      Left lower leg: Edema present.      Comments: Patient with a right and left  great toenails that has almost completely lifted off and hanging on by the cuticle.podiatry consult 12/19/19 but pt did not make the appointment due to respiratory issues.     3-4+ lower extremities.    Skin:     General: Skin is warm.      Comments: Surgical incision left breast Dermabond and large amount of bruising around the incision.   Neurological:      Mental Status: She is alert and oriented to person, place, and time.   Psychiatric:         Behavior: Behavior normal.           LABS:   Recent Results (from the past 240 hour(s))   INR   Result Value Ref Range    INR 1.98 (H) 0.90 - 1.10   ECG 12 lead with MUSE   Result Value Ref Range    SYSTOLIC BLOOD PRESSURE      DIASTOLIC BLOOD PRESSURE      VENTRICULAR RATE 94 BPM    ATRIAL RATE 107 BPM    P-R INTERVAL      QRS DURATION 126 ms    Q-T INTERVAL 358 ms    QTC CALCULATION (BEZET) 447 ms    P Axis      R AXIS 39 degrees    T AXIS -25 degrees    MUSE DIAGNOSIS       Atrial fibrillation  Right bundle branch block  Abnormal ECG  When compared with ECG of 26-JUN-2019 11:38,  No significant change was found  Confirmed by SEE ED PROVIDER NOTE FOR, ECG INTERPRETATION ([id number]),  DOMINIQUE DE LUNA ([id number]) on 12/16/2019 8:02:13 AM     Comprehensive Metabolic Panel   Result Value Ref Range    Sodium 135 (L) 136 - 145 mmol/L    Potassium 4.8 3.5 - 5.0 mmol/L    Chloride 99 98 - 107 mmol/L    CO2 28 22 - 31 mmol/L    Anion Gap, Calculation 8 5 - 18 mmol/L    Glucose 274 (H) 70 - 125 mg/dL    BUN 12 8 - 28 mg/dL    Creatinine 1.08 0.60 - 1.10 mg/dL    GFR MDRD Af Amer 60 (L) >60 mL/min/1.73m2    GFR MDRD Non Af Amer 50 (L) >60 mL/min/1.73m2    Bilirubin, Total 0.7 0.0 - 1.0 mg/dL    Calcium 8.4 (L) 8.5 - 10.5 mg/dL    Protein, Total 5.6 (L) 6.0 - 8.0 g/dL    Albumin 2.4 (L) 3.5 - 5.0 g/dL     Alkaline Phosphatase 70 45 - 120 U/L    AST 13 0 - 40 U/L    ALT 24 0 - 45 U/L   INR   Result Value Ref Range    INR 2.36 (H) 0.90 - 1.10   Lactic Acid   Result Value Ref Range    Lactic Acid 1.9 0.5 - 2.2 mmol/L   Troponin I   Result Value Ref Range    Troponin I 0.01 0.00 - 0.29 ng/mL   HM1 (CBC with Diff)   Result Value Ref Range    WBC 19.7 (H) 4.0 - 11.0 thou/uL    RBC 3.21 (L) 3.80 - 5.40 mill/uL    Hemoglobin 9.5 (L) 12.0 - 16.0 g/dL    Hematocrit 31.0 (L) 35.0 - 47.0 %    MCV 97 80 - 100 fL    MCH 29.6 27.0 - 34.0 pg    MCHC 30.6 (L) 32.0 - 36.0 g/dL    RDW 21.3 (H) 11.0 - 14.5 %    Platelets 168 140 - 440 thou/uL    MPV 10.8 8.5 - 12.5 fL    Neutrophils % 86 (H) 50 - 70 %    Lymphocytes % 4 (L) 20 - 40 %    Monocytes % 8 2 - 10 %    Eosinophils % 1 0 - 6 %    Basophils % 0 0 - 2 %    Neutrophils Absolute 16.9 (H) 2.0 - 7.7 thou/uL    Lymphocytes Absolute 0.8 0.8 - 4.4 thou/uL    Monocytes Absolute 1.5 (H) 0.0 - 0.9 thou/uL    Eosinophils Absolute 0.3 0.0 - 0.4 thou/uL    Basophils Absolute 0.0 0.0 - 0.2 thou/uL   BNP(B-type Natriuretic Peptide)   Result Value Ref Range    BNP 42 0 - 130 pg/mL   Manual Differential   Result Value Ref Range    Platelet Estimate Normal Normal    Ovalocytes 1+ (!) Negative    Polychromasia 1+ (!) Negative   Urinalysis-UC if Indicated   Result Value Ref Range    Color, UA Yellow Colorless, Yellow, Straw, Light Yellow    Clarity, UA Clear Clear    Glucose, UA Negative Negative    Bilirubin, UA Negative Negative    Ketones, UA Negative Negative, 60 mg/dL    Specific Gravity, UA 1.015 1.001 - 1.030    Blood, UA Negative Negative    pH, UA 5.0 4.5 - 8.0    Protein, UA Negative Negative mg/dL    Urobilinogen, UA <2.0 E.U./dL <2.0 E.U./dL, 2.0 E.U./dL    Nitrite, UA Negative Negative    Leukocytes, UA Moderate (!) Negative    Bacteria, UA Moderate (!) None Seen hpf    RBC, UA 0-2 None Seen, 0-2 hpf    WBC, UA 5-10 (!) None Seen, 0-5 hpf    Squam Epithel, UA 0-5 None Seen, 0-5 lpf     WBC Clumps Present (!) None Seen    Mucus, UA Moderate (!) None Seen lpf    Hyaline Casts, UA 10-25 (!) 0-5, None Seen lpf   Culture, Urine   Result Value Ref Range    Culture 50,000-100,000 col/ml Citrobacter freundii (!)        Susceptibility    Citrobacter freundii - JAYDEN     Amoxicillin + Clavulanate >16/8 Resistant      Ampicillin >16 Resistant      Cefazolin >16 Resistant      Cefepime <=1 Sensitive      Ceftriaxone <=1 Sensitive      Ciprofloxacin <=0.5 Sensitive      Gentamicin <=2 Sensitive      Levofloxacin <=1 Sensitive      Meropenem <=0.25 Sensitive      Nitrofurantoin <=16 Sensitive      Tetracycline <=2 Sensitive      Tobramycin <=2 Sensitive      Trimethoprim + Sulfamethoxazole <=0.5 Sensitive    POCT Glucose   Result Value Ref Range    Glucose 211 (H) 70 - 139 mg/dL   POCT Glucose   Result Value Ref Range    Glucose 155 (H) 70 - 139 mg/dL   POCT Glucose   Result Value Ref Range    Glucose 130 70 - 139 mg/dL   INR - early am (tomorrow am)   Result Value Ref Range    INR 2.34 (H) 0.90 - 1.10   Basic metabolic panel   Result Value Ref Range    Sodium 137 136 - 145 mmol/L    Potassium 4.5 3.5 - 5.0 mmol/L    Chloride 101 98 - 107 mmol/L    CO2 31 22 - 31 mmol/L    Anion Gap, Calculation 5 5 - 18 mmol/L    Glucose 148 (H) 70 - 125 mg/dL    Calcium 8.6 8.5 - 10.5 mg/dL    BUN 11 8 - 28 mg/dL    Creatinine 0.83 0.60 - 1.10 mg/dL    GFR MDRD Af Amer >60 >60 mL/min/1.73m2    GFR MDRD Non Af Amer >60 >60 mL/min/1.73m2   HM1 (CBC with Diff)   Result Value Ref Range    WBC 19.1 (H) 4.0 - 11.0 thou/uL    RBC 3.43 (L) 3.80 - 5.40 mill/uL    Hemoglobin 10.0 (L) 12.0 - 16.0 g/dL    Hematocrit 33.0 (L) 35.0 - 47.0 %    MCV 96 80 - 100 fL    MCH 29.2 27.0 - 34.0 pg    MCHC 30.3 (L) 32.0 - 36.0 g/dL    RDW 21.1 (H) 11.0 - 14.5 %    Platelets 177 140 - 440 thou/uL    MPV 10.4 8.5 - 12.5 fL    Neutrophils % 85 (H) 50 - 70 %    Lymphocytes % 3 (L) 20 - 40 %    Monocytes % 8 2 - 10 %    Eosinophils % 3 0 - 6 %    Basophils  % 0 0 - 2 %    Neutrophils Absolute 16.2 (H) 2.0 - 7.7 thou/uL    Lymphocytes Absolute 0.6 (L) 0.8 - 4.4 thou/uL    Monocytes Absolute 1.6 (H) 0.0 - 0.9 thou/uL    Eosinophils Absolute 0.6 (H) 0.0 - 0.4 thou/uL    Basophils Absolute 0.0 0.0 - 0.2 thou/uL   Manual Differential   Result Value Ref Range    Platelet Estimate Normal Normal    Ovalocytes 1+ (!) Negative    Polychromasia 1+ (!) Negative   POCT Glucose   Result Value Ref Range    Glucose 134 70 - 139 mg/dL   POCT Glucose   Result Value Ref Range    Glucose 175 (H) 70 - 139 mg/dL   POCT Glucose   Result Value Ref Range    Glucose 201 (H) 70 - 139 mg/dL   Lactic Acid   Result Value Ref Range    Lactic Acid 1.6 0.5 - 2.2 mmol/L   INR   Result Value Ref Range    INR 1.85 (H) 0.90 - 1.10   HM2(CBC w/o Differential)   Result Value Ref Range    WBC 14.6 (H) 4.0 - 11.0 thou/uL    RBC 2.94 (L) 3.80 - 5.40 mill/uL    Hemoglobin 8.5 (L) 12.0 - 16.0 g/dL    Hematocrit 28.9 (L) 35.0 - 47.0 %    MCV 98 80 - 100 fL    MCH 28.9 27.0 - 34.0 pg    MCHC 29.4 (L) 32.0 - 36.0 g/dL    RDW 21.2 (H) 11.0 - 14.5 %    Platelets 158 140 - 440 thou/uL    MPV 11.1 8.5 - 12.5 fL   POCT Glucose   Result Value Ref Range    Glucose 140 (H) 70 - 139 mg/dL   POCT Glucose   Result Value Ref Range    Glucose 132 70 - 139 mg/dL   Hemoglobin   Result Value Ref Range    Hemoglobin 8.4 (L) 12.0 - 16.0 g/dL   POCT Glucose   Result Value Ref Range    Glucose 125 70 - 139 mg/dL   POCT Glucose   Result Value Ref Range    Glucose 158 (H) 70 - 139 mg/dL   Lactic Acid   Result Value Ref Range    Lactic Acid 0.8 0.5 - 2.2 mmol/L   HM2(CBC w/o Differential)   Result Value Ref Range    WBC 12.4 (H) 4.0 - 11.0 thou/uL    RBC 2.78 (L) 3.80 - 5.40 mill/uL    Hemoglobin 8.2 (L) 12.0 - 16.0 g/dL    Hematocrit 27.4 (L) 35.0 - 47.0 %    MCV 99 80 - 100 fL    MCH 29.5 27.0 - 34.0 pg    MCHC 29.9 (L) 32.0 - 36.0 g/dL    RDW 20.6 (H) 11.0 - 14.5 %    Platelets 164 140 - 440 thou/uL    MPV 10.3 8.5 - 12.5 fL   INR    Result Value Ref Range    INR 1.57 (H) 0.90 - 1.10   POCT Glucose   Result Value Ref Range    Glucose 125 70 - 139 mg/dL   POCT Glucose   Result Value Ref Range    Glucose 127 70 - 139 mg/dL   INR   Result Value Ref Range    INR 1.55 (H) 0.90 - 1.10     Current Outpatient Medications   Medication Sig     albuterol (PROVENTIL) 2.5 mg /3 mL (0.083 %) nebulizer solution Take 3 mL (2.5 mg total) by nebulization every 4 (four) hours as needed for wheezing.     bacitracin ointment Apply 1 application topically 2 (two) times a day. After epsom salt foot soak. To bilateral great toes for toenail falling off, until seen by Podiatry     calcium-vitamin D (CALCIUM-VITAMIN D) 500 mg(1,250mg) -200 unit per tablet Take 1 tablet by mouth 2 (two) times a day with meals.     cefdinir (OMNICEF) 300 MG capsule Take 1 capsule (300 mg total) by mouth 2 (two) times a day for 5 days.     cholecalciferol, vitamin D3, 1,000 unit tablet Take 2,000 Units by mouth daily.      diphenhydrAMINE (BENADRYL) 2 % cream Apply 1 application topically 3 (three) times a day as needed for itching.     diphenhydrAMINE (BENADRYL) 25 mg capsule Take 25 mg by mouth every 6 (six) hours as needed for itching.     fesoterodine (TOVIAZ) 8 mg Tb24 ER tablet Take 8 mg by mouth daily.      fluticasone propionate (FLONASE ALLERGY RELIEF) 50 mcg/actuation nasal spray One spray in each nostril daily     furosemide (LASIX) 40 MG tablet TAKE 1 TABLET(40 MG) BY MOUTH TWICE DAILY     glucosamine-chondroitin 500-400 mg tablet Take 1 tablet by mouth 2 (two) times a day with meals.      HYDROcodone-acetaminophen 5-325 mg per tablet Take 1 tablet by mouth every 4 (four) hours as needed for pain.     lidocaine-prilocaine (EMLA) cream Place over port 30 min. before being accessed.     loratadine (CLARITIN) 10 mg tablet Take 1 tablet (10 mg total) by mouth daily.     metoprolol tartrate (LOPRESSOR) 25 MG tablet Take 37.5 mg by mouth 2 (two) times a day. Hold for SBP < 110      mv-min/FA/vit K/lycop/lut/zeax (OCUVITE EYE PLUS MULTI ORAL) Take 1 tablet by mouth 2 (two) times a day with meals.     nystatin (MYCOSTATIN) powder Apply 1 application topically 2 (two) times a day. Under breasts for rash, until healed. Then PRN.     OMEGA-3/DHA/EPA/FISH OIL (FISH OIL-OMEGA-3 FATTY ACIDS) 300-1,000 mg capsule Take 2 g by mouth 2 times a day at 6:00 am and 4:00 pm.     omeprazole (PRILOSEC) 20 MG capsule Take 20 mg by mouth daily before breakfast.     polyvinyl alcohol-povidon,PF, (REFRESH CLASSIC) 1.4-0.6 % Dpet ophthalmic dropperette Administer 2 drops to both eyes 3 (three) times a day.     potassium chloride (K-DUR,KLOR-CON) 10 MEQ tablet TAKE 2 TABLETS BY MOUTH TWICE DAILY     prochlorperazine (COMPAZINE) 10 MG tablet TAKE 1 TABLET(10 MG) BY MOUTH EVERY 6 HOURS AS NEEDED FOR NAUSEA     simvastatin (ZOCOR) 20 MG tablet Take 1 tablet (20 mg total) by mouth daily with supper.     triamcinolone (KENALOG) 0.1 % cream Apply 1 application topically 2 (two) times a day. To left leg for rash, until healed     warfarin ANTICOAGULANT (COUMADIN/JANTOVEN) 2.5 MG tablet Take 2 tablets (5 mg total) by mouth daily.       ASSESSMENT:      ICD-10-CM    1. SOB (shortness of breath) R06.02    2. Wheezing R06.2    3. Weight gain R63.5        PLAN:    Increased SOB and wheezing unrelieved with nebulizer and 3L oxygen  NC - Pt sent to [facility] for further evaluation.       Atrial fibrillation-continue Coumadin,  metoprolol recently increased to 50 mg  twice daily    Venous stasis lower extremities ace wraps prior to hospitalization, came back to facility with lymph wraps. Her wraps were removed this morning for an appointment that she  did not make it to  .   Status post left breast lumpectomy monitor surgical incision for signs and symptoms of infection-recent left axilla hematoma.     Pain management Norco PRN anticoagulation    Right/Left  great toenail injury bacitracin to bilateral  great toe twice  daily, cover with a bandage and podiatry to see patient.  Foot soaks two times a day with epsom salt prior to bacitracin  twice a day  and podiatry consult    Rash under breast nystatin powder twice daily until healed and then as needed twice daily    Shortness of breath oxygen 1 to 2 L PRN to keep sats greater than 90%, currently refusing to wear it.     Anticoagulation management continue Coumadin and adjust per INRs    Debility PT OT    Patient to receive benign fusion related to her cancer every 3 weeks.  Nurse manager to follow-up to see if this is something that needs to be done while she is in the TCU or for can resume after discharge.    Electronically signed by: Sara Mayes CNP

## 2021-06-04 NOTE — PROGRESS NOTES
[facility]    Facility:   [facility] [[id number]]   Code Status: POLST AVAILABLE      CHIEF COMPLAINT/REASON FOR VISIT:  Chief Complaint   Patient presents with     Review Of Multiple Medical Conditions       HISTORY:      HPI: Jennifer is a 74 y.o. female who was admitted to [facility] from 12/19/19-12/31/19 for pneumonia.  The patient has bilateral pleural effusions on CTA and was treated with doxycycline and Keflex for suspected pneumonia.  She completed a course of Cefdinir for UTI outpatient during her hospital stay.  Her procalcitonin and influenza A were negative on hospital admission.  She was also noted to have severe BLE nonpitting edema and her furosemide was increased to 80 mg two times a day for diuresis.  It was recommended that her BMP be followed bi-weekly for assessment of renal function on increased diuresis.  She has a thymoma and left-sided breast cancer following with Dr. Villalba for treatment.  She recently developed a left axillary hematoma on chronic anticoagulation with warfarin therapy for atrial fibrillation and it was evaluated with keyla to continue anticoagulation.  Her blood glucose was elevated during her hospitalization due to steroid burst and this was managed with sliding scale insulin with diet-controlled diabetes at home.  Jennifer  has a past medical history of (Lower) Leg Localized Swelling, Adenocarcinoma In Situ Of The Uterus, Backache, Benign Polyps Of The Large Intestine, Breast cancer (H), Cancer (H), Chronic kidney disease, Coronary artery disease, Diabetes mellitus (H), Fatigue, Hyperglycemia, Hyperlipidemia, Hypertension, Joint Pain, Localized In The Knee, Lesion of mediastinum, Lymphedema, Macular Degeneration, Major Depression, Single Episode In Remission, Obesity, Obstructive Sleep Apnea, Osteopenia, Paroxysmal Atrial Fibrillation, Postmenopausal bleeding, Skin cancer, Tachycardia, Tremor, Urge And Stress  Incontinence, Urinary Frequency More Than Twice At Night (Nocturia), Urine Tests Nonspecific Abnormal Findings, and Vitamin D Deficiency.  She is currently a TCU patient at [facility] s/ hospitalization for acute rehabilitation.      Today Ms. Galvin is being evaluated for a review of multiple medical conditions.  Jennifer reported that her dyspnea at rest, coughing, and wheezing have improved since her hospitalization with increase in her diuresis and treatment for pneumonia with doxycycline and Keflex.  She said that her BLE severe lymphedema and moderate erythema has improved with diuresis as well.  She noted that she has had intermittent, shooting nerve pain in her right foot since starting chemotherapy rated 3-7/10 that is not alleviated with Norco PRN available to her.  She has not been taking anything for neuropathic pain at this time.  She said that she has had trouble falling and staying asleep since her hospital admission.  We discussed starting gabapentin at bedtime to alleviate her neuropathic pain and insomnia and she was agreeable to this treatment plan.  Jennifer is on chronic anticoagulation with warfarin therapy for management of atrial fibrillation with INR goal 2-3.  Her INR today was therapeutic at 2.76 with her last dose held yesterday due to elevated INR at 3.44.  She denies any uncontrolled bleeding, any active bleeding, any bruising, hematuria, epistaxis, blood in stools or black/dark/tarry stools.  The patient denied lightheadedness, dizziness, breathing difficulty, chest pain, palpitations, constipation, urinary symptoms, numbness or tingling in extremities, and pain.  Nursing staff denied any other concerns for the patient at this time.    Past Medical History:   Diagnosis Date     (Lower) Leg Localized Swelling     Created by Conversion      Adenocarcinoma In Situ Of The Uterus     Created by Conversion      Backache     Created by [facility] Annotation: Feb 29 2012  12:14PM - Opal Helm: Referred to  [facility] 9/11, given TENS unit.      Benign Polyps Of The Large Intestine     Created by Conversion      Breast cancer (H)      Cancer (H)     uterine     Chronic kidney disease     stage 1 per H&P 11/22/2019     Coronary artery disease      Diabetes mellitus (H)     type 2     Fatigue     Created by Conversion      Hyperglycemia     Created by Conversion      Hyperlipidemia     Created by Conversion      Hypertension     Created by Conversion      Joint Pain, Localized In The Knee     Created by Conversion      Lesion of mediastinum      Lymphedema     Created by Conversion      Macular Degeneration     Created by Conversion [employer] Annotation: Apr 5 2011 12:22PM - Tien Guzman: Followed by  Ophthalmologist, Dr. Avilez.      Major Depression, Single Episode In Remission     Created by Conversion      Obesity     Created by Conversion      Obstructive Sleep Apnea     CPAP     Osteopenia     Created by Conversion      Paroxysmal Atrial Fibrillation     Created by Conversion [employer] Annotation: Nov 28 2012 10:36PM - Shruthi Dhaliwal: Found incidential  on exam on May 7th, [id number] score of 1 for HTNsymptomatic and hospitalized  x2 in July, 2012.CV X 2 in 2012Chronic Sotalol Rx      Postmenopausal bleeding     Created by Conversion      Skin cancer      Tachycardia     Created by Conversion      Tremor     Created by Conversion      Urge And Stress Incontinence     Created by Conversion      Urinary Frequency More Than Twice At Night (Nocturia)     Created by Conversion      Urine Tests Nonspecific Abnormal Findings     Created by Conversion      Vitamin D Deficiency     Created by Conversion              Family History   Problem Relation Age of Onset     Hypertension Father      Pneumonia Father      Esophageal cancer Brother      Cancer Brother      Stroke Mother      Alzheimer's disease Brother      Cancer Brother      Prostate cancer Brother      Cancer Nephew       Colon cancer Nephew      Cancer Paternal cousin      Social History     Socioeconomic History     Marital status: Single     Spouse name: None     Number of children: 0     Years of education: None     Highest education level: None   Occupational History     Occupation: Retired RN     Employer: [employer]   Social Needs     Financial resource strain: None     Food insecurity:     Worry: None     Inability: None     Transportation needs:     Medical: None     Non-medical: None   Tobacco Use     Smoking status: Former Smoker     Packs/day: 3.00     Years: 40.00     Pack years: 120.00     Types: Cigarettes     Last attempt to quit: 1/1/2005     Years since quitting: 15.0     Smokeless tobacco: Never Used   Substance and Sexual Activity     Alcohol use: Not Currently     Drug use: No     Sexual activity: Never     Partners: Male     Birth control/protection: Surgical, Post-menopausal   Lifestyle     Physical activity:     Days per week: None     Minutes per session: None     Stress: None   Relationships     Social connections:     Talks on phone: None     Gets together: None     Attends L[employer]-day service: None     Active member of club or organization: None     Attends meetings of clubs or organizations: None     Relationship status: None     Intimate partner violence:     Fear of current or ex partner: None     Emotionally abused: None     Physically abused: None     Forced sexual activity: None   Other Topics Concern     None   Social History Narrative    Lives alone single family home that she owns, no children and is retired.  Boyfriend used to live with her, however due to stroke he is in his own care facility.  She is currently at [facility]       REVIEW OF SYSTEM:  Pertinent items are noted in HPI.  A 12 point comprehensive review of systems was negative except as noted.    PHYSICAL EXAM:     General Appearance:    Alert, cooperative, no distress, appears stated age; morbidly obese   Head:     Normocephalic, without obvious abnormality, atraumatic   Eyes:    PERRL, conjunctiva/corneas clear, both eyes   Ears:    Normal external ear canals, both ears   Nose:   Nares normal, septum midline, mucosa normal, no drainage    or sinus tenderness   Throat:   Lips, mucosa, and tongue normal; teeth and gums normal   Neck:   Supple, symmetrical, trachea midline, no adenopathy;     thyroid:  no enlargement/tenderness/nodules; no carotid    bruit or JVD   Back:     Symmetric, no curvature, ROM normal, no CVA tenderness   Lungs:     Clear to auscultation bilaterally, respirations unlabored   Chest Wall:    No tenderness or deformity    Heart:    Regular rate and rhythm, S1 and S2 normal, no murmur, rub   or gallop   Abdomen:     Soft, non-tender, bowel sounds active all four quadrants,     no masses, no organomegaly   Extremities:   Extremities normal, atraumatic, no cyanosis; severe lymphedema in BLE   Pulses:   2+ and symmetric all extremities   Skin:   Skin color, texture, turgor normal, no rashes or lesions; moderate erythema on BLE   Neurologic:   Oriented to person, place, time, and situation; exhibits logical thought processes; generalized weakness         LABS:   Lab Results   Component Value Date    WBC 13.9 (H) 12/30/2019    HGB 9.9 (L) 12/30/2019    HCT 33.9 (L) 12/30/2019     12/30/2019    CHOL 124 07/22/2019    TRIG 162 (H) 07/22/2019    HDL 34 (L) 07/22/2019    ALT 24 12/14/2019    AST 13 12/14/2019     01/03/2020    K 3.7 01/03/2020    CL 99 01/03/2020    CREATININE 1.13 (H) 01/03/2020    BUN 18 01/03/2020    CO2 33 (H) 01/03/2020    TSH 2.37 06/26/2019    INR 2.76 (H) 01/03/2020    HGBA1C 6.5 (H) 09/11/2019    MICROALBUR <0.50 04/03/2019        ASSESSMENT:      ICD-10-CM    1. Physical deconditioning R53.81    2. Bilateral leg edema R60.0    3. Pneumonia due to infectious organism, unspecified laterality, unspecified part of lung J18.9    4. Atrial fibrillation, unspecified type (H) I48.91     5. CHF (congestive heart failure), NYHA class I, acute on chronic, combined (H) I50.43    6. Adjustment insomnia F51.02    7. Neuropathic pain M79.2    8. Anticoagulation management encounter Z51.81     Z79.01    9. On warfarin therapy Z79.01        PLAN:      Physical deconditioning  -PT/OT as directed  -Discharge from facility per therapy recommendations     Pneumonia  -Continue doxycycline and Keflex as prescribed  -Continue oxygen therapy and wean as tolerated  -Notify provider if patient has s/s of systemic infection including fever, chills, night sweats   -Notify provider if patient has new or worsening dyspnea, wheezing, or cough    Anticoagulation management/A fib  Lab Results   Component Value Date    INR 2.76 (H) 01/03/2020    INR 3.44 (H) 01/02/2020    INR 2.95 (H) 12/30/2019   -START warfarin 3 mg daily  -Check INR on 1/6/20  -Notify provider if patient has s/s bleeding     CHF/Peripheral edema  -Encouraged cardiac diet, low sodium diet, exercise and stress reduction techniques.   -Emphasized importance of blood pressure control.   -Continue furosemide 80 mg two times a day   -Notify provider if pt's SBP<90 or >180 and DBP <50 or >100   -Notify provider if patient has weight gain of >2 lb in one day or > 5 lb in one week     Neuropathic pain  -START gabapentin 300 mg at HS  -Encouraged patient to utilize integrative therapies such as distraction and deep breathing for pain management  -Notify provider if patient has new or worsening pain     Insomnia  -Notify provider if patient has s/s worsening depression, anxiety, insomnia, changes in appetite, or suicidal ideation      Otherwise continue current care plan for all other chronic medical conditions, as they are stable. Encouraged patient to engage in healthy lifestyle behaviors such as engaging in social activities, exercising (PT/OT), eating well, and following care plan. Follow up for routine check-up, or sooner if needed. Will continue to monitor  patient and work with nursing staff collaboratively to work toward positive patient outcomes.     Electronically signed by: Anca Abbott CNP     Total floor/unit time was 60 minutes and 40 minutes was spent in counseling patient on pain management, sleep management, anticoagulation management, edema management, and acute rehabilitation and coordination of care with nursing staff.

## 2021-06-04 NOTE — PROGRESS NOTES
Met with Jennifer in  where she is currently inpatient. Jennifer is understandably disappointed she will be in the hospital over Christmas.  Offered emotional support and encouragement.  Fabi Morton RN

## 2021-06-04 NOTE — PROGRESS NOTES
[facility] appointment with Dr. Ferrera.  She is accompanied by her sister in law for consult.  She has had a difficult recovery from her chemo and surgery and is currently in a TCU for rehab, hoping she can go home.  She is not sure when that will be.  RN assessment and EMR update.  Pulse 100   Resp 20   SpO2 97% .  Patient met with Dr. Ferrera, see dictation for details.  Patient will plan to follow up with Dr. Villalba.  Per Dr. Ferrera, she sent a message to patient's Nurse Navigator to help her arrange, as well as the [facility].  RN time 15 mins

## 2021-06-05 VITALS
OXYGEN SATURATION: 90 % | TEMPERATURE: 98.6 F | DIASTOLIC BLOOD PRESSURE: 84 MMHG | SYSTOLIC BLOOD PRESSURE: 136 MMHG | WEIGHT: 293 LBS | HEART RATE: 82 BPM | BODY MASS INDEX: 50.58 KG/M2

## 2021-06-05 NOTE — PROGRESS NOTES
[facility]    Facility:   [facility] [[id number]]   Code Status: FULL CODE      CHIEF COMPLAINT/REASON FOR VISIT:  Chief Complaint   Patient presents with     Review Of Multiple Medical Conditions       HISTORY:      HPI: Jennifer is a 74 y.o. female undergoing physical and occupational therapy at [facility] transitional care unit. , she is with history of severe obesity, stage Ib left breast cancer, I normal, vitamin D deficiency, urge stress incontinence, tremor, postmenopausal bleeding and uterine adenocarcinoma in situ status post JEROME/BSO, atrial fibrillation, osteopenia, FLAKITA, macular degeneration, chronic bilateral leg edema, hypertension, diabetes mellitus, CAD, chronic back and bilateral knee pain who presented to [facility] for scheduled left breast lumpectomy And sentinel lymph node biopsy.  Per the records she became hypoxic in the PACU and noted to be persistently hypoxic to 4 L of oxygen which is a change from her baseline.  She does have FLAKITA but does not use her CPAP machine. She was recently sent back to the hospital from December 14-17 th  for left axilla pain and found to have a left axillary  hematoma related to her recent lumpectomy. She was seen by general surgery who recommended resuming warfarin and providing modest infection prophylaxis.She was also treated at this time with cefdinir for a UTI. She then returned and was  admitted to [facility] from 12/19/19-12/31/19 for pneumonia.  The patient has bilateral pleural effusions on CTA and was treated with doxycycline and Keflex for suspected pneumonia.  She completed a course of Cefdinir for UTI outpatient during her hospital stay.  Her procalcitonin and influenza A were negative on hospital admission.  She was also noted to have severe BLE nonpitting edema and her furosemide was increased to 80 mg two times a day for diuresis.  It was recommended that her BMP be followed  bi-weekly for assessment of renal function on increased diuresis.    Today she was seen as a first visit to review multiple medical issues after returning from her recent hospitalization.  She denied chest pain and reports her breathing is good.  She is also currently off oxygen she reports positive bowel movement and voiding well her left breast incision is healed.  Her INR was reviewed today and was therapeutic at 2.19.  Her hemoglobin is at 11.5 and GFR 49.  She does report right shoulder pain and tells me she has had it for over a month.  She does not recall whether it was x-rayed or not but tells me that it is affecting her therapy.  An x-ray was ordered.  She denies any cough or congestion.  She is with lower extremity edema and tells me she will not be resuming her lymph wraps.  She was started on high doses of Lasix in the hospital and is on frequent monitoring of BMPs.    Past Medical History:   Diagnosis Date     (Lower) Leg Localized Swelling     Created by Conversion      Adenocarcinoma In Situ Of The Uterus     Created by Conversion      Backache     Created by Conversion [employer] [facility] Annotation: Feb 29 2012 12:14PM - Opal Helm: Referred to  [facility] 9/11, given TENS unit.      Benign Polyps Of The Large Intestine     Created by Conversion      Breast cancer (H)      Cancer (H)     uterine     Chronic kidney disease     stage 1 per H&P 11/22/2019     Coronary artery disease      Diabetes mellitus (H)     type 2     Fatigue     Created by Conversion      Hyperglycemia     Created by Conversion      Hyperlipidemia     Created by Conversion      Hypertension     Created by Conversion      Joint Pain, Localized In The Knee     Created by Conversion      Lesion of mediastinum      Lymphedema     Created by Conversion      Macular Degeneration     Created by Conversion [facility] Annotation: Apr 5 2011 12:22PM - Tien Guzman: Followed by  Ophthalmologist, Dr. Avilez.      Major Depression, Single  Episode In Remission     Created by Conversion      Obesity     Created by Conversion      Obstructive Sleep Apnea     CPAP     Osteopenia     Created by Conversion      Paroxysmal Atrial Fibrillation     Created by Conversion [employer] Annotation: Nov 28 2012 10:36PM - Shruthi Dhaliwal: Found incidential  on exam on May 7th, [id number] score of 1 for HTNsymptomatic and hospitalized  x2 in July, 2012.CV X 2 in 2012Chronic Sotalol Rx      Postmenopausal bleeding     Created by Conversion      Skin cancer      Tachycardia     Created by Conversion      Tremor     Created by Conversion      Urge And Stress Incontinence     Created by Conversion      Urinary Frequency More Than Twice At Night (Nocturia)     Created by Conversion      Urine Tests Nonspecific Abnormal Findings     Created by Conversion      Vitamin D Deficiency     Created by Conversion              Family History   Problem Relation Age of Onset     Hypertension Father      Pneumonia Father      Esophageal cancer Brother      Cancer Brother      Stroke Mother      Alzheimer's disease Brother      Cancer Brother      Prostate cancer Brother      Cancer Nephew      Colon cancer Nephew      Cancer Paternal cousin      Social History     Socioeconomic History     Marital status: Single     Spouse name: Not on file     Number of children: 0     Years of education: Not on file     Highest education level: Not on file   Occupational History     Occupation: Retired RN     Employer: [employer]   Social Needs     Financial resource strain: Not on file     Food insecurity:     Worry: Not on file     Inability: Not on file     Transportation needs:     Medical: Not on file     Non-medical: Not on file   Tobacco Use     Smoking status: Former Smoker     Packs/day: 3.00     Years: 40.00     Pack years: 120.00     Types: Cigarettes     Last attempt to quit: 1/1/2005     Years since quitting: 15.0     Smokeless tobacco: Never Used   Substance and Sexual Activity      Alcohol use: Not Currently     Drug use: No     Sexual activity: Never     Partners: Male     Birth control/protection: Surgical, Post-menopausal   Lifestyle     Physical activity:     Days per week: Not on file     Minutes per session: Not on file     Stress: Not on file   Relationships     Social connections:     Talks on phone: Not on file     Gets together: Not on file     Attends Baptism service: Not on file     Active member of club or organization: Not on file     Attends meetings of clubs or organizations: Not on file     Relationship status: Not on file     Intimate partner violence:     Fear of current or ex partner: Not on file     Emotionally abused: Not on file     Physically abused: Not on file     Forced sexual activity: Not on file   Other Topics Concern     Not on file   Social History Narrative    Lives alone single family home that she owns, no children and is retired.  Boyfriend used to live with her, however due to stroke he is in his own care facility.  She is currently at [facility]         Review of Systems   Constitutional: Positive for activity change. Negative for appetite change, fatigue and fever.   HENT: Negative for congestion.    Respiratory: Negative for cough and wheezing.         Denied shortness of breath-she is off the oxygen   Cardiovascular: Positive for leg swelling. Negative for chest pain.        Lymphedema   Gastrointestinal: Negative for abdominal distention, abdominal pain, constipation, diarrhea and nausea.   Genitourinary: Negative for dysuria.   Musculoskeletal: Positive for arthralgias. Negative for back pain.   Skin: Positive for wound. Negative for color change.        Left breast incision healed   Neurological: Negative for dizziness.   Psychiatric/Behavioral: Negative for agitation, behavioral problems and confusion.       Vitals:    01/06/20 1110   BP: 100/58   Pulse: 81   Resp: 18   Temp: 98.7  F (37.1  C)   SpO2: 94%   Weight: (!) 337 lb (152.9  kg)       Physical Exam  Constitutional:       Appearance: She is well-developed.      Comments: Pleasant woman in no acute distress   HENT:      Head: Normocephalic.   Eyes:      Conjunctiva/sclera: Conjunctivae normal.   Neck:      Musculoskeletal: Normal range of motion.   Cardiovascular:      Rate and Rhythm: Normal rate and regular rhythm.      Heart sounds: Normal heart sounds. No murmur.   Pulmonary:      Effort: No respiratory distress.      Breath sounds: No rales.   Abdominal:      General: Bowel sounds are normal. There is no distension.      Palpations: Abdomen is soft.      Tenderness: There is no abdominal tenderness.   Musculoskeletal: Normal range of motion.      Right lower leg: Edema present.      Left lower leg: Edema present.      Comments: Patient with a right and left  great toenails that has almost completely lifted off and hanging on by the cuticle.podiatry consult 12/19/19 but pt did not make the appointment due to respiratory issues.  Podiatry consult rescheduled    3-4+ lower extremities.    Skin:     General: Skin is warm.      Comments: Healed left breast incision   Neurological:      Mental Status: She is alert and oriented to person, place, and time.   Psychiatric:         Behavior: Behavior normal.           LABS:   Recent Results (from the past 240 hour(s))   Potassium   Result Value Ref Range    Potassium 3.8 3.5 - 5.0 mmol/L   POCT Glucose   Result Value Ref Range    Glucose 257 (H) 70 - 139 mg/dL   Basic Metabolic Panel   Result Value Ref Range    Sodium 138 136 - 145 mmol/L    Potassium 4.4 3.5 - 5.0 mmol/L    Chloride 104 98 - 107 mmol/L    CO2 28 22 - 31 mmol/L    Anion Gap, Calculation 6 5 - 18 mmol/L    Glucose 207 (H) 70 - 125 mg/dL    Calcium 8.8 8.5 - 10.5 mg/dL    BUN 16 8 - 28 mg/dL    Creatinine 0.83 0.60 - 1.10 mg/dL    GFR MDRD Af Amer >60 >60 mL/min/1.73m2    GFR MDRD Non Af Amer >60 >60 mL/min/1.73m2   HM1 (CBC with Diff)   Result Value Ref Range    WBC 13.2 (H) 4.0  - 11.0 thou/uL    RBC 3.30 (L) 3.80 - 5.40 mill/uL    Hemoglobin 9.6 (L) 12.0 - 16.0 g/dL    Hematocrit 32.1 (L) 35.0 - 47.0 %    MCV 97 80 - 100 fL    MCH 29.1 27.0 - 34.0 pg    MCHC 29.9 (L) 32.0 - 36.0 g/dL    RDW 19.9 (H) 11.0 - 14.5 %    Platelets 168 140 - 440 thou/uL    MPV 10.7 8.5 - 12.5 fL    Neutrophils % 85 (H) 50 - 70 %    Lymphocytes % 7 (L) 20 - 40 %    Monocytes % 7 2 - 10 %    Eosinophils % 0 0 - 6 %    Basophils % 0 0 - 2 %    Neutrophils Absolute 11.3 (H) 2.0 - 7.7 thou/uL    Lymphocytes Absolute 0.9 0.8 - 4.4 thou/uL    Monocytes Absolute 0.9 0.0 - 0.9 thou/uL    Eosinophils Absolute 0.0 0.0 - 0.4 thou/uL    Basophils Absolute 0.0 0.0 - 0.2 thou/uL   POCT Glucose   Result Value Ref Range    Glucose 208 (H) 70 - 139 mg/dL   POCT Glucose   Result Value Ref Range    Glucose 167 (H) 70 - 139 mg/dL   POCT Glucose   Result Value Ref Range    Glucose 226 (H) 70 - 139 mg/dL   POCT Glucose   Result Value Ref Range    Glucose 241 (H) 70 - 139 mg/dL   INR   Result Value Ref Range    INR 2.79 (H) 0.90 - 1.10   Basic Metabolic Panel   Result Value Ref Range    Sodium 139 136 - 145 mmol/L    Potassium 4.0 3.5 - 5.0 mmol/L    Chloride 103 98 - 107 mmol/L    CO2 27 22 - 31 mmol/L    Anion Gap, Calculation 9 5 - 18 mmol/L    Glucose 195 (H) 70 - 125 mg/dL    Calcium 9.0 8.5 - 10.5 mg/dL    BUN 20 8 - 28 mg/dL    Creatinine 0.89 0.60 - 1.10 mg/dL    GFR MDRD Af Amer >60 >60 mL/min/1.73m2    GFR MDRD Non Af Amer >60 >60 mL/min/1.73m2   HM2(CBC w/o Differential)   Result Value Ref Range    WBC 14.4 (H) 4.0 - 11.0 thou/uL    RBC 3.37 (L) 3.80 - 5.40 mill/uL    Hemoglobin 9.6 (L) 12.0 - 16.0 g/dL    Hematocrit 32.7 (L) 35.0 - 47.0 %    MCV 97 80 - 100 fL    MCH 28.5 27.0 - 34.0 pg    MCHC 29.4 (L) 32.0 - 36.0 g/dL    RDW 20.0 (H) 11.0 - 14.5 %    Platelets 197 140 - 440 thou/uL    MPV 11.0 8.5 - 12.5 fL   POCT Glucose   Result Value Ref Range    Glucose 165 (H) 70 - 139 mg/dL   POCT Glucose   Result Value Ref Range     Glucose 160 (H) 70 - 139 mg/dL   Lactic Acid   Result Value Ref Range    Lactic Acid 3.1 (H) 0.5 - 2.2 mmol/L   POCT Glucose   Result Value Ref Range    Glucose 340 (H) 70 - 139 mg/dL   POCT Glucose   Result Value Ref Range    Glucose 285 (H) 70 - 139 mg/dL   POCT Glucose   Result Value Ref Range    Glucose 267 (H) 70 - 139 mg/dL   INR   Result Value Ref Range    INR 2.95 (H) 0.90 - 1.10   Lactic Acid   Result Value Ref Range    Lactic Acid 1.4 0.5 - 2.2 mmol/L   Basic Metabolic Panel   Result Value Ref Range    Sodium 140 136 - 145 mmol/L    Potassium 3.7 3.5 - 5.0 mmol/L    Chloride 103 98 - 107 mmol/L    CO2 30 22 - 31 mmol/L    Anion Gap, Calculation 7 5 - 18 mmol/L    Glucose 174 (H) 70 - 125 mg/dL    Calcium 9.1 8.5 - 10.5 mg/dL    BUN 19 8 - 28 mg/dL    Creatinine 0.91 0.60 - 1.10 mg/dL    GFR MDRD Af Amer >60 >60 mL/min/1.73m2    GFR MDRD Non Af Amer >60 >60 mL/min/1.73m2   HM2(CBC w/o Differential)   Result Value Ref Range    WBC 13.9 (H) 4.0 - 11.0 thou/uL    RBC 3.47 (L) 3.80 - 5.40 mill/uL    Hemoglobin 9.9 (L) 12.0 - 16.0 g/dL    Hematocrit 33.9 (L) 35.0 - 47.0 %    MCV 98 80 - 100 fL    MCH 28.5 27.0 - 34.0 pg    MCHC 29.2 (L) 32.0 - 36.0 g/dL    RDW 19.9 (H) 11.0 - 14.5 %    Platelets 201 140 - 440 thou/uL    MPV 10.8 8.5 - 12.5 fL   POCT Glucose   Result Value Ref Range    Glucose 156 (H) 70 - 139 mg/dL   POCT Glucose   Result Value Ref Range    Glucose 125 70 - 139 mg/dL   POCT Glucose   Result Value Ref Range    Glucose 242 (H) 70 - 139 mg/dL   POCT Glucose   Result Value Ref Range    Glucose 299 (H) 70 - 139 mg/dL   Potassium - Next AM   Result Value Ref Range    Potassium 3.7 3.5 - 5.0 mmol/L   POCT Glucose   Result Value Ref Range    Glucose 130 70 - 139 mg/dL   INR   Result Value Ref Range    INR 3.44 (H) 0.90 - 1.10   Basic Metabolic Panel   Result Value Ref Range    Sodium 140 136 - 145 mmol/L    Potassium 3.7 3.5 - 5.0 mmol/L    Chloride 99 98 - 107 mmol/L    CO2 33 (H) 22 - 31 mmol/L     Anion Gap, Calculation 8 5 - 18 mmol/L    Glucose 149 (H) 70 - 125 mg/dL    Calcium 9.6 8.5 - 10.5 mg/dL    BUN 18 8 - 28 mg/dL    Creatinine 1.13 (H) 0.60 - 1.10 mg/dL    GFR MDRD Af Amer 57 (L) >60 mL/min/1.73m2    GFR MDRD Non Af Amer 47 (L) >60 mL/min/1.73m2   INR   Result Value Ref Range    INR 2.76 (H) 0.90 - 1.10   Basic Metabolic Panel   Result Value Ref Range    Sodium 140 136 - 145 mmol/L    Potassium 3.8 3.5 - 5.0 mmol/L    Chloride 100 98 - 107 mmol/L    CO2 33 (H) 22 - 31 mmol/L    Anion Gap, Calculation 7 5 - 18 mmol/L    Glucose 134 (H) 70 - 125 mg/dL    Calcium 8.9 8.5 - 10.5 mg/dL    BUN 13 8 - 28 mg/dL    Creatinine 1.10 0.60 - 1.10 mg/dL    GFR MDRD Af Amer 59 (L) >60 mL/min/1.73m2    GFR MDRD Non Af Amer 49 (L) >60 mL/min/1.73m2   HM2(CBC w/o Differential)   Result Value Ref Range    WBC 10.3 4.0 - 11.0 thou/uL    RBC 4.08 3.80 - 5.40 mill/uL    Hemoglobin 11.5 (L) 12.0 - 16.0 g/dL    Hematocrit 39.5 35.0 - 47.0 %    MCV 97 80 - 100 fL    MCH 28.2 27.0 - 34.0 pg    MCHC 29.1 (L) 32.0 - 36.0 g/dL    RDW 18.9 (H) 11.0 - 14.5 %    Platelets 172 140 - 440 thou/uL    MPV 11.1 8.5 - 12.5 fL   INR   Result Value Ref Range    INR 2.19 (H) 0.90 - 1.10     Current Outpatient Medications   Medication Sig     acetaminophen (TYLENOL) 500 MG tablet Take 1-2 tablets (500-1,000 mg total) by mouth every 4 (four) hours as needed (PAin 1-3).     albuterol (PROVENTIL) 2.5 mg /3 mL (0.083 %) nebulizer solution Take 3 mL (2.5 mg total) by nebulization every 4 (four) hours as needed for wheezing.     bacitracin ointment Apply 1 application topically 2 (two) times a day. After epsom salt foot soak. To bilateral great toes for toenail falling off, until seen by Podiatry     calcium-vitamin D (CALCIUM-VITAMIN D) 500 mg(1,250mg) -200 unit per tablet Take 1 tablet by mouth 2 (two) times a day with meals.     cholecalciferol, vitamin D3, 1,000 unit tablet Take 2,000 Units by mouth daily.      diphenhydrAMINE (BENADRYL) 2 %  cream Apply 1 application topically 3 (three) times a day as needed for itching.     diphenhydrAMINE (BENADRYL) 25 mg capsule Take 25 mg by mouth every 6 (six) hours as needed for itching.     fesoterodine (TOVIAZ) 8 mg Tb24 ER tablet Take 8 mg by mouth daily.      fluticasone propionate (FLONASE ALLERGY RELIEF) 50 mcg/actuation nasal spray One spray in each nostril daily     furosemide (LASIX) 80 MG tablet Take 1 tablet (80 mg total) by mouth 2 (two) times a day at 9am and 6pm.     gabapentin (NEURONTIN) 300 MG capsule Take 300 mg by mouth at bedtime.     glucosamine-chondroitin 500-400 mg tablet Take 1 tablet by mouth 2 (two) times a day with meals.      HYDROcodone-acetaminophen 5-325 mg per tablet Take 1 tablet by mouth every 4 (four) hours as needed for pain.     lidocaine 4 % patch Place 1 patch on the skin daily. Remove and discard patch with 12 hours or as directed by MD.     lidocaine-prilocaine (EMLA) cream Place over port 30 min. before being accessed.     loratadine (CLARITIN) 10 mg tablet Take 1 tablet (10 mg total) by mouth daily.     metoprolol tartrate (LOPRESSOR) 50 MG tablet Take 50 mg by mouth 2 (two) times a day. Hold for SBP < 110     mv-min/FA/vit K/lycop/lut/zeax (OCUVITE EYE PLUS MULTI ORAL) Take 1 tablet by mouth 2 (two) times a day with meals.     NOVOLOG U-100 INSULIN ASPART 100 unit/mL injection Check blood sugar three (3) times daily.  11.65 Type 2 with hyperglycemia     nystatin (MYCOSTATIN) powder Apply 1 application topically 2 (two) times a day. Under breasts for rash, until healed. Then PRN.     OMEGA-3/DHA/EPA/FISH OIL (FISH OIL-OMEGA-3 FATTY ACIDS) 300-1,000 mg capsule Take 2 g by mouth 2 times a day at 6:00 am and 4:00 pm.     omeprazole (PRILOSEC) 20 MG capsule Take 20 mg by mouth daily before breakfast.     polyvinyl alcohol-povidon,PF, (REFRESH CLASSIC) 1.4-0.6 % Dpet ophthalmic dropperette Administer 2 drops to both eyes 3 (three) times a day.     potassium chloride  (K-DUR,KLOR-CON) 10 MEQ tablet TAKE 2 TABLETS BY MOUTH TWICE DAILY     prochlorperazine (COMPAZINE) 10 MG tablet TAKE 1 TABLET(10 MG) BY MOUTH EVERY 6 HOURS AS NEEDED FOR NAUSEA     simvastatin (ZOCOR) 20 MG tablet Take 1 tablet (20 mg total) by mouth daily with supper.     triamcinolone (KENALOG) 0.1 % cream Apply 1 application topically 2 (two) times a day. To left leg for rash, until healed     warfarin ANTICOAGULANT (COUMADIN/JANTOVEN) 2 MG tablet Take 2 tablets (4 mg total) by mouth daily.       ASSESSMENT:      ICD-10-CM    1. Physical deconditioning R53.81    2. Bilateral leg edema R60.0    3. Pneumonia due to infectious organism, unspecified laterality, unspecified part of lung J18.9    4. Anticoagulation management encounter Z51.81     Z79.01        PLAN:   Chronic right shoulder pain x-ray ordered    Loose toenails continue foot soaks and dressing changes per orders, podiatry consult    Atrial fibrillation-continue Coumadin,  metoprolol and increased lasix, monitor BMP   closely     Venous stasis lower extremities declined lymph wraps  .   Status post left breast lumpectomy incision healed    Pain management Norco PRN     Shortness of breath resolved off oxygen    Anticoagulation management continue Coumadin and adjust per INRs    Debility PT OT    Electronically signed by: Sara Mayes CNP

## 2021-06-05 NOTE — PROGRESS NOTES
[facility]    Facility:   [facility] [[id number]]   Code Status: FULL CODE      CHIEF COMPLAINT/REASON FOR VISIT:  Chief Complaint   Patient presents with     Review Of Multiple Medical Conditions       HISTORY:      HPI: Jennifer is a 74 y.o. female undergoing physical and occupational therapy at [facility] transitional care unit. , she is with history of severe obesity, stage Ib left breast cancer, I normal, vitamin D deficiency, urge stress incontinence, tremor, postmenopausal bleeding and uterine adenocarcinoma in situ status post JEROME/BSO, atrial fibrillation, osteopenia, FLAKITA, macular degeneration, chronic bilateral leg edema, hypertension, diabetes mellitus, CAD, chronic back and bilateral knee pain who presented to [facility] for scheduled left breast lumpectomy And sentinel lymph node biopsy.  Per the records she became hypoxic in the PACU and noted to be persistently hypoxic to 4 L of oxygen which is a change from her baseline.  She does have FLAKITA but does not use her CPAP machine. She was recently sent back to the hospital from December 14-17 th  for left axilla pain and found to have a left axillary  hematoma related to her recent lumpectomy. She was seen by general surgery who recommended resuming warfarin and providing modest infection prophylaxis.She was also treated at this time with cefdinir for a UTI. She then returned and was  admitted to [facility] from 12/19/19-12/31/19 for pneumonia.  The patient has bilateral pleural effusions on CTA and was treated with doxycycline and Keflex for suspected pneumonia.  She completed a course of Cefdinir for UTI outpatient during her hospital stay.  Her procalcitonin and influenza A were negative on hospital admission.  She was also noted to have severe BLE nonpitting edema and her furosemide was increased to 80 mg two times a day for diuresis.  It was recommended that her BMP be followed  bi-weekly for assessment of renal function on increased diuresis.    Today she was seen as a visit to review multiple medical issues.   She denied chest pain but does report shortness of breath with exertion..  She is off the  Oxygen.  she reports positive bowel movement and voiding well.  Her left breast incision is healed.  Her BMP checked on 1/10/20 is stable despite high doses of lasix. Her weights were reviewed and she is up 8.5 pounds over the last week. Her lung sounds were clear.    She denies any cough or congestion.  She is with lower extremity edema and has not been wearing her lymph wraps or aces. She recently completed antibx for lower extremity cellulitis. Her legs continue to be red but pt and nursing feel they are looking better. She does also have lower extremity blistering. Writer spoke with therapy today and she will go back to lymph wraps daily Monday through Friday and ace wraps on the weekends.Last A1C 9/2019 was 6.5. She does have a podiatry consult pending  Due to loose great toe nails however her left nail is now off.     Past Medical History:   Diagnosis Date     (Lower) Leg Localized Swelling     Created by Conversion      Adenocarcinoma In Situ Of The Uterus     Created by Conversion      Backache     Created by Conversion [facility] Annotation: Feb 29 2012 12:14PM - Opal Helm: Referred to  [facility] 9/11, given TENS unit.      Benign Polyps Of The Large Intestine     Created by Conversion      Breast cancer (H)      Cancer (H)     uterine     Chronic kidney disease     stage 1 per H&P 11/22/2019     Coronary artery disease      Diabetes mellitus (H)     type 2     Fatigue     Created by Conversion      Hyperglycemia     Created by Conversion      Hyperlipidemia     Created by Conversion      Hypertension     Created by Conversion      Joint Pain, Localized In The Knee     Created by Conversion      Lesion of mediastinum      Lymphedema     Created by Conversion      Macular  Degeneration     Created by Conversion [facility] Annotation: Apr 5 2011 12:22PM - Tien Guzman: Followed by  Ophthalmologist, Dr. Avilez.      Major Depression, Single Episode In Remission     Created by Conversion      Obesity     Created by Conversion      Obstructive Sleep Apnea     CPAP     Osteopenia     Created by Conversion      Paroxysmal Atrial Fibrillation     Created by Conversion [facility] Annotation: Nov 28 2012 10:36PM - Shruthi Dhaliwal: Found incidential  on exam on May 7th, [id number] score of 1 for HTNsymptomatic and hospitalized  x2 in July, 2012.CV X 2 in 2012Chronic Sotalol Rx      Postmenopausal bleeding     Created by Conversion      Skin cancer      Tachycardia     Created by Conversion      Tremor     Created by Conversion      Urge And Stress Incontinence     Created by Conversion      Urinary Frequency More Than Twice At Night (Nocturia)     Created by Conversion      Urine Tests Nonspecific Abnormal Findings     Created by Conversion      Vitamin D Deficiency     Created by Conversion              Family History   Problem Relation Age of Onset     Hypertension Father      Pneumonia Father      Esophageal cancer Brother      Cancer Brother      Stroke Mother      Alzheimer's disease Brother      Cancer Brother      Prostate cancer Brother      Cancer Nephew      Colon cancer Nephew      Cancer Paternal cousin      Social History     Socioeconomic History     Marital status: Single     Spouse name: Not on file     Number of children: 0     Years of education: Not on file     Highest education level: Not on file   Occupational History     Occupation: Retired RN     Employer: [employer]   Social Needs     Financial resource strain: Not on file     Food insecurity:     Worry: Not on file     Inability: Not on file     Transportation needs:     Medical: Not on file     Non-medical: Not on file   Tobacco Use     Smoking status: Former Smoker     Packs/day: 3.00     Years: 40.00      Pack years: 120.00     Types: Cigarettes     Last attempt to quit: 1/1/2005     Years since quitting: 15.0     Smokeless tobacco: Never Used   Substance and Sexual Activity     Alcohol use: Not Currently     Drug use: No     Sexual activity: Never     Partners: Male     Birth control/protection: Surgical, Post-menopausal   Lifestyle     Physical activity:     Days per week: Not on file     Minutes per session: Not on file     Stress: Not on file   Relationships     Social connections:     Talks on phone: Not on file     Gets together: Not on file     Attends [employer] service: Not on file     Active member of club or organization: Not on file     Attends meetings of clubs or organizations: Not on file     Relationship status: Not on file     Intimate partner violence:     Fear of current or ex partner: Not on file     Emotionally abused: Not on file     Physically abused: Not on file     Forced sexual activity: Not on file   Other Topics Concern     Not on file   Social History Narrative    Lives alone single family home that she owns, no children and is retired.  Boyfriend used to live with her, however due to stroke he is in his own care facility.  She is currently at [facility]         Review of Systems   Constitutional: Positive for activity change. Negative for appetite change, fatigue and fever.   HENT: Negative for congestion.    Respiratory: Negative for cough and wheezing.         Shortness of breath with exertion   Cardiovascular: Positive for leg swelling. Negative for chest pain.        Lymphedema   Gastrointestinal: Negative for abdominal distention, abdominal pain, constipation, diarrhea and nausea.   Genitourinary: Negative for dysuria.   Musculoskeletal: Positive for arthralgias. Negative for back pain.   Skin: Positive for wound. Negative for color change.        Left breast incision healed   Neurological: Negative for dizziness.   Psychiatric/Behavioral: Negative for agitation,  behavioral problems and confusion.       Vitals:    01/13/20 0915   BP: 119/71   Pulse: 91   Resp: 24   Temp: 98.5  F (36.9  C)   SpO2: 94%   Weight: (!) 349 lb (158.3 kg)       Physical Exam  Constitutional:       Appearance: She is well-developed.      Comments: Pleasant woman in no acute distress   HENT:      Head: Normocephalic.   Eyes:      Conjunctiva/sclera: Conjunctivae normal.   Neck:      Musculoskeletal: Normal range of motion.   Cardiovascular:      Rate and Rhythm: Normal rate and regular rhythm.      Heart sounds: Normal heart sounds. No murmur.   Pulmonary:      Effort: No respiratory distress.      Breath sounds: No rales.   Abdominal:      General: Bowel sounds are normal. There is no distension.      Palpations: Abdomen is soft.      Tenderness: There is no abdominal tenderness.   Musculoskeletal: Normal range of motion.      Right lower leg: Edema present.      Left lower leg: Edema present.      Comments: Patient with a right   great toenails that is almost completely lifted off and hanging on by the cuticle. Her left toe great toenail has fallen off.  Podiatry consult rescheduled    3-4+ lower extremities. Will resume lymph wraps.    Skin:     General: Skin is warm.      Comments: Healed left breast incision   Neurological:      Mental Status: She is alert and oriented to person, place, and time.   Psychiatric:         Behavior: Behavior normal.           LABS:   Recent Results (from the past 240 hour(s))   Basic Metabolic Panel   Result Value Ref Range    Sodium 140 136 - 145 mmol/L    Potassium 3.8 3.5 - 5.0 mmol/L    Chloride 100 98 - 107 mmol/L    CO2 33 (H) 22 - 31 mmol/L    Anion Gap, Calculation 7 5 - 18 mmol/L    Glucose 134 (H) 70 - 125 mg/dL    Calcium 8.9 8.5 - 10.5 mg/dL    BUN 13 8 - 28 mg/dL    Creatinine 1.10 0.60 - 1.10 mg/dL    GFR MDRD Af Amer 59 (L) >60 mL/min/1.73m2    GFR MDRD Non Af Amer 49 (L) >60 mL/min/1.73m2   HM2(CBC w/o Differential)   Result Value Ref Range    WBC  10.3 4.0 - 11.0 thou/uL    RBC 4.08 3.80 - 5.40 mill/uL    Hemoglobin 11.5 (L) 12.0 - 16.0 g/dL    Hematocrit 39.5 35.0 - 47.0 %    MCV 97 80 - 100 fL    MCH 28.2 27.0 - 34.0 pg    MCHC 29.1 (L) 32.0 - 36.0 g/dL    RDW 18.9 (H) 11.0 - 14.5 %    Platelets 172 140 - 440 thou/uL    MPV 11.1 8.5 - 12.5 fL   INR   Result Value Ref Range    INR 2.19 (H) 0.90 - 1.10   INR   Result Value Ref Range    INR 2.04 (H) 0.90 - 1.10   Basic Metabolic Panel   Result Value Ref Range    Sodium 141 136 - 145 mmol/L    Potassium 4.0 3.5 - 5.0 mmol/L    Chloride 103 98 - 107 mmol/L    CO2 32 (H) 22 - 31 mmol/L    Anion Gap, Calculation 6 5 - 18 mmol/L    Glucose 142 (H) 70 - 125 mg/dL    Calcium 8.9 8.5 - 10.5 mg/dL    BUN 11 8 - 28 mg/dL    Creatinine 1.08 0.60 - 1.10 mg/dL    GFR MDRD Af Amer 60 (L) >60 mL/min/1.73m2    GFR MDRD Non Af Amer 50 (L) >60 mL/min/1.73m2   Basic Metabolic Panel   Result Value Ref Range    Sodium 139 136 - 145 mmol/L    Potassium 4.7 3.5 - 5.0 mmol/L    Chloride 101 98 - 107 mmol/L    CO2 30 22 - 31 mmol/L    Anion Gap, Calculation 8 5 - 18 mmol/L    Glucose 125 70 - 125 mg/dL    Calcium 9.6 8.5 - 10.5 mg/dL    BUN 10 8 - 28 mg/dL    Creatinine 1.02 0.60 - 1.10 mg/dL    GFR MDRD Af Amer >60 >60 mL/min/1.73m2    GFR MDRD Non Af Amer 53 (L) >60 mL/min/1.73m2   Glycosylated Hemoglobin A1C   Result Value Ref Range    Hemoglobin A1c 5.6 4.2 - 6.1 %     Current Outpatient Medications   Medication Sig     acetaminophen (TYLENOL) 500 MG tablet Take 1-2 tablets (500-1,000 mg total) by mouth every 4 (four) hours as needed (PAin 1-3).     albuterol (PROVENTIL) 2.5 mg /3 mL (0.083 %) nebulizer solution Take 3 mL (2.5 mg total) by nebulization every 4 (four) hours as needed for wheezing.     bacitracin ointment Apply 1 application topically 2 (two) times a day. After epsom salt foot soak. To bilateral great toes for toenail falling off, until seen by Podiatry     calcium-vitamin D (CALCIUM-VITAMIN D) 500 mg(1,250mg) -200  unit per tablet Take 1 tablet by mouth 2 (two) times a day with meals.     cholecalciferol, vitamin D3, 1,000 unit tablet Take 2,000 Units by mouth daily.      diphenhydrAMINE (BENADRYL) 2 % cream Apply 1 application topically 3 (three) times a day as needed for itching.     diphenhydrAMINE (BENADRYL) 25 mg capsule Take 25 mg by mouth every 6 (six) hours as needed for itching.     fesoterodine (TOVIAZ) 8 mg Tb24 ER tablet Take 8 mg by mouth daily.      fluticasone propionate (FLONASE ALLERGY RELIEF) 50 mcg/actuation nasal spray One spray in each nostril daily     furosemide (LASIX) 80 MG tablet Take 1 tablet (80 mg total) by mouth 2 (two) times a day at 9am and 6pm.     gabapentin (NEURONTIN) 300 MG capsule Take 300 mg by mouth at bedtime.     glucosamine-chondroitin 500-400 mg tablet Take 1 tablet by mouth 2 (two) times a day with meals.      HYDROcodone-acetaminophen 5-325 mg per tablet Take 1 tablet by mouth every 4 (four) hours as needed for pain.     lidocaine 4 % patch Place 1 patch on the skin daily. Remove and discard patch with 12 hours or as directed by MD.     lidocaine-prilocaine (EMLA) cream Place over port 30 min. before being accessed.     loratadine (CLARITIN) 10 mg tablet Take 1 tablet (10 mg total) by mouth daily.     metoprolol tartrate (LOPRESSOR) 50 MG tablet Take 50 mg by mouth 2 (two) times a day. Hold for SBP < 110     mv-min/FA/vit K/lycop/lut/zeax (OCUVITE EYE PLUS MULTI ORAL) Take 1 tablet by mouth 2 (two) times a day with meals.     NOVOLOG U-100 INSULIN ASPART 100 unit/mL injection Check blood sugar three (3) times daily.  11.65 Type 2 with hyperglycemia     nystatin (MYCOSTATIN) powder Apply 1 application topically 2 (two) times a day. Under breasts for rash, until healed. Then PRN.     OMEGA-3/DHA/EPA/FISH OIL (FISH OIL-OMEGA-3 FATTY ACIDS) 300-1,000 mg capsule Take 2 g by mouth 2 times a day at 6:00 am and 4:00 pm.     omeprazole (PRILOSEC) 20 MG capsule Take 20 mg by mouth daily  before breakfast.     polyvinyl alcohol-povidon,PF, (REFRESH CLASSIC) 1.4-0.6 % Dpet ophthalmic dropperette Administer 2 drops to both eyes 3 (three) times a day.     potassium chloride (K-DUR,KLOR-CON) 10 MEQ tablet TAKE 2 TABLETS BY MOUTH TWICE DAILY     prochlorperazine (COMPAZINE) 10 MG tablet TAKE 1 TABLET(10 MG) BY MOUTH EVERY 6 HOURS AS NEEDED FOR NAUSEA     simvastatin (ZOCOR) 20 MG tablet Take 1 tablet (20 mg total) by mouth daily with supper.     warfarin ANTICOAGULANT (COUMADIN/JANTOVEN) 2 MG tablet Take 2 tablets (4 mg total) by mouth daily.       ASSESSMENT:      ICD-10-CM    1. Physical deconditioning R53.81    2. Bilateral leg edema R60.0    3. CHF (congestive heart failure), NYHA class I, acute on chronic, combined (H) I50.43    4. On warfarin therapy Z79.01        PLAN:   Chronic right shoulder pain x-ray with possible rotator cuff tear- ortho consult     Loose toenails continue foot soaks, bacitracin  and dressing changes per orders, podiatry consult    Atrial fibrillation-continue Coumadin,  metoprolol and increased lasix, monitor BMP closely     Venous stasis lower extremities. Pt  will resume lymph wraps M-F and ace wraps on the weekends. .  Blistering lower extremities- new dressing change orders.      Status post left breast lumpectomy incision healed    Pain management Norco PRN     Shortness of breath with exertion. Oxygen PRN    Anticoagulation management continue Coumadin and adjust per INRs    Debility PT OT      Electronically signed by: Sara Mayes, CNP

## 2021-06-05 NOTE — PROGRESS NOTES
[facility]      Facility:    [facility] [[id number]]  Code Status: FULL CODE       Chief Complaint/Reason for Visit:  Chief Complaint   Patient presents with     H & P     MD note TCU visit.       HPI:   Jennifer is a 74 y.o. female with hx of left breast cancer, afib on warfarin, lymphedema and venous stasis, anemia, obesity, HTN seen in TCU at [facility]. Original hospitalization 12/2/2019 for left breast lumpectomy and sentinel node biopsy, discharged to TCU, re hospitalized 12/14/19-12/17/19 with left axillary hematoma, ABLA and UTI. Returned to TCU and then re hospitalized 12/19/19-12/31/19 as noted below for pneumonia, LE cellulitis.      74-year-old female with history of recent lumpectomy and sentinel lymph node biopsy, chronic atrial fibrillation on Coumadin, recent left axillary hematoma after lumpectomy presents with acute hypoxemic respiratory failure likely secondary to pneumonia versus bronchitis.     Acute hypoxemic respiratory failure: Patient with noted increasing wheezing with production of green sputum.  Noted is a recent hospitalization and the patient is already on Cefdinir.  CTA of the chest is negative for obvious infiltrate but does show some mild bilateral pleural effusions.  Upon further review of CT chest with pulmonary medicine there could be possible infiltrate seen on imaging.  Procalcitonin and influenza negative.     -- continue course of doxycycline and other nebs/pulmonary cares   -- completed steroid burst  -- continue increased dose of Lasix 80 mg twice daily after discharge.  Patient will need close follow up of her renal function at least weekly while at TCU.     Concern bilateral for LE cellulitis  --Continue current course of Keflex and doxycycline.  I feel currently the patient most likely has venous stasis changes bilaterally and some superficial skin irritation related to lymphedema wraps.     Thymoma: follow up  Dr. Villalba     Left axillary hematoma: In the setting of chronic anticoagulation for A. fib and recent lumpectomy with sentinel lymph node biopsy.    --Okay to continue Coumadin     UTI:  Present on admission  -- Completed cefdinir     Chronic A. fib: Continue home cardiovascular medications.  Reduce home dose warfarin to 4 mg daily for now and adjust based on future INR values.     Left-sided breast cancer: Follows with Dr. Villalba.  Plan outpatient follow-up     FLAKITA: continue CPAP    DM2: We will send out on sliding scale insulin given recent hyperglycemia while inpatient however I feel this is most likely steroid induced.  Patient has had diet controlled diabetes historically    Today:  She has been progressing slowly with therapy, ambulating with walker. Some fatigue and short of breath with exertion though not at rest. No need for oxygen. No chest pain. Appetite is pretty good. No nausea, vomiting or diarrhea. No dizziness. Has a rash on body, unclear etiology, itchy. No new meds, improving with symptomatic cares. She lives alone, not ready to discharge yet. No new vision or hearing problems.        Past Medical History:  Past Medical History:   Diagnosis Date     (Lower) Leg Localized Swelling     Created by Conversion      Adenocarcinoma In Situ Of The Uterus     Created by Conversion      Backache     Created by [employer] [facility] Annotation: Feb 29 2012 12:14PM - Opal Helm: Referred to  [facility] 9/11, given TENS unit.      Benign Polyps Of The Large Intestine     Created by Conversion      Breast cancer (H)      Cancer (H)     uterine     Chronic kidney disease     stage 1 per H&P 11/22/2019     Coronary artery disease      Diabetes mellitus (H)     type 2     Fatigue     Created by Conversion      Hyperglycemia     Created by Conversion      Hyperlipidemia     Created by Conversion      Hypertension     Created by Conversion      Joint Pain, Localized In The Knee     Created by Conversion       Lesion of mediastinum      Lymphedema     Created by Conversion      Macular Degeneration     [facility]: Apr 5 2011 12:22PM - Tien Guzman: Followed by  Ophthalmologist, Dr. Avilez.      Major Depression, Single Episode In Remission     Created by Conversion      Obesity     Created by Conversion      Obstructive Sleep Apnea     CPAP     Osteopenia     Created by Conversion      Paroxysmal Atrial Fibrillation     [facility]notation: Nov 28 2012 10:36PM - Shruthi Dhaliwal: Found incidential  on exam on May 7th, [id number] score of 1 for HTNsymptomatic and hospitalized  x2 in July, 2012.CV X 2 in 2012Chronic Sotalol Rx      Postmenopausal bleeding     Created by Conversion      Skin cancer      Tachycardia     Created by Conversion      Tremor     Created by Conversion      Urge And Stress Incontinence     Created by Conversion      Urinary Frequency More Than Twice At Night (Nocturia)     Created by Conversion      Urine Tests Nonspecific Abnormal Findings     Created by Conversion      Vitamin D Deficiency     Created by Conversion            Surgical History:  Past Surgical History:   Procedure Laterality Date     BREAST BIOPSY Left     Benign     BREAST LUMPECTOMY Left 12/2/2019    Procedure: Left lumpectomy after wire localization with sentinel lymph node biopsy;  Surgeon: Sara Ferrera MD;  Location: [facility];  Service: General     CATARACT EXTRACTION  10/17/13     COLONOSCOPY N/A 4/29/2015    Procedure: COLONOSCOPY WITH POLYPECTOMY;  Surgeon: Too Ureña MD;  Location: [facility] OR;  Service:      COLONOSCOPY N/A 11/9/2016    Procedure: COLONOSCOPY;  Surgeon: Ayden Dela Cruz MD;  Location: [facility] OR;  Service:      CT BIOPSY LUNG  8/30/2019     ENDOMETRIAL BIOPSY  4/2014     EYE SURGERY       HYSTERECTOMY  2014     Interstem - Stage 2  09/11/2014     Interstim Stage I  08/26/2014     IR PORT PLACEMENT >5 YEARS   8/28/2019     OOPHORECTOMY Bilateral 2014     post urinary stimulator implantation  8/26/14     CO BIOPSY OF BREAST, INCISIONAL      Description: Incisional Breast Biopsy;  Recorded: 04/05/2011;     CO CARDIOVERSION ELECTIVE ARRHYTHMIA EXTERNAL      Description: Elective Cardioversion External;  Recorded: 07/30/2012;     CO REMOVE TONSILS/ADENOIDS,<13 Y/O      Description: Tonsillectomy With Adenoidectomy;  Recorded: 04/15/2014;     CO SLING OPER STRES INCONTINENCE N/A 11/3/2016    Procedure: PUBOVAGINAL SLING WITH CONCHITA WITH CYSTOSCOPY;  Surgeon: Bill Wilson MD;  Location: [facility];  Service: Gynecology     TONSILLECTOMY       US BREAST CORE BIOPSY LEFT Left 8/16/2019     US BREAST LOC W SENT NODE INJ LEFT Left 12/2/2019     VITRECTOMY Right 1/15/15       Family History:   Family History   Problem Relation Age of Onset     Hypertension Father      Pneumonia Father      Esophageal cancer Brother      Cancer Brother      Stroke Mother      Alzheimer's disease Brother      Cancer Brother      Prostate cancer Brother      Cancer Nephew      Colon cancer Nephew      Cancer Paternal cousin        Social History:    Social History     Socioeconomic History     Marital status: Single     Spouse name: Not on file     Number of children: 0     Years of education: Not on file     Highest education level: Not on file   Occupational History     Occupation: Retired RN     Employer: [employer]   Social Needs     Financial resource strain: Not on file     Food insecurity:     Worry: Not on file     Inability: Not on file     Transportation needs:     Medical: Not on file     Non-medical: Not on file   Tobacco Use     Smoking status: Former Smoker     Packs/day: 3.00     Years: 40.00     Pack years: 120.00     Types: Cigarettes     Last attempt to quit: 1/1/2005     Years since quitting: 15.0     Smokeless tobacco: Never Used   Substance and Sexual Activity     Alcohol use: Not Currently     Drug use: No      Sexual activity: Never     Partners: Male     Birth control/protection: Surgical, Post-menopausal   Lifestyle     Physical activity:     Days per week: Not on file     Minutes per session: Not on file     Stress: Not on file   Relationships     Social connections:     Talks on phone: Not on file     Gets together: Not on file     Attends [employer] service: Not on file     Active member of club or organization: Not on file     Attends meetings of clubs or organizations: Not on file     Relationship status: Not on file     Intimate partner violence:     Fear of current or ex partner: Not on file     Emotionally abused: Not on file     Physically abused: Not on file     Forced sexual activity: Not on file   Other Topics Concern     Not on file   Social History Narrative    Lives alone single family home that she owns, no children and is retired.  Boyfriend used to live with her, however due to stroke he is in his own care facility.  She is currently at [facility]        Medications:  Current Outpatient Medications   Medication Sig     acetaminophen (TYLENOL) 500 MG tablet Take 1-2 tablets (500-1,000 mg total) by mouth every 4 (four) hours as needed (PAin 1-3).     albuterol (PROVENTIL) 2.5 mg /3 mL (0.083 %) nebulizer solution Take 3 mL (2.5 mg total) by nebulization every 4 (four) hours as needed for wheezing.     bacitracin ointment Apply 1 application topically 2 (two) times a day. After epsom salt foot soak. To bilateral great toes for toenail falling off, until seen by Podiatry     calcium-vitamin D (CALCIUM-VITAMIN D) 500 mg(1,250mg) -200 unit per tablet Take 1 tablet by mouth 2 (two) times a day with meals.     cholecalciferol, vitamin D3, 1,000 unit tablet Take 2,000 Units by mouth daily.      diphenhydrAMINE (BENADRYL) 2 % cream Apply 1 application topically 3 (three) times a day as needed for itching.     diphenhydrAMINE (BENADRYL) 25 mg capsule Take 25 mg by mouth every 6 (six) hours as needed  for itching.     fesoterodine (TOVIAZ) 8 mg Tb24 ER tablet Take 8 mg by mouth daily.      fluticasone propionate (FLONASE ALLERGY RELIEF) 50 mcg/actuation nasal spray One spray in each nostril daily     furosemide (LASIX) 80 MG tablet Take 1 tablet (80 mg total) by mouth 2 (two) times a day at 9am and 6pm.     gabapentin (NEURONTIN) 300 MG capsule Take 300 mg by mouth at bedtime.     glucosamine-chondroitin 500-400 mg tablet Take 1 tablet by mouth 2 (two) times a day with meals.      HYDROcodone-acetaminophen 5-325 mg per tablet Take 1 tablet by mouth every 4 (four) hours as needed for pain.     lidocaine 4 % patch Place 1 patch on the skin daily. Remove and discard patch with 12 hours or as directed by MD.     lidocaine-prilocaine (EMLA) cream Place over port 30 min. before being accessed.     loratadine (CLARITIN) 10 mg tablet Take 1 tablet (10 mg total) by mouth daily.     metoprolol tartrate (LOPRESSOR) 50 MG tablet Take 50 mg by mouth 2 (two) times a day. Hold for SBP < 110     mv-min/FA/vit K/lycop/lut/zeax (OCUVITE EYE PLUS MULTI ORAL) Take 1 tablet by mouth 2 (two) times a day with meals.     NOVOLOG U-100 INSULIN ASPART 100 unit/mL injection Check blood sugar three (3) times daily.  11.65 Type 2 with hyperglycemia     nystatin (MYCOSTATIN) powder Apply 1 application topically 2 (two) times a day. Under breasts for rash, until healed. Then PRN.     OMEGA-3/DHA/EPA/FISH OIL (FISH OIL-OMEGA-3 FATTY ACIDS) 300-1,000 mg capsule Take 2 g by mouth 2 times a day at 6:00 am and 4:00 pm.     omeprazole (PRILOSEC) 20 MG capsule Take 20 mg by mouth daily before breakfast.     polyvinyl alcohol-povidon,PF, (REFRESH CLASSIC) 1.4-0.6 % Dpet ophthalmic dropperette Administer 2 drops to both eyes 3 (three) times a day.     potassium chloride (K-DUR,KLOR-CON) 10 MEQ tablet TAKE 2 TABLETS BY MOUTH TWICE DAILY     prochlorperazine (COMPAZINE) 10 MG tablet TAKE 1 TABLET(10 MG) BY MOUTH EVERY 6 HOURS AS NEEDED FOR NAUSEA      simvastatin (ZOCOR) 20 MG tablet Take 1 tablet (20 mg total) by mouth daily with supper.     warfarin ANTICOAGULANT (COUMADIN/JANTOVEN) 2 MG tablet Take 2 tablets (4 mg total) by mouth daily.       Allergies:  Allergies   Allergen Reactions     Aspirin      Pt currently on WArfarin     Penicillins      Boils  -- 12/17/19 tolerating ceftriaxone inpt and discharged on cefdinir        Review of Systems:  Pertinent items are noted in HPI.      Physical Exam:   General: Patient is alert pale and tired appearing female, no distress.   Vitals: /85, Temp 98.2, Pulse 92, RR 18, O2 sat 95%RA.  HEENT: Head is NCAT. Eyes show no injection or icterus. Nares negative. Oropharynx well hydrated.  Neck: Supple. No tenderness or adenopathy. No JVD.  Lungs: Clear bilaterally. No wheezes.  Cardiovascular: Regular rate and rhythm, normal S1, S2.  Back: No spinal or CVA tenderness.  Abdomen: Obese, soft, no tenderness on exam. Bowel sounds present. No guarding rebound or rigidity.  : Deferred.  Extremities: Signif LE swelling with lymphedema wraps.  Musculoskeletal: Mild degen changes.   Skin: Maculopapular pinkish rash torso and extremities.  Psych: Mood appears pretty good.      Labs:  Lab Results   Component Value Date    WBC 10.7 01/21/2020    HGB 11.1 (L) 01/21/2020    HCT 38.3 01/21/2020    MCV 92 01/21/2020     01/21/2020     Results for orders placed or performed in visit on 01/29/20   Basic Metabolic Panel   Result Value Ref Range    Sodium 141 136 - 145 mmol/L    Potassium 3.9 3.5 - 5.0 mmol/L    Chloride 101 98 - 107 mmol/L    CO2 30 22 - 31 mmol/L    Anion Gap, Calculation 10 5 - 18 mmol/L    Glucose 103 70 - 125 mg/dL    Calcium 9.0 8.5 - 10.5 mg/dL    BUN 14 8 - 28 mg/dL    Creatinine 0.93 0.60 - 1.10 mg/dL    GFR MDRD Af Amer >60 >60 mL/min/1.73m2    GFR MDRD Non Af Amer 59 (L) >60 mL/min/1.73m2       Assessment/Plan:  1. Breast cancer, left lumpectomy 12/2/19. Follow up with heme/onc.  2. Lymphedema. With  venous stasis, recent cellulitis, no current abx. Has lymphedema wraps per OT. Continue lasix.   3. HTN. BPs stable on metoprolol, lasix. Continue to monitor.   4. Afib. Anticoagulated with warfarin. Monitor and adjust per INR.   5. Anemia. ABLA. Had post op left axillary hematoma. Last hgb at 11.1.  6. Recent pneumonia. No current respiratory concerns,  7. HLD. on simvastatin.   8. Urge and stress incontinence. Continue fesoterodine.   9. Thymoma. Follow up with Dr. Orly orellana/onc.  10. FLAKITA. Uses CPAP.  11. Code status is full code.          Electronically signed by: Yolanda Ly MD

## 2021-06-05 NOTE — PROGRESS NOTES
[facility]    Facility:   [facility] [[id number]]   Code Status: FULL CODE      CHIEF COMPLAINT/REASON FOR VISIT:  Chief Complaint   Patient presents with     Problem Visit     weight and INR review       HISTORY:      HPI: Jennifer is a 74 y.o. female undergoing physical and occupational therapy at [facility] transitional care unit. , she is with history of severe obesity, stage Ib left breast cancer, I normal, vitamin D deficiency, urge stress incontinence, tremor, postmenopausal bleeding and uterine adenocarcinoma in situ status post JEROME/BSO, atrial fibrillation, osteopenia, FLAKITA, macular degeneration, chronic bilateral leg edema, hypertension, diabetes mellitus, CAD, chronic back and bilateral knee pain who presented to [facility] for scheduled left breast lumpectomy And sentinel lymph node biopsy.  Per the records she became hypoxic in the PACU and noted to be persistently hypoxic to 4 L of oxygen which is a change from her baseline.  She does have FLAKITA but does not use her CPAP machine. She was recently sent back to the hospital from December 14-17 th  for left axilla pain and found to have a left axillary  hematoma related to her recent lumpectomy. She was seen by general surgery who recommended resuming warfarin and providing modest infection prophylaxis.She was also treated at this time with cefdinir for a UTI. She then returned and was  admitted to [facility] from 12/19/19-12/31/19 for pneumonia.  The patient has bilateral pleural effusions on CTA and was treated with doxycycline and Keflex for suspected pneumonia.  She completed a course of Cefdinir for UTI outpatient during her hospital stay.  Her procalcitonin and influenza A were negative on hospital admission.  She was also noted to have severe BLE nonpitting edema and her furosemide was increased to 80 mg two times a day for diuresis.  It was recommended that her BMP be followed  bi-weekly for assessment of renal function on increased diuresis.    Today she was seen as a visit to review multiple medical issues.   She denied chest pain but does report shortness of breath with exertion..  She is off the  Oxygen.  she reports positive bowel movement and voiding well.  Her left breast incision is healed.  Her BMP checked on 1/15/20 is stable despite high doses of lasix. Her weights were reviewed and Her weights have been labile all week however she is down 7 pounds over the last day.  Her lung sounds were clear.    She denies any cough or congestion.  She is getting daily lymph wraps. She was seen by podiatry yesterday and her other toenail was removed and new dressing change orders.  She recently completed antibx for lower extremity cellulitis. Her legs continue to be red but pt and nursing feel they are looking better. She does also have lower extremity blistering.  .Last A1C 9/2019 was 6.5. Her INR was reviewed today and she was subtherapeutic at 1.71, adjustments made to coumadin     Past Medical History:   Diagnosis Date     (Lower) Leg Localized Swelling     Created by Conversion      Adenocarcinoma In Situ Of The Uterus     Created by Conversion      Backache     Created by [facility] Annotation: Feb 29 2012 12:14PM - Opal Helm: Referred to  [facility] 9/11, given TENS unit.      Benign Polyps Of The Large Intestine     Created by Conversion      Breast cancer (H)      Cancer (H)     uterine     Chronic kidney disease     stage 1 per H&P 11/22/2019     Coronary artery disease      Diabetes mellitus (H)     type 2     Fatigue     Created by Conversion      Hyperglycemia     Created by Conversion      Hyperlipidemia     Created by Conversion      Hypertension     Created by Conversion      Joint Pain, Localized In The Knee     Created by Conversion      Lesion of mediastinum      Lymphedema     Created by Conversion      Macular Degeneration     Created by Conversion  [facility] Annotation: Apr 5 2011 12:22PM - Tien Guzman: Followed by  Ophthalmologist, Dr. Avilez.      Major Depression, Single Episode In Remission     Created by Conversion      Obesity     Created by Conversion      Obstructive Sleep Apnea     CPAP     Osteopenia     Created by Conversion      Paroxysmal Atrial Fibrillation     Created by Conversion [facility] Annotation: Nov 28 2012 10:36PM - Madhuri Shruthi: Found incidential  on exam on May 7th, [id number] score of 1 for HTNsymptomatic and hospitalized  x2 in July, 2012.CV X 2 in 2012Chronic Sotalol Rx      Postmenopausal bleeding     Created by Conversion      Skin cancer      Tachycardia     Created by Conversion      Tremor     Created by Conversion      Urge And Stress Incontinence     Created by Conversion      Urinary Frequency More Than Twice At Night (Nocturia)     Created by Conversion      Urine Tests Nonspecific Abnormal Findings     Created by Conversion      Vitamin D Deficiency     Created by Conversion              Family History   Problem Relation Age of Onset     Hypertension Father      Pneumonia Father      Esophageal cancer Brother      Cancer Brother      Stroke Mother      Alzheimer's disease Brother      Cancer Brother      Prostate cancer Brother      Cancer Nephew      Colon cancer Nephew      Cancer Paternal cousin      Social History     Socioeconomic History     Marital status: Single     Spouse name: Not on file     Number of children: 0     Years of education: Not on file     Highest education level: Not on file   Occupational History     Occupation: Retired RN     Employer: [employer]   Social Needs     Financial resource strain: Not on file     Food insecurity:     Worry: Not on file     Inability: Not on file     Transportation needs:     Medical: Not on file     Non-medical: Not on file   Tobacco Use     Smoking status: Former Smoker     Packs/day: 3.00     Years: 40.00     Pack years: 120.00     Types:  Cigarettes     Last attempt to quit: 1/1/2005     Years since quitting: 15.0     Smokeless tobacco: Never Used   Substance and Sexual Activity     Alcohol use: Not Currently     Drug use: No     Sexual activity: Never     Partners: Male     Birth control/protection: Surgical, Post-menopausal   Lifestyle     Physical activity:     Days per week: Not on file     Minutes per session: Not on file     Stress: Not on file   Relationships     Social connections:     Talks on phone: Not on file     Gets together: Not on file     Attends Church service: Not on file     Active member of club or organization: Not on file     Attends meetings of clubs or organizations: Not on file     Relationship status: Not on file     Intimate partner violence:     Fear of current or ex partner: Not on file     Emotionally abused: Not on file     Physically abused: Not on file     Forced sexual activity: Not on file   Other Topics Concern     Not on file   Social History Narrative    Lives alone single family home that she owns, no children and is retired.  Boyfriend used to live with her, however due to stroke he is in his own care facility.  She is currently at [facility]         Review of Systems   Constitutional: Positive for activity change. Negative for appetite change, fatigue and fever.   HENT: Negative for congestion.    Respiratory: Negative for cough and wheezing.         Shortness of breath with exertion   Cardiovascular: Positive for leg swelling. Negative for chest pain.        Lymphedema   Gastrointestinal: Negative for abdominal distention, abdominal pain, constipation, diarrhea and nausea.   Genitourinary: Negative for dysuria.   Musculoskeletal: Positive for arthralgias. Negative for back pain.   Skin: Positive for wound. Negative for color change.        Left breast incision healed   Neurological: Negative for dizziness.   Psychiatric/Behavioral: Negative for agitation, behavioral problems and confusion.        Vitals:    01/16/20 1044   BP: 142/80   Pulse: 99   Resp: 20   Temp: 98.5  F (36.9  C)   SpO2: 92%   Weight: (!) 342 lb 14.4 oz (155.5 kg)       Physical Exam  Constitutional:       Appearance: She is well-developed.      Comments: Pleasant woman in no acute distress   HENT:      Head: Normocephalic.   Eyes:      Conjunctiva/sclera: Conjunctivae normal.   Neck:      Musculoskeletal: Normal range of motion.   Cardiovascular:      Rate and Rhythm: Normal rate and regular rhythm.      Heart sounds: Normal heart sounds. No murmur.   Pulmonary:      Effort: No respiratory distress.      Breath sounds: No rales.   Abdominal:      General: Bowel sounds are normal. There is no distension.      Palpations: Abdomen is soft.      Tenderness: There is no abdominal tenderness.   Musculoskeletal: Normal range of motion.      Right lower leg: Edema present.      Left lower leg: Edema present.      Comments: 3-4+ lower extremities. Lymph wraps resumed    Skin:     General: Skin is warm.      Comments: Healed left breast incision   Neurological:      Mental Status: She is alert and oriented to person, place, and time.   Psychiatric:         Behavior: Behavior normal.           LABS:   Recent Results (from the past 240 hour(s))   INR   Result Value Ref Range    INR 2.04 (H) 0.90 - 1.10   Basic Metabolic Panel   Result Value Ref Range    Sodium 141 136 - 145 mmol/L    Potassium 4.0 3.5 - 5.0 mmol/L    Chloride 103 98 - 107 mmol/L    CO2 32 (H) 22 - 31 mmol/L    Anion Gap, Calculation 6 5 - 18 mmol/L    Glucose 142 (H) 70 - 125 mg/dL    Calcium 8.9 8.5 - 10.5 mg/dL    BUN 11 8 - 28 mg/dL    Creatinine 1.08 0.60 - 1.10 mg/dL    GFR MDRD Af Amer 60 (L) >60 mL/min/1.73m2    GFR MDRD Non Af Amer 50 (L) >60 mL/min/1.73m2   Basic Metabolic Panel   Result Value Ref Range    Sodium 139 136 - 145 mmol/L    Potassium 4.7 3.5 - 5.0 mmol/L    Chloride 101 98 - 107 mmol/L    CO2 30 22 - 31 mmol/L    Anion Gap, Calculation 8 5 - 18 mmol/L     Glucose 125 70 - 125 mg/dL    Calcium 9.6 8.5 - 10.5 mg/dL    BUN 10 8 - 28 mg/dL    Creatinine 1.02 0.60 - 1.10 mg/dL    GFR MDRD Af Amer >60 >60 mL/min/1.73m2    GFR MDRD Non Af Amer 53 (L) >60 mL/min/1.73m2   Glycosylated Hemoglobin A1C   Result Value Ref Range    Hemoglobin A1c 5.6 4.2 - 6.1 %   Basic Metabolic Panel   Result Value Ref Range    Sodium 142 136 - 145 mmol/L    Potassium 4.0 3.5 - 5.0 mmol/L    Chloride 102 98 - 107 mmol/L    CO2 32 (H) 22 - 31 mmol/L    Anion Gap, Calculation 8 5 - 18 mmol/L    Glucose 125 70 - 125 mg/dL    Calcium 9.2 8.5 - 10.5 mg/dL    BUN 10 8 - 28 mg/dL    Creatinine 1.06 0.60 - 1.10 mg/dL    GFR MDRD Af Amer >60 >60 mL/min/1.73m2    GFR MDRD Non Af Amer 51 (L) >60 mL/min/1.73m2   Basic Metabolic Panel   Result Value Ref Range    Sodium 142 136 - 145 mmol/L    Potassium 3.9 3.5 - 5.0 mmol/L    Chloride 102 98 - 107 mmol/L    CO2 31 22 - 31 mmol/L    Anion Gap, Calculation 9 5 - 18 mmol/L    Glucose 124 70 - 125 mg/dL    Calcium 9.0 8.5 - 10.5 mg/dL    BUN 9 8 - 28 mg/dL    Creatinine 0.92 0.60 - 1.10 mg/dL    GFR MDRD Af Amer >60 >60 mL/min/1.73m2    GFR MDRD Non Af Amer 60 (L) >60 mL/min/1.73m2   INR   Result Value Ref Range    INR 1.71 (H) 0.90 - 1.10     Current Outpatient Medications   Medication Sig     acetaminophen (TYLENOL) 500 MG tablet Take 1-2 tablets (500-1,000 mg total) by mouth every 4 (four) hours as needed (PAin 1-3).     albuterol (PROVENTIL) 2.5 mg /3 mL (0.083 %) nebulizer solution Take 3 mL (2.5 mg total) by nebulization every 4 (four) hours as needed for wheezing.     bacitracin ointment Apply 1 application topically 2 (two) times a day. After epsom salt foot soak. To bilateral great toes for toenail falling off, until seen by Podiatry     calcium-vitamin D (CALCIUM-VITAMIN D) 500 mg(1,250mg) -200 unit per tablet Take 1 tablet by mouth 2 (two) times a day with meals.     cholecalciferol, vitamin D3, 1,000 unit tablet Take 2,000 Units by mouth daily.       diphenhydrAMINE (BENADRYL) 2 % cream Apply 1 application topically 3 (three) times a day as needed for itching.     diphenhydrAMINE (BENADRYL) 25 mg capsule Take 25 mg by mouth every 6 (six) hours as needed for itching.     fesoterodine (TOVIAZ) 8 mg Tb24 ER tablet Take 8 mg by mouth daily.      fluticasone propionate (FLONASE ALLERGY RELIEF) 50 mcg/actuation nasal spray One spray in each nostril daily     furosemide (LASIX) 80 MG tablet Take 1 tablet (80 mg total) by mouth 2 (two) times a day at 9am and 6pm.     gabapentin (NEURONTIN) 300 MG capsule Take 300 mg by mouth at bedtime.     glucosamine-chondroitin 500-400 mg tablet Take 1 tablet by mouth 2 (two) times a day with meals.      HYDROcodone-acetaminophen 5-325 mg per tablet Take 1 tablet by mouth every 4 (four) hours as needed for pain.     lidocaine 4 % patch Place 1 patch on the skin daily. Remove and discard patch with 12 hours or as directed by MD.     lidocaine-prilocaine (EMLA) cream Place over port 30 min. before being accessed.     loratadine (CLARITIN) 10 mg tablet Take 1 tablet (10 mg total) by mouth daily.     metoprolol tartrate (LOPRESSOR) 50 MG tablet Take 50 mg by mouth 2 (two) times a day. Hold for SBP < 110     mv-min/FA/vit K/lycop/lut/zeax (OCUVITE EYE PLUS MULTI ORAL) Take 1 tablet by mouth 2 (two) times a day with meals.     NOVOLOG U-100 INSULIN ASPART 100 unit/mL injection Check blood sugar three (3) times daily.  11.65 Type 2 with hyperglycemia     nystatin (MYCOSTATIN) powder Apply 1 application topically 2 (two) times a day. Under breasts for rash, until healed. Then PRN.     OMEGA-3/DHA/EPA/FISH OIL (FISH OIL-OMEGA-3 FATTY ACIDS) 300-1,000 mg capsule Take 2 g by mouth 2 times a day at 6:00 am and 4:00 pm.     omeprazole (PRILOSEC) 20 MG capsule Take 20 mg by mouth daily before breakfast.     polyvinyl alcohol-povidon,PF, (REFRESH CLASSIC) 1.4-0.6 % Dpet ophthalmic dropperette Administer 2 drops to both eyes 3 (three) times a day.      potassium chloride (K-DUR,KLOR-CON) 10 MEQ tablet TAKE 2 TABLETS BY MOUTH TWICE DAILY     prochlorperazine (COMPAZINE) 10 MG tablet TAKE 1 TABLET(10 MG) BY MOUTH EVERY 6 HOURS AS NEEDED FOR NAUSEA     simvastatin (ZOCOR) 20 MG tablet Take 1 tablet (20 mg total) by mouth daily with supper.     warfarin ANTICOAGULANT (COUMADIN/JANTOVEN) 2 MG tablet Take 2 tablets (4 mg total) by mouth daily.       ASSESSMENT:      ICD-10-CM    1. Weight gain with edema R63.5     R60.9    2. CHF (congestive heart failure), NYHA class I, acute on chronic, combined (H) I50.43    3. Physical deconditioning R53.81    4. Encounter for therapeutic drug monitoring Z51.81        PLAN:   Chronic right shoulder pain x-ray with possible rotator cuff tear- ortho consult     Loose toenails seen by podiatry 1/15/19 and loose toenail was removed and she has new dressing change orders     Atrial fibrillation-continue Coumadin,  metoprolol and increased lasix, monitor BMP closely     Venous stasis lower extremities. Pt  will resume lymph wraps M-F and ace wraps on the weekends. .  Blistering lower extremities- new dressing change orders.      Status post left breast lumpectomy incision healed    Pain management Norco PRN     Shortness of breath with exertion. Oxygen PRN    Anticoagulation management continue Coumadin and adjust per INRs    Debility PT OT      Electronically signed by: Sara Mayes, CNP

## 2021-06-05 NOTE — PROGRESS NOTES
[facility]    Facility:   [facility] [[id number]]   Code Status: FULL CODE      CHIEF COMPLAINT/REASON FOR VISIT:  Chief Complaint   Patient presents with     Review Of Multiple Medical Conditions       HISTORY:      HPI: Jennifer is a 74 y.o. female undergoing physical and occupational therapy at [facility] transitional care unit. , she is with history of severe obesity, stage Ib left breast cancer, I normal, vitamin D deficiency, urge stress incontinence, tremor, postmenopausal bleeding and uterine adenocarcinoma in situ status post JEROME/BSO, atrial fibrillation, osteopenia, FLAKITA, macular degeneration, chronic bilateral leg edema, hypertension, diabetes mellitus, CAD, chronic back and bilateral knee pain who presented to [facility] for scheduled left breast lumpectomy And sentinel lymph node biopsy.  Per the records she became hypoxic in the PACU and noted to be persistently hypoxic to 4 L of oxygen which is a change from her baseline.  She does have FLAKITA but does not use her CPAP machine. She was recently sent back to the hospital from December 14-17 th  for left axilla pain and found to have a left axillary  hematoma related to her recent lumpectomy. She was seen by general surgery who recommended resuming warfarin and providing modest infection prophylaxis.She was also treated at this time with cefdinir for a UTI. She then returned and was  admitted to [facility] from 12/19/19-12/31/19 for pneumonia.  The patient has bilateral pleural effusions on CTA and was treated with doxycycline and Keflex for suspected pneumonia.  She completed a course of Cefdinir for UTI outpatient during her hospital stay.  Her procalcitonin and influenza A were negative on hospital admission.  She was also noted to have severe BLE nonpitting edema and her furosemide was increased to 80 mg two times a day for diuresis.  It was recommended that her BMP be followed  bi-weekly for assessment of renal function on increased diuresis.    Today she was seen for reports of a funny feeling left armpit.  She reports funny feeling started approximately 3 days ago.  She denies any pain numbness or tingling.  There was no redness sores bumps noted in this area she denies having any numbness or tingling in the armpit or down the arm.  She tells me she cannot explain the feeling and at this time we will monitor it for any changes.  She denies any  Increased shortness of breath beyond baseline   She denied chest pain.  She is moving her bowels and has no issues with urination.   Her left breast incision is healed.  Her BMP was within normal limits today.  Her BMP checks were changed to weekly.  Her lung sounds were clear.    She denies any cough or congestion.   She was seen by the in-house podiatrist and her other toenail was removed and new dressing change orders.   .Last A1C 9/2019 was 6.5.     Past Medical History:   Diagnosis Date     (Lower) Leg Localized Swelling     Created by Conversion      Adenocarcinoma In Situ Of The Uterus     Created by Conversion      Backache     Created by Conversion [employer] [facility] Annotation: Feb 29 2012 12:14PM - Opal Helm: Referred to  [facility] 9/11, given TENS unit.      Benign Polyps Of The Large Intestine     Created by Conversion      Breast cancer (H)      Cancer (H)     uterine     Chronic kidney disease     stage 1 per H&P 11/22/2019     Coronary artery disease      Diabetes mellitus (H)     type 2     Fatigue     Created by Conversion      Hyperglycemia     Created by Conversion      Hyperlipidemia     Created by Conversion      Hypertension     Created by Conversion      Joint Pain, Localized In The Knee     Created by Conversion      Lesion of mediastinum      Lymphedema     Created by Conversion      Macular Degeneration     Created by Conversion [employer] [facility] Annotation: Apr 5 2011 12:22PM - Tien Guzman: Followed by  Ophthalmologist,  Dr. Avilez.      Major Depression, Single Episode In Remission     Created by Conversion      Obesity     Created by Conversion      Obstructive Sleep Apnea     CPAP     Osteopenia     Created by Conversion      Paroxysmal Atrial Fibrillation     Created by Conversion [facility] Annotation: Nov 28 2012 10:36PM - Shruthi Dhaliwal: Found incidential  on exam on May 7th, [id number] score of 1 for HTNsymptomatic and hospitalized  x2 in July, 2012.CV X 2 in 2012Chronic Sotalol Rx      Postmenopausal bleeding     Created by Conversion      Skin cancer      Tachycardia     Created by Conversion      Tremor     Created by Conversion      Urge And Stress Incontinence     Created by Conversion      Urinary Frequency More Than Twice At Night (Nocturia)     Created by Conversion      Urine Tests Nonspecific Abnormal Findings     Created by Conversion      Vitamin D Deficiency     Created by Conversion              Family History   Problem Relation Age of Onset     Hypertension Father      Pneumonia Father      Esophageal cancer Brother      Cancer Brother      Stroke Mother      Alzheimer's disease Brother      Cancer Brother      Prostate cancer Brother      Cancer Nephew      Colon cancer Nephew      Cancer Paternal cousin      Social History     Socioeconomic History     Marital status: Single     Spouse name: Not on file     Number of children: 0     Years of education: Not on file     Highest education level: Not on file   Occupational History     Occupation: Retired RN     Employer: [employer]   Social Needs     Financial resource strain: Not on file     Food insecurity:     Worry: Not on file     Inability: Not on file     Transportation needs:     Medical: Not on file     Non-medical: Not on file   Tobacco Use     Smoking status: Former Smoker     Packs/day: 3.00     Years: 40.00     Pack years: 120.00     Types: Cigarettes     Last attempt to quit: 1/1/2005     Years since quitting: 15.0     Smokeless tobacco:  Never Used   Substance and Sexual Activity     Alcohol use: Not Currently     Drug use: No     Sexual activity: Never     Partners: Male     Birth control/protection: Surgical, Post-menopausal   Lifestyle     Physical activity:     Days per week: Not on file     Minutes per session: Not on file     Stress: Not on file   Relationships     Social connections:     Talks on phone: Not on file     Gets together: Not on file     Attends Anabaptist service: Not on file     Active member of club or organization: Not on file     Attends meetings of clubs or organizations: Not on file     Relationship status: Not on file     Intimate partner violence:     Fear of current or ex partner: Not on file     Emotionally abused: Not on file     Physically abused: Not on file     Forced sexual activity: Not on file   Other Topics Concern     Not on file   Social History Narrative    Lives alone single family home that she owns, no children and is retired.  Boyfriend used to live with her, however due to stroke he is in his own care facility.  She is currently at [facility]         Review of Systems   Constitutional: Positive for activity change. Negative for appetite change, fatigue and fever.   HENT: Negative for congestion.    Respiratory: Negative for cough and wheezing.         Shortness of breath with exertion   Cardiovascular: Positive for leg swelling. Negative for chest pain.        Lymphedema   Gastrointestinal: Negative for abdominal distention, abdominal pain, constipation, diarrhea and nausea.   Genitourinary: Negative for dysuria.   Musculoskeletal: Positive for arthralgias. Negative for back pain.   Skin: Positive for wound. Negative for color change.        Left breast incision healed   Neurological: Negative for dizziness.   Psychiatric/Behavioral: Negative for agitation, behavioral problems and confusion.       Vitals:    02/03/20 0838   BP: 113/67   Pulse: (!) 113   Resp: 18   Temp: 98  F (36.7  C)   SpO2:  92%   Weight: (!) 338 lb (153.3 kg)       Physical Exam  Constitutional:       Appearance: She is well-developed.      Comments: Pleasant woman in no acute distress   HENT:      Head: Normocephalic.   Eyes:      Conjunctiva/sclera: Conjunctivae normal.   Neck:      Musculoskeletal: Normal range of motion.   Cardiovascular:      Rate and Rhythm: Normal rate and regular rhythm.      Heart sounds: Normal heart sounds. No murmur.   Pulmonary:      Effort: No respiratory distress.      Breath sounds: No rales.   Abdominal:      General: Bowel sounds are normal. There is no distension.      Palpations: Abdomen is soft.      Tenderness: There is no abdominal tenderness.   Musculoskeletal: Normal range of motion.      Right lower leg: Edema present.      Left lower leg: Edema present.      Comments: 3-4+ lower extremities. Lymph wraps resumed    Skin:     General: Skin is warm.      Findings: Rash present.      Comments: Healed left breast incision   Neurological:      Mental Status: She is alert and oriented to person, place, and time.   Psychiatric:         Behavior: Behavior normal.           LABS:   Recent Results (from the past 240 hour(s))   Basic Metabolic Panel   Result Value Ref Range    Sodium 141 136 - 145 mmol/L    Potassium 3.9 3.5 - 5.0 mmol/L    Chloride 101 98 - 107 mmol/L    CO2 30 22 - 31 mmol/L    Anion Gap, Calculation 10 5 - 18 mmol/L    Glucose 103 70 - 125 mg/dL    Calcium 9.0 8.5 - 10.5 mg/dL    BUN 14 8 - 28 mg/dL    Creatinine 0.93 0.60 - 1.10 mg/dL    GFR MDRD Af Amer >60 >60 mL/min/1.73m2    GFR MDRD Non Af Amer 59 (L) >60 mL/min/1.73m2   INR   Result Value Ref Range    INR 1.61 (H) 0.90 - 1.10     Current Outpatient Medications   Medication Sig     acetaminophen (TYLENOL) 500 MG tablet Take 1-2 tablets (500-1,000 mg total) by mouth every 4 (four) hours as needed (PAin 1-3).     albuterol (PROVENTIL) 2.5 mg /3 mL (0.083 %) nebulizer solution Take 3 mL (2.5 mg total) by nebulization every 4 (four)  hours as needed for wheezing.     bacitracin ointment Apply 1 application topically 2 (two) times a day. After epsom salt foot soak. To bilateral great toes for toenail falling off, until seen by Podiatry     calcium-vitamin D (CALCIUM-VITAMIN D) 500 mg(1,250mg) -200 unit per tablet Take 1 tablet by mouth 2 (two) times a day with meals.     cholecalciferol, vitamin D3, 1,000 unit tablet Take 2,000 Units by mouth daily.      diphenhydrAMINE (BENADRYL) 2 % cream Apply 1 application topically 3 (three) times a day as needed for itching.     diphenhydrAMINE (BENADRYL) 25 mg capsule Take 25 mg by mouth every 6 (six) hours as needed for itching.     fesoterodine (TOVIAZ) 8 mg Tb24 ER tablet Take 8 mg by mouth daily.      fluticasone propionate (FLONASE ALLERGY RELIEF) 50 mcg/actuation nasal spray One spray in each nostril daily     furosemide (LASIX) 80 MG tablet Take 1 tablet (80 mg total) by mouth 2 (two) times a day at 9am and 6pm.     gabapentin (NEURONTIN) 300 MG capsule Take 300 mg by mouth at bedtime.     glucosamine-chondroitin 500-400 mg tablet Take 1 tablet by mouth 2 (two) times a day with meals.      HYDROcodone-acetaminophen 5-325 mg per tablet Take 1 tablet by mouth every 4 (four) hours as needed for pain.     lidocaine 4 % patch Place 1 patch on the skin daily. Remove and discard patch with 12 hours or as directed by MD.     lidocaine-prilocaine (EMLA) cream Place over port 30 min. before being accessed.     loratadine (CLARITIN) 10 mg tablet Take 1 tablet (10 mg total) by mouth daily.     metoprolol tartrate (LOPRESSOR) 50 MG tablet Take 50 mg by mouth 2 (two) times a day. Hold for SBP < 110     mv-min/FA/vit K/lycop/lut/zeax (OCUVITE EYE PLUS MULTI ORAL) Take 1 tablet by mouth 2 (two) times a day with meals.     NOVOLOG U-100 INSULIN ASPART 100 unit/mL injection Check blood sugar three (3) times daily.  11.65 Type 2 with hyperglycemia     nystatin (MYCOSTATIN) powder Apply 1 application topically 2 (two)  times a day. Under breasts for rash, until healed. Then PRN.     OMEGA-3/DHA/EPA/FISH OIL (FISH OIL-OMEGA-3 FATTY ACIDS) 300-1,000 mg capsule Take 2 g by mouth 2 times a day at 6:00 am and 4:00 pm.     omeprazole (PRILOSEC) 20 MG capsule Take 20 mg by mouth daily before breakfast.     polyvinyl alcohol-povidon,PF, (REFRESH CLASSIC) 1.4-0.6 % Dpet ophthalmic dropperette Administer 2 drops to both eyes 3 (three) times a day.     potassium chloride (K-DUR,KLOR-CON) 10 MEQ tablet TAKE 2 TABLETS BY MOUTH TWICE DAILY     prochlorperazine (COMPAZINE) 10 MG tablet TAKE 1 TABLET(10 MG) BY MOUTH EVERY 6 HOURS AS NEEDED FOR NAUSEA     simvastatin (ZOCOR) 20 MG tablet Take 1 tablet (20 mg total) by mouth daily with supper.     warfarin ANTICOAGULANT (COUMADIN/JANTOVEN) 2 MG tablet Take 2 tablets (4 mg total) by mouth daily.       ASSESSMENT:      ICD-10-CM    1. CHF (congestive heart failure), NYHA class I, acute on chronic, combined (H) I50.43    2. Physical deconditioning R53.81    3. Hypertension I10    4. Abnormal sensation of upper extremity R20.9        PLAN:   Chronic right shoulder pain recently seen by orthopedics and was given a cortisone shot.    Rash triamcinolone cream to rash twice daily,  hydroxyzine to 50 mg every 6 hours PRN     Loose toenails seen by podiatry 1/15/19 and loose toenail was removed continue dressing change orders     Atrial fibrillation-continue Coumadin,  metoprolol and increased lasix, monitor BMP closely     Venous stasis lower extremities. Pt  will resume lymph wraps M-F and ace wraps on the weekends. .  Blistering lower extremities- new dressing change orders.      Status post left breast lumpectomy incision healed    Pain management Norco PRN     Shortness of breath with exertion. Oxygen PRN    Anticoagulation management continue Coumadin and adjust per INRs    Debility PT OT      Electronically signed by: Sara Mayes CNP

## 2021-06-05 NOTE — PROGRESS NOTES
[facility]    Facility:   [facility] [[id number]]   Code Status: FULL CODE      CHIEF COMPLAINT/REASON FOR VISIT:  Chief Complaint   Patient presents with     Problem Visit     Rash       HISTORY:      HPI: Jennifer is a 74 y.o. female undergoing physical and occupational therapy at [facility] transitional care unit. , she is with history of severe obesity, stage Ib left breast cancer, I normal, vitamin D deficiency, urge stress incontinence, tremor, postmenopausal bleeding and uterine adenocarcinoma in situ status post JEROME/BSO, atrial fibrillation, osteopenia, FLAKITA, macular degeneration, chronic bilateral leg edema, hypertension, diabetes mellitus, CAD, chronic back and bilateral knee pain who presented to [facility] for scheduled left breast lumpectomy And sentinel lymph node biopsy.  Per the records she became hypoxic in the PACU and noted to be persistently hypoxic to 4 L of oxygen which is a change from her baseline.  She does have FLAKITA but does not use her CPAP machine. She was recently sent back to the hospital from December 14-17 th  for left axilla pain and found to have a left axillary  hematoma related to her recent lumpectomy. She was seen by general surgery who recommended resuming warfarin and providing modest infection prophylaxis.She was also treated at this time with cefdinir for a UTI. She then returned and was  admitted to [facility] from 12/19/19-12/31/19 for pneumonia.  The patient has bilateral pleural effusions on CTA and was treated with doxycycline and Keflex for suspected pneumonia.  She completed a course of Cefdinir for UTI outpatient during her hospital stay.  Her procalcitonin and influenza A were negative on hospital admission.  She was also noted to have severe BLE nonpitting edema and her furosemide was increased to 80 mg two times a day for diuresis.  It was recommended that her BMP be followed bi-weekly for  assessment of renal function on increased diuresis.    Today she was seen for follow up to rash and itching  She reports relief with the triamcinolone but the rash on her back and upper thighs but it appears to be fading on her abdomen and arms increased itching.  Her Vistaril was increased to 50 mg every 6 hours as needed.  She continued to have the rash on her back and upper thighs but it did appear to be lighter on her abdomen and arms.  She did just recently follow-up with orthopedics regarding a possible rotator cuff tear  and was given a cortisone shot.  In review of her meds this was her only recent new med.  She denies any  Increased shortness of breath beyond baseline   She denied chest pain.  She is moving her bowels and has no issues with urination.   Her left breast incision is healed.  He BMP's have been stable despite high doses of lasix.   Her lung sounds were clear.    She denies any cough or congestion.   She was seen by podiatry  and her other toenail was removed and new dressing change orders.   .Last A1C 9/2019 was 6.5.     Past Medical History:   Diagnosis Date     (Lower) Leg Localized Swelling     Created by Conversion      Adenocarcinoma In Situ Of The Uterus     Created by Conversion      Backache     Created by Conversion [facility] Annotation: Feb 29 2012 12:14PM - Opal Helm: Referred to  [facility] 9/11, given TENS unit.      Benign Polyps Of The Large Intestine     Created by Conversion      Breast cancer (H)      Cancer (H)     uterine     Chronic kidney disease     stage 1 per H&P 11/22/2019     Coronary artery disease      Diabetes mellitus (H)     type 2     Fatigue     Created by Conversion      Hyperglycemia     Created by Conversion      Hyperlipidemia     Created by Conversion      Hypertension     Created by Conversion      Joint Pain, Localized In The Knee     Created by Conversion      Lesion of mediastinum      Lymphedema     Created by Conversion      Macular  Degeneration     Created by Conversion [facility] Annotation: Apr 5 2011 12:22PM - Tien Guzman: Followed by  Ophthalmologist, Dr. Avilez.      Major Depression, Single Episode In Remission     Created by Conversion      Obesity     Created by Conversion      Obstructive Sleep Apnea     CPAP     Osteopenia     Created by Conversion      Paroxysmal Atrial Fibrillation     Created by Conversion [facility] Annotation: Nov 28 2012 10:36PM - Shruthi Dhaliwal: Found incidential  on exam on May 7th, [id number] score of 1 for HTNsymptomatic and hospitalized  x2 in July, 2012.CV X 2 in 2012Chronic Sotalol Rx      Postmenopausal bleeding     Created by Conversion      Skin cancer      Tachycardia     Created by Conversion      Tremor     Created by Conversion      Urge And Stress Incontinence     Created by Conversion      Urinary Frequency More Than Twice At Night (Nocturia)     Created by Conversion      Urine Tests Nonspecific Abnormal Findings     Created by Conversion      Vitamin D Deficiency     Created by Conversion              Family History   Problem Relation Age of Onset     Hypertension Father      Pneumonia Father      Esophageal cancer Brother      Cancer Brother      Stroke Mother      Alzheimer's disease Brother      Cancer Brother      Prostate cancer Brother      Cancer Nephew      Colon cancer Nephew      Cancer Paternal cousin      Social History     Socioeconomic History     Marital status: Single     Spouse name: Not on file     Number of children: 0     Years of education: Not on file     Highest education level: Not on file   Occupational History     Occupation: Retired RN     Employer: [employer]   Social Needs     Financial resource strain: Not on file     Food insecurity:     Worry: Not on file     Inability: Not on file     Transportation needs:     Medical: Not on file     Non-medical: Not on file   Tobacco Use     Smoking status: Former Smoker     Packs/day: 3.00     Years: 40.00      Pack years: 120.00     Types: Cigarettes     Last attempt to quit: 1/1/2005     Years since quitting: 15.0     Smokeless tobacco: Never Used   Substance and Sexual Activity     Alcohol use: Not Currently     Drug use: No     Sexual activity: Never     Partners: Male     Birth control/protection: Surgical, Post-menopausal   Lifestyle     Physical activity:     Days per week: Not on file     Minutes per session: Not on file     Stress: Not on file   Relationships     Social connections:     Talks on phone: Not on file     Gets together: Not on file     Attends Zoroastrian service: Not on file     Active member of club or organization: Not on file     Attends meetings of clubs or organizations: Not on file     Relationship status: Not on file     Intimate partner violence:     Fear of current or ex partner: Not on file     Emotionally abused: Not on file     Physically abused: Not on file     Forced sexual activity: Not on file   Other Topics Concern     Not on file   Social History Narrative    Lives alone single family home that she owns, no children and is retired.  Boyfriend used to live with her, however due to stroke he is in his own care facility.  She is currently at [facility]         Review of Systems   Constitutional: Positive for activity change. Negative for appetite change, fatigue and fever.   HENT: Negative for congestion.    Respiratory: Negative for cough and wheezing.         Shortness of breath with exertion   Cardiovascular: Positive for leg swelling. Negative for chest pain.        Lymphedema   Gastrointestinal: Negative for abdominal distention, abdominal pain, constipation, diarrhea and nausea.   Genitourinary: Negative for dysuria.   Musculoskeletal: Positive for arthralgias. Negative for back pain.   Skin: Positive for wound. Negative for color change.        Left breast incision healed   Neurological: Negative for dizziness.   Psychiatric/Behavioral: Negative for agitation,  behavioral problems and confusion.       Vitals:    01/31/20 1148   BP: 110/74   Pulse: 89   Resp: 18   Temp: 98.2  F (36.8  C)   SpO2: 97%   Weight: (!) 341 lb (154.7 kg)       Physical Exam  Constitutional:       Appearance: She is well-developed.      Comments: Pleasant woman in no acute distress   HENT:      Head: Normocephalic.   Eyes:      Conjunctiva/sclera: Conjunctivae normal.   Neck:      Musculoskeletal: Normal range of motion.   Cardiovascular:      Rate and Rhythm: Normal rate and regular rhythm.      Heart sounds: Normal heart sounds. No murmur.   Pulmonary:      Effort: No respiratory distress.      Breath sounds: No rales.   Abdominal:      General: Bowel sounds are normal. There is no distension.      Palpations: Abdomen is soft.      Tenderness: There is no abdominal tenderness.   Musculoskeletal: Normal range of motion.      Right lower leg: Edema present.      Left lower leg: Edema present.      Comments: 3-4+ lower extremities. Lymph wraps resumed    Skin:     General: Skin is warm.      Findings: Rash present.      Comments: Healed left breast incision   Neurological:      Mental Status: She is alert and oriented to person, place, and time.   Psychiatric:         Behavior: Behavior normal.           LABS:   Recent Results (from the past 240 hour(s))   INR   Result Value Ref Range    INR 1.70 (H) 0.90 - 1.10   Basic Metabolic Panel   Result Value Ref Range    Sodium 142 136 - 145 mmol/L    Potassium 4.0 3.5 - 5.0 mmol/L    Chloride 102 98 - 107 mmol/L    CO2 32 (H) 22 - 31 mmol/L    Anion Gap, Calculation 8 5 - 18 mmol/L    Glucose 123 70 - 125 mg/dL    Calcium 9.1 8.5 - 10.5 mg/dL    BUN 14 8 - 28 mg/dL    Creatinine 1.02 0.60 - 1.10 mg/dL    GFR MDRD Af Amer >60 >60 mL/min/1.73m2    GFR MDRD Non Af Amer 53 (L) >60 mL/min/1.73m2   Basic Metabolic Panel   Result Value Ref Range    Sodium 141 136 - 145 mmol/L    Potassium 3.9 3.5 - 5.0 mmol/L    Chloride 101 98 - 107 mmol/L    CO2 30 22 - 31  mmol/L    Anion Gap, Calculation 10 5 - 18 mmol/L    Glucose 103 70 - 125 mg/dL    Calcium 9.0 8.5 - 10.5 mg/dL    BUN 14 8 - 28 mg/dL    Creatinine 0.93 0.60 - 1.10 mg/dL    GFR MDRD Af Amer >60 >60 mL/min/1.73m2    GFR MDRD Non Af Amer 59 (L) >60 mL/min/1.73m2   INR   Result Value Ref Range    INR 1.61 (H) 0.90 - 1.10     Current Outpatient Medications   Medication Sig     acetaminophen (TYLENOL) 500 MG tablet Take 1-2 tablets (500-1,000 mg total) by mouth every 4 (four) hours as needed (PAin 1-3).     albuterol (PROVENTIL) 2.5 mg /3 mL (0.083 %) nebulizer solution Take 3 mL (2.5 mg total) by nebulization every 4 (four) hours as needed for wheezing.     bacitracin ointment Apply 1 application topically 2 (two) times a day. After epsom salt foot soak. To bilateral great toes for toenail falling off, until seen by Podiatry     calcium-vitamin D (CALCIUM-VITAMIN D) 500 mg(1,250mg) -200 unit per tablet Take 1 tablet by mouth 2 (two) times a day with meals.     cholecalciferol, vitamin D3, 1,000 unit tablet Take 2,000 Units by mouth daily.      diphenhydrAMINE (BENADRYL) 2 % cream Apply 1 application topically 3 (three) times a day as needed for itching.     diphenhydrAMINE (BENADRYL) 25 mg capsule Take 25 mg by mouth every 6 (six) hours as needed for itching.     fesoterodine (TOVIAZ) 8 mg Tb24 ER tablet Take 8 mg by mouth daily.      fluticasone propionate (FLONASE ALLERGY RELIEF) 50 mcg/actuation nasal spray One spray in each nostril daily     furosemide (LASIX) 80 MG tablet Take 1 tablet (80 mg total) by mouth 2 (two) times a day at 9am and 6pm.     gabapentin (NEURONTIN) 300 MG capsule Take 300 mg by mouth at bedtime.     glucosamine-chondroitin 500-400 mg tablet Take 1 tablet by mouth 2 (two) times a day with meals.      HYDROcodone-acetaminophen 5-325 mg per tablet Take 1 tablet by mouth every 4 (four) hours as needed for pain.     lidocaine 4 % patch Place 1 patch on the skin daily. Remove and discard patch  with 12 hours or as directed by MD.     lidocaine-prilocaine (EMLA) cream Place over port 30 min. before being accessed.     loratadine (CLARITIN) 10 mg tablet Take 1 tablet (10 mg total) by mouth daily.     metoprolol tartrate (LOPRESSOR) 50 MG tablet Take 50 mg by mouth 2 (two) times a day. Hold for SBP < 110     mv-min/FA/vit K/lycop/lut/zeax (OCUVITE EYE PLUS MULTI ORAL) Take 1 tablet by mouth 2 (two) times a day with meals.     NOVOLOG U-100 INSULIN ASPART 100 unit/mL injection Check blood sugar three (3) times daily.  11.65 Type 2 with hyperglycemia     nystatin (MYCOSTATIN) powder Apply 1 application topically 2 (two) times a day. Under breasts for rash, until healed. Then PRN.     OMEGA-3/DHA/EPA/FISH OIL (FISH OIL-OMEGA-3 FATTY ACIDS) 300-1,000 mg capsule Take 2 g by mouth 2 times a day at 6:00 am and 4:00 pm.     omeprazole (PRILOSEC) 20 MG capsule Take 20 mg by mouth daily before breakfast.     polyvinyl alcohol-povidon,PF, (REFRESH CLASSIC) 1.4-0.6 % Dpet ophthalmic dropperette Administer 2 drops to both eyes 3 (three) times a day.     potassium chloride (K-DUR,KLOR-CON) 10 MEQ tablet TAKE 2 TABLETS BY MOUTH TWICE DAILY     prochlorperazine (COMPAZINE) 10 MG tablet TAKE 1 TABLET(10 MG) BY MOUTH EVERY 6 HOURS AS NEEDED FOR NAUSEA     simvastatin (ZOCOR) 20 MG tablet Take 1 tablet (20 mg total) by mouth daily with supper.     warfarin ANTICOAGULANT (COUMADIN/JANTOVEN) 2 MG tablet Take 2 tablets (4 mg total) by mouth daily.       ASSESSMENT:      ICD-10-CM    1. Rash R21        PLAN:   Chronic right shoulder pain recently seen by orthopedics and was given a cortisone shot.    Rash triamcinolone cream to rash twice daily, increase hydroxyzine to 50 mg every 6 hours PRN     Loose toenails seen by podiatry 1/15/19 and loose toenail was removed and she has new dressing change orders     Atrial fibrillation-continue Coumadin,  metoprolol and increased lasix, monitor BMP closely     Venous stasis lower  extremities. Pt  will resume lymph wraps M-F and ace wraps on the weekends. .  Blistering lower extremities- new dressing change orders.      Status post left breast lumpectomy incision healed    Pain management Norco PRN     Shortness of breath with exertion. Oxygen PRN    Anticoagulation management continue Coumadin and adjust per INRs    Debility PT OT      Electronically signed by: Sara Mayes CNP

## 2021-06-05 NOTE — PROGRESS NOTES
[facility]    Facility:   [facility] [[id number]]   Code Status: FULL CODE      CHIEF COMPLAINT/REASON FOR VISIT:  Chief Complaint   Patient presents with     Review Of Multiple Medical Conditions       HISTORY:      HPI: Jennifer is a 74 y.o. female undergoing physical and occupational therapy at [facility] transitional care unit. , she is with history of severe obesity, stage Ib left breast cancer, I normal, vitamin D deficiency, urge stress incontinence, tremor, postmenopausal bleeding and uterine adenocarcinoma in situ status post JEROME/BSO, atrial fibrillation, osteopenia, FLAKITA, macular degeneration, chronic bilateral leg edema, hypertension, diabetes mellitus, CAD, chronic back and bilateral knee pain who presented to [facility] for scheduled left breast lumpectomy And sentinel lymph node biopsy.  Per the records she became hypoxic in the PACU and noted to be persistently hypoxic to 4 L of oxygen which is a change from her baseline.  She does have FLAKITA but does not use her CPAP machine. She was recently sent back to the hospital from December 14-17 th  for left axilla pain and found to have a left axillary  hematoma related to her recent lumpectomy. She was seen by general surgery who recommended resuming warfarin and providing modest infection prophylaxis.She was also treated at this time with cefdinir for a UTI. She then returned and was  admitted to [facility] from 12/19/19-12/31/19 for pneumonia.  The patient has bilateral pleural effusions on CTA and was treated with doxycycline and Keflex for suspected pneumonia.  She completed a course of Cefdinir for UTI outpatient during her hospital stay.  Her procalcitonin and influenza A were negative on hospital admission.  She was also noted to have severe BLE nonpitting edema and her furosemide was increased to 80 mg two times a day for diuresis.  It was recommended that her BMP be followed  bi-weekly for assessment of renal function on increased diuresis.    Today she was seen for review of weight and follow-up of rash.  Her rash is almost completely resolved.  However she does still continue to have intermittent itching her triamcinolone was discontinued and I have ordered her Sarna lotion.  Her weights were reviewed and she is up 3 pounds in the last 2 days she was given an extra 20 mg of Lasix in addition to her 80 mg twice daily.  She denies any  Increased shortness of breath beyond baseline   She denied chest pain.  She is moving her bowels and has no issues with urination.   Her left breast incision is healed.  Her BMP have been stable despite large doses of Lasix.  Her lung sounds were clear.    She denies any cough or congestion.     .Last A1C 9/2019 was 6.5.     Past Medical History:   Diagnosis Date     (Lower) Leg Localized Swelling     Created by Conversion      Adenocarcinoma In Situ Of The Uterus     Created by Conversion      Backache     Created by Conversion [facility] [employer] [facility] Annotation: Feb 29 2012 12:14PM - Opal Helm: Referred to  [facility] 9/11, given TENS unit.      Benign Polyps Of The Large Intestine     Created by Conversion      Breast cancer (H)      Cancer (H)     uterine     Chronic kidney disease     stage 1 per H&P 11/22/2019     Coronary artery disease      Diabetes mellitus (H)     type 2     Fatigue     Created by Conversion      Hyperglycemia     Created by Conversion      Hyperlipidemia     Created by Conversion      Hypertension     Created by Conversion      Joint Pain, Localized In The Knee     Created by Conversion      Lesion of mediastinum      Lymphedema     Created by Conversion      Macular Degeneration     Created by Conversion [facility] Annotation: Apr 5 2011 12:22PM - Tien Guzman: Followed by  Ophthalmologist, Dr. Avilez.      Major Depression, Single Episode In Remission     Created by Conversion      Obesity     Created by Conversion       Obstructive Sleep Apnea     CPAP     Osteopenia     Created by Conversion      Paroxysmal Atrial Fibrillation     Created by Conversion [email or web address] [facility] Annotation: Nov 28 2012 10:36PM - Shruthi Dhaliwal: Found incidential  on exam on May 7th, [id number] score of 1 for HTNsymptomatic and hospitalized  x2 in July, 2012.CV X 2 in 2012Chronic Sotalol Rx      Postmenopausal bleeding     Created by Conversion      Skin cancer      Tachycardia     Created by Conversion      Tremor     Created by Conversion      Urge And Stress Incontinence     Created by Conversion      Urinary Frequency More Than Twice At Night (Nocturia)     Created by Conversion      Urine Tests Nonspecific Abnormal Findings     Created by Conversion      Vitamin D Deficiency     Created by Conversion              Family History   Problem Relation Age of Onset     Hypertension Father      Pneumonia Father      Esophageal cancer Brother      Cancer Brother      Stroke Mother      Alzheimer's disease Brother      Cancer Brother      Prostate cancer Brother      Cancer Nephew      Colon cancer Nephew      Cancer Paternal cousin      Social History     Socioeconomic History     Marital status: Single     Spouse name: Not on file     Number of children: 0     Years of education: Not on file     Highest education level: Not on file   Occupational History     Occupation: Retired RN     Employer: [employer]   Social Needs     Financial resource strain: Not on file     Food insecurity:     Worry: Not on file     Inability: Not on file     Transportation needs:     Medical: Not on file     Non-medical: Not on file   Tobacco Use     Smoking status: Former Smoker     Packs/day: 3.00     Years: 40.00     Pack years: 120.00     Types: Cigarettes     Last attempt to quit: 1/1/2005     Years since quitting: 15.1     Smokeless tobacco: Never Used   Substance and Sexual Activity     Alcohol use: Not Currently     Drug use: No     Sexual activity: Never     Partners:  Male     Birth control/protection: Surgical, Post-menopausal   Lifestyle     Physical activity:     Days per week: Not on file     Minutes per session: Not on file     Stress: Not on file   Relationships     Social connections:     Talks on phone: Not on file     Gets together: Not on file     Attends Restorationism service: Not on file     Active member of club or organization: Not on file     Attends meetings of clubs or organizations: Not on file     Relationship status: Not on file     Intimate partner violence:     Fear of current or ex partner: Not on file     Emotionally abused: Not on file     Physically abused: Not on file     Forced sexual activity: Not on file   Other Topics Concern     Not on file   Social History Narrative    Lives alone single family home that she owns, no children and is retired.  Boyfriend used to live with her, however due to stroke he is in his own care facility.  She is currently at [facility]         Review of Systems   Constitutional: Positive for activity change. Negative for appetite change, fatigue and fever.   HENT: Negative for congestion.    Respiratory: Negative for cough and wheezing.         Shortness of breath with exertion   Cardiovascular: Positive for leg swelling. Negative for chest pain.        Lymphedema   Gastrointestinal: Negative for abdominal distention, abdominal pain, constipation, diarrhea and nausea.   Genitourinary: Negative for dysuria.   Musculoskeletal: Positive for arthralgias. Negative for back pain.   Skin: Positive for wound. Negative for color change.        Left breast incision healed   Neurological: Negative for dizziness.   Psychiatric/Behavioral: Negative for agitation, behavioral problems and confusion.       Vitals:    02/07/20 1628   BP: 126/74   Pulse: 63   Resp: 18   Temp: 97.9  F (36.6  C)   SpO2: 97%   Weight: (!) 339 lb 8 oz (154 kg)       Physical Exam  Constitutional:       Appearance: She is well-developed.      Comments:  Pleasant woman in no acute distress   HENT:      Head: Normocephalic.   Eyes:      Conjunctiva/sclera: Conjunctivae normal.   Neck:      Musculoskeletal: Normal range of motion.   Cardiovascular:      Rate and Rhythm: Normal rate and regular rhythm.      Heart sounds: Normal heart sounds. No murmur.   Pulmonary:      Effort: No respiratory distress.      Breath sounds: No rales.   Abdominal:      General: Bowel sounds are normal. There is no distension.      Palpations: Abdomen is soft.      Tenderness: There is no abdominal tenderness.   Musculoskeletal: Normal range of motion.      Right lower leg: Edema present.      Left lower leg: Edema present.      Comments: 3-4+ lower extremities. Lymph wraps resumed    Skin:     General: Skin is warm.      Findings: Rash present.      Comments: Healed left breast incision  Back rash almost completely resolved   Neurological:      Mental Status: She is alert and oriented to person, place, and time.   Psychiatric:         Behavior: Behavior normal.           LABS:   Recent Results (from the past 240 hour(s))   Basic Metabolic Panel   Result Value Ref Range    Sodium 141 136 - 145 mmol/L    Potassium 3.9 3.5 - 5.0 mmol/L    Chloride 101 98 - 107 mmol/L    CO2 30 22 - 31 mmol/L    Anion Gap, Calculation 10 5 - 18 mmol/L    Glucose 103 70 - 125 mg/dL    Calcium 9.0 8.5 - 10.5 mg/dL    BUN 14 8 - 28 mg/dL    Creatinine 0.93 0.60 - 1.10 mg/dL    GFR MDRD Af Amer >60 >60 mL/min/1.73m2    GFR MDRD Non Af Amer 59 (L) >60 mL/min/1.73m2   INR   Result Value Ref Range    INR 1.61 (H) 0.90 - 1.10   Basic Metabolic Panel   Result Value Ref Range    Sodium 143 136 - 145 mmol/L    Potassium 3.9 3.5 - 5.0 mmol/L    Chloride 103 98 - 107 mmol/L    CO2 30 22 - 31 mmol/L    Anion Gap, Calculation 10 5 - 18 mmol/L    Glucose 105 70 - 125 mg/dL    Calcium 9.2 8.5 - 10.5 mg/dL    BUN 13 8 - 28 mg/dL    Creatinine 0.98 0.60 - 1.10 mg/dL    GFR MDRD Af Amer >60 >60 mL/min/1.73m2    GFR MDRD Non Af  Amer 56 (L) >60 mL/min/1.73m2   INR   Result Value Ref Range    INR 1.62 (H) 0.90 - 1.10     Current Outpatient Medications   Medication Sig     acetaminophen (TYLENOL) 500 MG tablet Take 1-2 tablets (500-1,000 mg total) by mouth every 4 (four) hours as needed (PAin 1-3).     albuterol (PROVENTIL) 2.5 mg /3 mL (0.083 %) nebulizer solution Take 3 mL (2.5 mg total) by nebulization every 4 (four) hours as needed for wheezing.     bacitracin ointment Apply 1 application topically 2 (two) times a day. After epsom salt foot soak. To bilateral great toes for toenail falling off, until seen by Podiatry     calcium-vitamin D (CALCIUM-VITAMIN D) 500 mg(1,250mg) -200 unit per tablet Take 1 tablet by mouth 2 (two) times a day with meals.     cholecalciferol, vitamin D3, 1,000 unit tablet Take 2,000 Units by mouth daily.      diphenhydrAMINE (BENADRYL) 2 % cream Apply 1 application topically 3 (three) times a day as needed for itching.     diphenhydrAMINE (BENADRYL) 25 mg capsule Take 25 mg by mouth every 6 (six) hours as needed for itching.     fesoterodine (TOVIAZ) 8 mg Tb24 ER tablet Take 8 mg by mouth daily.      fluticasone propionate (FLONASE ALLERGY RELIEF) 50 mcg/actuation nasal spray One spray in each nostril daily     furosemide (LASIX) 80 MG tablet Take 1 tablet (80 mg total) by mouth 2 (two) times a day at 9am and 6pm.     gabapentin (NEURONTIN) 300 MG capsule Take 300 mg by mouth at bedtime.     glucosamine-chondroitin 500-400 mg tablet Take 1 tablet by mouth 2 (two) times a day with meals.      HYDROcodone-acetaminophen 5-325 mg per tablet Take 1 tablet by mouth every 4 (four) hours as needed for pain.     lidocaine 4 % patch Place 1 patch on the skin daily. Remove and discard patch with 12 hours or as directed by MD.     lidocaine-prilocaine (EMLA) cream Place over port 30 min. before being accessed.     loratadine (CLARITIN) 10 mg tablet Take 1 tablet (10 mg total) by mouth daily.     metoprolol tartrate  (LOPRESSOR) 50 MG tablet Take 50 mg by mouth 2 (two) times a day. Hold for SBP < 110     mv-min/FA/vit K/lycop/lut/zeax (OCUVITE EYE PLUS MULTI ORAL) Take 1 tablet by mouth 2 (two) times a day with meals.     NOVOLOG U-100 INSULIN ASPART 100 unit/mL injection Check blood sugar three (3) times daily.  11.65 Type 2 with hyperglycemia     nystatin (MYCOSTATIN) powder Apply 1 application topically 2 (two) times a day. Under breasts for rash, until healed. Then PRN.     OMEGA-3/DHA/EPA/FISH OIL (FISH OIL-OMEGA-3 FATTY ACIDS) 300-1,000 mg capsule Take 2 g by mouth 2 times a day at 6:00 am and 4:00 pm.     omeprazole (PRILOSEC) 20 MG capsule Take 20 mg by mouth daily before breakfast.     polyvinyl alcohol-povidon,PF, (REFRESH CLASSIC) 1.4-0.6 % Dpet ophthalmic dropperette Administer 2 drops to both eyes 3 (three) times a day.     potassium chloride (K-DUR,KLOR-CON) 10 MEQ tablet TAKE 2 TABLETS BY MOUTH TWICE DAILY     prochlorperazine (COMPAZINE) 10 MG tablet TAKE 1 TABLET(10 MG) BY MOUTH EVERY 6 HOURS AS NEEDED FOR NAUSEA     simvastatin (ZOCOR) 20 MG tablet Take 1 tablet (20 mg total) by mouth daily with supper.     warfarin ANTICOAGULANT (COUMADIN/JANTOVEN) 2 MG tablet Take 2 tablets (4 mg total) by mouth daily.       ASSESSMENT:      ICD-10-CM    1. Physical deconditioning R53.81    2. Bilateral leg edema R60.0    3. CHF (congestive heart failure), NYHA class I, acute on chronic, combined (H) I50.43    4. Rash R21    5. Weight gain R63.5        PLAN:   Chronic right shoulder pain recently seen by orthopedics and was given a cortisone shot.    Rash triamcinolone cream to rash twice daily completed Sarna lotion ordered daily and as needed,  hydroxyzine to 50 mg every 6 hours PRN     Loose toenails seen by podiatry 1/15/19 and loose toenail was removed continue dressing change orders     Atrial fibrillation-continue Coumadin,  metoprolol and increased lasix, monitor BMP closely     Venous stasis lower extremities. Pt   will resume lymph wraps M-F and ace wraps on the weekends. .  Blistering lower extremities- new dressing change orders.      Status post left breast lumpectomy incision healed    Pain management Norco PRN     Shortness of breath with exertion. Oxygen PRN    Anticoagulation management continue Coumadin and adjust per INRs    Debility PT OT      Electronically signed by: Sara Mayes CNP

## 2021-06-05 NOTE — PROGRESS NOTES
[facility]    Facility:   [facility] [[id number]]   Code Status: FULL CODE      CHIEF COMPLAINT/REASON FOR VISIT:  Chief Complaint   Patient presents with     Review Of Multiple Medical Conditions       HISTORY:      HPI: Jennifer is a 74 y.o. female undergoing physical and occupational therapy at [facility] transitional care unit. , she is with history of severe obesity, stage Ib left breast cancer, I normal, vitamin D deficiency, urge stress incontinence, tremor, postmenopausal bleeding and uterine adenocarcinoma in situ status post JEROME/BSO, atrial fibrillation, osteopenia, FLAKITA, macular degeneration, chronic bilateral leg edema, hypertension, diabetes mellitus, CAD, chronic back and bilateral knee pain who presented to [facility] for scheduled left breast lumpectomy And sentinel lymph node biopsy.  Per the records she became hypoxic in the PACU and noted to be persistently hypoxic to 4 L of oxygen which is a change from her baseline.  She does have FLAKITA but does not use her CPAP machine. She was recently sent back to the hospital from December 14-17 th  for left axilla pain and found to have a left axillary  hematoma related to her recent lumpectomy. She was seen by general surgery who recommended resuming warfarin and providing modest infection prophylaxis.She was also treated at this time with cefdinir for a UTI. She then returned and was  admitted to [facility] from 12/19/19-12/31/19 for pneumonia.  The patient has bilateral pleural effusions on CTA and was treated with doxycycline and Keflex for suspected pneumonia.  She completed a course of Cefdinir for UTI outpatient during her hospital stay.  Her procalcitonin and influenza A were negative on hospital admission.  She was also noted to have severe BLE nonpitting edema and her furosemide was increased to 80 mg two times a day for diuresis.  It was recommended that her BMP be followed  bi-weekly for assessment of renal function on increased diuresis.    Today she was seen for follow up to rash and itching  She reports relief with the triamcinolone and has taking the hydroxyzine once.  The rash did appear lighter in  color.  She did just recently follow-up with orthopedics regarding a possible rotator cuff tear  and was given a cortisone shot.  In review of her meds this was her only recent new med.  She denies any  Increased shortness of breath beyond baseline   She denied chest pain.  She is moving her bowels and has no issues with urination.   Her left breast incision is healed.  He BMP's have been stable despite high doses of lasix.   Her lung sounds were clear.    She denies any cough or congestion.   She was seen by podiatry  and her other toenail was removed and new dressing change orders.   .Last A1C 9/2019 was 6.5.     Past Medical History:   Diagnosis Date     (Lower) Leg Localized Swelling     Created by Conversion      Adenocarcinoma In Situ Of The Uterus     Created by Conversion      Backache     Created by Conversion [facility] Annotation: Feb 29 2012 12:14PM - Opal Helm: Referred to  [facility] 9/11, given TENS unit.      Benign Polyps Of The Large Intestine     Created by Conversion      Breast cancer (H)      Cancer (H)     uterine     Chronic kidney disease     stage 1 per H&P 11/22/2019     Coronary artery disease      Diabetes mellitus (H)     type 2     Fatigue     Created by Conversion      Hyperglycemia     Created by Conversion      Hyperlipidemia     Created by Conversion      Hypertension     Created by Conversion      Joint Pain, Localized In The Knee     Created by Conversion      Lesion of mediastinum      Lymphedema     Created by Conversion      Macular Degeneration     Created by Conversion [facility] Annotation: Apr 5 2011 12:22PM - Tien Guzman: Followed by  Ophthalmologist, Dr. Avilez.      Major Depression, Single Episode In Remission     Created by  Conversion      Obesity     Created by Conversion      Obstructive Sleep Apnea     CPAP     Osteopenia     Created by Conversion      Paroxysmal Atrial Fibrillation     Created by Conversion [facility] Annotation: Nov 28 2012 10:36PM - Shruthi Dhaliwal: Found incidential  on exam on May 7th, [id number] score of 1 for HTNsymptomatic and hospitalized  x2 in July, 2012.CV X 2 in 2012Chronic Sotalol Rx      Postmenopausal bleeding     Created by Conversion      Skin cancer      Tachycardia     Created by Conversion      Tremor     Created by Conversion      Urge And Stress Incontinence     Created by Conversion      Urinary Frequency More Than Twice At Night (Nocturia)     Created by Conversion      Urine Tests Nonspecific Abnormal Findings     Created by Conversion      Vitamin D Deficiency     Created by Conversion              Family History   Problem Relation Age of Onset     Hypertension Father      Pneumonia Father      Esophageal cancer Brother      Cancer Brother      Stroke Mother      Alzheimer's disease Brother      Cancer Brother      Prostate cancer Brother      Cancer Nephew      Colon cancer Nephew      Cancer Paternal cousin      Social History     Socioeconomic History     Marital status: Single     Spouse name: Not on file     Number of children: 0     Years of education: Not on file     Highest education level: Not on file   Occupational History     Occupation: Retired RN     Employer: [employer]   Social Needs     Financial resource strain: Not on file     Food insecurity:     Worry: Not on file     Inability: Not on file     Transportation needs:     Medical: Not on file     Non-medical: Not on file   Tobacco Use     Smoking status: Former Smoker     Packs/day: 3.00     Years: 40.00     Pack years: 120.00     Types: Cigarettes     Last attempt to quit: 1/1/2005     Years since quitting: 15.0     Smokeless tobacco: Never Used   Substance and Sexual Activity     Alcohol use: Not Currently      Drug use: No     Sexual activity: Never     Partners: Male     Birth control/protection: Surgical, Post-menopausal   Lifestyle     Physical activity:     Days per week: Not on file     Minutes per session: Not on file     Stress: Not on file   Relationships     Social connections:     Talks on phone: Not on file     Gets together: Not on file     Attends Mosque service: Not on file     Active member of club or organization: Not on file     Attends meetings of clubs or organizations: Not on file     Relationship status: Not on file     Intimate partner violence:     Fear of current or ex partner: Not on file     Emotionally abused: Not on file     Physically abused: Not on file     Forced sexual activity: Not on file   Other Topics Concern     Not on file   Social History Narrative    Lives alone single family home that she owns, no children and is retired.  Boyfriend used to live with her, however due to stroke he is in his own care facility.  She is currently at [facility]         Review of Systems   Constitutional: Positive for activity change. Negative for appetite change, fatigue and fever.   HENT: Negative for congestion.    Respiratory: Negative for cough and wheezing.         Shortness of breath with exertion   Cardiovascular: Positive for leg swelling. Negative for chest pain.        Lymphedema   Gastrointestinal: Negative for abdominal distention, abdominal pain, constipation, diarrhea and nausea.   Genitourinary: Negative for dysuria.   Musculoskeletal: Positive for arthralgias. Negative for back pain.   Skin: Positive for wound. Negative for color change.        Left breast incision healed   Neurological: Negative for dizziness.   Psychiatric/Behavioral: Negative for agitation, behavioral problems and confusion.       Vitals:    01/29/20 1213   BP: 108/78   Pulse: 80   Resp: 18   Temp: 96.6  F (35.9  C)   SpO2: 90%   Weight: (!) 340 lb (154.2 kg)       Physical Exam  Constitutional:        Appearance: She is well-developed.      Comments: Pleasant woman in no acute distress   HENT:      Head: Normocephalic.   Eyes:      Conjunctiva/sclera: Conjunctivae normal.   Neck:      Musculoskeletal: Normal range of motion.   Cardiovascular:      Rate and Rhythm: Normal rate and regular rhythm.      Heart sounds: Normal heart sounds. No murmur.   Pulmonary:      Effort: No respiratory distress.      Breath sounds: No rales.   Abdominal:      General: Bowel sounds are normal. There is no distension.      Palpations: Abdomen is soft.      Tenderness: There is no abdominal tenderness.   Musculoskeletal: Normal range of motion.      Right lower leg: Edema present.      Left lower leg: Edema present.      Comments: 3-4+ lower extremities. Lymph wraps resumed    Skin:     General: Skin is warm.      Findings: Rash present.      Comments: Healed left breast incision   Neurological:      Mental Status: She is alert and oriented to person, place, and time.   Psychiatric:         Behavior: Behavior normal.           LABS:   Recent Results (from the past 240 hour(s))   HM2(CBC w/o Differential)   Result Value Ref Range    WBC 10.7 4.0 - 11.0 thou/uL    RBC 4.15 3.80 - 5.40 mill/uL    Hemoglobin 11.1 (L) 12.0 - 16.0 g/dL    Hematocrit 38.3 35.0 - 47.0 %    MCV 92 80 - 100 fL    MCH 26.7 (L) 27.0 - 34.0 pg    MCHC 29.0 (L) 32.0 - 36.0 g/dL    RDW 17.9 (H) 11.0 - 14.5 %    Platelets 176 140 - 440 thou/uL    MPV 11.3 8.5 - 12.5 fL   INR   Result Value Ref Range    INR 1.70 (H) 0.90 - 1.10   Basic Metabolic Panel   Result Value Ref Range    Sodium 142 136 - 145 mmol/L    Potassium 4.0 3.5 - 5.0 mmol/L    Chloride 102 98 - 107 mmol/L    CO2 32 (H) 22 - 31 mmol/L    Anion Gap, Calculation 8 5 - 18 mmol/L    Glucose 123 70 - 125 mg/dL    Calcium 9.1 8.5 - 10.5 mg/dL    BUN 14 8 - 28 mg/dL    Creatinine 1.02 0.60 - 1.10 mg/dL    GFR MDRD Af Amer >60 >60 mL/min/1.73m2    GFR MDRD Non Af Amer 53 (L) >60 mL/min/1.73m2   Basic  Metabolic Panel   Result Value Ref Range    Sodium 141 136 - 145 mmol/L    Potassium 3.9 3.5 - 5.0 mmol/L    Chloride 101 98 - 107 mmol/L    CO2 30 22 - 31 mmol/L    Anion Gap, Calculation 10 5 - 18 mmol/L    Glucose 103 70 - 125 mg/dL    Calcium 9.0 8.5 - 10.5 mg/dL    BUN 14 8 - 28 mg/dL    Creatinine 0.93 0.60 - 1.10 mg/dL    GFR MDRD Af Amer >60 >60 mL/min/1.73m2    GFR MDRD Non Af Amer 59 (L) >60 mL/min/1.73m2     Current Outpatient Medications   Medication Sig     acetaminophen (TYLENOL) 500 MG tablet Take 1-2 tablets (500-1,000 mg total) by mouth every 4 (four) hours as needed (PAin 1-3).     albuterol (PROVENTIL) 2.5 mg /3 mL (0.083 %) nebulizer solution Take 3 mL (2.5 mg total) by nebulization every 4 (four) hours as needed for wheezing.     bacitracin ointment Apply 1 application topically 2 (two) times a day. After epsom salt foot soak. To bilateral great toes for toenail falling off, until seen by Podiatry     calcium-vitamin D (CALCIUM-VITAMIN D) 500 mg(1,250mg) -200 unit per tablet Take 1 tablet by mouth 2 (two) times a day with meals.     cholecalciferol, vitamin D3, 1,000 unit tablet Take 2,000 Units by mouth daily.      diphenhydrAMINE (BENADRYL) 2 % cream Apply 1 application topically 3 (three) times a day as needed for itching.     diphenhydrAMINE (BENADRYL) 25 mg capsule Take 25 mg by mouth every 6 (six) hours as needed for itching.     fesoterodine (TOVIAZ) 8 mg Tb24 ER tablet Take 8 mg by mouth daily.      fluticasone propionate (FLONASE ALLERGY RELIEF) 50 mcg/actuation nasal spray One spray in each nostril daily     furosemide (LASIX) 80 MG tablet Take 1 tablet (80 mg total) by mouth 2 (two) times a day at 9am and 6pm.     gabapentin (NEURONTIN) 300 MG capsule Take 300 mg by mouth at bedtime.     glucosamine-chondroitin 500-400 mg tablet Take 1 tablet by mouth 2 (two) times a day with meals.      HYDROcodone-acetaminophen 5-325 mg per tablet Take 1 tablet by mouth every 4 (four) hours as  needed for pain.     lidocaine 4 % patch Place 1 patch on the skin daily. Remove and discard patch with 12 hours or as directed by MD.     lidocaine-prilocaine (EMLA) cream Place over port 30 min. before being accessed.     loratadine (CLARITIN) 10 mg tablet Take 1 tablet (10 mg total) by mouth daily.     metoprolol tartrate (LOPRESSOR) 50 MG tablet Take 50 mg by mouth 2 (two) times a day. Hold for SBP < 110     mv-min/FA/vit K/lycop/lut/zeax (OCUVITE EYE PLUS MULTI ORAL) Take 1 tablet by mouth 2 (two) times a day with meals.     NOVOLOG U-100 INSULIN ASPART 100 unit/mL injection Check blood sugar three (3) times daily.  11.65 Type 2 with hyperglycemia     nystatin (MYCOSTATIN) powder Apply 1 application topically 2 (two) times a day. Under breasts for rash, until healed. Then PRN.     OMEGA-3/DHA/EPA/FISH OIL (FISH OIL-OMEGA-3 FATTY ACIDS) 300-1,000 mg capsule Take 2 g by mouth 2 times a day at 6:00 am and 4:00 pm.     omeprazole (PRILOSEC) 20 MG capsule Take 20 mg by mouth daily before breakfast.     polyvinyl alcohol-povidon,PF, (REFRESH CLASSIC) 1.4-0.6 % Dpet ophthalmic dropperette Administer 2 drops to both eyes 3 (three) times a day.     potassium chloride (K-DUR,KLOR-CON) 10 MEQ tablet TAKE 2 TABLETS BY MOUTH TWICE DAILY     prochlorperazine (COMPAZINE) 10 MG tablet TAKE 1 TABLET(10 MG) BY MOUTH EVERY 6 HOURS AS NEEDED FOR NAUSEA     simvastatin (ZOCOR) 20 MG tablet Take 1 tablet (20 mg total) by mouth daily with supper.     warfarin ANTICOAGULANT (COUMADIN/JANTOVEN) 2 MG tablet Take 2 tablets (4 mg total) by mouth daily.       ASSESSMENT:      ICD-10-CM    1. Rash R21    2. Itching L29.9        PLAN:   Chronic right shoulder pain recently seen by orthopedics and was given a cortisone shot.    Rash triamcinolone cream to rash twice daily,  Hydroxyzine PRN     Loose toenails seen by podiatry 1/15/19 and loose toenail was removed and she has new dressing change orders     Atrial fibrillation-continue Coumadin,   metoprolol and increased lasix, monitor BMP closely     Venous stasis lower extremities. Pt  will resume lymph wraps M-F and ace wraps on the weekends. .  Blistering lower extremities- new dressing change orders.      Status post left breast lumpectomy incision healed    Pain management Norco PRN     Shortness of breath with exertion. Oxygen PRN    Anticoagulation management continue Coumadin and adjust per INRs    Debility PT OT      Electronically signed by: Sara Mayes CNP

## 2021-06-05 NOTE — TELEPHONE ENCOUNTER
[facility] Nurse Triage Telephone Note      Provider: ALECIA Bradford  Facility: [facility] Type: TCU    Caller: Emili  Call Back Number:  [phone]    Allergies: Aspirin and Penicillins    Reason for call: Nurse reporting a med error.  Patient has orders for Norco 5/325mg, but was accidentally given Oxycodone 5mg by staff.  Med error occurred at about 4:15pm today.  No adverse effects noted.       Verbal Order/Direction given by Provider: Continue to monitor.      Provider giving order: ALECIA Bradford    Verbal order given to: Emili Hernadez RN

## 2021-06-05 NOTE — PROGRESS NOTES
[facility]    Facility:   [facility] [[id number]]   Code Status: FULL CODE      CHIEF COMPLAINT/REASON FOR VISIT:  Chief Complaint   Patient presents with     Review Of Multiple Medical Conditions       HISTORY:      HPI: Jennifer is a 74 y.o. female undergoing physical and occupational therapy at [facility] transitional care unit. , she is with history of severe obesity, stage Ib left breast cancer, I normal, vitamin D deficiency, urge stress incontinence, tremor, postmenopausal bleeding and uterine adenocarcinoma in situ status post JEROME/BSO, atrial fibrillation, osteopenia, FLAKITA, macular degeneration, chronic bilateral leg edema, hypertension, diabetes mellitus, CAD, chronic back and bilateral knee pain who presented to [facility] for scheduled left breast lumpectomy And sentinel lymph node biopsy.  Per the records she became hypoxic in the PACU and noted to be persistently hypoxic to 4 L of oxygen which is a change from her baseline.  She does have FLAKITA but does not use her CPAP machine. She was recently sent back to the hospital from December 14-17 th  for left axilla pain and found to have a left axillary  hematoma related to her recent lumpectomy. She was seen by general surgery who recommended resuming warfarin and providing modest infection prophylaxis.She was also treated at this time with cefdinir for a UTI. She then returned and was  admitted to [facility] from 12/19/19-12/31/19 for pneumonia.  The patient has bilateral pleural effusions on CTA and was treated with doxycycline and Keflex for suspected pneumonia.  She completed a course of Cefdinir for UTI outpatient during her hospital stay.  Her procalcitonin and influenza A were negative on hospital admission.  She was also noted to have severe BLE nonpitting edema and her furosemide was increased to 80 mg two times a day for diuresis.  It was recommended that her BMP be followed  bi-weekly for assessment of renal function on increased diuresis.    Today she was seen as a visit to review multiple medical issues. She  Is also being seen for follow- up of lower extremity redness. Her redness was unchanged and her WBC is within normal limits. She is also afebrile.   She did complete Keflex on 1/16/2020 for lower extremity cellulitis.  Her legs were a light red in color.  She had no warmth to the legs.  Her weights were reviewed and have been stable over the last week.  She denied chest pain but does report shortness of breath with exertion..  She is off the Oxygen.  She is moving her bowels and has no issues with urination.   Her left breast incision is healed.  He BMP's have been stable despite high doses of lasix.   Her lung sounds were clear.    She denies any cough or congestion.  She is getting daily lymph wraps. She was seen by podiatry  and her other toenail was removed and new dressing change orders.   .Last A1C 9/2019 was 6.5. She will follow up with Orthopedics today for possible RUE rotator cuff tear    Past Medical History:   Diagnosis Date     (Lower) Leg Localized Swelling     Created by Conversion      Adenocarcinoma In Situ Of The Uterus     Created by Conversion      Backache     Created by Conversion [facility] Annotation: Feb 29 2012 12:14PM - Opal Helm: Referred to  [facility] 9/11, given TENS unit.      Benign Polyps Of The Large Intestine     Created by Conversion      Breast cancer (H)      Cancer (H)     uterine     Chronic kidney disease     stage 1 per H&P 11/22/2019     Coronary artery disease      Diabetes mellitus (H)     type 2     Fatigue     Created by Conversion      Hyperglycemia     Created by Conversion      Hyperlipidemia     Created by Conversion      Hypertension     Created by Conversion      Joint Pain, Localized In The Knee     Created by Conversion      Lesion of mediastinum      Lymphedema     Created by Conversion      Macular Degeneration      Created by Conversion [facility] Annotation: Apr 5 2011 12:22PM - Tien Guzman: Followed by  Ophthalmologist, Dr. Avilez.      Major Depression, Single Episode In Remission     Created by Conversion      Obesity     Created by Conversion      Obstructive Sleep Apnea     CPAP     Osteopenia     Created by Conversion      Paroxysmal Atrial Fibrillation     Created by Conversion [facility] Annotation: Nov 28 2012 10:36PM - Shruthi Dhaliwal: Found incidential  on exam on May 7th, [id number] score of 1 for HTNsymptomatic and hospitalized  x2 in July, 2012.CV X 2 in 2012Chronic Sotalol Rx      Postmenopausal bleeding     Created by Conversion      Skin cancer      Tachycardia     Created by Conversion      Tremor     Created by Conversion      Urge And Stress Incontinence     Created by Conversion      Urinary Frequency More Than Twice At Night (Nocturia)     Created by Conversion      Urine Tests Nonspecific Abnormal Findings     Created by Conversion      Vitamin D Deficiency     Created by Conversion              Family History   Problem Relation Age of Onset     Hypertension Father      Pneumonia Father      Esophageal cancer Brother      Cancer Brother      Stroke Mother      Alzheimer's disease Brother      Cancer Brother      Prostate cancer Brother      Cancer Nephew      Colon cancer Nephew      Cancer Paternal cousin      Social History     Socioeconomic History     Marital status: Single     Spouse name: Not on file     Number of children: 0     Years of education: Not on file     Highest education level: Not on file   Occupational History     Occupation: Retired RN     Employer: [employer]   Social Needs     Financial resource strain: Not on file     Food insecurity:     Worry: Not on file     Inability: Not on file     Transportation needs:     Medical: Not on file     Non-medical: Not on file   Tobacco Use     Smoking status: Former Smoker     Packs/day: 3.00     Years: 40.00     Pack years:  120.00     Types: Cigarettes     Last attempt to quit: 1/1/2005     Years since quitting: 15.0     Smokeless tobacco: Never Used   Substance and Sexual Activity     Alcohol use: Not Currently     Drug use: No     Sexual activity: Never     Partners: Male     Birth control/protection: Surgical, Post-menopausal   Lifestyle     Physical activity:     Days per week: Not on file     Minutes per session: Not on file     Stress: Not on file   Relationships     Social connections:     Talks on phone: Not on file     Gets together: Not on file     Attends Cheondoism service: Not on file     Active member of club or organization: Not on file     Attends meetings of clubs or organizations: Not on file     Relationship status: Not on file     Intimate partner violence:     Fear of current or ex partner: Not on file     Emotionally abused: Not on file     Physically abused: Not on file     Forced sexual activity: Not on file   Other Topics Concern     Not on file   Social History Narrative    Lives alone single family home that she owns, no children and is retired.  Boyfriend used to live with her, however due to stroke he is in his own care facility.  She is currently at [facility]         Review of Systems   Constitutional: Positive for activity change. Negative for appetite change, fatigue and fever.   HENT: Negative for congestion.    Respiratory: Negative for cough and wheezing.         Shortness of breath with exertion   Cardiovascular: Positive for leg swelling. Negative for chest pain.        Lymphedema   Gastrointestinal: Negative for abdominal distention, abdominal pain, constipation, diarrhea and nausea.   Genitourinary: Negative for dysuria.   Musculoskeletal: Positive for arthralgias. Negative for back pain.   Skin: Positive for wound. Negative for color change.        Left breast incision healed   Neurological: Negative for dizziness.   Psychiatric/Behavioral: Negative for agitation, behavioral problems  and confusion.       Vitals:    01/22/20 2046   BP: 119/89   Pulse: 98   Resp: 22   Temp: 97.5  F (36.4  C)   SpO2: 91%   Weight: (!) 349 lb (158.3 kg)       Physical Exam  Constitutional:       Appearance: She is well-developed.      Comments: Pleasant woman in no acute distress   HENT:      Head: Normocephalic.   Eyes:      Conjunctiva/sclera: Conjunctivae normal.   Neck:      Musculoskeletal: Normal range of motion.   Cardiovascular:      Rate and Rhythm: Normal rate and regular rhythm.      Heart sounds: Normal heart sounds. No murmur.   Pulmonary:      Effort: No respiratory distress.      Breath sounds: No rales.   Abdominal:      General: Bowel sounds are normal. There is no distension.      Palpations: Abdomen is soft.      Tenderness: There is no abdominal tenderness.   Musculoskeletal: Normal range of motion.      Right lower leg: Edema present.      Left lower leg: Edema present.      Comments: 3-4+ lower extremities. Lymph wraps resumed    Skin:     General: Skin is warm.      Comments: Healed left breast incision   Neurological:      Mental Status: She is alert and oriented to person, place, and time.   Psychiatric:         Behavior: Behavior normal.           LABS:   Recent Results (from the past 240 hour(s))   Basic Metabolic Panel   Result Value Ref Range    Sodium 142 136 - 145 mmol/L    Potassium 4.0 3.5 - 5.0 mmol/L    Chloride 102 98 - 107 mmol/L    CO2 32 (H) 22 - 31 mmol/L    Anion Gap, Calculation 8 5 - 18 mmol/L    Glucose 125 70 - 125 mg/dL    Calcium 9.2 8.5 - 10.5 mg/dL    BUN 10 8 - 28 mg/dL    Creatinine 1.06 0.60 - 1.10 mg/dL    GFR MDRD Af Amer >60 >60 mL/min/1.73m2    GFR MDRD Non Af Amer 51 (L) >60 mL/min/1.73m2   Basic Metabolic Panel   Result Value Ref Range    Sodium 142 136 - 145 mmol/L    Potassium 3.9 3.5 - 5.0 mmol/L    Chloride 102 98 - 107 mmol/L    CO2 31 22 - 31 mmol/L    Anion Gap, Calculation 9 5 - 18 mmol/L    Glucose 124 70 - 125 mg/dL    Calcium 9.0 8.5 - 10.5 mg/dL     BUN 9 8 - 28 mg/dL    Creatinine 0.92 0.60 - 1.10 mg/dL    GFR MDRD Af Amer >60 >60 mL/min/1.73m2    GFR MDRD Non Af Amer 60 (L) >60 mL/min/1.73m2   INR   Result Value Ref Range    INR 1.71 (H) 0.90 - 1.10   HM2(CBC w/o Differential)   Result Value Ref Range    WBC 10.7 4.0 - 11.0 thou/uL    RBC 4.15 3.80 - 5.40 mill/uL    Hemoglobin 11.1 (L) 12.0 - 16.0 g/dL    Hematocrit 38.3 35.0 - 47.0 %    MCV 92 80 - 100 fL    MCH 26.7 (L) 27.0 - 34.0 pg    MCHC 29.0 (L) 32.0 - 36.0 g/dL    RDW 17.9 (H) 11.0 - 14.5 %    Platelets 176 140 - 440 thou/uL    MPV 11.3 8.5 - 12.5 fL     Current Outpatient Medications   Medication Sig     acetaminophen (TYLENOL) 500 MG tablet Take 1-2 tablets (500-1,000 mg total) by mouth every 4 (four) hours as needed (PAin 1-3).     albuterol (PROVENTIL) 2.5 mg /3 mL (0.083 %) nebulizer solution Take 3 mL (2.5 mg total) by nebulization every 4 (four) hours as needed for wheezing.     bacitracin ointment Apply 1 application topically 2 (two) times a day. After epsom salt foot soak. To bilateral great toes for toenail falling off, until seen by Podiatry     calcium-vitamin D (CALCIUM-VITAMIN D) 500 mg(1,250mg) -200 unit per tablet Take 1 tablet by mouth 2 (two) times a day with meals.     cholecalciferol, vitamin D3, 1,000 unit tablet Take 2,000 Units by mouth daily.      diphenhydrAMINE (BENADRYL) 2 % cream Apply 1 application topically 3 (three) times a day as needed for itching.     diphenhydrAMINE (BENADRYL) 25 mg capsule Take 25 mg by mouth every 6 (six) hours as needed for itching.     fesoterodine (TOVIAZ) 8 mg Tb24 ER tablet Take 8 mg by mouth daily.      fluticasone propionate (FLONASE ALLERGY RELIEF) 50 mcg/actuation nasal spray One spray in each nostril daily     furosemide (LASIX) 80 MG tablet Take 1 tablet (80 mg total) by mouth 2 (two) times a day at 9am and 6pm.     gabapentin (NEURONTIN) 300 MG capsule Take 300 mg by mouth at bedtime.     glucosamine-chondroitin 500-400 mg tablet  Take 1 tablet by mouth 2 (two) times a day with meals.      HYDROcodone-acetaminophen 5-325 mg per tablet Take 1 tablet by mouth every 4 (four) hours as needed for pain.     lidocaine 4 % patch Place 1 patch on the skin daily. Remove and discard patch with 12 hours or as directed by MD.     lidocaine-prilocaine (EMLA) cream Place over port 30 min. before being accessed.     loratadine (CLARITIN) 10 mg tablet Take 1 tablet (10 mg total) by mouth daily.     metoprolol tartrate (LOPRESSOR) 50 MG tablet Take 50 mg by mouth 2 (two) times a day. Hold for SBP < 110     mv-min/FA/vit K/lycop/lut/zeax (OCUVITE EYE PLUS MULTI ORAL) Take 1 tablet by mouth 2 (two) times a day with meals.     NOVOLOG U-100 INSULIN ASPART 100 unit/mL injection Check blood sugar three (3) times daily.  11.65 Type 2 with hyperglycemia     nystatin (MYCOSTATIN) powder Apply 1 application topically 2 (two) times a day. Under breasts for rash, until healed. Then PRN.     OMEGA-3/DHA/EPA/FISH OIL (FISH OIL-OMEGA-3 FATTY ACIDS) 300-1,000 mg capsule Take 2 g by mouth 2 times a day at 6:00 am and 4:00 pm.     omeprazole (PRILOSEC) 20 MG capsule Take 20 mg by mouth daily before breakfast.     polyvinyl alcohol-povidon,PF, (REFRESH CLASSIC) 1.4-0.6 % Dpet ophthalmic dropperette Administer 2 drops to both eyes 3 (three) times a day.     potassium chloride (K-DUR,KLOR-CON) 10 MEQ tablet TAKE 2 TABLETS BY MOUTH TWICE DAILY     prochlorperazine (COMPAZINE) 10 MG tablet TAKE 1 TABLET(10 MG) BY MOUTH EVERY 6 HOURS AS NEEDED FOR NAUSEA     simvastatin (ZOCOR) 20 MG tablet Take 1 tablet (20 mg total) by mouth daily with supper.     warfarin ANTICOAGULANT (COUMADIN/JANTOVEN) 2 MG tablet Take 2 tablets (4 mg total) by mouth daily.       ASSESSMENT:      ICD-10-CM    1. CHF (congestive heart failure), NYHA class I, acute on chronic, combined (H) I50.43    2. Physical deconditioning R53.81    3. Bilateral leg edema R60.0    4. Venous stasis I87.8        PLAN:   Chronic  right shoulder pain x-ray with possible rotator cuff tear- ortho consult today.      Loose toenails seen by podiatry 1/15/19 and loose toenail was removed and she has new dressing change orders     Atrial fibrillation-continue Coumadin,  metoprolol and increased lasix, monitor BMP closely     Venous stasis lower extremities. Pt  will resume lymph wraps M-F and ace wraps on the weekends. .  Blistering lower extremities- new dressing change orders.      Status post left breast lumpectomy incision healed    Pain management Norco PRN     Shortness of breath with exertion. Oxygen PRN    Anticoagulation management continue Coumadin and adjust per INRs    Debility PT OT      Electronically signed by: Sara Mayes CNP

## 2021-06-05 NOTE — PROGRESS NOTES
[facility]    Facility:   [facility] [[id number]]   Code Status: FULL CODE      CHIEF COMPLAINT/REASON FOR VISIT:  Chief Complaint   Patient presents with     Review Of Multiple Medical Conditions       HISTORY:      HPI: Jennifer is a 74 y.o. female undergoing physical and occupational therapy at [facility] transitional care unit. , she is with history of severe obesity, stage Ib left breast cancer, I normal, vitamin D deficiency, urge stress incontinence, tremor, postmenopausal bleeding and uterine adenocarcinoma in situ status post JEROME/BSO, atrial fibrillation, osteopenia, FLAKITA, macular degeneration, chronic bilateral leg edema, hypertension, diabetes mellitus, CAD, chronic back and bilateral knee pain who presented to [facility] for scheduled left breast lumpectomy And sentinel lymph node biopsy.  Per the records she became hypoxic in the PACU and noted to be persistently hypoxic to 4 L of oxygen which is a change from her baseline.  She does have FLAKITA but does not use her CPAP machine. She was recently sent back to the hospital from December 14-17 th  for left axilla pain and found to have a left axillary  hematoma related to her recent lumpectomy. She was seen by general surgery who recommended resuming warfarin and providing modest infection prophylaxis.She was also treated at this time with cefdinir for a UTI. She then returned and was  admitted to [facility] from 12/19/19-12/31/19 for pneumonia.  The patient has bilateral pleural effusions on CTA and was treated with doxycycline and Keflex for suspected pneumonia.  She completed a course of Cefdinir for UTI outpatient during her hospital stay.  Her procalcitonin and influenza A were negative on hospital admission.  She was also noted to have severe BLE nonpitting edema and her furosemide was increased to 80 mg two times a day for diuresis.  It was recommended that her BMP be followed  bi-weekly for assessment of renal function on increased diuresis.    Today she was seen as a visit to review multiple medical issues.   She denied chest pain and reports her breathing is good.  She is also currently off oxygen she reports positive bowel movement and voiding well her left breast incision is healed.  Her INR was reviewed today and was therapeutic at 2.04. no changes in dosing.   Her BMP is stable and being monitored closely due to high  doses of diuretics.   She denies any cough or congestion.  She is with lower extremity edema and tells me she will not be resuming her lymph wraps. Her lower extremities continue to be red and she reports it is increased on right  foot. Keflex extended.  Her BS were reviewed and are elevated. She tells me she was diet controlled.prior. She did  receive a burst of steroids during her recent hospitalization and sent back on a sliding scale. Last A1C 9/2019 was 6.5. She does have a podiatry consult pending  Due to loose great toe nails however her left nail is now off. She is with lower extremity lymphedema and unable to wear the wraps however she also refuses ace wraps.     Past Medical History:   Diagnosis Date     (Lower) Leg Localized Swelling     Created by Conversion      Adenocarcinoma In Situ Of The Uterus     Created by Conversion      Backache     Created by Conversion [facility] Annotation: Feb 29 2012 12:14PM - Opal Helm: Referred to  [facility] 9/11, given TENS unit.      Benign Polyps Of The Large Intestine     Created by Conversion      Breast cancer (H)      Cancer (H)     uterine     Chronic kidney disease     stage 1 per H&P 11/22/2019     Coronary artery disease      Diabetes mellitus (H)     type 2     Fatigue     Created by Conversion      Hyperglycemia     Created by Conversion      Hyperlipidemia     Created by Conversion      Hypertension     Created by Conversion      Joint Pain, Localized In The Knee     Created by Conversion      Lesion  of mediastinum      Lymphedema     Created by Conversion      Macular Degeneration     Created by Conversion [facility] Annotation: Apr 5 2011 12:22PM - Tien Guzman: Followed by  Ophthalmologist, Dr. Avilez.      Major Depression, Single Episode In Remission     Created by Conversion      Obesity     Created by Conversion      Obstructive Sleep Apnea     CPAP     Osteopenia     Created by Conversion      Paroxysmal Atrial Fibrillation     Created by Conversion [employer] [facility] Annotation: Nov 28 2012 10:36PM - Shruthi Dhaliwal: Found incidential  on exam on May 7th, [id number] score of 1 for HTNsymptomatic and hospitalized  x2 in July, 2012.CV X 2 in 2012Chronic Sotalol Rx      Postmenopausal bleeding     Created by Conversion      Skin cancer      Tachycardia     Created by Conversion      Tremor     Created by Conversion      Urge And Stress Incontinence     Created by Conversion      Urinary Frequency More Than Twice At Night (Nocturia)     Created by Conversion      Urine Tests Nonspecific Abnormal Findings     Created by Conversion      Vitamin D Deficiency     Created by Conversion              Family History   Problem Relation Age of Onset     Hypertension Father      Pneumonia Father      Esophageal cancer Brother      Cancer Brother      Stroke Mother      Alzheimer's disease Brother      Cancer Brother      Prostate cancer Brother      Cancer Nephew      Colon cancer Nephew      Cancer Paternal cousin      Social History     Socioeconomic History     Marital status: Single     Spouse name: Not on file     Number of children: 0     Years of education: Not on file     Highest education level: Not on file   Occupational History     Occupation: Retired RN     Employer: [employer]   Social Needs     Financial resource strain: Not on file     Food insecurity:     Worry: Not on file     Inability: Not on file     Transportation needs:     Medical: Not on file     Non-medical: Not on file   Tobacco Use      Smoking status: Former Smoker     Packs/day: 3.00     Years: 40.00     Pack years: 120.00     Types: Cigarettes     Last attempt to quit: 1/1/2005     Years since quitting: 15.0     Smokeless tobacco: Never Used   Substance and Sexual Activity     Alcohol use: Not Currently     Drug use: No     Sexual activity: Never     Partners: Male     Birth control/protection: Surgical, Post-menopausal   Lifestyle     Physical activity:     Days per week: Not on file     Minutes per session: Not on file     Stress: Not on file   Relationships     Social connections:     Talks on phone: Not on file     Gets together: Not on file     Attends Synagogue service: Not on file     Active member of club or organization: Not on file     Attends meetings of clubs or organizations: Not on file     Relationship status: Not on file     Intimate partner violence:     Fear of current or ex partner: Not on file     Emotionally abused: Not on file     Physically abused: Not on file     Forced sexual activity: Not on file   Other Topics Concern     Not on file   Social History Narrative    Lives alone single family home that she owns, no children and is retired.  Boyfriend used to live with her, however due to stroke he is in his own care facility.  She is currently at [facility]         Review of Systems   Constitutional: Positive for activity change. Negative for appetite change, fatigue and fever.   HENT: Negative for congestion.    Respiratory: Negative for cough and wheezing.         Denied shortness of breath-she is off the oxygen   Cardiovascular: Positive for leg swelling. Negative for chest pain.        Lymphedema   Gastrointestinal: Negative for abdominal distention, abdominal pain, constipation, diarrhea and nausea.   Genitourinary: Negative for dysuria.   Musculoskeletal: Positive for arthralgias. Negative for back pain.   Skin: Positive for wound. Negative for color change.        Left breast incision healed    Neurological: Negative for dizziness.   Psychiatric/Behavioral: Negative for agitation, behavioral problems and confusion.       Vitals:    01/09/20 0805   BP: 110/72   Pulse: 88   Resp: 16   Temp: 97.3  F (36.3  C)   SpO2: 94%   Weight: (!) 344 lb 3.2 oz (156.1 kg)       Physical Exam  Constitutional:       Appearance: She is well-developed.      Comments: Pleasant woman in no acute distress   HENT:      Head: Normocephalic.   Eyes:      Conjunctiva/sclera: Conjunctivae normal.   Neck:      Musculoskeletal: Normal range of motion.   Cardiovascular:      Rate and Rhythm: Normal rate and regular rhythm.      Heart sounds: Normal heart sounds. No murmur.   Pulmonary:      Effort: No respiratory distress.      Breath sounds: No rales.   Abdominal:      General: Bowel sounds are normal. There is no distension.      Palpations: Abdomen is soft.      Tenderness: There is no abdominal tenderness.   Musculoskeletal: Normal range of motion.      Right lower leg: Edema present.      Left lower leg: Edema present.      Comments: Patient with a right   great toenails that is almost completely lifted off and hanging on by the cuticle. Her left toe great toenail has fallen off.  Podiatry consult rescheduled    3-4+ lower extremities. Declines ace wraps    Skin:     General: Skin is warm.      Comments: Healed left breast incision   Neurological:      Mental Status: She is alert and oriented to person, place, and time.   Psychiatric:         Behavior: Behavior normal.           LABS:   Recent Results (from the past 240 hour(s))   POCT Glucose   Result Value Ref Range    Glucose 125 70 - 139 mg/dL   POCT Glucose   Result Value Ref Range    Glucose 242 (H) 70 - 139 mg/dL   POCT Glucose   Result Value Ref Range    Glucose 299 (H) 70 - 139 mg/dL   Potassium - Next AM   Result Value Ref Range    Potassium 3.7 3.5 - 5.0 mmol/L   POCT Glucose   Result Value Ref Range    Glucose 130 70 - 139 mg/dL   INR   Result Value Ref Range    INR  3.44 (H) 0.90 - 1.10   Basic Metabolic Panel   Result Value Ref Range    Sodium 140 136 - 145 mmol/L    Potassium 3.7 3.5 - 5.0 mmol/L    Chloride 99 98 - 107 mmol/L    CO2 33 (H) 22 - 31 mmol/L    Anion Gap, Calculation 8 5 - 18 mmol/L    Glucose 149 (H) 70 - 125 mg/dL    Calcium 9.6 8.5 - 10.5 mg/dL    BUN 18 8 - 28 mg/dL    Creatinine 1.13 (H) 0.60 - 1.10 mg/dL    GFR MDRD Af Amer 57 (L) >60 mL/min/1.73m2    GFR MDRD Non Af Amer 47 (L) >60 mL/min/1.73m2   INR   Result Value Ref Range    INR 2.76 (H) 0.90 - 1.10   Basic Metabolic Panel   Result Value Ref Range    Sodium 140 136 - 145 mmol/L    Potassium 3.8 3.5 - 5.0 mmol/L    Chloride 100 98 - 107 mmol/L    CO2 33 (H) 22 - 31 mmol/L    Anion Gap, Calculation 7 5 - 18 mmol/L    Glucose 134 (H) 70 - 125 mg/dL    Calcium 8.9 8.5 - 10.5 mg/dL    BUN 13 8 - 28 mg/dL    Creatinine 1.10 0.60 - 1.10 mg/dL    GFR MDRD Af Amer 59 (L) >60 mL/min/1.73m2    GFR MDRD Non Af Amer 49 (L) >60 mL/min/1.73m2   HM2(CBC w/o Differential)   Result Value Ref Range    WBC 10.3 4.0 - 11.0 thou/uL    RBC 4.08 3.80 - 5.40 mill/uL    Hemoglobin 11.5 (L) 12.0 - 16.0 g/dL    Hematocrit 39.5 35.0 - 47.0 %    MCV 97 80 - 100 fL    MCH 28.2 27.0 - 34.0 pg    MCHC 29.1 (L) 32.0 - 36.0 g/dL    RDW 18.9 (H) 11.0 - 14.5 %    Platelets 172 140 - 440 thou/uL    MPV 11.1 8.5 - 12.5 fL   INR   Result Value Ref Range    INR 2.19 (H) 0.90 - 1.10   INR   Result Value Ref Range    INR 2.04 (H) 0.90 - 1.10   Basic Metabolic Panel   Result Value Ref Range    Sodium 141 136 - 145 mmol/L    Potassium 4.0 3.5 - 5.0 mmol/L    Chloride 103 98 - 107 mmol/L    CO2 32 (H) 22 - 31 mmol/L    Anion Gap, Calculation 6 5 - 18 mmol/L    Glucose 142 (H) 70 - 125 mg/dL    Calcium 8.9 8.5 - 10.5 mg/dL    BUN 11 8 - 28 mg/dL    Creatinine 1.08 0.60 - 1.10 mg/dL    GFR MDRD Af Amer 60 (L) >60 mL/min/1.73m2    GFR MDRD Non Af Amer 50 (L) >60 mL/min/1.73m2     Current Outpatient Medications   Medication Sig     acetaminophen  (TYLENOL) 500 MG tablet Take 1-2 tablets (500-1,000 mg total) by mouth every 4 (four) hours as needed (PAin 1-3).     albuterol (PROVENTIL) 2.5 mg /3 mL (0.083 %) nebulizer solution Take 3 mL (2.5 mg total) by nebulization every 4 (four) hours as needed for wheezing.     bacitracin ointment Apply 1 application topically 2 (two) times a day. After epsom salt foot soak. To bilateral great toes for toenail falling off, until seen by Podiatry     calcium-vitamin D (CALCIUM-VITAMIN D) 500 mg(1,250mg) -200 unit per tablet Take 1 tablet by mouth 2 (two) times a day with meals.     cholecalciferol, vitamin D3, 1,000 unit tablet Take 2,000 Units by mouth daily.      diphenhydrAMINE (BENADRYL) 2 % cream Apply 1 application topically 3 (three) times a day as needed for itching.     diphenhydrAMINE (BENADRYL) 25 mg capsule Take 25 mg by mouth every 6 (six) hours as needed for itching.     fesoterodine (TOVIAZ) 8 mg Tb24 ER tablet Take 8 mg by mouth daily.      fluticasone propionate (FLONASE ALLERGY RELIEF) 50 mcg/actuation nasal spray One spray in each nostril daily     furosemide (LASIX) 80 MG tablet Take 1 tablet (80 mg total) by mouth 2 (two) times a day at 9am and 6pm.     gabapentin (NEURONTIN) 300 MG capsule Take 300 mg by mouth at bedtime.     glucosamine-chondroitin 500-400 mg tablet Take 1 tablet by mouth 2 (two) times a day with meals.      HYDROcodone-acetaminophen 5-325 mg per tablet Take 1 tablet by mouth every 4 (four) hours as needed for pain.     lidocaine 4 % patch Place 1 patch on the skin daily. Remove and discard patch with 12 hours or as directed by MD.     lidocaine-prilocaine (EMLA) cream Place over port 30 min. before being accessed.     loratadine (CLARITIN) 10 mg tablet Take 1 tablet (10 mg total) by mouth daily.     metoprolol tartrate (LOPRESSOR) 50 MG tablet Take 50 mg by mouth 2 (two) times a day. Hold for SBP < 110     mv-min/FA/vit K/lycop/lut/zeax (OCUVITE EYE PLUS MULTI ORAL) Take 1 tablet by  mouth 2 (two) times a day with meals.     NOVOLOG U-100 INSULIN ASPART 100 unit/mL injection Check blood sugar three (3) times daily.  11.65 Type 2 with hyperglycemia     nystatin (MYCOSTATIN) powder Apply 1 application topically 2 (two) times a day. Under breasts for rash, until healed. Then PRN.     OMEGA-3/DHA/EPA/FISH OIL (FISH OIL-OMEGA-3 FATTY ACIDS) 300-1,000 mg capsule Take 2 g by mouth 2 times a day at 6:00 am and 4:00 pm.     omeprazole (PRILOSEC) 20 MG capsule Take 20 mg by mouth daily before breakfast.     polyvinyl alcohol-povidon,PF, (REFRESH CLASSIC) 1.4-0.6 % Dpet ophthalmic dropperette Administer 2 drops to both eyes 3 (three) times a day.     potassium chloride (K-DUR,KLOR-CON) 10 MEQ tablet TAKE 2 TABLETS BY MOUTH TWICE DAILY     prochlorperazine (COMPAZINE) 10 MG tablet TAKE 1 TABLET(10 MG) BY MOUTH EVERY 6 HOURS AS NEEDED FOR NAUSEA     simvastatin (ZOCOR) 20 MG tablet Take 1 tablet (20 mg total) by mouth daily with supper.     triamcinolone (KENALOG) 0.1 % cream Apply 1 application topically 2 (two) times a day. To left leg for rash, until healed     warfarin ANTICOAGULANT (COUMADIN/JANTOVEN) 2 MG tablet Take 2 tablets (4 mg total) by mouth daily.       ASSESSMENT:      ICD-10-CM    1. Physical deconditioning R53.81    2. Anticoagulation management encounter Z51.81     Z79.01    3. CHF (congestive heart failure), NYHA class I, acute on chronic, combined (H) I50.43    4. Type 2 diabetes mellitus with other specified complication, without long-term current use of insulin (H) E11.69        PLAN:   Chronic right shoulder pain x-ray with possible rotator cuff tear- ortho consult     Loose toenails continue foot soaks, bacitracin  and dressing changes per orders, podiatry consult    Atrial fibrillation-continue Coumadin,  metoprolol and increased lasix, monitor BMP closely     Venous stasis lower extremities unable to wear lymph wraps due to cellulitis and refused ace wraps   .   Status post left  breast lumpectomy incision healed    Pain management Norco PRN     Shortness of breath resolved off oxygen    Anticoagulation management continue Coumadin and adjust per INRs    Debility PT OT    Increased redness right foot- keflex x 7 days     Electronically signed by: Sara Mayes CNP

## 2021-06-05 NOTE — PROGRESS NOTES
[facility]    Facility:   Formerly named [facility] [[id number]]   Code Status: FULL CODE      CHIEF COMPLAINT/REASON FOR VISIT:  Chief Complaint   Patient presents with     Review Of Multiple Medical Conditions       HISTORY:      HPI: Jennifer is a 74 y.o. female undergoing physical and occupational therapy at [facility] transitional care unit. , she is with history of severe obesity, stage Ib left breast cancer, I normal, vitamin D deficiency, urge stress incontinence, tremor, postmenopausal bleeding and uterine adenocarcinoma in situ status post JEROME/BSO, atrial fibrillation, osteopenia, FLAKITA, macular degeneration, chronic bilateral leg edema, hypertension, diabetes mellitus, CAD, chronic back and bilateral knee pain who presented to [facility] for scheduled left breast lumpectomy And sentinel lymph node biopsy.  Per the records she became hypoxic in the PACU and noted to be persistently hypoxic to 4 L of oxygen which is a change from her baseline.  She does have FLAKITA but does not use her CPAP machine. She was recently sent back to the hospital from December 14-17 th  for left axilla pain and found to have a left axillary  hematoma related to her recent lumpectomy. She was seen by general surgery who recommended resuming warfarin and providing modest infection prophylaxis.She was also treated at this time with cefdinir for a UTI. She then returned and was  admitted to [facility] from 12/19/19-12/31/19 for pneumonia.  The patient has bilateral pleural effusions on CTA and was treated with doxycycline and Keflex for suspected pneumonia.  She completed a course of Cefdinir for UTI outpatient during her hospital stay.  Her procalcitonin and influenza A were negative on hospital admission.  She was also noted to have severe BLE nonpitting edema and her furosemide was increased to 80 mg two times a day for diuresis.  It was recommended that her BMP be followed  bi-weekly for assessment of renal function on increased diuresis.    Today she was seen as a visit to review multiple medical issues.  She verbalized that she felt her redness was increasing on her lower extremities.  She did complete Keflex on 1/16/2020 for lower extremity cellulitis.  Her legs were a light red in color.  She had no warmth to the legs she is afebrile and last WBC was within normal limits however a CBC was ordered for the a.m. and if elevated or worsening of her legs tomorrow she will be started on doxycycline.  Her weights were reviewed and she was down 2 pounds in the last 4 days however today she is up 0.7 pounds. she denied chest pain but does report shortness of breath with exertion..  She is off the  Oxygen.  she reports positive bowel movement and voiding well.  Her left breast incision is healed.  Her BMP checked on 1/15/20 is stable despite high doses of lasix.  Her lung sounds were clear.    She denies any cough or congestion.  She is getting daily lymph wraps. She was seen by podiatry  and her other toenail was removed and new dressing change orders.  She recently completed antibx for lower extremity cellulitis. She does also have lower extremity blistering.  .Last A1C 9/2019 was 6.5.    Past Medical History:   Diagnosis Date     (Lower) Leg Localized Swelling     Created by Conversion      Adenocarcinoma In Situ Of The Uterus     Created by Conversion      Backache     Created by Conversion [facility] Annotation: Feb 29 2012 12:14PM - Opal Helm: Referred to  [facility] 9/11, given TENS unit.      Benign Polyps Of The Large Intestine     Created by Conversion      Breast cancer (H)      Cancer (H)     uterine     Chronic kidney disease     stage 1 per H&P 11/22/2019     Coronary artery disease      Diabetes mellitus (H)     type 2     Fatigue     Created by Conversion      Hyperglycemia     Created by Conversion      Hyperlipidemia     Created by Conversion      Hypertension      Created by Conversion      Joint Pain, Localized In The Knee     Created by Conversion      Lesion of mediastinum      Lymphedema     Created by Conversion      Macular Degeneration     Created by Conversion [facility] Annotation: Apr 5 2011 12:22PM - Tien Guzman: Followed by  Ophthalmologist, Dr. Avilez.      Major Depression, Single Episode In Remission     Created by Conversion      Obesity     Created by Conversion      Obstructive Sleep Apnea     CPAP     Osteopenia     Created by Conversion      Paroxysmal Atrial Fibrillation     Created by Conversion [facility] Annotation: Nov 28 2012 10:36PM - Shruthi Dhaliwal: Found incidential  on exam on May 7th, [id number] score of 1 for HTNsymptomatic and hospitalized  x2 in July, 2012.CV X 2 in 2012Chronic Sotalol Rx      Postmenopausal bleeding     Created by Conversion      Skin cancer      Tachycardia     Created by Conversion      Tremor     Created by Conversion      Urge And Stress Incontinence     Created by Conversion      Urinary Frequency More Than Twice At Night (Nocturia)     Created by Conversion      Urine Tests Nonspecific Abnormal Findings     Created by Conversion      Vitamin D Deficiency     Created by Conversion              Family History   Problem Relation Age of Onset     Hypertension Father      Pneumonia Father      Esophageal cancer Brother      Cancer Brother      Stroke Mother      Alzheimer's disease Brother      Cancer Brother      Prostate cancer Brother      Cancer Nephew      Colon cancer Nephew      Cancer Paternal cousin      Social History     Socioeconomic History     Marital status: Single     Spouse name: Not on file     Number of children: 0     Years of education: Not on file     Highest education level: Not on file   Occupational History     Occupation: Retired RN     Employer: [employer]   Social Needs     Financial resource strain: Not on file     Food insecurity:     Worry: Not on file     Inability: Not on  file     Transportation needs:     Medical: Not on file     Non-medical: Not on file   Tobacco Use     Smoking status: Former Smoker     Packs/day: 3.00     Years: 40.00     Pack years: 120.00     Types: Cigarettes     Last attempt to quit: 1/1/2005     Years since quitting: 15.0     Smokeless tobacco: Never Used   Substance and Sexual Activity     Alcohol use: Not Currently     Drug use: No     Sexual activity: Never     Partners: Male     Birth control/protection: Surgical, Post-menopausal   Lifestyle     Physical activity:     Days per week: Not on file     Minutes per session: Not on file     Stress: Not on file   Relationships     Social connections:     Talks on phone: Not on file     Gets together: Not on file     Attends [employer] service: Not on file     Active member of club or organization: Not on file     Attends meetings of clubs or organizations: Not on file     Relationship status: Not on file     Intimate partner violence:     Fear of current or ex partner: Not on file     Emotionally abused: Not on file     Physically abused: Not on file     Forced sexual activity: Not on file   Other Topics Concern     Not on file   Social History Narrative    Lives alone single family home that she owns, no children and is retired.  Boyfriend used to live with her, however due to stroke he is in his own care facility.  She is currently at [facility]         Review of Systems   Constitutional: Positive for activity change. Negative for appetite change, fatigue and fever.   HENT: Negative for congestion.    Respiratory: Negative for cough and wheezing.         Shortness of breath with exertion   Cardiovascular: Positive for leg swelling. Negative for chest pain.        Lymphedema   Gastrointestinal: Negative for abdominal distention, abdominal pain, constipation, diarrhea and nausea.   Genitourinary: Negative for dysuria.   Musculoskeletal: Positive for arthralgias. Negative for back pain.   Skin:  Positive for wound. Negative for color change.        Left breast incision healed   Neurological: Negative for dizziness.   Psychiatric/Behavioral: Negative for agitation, behavioral problems and confusion.       Vitals:    01/20/20 0857   BP: 130/86   Pulse: 95   Resp: 18   Temp: 97.4  F (36.3  C)   SpO2: 94%   Weight: (!) 348 lb 12.8 oz (158.2 kg)       Physical Exam  Constitutional:       Appearance: She is well-developed.      Comments: Pleasant woman in no acute distress   HENT:      Head: Normocephalic.   Eyes:      Conjunctiva/sclera: Conjunctivae normal.   Neck:      Musculoskeletal: Normal range of motion.   Cardiovascular:      Rate and Rhythm: Normal rate and regular rhythm.      Heart sounds: Normal heart sounds. No murmur.   Pulmonary:      Effort: No respiratory distress.      Breath sounds: No rales.   Abdominal:      General: Bowel sounds are normal. There is no distension.      Palpations: Abdomen is soft.      Tenderness: There is no abdominal tenderness.   Musculoskeletal: Normal range of motion.      Right lower leg: Edema present.      Left lower leg: Edema present.      Comments: 3-4+ lower extremities. Lymph wraps resumed    Skin:     General: Skin is warm.      Comments: Healed left breast incision   Neurological:      Mental Status: She is alert and oriented to person, place, and time.   Psychiatric:         Behavior: Behavior normal.           LABS:   Recent Results (from the past 240 hour(s))   Basic Metabolic Panel   Result Value Ref Range    Sodium 142 136 - 145 mmol/L    Potassium 4.0 3.5 - 5.0 mmol/L    Chloride 102 98 - 107 mmol/L    CO2 32 (H) 22 - 31 mmol/L    Anion Gap, Calculation 8 5 - 18 mmol/L    Glucose 125 70 - 125 mg/dL    Calcium 9.2 8.5 - 10.5 mg/dL    BUN 10 8 - 28 mg/dL    Creatinine 1.06 0.60 - 1.10 mg/dL    GFR MDRD Af Amer >60 >60 mL/min/1.73m2    GFR MDRD Non Af Amer 51 (L) >60 mL/min/1.73m2   Basic Metabolic Panel   Result Value Ref Range    Sodium 142 136 - 145  mmol/L    Potassium 3.9 3.5 - 5.0 mmol/L    Chloride 102 98 - 107 mmol/L    CO2 31 22 - 31 mmol/L    Anion Gap, Calculation 9 5 - 18 mmol/L    Glucose 124 70 - 125 mg/dL    Calcium 9.0 8.5 - 10.5 mg/dL    BUN 9 8 - 28 mg/dL    Creatinine 0.92 0.60 - 1.10 mg/dL    GFR MDRD Af Amer >60 >60 mL/min/1.73m2    GFR MDRD Non Af Amer 60 (L) >60 mL/min/1.73m2   INR   Result Value Ref Range    INR 1.71 (H) 0.90 - 1.10     Current Outpatient Medications   Medication Sig     acetaminophen (TYLENOL) 500 MG tablet Take 1-2 tablets (500-1,000 mg total) by mouth every 4 (four) hours as needed (PAin 1-3).     albuterol (PROVENTIL) 2.5 mg /3 mL (0.083 %) nebulizer solution Take 3 mL (2.5 mg total) by nebulization every 4 (four) hours as needed for wheezing.     bacitracin ointment Apply 1 application topically 2 (two) times a day. After epsom salt foot soak. To bilateral great toes for toenail falling off, until seen by Podiatry     calcium-vitamin D (CALCIUM-VITAMIN D) 500 mg(1,250mg) -200 unit per tablet Take 1 tablet by mouth 2 (two) times a day with meals.     cholecalciferol, vitamin D3, 1,000 unit tablet Take 2,000 Units by mouth daily.      diphenhydrAMINE (BENADRYL) 2 % cream Apply 1 application topically 3 (three) times a day as needed for itching.     diphenhydrAMINE (BENADRYL) 25 mg capsule Take 25 mg by mouth every 6 (six) hours as needed for itching.     fesoterodine (TOVIAZ) 8 mg Tb24 ER tablet Take 8 mg by mouth daily.      fluticasone propionate (FLONASE ALLERGY RELIEF) 50 mcg/actuation nasal spray One spray in each nostril daily     furosemide (LASIX) 80 MG tablet Take 1 tablet (80 mg total) by mouth 2 (two) times a day at 9am and 6pm.     gabapentin (NEURONTIN) 300 MG capsule Take 300 mg by mouth at bedtime.     glucosamine-chondroitin 500-400 mg tablet Take 1 tablet by mouth 2 (two) times a day with meals.      HYDROcodone-acetaminophen 5-325 mg per tablet Take 1 tablet by mouth every 4 (four) hours as needed for  pain.     lidocaine 4 % patch Place 1 patch on the skin daily. Remove and discard patch with 12 hours or as directed by MD.     lidocaine-prilocaine (EMLA) cream Place over port 30 min. before being accessed.     loratadine (CLARITIN) 10 mg tablet Take 1 tablet (10 mg total) by mouth daily.     metoprolol tartrate (LOPRESSOR) 50 MG tablet Take 50 mg by mouth 2 (two) times a day. Hold for SBP < 110     mv-min/FA/vit K/lycop/lut/zeax (OCUVITE EYE PLUS MULTI ORAL) Take 1 tablet by mouth 2 (two) times a day with meals.     NOVOLOG U-100 INSULIN ASPART 100 unit/mL injection Check blood sugar three (3) times daily.  11.65 Type 2 with hyperglycemia     nystatin (MYCOSTATIN) powder Apply 1 application topically 2 (two) times a day. Under breasts for rash, until healed. Then PRN.     OMEGA-3/DHA/EPA/FISH OIL (FISH OIL-OMEGA-3 FATTY ACIDS) 300-1,000 mg capsule Take 2 g by mouth 2 times a day at 6:00 am and 4:00 pm.     omeprazole (PRILOSEC) 20 MG capsule Take 20 mg by mouth daily before breakfast.     polyvinyl alcohol-povidon,PF, (REFRESH CLASSIC) 1.4-0.6 % Dpet ophthalmic dropperette Administer 2 drops to both eyes 3 (three) times a day.     potassium chloride (K-DUR,KLOR-CON) 10 MEQ tablet TAKE 2 TABLETS BY MOUTH TWICE DAILY     prochlorperazine (COMPAZINE) 10 MG tablet TAKE 1 TABLET(10 MG) BY MOUTH EVERY 6 HOURS AS NEEDED FOR NAUSEA     simvastatin (ZOCOR) 20 MG tablet Take 1 tablet (20 mg total) by mouth daily with supper.     warfarin ANTICOAGULANT (COUMADIN/JANTOVEN) 2 MG tablet Take 2 tablets (4 mg total) by mouth daily.       ASSESSMENT:      ICD-10-CM    1. CHF (congestive heart failure), NYHA class I, acute on chronic, combined (H) I50.43    2. Bilateral leg edema R60.0    3. Venous stasis I87.8        PLAN:   Chronic right shoulder pain x-ray with possible rotator cuff tear- ortho consult     Loose toenails seen by podiatry 1/15/19 and loose toenail was removed and she has new dressing change orders     Atrial  fibrillation-continue Coumadin,  metoprolol and increased lasix, monitor BMP closely     Venous stasis lower extremities. Pt  will resume lymph wraps M-F and ace wraps on the weekends. .  Blistering lower extremities- new dressing change orders.      Status post left breast lumpectomy incision healed    Pain management Norco PRN     Shortness of breath with exertion. Oxygen PRN    Anticoagulation management continue Coumadin and adjust per INRs    Debility PT OT      Electronically signed by: Sara Mayes CNP

## 2021-06-05 NOTE — PROGRESS NOTES
[facility]    Facility:   [facility] [[id number]]   Code Status: FULL CODE      CHIEF COMPLAINT/REASON FOR VISIT:  Chief Complaint   Patient presents with     Review Of Multiple Medical Conditions       HISTORY:      HPI: Jennifer is a 74 y.o. female undergoing physical and occupational therapy at [facility] transitional care unit. , she is with history of severe obesity, stage Ib left breast cancer, I normal, vitamin D deficiency, urge stress incontinence, tremor, postmenopausal bleeding and uterine adenocarcinoma in situ status post JEROME/BSO, atrial fibrillation, osteopenia, FLAKITA, macular degeneration, chronic bilateral leg edema, hypertension, diabetes mellitus, CAD, chronic back and bilateral knee pain who presented to [facility] for scheduled left breast lumpectomy And sentinel lymph node biopsy.  Per the records she became hypoxic in the PACU and noted to be persistently hypoxic to 4 L of oxygen which is a change from her baseline.  She does have FLAKITA but does not use her CPAP machine. She was recently sent back to the hospital from December 14-17 th  for left axilla pain and found to have a left axillary  hematoma related to her recent lumpectomy. She was seen by general surgery who recommended resuming warfarin and providing modest infection prophylaxis.She was also treated at this time with cefdinir for a UTI. She then returned and was  admitted to [facility] from 12/19/19-12/31/19 for pneumonia.  The patient has bilateral pleural effusions on CTA and was treated with doxycycline and Keflex for suspected pneumonia.  She completed a course of Cefdinir for UTI outpatient during her hospital stay.  Her procalcitonin and influenza A were negative on hospital admission.  She was also noted to have severe BLE nonpitting edema and her furosemide was increased to 80 mg two times a day for diuresis.  It was recommended that her BMP be followed  bi-weekly for assessment of renal function on increased diuresis.    Today she was seen for reports of a rash.  She was found to have a rash on her back chest abdomen and slightly on her arms.  She did just recently follow-up with orthopedics regarding a possible rotator  Cuff tear  and was given a cortisone shot.  In review of her meds this was her only recent new med.  She denies any shortness of breath related to the rash or closing of her airway.  She was given Benadryl x2 over the weekend and tells me she did not feel it worked well for her.  I did order her some triamcinolone for her rash and also switched her Benadryl to hydroxyzine.  She was able to lift that arm completely overhead for me today.  In review of her weights she is down 6 pounds over the last 3 days she continues to get lymph wraps per therapy.    She denied chest pain but does report shortness of breath with exertion..  She is off the Oxygen.  She is moving her bowels and has no issues with urination.   Her left breast incision is healed.  He BMP's have been stable despite high doses of lasix.   Her lung sounds were clear.    She denies any cough or congestion.   She was seen by podiatry  and her other toenail was removed and new dressing change orders.   .Last A1C 9/2019 was 6.5.     Past Medical History:   Diagnosis Date     (Lower) Leg Localized Swelling     Created by Conversion      Adenocarcinoma In Situ Of The Uterus     Created by Conversion      Backache     Created by Conversion [facility] Annotation: Feb 29 2012 12:14PM - Opal Helm: Referred to  [facility] 9/11, given TENS unit.      Benign Polyps Of The Large Intestine     Created by Conversion      Breast cancer (H)      Cancer (H)     uterine     Chronic kidney disease     stage 1 per H&P 11/22/2019     Coronary artery disease      Diabetes mellitus (H)     type 2     Fatigue     Created by Conversion      Hyperglycemia     Created by Conversion      Hyperlipidemia      Created by Conversion      Hypertension     Created by Conversion      Joint Pain, Localized In The Knee     Created by Conversion      Lesion of mediastinum      Lymphedema     Created by Conversion      Macular Degeneration     Created by Conversion [employer] [facility] Annotation: Apr 5 2011 12:22PM - Tien Guzman: Followed by  Ophthalmologist, Dr. Avilez.      Major Depression, Single Episode In Remission     Created by Conversion      Obesity     Created by Conversion      Obstructive Sleep Apnea     CPAP     Osteopenia     Created by Conversion      Paroxysmal Atrial Fibrillation     Created by Conversion [employer] [facility] Annotation: Nov 28 2012 10:36PM - Shruthi Dhaliwal: Found incidential  on exam on May 7th, [id number] score of 1 for HTNsymptomatic and hospitalized  x2 in July, 2012.CV X 2 in 2012Chronic Sotalol Rx      Postmenopausal bleeding     Created by Conversion      Skin cancer      Tachycardia     Created by Conversion      Tremor     Created by Conversion      Urge And Stress Incontinence     Created by Conversion      Urinary Frequency More Than Twice At Night (Nocturia)     Created by Conversion      Urine Tests Nonspecific Abnormal Findings     Created by Conversion      Vitamin D Deficiency     Created by Conversion              Family History   Problem Relation Age of Onset     Hypertension Father      Pneumonia Father      Esophageal cancer Brother      Cancer Brother      Stroke Mother      Alzheimer's disease Brother      Cancer Brother      Prostate cancer Brother      Cancer Nephew      Colon cancer Nephew      Cancer Paternal cousin      Social History     Socioeconomic History     Marital status: Single     Spouse name: Not on file     Number of children: 0     Years of education: Not on file     Highest education level: Not on file   Occupational History     Occupation: Retired RN     Employer: [employer]   Social Needs     Financial resource strain: Not on file     Food insecurity:      Worry: Not on file     Inability: Not on file     Transportation needs:     Medical: Not on file     Non-medical: Not on file   Tobacco Use     Smoking status: Former Smoker     Packs/day: 3.00     Years: 40.00     Pack years: 120.00     Types: Cigarettes     Last attempt to quit: 1/1/2005     Years since quitting: 15.0     Smokeless tobacco: Never Used   Substance and Sexual Activity     Alcohol use: Not Currently     Drug use: No     Sexual activity: Never     Partners: Male     Birth control/protection: Surgical, Post-menopausal   Lifestyle     Physical activity:     Days per week: Not on file     Minutes per session: Not on file     Stress: Not on file   Relationships     Social connections:     Talks on phone: Not on file     Gets together: Not on file     Attends Sabianist service: Not on file     Active member of club or organization: Not on file     Attends meetings of clubs or organizations: Not on file     Relationship status: Not on file     Intimate partner violence:     Fear of current or ex partner: Not on file     Emotionally abused: Not on file     Physically abused: Not on file     Forced sexual activity: Not on file   Other Topics Concern     Not on file   Social History Narrative    Lives alone single family home that she owns, no children and is retired.  Boyfriend used to live with her, however due to stroke he is in his own care facility.  She is currently at [facility]         Review of Systems   Constitutional: Positive for activity change. Negative for appetite change, fatigue and fever.   HENT: Negative for congestion.    Respiratory: Negative for cough and wheezing.         Shortness of breath with exertion   Cardiovascular: Positive for leg swelling. Negative for chest pain.        Lymphedema   Gastrointestinal: Negative for abdominal distention, abdominal pain, constipation, diarrhea and nausea.   Genitourinary: Negative for dysuria.   Musculoskeletal: Positive for  arthralgias. Negative for back pain.   Skin: Positive for wound. Negative for color change.        Left breast incision healed   Neurological: Negative for dizziness.   Psychiatric/Behavioral: Negative for agitation, behavioral problems and confusion.       Vitals:    01/27/20 0922   BP: 126/75   Pulse: 93   Resp: 18   Temp: 98  F (36.7  C)   SpO2: 91%   Weight: (!) 345 lb 8 oz (156.7 kg)       Physical Exam  Constitutional:       Appearance: She is well-developed.      Comments: Pleasant woman in no acute distress   HENT:      Head: Normocephalic.   Eyes:      Conjunctiva/sclera: Conjunctivae normal.   Neck:      Musculoskeletal: Normal range of motion.   Cardiovascular:      Rate and Rhythm: Normal rate and regular rhythm.      Heart sounds: Normal heart sounds. No murmur.   Pulmonary:      Effort: No respiratory distress.      Breath sounds: No rales.   Abdominal:      General: Bowel sounds are normal. There is no distension.      Palpations: Abdomen is soft.      Tenderness: There is no abdominal tenderness.   Musculoskeletal: Normal range of motion.      Right lower leg: Edema present.      Left lower leg: Edema present.      Comments: 3-4+ lower extremities. Lymph wraps resumed    Skin:     General: Skin is warm.      Findings: Rash present.      Comments: Healed left breast incision   Neurological:      Mental Status: She is alert and oriented to person, place, and time.   Psychiatric:         Behavior: Behavior normal.           LABS:   Recent Results (from the past 240 hour(s))   HM2(CBC w/o Differential)   Result Value Ref Range    WBC 10.7 4.0 - 11.0 thou/uL    RBC 4.15 3.80 - 5.40 mill/uL    Hemoglobin 11.1 (L) 12.0 - 16.0 g/dL    Hematocrit 38.3 35.0 - 47.0 %    MCV 92 80 - 100 fL    MCH 26.7 (L) 27.0 - 34.0 pg    MCHC 29.0 (L) 32.0 - 36.0 g/dL    RDW 17.9 (H) 11.0 - 14.5 %    Platelets 176 140 - 440 thou/uL    MPV 11.3 8.5 - 12.5 fL   INR   Result Value Ref Range    INR 1.70 (H) 0.90 - 1.10   Basic  Metabolic Panel   Result Value Ref Range    Sodium 142 136 - 145 mmol/L    Potassium 4.0 3.5 - 5.0 mmol/L    Chloride 102 98 - 107 mmol/L    CO2 32 (H) 22 - 31 mmol/L    Anion Gap, Calculation 8 5 - 18 mmol/L    Glucose 123 70 - 125 mg/dL    Calcium 9.1 8.5 - 10.5 mg/dL    BUN 14 8 - 28 mg/dL    Creatinine 1.02 0.60 - 1.10 mg/dL    GFR MDRD Af Amer >60 >60 mL/min/1.73m2    GFR MDRD Non Af Amer 53 (L) >60 mL/min/1.73m2     Current Outpatient Medications   Medication Sig     acetaminophen (TYLENOL) 500 MG tablet Take 1-2 tablets (500-1,000 mg total) by mouth every 4 (four) hours as needed (PAin 1-3).     albuterol (PROVENTIL) 2.5 mg /3 mL (0.083 %) nebulizer solution Take 3 mL (2.5 mg total) by nebulization every 4 (four) hours as needed for wheezing.     bacitracin ointment Apply 1 application topically 2 (two) times a day. After epsom salt foot soak. To bilateral great toes for toenail falling off, until seen by Podiatry     calcium-vitamin D (CALCIUM-VITAMIN D) 500 mg(1,250mg) -200 unit per tablet Take 1 tablet by mouth 2 (two) times a day with meals.     cholecalciferol, vitamin D3, 1,000 unit tablet Take 2,000 Units by mouth daily.      diphenhydrAMINE (BENADRYL) 2 % cream Apply 1 application topically 3 (three) times a day as needed for itching.     diphenhydrAMINE (BENADRYL) 25 mg capsule Take 25 mg by mouth every 6 (six) hours as needed for itching.     fesoterodine (TOVIAZ) 8 mg Tb24 ER tablet Take 8 mg by mouth daily.      fluticasone propionate (FLONASE ALLERGY RELIEF) 50 mcg/actuation nasal spray One spray in each nostril daily     furosemide (LASIX) 80 MG tablet Take 1 tablet (80 mg total) by mouth 2 (two) times a day at 9am and 6pm.     gabapentin (NEURONTIN) 300 MG capsule Take 300 mg by mouth at bedtime.     glucosamine-chondroitin 500-400 mg tablet Take 1 tablet by mouth 2 (two) times a day with meals.      HYDROcodone-acetaminophen 5-325 mg per tablet Take 1 tablet by mouth every 4 (four) hours as  needed for pain.     lidocaine 4 % patch Place 1 patch on the skin daily. Remove and discard patch with 12 hours or as directed by MD.     lidocaine-prilocaine (EMLA) cream Place over port 30 min. before being accessed.     loratadine (CLARITIN) 10 mg tablet Take 1 tablet (10 mg total) by mouth daily.     metoprolol tartrate (LOPRESSOR) 50 MG tablet Take 50 mg by mouth 2 (two) times a day. Hold for SBP < 110     mv-min/FA/vit K/lycop/lut/zeax (OCUVITE EYE PLUS MULTI ORAL) Take 1 tablet by mouth 2 (two) times a day with meals.     NOVOLOG U-100 INSULIN ASPART 100 unit/mL injection Check blood sugar three (3) times daily.  11.65 Type 2 with hyperglycemia     nystatin (MYCOSTATIN) powder Apply 1 application topically 2 (two) times a day. Under breasts for rash, until healed. Then PRN.     OMEGA-3/DHA/EPA/FISH OIL (FISH OIL-OMEGA-3 FATTY ACIDS) 300-1,000 mg capsule Take 2 g by mouth 2 times a day at 6:00 am and 4:00 pm.     omeprazole (PRILOSEC) 20 MG capsule Take 20 mg by mouth daily before breakfast.     polyvinyl alcohol-povidon,PF, (REFRESH CLASSIC) 1.4-0.6 % Dpet ophthalmic dropperette Administer 2 drops to both eyes 3 (three) times a day.     potassium chloride (K-DUR,KLOR-CON) 10 MEQ tablet TAKE 2 TABLETS BY MOUTH TWICE DAILY     prochlorperazine (COMPAZINE) 10 MG tablet TAKE 1 TABLET(10 MG) BY MOUTH EVERY 6 HOURS AS NEEDED FOR NAUSEA     simvastatin (ZOCOR) 20 MG tablet Take 1 tablet (20 mg total) by mouth daily with supper.     warfarin ANTICOAGULANT (COUMADIN/JANTOVEN) 2 MG tablet Take 2 tablets (4 mg total) by mouth daily.       ASSESSMENT:      ICD-10-CM    1. Atrial fibrillation, unspecified type (H) I48.91    2. Physical deconditioning R53.81    3. Bilateral leg edema R60.0    4. Rash R21        PLAN:   Chronic right shoulder pain recently seen by orthopedics and was given a cortisone shot.    Rash triamcinolone cream to rash twice daily, change Benadryl to hydroxyzine    Loose toenails seen by podiatry  1/15/19 and loose toenail was removed and she has new dressing change orders     Atrial fibrillation-continue Coumadin,  metoprolol and increased lasix, monitor BMP closely     Venous stasis lower extremities. Pt  will resume lymph wraps M-F and ace wraps on the weekends. .  Blistering lower extremities- new dressing change orders.      Status post left breast lumpectomy incision healed    Pain management Norco PRN     Shortness of breath with exertion. Oxygen PRN    Anticoagulation management continue Coumadin and adjust per INRs    Debility PT OT      Electronically signed by: Sara Mayes CNP

## 2021-06-06 NOTE — PROGRESS NOTES
[facility] Hematology and Oncology Progress Note    Patient: Jennifer Galvin  MRN: [id number]  Date of Service: 03/10/2020        Reason for Visit    Chief Complaint   Patient presents with     HE Cancer     Malignant neoplasm of upper-outer quadrant of left breast     Hospital Visit Follow Up       Assessment and Plan  Cancer Staging  Malignant neoplasm of upper-outer quadrant of left breast in female, estrogen receptor positive (H)  Staging form: Breast, AJCC 8th Edition  - Clinical: Stage IB (cT2, cN0, cM0, G1, ER+, MS+, HER2+) - Signed by Roly Villalba MD on 8/21/2019  - Pathologic: No Stage Recommended (ypTis (DCIS), pN0(i+), cM0, ER+, MS+, HER2+) - Signed by Meg Walsh CNP on 3/10/2020    1.  Breast cancer, triple positive, stage I: Patient is been started on neoadjuvant chemotherapy with weekly Taxol and Herceptin.  Last dose of this was on 11/27. She underwent lumpectomy at the beginning of December with Dr. Ferrera. Had 21mm of residual DCIS and isolated tumor cells in 1/3 lymph nodes. Has had a complicated post op period and has been in and out of TCU's since then. Now out. Not sure at this point if we can do radiation/maintenance Herceptin. I will order ECHO to see how her heart is functioning. She will return in 2 weeks to see Dr. Villalba to decide next steps on treatment.     2.  Anterior mediastinal mass, thymoma: This likely should be excised.  Apparently is been slowly growing over 7 to 8 years.  Dr. Villalba feels that this may be causing some of her symptoms so we may refer to Dr. Romero but she did not do well with last surgery and still is not fully recovered. I think they needs to wait until she gets stronger.     3. ZAPATA, shortness of breath, hypoxia: her oxygen levels are good here, but she does get pretty shortness of breath with exertion. She denies any neuro symptoms. Will monitor and get Echo.     ECOG Performance   ECOG Performance Status: 2     Distress Assessment  Distress  Assessment Score: No distress    Pain  Currently in Pain: No/denies      Problem List    1. Encounter for monitoring cardiotoxic drug therapy  Echo Complete   2. Malignant neoplasm of upper-outer quadrant of left breast in female, estrogen receptor positive (H)  HM1(CBC and Differential)    Comprehensive Metabolic Panel    HM1(CBC and Differential)    Comprehensive Metabolic Panel    HM1 (CBC with Diff)    sodium chloride flush 10 mL (NS)    heparin lockflush (PF) porcine 300-600 Units   3. Thymoma     4. Paroxysmal Atrial Fibrillation  INR        ______________________________________________________________________________    History of Present Illness    Jennifer Galvin is a very pleasant 74 y.o. old female with a history of newly diagnosed breast cancer.  There is breast cancer is located on her left breast measures 2.3 cm in size in the upper outer quadrant ER positive VA positive as well as HER-2/arcelia 3+ on immunohistochemistry.  It is not palpable.  Detected incidentally in late July early August 2019.  Her presentation started with shortness of breath for which she was sent to cardiology for a stress test.  The stress test showed some uptake in the mediastinum as well as in the breast.  Therefore she had a CT scan of the chest which showed that she had a 3.2 cm mass in the mediastinum located anteriorly.  This was initially detected in 2012 but had grown from 1.9 cm at the time to 3.2 cm now.  No other abnormality noted.  Differential is thymoma/lymphoma.  She also had a mammogram which showed that she had a lesion in her left breast 2.3 cm in size.  No lymph adenopathy noted in the axillary area.  She then had a ultrasound-guided biopsy of the left breast which showed this invasive ductal carcinoma.     We had her undergo CT-guided biopsy of the mediastinal mass which has come back as thymoma.  She started neoadjuvant treatment with Taxol and Herceptin.  She had 11 weeks of taxol and 12 weekly doses of  Herceptin, last one on 11/27. Had lumpectomy on 12/2/19 with Dr. Ferrera that showed   ypTis pNo(i+) M0. DCIS was 21mm in size. No invasive cancer. Margins are negative. The DCIS was arising in fibroadenoma and involving areas of sclerosing adenosis.   She had a hard post op course with weakness, hypoxia, cellulitis, CHF. Was in TCU until recently. Now living with sister. Still struggling with shortness of breath, edema in legs, poor mobility.     Pain Status  Currently in Pain: No/denies    Review of Systems    Constitutional  Constitutional (WDL): Exceptions to WDL  Fatigue: Fatigue not relieved by rest - Limiting instrumental ADL  Neurosensory  Neurosensory (WDL): Exceptions to WDL  Ataxia: Moderate symptoms, limiting instrumental ADL(Uses walker.)  Peripheral Sensory Neuropathy: Moderate symptoms, limiting instrumental ADL(N/T to all fingertips.)  Eye   Eye Disorder (WDL): All eye disorder elements are within defined limits(Reading glasses.)  Ear  Ear Disorder (WDL): All ear disorder elements are within defined limits  Cardiovascular  Edema: Yes(Lymphedema to both legs.)  Pulmonary  Respiratory (WDL): Exceptions to WDL  Cough: Moderate symptoms, medical intervention indicated, limiting instrumental ADL(Occ congenstion.)  Dyspnea: Shortness of breath with minimal exertion, limiting instrumental ADL  Gastrointestinal  Gastrointestinal (WDL): Exceptions to WDL  Dry Mouth: Symptomatic (e.g., dry or thick saliva) without significant dietary alteration, unstimulated saliva flow >0.2 ml/min  Genitourinary  Genitourinary (WDL): All genitourinary elements are within defined limits  Lymphatic     Musculoskeletal and Connective Tissue  Musculoskeletal and Connetive Tissue Disorders (WDL): Exceptions to WDL  Arthralgia: Moderate pain, limiting instrumental ADL(Lt knee)  Integumentary  Integumentary (WDL): Exceptions to WDL(Fingernails.)  Alopecia: Hair loss of >50% normal for that individual that is readily apparent to  others, a wig or hair piece is necessary if the patient desires to completely camouflage the hair loss, associated with psychosocial impact  Patient Coping  Patient Coping: Accepting  Distress Assessment  Distress Assessment Score: No distress  Accompanied by  Accompanied by: Alone  Oral Chemo Adherence         Past History  Past Medical History:   Diagnosis Date     (Lower) Leg Localized Swelling     Created by Conversion      Adenocarcinoma In Situ Of The Uterus     Created by Conversion      Backache     Created by Conversion [facility] [facility] Annotation: Feb 29 2012 12:14PM - Opal Helm: Referred to  [facility] 9/11, given TENS unit.      Benign Polyps Of The Large Intestine     Created by Conversion      Breast cancer (H)      Cancer (H)     uterine     Chronic kidney disease     stage 1 per H&P 11/22/2019     Coronary artery disease      Diabetes mellitus (H)     type 2     Fatigue     Created by Conversion      Hyperglycemia     Created by Conversion      Hyperlipidemia     Created by Conversion      Hypertension     Created by Conversion      Joint Pain, Localized In The Knee     Created by Conversion      Lesion of mediastinum      Lymphedema     Created by Conversion      Macular Degeneration     Created by Conversion [facility] Annotation: Apr 5 2011 12:22PM - Tien Guzman: Followed by  Ophthalmologist, Dr. Avilez.      Major Depression, Single Episode In Remission     Created by Conversion      Obesity     Created by Conversion      Obstructive Sleep Apnea     CPAP     Osteopenia     Created by Conversion      Paroxysmal Atrial Fibrillation     Created by Conversion [facility] Annotation: Nov 28 2012 10:36PM - Shruthi Dhaliwal: Found incidential  on exam on May 7th, [id number] score of 1 for HTNsymptomatic and hospitalized  x2 in July, 2012.CV X 2 in 2012Chronic Sotalol Rx      Postmenopausal bleeding     Created by Conversion      Skin cancer      Tachycardia     Created by Conversion      Tremor      Created by Conversion      Urge And Stress Incontinence     Created by Conversion      Urinary Frequency More Than Twice At Night (Nocturia)     Created by Conversion      Urine Tests Nonspecific Abnormal Findings     Created by Conversion      Vitamin D Deficiency     Created by Conversion        PHYSICAL EXAM:  /80   Pulse 100   Temp 97.9  F (36.6  C) (Oral)   Wt (!) 322 lb 1.6 oz (146.1 kg)   SpO2 96%   BMI 51.99 kg/m    GENERAL: no acute distress. Cooperative in conversation. Here alone today. Using walker.   HEENT: pupils are equal, round and reactive. Oromucosa is clean and intact. No ulcerations or mucositis noted. No bleeding noted.    RESP: lungs are clear to slightly diminished bilaterally per auscultation. Regular respiratory rate. No wheezes or rhonchi.  CV: Regular, rate and rhythm. No murmurs.  ABD: soft, nontender.   MUSCULOSKELETAL: Bilateral lower extremity swelling which is chronic. A little worse than her baseline.   NEURO: non focal. Alert and oriented x3.   PSYCH: within normal limits. No depression or anxiety.  SKIN: warm dry intact   LYMPH: no cervical, supraclavicular or axillary lymphadenopathy. I did feel axilla and I do not feel lymphadenopathy or hematoma.   BREAST: Deferred.       Lab Results    Recent Results (from the past 168 hour(s))   INR   Result Value Ref Range    INR 2.20 (!) 0.9 - 1.1   INR   Result Value Ref Range    INR 2.04 (H) 0.90 - 1.10   Comprehensive Metabolic Panel   Result Value Ref Range    Sodium 140 136 - 145 mmol/L    Potassium 4.1 3.5 - 5.0 mmol/L    Chloride 102 98 - 107 mmol/L    CO2 27 22 - 31 mmol/L    Anion Gap, Calculation 11 5 - 18 mmol/L    Glucose 146 (H) 70 - 125 mg/dL    BUN 17 8 - 28 mg/dL    Creatinine 1.08 0.60 - 1.10 mg/dL    GFR MDRD Af Amer 60 (L) >60 mL/min/1.73m2    GFR MDRD Non Af Amer 50 (L) >60 mL/min/1.73m2    Bilirubin, Total 0.7 0.0 - 1.0 mg/dL    Calcium 9.5 8.5 - 10.5 mg/dL    Protein, Total 7.0 6.0 - 8.0 g/dL    Albumin 3.4  (L) 3.5 - 5.0 g/dL    Alkaline Phosphatase 79 45 - 120 U/L    AST 16 0 - 40 U/L    ALT 14 0 - 45 U/L   HM1 (CBC with Diff)   Result Value Ref Range    WBC 11.6 (H) 4.0 - 11.0 thou/uL    RBC 5.41 (H) 3.80 - 5.40 mill/uL    Hemoglobin 13.1 12.0 - 16.0 g/dL    Hematocrit 43.4 35.0 - 47.0 %    MCV 80 80 - 100 fL    MCH 24.2 (L) 27.0 - 34.0 pg    MCHC 30.2 (L) 32.0 - 36.0 g/dL    RDW 18.6 (H) 11.0 - 14.5 %    Platelets 212 140 - 440 thou/uL    MPV 11.3 8.5 - 12.5 fL    Neutrophils % 80 (H) 50 - 70 %    Lymphocytes % 9 (L) 20 - 40 %    Monocytes % 9 2 - 10 %    Eosinophils % 2 0 - 6 %    Basophils % 0 0 - 2 %    Neutrophils Absolute 9.2 (H) 2.0 - 7.7 thou/uL    Lymphocytes Absolute 1.1 0.8 - 4.4 thou/uL    Monocytes Absolute 1.0 (H) 0.0 - 0.9 thou/uL    Eosinophils Absolute 0.2 0.0 - 0.4 thou/uL    Basophils Absolute 0.1 0.0 - 0.2 thou/uL       Imaging    No results found.      Signed by: eMg Walsh, CNP

## 2021-06-06 NOTE — TELEPHONE ENCOUNTER
Orders being requested:   1. Diabetic Shoes- fax to [facility]: [phone]    2.Nebulizer Supplies (cup, tubing, mask, & filters) fax to [facility]: [phone]    Reason service is needed/diagnosis:   1. Type 2 diabetes mellitus with stage 1 chronic kidney disease, without long-term current use of insulin (H) [E11.22, N18.1]     2.   Acute respiratory failure with hypoxia (H) (J96.01)  Postoperative respiratory failure (H) (J95.821)  Pneumonia due to infectious organism, unspecified laterality, unspecified part of lung (J18.9)     When are orders needed by: ASAP    Where to send Orders: Fax: [facility]: [phone], [facility]: [phone]     Okay to leave detailed message?  Yes    Please fax orders.

## 2021-06-06 NOTE — TELEPHONE ENCOUNTER
Followed up with Sleep clinic as no appt or notes were seen in Epic.  No record that I called,  said maybe a note was sent to nurse?  She did phone note to see if appt or sleep study is needed first.  Will follow up in one week

## 2021-06-06 NOTE — TELEPHONE ENCOUNTER
Tried to call and phone was rumbled and then hung up x2.  Faxed to [facility] and they will call pt

## 2021-06-06 NOTE — TELEPHONE ENCOUNTER
"Telephoned and spoke with Jennifer after learning she had cancelled follow up with nurse practitioner today.   This appointment has been rescheduled for 3/10 and Jennifer does intend to come to that.  She was released from TCU on 2/24/20 and is happy to be back home.  States she is \"pretty good\" her legs are \"better.\"    Support and encouragement provided and calls welcomed. Fabi Morton RN    "

## 2021-06-06 NOTE — PROGRESS NOTES
[facility] Clinic Note    Jennifer Galvin   74 y.o. female    Date of Visit: 2/27/2020  Chief Complaint   Patient presents with     Hospital Visit Follow Up     [facility], then TCU discharged 2/24/20     ASSESSMENT/PLAN  1. FLAKITA (obstructive sleep apnea)     2. Morbid obesity (H)     3. Pneumonia due to infectious organism, unspecified laterality, unspecified part of lung     4. Postoperative respiratory failure (H)     5. Status post left breast lumpectomy     6. Thymoma     7. Urge incontinence of urine  oxybutynin (DITROPAN XL) 10 MG ER tablet   8. Paroxysmal atrial fibrillation (H)       ---------------------------------------------    1-4. Suspect her severe lower extremity edema is driven in part by her FLAKITA leading to some degree of pulmonary hypertension and diastolic heart failure. Recommended she get in to see her sleep specialist soon so they can figure out a way to make her PAP machine tolerable. She has completed her abx for PNA and this appears resolved based on history and exam. Continue Lasix 80 mg two times a day.   5-6. Patient has follow up scheduled with Dr. Villalba next week to discuss her breast cancer and incidentally discovered thymoma.   7. Toviaz was very expensive for the patient, so we switched her to Oxybutynin.   8. INR therapeutic  9. Continue Metoprolol at 25 mg two times a day. She can check her blood pressure at home if she desires, but didn't want to pile more burden on her at this time.     Return in about 1 month (around 3/27/2020) for Recheck.    SUBJECTIVE    Jennifer Galvin is a 74 y.o. female who underwent lumpectomy for breast cancer on 12/2/19. She ended up hospitalized from 12/19/19-12/31/19 for PNA and pulmonary edema. She then went to a TCU and was finally discharged on 2/24/20 to the care of her sister in law.     Hypertension  The patient reports she was on Metoprolol 25 mg two times a day prior to her hospitalization. While at the TCU, she was on Metoprolol  50 mg two times a day, but they frequently held one of her daily doses due to SBP <110. Therefore, she decided to take Metoprolol 25 mg two times a day since discharge from the TCU 3 days ago. She wonders if she should take Metoprolol 25 or 50 mg two times a day. The patient has not been able to check her BP at home, but she will be able to use her sister in law's cuff starting today. She denies lightheadedness, dizziness, CP, or palpitations.     Breathing  The patient has a hx of FLAKITA, is currently on her last day of antibiotics for PNA, and is using Lasix 80 mg two times a day for severe lower extremity edema. She reports orthopnea, such that she has to sleep upright in a recliner. The patient has not been using her PAP machine because she gets too congested and can' breathe. She last saw her sleep specialist in December, before her hospitalization. She has used her Albuterol inhaler once since discharge from the TCU. The patient also reports that she was discharged from the TCU with albuterol neb solution bet she does not have a neb machine. The DME order has been sent.     ROS:   Per HPI, all other systems negative     Medications, allergies, and problem list were reviewed and updated    Patient Active Problem List   Diagnosis     Macular Degeneration     Vitamin D Deficiency     Abnormal involuntary movement     Joint Pain, Localized In The Knee     Leukocytosis     FLAKITA (obstructive sleep apnea)     Hyperlipidemia     Major Depression, Single Episode In Remission     Hypertension     Lymphedema     Paroxysmal Atrial Fibrillation     Osteopenia     Urge And Stress Incontinence     Adenocarcinoma In Situ Of The Uterus     Venous stasis     Acquired lymphedema of leg     History of uterine cancer     Diabetes mellitus, type 2 (H)     Venous hypertension of lower extremity, bilateral     BMI 50.0-59.9, adult (H)     Aspirin contraindicated     Elective surgery     Lesion of mediastinum     Follow-up examination  following surgery     Breast nodule     Malignant neoplasm of upper-outer quadrant of left breast in female, estrogen receptor positive (H)     Acute respiratory failure with hypoxia (H)     Diet-controlled diabetes mellitus (H)     Postoperative respiratory failure (H)     Status post left breast lumpectomy     Morbid obesity (H)     Axillary hematoma, left, initial encounter     Anemia due to blood loss, acute     Hematoma     CHF (congestive heart failure), NYHA class I, acute on chronic, combined (H)     Urinary tract infection     HAP (hospital-acquired pneumonia)     Pneumonia     Bilateral leg edema     Thymoma     Physical deconditioning     Adjustment insomnia     Neuropathic pain     Past Medical History:   Diagnosis Date     (Lower) Leg Localized Swelling     Created by Conversion      Adenocarcinoma In Situ Of The Uterus     Created by Conversion      Backache     Created by Conversion [employer] Annotation: Feb 29 2012 12:14PM - Opal Helm: Referred to  [facility] 9/11, given TENS unit.      Benign Polyps Of The Large Intestine     Created by Conversion      Breast cancer (H)      Cancer (H)     uterine     Chronic kidney disease     stage 1 per H&P 11/22/2019     Coronary artery disease      Diabetes mellitus (H)     type 2     Fatigue     Created by Conversion      Hyperglycemia     Created by Conversion      Hyperlipidemia     Created by Conversion      Hypertension     Created by Conversion      Joint Pain, Localized In The Knee     Created by Conversion      Lesion of mediastinum      Lymphedema     Created by Conversion      Macular Degeneration     Created by Conversion [employer] Annotation: Apr 5 2011 12:22PM - Tien Guzman: Followed by  Ophthalmologist, Dr. Avilez.      Major Depression, Single Episode In Remission     Created by Conversion      Obesity     Created by Conversion      Obstructive Sleep Apnea     CPAP     Osteopenia     Created by Conversion      Paroxysmal Atrial  Fibrillation     Created by Conversion [facility] Annotation: Nov 28 2012 10:36PM - Shruthi Dhaliwal: Found incidential  on exam on May 7th, [id number] score of 1 for HTNsymptomatic and hospitalized  x2 in July, 2012.CV X 2 in 2012Chronic Sotalol Rx      Postmenopausal bleeding     Created by Conversion      Skin cancer      Tachycardia     Created by Conversion      Tremor     Created by Conversion      Urge And Stress Incontinence     Created by Conversion      Urinary Frequency More Than Twice At Night (Nocturia)     Created by Conversion      Urine Tests Nonspecific Abnormal Findings     Created by Conversion      Vitamin D Deficiency     Created by Conversion      Current Outpatient Medications   Medication Sig Dispense Refill     acetaminophen (TYLENOL) 500 MG tablet Take 1-2 tablets (500-1,000 mg total) by mouth every 4 (four) hours as needed (PAin 1-3).  0     albuterol (PROAIR HFA;PROVENTIL HFA;VENTOLIN HFA) 90 mcg/actuation inhaler Inhale 2 puffs every 4 (four) hours as needed for wheezing.       calcium-vitamin D (CALCIUM-VITAMIN D) 500 mg(1,250mg) -200 unit per tablet Take 1 tablet by mouth 2 (two) times a day with meals.       camphor-menthoL (SARNA) lotion Apply 1 application topically as needed for itching. Daily and prn       cholecalciferol, vitamin D3, 1,000 unit tablet Take 2,000 Units by mouth daily.        fesoterodine (TOVIAZ) 8 mg Tb24 ER tablet Take 8 mg by mouth daily.        fluticasone propionate (FLONASE ALLERGY RELIEF) 50 mcg/actuation nasal spray One spray in each nostril daily 16 g 12     furosemide (LASIX) 80 MG tablet Take 1 tablet (80 mg total) by mouth 2 (two) times a day at 9am and 6pm.  0     gabapentin (NEURONTIN) 300 MG capsule Take 300 mg by mouth at bedtime.       glucosamine-chondroitin 500-400 mg tablet Take 1 tablet by mouth 2 (two) times a day with meals.        guaiFENesin (ROBITUSSIN) 100 mg/5 mL syrup Take 200 mg by mouth every 4 (four) hours as needed for cough.        HYDROcodone-acetaminophen 5-325 mg per tablet Take 1 tablet by mouth every 4 (four) hours as needed for pain. 6 tablet 0     lidocaine 4 % patch Place 1 patch on the skin daily. Remove and discard patch with 12 hours or as directed by MD.  0     lidocaine-prilocaine (EMLA) cream Place over port 30 min. before being accessed. 30 g 1     loratadine (CLARITIN) 10 mg tablet Take 1 tablet (10 mg total) by mouth daily. 30 tablet 11     metoprolol tartrate (LOPRESSOR) 25 MG tablet Take 1 tablet (25 mg total) by mouth 2 (two) times a day. 180 tablet 3     mv-min/FA/vit K/lycop/lut/zeax (OCUVITE EYE PLUS MULTI ORAL) Take 1 tablet by mouth 2 (two) times a day with meals.       nystatin (MYCOSTATIN) powder Apply 1 application topically 2 (two) times a day. Under breasts for rash, until healed. Then PRN.       OMEGA-3/DHA/EPA/FISH OIL (FISH OIL-OMEGA-3 FATTY ACIDS) 300-1,000 mg capsule Take 2 g by mouth 2 times a day at 6:00 am and 4:00 pm.       omeprazole (PRILOSEC) 20 MG capsule Take 1 capsule (20 mg total) by mouth daily before breakfast. 90 capsule 3     polyvinyl alcohol-povidon,PF, (REFRESH CLASSIC) 1.4-0.6 % Dpet ophthalmic dropperette Administer 2 drops to both eyes 3 (three) times a day.  0     potassium chloride (K-DUR,KLOR-CON) 10 MEQ tablet TAKE 2 TABLETS BY MOUTH TWICE DAILY 360 tablet 3     simvastatin (ZOCOR) 20 MG tablet Take 1 tablet (20 mg total) by mouth daily with supper. 90 tablet 3     warfarin ANTICOAGULANT (COUMADIN/JANTOVEN) 2 MG tablet Take 2 tablets (4 mg total) by mouth daily. (Patient taking differently: Take 5 mg by mouth daily. Next INR due 2/24/2020)       No current facility-administered medications for this visit.      Allergies   Allergen Reactions     Aspirin      Pt currently on WArfarin     Penicillins      Boils  -- 12/17/19 tolerating ceftriaxone inpt and discharged on cefdinir        EXAM  Vitals:    02/27/20 1020   BP: 136/62   Patient Site: Left Arm   Patient Position: Sitting   Cuff  "Size: Adult Large   Pulse: 100   SpO2: 93%   Weight: (!) 338 lb 4 oz (153.4 kg)   Height: 5' 6\" (1.676 m)     General: alert, no distress, ambulates with the assistance of a walker  HEENT: sclerae anicteric, moist oral mucosa  Heart: Regular rate and rhythm, no murmurs. Warm extremities  Lungs: Clear to auscultation bilat. No crackles or wheezes.    Skin: warm/dry, no rashes  Neuro: no gross abnormalities  Lower extremities: 3+ pitting edema. Leg wrappings in place were not removed.     KAHLIL Cole on Internal Medicine Rotation 10:29 AM 2/27/2020    Addendum:   Attest above note by Basil Jules, KAHLIL     She had a complicated course after lumpectomy including respiratory failure, possible pneumonia x 2, axillary hematoma, and ongoing lymphedema.  She has a history of sleep apnea but again is not tolerating CPAP.  Some of the dependent edema may be related to morbid obesity and sleep disordered breathing (perhaps some degree of R sided heart failure and pulmonary HTN?)    She needs another appointment with sleep medicine specialist.      Weight has been stable in 340s- advised to let me know if climbing above 348 lbs.  Advised on 2,000 mg sodium restriction.  Continue current diuretic.  Legs are being wrapped by homecare provider.  Lymphedema wraps an option as well.  I unwrapped the R leg an there was an area of transudative drainage which is mostly dried up now (medial leg).  She does have pitting edema.  No sign of cellulitis.  She does have stasis dermatitis.      Visit with Meg coming up next week.    Given urge incontinence and Toviaz being too expensive, will change to oxybutynin.      Due to low BP's at TCU, will cut down metoprolol to 25 mg two times a day (instead of 50 mg two times a day).     I reviewed the multiple hospitalizations, lab work, imaging, and echo, the report is copied below    Summary       1.Left ventricle ejection fraction is normal. The calculated left ventricular ejection " fraction is 55%.    2.TAPSE is normal, which is consistent with normal right ventricular systolic function.    3.The aortic root is mildly dilated. The ascending aorta is mildly dilated.    4.Estimated central venous pressure equal to 15 mmHg.    5.No hemodynamically significant valvular heart abnormalities.    6.Very poor images for analysis despite the use of Definity contrast.    7.When compared to the previous study dated 12/10/2018, no significant change.        General: alert, no distress  HEENT: sclerae anicteric, moist oral mucosa  Heart: Regular rate and rhythm, no murmurs.  No pretibial edema.  Warm extremities  Lungs: Clear to auscultation bilat  Gastrointestinal: abdomen is non-distended.    Skin: warm/dry, stasis dermatitis legs bilat, dried clear drainage R medial leg  Neuro: no gross abnormalities  Heme/endo/lymph: pitting edema bilat       Cordell Ceja DO  Internal Medicine  [facility]

## 2021-06-06 NOTE — TELEPHONE ENCOUNTER
ANTICOAGULATION  MANAGEMENT- Home Care/Care Facility Result    Assessment     Today's INR result of 2.90 is Therapeutic (goal INR of 2.0-3.0)        Warfarin taken as previously instructed    - discharged from TCU with 5mg warfarin daily.    No new diet changes affecting INR    - reported eating healthy.  Sister-in-law does the cooking.    No new medication/supplements affecting INR    Continues to tolerate warfarin with no reported s/s of bleeding or thromboembolism     Previous INR was Therapeutic at 2.75 on 2/24/20.    Discharged from TCU on 2/24/20.    Plan:     Spoke with ANASTASIYA Lamb from [facility] discussed INR result and instructed:     Warfarin Dosing Instructions:   - Continue current warfarin dose 5 mg daily.    Next INR to be drawn:  Scheduled this Thurs. 3/5/20.    Education provided: importance of consistent vitamin K intake, target INR goal and significance of current INR result and importance of notifying clinic for changes in medications    Zainab verbalizes understanding and agrees to warfarin dosing plan.   ?   Aliya Mahajan RN    Subjective/Objective:      Jennifer Galvin, a 74 y.o. female is established on warfarin.     Home care/care facility RN's report of Jennifer INR, recent warfarin dosing, diet changes, medication changes, and symptoms is documented below.    Additional findings: none    Anticoagulation Episode Summary     Current INR goal:   2.0-3.0   TTR:   64.3 % (11.4 mo)   Next INR check:   3/5/2020   INR from last check:   2.90 (3/2/2020)   Weekly max warfarin dose:      Target end date:      INR check location:      Preferred lab:      Send INR reminders to:   ABHISHEK MIDWAY    Indications    Paroxysmal Atrial Fibrillation [I48.91]           Comments:            Anticoagulation Care Providers     Provider Role Specialty Phone number    Cordell Ceja DO Referring Internal Medicine [phone]

## 2021-06-06 NOTE — PROGRESS NOTES
[facility]    Facility:   [facility] [[id number]]   Code Status: FULL CODE      CHIEF COMPLAINT/REASON FOR VISIT:  Chief Complaint   Patient presents with     Review Of Multiple Medical Conditions       HISTORY:      HPI: Jennifer is a 74 y.o. female undergoing physical and occupational therapy at [facility] transitional care unit. , she is with history of severe obesity, stage Ib left breast cancer, I normal, vitamin D deficiency, urge stress incontinence, tremor, postmenopausal bleeding and uterine adenocarcinoma in situ status post JEROME/BSO, atrial fibrillation, osteopenia, FLAKITA, macular degeneration, chronic bilateral leg edema, hypertension, diabetes mellitus, CAD, chronic back and bilateral knee pain who presented to [facility] for scheduled left breast lumpectomy And sentinel lymph node biopsy.  Per the records she became hypoxic in the PACU and noted to be persistently hypoxic to 4 L of oxygen which is a change from her baseline.  She does have FLAKITA but does not use her CPAP machine. She was recently sent back to the hospital from December 14-17 th  for left axilla pain and found to have a left axillary  hematoma related to her recent lumpectomy. She was seen by general surgery who recommended resuming warfarin and providing modest infection prophylaxis.She was also treated at this time with cefdinir for a UTI. She then returned and was  admitted to [facility] from 12/19/19-12/31/19 for pneumonia.  The patient has bilateral pleural effusions on CTA and was treated with doxycycline and Keflex for suspected pneumonia.  She completed a course of Cefdinir for UTI outpatient during her hospital stay.  Her procalcitonin and influenza A were negative on hospital admission.  She was also noted to have severe BLE nonpitting edema and her furosemide was increased to 80 mg two times a day for diuresis.  It was recommended that her BMP be followed  bi-weekly for assessment of renal function on increased diuresis.    Today she was seen for follow-up to wheezing.  Her discharge was held over the weekend due to wheezing noted on Friday and she received a nebulizer.  Today she was seen in her room . Her  lungs were clear throughout all fields.  She is currently completing a course of doxycycline.  She reports she feels much better.  and will discharge today.  I did order her an albuterol inhaler for discharge.  Her  Last WBC was normal at 10.2 and hemoglobin 11.5.  She denies any  Increased shortness of breath beyond baseline   She denied chest pain.  She is moving her bowels and has no issues with urination.   Her left breast incision is healed.  Her BMP have been stable despite large doses of Lasix. Last A1C 9/2019 was 6.5.     Past Medical History:   Diagnosis Date     (Lower) Leg Localized Swelling     Created by Conversion      Adenocarcinoma In Situ Of The Uterus     Created by Conversion      Backache     Created by Conversion [employer] [facility] Annotation: Feb 29 2012 12:14PM - Opal Helm: Referred to  [facility] 9/11, given TENS unit.      Benign Polyps Of The Large Intestine     Created by Conversion      Breast cancer (H)      Cancer (H)     uterine     Chronic kidney disease     stage 1 per H&P 11/22/2019     Coronary artery disease      Diabetes mellitus (H)     type 2     Fatigue     Created by Conversion      Hyperglycemia     Created by Conversion      Hyperlipidemia     Created by Conversion      Hypertension     Created by Conversion      Joint Pain, Localized In The Knee     Created by Conversion      Lesion of mediastinum      Lymphedema     Created by Conversion      Macular Degeneration     Created by Conversion [employer] [facility] Annotation: Apr 5 2011 12:22PM - Tien Guzman: Followed by  Ophthalmologist, Dr. Avilez.      Major Depression, Single Episode In Remission     Created by Conversion      Obesity     Created by Conversion       Obstructive Sleep Apnea     CPAP     Osteopenia     Created by Conversion      Paroxysmal Atrial Fibrillation     Created by Conversion [employer] [facility] Annotation: Nov 28 2012 10:36PM - Shruthi Dhaliwal: Found incidential  on exam on May 7th, [id number] score of 1 for HTNsymptomatic and hospitalized  x2 in July, 2012.CV X 2 in 2012Chronic Sotalol Rx      Postmenopausal bleeding     Created by Conversion      Skin cancer      Tachycardia     Created by Conversion      Tremor     Created by Conversion      Urge And Stress Incontinence     Created by Conversion      Urinary Frequency More Than Twice At Night (Nocturia)     Created by Conversion      Urine Tests Nonspecific Abnormal Findings     Created by Conversion      Vitamin D Deficiency     Created by Conversion              Family History   Problem Relation Age of Onset     Hypertension Father      Pneumonia Father      Esophageal cancer Brother      Cancer Brother      Stroke Mother      Alzheimer's disease Brother      Cancer Brother      Prostate cancer Brother      Cancer Nephew      Colon cancer Nephew      Cancer Paternal cousin      Social History     Socioeconomic History     Marital status: Single     Spouse name: Not on file     Number of children: 0     Years of education: Not on file     Highest education level: Not on file   Occupational History     Occupation: Retired RN     Employer: [employer]   Social Needs     Financial resource strain: Not on file     Food insecurity:     Worry: Not on file     Inability: Not on file     Transportation needs:     Medical: Not on file     Non-medical: Not on file   Tobacco Use     Smoking status: Former Smoker     Packs/day: 3.00     Years: 40.00     Pack years: 120.00     Types: Cigarettes     Last attempt to quit: 1/1/2005     Years since quitting: 15.1     Smokeless tobacco: Never Used   Substance and Sexual Activity     Alcohol use: Not Currently     Drug use: No     Sexual activity: Never     Partners:  Male     Birth control/protection: Surgical, Post-menopausal   Lifestyle     Physical activity:     Days per week: Not on file     Minutes per session: Not on file     Stress: Not on file   Relationships     Social connections:     Talks on phone: Not on file     Gets together: Not on file     Attends Jew service: Not on file     Active member of club or organization: Not on file     Attends meetings of clubs or organizations: Not on file     Relationship status: Not on file     Intimate partner violence:     Fear of current or ex partner: Not on file     Emotionally abused: Not on file     Physically abused: Not on file     Forced sexual activity: Not on file   Other Topics Concern     Not on file   Social History Narrative    Lives alone single family home that she owns, no children and is retired.  Boyfriend used to live with her, however due to stroke he is in his own care facility.  She is currently at [facility]         Review of Systems   Constitutional: Positive for activity change. Negative for appetite change, fatigue and fever.   HENT: Negative for congestion.    Respiratory: Negative for cough.         Shortness of breath with exertion   Cardiovascular: Positive for leg swelling. Negative for chest pain.        Lymphedema   Gastrointestinal: Negative for abdominal distention, abdominal pain, constipation, diarrhea and nausea.   Genitourinary: Negative for dysuria.   Musculoskeletal: Positive for arthralgias. Negative for back pain.   Skin: Positive for wound. Negative for color change.        Left breast incision healed   Neurological: Negative for dizziness.   Psychiatric/Behavioral: Negative for agitation, behavioral problems and confusion.       Vitals:    02/24/20 0825   BP: 106/84   Pulse: 84   Resp: 19   Temp: 98  F (36.7  C)   SpO2: 93%   Weight: (!) 340 lb (154.2 kg)       Physical Exam  Constitutional:       Appearance: She is well-developed.      Comments: Pleasant woman in no  acute distress   HENT:      Head: Normocephalic.   Eyes:      Conjunctiva/sclera: Conjunctivae normal.   Neck:      Musculoskeletal: Normal range of motion.   Cardiovascular:      Rate and Rhythm: Normal rate and regular rhythm.      Heart sounds: Normal heart sounds. No murmur.   Pulmonary:      Effort: No respiratory distress.      Breath sounds: No rales.   Abdominal:      General: Bowel sounds are normal. There is no distension.      Palpations: Abdomen is soft.      Tenderness: There is no abdominal tenderness.   Musculoskeletal: Normal range of motion.      Right lower leg: Edema present.      Left lower leg: Edema present.      Comments: 3-4+ lower extremities. Lymph wraps resumed    Skin:     General: Skin is warm.      Findings: Rash present.      Comments: Healed left breast incision  Back rash almost completely resolved   Neurological:      Mental Status: She is alert and oriented to person, place, and time.   Psychiatric:         Behavior: Behavior normal.           LABS:   Recent Results (from the past 240 hour(s))   Basic Metabolic Panel   Result Value Ref Range    Sodium 141 136 - 145 mmol/L    Potassium 3.9 3.5 - 5.0 mmol/L    Chloride 102 98 - 107 mmol/L    CO2 29 22 - 31 mmol/L    Anion Gap, Calculation 10 5 - 18 mmol/L    Glucose 102 70 - 125 mg/dL    Calcium 9.1 8.5 - 10.5 mg/dL    BUN 14 8 - 28 mg/dL    Creatinine 0.98 0.60 - 1.10 mg/dL    GFR MDRD Af Amer >60 >60 mL/min/1.73m2    GFR MDRD Non Af Amer 56 (L) >60 mL/min/1.73m2   INR   Result Value Ref Range    INR 2.64 (H) 0.90 - 1.10   HM2(CBC w/o Differential)   Result Value Ref Range    WBC 10.2 4.0 - 11.0 thou/uL    RBC 4.60 3.80 - 5.40 mill/uL    Hemoglobin 11.5 (L) 12.0 - 16.0 g/dL    Hematocrit 39.0 35.0 - 47.0 %    MCV 85 80 - 100 fL    MCH 25.0 (L) 27.0 - 34.0 pg    MCHC 29.5 (L) 32.0 - 36.0 g/dL    RDW 18.2 (H) 11.0 - 14.5 %    Platelets 184 140 - 440 thou/uL    MPV 11.6 8.5 - 12.5 fL     Current Outpatient Medications   Medication Sig      acetaminophen (TYLENOL) 500 MG tablet Take 1-2 tablets (500-1,000 mg total) by mouth every 4 (four) hours as needed (PAin 1-3).     albuterol (PROAIR HFA;PROVENTIL HFA;VENTOLIN HFA) 90 mcg/actuation inhaler Inhale 2 puffs every 4 (four) hours as needed for wheezing.     calcium-vitamin D (CALCIUM-VITAMIN D) 500 mg(1,250mg) -200 unit per tablet Take 1 tablet by mouth 2 (two) times a day with meals.     camphor-menthoL (SARNA) lotion Apply 1 application topically as needed for itching. Daily and prn     cholecalciferol, vitamin D3, 1,000 unit tablet Take 2,000 Units by mouth daily.      fesoterodine (TOVIAZ) 8 mg Tb24 ER tablet Take 8 mg by mouth daily.      fluticasone propionate (FLONASE ALLERGY RELIEF) 50 mcg/actuation nasal spray One spray in each nostril daily     furosemide (LASIX) 80 MG tablet Take 1 tablet (80 mg total) by mouth 2 (two) times a day at 9am and 6pm.     gabapentin (NEURONTIN) 300 MG capsule Take 300 mg by mouth at bedtime.     glucosamine-chondroitin 500-400 mg tablet Take 1 tablet by mouth 2 (two) times a day with meals.      guaiFENesin (ROBITUSSIN) 100 mg/5 mL syrup Take 200 mg by mouth every 4 (four) hours as needed for cough.     HYDROcodone-acetaminophen 5-325 mg per tablet Take 1 tablet by mouth every 4 (four) hours as needed for pain.     lidocaine 4 % patch Place 1 patch on the skin daily. Remove and discard patch with 12 hours or as directed by MD.     lidocaine-prilocaine (EMLA) cream Place over port 30 min. before being accessed.     loratadine (CLARITIN) 10 mg tablet Take 1 tablet (10 mg total) by mouth daily.     metoprolol tartrate (LOPRESSOR) 50 MG tablet Take 50 mg by mouth 2 (two) times a day. Hold for SBP < 110     mv-min/FA/vit K/lycop/lut/zeax (OCUVITE EYE PLUS MULTI ORAL) Take 1 tablet by mouth 2 (two) times a day with meals.     nystatin (MYCOSTATIN) powder Apply 1 application topically 2 (two) times a day. Under breasts for rash, until healed. Then PRN.      OMEGA-3/DHA/EPA/FISH OIL (FISH OIL-OMEGA-3 FATTY ACIDS) 300-1,000 mg capsule Take 2 g by mouth 2 times a day at 6:00 am and 4:00 pm.     omeprazole (PRILOSEC) 20 MG capsule Take 20 mg by mouth daily before breakfast.     polyvinyl alcohol-povidon,PF, (REFRESH CLASSIC) 1.4-0.6 % Dpet ophthalmic dropperette Administer 2 drops to both eyes 3 (three) times a day.     potassium chloride (K-DUR,KLOR-CON) 10 MEQ tablet TAKE 2 TABLETS BY MOUTH TWICE DAILY     simvastatin (ZOCOR) 20 MG tablet Take 1 tablet (20 mg total) by mouth daily with supper.     warfarin ANTICOAGULANT (COUMADIN/JANTOVEN) 2 MG tablet Take 2 tablets (4 mg total) by mouth daily. (Patient taking differently: Take 5 mg by mouth daily. Next INR due 2/24/2020)       ASSESSMENT:      ICD-10-CM    1. Wheezing R06.2    2. Bilateral leg edema R60.0    3. Physical deconditioning R53.81        PLAN:     pneumonia currently completing a course of doxycycline, robitussin prn, albuterol inhaler as needed  Chronic right shoulder pain recently seen by orthopedics and was given a cortisone shot.    Rash triamcinolone cream to rash twice daily completed Sarna lotion ordered daily and as needed,  hydroxyzine to 50 mg every 6 hours PRN almost resolved, A few scabs from scratching on back.      Loose toenails seen by podiatry 1/15/19 and loose toenail was removed continue dressing change orders     Atrial fibrillation-continue Coumadin,  metoprolol and increased lasix, monitor BMP closely     Venous stasis lower extremities. Pt  will resume lymph wraps M-F and ace wraps on the weekends. .  Blistering lower extremities- new dressing change orders.      Status post left breast lumpectomy incision healed    Pain management Norco PRN     Shortness of breath with exertion. Oxygen PRN    Anticoagulation management continue Coumadin and adjust per INRs    Debility PT OT      Electronically signed by: Sara Mayes CNP

## 2021-06-06 NOTE — PROGRESS NOTES
[facility] Patient Outreach:     Discharge Date::  2/24/20      Reason for TCU stay (discharge diagnosis)::  Left breast lumpectomy, CHF      Are you feeling better, the same or worse since your discharge?:  Patient is feeling the same          As part of your discharge plan, did they discuss home care with you?: Yes        Have your seen them yet, or are they scheduled to visit?: Yes                Do you have any follow up visits scheduled with your PCP or Specialist?:  Yes, with Specialist      Who are you seeing and when is it scheduled?:  Oncology on 3/2/20.      I'm glad to hear you're doing well and we want you to continue to do well. Your PCP would like to see you for a follow-up visit. Can we help set that up for your today?: No        (RN) Provided patient the PCP's phone number to call if they have any questions or concerns?: No (Patient knows the number and will call to schedule with PCP on her own.  )

## 2021-06-06 NOTE — TELEPHONE ENCOUNTER
INR result is 2.2  INR   Date Value Ref Range Status   03/02/2020 2.90 (!) 0.9 - 1.1 Final       Will the patient be seen, or did they already see, MD or CNP today? No    Most Recent Warfarin dose day/week  Sunday Monday Tuesday Wednesday Thursday Friday Saturday       5 mg 5 mg 5 mg     Sunday Monday Tuesday Wednesday Thursday Friday Saturday   5 mg 5 mg  5 mg 5 mg          Has the patient missed any doses of Coumadin, Warfarin, Jantoven in the past 7 days? No    Has the patients medications changed since the last visit? No    Has the patient experienced any bleeding recently? No    Has the patient experienced any injuries or illness recently? No    Has the patient experienced any 'new' shortness of breath, severe headaches, or changes in vision recently? No    Has the patient had any changes in their diet, or alcohol consumption? No    Is the patient here today to prepare for any type of upcoming surgery, procedure, or for a cardioversion procedure? No    What phone number can we reach the patient at today? [phone] Anatoliy

## 2021-06-06 NOTE — TELEPHONE ENCOUNTER
ANTICOAGULATION  MANAGEMENT- Home Care/Care Facility Result    Assessment     Today's INR result of 2.2 is Therapeutic (goal INR of 2.0-3.0)        Warfarin taken as previously instructed    No new diet changes affecting INR    No new medication/supplements affecting INR    Continues to tolerate warfarin with no reported s/s of bleeding or thromboembolism     Previous INR was Therapeutic    Plan:     Spoke with home care nurse Windy discussed INR result and instructed:     Warfarin Dosing Instructions: Continue current warfarin dose 5 mg daily   Next INR to be drawn: one week    Education provided: importance of therapeutic range    Windy verbalizes understanding and agrees to warfarin dosing plan.   ?   Trinh Vargas RN    Subjective/Objective:      Jennifer Galvni, a 74 y.o. female is established on warfarin.     Home care/care facility RN's report of Jennifer INR, recent warfarin dosing, diet changes, medication changes, and symptoms is documented below.    Additional findings: none    Anticoagulation Episode Summary     Current INR goal:   2.0-3.0   TTR:   65.2 % (11.4 mo)   Next INR check:   3/13/2020   INR from last check:   2.20 (3/5/2020)   Weekly max warfarin dose:      Target end date:      INR check location:      Preferred lab:      Send INR reminders to:   [facility]    Indications    Paroxysmal Atrial Fibrillation [I48.91]           Comments:            Anticoagulation Care Providers     Provider Role Specialty Phone number    Cordell Ceja DO Referring Internal Medicine [phone]

## 2021-06-06 NOTE — PROGRESS NOTES
Met with Jennifer as she was completing clinic visit today.  She is accompanied today by her nephew John and his infant daughter.    Jennifer is receiving home care nurse visits and occupational therapy in home as well.  She is using walker today and lower extremity edema is evident.  We discussed the plan to return to treatment to complete full year of Herceptin.  Encouragement and reassurance provided.  Also gave her two hats containing Heat Wave yarn although she does have substantial hair growth now. Fabi Morton RN

## 2021-06-06 NOTE — TELEPHONE ENCOUNTER
Finally got a hold of Juliana (on consent to comminucate form) at number listed for pt, we tried to schedule but looks like pt missed sleep study and follow up appt with Dr Sanchez.  Sleep  will take from here, and talk to Dr Sanchez and get new order for sleep study and then call pt to schedule.    Juliana advised that we can also call [phone] to schedule or talk to her.

## 2021-06-06 NOTE — TELEPHONE ENCOUNTER
INR result is 2.9  INR   Date Value Ref Range Status   02/24/2020 2.75 (H) 0.90 - 1.10 Final       Will the patient be seen, or did they already see, MD or CNP today? No    Most Recent Warfarin dose day/week  Sunday Monday Tuesday Wednesday Thursday Friday Saturday    5 5 5 5 5 5     Sunday Monday Tuesday Wednesday Thursday Friday Saturday   5             Has the patient missed any doses of Coumadin, Warfarin, Jantoven in the past 7 days? No    Has the patients medications changed since the last visit? No    Has the patient experienced any bleeding recently? No    Has the patient experienced any injuries or illness recently? No    Has the patient experienced any 'new' shortness of breath, severe headaches, or changes in vision recently? No    Has the patient had any changes in their diet, or alcohol consumption? Yes is eating healthier now    Is the patient here today to prepare for any type of upcoming surgery, procedure, or for a cardioversion procedure? No    What phone number can we reach the patient at today? home phone listed in demographics.

## 2021-06-06 NOTE — TELEPHONE ENCOUNTER
Please schedule the patient to see us in clinic after she has been discharged from the hospital. Thanks.

## 2021-06-06 NOTE — PROGRESS NOTES
[facility]    Facility:   [facility] [[id number]]   Code Status: FULL CODE      CHIEF COMPLAINT/REASON FOR VISIT:  Chief Complaint   Patient presents with     Review Of Multiple Medical Conditions       HISTORY:      HPI: Jennifer is a 74 y.o. female undergoing physical and occupational therapy at [facility] transitional care unit. , she is with history of severe obesity, stage Ib left breast cancer, I normal, vitamin D deficiency, urge stress incontinence, tremor, postmenopausal bleeding and uterine adenocarcinoma in situ status post JEROME/BSO, atrial fibrillation, osteopenia, FLAKITA, macular degeneration, chronic bilateral leg edema, hypertension, diabetes mellitus, CAD, chronic back and bilateral knee pain who presented to [facility] for scheduled left breast lumpectomy And sentinel lymph node biopsy.  Per the records she became hypoxic in the PACU and noted to be persistently hypoxic to 4 L of oxygen which is a change from her baseline.  She does have FLAKITA but does not use her CPAP machine. She was recently sent back to the hospital from December 14-17 th  for left axilla pain and found to have a left axillary  hematoma related to her recent lumpectomy. She was seen by general surgery who recommended resuming warfarin and providing modest infection prophylaxis.She was also treated at this time with cefdinir for a UTI. She then returned and was  admitted to [facility] from 12/19/19-12/31/19 for pneumonia.  The patient has bilateral pleural effusions on CTA and was treated with doxycycline and Keflex for suspected pneumonia.  She completed a course of Cefdinir for UTI outpatient during her hospital stay.  Her procalcitonin and influenza A were negative on hospital admission.  She was also noted to have severe BLE nonpitting edema and her furosemide was increased to 80 mg two times a day for diuresis.  It was recommended that her BMP be followed  bi-weekly for assessment of renal function on increased diuresis.    Today she was seen for follow-up to chest x-ray and cough.  Today she was noted to have expiratory wheezing and was given a nebulizer.  Her WBC was normal at 10.2 and hemoglobin 11.5 however she was symptomatic and her chest x-ray read left basilar density compatible with pneumonia or atelectasis.  Her discharge was placed on hold over the weekend.  She was started on doxycycline.  she denies any  Increased shortness of breath beyond baseline   She denied chest pain.  She is moving her bowels and has no issues with urination.   Her left breast incision is healed.  Her BMP have been stable despite large doses of Lasix. Last A1C 9/2019 was 6.5.     Past Medical History:   Diagnosis Date     (Lower) Leg Localized Swelling     Created by Conversion      Adenocarcinoma In Situ Of The Uterus     Created by Conversion      Backache     Created by Conversion [facility] Annotation: Feb 29 2012 12:14PM - Opal Helm: Referred to  [facility] 9/11, given TENS unit.      Benign Polyps Of The Large Intestine     Created by Conversion      Breast cancer (H)      Cancer (H)     uterine     Chronic kidney disease     stage 1 per H&P 11/22/2019     Coronary artery disease      Diabetes mellitus (H)     type 2     Fatigue     Created by Conversion      Hyperglycemia     Created by Conversion      Hyperlipidemia     Created by Conversion      Hypertension     Created by Conversion      Joint Pain, Localized In The Knee     Created by Conversion      Lesion of mediastinum      Lymphedema     Created by Conversion      Macular Degeneration     Created by Conversion [facility] Annotation: Apr 5 2011 12:22PM - Tien Guzman: Followed by  Ophthalmologist, Dr. Avilez.      Major Depression, Single Episode In Remission     Created by Conversion      Obesity     Created by Conversion      Obstructive Sleep Apnea     CPAP     Osteopenia     Created by Conversion       Paroxysmal Atrial Fibrillation     Created by Conversion [facility] Annotation: Nov 28 2012 10:36PM - MadhuriShruthi: Found incidential  on exam on May 7th, [id number] score of 1 for HTNsymptomatic and hospitalized  x2 in July, 2012.CV X 2 in 2012Chronic Sotalol Rx      Postmenopausal bleeding     Created by Conversion      Skin cancer      Tachycardia     Created by Conversion      Tremor     Created by Conversion      Urge And Stress Incontinence     Created by Conversion      Urinary Frequency More Than Twice At Night (Nocturia)     Created by Conversion      Urine Tests Nonspecific Abnormal Findings     Created by Conversion      Vitamin D Deficiency     Created by Conversion              Family History   Problem Relation Age of Onset     Hypertension Father      Pneumonia Father      Esophageal cancer Brother      Cancer Brother      Stroke Mother      Alzheimer's disease Brother      Cancer Brother      Prostate cancer Brother      Cancer Nephew      Colon cancer Nephew      Cancer Paternal cousin      Social History     Socioeconomic History     Marital status: Single     Spouse name: Not on file     Number of children: 0     Years of education: Not on file     Highest education level: Not on file   Occupational History     Occupation: Retired RN     Employer: [employer]   Social Needs     Financial resource strain: Not on file     Food insecurity:     Worry: Not on file     Inability: Not on file     Transportation needs:     Medical: Not on file     Non-medical: Not on file   Tobacco Use     Smoking status: Former Smoker     Packs/day: 3.00     Years: 40.00     Pack years: 120.00     Types: Cigarettes     Last attempt to quit: 1/1/2005     Years since quitting: 15.1     Smokeless tobacco: Never Used   Substance and Sexual Activity     Alcohol use: Not Currently     Drug use: No     Sexual activity: Never     Partners: Male     Birth control/protection: Surgical, Post-menopausal   Lifestyle      Physical activity:     Days per week: Not on file     Minutes per session: Not on file     Stress: Not on file   Relationships     Social connections:     Talks on phone: Not on file     Gets together: Not on file     Attends [employer] service: Not on file     Active member of club or organization: Not on file     Attends meetings of clubs or organizations: Not on file     Relationship status: Not on file     Intimate partner violence:     Fear of current or ex partner: Not on file     Emotionally abused: Not on file     Physically abused: Not on file     Forced sexual activity: Not on file   Other Topics Concern     Not on file   Social History Narrative    Lives alone single family home that she owns, no children and is retired.  Boyfriend used to live with her, however due to stroke he is in his own care facility.  She is currently at [facility]         Review of Systems   Constitutional: Positive for activity change. Negative for appetite change, fatigue and fever.   HENT: Negative for congestion.    Respiratory: Positive for wheezing. Negative for cough.         Shortness of breath with exertion   Cardiovascular: Positive for leg swelling. Negative for chest pain.        Lymphedema   Gastrointestinal: Negative for abdominal distention, abdominal pain, constipation, diarrhea and nausea.   Genitourinary: Negative for dysuria.   Musculoskeletal: Positive for arthralgias. Negative for back pain.   Skin: Positive for wound. Negative for color change.        Left breast incision healed   Neurological: Negative for dizziness.   Psychiatric/Behavioral: Negative for agitation, behavioral problems and confusion.       Vitals:    02/21/20 0925   BP: 137/78   Pulse: (!) 119   Resp: 18   Temp: 98.3  F (36.8  C)   SpO2: 94%   Weight: (!) 339 lb 8 oz (154 kg)       Physical Exam  Constitutional:       Appearance: She is well-developed.      Comments: Pleasant woman in no acute distress   HENT:      Head:  Normocephalic.   Eyes:      Conjunctiva/sclera: Conjunctivae normal.   Neck:      Musculoskeletal: Normal range of motion.   Cardiovascular:      Rate and Rhythm: Normal rate and regular rhythm.      Heart sounds: Normal heart sounds. No murmur.   Pulmonary:      Effort: No respiratory distress.      Breath sounds: No rales.   Abdominal:      General: Bowel sounds are normal. There is no distension.      Palpations: Abdomen is soft.      Tenderness: There is no abdominal tenderness.   Musculoskeletal: Normal range of motion.      Right lower leg: Edema present.      Left lower leg: Edema present.      Comments: 3-4+ lower extremities. Lymph wraps resumed    Skin:     General: Skin is warm.      Findings: Rash present.      Comments: Healed left breast incision  Back rash almost completely resolved   Neurological:      Mental Status: She is alert and oriented to person, place, and time.   Psychiatric:         Behavior: Behavior normal.           LABS:   Recent Results (from the past 240 hour(s))   INR   Result Value Ref Range    INR 1.57 (H) 0.90 - 1.10   Basic Metabolic Panel   Result Value Ref Range    Sodium 141 136 - 145 mmol/L    Potassium 3.9 3.5 - 5.0 mmol/L    Chloride 102 98 - 107 mmol/L    CO2 29 22 - 31 mmol/L    Anion Gap, Calculation 10 5 - 18 mmol/L    Glucose 102 70 - 125 mg/dL    Calcium 9.1 8.5 - 10.5 mg/dL    BUN 14 8 - 28 mg/dL    Creatinine 0.98 0.60 - 1.10 mg/dL    GFR MDRD Af Amer >60 >60 mL/min/1.73m2    GFR MDRD Non Af Amer 56 (L) >60 mL/min/1.73m2   INR   Result Value Ref Range    INR 2.64 (H) 0.90 - 1.10   HM2(CBC w/o Differential)   Result Value Ref Range    WBC 10.2 4.0 - 11.0 thou/uL    RBC 4.60 3.80 - 5.40 mill/uL    Hemoglobin 11.5 (L) 12.0 - 16.0 g/dL    Hematocrit 39.0 35.0 - 47.0 %    MCV 85 80 - 100 fL    MCH 25.0 (L) 27.0 - 34.0 pg    MCHC 29.5 (L) 32.0 - 36.0 g/dL    RDW 18.2 (H) 11.0 - 14.5 %    Platelets 184 140 - 440 thou/uL    MPV 11.6 8.5 - 12.5 fL     Current Outpatient  Medications   Medication Sig     acetaminophen (TYLENOL) 500 MG tablet Take 1-2 tablets (500-1,000 mg total) by mouth every 4 (four) hours as needed (PAin 1-3).     albuterol (PROVENTIL) 2.5 mg /3 mL (0.083 %) nebulizer solution Take 3 mL (2.5 mg total) by nebulization every 4 (four) hours as needed for wheezing.     calcium-vitamin D (CALCIUM-VITAMIN D) 500 mg(1,250mg) -200 unit per tablet Take 1 tablet by mouth 2 (two) times a day with meals.     camphor-menthoL (SARNA) lotion Apply 1 application topically as needed for itching. Daily and prn     cholecalciferol, vitamin D3, 1,000 unit tablet Take 2,000 Units by mouth daily.      fesoterodine (TOVIAZ) 8 mg Tb24 ER tablet Take 8 mg by mouth daily.      fluticasone propionate (FLONASE ALLERGY RELIEF) 50 mcg/actuation nasal spray One spray in each nostril daily     furosemide (LASIX) 80 MG tablet Take 1 tablet (80 mg total) by mouth 2 (two) times a day at 9am and 6pm.     gabapentin (NEURONTIN) 300 MG capsule Take 300 mg by mouth at bedtime.     glucosamine-chondroitin 500-400 mg tablet Take 1 tablet by mouth 2 (two) times a day with meals.      guaiFENesin (ROBITUSSIN) 100 mg/5 mL syrup Take 200 mg by mouth every 4 (four) hours as needed for cough.     HYDROcodone-acetaminophen 5-325 mg per tablet Take 1 tablet by mouth every 4 (four) hours as needed for pain.     lidocaine 4 % patch Place 1 patch on the skin daily. Remove and discard patch with 12 hours or as directed by MD.     lidocaine-prilocaine (EMLA) cream Place over port 30 min. before being accessed.     loratadine (CLARITIN) 10 mg tablet Take 1 tablet (10 mg total) by mouth daily.     metoprolol tartrate (LOPRESSOR) 50 MG tablet Take 50 mg by mouth 2 (two) times a day. Hold for SBP < 110     mv-min/FA/vit K/lycop/lut/zeax (OCUVITE EYE PLUS MULTI ORAL) Take 1 tablet by mouth 2 (two) times a day with meals.     nystatin (MYCOSTATIN) powder Apply 1 application topically 2 (two) times a day. Under breasts for  rash, until healed. Then PRN.     OMEGA-3/DHA/EPA/FISH OIL (FISH OIL-OMEGA-3 FATTY ACIDS) 300-1,000 mg capsule Take 2 g by mouth 2 times a day at 6:00 am and 4:00 pm.     omeprazole (PRILOSEC) 20 MG capsule Take 20 mg by mouth daily before breakfast.     polyvinyl alcohol-povidon,PF, (REFRESH CLASSIC) 1.4-0.6 % Dpet ophthalmic dropperette Administer 2 drops to both eyes 3 (three) times a day.     potassium chloride (K-DUR,KLOR-CON) 10 MEQ tablet TAKE 2 TABLETS BY MOUTH TWICE DAILY     simvastatin (ZOCOR) 20 MG tablet Take 1 tablet (20 mg total) by mouth daily with supper.     warfarin ANTICOAGULANT (COUMADIN/JANTOVEN) 2 MG tablet Take 2 tablets (4 mg total) by mouth daily. (Patient taking differently: Take 5 mg by mouth daily. Next INR due 2/24/2020)       ASSESSMENT:      ICD-10-CM    1. Paroxysmal atrial fibrillation (H) I48.0    2. Physical deconditioning R53.81    3. Cough R05        PLAN:     pneumonia started on doxycycline l nebs Prn, robitussin prn    Chronic right shoulder pain recently seen by orthopedics and was given a cortisone shot.    Rash triamcinolone cream to rash twice daily completed Sarna lotion ordered daily and as needed,  hydroxyzine to 50 mg every 6 hours PRN almost resolved, A few scabs from scratching on back.      Loose toenails seen by podiatry 1/15/19 and loose toenail was removed continue dressing change orders     Atrial fibrillation-continue Coumadin,  metoprolol and increased lasix, monitor BMP closely     Venous stasis lower extremities. Pt  will resume lymph wraps M-F and ace wraps on the weekends. .  Blistering lower extremities- new dressing change orders.      Status post left breast lumpectomy incision healed    Pain management Norco PRN     Shortness of breath with exertion. Oxygen PRN    Anticoagulation management continue Coumadin and adjust per INRs    Debility PT OT      Electronically signed by: Sara Mayes, CNP

## 2021-06-06 NOTE — PATIENT INSTRUCTIONS - HE
Let me know if weight goes above 348 lbs    Stick to a low sodium diet (less than 2,000 mg per day)

## 2021-06-06 NOTE — TELEPHONE ENCOUNTER
Medication Request  Medication name:   omeprazole (PRILOSEC) 20 MG capsule        Sig - Route: Take 20 mg by mouth daily before breakfast. - Oral    Class: Historical Med        Requested Pharmacy: [facility]  Reason for request: Is out of medication    Okay to leave a detailed message: yes

## 2021-06-06 NOTE — PROGRESS NOTES
[facility]    Facility:   [facility] [[id number]]   Code Status: FULL CODE      CHIEF COMPLAINT/REASON FOR VISIT:  Chief Complaint   Patient presents with     Review Of Multiple Medical Conditions       HISTORY:      HPI: Jennifer is a 74 y.o. female undergoing physical and occupational therapy at [facility] transitional care unit. , she is with history of severe obesity, stage Ib left breast cancer, I normal, vitamin D deficiency, urge stress incontinence, tremor, postmenopausal bleeding and uterine adenocarcinoma in situ status post JEROME/BSO, atrial fibrillation, osteopenia, FLAKITA, macular degeneration, chronic bilateral leg edema, hypertension, diabetes mellitus, CAD, chronic back and bilateral knee pain who presented to [facility] for scheduled left breast lumpectomy And sentinel lymph node biopsy.  Per the records she became hypoxic in the PACU and noted to be persistently hypoxic to 4 L of oxygen which is a change from her baseline.  She does have FLAKITA but does not use her CPAP machine. She was recently sent back to the hospital from December 14-17 th  for left axilla pain and found to have a left axillary  hematoma related to her recent lumpectomy. She was seen by general surgery who recommended resuming warfarin and providing modest infection prophylaxis.She was also treated at this time with cefdinir for a UTI. She then returned and was  admitted to [facility] from 12/19/19-12/31/19 for pneumonia.  The patient has bilateral pleural effusions on CTA and was treated with doxycycline and Keflex for suspected pneumonia.  She completed a course of Cefdinir for UTI outpatient during her hospital stay.  Her procalcitonin and influenza A were negative on hospital admission.  She was also noted to have severe BLE nonpitting edema and her furosemide was increased to 80 mg two times a day for diuresis.  It was recommended that her BMP be followed  bi-weekly for assessment of renal function on increased diuresis.    Today she was seen for review of weight and follow-up of rash.  Her rash is almost completely resolved with a few scabs on her back from scratching She reports the itch is much better and she gets relief with the Sarna lotion. Her weights were reviewed and she is down 3.5 pounds in the last 2 days  She denies any  Increased shortness of breath beyond baseline   She denied chest pain.  She is moving her bowels and has no issues with urination.   Her left breast incision is healed.  Her BMP have been stable despite large doses of Lasix.  Her lung sounds were clear.    She denies any cough or congestion.     .Last A1C 9/2019 was 6.5.     Past Medical History:   Diagnosis Date     (Lower) Leg Localized Swelling     Created by Conversion      Adenocarcinoma In Situ Of The Uterus     Created by Conversion      Backache     Created by Conversion [employer] Annotation: Feb 29 2012 12:14PM - Opal Helm: Referred to  [facility] 9/11, given TENS unit.      Benign Polyps Of The Large Intestine     Created by Conversion      Breast cancer (H)      Cancer (H)     uterine     Chronic kidney disease     stage 1 per H&P 11/22/2019     Coronary artery disease      Diabetes mellitus (H)     type 2     Fatigue     Created by Conversion      Hyperglycemia     Created by Conversion      Hyperlipidemia     Created by Conversion      Hypertension     Created by Conversion      Joint Pain, Localized In The Knee     Created by Conversion      Lesion of mediastinum      Lymphedema     Created by Conversion      Macular Degeneration     Created by Conversion [employer] Annotation: Apr 5 2011 12:22PM - Tien Guzman: Followed by  Ophthalmologist, Dr. Avilez.      Major Depression, Single Episode In Remission     Created by Conversion      Obesity     Created by Conversion      Obstructive Sleep Apnea     CPAP     Osteopenia     Created by Conversion      Paroxysmal  Atrial Fibrillation     Created by Conversion [facility] Annotation: Nov 28 2012 10:36PM - MadhuriShruthi: Found incidential  on exam on May 7th, [id number] score of 1 for HTNsymptomatic and hospitalized  x2 in July, 2012.CV X 2 in 2012Chronic Sotalol Rx      Postmenopausal bleeding     Created by Conversion      Skin cancer      Tachycardia     Created by Conversion      Tremor     Created by Conversion      Urge And Stress Incontinence     Created by Conversion      Urinary Frequency More Than Twice At Night (Nocturia)     Created by Conversion      Urine Tests Nonspecific Abnormal Findings     Created by Conversion      Vitamin D Deficiency     Created by Conversion              Family History   Problem Relation Age of Onset     Hypertension Father      Pneumonia Father      Esophageal cancer Brother      Cancer Brother      Stroke Mother      Alzheimer's disease Brother      Cancer Brother      Prostate cancer Brother      Cancer Nephew      Colon cancer Nephew      Cancer Paternal cousin      Social History     Socioeconomic History     Marital status: Single     Spouse name: Not on file     Number of children: 0     Years of education: Not on file     Highest education level: Not on file   Occupational History     Occupation: Retired RN     Employer: [employer]   Social Needs     Financial resource strain: Not on file     Food insecurity:     Worry: Not on file     Inability: Not on file     Transportation needs:     Medical: Not on file     Non-medical: Not on file   Tobacco Use     Smoking status: Former Smoker     Packs/day: 3.00     Years: 40.00     Pack years: 120.00     Types: Cigarettes     Last attempt to quit: 1/1/2005     Years since quitting: 15.1     Smokeless tobacco: Never Used   Substance and Sexual Activity     Alcohol use: Not Currently     Drug use: No     Sexual activity: Never     Partners: Male     Birth control/protection: Surgical, Post-menopausal   Lifestyle     Physical  activity:     Days per week: Not on file     Minutes per session: Not on file     Stress: Not on file   Relationships     Social connections:     Talks on phone: Not on file     Gets together: Not on file     Attends Confucianism service: Not on file     Active member of club or organization: Not on file     Attends meetings of clubs or organizations: Not on file     Relationship status: Not on file     Intimate partner violence:     Fear of current or ex partner: Not on file     Emotionally abused: Not on file     Physically abused: Not on file     Forced sexual activity: Not on file   Other Topics Concern     Not on file   Social History Narrative    Lives alone single family home that she owns, no children and is retired.  Boyfriend used to live with her, however due to stroke he is in his own care facility.  She is currently at [facility]         Review of Systems   Constitutional: Positive for activity change. Negative for appetite change, fatigue and fever.   HENT: Negative for congestion.    Respiratory: Negative for cough and wheezing.         Shortness of breath with exertion   Cardiovascular: Positive for leg swelling. Negative for chest pain.        Lymphedema   Gastrointestinal: Negative for abdominal distention, abdominal pain, constipation, diarrhea and nausea.   Genitourinary: Negative for dysuria.   Musculoskeletal: Positive for arthralgias. Negative for back pain.   Skin: Positive for wound. Negative for color change.        Left breast incision healed   Neurological: Negative for dizziness.   Psychiatric/Behavioral: Negative for agitation, behavioral problems and confusion.       Vitals:    02/11/20 0848   BP: 110/58   Pulse: 88   Resp: 20   Temp: 98.3  F (36.8  C)   SpO2: 91%   Weight: (!) 337 lb 8 oz (153.1 kg)       Physical Exam  Constitutional:       Appearance: She is well-developed.      Comments: Pleasant woman in no acute distress   HENT:      Head: Normocephalic.   Eyes:       Conjunctiva/sclera: Conjunctivae normal.   Neck:      Musculoskeletal: Normal range of motion.   Cardiovascular:      Rate and Rhythm: Normal rate and regular rhythm.      Heart sounds: Normal heart sounds. No murmur.   Pulmonary:      Effort: No respiratory distress.      Breath sounds: No rales.   Abdominal:      General: Bowel sounds are normal. There is no distension.      Palpations: Abdomen is soft.      Tenderness: There is no abdominal tenderness.   Musculoskeletal: Normal range of motion.      Right lower leg: Edema present.      Left lower leg: Edema present.      Comments: 3-4+ lower extremities. Lymph wraps resumed    Skin:     General: Skin is warm.      Findings: Rash present.      Comments: Healed left breast incision  Back rash almost completely resolved   Neurological:      Mental Status: She is alert and oriented to person, place, and time.   Psychiatric:         Behavior: Behavior normal.           LABS:   Recent Results (from the past 240 hour(s))   Basic Metabolic Panel   Result Value Ref Range    Sodium 143 136 - 145 mmol/L    Potassium 3.9 3.5 - 5.0 mmol/L    Chloride 103 98 - 107 mmol/L    CO2 30 22 - 31 mmol/L    Anion Gap, Calculation 10 5 - 18 mmol/L    Glucose 105 70 - 125 mg/dL    Calcium 9.2 8.5 - 10.5 mg/dL    BUN 13 8 - 28 mg/dL    Creatinine 0.98 0.60 - 1.10 mg/dL    GFR MDRD Af Amer >60 >60 mL/min/1.73m2    GFR MDRD Non Af Amer 56 (L) >60 mL/min/1.73m2   INR   Result Value Ref Range    INR 1.62 (H) 0.90 - 1.10   Basic Metabolic Panel   Result Value Ref Range    Sodium 143 136 - 145 mmol/L    Potassium 4.0 3.5 - 5.0 mmol/L    Chloride 101 98 - 107 mmol/L    CO2 32 (H) 22 - 31 mmol/L    Anion Gap, Calculation 10 5 - 18 mmol/L    Glucose 105 70 - 125 mg/dL    Calcium 9.5 8.5 - 10.5 mg/dL    BUN 13 8 - 28 mg/dL    Creatinine 1.04 0.60 - 1.10 mg/dL    GFR MDRD Af Amer >60 >60 mL/min/1.73m2    GFR MDRD Non Af Amer 52 (L) >60 mL/min/1.73m2     Current Outpatient Medications   Medication  Sig     acetaminophen (TYLENOL) 500 MG tablet Take 1-2 tablets (500-1,000 mg total) by mouth every 4 (four) hours as needed (PAin 1-3).     albuterol (PROVENTIL) 2.5 mg /3 mL (0.083 %) nebulizer solution Take 3 mL (2.5 mg total) by nebulization every 4 (four) hours as needed for wheezing.     bacitracin ointment Apply 1 application topically 2 (two) times a day. After epsom salt foot soak. To bilateral great toes for toenail falling off, until seen by Podiatry     calcium-vitamin D (CALCIUM-VITAMIN D) 500 mg(1,250mg) -200 unit per tablet Take 1 tablet by mouth 2 (two) times a day with meals.     cholecalciferol, vitamin D3, 1,000 unit tablet Take 2,000 Units by mouth daily.      diphenhydrAMINE (BENADRYL) 2 % cream Apply 1 application topically 3 (three) times a day as needed for itching.     diphenhydrAMINE (BENADRYL) 25 mg capsule Take 25 mg by mouth every 6 (six) hours as needed for itching.     fesoterodine (TOVIAZ) 8 mg Tb24 ER tablet Take 8 mg by mouth daily.      fluticasone propionate (FLONASE ALLERGY RELIEF) 50 mcg/actuation nasal spray One spray in each nostril daily     furosemide (LASIX) 80 MG tablet Take 1 tablet (80 mg total) by mouth 2 (two) times a day at 9am and 6pm.     gabapentin (NEURONTIN) 300 MG capsule Take 300 mg by mouth at bedtime.     glucosamine-chondroitin 500-400 mg tablet Take 1 tablet by mouth 2 (two) times a day with meals.      HYDROcodone-acetaminophen 5-325 mg per tablet Take 1 tablet by mouth every 4 (four) hours as needed for pain.     lidocaine 4 % patch Place 1 patch on the skin daily. Remove and discard patch with 12 hours or as directed by MD.     lidocaine-prilocaine (EMLA) cream Place over port 30 min. before being accessed.     loratadine (CLARITIN) 10 mg tablet Take 1 tablet (10 mg total) by mouth daily.     metoprolol tartrate (LOPRESSOR) 50 MG tablet Take 50 mg by mouth 2 (two) times a day. Hold for SBP < 110     mv-min/FA/vit K/lycop/lut/zeax (OCUVITE EYE PLUS MULTI  ORAL) Take 1 tablet by mouth 2 (two) times a day with meals.     NOVOLOG U-100 INSULIN ASPART 100 unit/mL injection Check blood sugar three (3) times daily.  11.65 Type 2 with hyperglycemia     nystatin (MYCOSTATIN) powder Apply 1 application topically 2 (two) times a day. Under breasts for rash, until healed. Then PRN.     OMEGA-3/DHA/EPA/FISH OIL (FISH OIL-OMEGA-3 FATTY ACIDS) 300-1,000 mg capsule Take 2 g by mouth 2 times a day at 6:00 am and 4:00 pm.     omeprazole (PRILOSEC) 20 MG capsule Take 20 mg by mouth daily before breakfast.     polyvinyl alcohol-povidon,PF, (REFRESH CLASSIC) 1.4-0.6 % Dpet ophthalmic dropperette Administer 2 drops to both eyes 3 (three) times a day.     potassium chloride (K-DUR,KLOR-CON) 10 MEQ tablet TAKE 2 TABLETS BY MOUTH TWICE DAILY     prochlorperazine (COMPAZINE) 10 MG tablet TAKE 1 TABLET(10 MG) BY MOUTH EVERY 6 HOURS AS NEEDED FOR NAUSEA     simvastatin (ZOCOR) 20 MG tablet Take 1 tablet (20 mg total) by mouth daily with supper.     warfarin ANTICOAGULANT (COUMADIN/JANTOVEN) 2 MG tablet Take 2 tablets (4 mg total) by mouth daily.       ASSESSMENT:      ICD-10-CM    1. CHF (congestive heart failure), NYHA class I, acute on chronic, combined (H) I50.43    2. Physical deconditioning R53.81    3. Bilateral leg edema R60.0    4. Rash R21        PLAN:   Chronic right shoulder pain recently seen by orthopedics and was given a cortisone shot.    Rash triamcinolone cream to rash twice daily completed Sarna lotion ordered daily and as needed,  hydroxyzine to 50 mg every 6 hours PRN almost resolved, A few scabs from scratching on back.      Loose toenails seen by podiatry 1/15/19 and loose toenail was removed continue dressing change orders     Atrial fibrillation-continue Coumadin,  metoprolol and increased lasix, monitor BMP closely     Venous stasis lower extremities. Pt  will resume lymph wraps M-F and ace wraps on the weekends. .  Blistering lower extremities- new dressing change  orders.      Status post left breast lumpectomy incision healed    Pain management Norco PRN     Shortness of breath with exertion. Oxygen PRN    Anticoagulation management continue Coumadin and adjust per INRs    Debility PT OT      Electronically signed by: Sara Mayes CNP

## 2021-06-06 NOTE — PROGRESS NOTES
[facility]    Facility:   [facility] [[id number]]   Code Status: FULL CODE  PCP: Cordell Ceja DO   Phone: [phone]   Fax: [phone]      CHIEF COMPLAINT/REASON FOR VISIT:  Chief Complaint   Patient presents with     Discharge Summary       HISTORY COURSE:  Jennifer is a 74 y.o. female undergoing physical and occupational therapy at [facility] transitional care unit. , she is with history of severe obesity, stage Ib left breast cancer, I normal, vitamin D deficiency, urge stress incontinence, tremor, postmenopausal bleeding and uterine adenocarcinoma in situ status post JEROME/BSO, atrial fibrillation, osteopenia, FLAKITA, macular degeneration, chronic bilateral leg edema, hypertension, diabetes mellitus, CAD, chronic back and bilateral knee pain who presented to [facility] for scheduled left breast lumpectomy And sentinel lymph node biopsy.  Per the records she became hypoxic in the PACU and noted to be persistently hypoxic to 4 L of oxygen which is a change from her baseline.  She does have FLAKITA but does not use her CPAP machine. She was recently sent back to the hospital from December 14-17 th  for left axilla pain and found to have a left axillary  hematoma related to her recent lumpectomy. She was seen by general surgery who recommended resuming warfarin and providing modest infection prophylaxis.She was also treated at this time with cefdinir for a UTI. She then returned and was  admitted to [facility] from 12/19/19-12/31/19 for pneumonia.  The patient has bilateral pleural effusions on CTA and was treated with doxycycline and Keflex for suspected pneumonia.  She completed a course of Cefdinir for UTI outpatient during her hospital stay.  Her procalcitonin and influenza A were negative on hospital admission.  She was also noted to have severe BLE nonpitting edema and her furosemide was increased to 80 mg two times a day for diuresis.   It was recommended that her BMP be followed bi-weekly for assessment of renal function on increased diuresis.     Today she was seen for a face-to-face for discharge.  Patient will discharge to home on  Monday 2/20/2020.  She reports she will be staying with her sister temporarily.  She will discharge with current medications and treatments.  She will have [facility] home care services PT OT home health aide and an RN.  Her INR was reviewed today and she was therapeutic at 2.64.  she will continue on 5 mg of Coumadin daily and will need an INR check done on 2/24/2020.  She was noted to have some upper airway congestion today.    I will check a chest x-ray prior to her discharge due to her being immunocompromised. Her weights were reviewed and she is   up 3 pounds over the last week.. She denies any  Increased shortness of breath beyond baseline   She denied chest pain.  She is moving her bowels and has no issues with urination.   Her left breast incision is healed.  Her BMP have been stable despite large doses of Lasix.    Her rash has completely healed and she reports relief with the Sarna lotion and her hydroxyzine has been discontinued.   .Last A1C 9/2019 was 6.5.     Review of Systems  Constitutional: Positive for activity change. Negative for appetite change, fatigue and fever.   HENT: Negative for congestion.    Respiratory: Negative for cough and wheezing.         Shortness of breath with exertion   Cardiovascular: Positive for leg swelling. Negative for chest pain.        Lymphedema   Gastrointestinal: Negative for abdominal distention, abdominal pain, constipation, diarrhea and nausea.   Genitourinary: Negative for dysuria.   Musculoskeletal: Positive for arthralgias. Negative for back pain.   Skin: Positive for wound. Negative for color change.        Left breast incision healed   Neurological: Negative for dizziness.   Psychiatric/Behavioral: Negative for agitation, behavioral problems and confusion.   Vitals:     02/20/20 0724   BP: 102/60   Pulse: 93   Resp: 18   Temp: 98.9  F (37.2  C)   SpO2: 94%   Weight: (!) 338 lb 9.6 oz (153.6 kg)       Physical Exam  Constitutional:       Appearance: She is well-developed.      Comments: Pleasant woman in no acute distress   HENT:      Head: Normocephalic.   Eyes:      Conjunctiva/sclera: Conjunctivae normal.   Neck:      Musculoskeletal: Normal range of motion.   Cardiovascular:      Rate and Rhythm: Normal rate and regular rhythm.      Heart sounds: Normal heart sounds. No murmur.   Pulmonary:      Effort: No respiratory distress.      Breath sounds: No rales.   Abdominal:      General: Bowel sounds are normal. There is no distension.      Palpations: Abdomen is soft.      Tenderness: There is no abdominal tenderness.   Musculoskeletal: Normal range of motion.      Right lower leg: Edema present.      Left lower leg: Edema present.      Comments: 3-4+ lower extremities. Lymph wraps resumed    Skin:     General: Skin is warm.      Findings: Rash present.      Comments: Healed left breast incision  Back rash healed  Neurological:      Mental Status: She is alert and oriented to person, place, and time.   Psychiatric:         Behavior: Behavior normal.   MEDICATION LIST:  Current Outpatient Medications   Medication Sig     acetaminophen (TYLENOL) 500 MG tablet Take 1-2 tablets (500-1,000 mg total) by mouth every 4 (four) hours as needed (PAin 1-3).     albuterol (PROVENTIL) 2.5 mg /3 mL (0.083 %) nebulizer solution Take 3 mL (2.5 mg total) by nebulization every 4 (four) hours as needed for wheezing.     calcium-vitamin D (CALCIUM-VITAMIN D) 500 mg(1,250mg) -200 unit per tablet Take 1 tablet by mouth 2 (two) times a day with meals.     camphor-menthoL (SARNA) lotion Apply 1 application topically as needed for itching. Daily and prn     cholecalciferol, vitamin D3, 1,000 unit tablet Take 2,000 Units by mouth daily.      fesoterodine (TOVIAZ) 8 mg Tb24 ER tablet Take 8 mg by mouth  daily.      fluticasone propionate (FLONASE ALLERGY RELIEF) 50 mcg/actuation nasal spray One spray in each nostril daily     furosemide (LASIX) 80 MG tablet Take 1 tablet (80 mg total) by mouth 2 (two) times a day at 9am and 6pm.     gabapentin (NEURONTIN) 300 MG capsule Take 300 mg by mouth at bedtime.     glucosamine-chondroitin 500-400 mg tablet Take 1 tablet by mouth 2 (two) times a day with meals.      guaiFENesin (ROBITUSSIN) 100 mg/5 mL syrup Take 200 mg by mouth every 4 (four) hours as needed for cough.     HYDROcodone-acetaminophen 5-325 mg per tablet Take 1 tablet by mouth every 4 (four) hours as needed for pain.     lidocaine 4 % patch Place 1 patch on the skin daily. Remove and discard patch with 12 hours or as directed by MD.     lidocaine-prilocaine (EMLA) cream Place over port 30 min. before being accessed.     loratadine (CLARITIN) 10 mg tablet Take 1 tablet (10 mg total) by mouth daily.     metoprolol tartrate (LOPRESSOR) 50 MG tablet Take 50 mg by mouth 2 (two) times a day. Hold for SBP < 110     mv-min/FA/vit K/lycop/lut/zeax (OCUVITE EYE PLUS MULTI ORAL) Take 1 tablet by mouth 2 (two) times a day with meals.     nystatin (MYCOSTATIN) powder Apply 1 application topically 2 (two) times a day. Under breasts for rash, until healed. Then PRN.     OMEGA-3/DHA/EPA/FISH OIL (FISH OIL-OMEGA-3 FATTY ACIDS) 300-1,000 mg capsule Take 2 g by mouth 2 times a day at 6:00 am and 4:00 pm.     omeprazole (PRILOSEC) 20 MG capsule Take 20 mg by mouth daily before breakfast.     polyvinyl alcohol-povidon,PF, (REFRESH CLASSIC) 1.4-0.6 % Dpet ophthalmic dropperette Administer 2 drops to both eyes 3 (three) times a day.     potassium chloride (K-DUR,KLOR-CON) 10 MEQ tablet TAKE 2 TABLETS BY MOUTH TWICE DAILY     simvastatin (ZOCOR) 20 MG tablet Take 1 tablet (20 mg total) by mouth daily with supper.     warfarin ANTICOAGULANT (COUMADIN/JANTOVEN) 2 MG tablet Take 2 tablets (4 mg total) by mouth daily. (Patient taking  differently: Take 5 mg by mouth daily. Next INR due 2/24/2020)       DISCHARGE DIAGNOSIS:    ICD-10-CM    1. Bilateral leg edema R60.0    2. Status post left breast lumpectomy Z98.890    3. CHF (congestive heart failure), NYHA class I, acute on chronic, combined (H) I50.43        MEDICAL EQUIPMENT NEEDS:  None    DISCHARGE PLAN/FACE TO FACE:  I certify that services are/were furnished while this patient was under the care of a physician and that a physician or an allowed non-physician practitioner (NPP), had a face-to-face encounter that meets the physician face-to-face encounter requirements. The encounter was in whole, or in part, related to the primary reason for home health. The patient is confined to his/her home and needs intermittent skilled nursing, physical therapy, speech-language pathology, or the continued need for occupational therapy. A plan of care has been established by a physician and is periodically reviewed by a physician.  Date of Face-to-Face Encounter: 2/20/20    I certify that, based on my findings, the following services are medically necessary home health services: [facility] PT/OT/HHA/RN     My clinical findings support the need for the above skilled services because: PT OT for continued strength and endurance and for lymph wraps therapy, home health aide to assist with activities of daily living, RN for vital signs, medication management, and INR checks    This patient is homebound because: She is deconditioned and easily fatigued following an  extensive TCU stay.  She is with recent history of a left breast lumpectomy.  She also requires the use of adaptive equipment.      The patient is, or has been, under my care and I have initiated the establishment of the plan of care. This patient will be followed by a physician who will periodically review the plan of care.    Schedule follow up visit with primary care provider within 7 days to reestablish care.    Electronically signed by: Sara Mayes  CNP

## 2021-06-06 NOTE — TELEPHONE ENCOUNTER
Spoke with Windy and relayed pcp message.  [facility]indy understanding, and would like nebulizer order faxed to [facility].  This was done.  Routing to speciality to assist with referral to [facility].

## 2021-06-06 NOTE — TELEPHONE ENCOUNTER
Medication Question or Clarification  Who is calling: Home Care Nurse  What medication are you calling about (include dose and sig)?:   metoprolol tartrate (LOPRESSOR) 50 MG tablet   12/18/2019     Sig - Route: Take 50 mg by mouth 2 (two) times a day. Hold for SBP < 110 - Oral    Class: Historical Med        Who prescribed the medication?: Hospital provider  What is your question/concern?: Nurse states patient is now taking Metoprolol 25 mg two times a day at home.    TCU was holding the metoprolol 50 mg due to blood pressure was SBP < 110.  Patient does not check her blood pressures at home.     Please advise.    [facility] to leave a detailed message?: Yes

## 2021-06-06 NOTE — PROGRESS NOTES
[facility]    Facility:   [facility] [[id number]]   Code Status: FULL CODE       Chief Complaint   Patient presents with     Follow Up     TCU 2/18/20.       HPI:   Jennifer is a 74 y.o. female with hx of left breast cancer, afib on warfarin, lymphedema and venous stasis, anemia, obesity, HTN seen in TCU at [facility]. Original hospitalization 12/2/2019 for left breast lumpectomy and sentinel node biopsy, discharged to TCU, re hospitalized 12/14/19-12/17/19 with left axillary hematoma, ABLA and UTI. Returned to TCU and then re hospitalized 12/19/19-12/31/19 as noted below for pneumonia, LE cellulitis.      74-year-old female with history of recent lumpectomy and sentinel lymph node biopsy, chronic atrial fibrillation on Coumadin, recent left axillary hematoma after lumpectomy presents with acute hypoxemic respiratory failure likely secondary to pneumonia versus bronchitis.     Acute hypoxemic respiratory failure: Patient with noted increasing wheezing with production of green sputum.  Noted is a recent hospitalization and the patient is already on Cefdinir.  CTA of the chest is negative for obvious infiltrate but does show some mild bilateral pleural effusions.  Upon further review of CT chest with pulmonary medicine there could be possible infiltrate seen on imaging.  Procalcitonin and influenza negative.     -- continue course of doxycycline and other nebs/pulmonary cares   -- completed steroid burst  -- continue increased dose of Lasix 80 mg twice daily after discharge.  Patient will need close follow up of her renal function at least weekly while at TCU.     Concern bilateral for LE cellulitis  --Continue current course of Keflex and doxycycline.  I feel currently the patient most likely has venous stasis changes bilaterally and some superficial skin irritation related to lymphedema wraps.     Thymoma: follow up Dr. Villalba     Left axillary hematoma:  In the setting of chronic anticoagulation for A. fib and recent lumpectomy with sentinel lymph node biopsy.    --Okay to continue Coumadin     UTI:  Present on admission  -- Completed cefdinir     Chronic A. fib: Continue home cardiovascular medications.  Reduce home dose warfarin to 4 mg daily for now and adjust based on future INR values.     Left-sided breast cancer: Follows with Dr. Villalba.  Plan outpatient follow-up     FLAKITA: continue CPAP    DM2: We will send out on sliding scale insulin given recent hyperglycemia while inpatient however I feel this is most likely steroid induced.  Patient has had diet controlled diabetes historically    Today:  She has been doing fairly well, progressing with therapy. She is planning to discharge later this week, reports she will be moving in with her sister in law temporarily. She does tire with ambulation but has been walking with walker, No falls. Denies dizziness. No chest pain or shortness of breath. No cough or fever. No need for oxygen. Appetite is pretty good. No nausea, vomiting or diarrhea. She reports her LE edema is improved with lymphedema wraps. She is a diabetic, controlled with diet previously, has SSI here but not needing much coverage and accuchecks as well as SSI coverage can be discontinued when she goes home.       Past Medical History:  Past Medical History:   Diagnosis Date     (Lower) Leg Localized Swelling     Created by Conversion      Adenocarcinoma In Situ Of The Uterus     Created by Conversion      Backache     Created by [facility] Annotation: Feb 29 2012 12:14PM - Opal Helm: Referred to  [facility] 9/11, given TENS unit.      Benign Polyps Of The Large Intestine     Created by Conversion      Breast cancer (H)      Cancer (H)     uterine     Chronic kidney disease     stage 1 per H&P 11/22/2019     Coronary artery disease      Diabetes mellitus (H)     type 2     Fatigue     Created by Conversion      Hyperglycemia     Created  by Conversion      Hyperlipidemia     Created by Conversion      Hypertension     Created by Conversion      Joint Pain, Localized In The Knee     Created by Conversion      Lesion of mediastinum      Lymphedema     Created by Conversion      Macular Degeneration     Created by Conversion [employer] [facility] Annotation: Apr 5 2011 12:22PM - Tien Guzman: Followed by  Ophthalmologist, Dr. Avilez.      Major Depression, Single Episode In Remission     Created by Conversion      Obesity     Created by Conversion      Obstructive Sleep Apnea     CPAP     Osteopenia     Created by Conversion      Paroxysmal Atrial Fibrillation     Created by Conversion [employer] [facility] Annotation: Nov 28 2012 10:36PM - Shruthi Dhaliwal: Found incidential  on exam on May 7th, [id number] score of 1 for HTNsymptomatic and hospitalized  x2 in July, 2012.CV X 2 in 2012Chronic Sotalol Rx      Postmenopausal bleeding     Created by Conversion      Skin cancer      Tachycardia     Created by Conversion      Tremor     Created by Conversion      Urge And Stress Incontinence     Created by Conversion      Urinary Frequency More Than Twice At Night (Nocturia)     Created by Conversion      Urine Tests Nonspecific Abnormal Findings     Created by Conversion      Vitamin D Deficiency     Created by Conversion         Medications:  Current Outpatient Medications   Medication Sig     acetaminophen (TYLENOL) 500 MG tablet Take 1-2 tablets (500-1,000 mg total) by mouth every 4 (four) hours as needed (PAin 1-3).     albuterol (PROVENTIL) 2.5 mg /3 mL (0.083 %) nebulizer solution Take 3 mL (2.5 mg total) by nebulization every 4 (four) hours as needed for wheezing.     calcium-vitamin D (CALCIUM-VITAMIN D) 500 mg(1,250mg) -200 unit per tablet Take 1 tablet by mouth 2 (two) times a day with meals.     camphor-menthoL (SARNA) lotion Apply 1 application topically as needed for itching. Daily and prn     cholecalciferol, vitamin D3, 1,000 unit tablet Take 2,000 Units by  mouth daily.      fesoterodine (TOVIAZ) 8 mg Tb24 ER tablet Take 8 mg by mouth daily.      fluticasone propionate (FLONASE ALLERGY RELIEF) 50 mcg/actuation nasal spray One spray in each nostril daily     furosemide (LASIX) 80 MG tablet Take 1 tablet (80 mg total) by mouth 2 (two) times a day at 9am and 6pm.     gabapentin (NEURONTIN) 300 MG capsule Take 300 mg by mouth at bedtime.     glucosamine-chondroitin 500-400 mg tablet Take 1 tablet by mouth 2 (two) times a day with meals.      guaiFENesin (ROBITUSSIN) 100 mg/5 mL syrup Take 200 mg by mouth every 4 (four) hours as needed for cough.     HYDROcodone-acetaminophen 5-325 mg per tablet Take 1 tablet by mouth every 4 (four) hours as needed for pain.     lidocaine 4 % patch Place 1 patch on the skin daily. Remove and discard patch with 12 hours or as directed by MD.     lidocaine-prilocaine (EMLA) cream Place over port 30 min. before being accessed.     loratadine (CLARITIN) 10 mg tablet Take 1 tablet (10 mg total) by mouth daily.     metoprolol tartrate (LOPRESSOR) 50 MG tablet Take 50 mg by mouth 2 (two) times a day. Hold for SBP < 110     mv-min/FA/vit K/lycop/lut/zeax (OCUVITE EYE PLUS MULTI ORAL) Take 1 tablet by mouth 2 (two) times a day with meals.     nystatin (MYCOSTATIN) powder Apply 1 application topically 2 (two) times a day. Under breasts for rash, until healed. Then PRN.     OMEGA-3/DHA/EPA/FISH OIL (FISH OIL-OMEGA-3 FATTY ACIDS) 300-1,000 mg capsule Take 2 g by mouth 2 times a day at 6:00 am and 4:00 pm.     omeprazole (PRILOSEC) 20 MG capsule Take 20 mg by mouth daily before breakfast.     polyvinyl alcohol-povidon,PF, (REFRESH CLASSIC) 1.4-0.6 % Dpet ophthalmic dropperette Administer 2 drops to both eyes 3 (three) times a day.     potassium chloride (K-DUR,KLOR-CON) 10 MEQ tablet TAKE 2 TABLETS BY MOUTH TWICE DAILY     simvastatin (ZOCOR) 20 MG tablet Take 1 tablet (20 mg total) by mouth daily with supper.     warfarin ANTICOAGULANT  (COUMADIN/JANTOVEN) 2 MG tablet Take 2 tablets (4 mg total) by mouth daily. (Patient taking differently: Take 5 mg by mouth daily. Next INR due 2/24/2020)       Physical Exam:   General: Patient is alert pale and tired appearing female, no distress.   Vitals: /86, Temp 97.2, Pulse 80, RR 18, O2 sat 93%RA.  HEENT: Head is NCAT. Eyes show no injection or icterus. Nares negative. Oropharynx well hydrated.  Neck: Supple. No tenderness or adenopathy. No JVD.  Lungs: Clear bilaterally. No wheezes.  Cardiovascular: Regular rate and rhythm, normal S1, S2.  Back: No spinal or CVA tenderness.  Abdomen: Obese, soft, no tenderness on exam. Bowel sounds present. No guarding rebound or rigidity.  : Deferred.  Extremities: Signif LE swelling with lymphedema wraps.  Musculoskeletal: Mild degen changes.   Psych: Mood appears good.      Labs:  Lab Results   Component Value Date    WBC 10.7 01/21/2020    HGB 11.1 (L) 01/21/2020    HCT 38.3 01/21/2020    MCV 92 01/21/2020     01/21/2020     Results for orders placed or performed in visit on 01/29/20   Basic Metabolic Panel   Result Value Ref Range    Sodium 141 136 - 145 mmol/L    Potassium 3.9 3.5 - 5.0 mmol/L    Chloride 101 98 - 107 mmol/L    CO2 30 22 - 31 mmol/L    Anion Gap, Calculation 10 5 - 18 mmol/L    Glucose 103 70 - 125 mg/dL    Calcium 9.0 8.5 - 10.5 mg/dL    BUN 14 8 - 28 mg/dL    Creatinine 0.93 0.60 - 1.10 mg/dL    GFR MDRD Af Amer >60 >60 mL/min/1.73m2    GFR MDRD Non Af Amer 59 (L) >60 mL/min/1.73m2       Assessment/Plan:  1. Breast cancer, left lumpectomy and sentinel node biopsy on 12/2/19. Follow up with heme/onc.  2. Lymphedema. With venous stasis. Has lymphedema wraps per OT. Continue lasix.   3. HTN. BPs stable on metoprolol, lasix. Continue to monitor.   4. Afib. Anticoagulated with warfarin. Monitor and adjust per INR.   5. Anemia. ABLA. Had post op left axillary hematoma. Last hgb at 11.1.  6. Diabetes. Diet controlled at home, covered with  SSI during hospitalization and TCU stay, will be discontinued when she goes home.             Electronically signed by: Yolanda Ly MD

## 2021-06-06 NOTE — TELEPHONE ENCOUNTER
Dr Sanchez,    Patient has been hospitalized,. MD feels Sleep Study is warranted.     Patient formerly saw Dr Mcneil.    Please Advise.    Thank you.

## 2021-06-06 NOTE — TELEPHONE ENCOUNTER
Orders being requested: Skilled nursing 1 x/wk for 1 wk, 2 x/wk for 3 wks, then 1 x/wk for 2 wks. INR draws, CHF, respiratory and nutrition assessment and teaching.   PT eval and tx  Strength, balance, gait.  OT eval and tx  ADL's, lymphedema wraps.    Lower right calf large fluid filled area to be dressed with Vaseline gauze and covered with ABD pad and then lymphedema wrap over it.  Diabetic shoes order.  Nebulizer machine. (has neb solution from TCU).  Reason service is needed/diagnosis: Lymphedema, CHF, Cough, Wheezing  When are orders needed by: asap  Where to send Orders: Phone:  [facility] at [phone]  Okay to leave detailed message?  Yes

## 2021-06-06 NOTE — TELEPHONE ENCOUNTER
Orders being requested: PT 1 x/wk for 1 wk, 2 x/wk for 2 wks, then 1 x/wk for 1 wk.  Reason service is needed/diagnosis: Strengthening, gait training, and balance  When are orders needed by: asalevar  Where to send Orders: Phone:  Jim at [phone]  Okay to leave detailed message?  Yes

## 2021-06-06 NOTE — TELEPHONE ENCOUNTER
Please help arrange for sleep medicine follow-up.  She missed appointment when she was hospitalized, and part of her breathing and leg swelling issues are likely tied to untreated sleep apnea.

## 2021-06-07 NOTE — PROGRESS NOTES
"Jennifer Galvin is a 74 y.o. female who is being evaluated via a billable telephone visit.      The patient has been notified of following:     \"This telephone visit will be conducted via a call between you and your physician/provider. We have found that certain health care needs can be provided without the need for a physical exam.  This service lets us provide the care you need with a short phone conversation.  If a prescription is necessary we can send it directly to your pharmacy.  If lab work is needed we can place an order for that and you can then stop by our lab to have the test done at a later time.    If during the course of the call the physician/provider feels a telephone visit is not appropriate, you will not be charged for this service.\"         Jennifer Galvin complains of    Chief Complaint   Patient presents with     Follow-up       I have reviewed and updated the patient's Past Medical History, Social History, Family History and Medication List.    ALLERGIES  Aspirin and Penicillins    Additional provider notes:     Jennifer is a 74-year-old woman with history of lumpectomy for breast cancer, had a prolonged set of hospitalizations and TCU stay following respiratory failure, pneumonia, pulmonary edema.  I last visited with her 1 month ago and recommended 1 month follow-up.    At that time, I had suggested that she reduce the metoprolol 50 mg twice a day down to 25 mg twice a day, as that is what they were doing in the TCU to avoid hypotension.  It sounds like she has not been taking her blood pressure regularly, but I urged her to do so.    Because of the morbid obesity, history of sleep apnea which was not being treated, I suggested that perhaps some of the peripheral edema could be related to RV overload associated with untreated apnea.  I did recommend that she get into see her sleep specialist soon.    Since the last visit, she met with Meg.  They are contemplating eventual excision of thymoma. "  She did also order an echocardiogram which showed elevated central venous pressure.  It was a technically limited study.    she feels the breathing is feeling ok.  She still sleeps in a chair.    She lives with her sister-in-law Luz, who is cooking healthy food for her.  Her weight went from 338 pounds down to 290 pounds in the last 30 days.      Assessment/Plan:  1. Type 2 diabetes mellitus with stage 1 chronic kidney disease, without long-term current use of insulin (H)  Jennifer Galvin reports she is doing well.  She has history of diabetes, but quite mild and not requiring any oral medications.  She has not yet due for A1c, but should have microalbumin.  Historically microalbumin has been negative.  She is not on ACE/ARB.  - Microalbumin, Random Urine; Future  --Face-to-face visit for diabetes shoes performed today    2. Malignant neoplasm of upper-outer quadrant of left breast in female, estrogen receptor positive (H)  Sees Meg Paez.  Last visit was March 10, echocardiogram was ordered, which was a technically poor study because of obesity.  She has an infusion visit 3 days from now.  I will send a message to the oncology team to see if this is correct.  She did not seem aware of this visit.    3. Morbid obesity (H)  Noted that she lost a significant amount of weight through eating better, weight 1 from 338 pounds in the clinic 1 month ago to 290 pounds this morning on the home scale.  I congratulated her on her weight loss, she should keep up the good work.    4. FLAKITA (obstructive sleep apnea)  Currently not treated, unclear to me when we can get her back into sleep clinic to have this reassessed.    5. Thymoma  Plan for this pending        Phone call duration:  13 minutes    Cordell Ceja, DO

## 2021-06-07 NOTE — TELEPHONE ENCOUNTER
Spoke with [facility] representative who stated they need signed visit notes from provider as well as a documented medical necessity form filled out and signed.  Will be faxing over form to LPN's direct fax.

## 2021-06-07 NOTE — PROGRESS NOTES
[facility] Progress Note    Patient: Jennifer Galvin  MRN: [id number]  Date of Service: 3/30/2020        Reason for visit      1. Thymoma    2. Malignant neoplasm of upper-outer quadrant of left breast in female, estrogen receptor positive (H)    2.  Thymoma    Assessment     1.  A very complicated at the same time very pleasant 74 y.o. woman with what looks like a stage Ib CA breast left side ER positive MN positive as well as HER-2/arcelia 3+.  Currently status post 3 cycles of neoadjuvant weekly paclitaxel with Herceptin.  At the time of surgery she had residual disease in the lymph nodes.  2.  Anterior mediastinal mass which is growing slowly over the past 7 to 8 years.  Biopsy is consistent with thymoma.  3.  Shortness of breath of unclear etiology.  She has been seen by I think cardiology as well as pulmonology in the hospital.  She has elevated pulmonary artery pressures.  She obviously has obesity related cardiac issues as well.  4.  Other medical issues including weight gain etc.  5.  Atrial fibrillation on anticoagulation.  6.  Mild anxiety.      Plan     1.  Ideally we would like to give her Herceptin to a total of 1 year.  However at this time her cardiac function cannot be properly assessed echocardiogram.  We will try to schedule her for a MUGA scan.  I am actually going to defer it for about 6 weeks..  2.  In the meantime she can see cardiology and sleep medicine that will be very helpful.  3.  In the meantime I can start her on anastrozole for adjuvant hormonal therapy.  4.  She probably also need her thymoma excision.  Not sure if it is causing some of her weakness symptoms.  5.  Follow-up with Dr. Dela Cruz for her primary care needs.    Clinical stage      Cancer Staging  Malignant neoplasm of upper-outer quadrant of left breast in female, estrogen receptor positive (H)  Staging form: Breast, AJCC 8th Edition  - Clinical: Stage IB (cT2, cN0, cM0, G1, ER+, MN+, HER2+) - Signed by Roly Villalba  MD MARTELL on 8/21/2019  - Pathologic: No Stage Recommended (ypTis (DCIS), pN0(i+), cM0, ER+, WI+, HER2+) - Signed by Meg Walsh CNP on 3/10/2020      History     Jennifer Galvin is a very pleasant 74 y.o. old female with a history of newly diagnosed breast cancer.  There is breast cancer is located on her left breast measures 2.3 cm in size in the upper outer quadrant ER positive WI positive as well as HER-2/arcelia 3+ on immunohistochemistry.  It is not palpable.  Detected incidentally in late July early August 2019.  Her presentation started with shortness of breath for which she was sent to cardiology for a stress test.  The stress test showed some uptake in the mediastinum as well as in the breast.  Therefore she had a CT scan of the chest which showed that she had a 3.2 cm mass in the mediastinum located anteriorly.  This was initially detected in 2012 but had grown from 1.9 cm at the time to 3.2 cm now.  No other abnormality noted.  Differential is thymoma/lymphoma.  She also had a mammogram which showed that she had a lesion in her left breast 2.3 cm in size.  No lymph adenopathy noted in the axillary area.  She then had a ultrasound-guided biopsy of the left breast which showed this invasive ductal carcinoma.     She was subsequently seen by me.  We had her undergo CT-guided biopsy of the mediastinal mass which has come back as thymoma.  She is also seen pulmonology for shortness of breath.      Jennifer was started on neoadjuvant chemotherapy with weekly paclitaxel and Herceptin on 4 September 2019.  She finished 12 weeks of that therapy.  Had surgery in December 2019.  There was small amount of residual DCIS but no invasive cancer and isolated tumor cells present in 1 of 3 lymph nodes.  This is considered a very good response.  He had a complicated postoperative course.  She was in and out of TCU's.  She had significant lower extremity edema.  She has been evaluated by cardiology.  She has elevated  pulmonary artery pressures.  She has obstructive sleep apnea but has not been able to see sleep medicine yet.    She had a virtual visit with Dr. Cordell Ceja last week.    Past Medical History     Past Medical History:   Diagnosis Date     Acute respiratory failure with hypoxia (H)      Adenocarcinoma In Situ Of The Uterus     Created by Conversion      Anemia due to blood loss, acute 12/15/2019     Benign Polyps Of The Large Intestine     Created by Conversion      Breast cancer (H)      Chronic kidney disease     stage 1 per H&P 11/22/2019     Coronary artery disease      Diabetes mellitus (H)     type 2     Hyperglycemia     Created by Conversion      Hyperlipidemia     Created by Conversion      Hypertension     Created by Conversion      Lesion of mediastinum      Lymphedema     Created by Conversion      Macular Degeneration     Created by Conversion [employer] Annotation: Apr 5 2011 12:22PM - Tien Guzman: Followed by  Ophthalmologist, Dr. Avilez.      Major Depression, Single Episode In Remission     Created by Conversion      Obstructive Sleep Apnea     CPAP     Osteopenia     Created by Conversion      Paroxysmal Atrial Fibrillation     Created by Conversion [employer] Annotation: Nov 28 2012 10:36PM - Shruthi Dhaliwal: Found incidential  on exam on May 7th, [id number] score of 1 for HTNsymptomatic and hospitalized  x2 in July, 2012.CV X 2 in 2012Chronic Sotalol Rx      Postmenopausal bleeding     Created by Conversion      Skin cancer      Tremor     Created by Conversion      Urge And Stress Incontinence     Created by Conversion      Urinary Frequency More Than Twice At Night (Nocturia)     Created by Conversion      Venous hypertension of lower extremity, bilateral 10/28/2015     Vitamin D Deficiency     Created by Conversion            Review of Systems   Constitutional  Constitutional (WDL): All constitutional elements are within defined limits  Neurosensory  Neurosensory (WDL): Exceptions to  WDL  Ataxia: Asymptomatic, clinical or diagnostic observations only, intervention not indicated(using a walker)  Peripheral Sensory Neuropathy: Moderate symptoms, limiting instrumental ADL(fingertips)  Cardiovascular  Cardiovascular (WDL): Exceptions to WDL  Pulmonary  Respiratory (WDL): Exceptions to WDL  Dyspnea: Shortness of breath with minimal exertion, limiting instrumental ADL  Gastrointestinal  Gastrointestinal (WDL): Exceptions to WDL  Dry Mouth: Symptomatic (e.g., dry or thick saliva) without significant dietary alteration, unstimulated saliva flow >0.2 ml/min  Genitourinary  Genitourinary (WDL): Exceptions to WDL(urgency, sees a urologist)  Integumentary  Integumentary (WDL): Exceptions to WDL(nails lifting, lost both great toenails)  Alopecia: Hair loss of >50% normal for that individual that is readily apparent to others, a wig or hair piece is necessary if the patient desires to completely camouflage the hair loss, associated with psychosocial impact  Patient Coping  Patient Coping: Accepting  Accompanied by  Accompanied by: Alone    ECOG performance status and Distress Assessment      ECOG Performance:    ECOG Performance Status: 2    Distress Assessment  Distress Assessment Score: No distress:     Pain Status  Currently in Pain: No/denies        Vital Signs     Vitals:    03/30/20 1001   BP: 114/61   Pulse: 87   Temp: 97.5  F (36.4  C)   SpO2: 93%       Physical Exam     GENERAL: Patient is morbidly obese.  No acute distress. Cooperative in conversation.   HEENT: pupils are equal, round and reactive. Oral mucosa is moist and intact.  RESP:Chest symmetric. Regular respiratory rate. No stridor.  ABD: Nondistended, soft.  EXTREMITIES: Bilateral lower extremity edema 2+-3+.  NEURO: non focal. Alert and oriented x3.   PSYCH: within normal limits. No depression or anxiety.  SKIN: warm dry intact         Lab Results     Results for orders placed or performed in visit on 03/27/20   INR   Result Value Ref  Range    INR 2.50 (!) 0.9 - 1.1         Imaging Results     No results found.      Roly Villalba MD

## 2021-06-07 NOTE — TELEPHONE ENCOUNTER
Telephoned and spoke with Jennifer to check in.  She's observing pandemic restrictions and expresses no needs from navigation perspective today.  Fabi Morton RN

## 2021-06-07 NOTE — TELEPHONE ENCOUNTER
Dr. Sanchez please be advised, per Dr. Mcneil last note, due to inconsistent use: per medicare guidelines, patient needs to start the process over beginning with sleep study. Split with TCM, and then start as a new patient. Sleep study ordered in chart is from September 2019.    Please advise. Do you still want her to schedule appointment to see you? She no longer is in the hospital.

## 2021-06-07 NOTE — TELEPHONE ENCOUNTER
FYI - Status Update  Who is Calling: Patient  Update: Patient went to [facility] to get fitted for the diabetic shoes, and then did not hear back from [facility]. So the patient called them today and was told that they need to complete an foot exam with their provider for Medicare to cover the shoes. Patient was unsure why they would need this exam. Patient was not entirely sure what the facility needed, just that they said the patient needed to complete a foot exam. Please call the patient back to assist with this need, thank you.  Okay to leave a detailed message?:  Yes

## 2021-06-07 NOTE — TELEPHONE ENCOUNTER
INR standing order faxed to [facility].    Jenny Mir RN

## 2021-06-07 NOTE — TELEPHONE ENCOUNTER
INR result is  3.0  INR   Date Value Ref Range Status   03/27/2020 2.50 (!) 0.9 - 1.1 Final       Will the patient be seen, or did they already see, MD or CNP today? No    Most Recent Warfarin dose day/week  Sunday Monday Tuesday Wednesday Thursday Friday Saturday        5 5     Sunday Monday Tuesday Wednesday Thursday Friday Saturday    5 5 5 5         Has the patient missed any doses of Coumadin, Warfarin, Jantoven in the past 7 days? No    Has the patients medications changed since the last visit? No    Has the patient experienced any bleeding recently? No    Has the patient experienced any injuries or illness recently? No    Has the patient experienced any 'new' shortness of breath, severe headaches, or changes in vision recently? No    Has the patient had any changes in their diet, or alcohol consumption? No    Is the patient here today to prepare for any type of upcoming surgery, procedure, or for a cardioversion procedure? No    What phone number can we reach the patient at today? home phone listed in demographics.

## 2021-06-07 NOTE — TELEPHONE ENCOUNTER
INR result is 2.5  INR   Date Value Ref Range Status   03/13/2020 2.20 (!) 0.9 - 1.1 Final       Will the patient be seen, or did they already see, MD or CNP today? Yes telephone visit with PCP 03/27/20    Most Recent Warfarin dose day/week  Sunday Monday Tuesday Wednesday Thursday Friday Saturday        5 5     Sunday Monday Tuesday Wednesday Thursday Friday Saturday   5 5 5 5 5         Has the patient missed any doses of Coumadin, Warfarin, Jantoven in the past 7 days? No    Has the patients medications changed since the last visit? No    Has the patient experienced any bleeding recently? No    Has the patient experienced any injuries or illness recently? No    Has the patient experienced any 'new' shortness of breath, severe headaches, or changes in vision recently? No    Has the patient had any changes in their diet, or alcohol consumption? No    Is the patient here today to prepare for any type of upcoming surgery, procedure, or for a cardioversion procedure? No    What phone number can we reach the patient at today? Please contact [facility] at [phone]

## 2021-06-07 NOTE — TELEPHONE ENCOUNTER
ANTICOAGULATION  MANAGEMENT- Home Care/Care Facility Result    Assessment     Today's INR result of 2.4 is Therapeutic (goal INR of 2.0-3.0)        Warfarin taken as previously instructed    No new diet changes affecting INR    No new medication/supplements affecting INR    Continues to tolerate warfarin with no reported s/s of bleeding or thromboembolism     Previous INR was Therapeutic    Plan:     Spoke with sister in law Artis discussed INR result and instructed:     Warfarin Dosing Instructions: Continue current warfarin dose 5 mg daily     Next INR to be drawn: one month      Jennifer verbalizes understanding and agrees to warfarin dosing plan.   ?   Ibrahima Trimble RN    Subjective/Objective:      Jennifer Galvin, a 74 y.o. female is established on warfarin.     Home care/care facility RN's report of Jennifer INR, recent warfarin dosing, diet changes, medication changes, and symptoms is documented below.    Additional findings: none    Anticoagulation Episode Summary     Current INR goal:   2.0-3.0   TTR:   69.0 % (11.4 mo)   Next INR check:   5/15/2020   INR from last check:   2.40 (4/17/2020)   Weekly max warfarin dose:      Target end date:      INR check location:      Preferred lab:      Send INR reminders to:   [facility]    Indications    Paroxysmal Atrial Fibrillation [I48.91]           Comments:            Anticoagulation Care Providers     Provider Role Specialty Phone number    Cordell Ceja DO Referring Internal Medicine [phone]

## 2021-06-07 NOTE — TELEPHONE ENCOUNTER
FYI - Status Update  Who is Calling: Home Care  Update: nurse states patient discharged from home care with goals met.  FYI  Okay to leave a detailed message?:  No return call needed

## 2021-06-08 NOTE — TELEPHONE ENCOUNTER
Let's plan to first do the virtual visit since she did have labs fairly recently, then we can make a decision on what to do/test next

## 2021-06-08 NOTE — TELEPHONE ENCOUNTER
ANTICOAGULATION  MANAGEMENT    Assessment     Today's INR result of 2.6 is Therapeutic (goal INR of 2.0-3.0)        Warfarin taken as previously instructed    No new diet changes affecting INR    No new medication/supplements affecting INR    Continues to tolerate warfarin with no reported s/s of bleeding or thromboembolism     Previous INR was Therapeutic    Plan:     Spoke with spencer Artis regarding INR result and instructed:     Warfarin Dosing Instructions:  Continue current warfarin dose 5 mg daily     Instructed patient to follow up no later than: three weeks with onc melly Artis verbalizes understanding and agrees to warfarin dosing plan.    Instructed to call the [facility] for any changes, questions or concerns. (#[phone])   ?   Ibrahima Trimble RN    Subjective/Objective:      Jenniferlakisha Galvin, a 74 y.o. female is on warfarin.     Jennifer reports:     Home warfarin dose: as updated on anticoagulation calendar per template     Missed doses: No     Medication changes:  No     S/S of bleeding or thromboembolism:  No     New Injury or illness:  No     Changes in diet or alcohol consumption:  No     Upcoming surgery, procedure or cardioversion:  No    Anticoagulation Episode Summary     Current INR goal:   2.0-3.0   TTR:   74.3 % (11.4 mo)   Next INR check:   6/8/2020   INR from last check:   2.60 (5/18/2020)   Weekly max warfarin dose:      Target end date:      INR check location:      Preferred lab:      Send INR reminders to:   [facility]    Indications    Paroxysmal Atrial Fibrillation [I48.91]           Comments:            Anticoagulation Care Providers     Provider Role Specialty Phone number    Cordell Ceja DO Referring Internal Medicine [phone]

## 2021-06-08 NOTE — TELEPHONE ENCOUNTER
RN cannot approve Refill Request    RN can NOT refill this medication med is not covered by policy/route to provider. Last office visit: 2/27/2020 Cordell Ceja DO Last Physical: 11/22/2019 Last MTM visit: Visit date not found Last visit same specialty: 2/27/2020 Cordell Ceja DO.  Next visit within 3 mo: Visit date not found  Next physical within 3 mo: Visit date not found      Radha Sue, [facility] Triage/Med Refill 5/23/2020    Requested Prescriptions   Pending Prescriptions Disp Refills     warfarin ANTICOAGULANT (COUMADIN/JANTOVEN) 5 MG tablet [Pharmacy Med Name: WARFARIN SOD 5MG TABLETS (PEACH)] 90 tablet 1     Sig: TAKE ONE-HALF TO ONE TABLET BY MOUTH DAILY AS DIRECTED. ADJUST DOSE PER INR RESULT       Warfarin Refill Protocol  Failed - 5/22/2020  5:10 PM        Failed -  Route to appropriate pool/provider     Last Anticoagulation Summary:   Anticoagulation Episode Summary     Current INR goal:   2.0-3.0   TTR:   74.0 % (11.2 mo)   Next INR check:   6/8/2020   INR from last check:   2.60 (5/18/2020)   Weekly max warfarin dose:      Target end date:      INR check location:      Preferred lab:      Send INR reminders to:   [facility]    Indications    Paroxysmal Atrial Fibrillation [I48.91]           Comments:            Anticoagulation Care Providers     Provider Role Specialty Phone number    Cordell Ceja DO Referring Internal Medicine [phone]                Passed - Provider visit in last year     Last office visit with prescriber/PCP: 2/27/2020 Cordell Ceja DO OR same dept: 2/27/2020 Cordell Ceja DO OR same specialty: 2/27/2020 Cordell Ceja DO  Last physical: 11/22/2019 Last MTM visit: Visit date not found    Next appt within 3 mo: Visit date not found Next physical within 3 mo: Visit date not found  Prescriber OR PCP: Cordell Ceja DO  Last diagnosis associated with med order: 1. Paroxysmal Atrial Fibrillation  - warfarin  ANTICOAGULANT (COUMADIN/JANTOVEN) 5 MG tablet [Pharmacy Med Name: WARFARIN SOD 5MG TABLETS (PEACH)]; TAKE ONE-HALF TO ONE TABLET BY MOUTH DAILY AS DIRECTED. ADJUST DOSE PER INR RESULT  Dispense: 90 tablet; Refill: 1    If protocol passes may refill for 6 months if within 3 months of last provider visit (or a total of 9 months).

## 2021-06-08 NOTE — TELEPHONE ENCOUNTER
Yes to either virtual visit or face to face (the latter I am not doing this week).  At the least, can we recheck the BMP?  This will help me determine if she needs to remain on the higher dose

## 2021-06-08 NOTE — PROGRESS NOTES
[facility] Cancer Care Progress Note    Patient: Jennifer Galvin  MRN: [id number]  Date of Service: 5/11/2020        Reason for visit      1. Malignant neoplasm of upper-outer quadrant of left breast in female, estrogen receptor positive (H)    2. Systolic heart failure, unspecified HF chronicity (H)     3.  Thymoma    Assessment     1.  A very complicated at the same time very pleasant 74 y.o. woman with what looks like a stage Ib CA breast left side ER positive MA positive as well as HER-2/arcelia 3+.  Currently status post 3 cycles of neoadjuvant weekly paclitaxel with Herceptin.  At the time of surgery she had residual disease in the lymph nodes.  She does need more Herceptin however her MUGA scan is still inconclusive.  She is on anastrozole and seems to be tolerating it well.  Started that in March 2020.  2.  Anterior mediastinal mass which is growing slowly over the past 7 to 8 years.  Biopsy is consistent with thymoma.  3.  Shortness of breath of unclear etiology.  She has been seen by I think cardiology as well as pulmonology in the hospital.  She has elevated pulmonary artery pressures.  She obviously has obesity related cardiac issues as well.  Seems to be stable.  4.  Other medical issues including weight gain etc.  5.  Atrial fibrillation on anticoagulation.  6.  Mild anxiety.      Plan     1.  Discussed with the patient that she needs another MUGA scan.  I do not want to start Herceptin until we have a baseline MUGA scan.  I am going to check a BNP today again.  2.  She should follow-up with the cardiologist recommendation.  3.  Continue anastrozole for adjuvant hormonal therapy.  She will need to take it until March 2025.  4.  She probably also need her thymoma excision.  Not sure if it is causing some of her weakness symptoms.  5.  Follow-up with Dr. Dela Cruz for her primary care needs.    Clinical stage      Cancer Staging  Malignant neoplasm of upper-outer quadrant of left breast in female, estrogen  receptor positive (H)  Staging form: Breast, AJCC 8th Edition  - Clinical: Stage IB (cT2, cN0, cM0, G1, ER+, AK+, HER2+) - Signed by Roly Villalba MD on 8/21/2019  - Pathologic: No Stage Recommended (ypTis (DCIS), pN0(i+), cM0, ER+, AK+, HER2+) - Signed by Meg Walsh CNP on 3/10/2020      History     Jennifer Galvin is a very pleasant 74 y.o. old female with a history of newly diagnosed breast cancer.  There is breast cancer is located on her left breast measures 2.3 cm in size in the upper outer quadrant ER positive AK positive as well as HER-2/arcelia 3+ on immunohistochemistry.  It is not palpable.  Detected incidentally in late July early August 2019.  Her presentation started with shortness of breath for which she was sent to cardiology for a stress test.  The stress test showed some uptake in the mediastinum as well as in the breast.  Therefore she had a CT scan of the chest which showed that she had a 3.2 cm mass in the mediastinum located anteriorly.  This was initially detected in 2012 but had grown from 1.9 cm at the time to 3.2 cm now.  No other abnormality noted.  Differential is thymoma/lymphoma.  She also had a mammogram which showed that she had a lesion in her left breast 2.3 cm in size.  No lymph adenopathy noted in the axillary area.  She then had a ultrasound-guided biopsy of the left breast which showed this invasive ductal carcinoma.     She was subsequently seen by me.  We had her undergo CT-guided biopsy of the mediastinal mass which has come back as thymoma.  She is also seen pulmonology for shortness of breath.      Jennifer was started on neoadjuvant chemotherapy with weekly paclitaxel and Herceptin on 4 September 2019.  She finished 12 weeks of that therapy.  Had surgery in December 2019.  There was small amount of residual DCIS but no invasive cancer and isolated tumor cells present in 1 of 3 lymph nodes.  This is considered a very good response.  He had a complicated  postoperative course.  She was in and out of TCU's.  She had significant lower extremity edema.  She has been evaluated by cardiology.  She has elevated pulmonary artery pressures.  She has obstructive sleep apnea but has not been able to see sleep medicine yet.    We saw her in April 2020.  Recommended that she should wait a little bit and then have a MUGA scan and come back for adjuvant Herceptin treatment.  She had a MUGA scan and she is here after that.  Overall looks slightly better.  Unfortunately the MUGA scan did not read out well.  She needs another 1.    Past Medical History     Past Medical History:   Diagnosis Date     Acute respiratory failure with hypoxia (H)      Adenocarcinoma In Situ Of The Uterus     Created by Conversion      Anemia due to blood loss, acute 12/15/2019     Benign Polyps Of The Large Intestine     Created by Conversion      Breast cancer (H)      Chronic kidney disease     stage 1 per H&P 11/22/2019     Coronary artery disease      Diabetes mellitus (H)     type 2     Hyperglycemia     Created by Conversion      Hyperlipidemia     Created by Conversion      Hypertension     Created by Conversion      Lesion of mediastinum      Lymphedema     Created by Conversion      Macular Degeneration     Created by Conversion [facility] Annotation: Apr 5 2011 12:22PM - Tien Guzman: Followed by  Ophthalmologist, Dr. Avilez.      Major Depression, Single Episode In Remission     Created by Conversion      Obstructive Sleep Apnea     CPAP     Osteopenia     Created by Conversion      Paroxysmal Atrial Fibrillation     Created by Conversion [facility] Annotation: Nov 28 2012 10:36PM - Shruthi Dhaliwal: Found incidential  on exam on May 7th, [id number] score of 1 for HTNsymptomatic and hospitalized  x2 in July, 2012.CV X 2 in 2012Chronic Sotalol Rx      Postmenopausal bleeding     Created by Conversion      Skin cancer      Tremor     Created by Conversion      Urge And Stress Incontinence      Created by Conversion      Urinary Frequency More Than Twice At Night (Nocturia)     Created by Conversion      Venous hypertension of lower extremity, bilateral 10/28/2015     Vitamin D Deficiency     Created by Conversion            Review of Systems   Constitutional  Constitutional (WDL): Exceptions to WDL  Weight Loss: to <10% from baseline, intervention not indicated(DOWN 5 LBS)  Neurosensory  Neurosensory (WDL): Exceptions to WDL  Peripheral Motor Neuropathy: Asymptomatic, clinical or diagnostic observations only, intervention not indicated  Ataxia: Asymptomatic, clinical or diagnostic observations only, intervention not indicated(walker)  Peripheral Sensory Neuropathy: Moderate symptoms, limiting instrumental ADL(fingertips)  Cardiovascular  Cardiovascular (WDL): Exceptions to WDL  Edema: Yes  Edema Limbs: 5 - 10% inter-limb discrepancy in volume or circumference at point of greatest visible difference, swelling or obscuration of anatomic architecture on close inspection(feet)  Pulmonary  Respiratory (WDL): Exceptions to WDL  Cough: Mild symptoms, nonprescription intervention indicated(dry)  Gastrointestinal  Gastrointestinal (WDL): Exceptions to WDL  Dry Mouth: Symptomatic (e.g., dry or thick saliva) without significant dietary alteration, unstimulated saliva flow >0.2 ml/min  Genitourinary  Genitourinary (WDL): Exceptions to WDL(incontinence)  Integumentary  Integumentary (WDL): All integumentary elements are within defined limits  Patient Coping  Patient Coping: Accepting  Accompanied by  Accompanied by: Alone    ECOG performance status and Distress Assessment      ECOG Performance:    ECOG Performance Status: 2    Distress Assessment  Distress Assessment Score: No distress:     Pain Status  Currently in Pain: No/denies        Vital Signs     Vitals:    05/11/20 0858   BP: 130/72   Pulse: 63   Temp: 97.7  F (36.5  C)   SpO2: 94%       Physical Exam     GENERAL: Patient is morbidly obese.  No acute  distress. Cooperative in conversation.   HEENT: pupils are equal, round and reactive. Oral mucosa is moist and intact.  RESP:Chest symmetric. Regular respiratory rate. No stridor.  ABD: Nondistended, soft.  EXTREMITIES: Bilateral lower extremity edema 2+-3+.  NEURO: non focal. Alert and oriented x3.   PSYCH: within normal limits. No depression or anxiety.  SKIN: warm dry intact         Lab Results     Results for orders placed or performed in visit on 04/17/20   INR   Result Value Ref Range    INR 2.40 (!) 0.9 - 1.1         Imaging Results     Nm Muga    Result Date: 5/7/2020    Poor quality study, likely due to poor red cell tagging. Cannot assess LV function. Consider repeating study or alternative assessment of LV function, such as echocardiogram or cardiac MRI.          Roly Villalba MD

## 2021-06-08 NOTE — TELEPHONE ENCOUNTER
Refill Approved    Rx renewed per Medication Renewal Policy. Medication was last renewed on 12/3119.    Lianne Perales, [facility] Triage/Med Refill 5/26/2020     Requested Prescriptions   Pending Prescriptions Disp Refills     furosemide (LASIX) 80 MG tablet 180 tablet 0     Sig: Take 1 tablet (80 mg total) by mouth 2 (two) times a day at 9am and 6pm.       Diuretics/Combination Diuretics Refill Protocol  Passed - 5/26/2020  1:42 PM        Passed - Visit with PCP or prescribing provider visit in past 12 months     Last office visit with prescriber/PCP: 2/27/2020 Cordell Ceja DO OR same dept: 2/27/2020 Cordell Ceja DO OR same specialty: 2/27/2020 Cordell Ceja DO  Last physical: 11/22/2019 Last MTM visit: Visit date not found   Next visit within 3 mo: Visit date not found  Next physical within 3 mo: Visit date not found  Prescriber OR PCP: Cordell Ceja DO  Last diagnosis associated with med order: 1. Venous stasis  - furosemide (LASIX) 80 MG tablet; Take 1 tablet (80 mg total) by mouth 2 (two) times a day at 9am and 6pm.  Dispense: 180 tablet; Refill: 0    If protocol passes may refill for 12 months if within 3 months of last provider visit (or a total of 15 months).             Passed - Serum Potassium in past 12 months      Lab Results   Component Value Date    Potassium 4.0 05/11/2020             Passed - Serum Sodium in past 12 months      Lab Results   Component Value Date    Sodium 141 05/11/2020             Passed - Blood pressure on file in past 12 months     BP Readings from Last 1 Encounters:   05/19/20 110/63             Passed - Serum Creatinine in past 12 months      Creatinine   Date Value Ref Range Status   05/11/2020 0.99 0.60 - 1.10 mg/dL Final

## 2021-06-08 NOTE — TELEPHONE ENCOUNTER
To the best of our ability, keep questions, phone calls, and video visits focused on primary physician    This leads to best patient care and highest patient satisfaction with fewest errors

## 2021-06-08 NOTE — TELEPHONE ENCOUNTER
LMTCB Please help schedule pt for Lab only appointment and schedule pt for VV with pcp     PCP Please place orders for labs

## 2021-06-08 NOTE — TELEPHONE ENCOUNTER
ANTICOAGULATION  MANAGEMENT    Assessment     Today's INR result of 2.0 is Therapeutic (goal INR of 2.0-3.0)        Warfarin taken as previously instructed    Increased greens/vitamin K intake may be affecting INR will stay at this level    No new medication/supplements affecting INR    Continues to tolerate warfarin with no reported s/s of bleeding or thromboembolism     Previous INR was Therapeutic    Plan:     Spoke with sister in law Escobar regarding INR result and instructed:     Warfarin Dosing Instructions:  Continue current warfarin dose 5 mg daily    Instructed patient to follow up no later than: three weeks with cancer care      Jennifer verbalizes understanding and agrees to warfarin dosing plan.    Instructed to call the [facility] for any changes, questions or concerns. (#[phone])   ?   Ibrahima Trimble RN    Subjective/Objective:      Jenniferlakisha Galvin, a 75 y.o. female is on warfarin.     Jennifer reports:     Home warfarin dose: as updated on anticoagulation calendar per template     Missed doses: No     Medication changes:  No     S/S of bleeding or thromboembolism:  No     New Injury or illness:  No     Changes in diet or alcohol consumption:  No     Upcoming surgery, procedure or cardioversion:  No    Anticoagulation Episode Summary     Current INR goal:   2.0-3.0   TTR:   74.4 % (11.4 mo)   Next INR check:   6/29/2020   INR from last check:   2.04 (6/8/2020)   Weekly max warfarin dose:      Target end date:      INR check location:      Preferred lab:      Send INR reminders to:   ABHISHEK MIDWAY    Indications    Paroxysmal Atrial Fibrillation [I48.91]           Comments:            Anticoagulation Care Providers     Provider Role Specialty Phone number    Cordell Ceja DO Referring Internal Medicine [phone]

## 2021-06-08 NOTE — PROGRESS NOTES
[facility] Hematology and Oncology Progress Note    Patient: Jennifer Galvin  MRN: [id number]  Date of Service: 06/08/2020        Reason for Visit    Chief Complaint   Patient presents with     HE Cancer     Malignant neoplasm of upper-outer quadrant of left breast in female, estrogen receptor positive       Assessment and Plan  Cancer Staging  Malignant neoplasm of upper-outer quadrant of left breast in female, estrogen receptor positive (H)  Staging form: Breast, AJCC 8th Edition  - Clinical: Stage IB (cT2, cN0, cM0, G1, ER+, WV+, HER2+) - Signed by Roly Villalba MD on 8/21/2019  - Pathologic: No Stage Recommended (ypTis (DCIS), pN0(i+), cM0, ER+, WV+, HER2+) - Signed by Meg Walsh CNP on 3/10/2020    1.  Breast cancer, triple positive, stage I: Patient had neoadjuvant chemotherapy with weekly Taxol and Herceptin.  Last dose of this was on 11/27. She underwent lumpectomy at the beginning of December with Dr. Ferrera. Had 21mm of residual DCIS and isolated tumor cells in 1/3 lymph nodes. Has had a complicated post op period and has been in and out of TCU's since then. Now out. Her MUGA was normal one month ago so we started her maintenance herceptin. She states she tolerated it fine. She will continue that for now. Return in 3 weeks. We will have to closely monitor BNP and muga scans with her.     2.  Anterior mediastinal mass, thymoma: This likely should be excised.  Apparently is been slowly growing over 7 to 8 years.  Dr. Villalba feels that this may be causing some of her symptoms so we may refer to Dr. Romero but she did not do well with last surgery and still is not fully recovered. I think they needs to wait until she gets stronger.     3. ZAPATA, shortness of breath, hypoxia: her oxygen levels are good here, but she does get pretty shortness of breath with exertion. She denies any neuro symptoms. No worrisome features on BNP, MUGA. Will continue to see PCP and cardiology prn.     4. Weight gain:  her PCP changed her lasix from 80mg two times a day to 40mg two times a day. I told her to do 80mg in the am and 40mg in the pm and see how she does with her weight. Encourage healthy diet and some daily exercise.     5. Mild ptsosis of right eyelid: noticed by PCP. I am not noticing today. Will monitor at next visit and try to do in person visit for better physical exam. Pt states she hasn't noticed anything different recently. No neuro changes.     ECOG Performance   ECOG Performance Status: 1     Distress Assessment  Distress Assessment Score: No distress    Pain  Currently in Pain: No/denies      Problem List    1. Malignant neoplasm of upper-outer quadrant of left breast in female, estrogen receptor positive (H)  CC OFFICE VISIT LONG    anastrozole (ARIMIDEX) 1 mg tablet    CC OFFICE VISIT LONG    Infusion Appointment    DISCONTINUED: trastuzumab 790 mg in sodium chloride 0.9% 250 mL (HERCEPTIN)        ______________________________________________________________________________    History of Present Illness    Jennifer Galvin is a very pleasant 75 y.o. female with a history of newly diagnosed breast cancer.  There is breast cancer is located on her left breast measures 2.3 cm in size in the upper outer quadrant ER positive NC positive as well as HER-2/arcelia 3+ on immunohistochemistry.  It is not palpable.  Detected incidentally in late July early August 2019.  Her presentation started with shortness of breath for which she was sent to cardiology for a stress test.  The stress test showed some uptake in the mediastinum as well as in the breast.  Therefore she had a CT scan of the chest which showed that she had a 3.2 cm mass in the mediastinum located anteriorly.  This was initially detected in 2012 but had grown from 1.9 cm at the time to 3.2 cm now.  No other abnormality noted.  Differential is thymoma/lymphoma.  She also had a mammogram which showed that she had a lesion in her left breast 2.3 cm in size.  No  lymph adenopathy noted in the axillary area.  She then had a ultrasound-guided biopsy of the left breast which showed this invasive ductal carcinoma.     We had her undergo CT-guided biopsy of the mediastinal mass which has come back as thymoma.  She started neoadjuvant treatment with Taxol and Herceptin.  She had 11 weeks of taxol and 12 weekly doses of Herceptin, last one on 11/27. Had lumpectomy on 12/2/19 with Dr. Ferrera that showed   ypTis pNo(i+) M0. DCIS was 21mm in size. No invasive cancer. Margins are negative. The DCIS was arising in fibroadenoma and involving areas of sclerosing adenosis.   She had a hard post op course with weakness, hypoxia, cellulitis, CHF. Was in TCU for a couple of months. Now living with sister. Still struggling with shortness of breath, edema in legs, poor mobility. She feels like she may be slowly getting better. Saw Dr. Villalba in May and he ordered MUGA scan. First one was inconclusive, but second one was normal she he did start maintenance Herceptin. She got her first dose of that 3 weeks ago. States she did fine with it and had no side effects that she could decipher. She continues to be weak, tired, shortness of breath with activity. Has leg swelling bilaterally.     Pain Status  Currently in Pain: No/denies    Review of Systems    Constitutional  Constitutional (WDL): Exceptions to WDL  Weight Gain: Concerns(up 9 pounds since 5/11/2020)  Neurosensory  Neurosensory (WDL): Exceptions to WDL  Peripheral Motor Neuropathy: Concerns(fingertips)  Ataxia: Concerns(using walker)  Peripheral Sensory Neuropathy: Concerns(fingertips)  Eye   Eye Disorder (WDL): All eye disorder elements are within defined limits  Ear  Ear Disorder (WDL): All ear disorder elements are within defined limits  Cardiovascular  Cardiovascular (WDL): Exceptions to WDL  Edema: Concerns(stable)  Pulmonary  Respiratory (WDL): Exceptions to WDL  Dyspnea: Concerns(with activity)  Gastrointestinal  Gastrointestinal  (WDL): All gastrointestinal elements are within defined limits  Genitourinary  Genitourinary (WDL): All genitourinary elements are within defined limits  Lymphatic  Lymph (WDL): Exceptions to WDL  Lymph Node Discomfort: Concerns(left axilla irritation)  Musculoskeletal and Connective Tissue  Musculoskeletal and Connetive Tissue Disorders (WDL): Exceptions to WDL  Arthralgia: Concerns(left knee)  Integumentary  Integumentary (WDL): All integumentary elements are within defined limits  Patient Coping  Patient Coping: Accepting;Open/discussion  Accompanied by  Accompanied by: Alone  Oral Chemo Adherence           Past History  Past Medical History:   Diagnosis Date     Acute respiratory failure with hypoxia (H)      Adenocarcinoma In Situ Of The Uterus     Created by Conversion      Anemia due to blood loss, acute 12/15/2019     Benign Polyps Of The Large Intestine     Created by Conversion      Breast cancer (H)      Chronic kidney disease     stage 1 per H&P 11/22/2019     Coronary artery disease      Diabetes mellitus (H)     type 2     Hyperglycemia     Created by Conversion      Hyperlipidemia     Created by Conversion      Hypertension     Created by Conversion      Lesion of mediastinum      Lymphedema     Created by Conversion      Macular Degeneration     Created by Conversion [facility] Annotation: Apr 5 2011 12:22PM - Tien Guzman: Followed by  Ophthalmologist, Dr. Avilez.      Major Depression, Single Episode In Remission     Created by Conversion      Obstructive Sleep Apnea     CPAP     Osteopenia     Created by Conversion      Paroxysmal Atrial Fibrillation     Created by Conversion [employer] Annotation: Nov 28 2012 10:36PM - Shruthi Dhaliwal: Found incidential  on exam on May 7th, [id number] score of 1 for HTNsymptomatic and hospitalized  x2 in July, 2012.CV X 2 in 2012Chronic Sotalol Rx      Postmenopausal bleeding     Created by Conversion      Skin cancer      Tremor     Created by Conversion       "Urge And Stress Incontinence     Created by Conversion      Urinary Frequency More Than Twice At Night (Nocturia)     Created by Conversion      Venous hypertension of lower extremity, bilateral 10/28/2015     Vitamin D Deficiency     Created by Conversion        PHYSICAL EXAM  /65   Pulse 75   Temp 98.5  F (36.9  C) (Oral)   Ht 5' 6\" (1.676 m)   Wt (!) 298 lb 6.4 oz (135.4 kg)   SpO2 94%   BMI 48.16 kg/m      GENERAL: no acute distress. Cooperative in conversation. Here alone due to visitor restrictions. Mask on  RESP: Regular respiratory rate. No expiratory wheezes   NEURO: non focal. Alert and oriented x3. I do not see any ptosis   PSYCH: within normal limits. No depression or anxiety.  SKIN: exposed skin is dry intact.       Lab Results    Recent Results (from the past 168 hour(s))   HM2(CBC w/o Differential)   Result Value Ref Range    WBC 10.9 4.0 - 11.0 thou/uL    RBC 4.90 3.80 - 5.40 mill/uL    Hemoglobin 12.5 12.0 - 16.0 g/dL    Hematocrit 40.2 35.0 - 47.0 %    MCV 82 80 - 100 fL    MCH 25.5 (L) 27.0 - 34.0 pg    MCHC 31.1 (L) 32.0 - 36.0 g/dL    RDW 21.9 (H) 11.0 - 14.5 %    Platelets 139 (L) 140 - 440 thou/uL    MPV 11.1 8.5 - 12.5 fL   Comprehensive Metabolic Panel   Result Value Ref Range    Sodium 143 136 - 145 mmol/L    Potassium 4.0 3.5 - 5.0 mmol/L    Chloride 106 98 - 107 mmol/L    CO2 29 22 - 31 mmol/L    Anion Gap, Calculation 8 5 - 18 mmol/L    Glucose 123 70 - 125 mg/dL    BUN 24 8 - 28 mg/dL    Creatinine 1.05 0.60 - 1.10 mg/dL    GFR MDRD Af Amer >60 >60 mL/min/1.73m2    GFR MDRD Non Af Amer 51 (L) >60 mL/min/1.73m2    Bilirubin, Total 0.6 0.0 - 1.0 mg/dL    Calcium 9.3 8.5 - 10.5 mg/dL    Protein, Total 6.5 6.0 - 8.0 g/dL    Albumin 3.5 3.5 - 5.0 g/dL    Alkaline Phosphatase 85 45 - 120 U/L    AST 20 0 - 40 U/L    ALT 21 0 - 45 U/L   Glycosylated Hemoglobin A1c   Result Value Ref Range    Hemoglobin A1c 5.6 <=5.6 %   INR   Result Value Ref Range    INR 2.04 (H) 0.90 - 1.10 " "      Imaging    Nm Muga    Result Date: 5/18/2020    The left ventricular ejection fraction is 63.9%.   Image quality was suboptimal related patient's body habitus and rotation of her heart      Jennifer Galvin is a 75 y.o. female who is being evaluated via a billable video visit.      The patient has been notified of following:     \"This video visit will be conducted via a call between you and your physician/provider. We have found that certain health care needs can be provided without the need for an in-person physical exam.  This service lets us provide the care you need with a video conversation.  If a prescription is necessary we can send it directly to your pharmacy.  If lab work is needed we can place an order for that and you can then stop by our lab to have the test done at a later time.    Video visits are billed at different rates depending on your insurance coverage. Please reach out to your insurance provider with any questions.    If during the course of the call the physician/provider feels a video visit is not appropriate, you will not be charged for this service.\"    Patient has given verbal consent to a Video visit? Yes    Patient would like to receive their AVS by AVS Preference: [email or web address].    Patient would like the video invitation sent by: Text to cell phone: [phone]    Will anyone else be joining your video visit? No        Video Start Time: 11:43    Additional provider notes: see above      Video-Visit Details    Type of service:  Video Visit    Video End Time (time video stopped): 11:50  Originating Location (pt. Location): pt was in cancer clinic    Distant Location (provider location):  [facility] CANCER CARE AND HEMATOLOGY     Platform used for Video Visit: Haresh Walsh CNP      Signed by: Meg Walsh CNP  "

## 2021-06-08 NOTE — TELEPHONE ENCOUNTER
Refill Request  Did you contact pharmacy: Yes  Medication name:   Requested Prescriptions     Pending Prescriptions Disp Refills     furosemide (LASIX) 80 MG tablet 180 tablet 0     Sig: Take 1 tablet (80 mg total) by mouth 2 (two) times a day at 9am and 6pm.     Who prescribed the medication: Wes Larson DO   Requested Pharmacy: [facility]  Is patient out of medication: Yes  Patient notified refills processed in 3 business days:  yes  Okay to leave a detailed message: yes

## 2021-06-08 NOTE — TELEPHONE ENCOUNTER
Medication Question or Clarification  Who is calling:The patient   What medication are you calling about (include dose and sig)?: furosemide (LASIX) 80 MG tablet  Who prescribed the medication?: Cordell Ceja, DO   What is your question/concern?: The patient states she used to be on 40MG's lasix but has been taking 80MG's of the  Lasix since she was admitted to the hospital and would alike to know if she should stay on the 80 MG's?  [facility] to leave a detailed message?: Yes

## 2021-06-08 NOTE — PROGRESS NOTES
Patient is here for labs, provider and treatment for Malignant neoplasm of upper-outer quadrant of left breast in female, estrogen receptor positive.     No

## 2021-06-08 NOTE — PROGRESS NOTES
Pt arrived to infusion clinic. Port accessed with great blood return. Labs reviewed. Pt tolerated Herceptin infusion well. Port flushed with Heparin, de-accessed, band-aid applied. Pt ambulated out of infusion clinic independently with use of walker.

## 2021-06-08 NOTE — PROGRESS NOTES
Pt arrives ambulatory with walker to Cancer Infusion Dept. Pt in chair #8. Port accessed with good blood return. VSS. Muga results noted, and proceeding with treatment. Pt tolerated Herceptin infusion well. Port showed good blood return, heparinized, de-accessed and covered. Pt left department ambulatory with walker.

## 2021-06-08 NOTE — PROGRESS NOTES
Patient would like the video invitation sent by: Text to cell phone: [phone]      ASSESSMENT:  1. Venous stasis  Jennifer Galvin is a 75-year-old woman presenting for virtual visit.  Based on the normal ejection fraction and Mugo test and progress with weight loss, improvement in shortness of breath, and how BNP has not been elevated and presence of shortness of breath, I think it would be safe to lower Lasix from 80 mg twice a day down to 40 mg twice a day.  She will split the 80 mg tablets in half to accomplish this, new prescription was sent in for her next refill.  She may have some element of pulmonary hypertension from untreated sleep apnea, the latter addressed below.  She was on the lower dose of Lasix 40 mg twice a day prior to her treatment of breast cancer.  - furosemide (LASIX) 40 MG tablet; Take 1 tablet (40 mg total) by mouth 2 (two) times a day at 9am and 6pm.  Dispense: 180 tablet; Refill: 3    2. BMI 45.0-49.9, adult (H)  She has been able to lower the BMI by about 9 points through weight loss, I encouraged her to keep up the good work.  She is walking around more, may end up switching from using a walker to a cane.  Current weight is 280 pounds.    3. FLAKITA (obstructive sleep apnea)  Currently untreated, should follow-up with sleep study when allowable later this year    4. Type 2 diabetes mellitus with stage 1 chronic kidney disease, without long-term current use of insulin (H)  She has diet-controlled diabetes.  Check 6-month A1c at the next infusion appointment in a week    5. Malignant neoplasm of upper-outer quadrant of left breast in female, estrogen receptor positive (H)  Currently under treatment, sees Dr. Roly Villalba and Meg Walsh.    6. Atrial fibrillation, unspecified type (H)  She is on anticoagulation    7. Ptosis of right eyelid  Unclear what is causing ptosis of right eyelid.  It seems that she is able to overcome this weakness when she looks directly into the camera, but  the eyelid droops when looking from the side.  Will send note to oncology team, who has seen her more frequently lately      PLAN:  There are no Patient Instructions on file for this visit.    No orders of the defined types were placed in this encounter.    No follow-ups on file.    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    Since the last visit, Dr. Villalba ordered a Mugo study which demonstrated ejection fraction of 63.9%.  Despite the shortness of breath, the BNP has not been substantially elevated.  She feels the shortness of breath she has experienced in the past is improving.  She has lost 10 lbs since the last visit (now 280 lbs) and about 70 lbs overall.  Through the process of hospitalization and subsequent TCU visit/day, furosemide was increased from 40 mg twice a day up to 80 mg twice a day.  She is inquiring if she can lower the diuretic yet.    She is walking more, currently using a walker but thinking about changing to a cane.      As part of the goal for optimal treatment, I did encourage her to visit with a sleep specialist.,  Since the sleep apnea is currently untreated.  With the COVID-19 pandemic, this is on hold.      In regard to cancer treatment, she received an infusion of Herceptin on 5/19.  This is going well so far.       REVIEW OF SYSTEMS:   All other systems are negative.    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  This is not consistent, when taking her glasses off or looking directly into camera, ptosis is not evident.  No dysarthria or facial droop.        ADDITIONAL HISTORY SUMMARIZED (2): reviewed heme/onc notes, plan for herceptin  CARE EVERYWHERE/ EXTRA INFORMATION (1):   RADIOLOGY TESTS (1):   LABS (1): a1c ordered, BMP, HM2  CARDIOLOGY/MEDICINE TESTS (1): echo from 3/25/20 and 12/20/19 reviewed-- limited studies.  MUGA EF >60%  INDEPENDENT REVIEW (2 each):     Total data points: 4    Video Start time:  1106  Video End time:  1118    The visit lasted a total of 12 minutes face to face    CA intake time  6 minutes      The patient has been notified of following:     \"This video visit will be conducted via a call between you and your physician/provider. We have found that certain health care needs can be provided without the need for an in-person physical exam.  This service lets us provide the care you need with a video conversation.  If a prescription is necessary we can send it directly to your pharmacy.  If lab work is needed we can place an order for that and you can then stop by our lab to have the test done at a later time.    Video visits are billed at different rates depending on your insurance coverage. Please reach out to your insurance provider with any questions.    If during the course of the call the physician/provider feels a video visit is not appropriate, you will not be charged for this service.\"    Patient has given verbal consent to a Video visit? Yes    Patient would like to receive their AVS by AVS Preference: Mail a copy.        Video-Visit Details    Type of service:  Video Visit    Originating Location (pt. Location): Home    Distant Location (provider " location):  [facility] INTERNAL MEDICINE     Mode of Communication:  Video Conference via Tim Ceja DO

## 2021-06-09 NOTE — PROGRESS NOTES
Pt here ambulatory for herceptin infusion. Port accessed/inr drawn and herceptin infused over 30 minutes. PT tolerated infusion without any problems. herceptin completed and port flushed and deaccessed with 2x2 to site. Follow up reviewed and pt dc'd

## 2021-06-09 NOTE — TELEPHONE ENCOUNTER
ANTICOAGULATION  MANAGEMENT    Assessment     Today's INR result of 2.0 is Therapeutic (goal INR of 2.0-3.0)        Warfarin taken as previously instructed    No new diet changes affecting INR    No new medication/supplements affecting INR    Continues to tolerate warfarin with no reported s/s of bleeding or thromboembolism     Previous INR was Therapeutic    Plan:     Spoke with spencer Escobar regarding INR result and instructed:     Warfarin Dosing Instructions:  Continue current warfarin dose 5 mg daily     Instructed patient to follow up no later than: three week with cancer care    Jennifer verbalizes understanding and agrees to warfarin dosing plan.    Instructed to call the [facility] for any changes, questions or concerns. (#[phone])   ?   Ibrahima Trimble RN    Subjective/Objective:      Jenniferlakisha Galvin, a 75 y.o. female is on warfarin.     Jennifer reports:     Home warfarin dose: as updated on anticoagulation calendar per template     Missed doses: No     Medication changes:  No     S/S of bleeding or thromboembolism:  No     New Injury or illness:  No     Changes in diet or alcohol consumption:  No     Upcoming surgery, procedure or cardioversion:  No    Anticoagulation Episode Summary     Current INR goal:   2.0-3.0   TTR:   74.4 % (11.4 mo)   Next INR check:   7/20/2020   INR from last check:   2.04 (6/29/2020)   Weekly max warfarin dose:      Target end date:      INR check location:      Preferred lab:      Send INR reminders to:   [facility]    Indications    Paroxysmal Atrial Fibrillation [I48.91]           Comments:            Anticoagulation Care Providers     Provider Role Specialty Phone number    Cordell Ceja DO Referring Internal Medicine [phone]

## 2021-06-09 NOTE — PROGRESS NOTES
[facility] Cancer Care Progress Note    Patient: Jennifer Galvin  MRN: [id number]  Date of Service: 6/29/2020        Reason for visit      1. Malignant neoplasm of upper-outer quadrant of left breast in female, estrogen receptor positive (H)    3.  Thymoma    Assessment     1.  A very complicated at the same time very pleasant 75 y.o. woman with what looks like a stage Ib CA breast left side ER positive [place] positive as well as HER-2/arcelia 3+.  Currently status post 3 cycles of neoadjuvant weekly paclitaxel with Herceptin.  At the time of surgery she had very small amount of residual disease in the lymph nodes.  She needed more Herceptin however initially she was too sick and then later on her mother scan was not reading well.  Now she is back for treatment.  She is on anastrozole and seems to be tolerating it well.  Started that in March 2020.  2.  Anterior mediastinal mass which is growing slowly over the past 7 to 8 years.  Biopsy is consistent with thymoma.  3.  Shortness of breath of unclear etiology.  She has been seen by I think cardiology as well as pulmonology in the hospital.  She has elevated pulmonary artery pressures.  She obviously has obesity related cardiac issues as well.  Seems to be stable.  4.  Other medical issues including weight gain etc.  5.  Atrial fibrillation on anticoagulation.  6.  Mild anxiety.      Plan     1.  Resume treatment with Herceptin. She will finish a full year of it.  She is not a very good candidate for radiation therapy for variety of reasons.  She has pulmonary artery hypertension.  It is a left-sided cancer and she has a heart condition as well.  2.  She should follow-up with the cardiologist recommendation.  3.  Continue anastrozole for adjuvant hormonal therapy.  She will need to take it until March 2025.  4.  She probably also need her thymoma excision.  Not sure if it is causing some of her weakness symptoms.  5.  Follow-up with Dr. Dela Cruz for her primary care needs.  6.   Patient concerned she may have staph infection. Will give her Bactoban oint to apply in her nostrils.    Clinical stage      Cancer Staging  Malignant neoplasm of upper-outer quadrant of left breast in female, estrogen receptor positive (H)  Staging form: Breast, AJCC 8th Edition  - Clinical: Stage IB (cT2, cN0, cM0, G1, ER+, UT+, HER2+) - Signed by Roly Villalba MD on 8/21/2019  - Pathologic: No Stage Recommended (ypTis (DCIS), pN0(i+), cM0, ER+, UT+, HER2+) - Signed by Meg Walsh CNP on 3/10/2020      History     Jennifer Galvin is a very pleasant 75 y.o. old female with a history of newly diagnosed breast cancer.  There is breast cancer is located on her left breast measures 2.3 cm in size in the upper outer quadrant ER positive UT positive as well as HER-2/arcelia 3+ on immunohistochemistry.  It is not palpable.  Detected incidentally in late July early August 2019.  Her presentation started with shortness of breath for which she was sent to cardiology for a stress test.  The stress test showed some uptake in the mediastinum as well as in the breast.  Therefore she had a CT scan of the chest which showed that she had a 3.2 cm mass in the mediastinum located anteriorly.  This was initially detected in 2012 but had grown from 1.9 cm at the time to 3.2 cm now.  No other abnormality noted.  Differential is thymoma/lymphoma.  She also had a mammogram which showed that she had a lesion in her left breast 2.3 cm in size.  No lymph adenopathy noted in the axillary area.  She then had a ultrasound-guided biopsy of the left breast which showed this invasive ductal carcinoma.     She was subsequently seen by me.  We had her undergo CT-guided biopsy of the mediastinal mass which has come back as thymoma.  She is also seen pulmonology for shortness of breath.      Jennifer was started on neoadjuvant chemotherapy with weekly paclitaxel and Herceptin on 4 September 2019.  She finished 12 weeks of that therapy.  Had  surgery in December 2019.  There was small amount of residual DCIS but no invasive cancer and isolated tumor cells present in 1 of 3 lymph nodes.  This is considered a very good response.  She had a complicated postoperative course.  She was in and out of TCU's.  She had significant lower extremity edema.  She has been evaluated by cardiology.  She has elevated pulmonary artery pressures.  She has obstructive sleep apnea but has not been able to see sleep medicine yet.    We saw her in April 2020.  Recommended that she should wait a little bit and then have a MUGA scan and come back for adjuvant Herceptin treatment.  She had a MUGA scan in May but was non diagnostic. So she had it repeated and she is here after that.  Overall looks slightly better.  Overall doing well.    Past Medical History     Past Medical History:   Diagnosis Date     Acute respiratory failure with hypoxia (H)      Adenocarcinoma In Situ Of The Uterus     Created by Conversion      Anemia due to blood loss, acute 12/15/2019     Benign Polyps Of The Large Intestine     Created by Conversion      Breast cancer (H)      Chronic kidney disease     stage 1 per H&P 11/22/2019     Coronary artery disease      Diabetes mellitus (H)     type 2     Hyperglycemia     Created by Conversion      Hyperlipidemia     Created by Conversion      Hypertension     Created by Conversion      Lesion of mediastinum      Lymphedema     Created by Conversion      Macular Degeneration     Created by Conversion [employer] [facility] Annotation: Apr 5 2011 12:22PM - Tien Guzman: Followed by  Ophthalmologist, Dr. Avilez.      Major Depression, Single Episode In Remission     Created by Conversion      Obstructive Sleep Apnea     CPAP     Osteopenia     Created by Conversion      Paroxysmal Atrial Fibrillation     Created by [employer] [facility] Annotation: Nov 28 2012 10:36PM - Shruthi Dhaliwal: Found incidential  on exam on May 7th, [id number] score of 1 for HTNsymptomatic and  hospitalized  x2 in July, 2012.CV X 2 in 2012Chronic Sotalol Rx      Postmenopausal bleeding     Created by Conversion      Skin cancer      Tremor     Created by Conversion      Urge And Stress Incontinence     Created by Conversion      Urinary Frequency More Than Twice At Night (Nocturia)     Created by Conversion      Venous hypertension of lower extremity, bilateral 10/28/2015     Vitamin D Deficiency     Created by Conversion            Review of Systems   Constitutional  Constitutional (WDL): All constitutional elements are within defined limits  Neurosensory  Neurosensory (WDL): All neurosensory elements are within defined limits  Peripheral Motor Neuropathy: Asymptomatic, clinical or diagnostic observations only, intervention not indicated(neuropathy in fingertips)  Cardiovascular  Cardiovascular (WDL): Exceptions to WDL  Edema: Yes  Pulmonary  Respiratory (WDL): Exceptions to WDL  Dyspnea: Shortness of breath with moderate exertion  Gastrointestinal  Gastrointestinal (WDL): All gastrointestinal elements are within defined limits  Genitourinary  Genitourinary (WDL): All genitourinary elements are within defined limits  Integumentary  Integumentary (WDL): All integumentary elements are within defined limits  Patient Coping  Patient Coping: Anxiety  Accompanied by  Accompanied by: Alone    ECOG performance status and Distress Assessment      ECOG Performance:    ECOG Performance Status: 1    Distress Assessment  Distress Assessment Score: 4:     Pain Status  Currently in Pain: No/denies        Vital Signs     Vitals:    06/29/20 1307   BP: 133/68   Pulse: (!) 104   Temp: 98.4  F (36.9  C)   SpO2: 95%       Physical Exam     GENERAL: Patient is morbidly obese.  No acute distress. Cooperative in conversation.   HEENT: pupils are equal, round and reactive. Oral mucosa is moist and intact.  RESP:Chest symmetric. Regular respiratory rate. No stridor.  CARDIOVASCULAR: Good distal pulses. Normal S1,S2, no S3. Soft  systolic ejection murmur.  ABD: Nondistended, soft.  EXTREMITIES: Bilateral lower extremity edema 2+-3+.  NEURO: non focal. Alert and oriented x3.   PSYCH: within normal limits. No depression or anxiety.  SKIN: warm dry intact         Lab Results     Results for orders placed or performed in visit on 06/29/20   Comprehensive Metabolic Panel   Result Value Ref Range    Sodium 144 136 - 145 mmol/L    Potassium 4.0 3.5 - 5.0 mmol/L    Chloride 106 98 - 107 mmol/L    CO2 29 22 - 31 mmol/L    Anion Gap, Calculation 9 5 - 18 mmol/L    Glucose 105 70 - 125 mg/dL    BUN 23 8 - 28 mg/dL    Creatinine 0.96 0.60 - 1.10 mg/dL    GFR MDRD Af Amer >60 >60 mL/min/1.73m2    GFR MDRD Non Af Amer 57 (L) >60 mL/min/1.73m2    Bilirubin, Total 0.8 0.0 - 1.0 mg/dL    Calcium 9.9 8.5 - 10.5 mg/dL    Protein, Total 6.5 6.0 - 8.0 g/dL    Albumin 3.7 3.5 - 5.0 g/dL    Alkaline Phosphatase 75 45 - 120 U/L    AST 23 0 - 40 U/L    ALT 26 0 - 45 U/L   HM1 (CBC with Diff)   Result Value Ref Range    WBC 10.2 4.0 - 11.0 thou/uL    RBC 4.99 3.80 - 5.40 mill/uL    Hemoglobin 13.1 12.0 - 16.0 g/dL    Hematocrit 41.6 35.0 - 47.0 %    MCV 83 80 - 100 fL    MCH 26.3 (L) 27.0 - 34.0 pg    MCHC 31.5 (L) 32.0 - 36.0 g/dL    RDW 20.7 (H) 11.0 - 14.5 %    Platelets 141 140 - 440 thou/uL    MPV 10.7 8.5 - 12.5 fL    Neutrophils % 74 (H) 50 - 70 %    Lymphocytes % 13 (L) 20 - 40 %    Monocytes % 10 2 - 10 %    Eosinophils % 2 0 - 6 %    Basophils % 0 0 - 2 %    Neutrophils Absolute 7.6 2.0 - 7.7 thou/uL    Lymphocytes Absolute 1.3 0.8 - 4.4 thou/uL    Monocytes Absolute 1.1 (H) 0.0 - 0.9 thou/uL    Eosinophils Absolute 0.2 0.0 - 0.4 thou/uL    Basophils Absolute 0.0 0.0 - 0.2 thou/uL   INR   Result Value Ref Range    INR 2.04 (H) 0.90 - 1.10   Manual Differential   Result Value Ref Range    Platelet Estimate Normal Normal    Ovalocytes 2+ (!) Negative    Tear Drop Cells 1+ (!) Negative         Imaging Results     No results found.      Roly Villalba MD

## 2021-06-09 NOTE — TELEPHONE ENCOUNTER
ANTICOAGULATION  MANAGEMENT    Assessment     Today's INR result of 2.0 is Therapeutic (goal INR of 2.0-3.0)        Warfarin taken as previously instructed    No new diet changes affecting INR    No new medication/supplements affecting INR    Continues to tolerate warfarin with no reported s/s of bleeding or thromboembolism     Previous INR was Therapeutic    Plan:     Left a detailed message for Luz palmer regarding INR result and instructed:     Warfarin Dosing Instructions:  Continue current warfarin dose 5 mg daily     Instructed patient to follow up no later than: 3 weeks with onc labs        Instructed to call the [facility] for any changes, questions or concerns. (#[phone])   ?   Ibrahima Trimble RN    Subjective/Objective:          Anticoagulation Episode Summary     Current INR goal:   2.0-3.0   TTR:   74.6 % (11.4 mo)   Next INR check:   8/20/2020   INR from last check:   2.01 (7/20/2020)   Weekly max warfarin dose:      Target end date:      INR check location:      Preferred lab:      Send INR reminders to:   [facility]    Indications    Paroxysmal Atrial Fibrillation [I48.91]           Comments:            Anticoagulation Care Providers     Provider Role Specialty Phone number    Cordell Ceja DO Referring Internal Medicine [phone]

## 2021-06-09 NOTE — TELEPHONE ENCOUNTER
PA initiated for Mupirocin cream.     Medication Prior Authorization    Start Date:  7/6/20  Type:  New  Urgency:  Urgent  Med Type:  Non-Specialty  Insurance Info:  [employer] - Phone [phone] Fax [phone]  Method:  ePA  Reference #:   (Key: [id number] - [id number]  ___________________________________________________________________________________________________________________:

## 2021-06-10 NOTE — PROGRESS NOTES
[facility] Cancer Care Progress Note    Patient: Jennifer Galvin  MRN: [id number]  Date of Service: 8/10/2020        Reason for visit      1. Malignant neoplasm of upper-outer quadrant of left breast in female, estrogen receptor positive (H)    3.  Thymoma    Assessment     1.  A very complicated at the same time very pleasant 75 y.o. woman with what looks like a stage Ib CA breast left side ER positive [place] positive as well as HER-2/arcelia 3+.  Currently status post 3 cycles of neoadjuvant weekly paclitaxel with Herceptin.  At the time of surgery she had very small amount of residual disease in the lymph nodes.  She needed more Herceptin however initially she was too sick and then later on her [place] scan was not reading well.  Now she is back for treatment since June 2020.  She is on anastrozole and seems to be tolerating it well.  Started that in March 2020.  2.  Anterior mediastinal mass which is growing slowly over the past 7 to 8 years.  Biopsy is consistent with thymoma.  3.  Shortness of breath of unclear etiology.  She has been seen by I think cardiology as well as pulmonology in the hospital.  She has elevated pulmonary artery pressures.  She obviously has obesity related cardiac issues as well.  Seems to be stable.  4.  Other medical issues including weight gain etc.  5.  Atrial fibrillation on anticoagulation.  6.  Mild anxiety.      Plan     1.  Continue treatment with Herceptin. She will finish a full year of it.  She is not a very good candidate for radiation therapy for variety of reasons.  She has pulmonary artery hypertension.  It is a left-sided cancer and she has a heart condition as well.  2.  She should follow-up with the cardiologist recommendation.  3.  Continue anastrozole for adjuvant hormonal therapy.  She will need to take it until March 2025.  4.  She probably also need her thymoma excision.  Not sure if it is causing some of her weakness symptoms.  5.  Follow-up with Dr. Dela Cruz for her primary  care needs.  6.  Patient concerned she may have staph infection. Will give her Bactoban oint to apply in her nostrils.    Clinical stage      Cancer Staging  Malignant neoplasm of upper-outer quadrant of left breast in female, estrogen receptor positive (H)  Staging form: Breast, AJCC 8th Edition  - Clinical: Stage IB (cT2, cN0, cM0, G1, ER+, FL+, HER2+) - Signed by Roly Villalba MD on 8/21/2019  - Pathologic: No Stage Recommended (ypTis (DCIS), pN0(i+), cM0, ER+, FL+, HER2+) - Signed by Meg Walsh CNP on 3/10/2020      History     Jennifer Galvin is a very pleasant 75 y.o. old female with a history of newly diagnosed breast cancer.  There is breast cancer is located on her left breast measures 2.3 cm in size in the upper outer quadrant ER positive [place] positive as well as HER-2/arcelia 3+ on immunohistochemistry.  It is not palpable.  Detected incidentally in late July early August 2019.  Her presentation started with shortness of breath for which she was sent to cardiology for a stress test.  The stress test showed some uptake in the mediastinum as well as in the breast.  Therefore she had a CT scan of the chest which showed that she had a 3.2 cm mass in the mediastinum located anteriorly.  This was initially detected in 2012 but had grown from 1.9 cm at the time to 3.2 cm now.  No other abnormality noted.  Differential is thymoma/lymphoma.  She also had a mammogram which showed that she had a lesion in her left breast 2.3 cm in size.  No lymph adenopathy noted in the axillary area.  She then had a ultrasound-guided biopsy of the left breast which showed this invasive ductal carcinoma.     She was subsequently seen by me.  We had her undergo CT-guided biopsy of the mediastinal mass which has come back as thymoma.  She is also seen pulmonology for shortness of breath.      Jennifer was started on neoadjuvant chemotherapy with weekly paclitaxel and Herceptin on 4 September 2019.  She finished 12 weeks of  that therapy.  Had surgery in December 2019.  There was small amount of residual DCIS but no invasive cancer and isolated tumor cells present in 1 of 3 lymph nodes.  This is considered a very good response.  She had a complicated postoperative course.  She was in and out of TCU's.  She had significant lower extremity edema.  She has been evaluated by cardiology.  She has elevated pulmonary artery pressures.  She has obstructive sleep apnea but has not been able to see sleep medicine yet.    We saw her in April 2020.  Recommended that she should wait a little bit and then have a MUGA scan and come back for adjuvant Herceptin treatment.  She had a MUGA scan in May but was non diagnostic. So she had it repeated and was okay to resume her Herceptin treatment.    Herceptin treatment was delivered on June 8, 2020.  She has been tolerating it well.  Comes in today for next treatment.)  Mammogram in July 2020.  Looks okay.  She has been dealing with some sores on her lower extremity.  Mostly on the right side.  She is currently in a assisted living facility where she has been quarantine as she is a new admitted there.    Past Medical History     Past Medical History:   Diagnosis Date     Acute respiratory failure with hypoxia (H)      Adenocarcinoma In Situ Of The Uterus     Created by Conversion      Anemia due to blood loss, acute 12/15/2019     Benign Polyps Of The Large Intestine     Created by Conversion      Breast cancer (H)      Chronic kidney disease     stage 1 per H&P 11/22/2019     Coronary artery disease      Diabetes mellitus (H)     type 2     Hyperglycemia     Created by Conversion      Hyperlipidemia     Created by Conversion      Hypertension     Created by Conversion      Lesion of mediastinum      Lymphedema     Created by Conversion      Macular Degeneration     Created by Conversion [facility] Annotation: Apr 5 2011 12:22PM - Tien Guzman: Followed by  Ophthalmologist, Dr. Avilez.      Louie  "Depression, Single Episode In Remission     Created by Conversion      Obstructive Sleep Apnea     CPAP     Osteopenia     Created by Conversion      Paroxysmal Atrial Fibrillation     Created by Conversion [employer] [facility] Annotation: Nov 28 2012 10:36PM - Shruthi Dhaliwal: Found incidential  on exam on May 7th, [id number] score of 1 for HTNsymptomatic and hospitalized  x2 in July, 2012.CV X 2 in 2012Chronic Sotalol Rx      Postmenopausal bleeding     Created by Conversion      Skin cancer      Tremor     Created by Conversion      Urge And Stress Incontinence     Created by Conversion      Urinary Frequency More Than Twice At Night (Nocturia)     Created by Conversion      Venous hypertension of lower extremity, bilateral 10/28/2015     Vitamin D Deficiency     Created by Conversion        Review of Systems   Constitutional  Constitutional (WDL): Exceptions to WDL  Weight Gain: 5 - <10% from baseline(up 20 lbs)  Neurosensory  Neurosensory (WDL): Exceptions to WDL  Peripheral Motor Neuropathy: Asymptomatic, clinical or diagnostic observations only, intervention not indicated(fingers)  Ataxia: Asymptomatic, clinical or diagnostic observations only, intervention not indicated(using a walker)  Peripheral Sensory Neuropathy: Moderate symptoms, limiting instrumental ADL(fingertips)  Cardiovascular  Cardiovascular (WDL): All cardiovascular elements are within defined limits  Pulmonary  Respiratory (WDL): Within Defined Limits  Gastrointestinal  Gastrointestinal (WDL): All gastrointestinal elements are within defined limits  Genitourinary  Genitourinary (WDL): Exceptions to WDL(incontinence)  Integumentary  Integumentary (WDL): Exceptions to WDL(blisters rt lower leg)  Patient Coping  Patient Coping: Accepting  Accompanied by  Accompanied by: Alone    ECOG performance status and Distress Assessment      ECOG Performance:    ECOG Performance Status: 2    Distress Assessment  Distress Assessment Score: 5(\"my life\"):     Pain " Status  Currently in Pain: No/denies        Vital Signs     Vitals:    08/10/20 1008   BP: 130/86   Pulse: 73   Temp: 97.5  F (36.4  C)   SpO2: 93%       Physical Exam     GENERAL: Patient is morbidly obese.  No acute distress. Cooperative in conversation.   HEENT: pupils are equal, round and reactive. Oral mucosa is moist and intact.  RESP:Chest symmetric. Regular respiratory rate. No stridor.  CARDIOVASCULAR: Good distal pulses. Normal S1,S2, no S3. Soft systolic ejection murmur.  ABD: Nondistended, soft.  EXTREMITIES: Bilateral lower extremity edema 2+-3+.  NEURO: non focal. Alert and oriented x3.   PSYCH: within normal limits. No depression or anxiety.  SKIN: warm dry intact         Lab Results     Results for orders placed or performed in visit on 08/10/20   Comprehensive Metabolic Panel   Result Value Ref Range    Sodium 141 136 - 145 mmol/L    Potassium 4.0 3.5 - 5.0 mmol/L    Chloride 104 98 - 107 mmol/L    CO2 29 22 - 31 mmol/L    Anion Gap, Calculation 8 5 - 18 mmol/L    Glucose 132 (H) 70 - 125 mg/dL    BUN 26 8 - 28 mg/dL    Creatinine 0.99 0.60 - 1.10 mg/dL    GFR MDRD Af Amer >60 >60 mL/min/1.73m2    GFR MDRD Non Af Amer 55 (L) >60 mL/min/1.73m2    Bilirubin, Total 0.7 0.0 - 1.0 mg/dL    Calcium 9.6 8.5 - 10.5 mg/dL    Protein, Total 6.6 6.0 - 8.0 g/dL    Albumin 3.6 3.5 - 5.0 g/dL    Alkaline Phosphatase 85 45 - 120 U/L    AST 25 0 - 40 U/L    ALT 28 0 - 45 U/L   HM1 (CBC with Diff)   Result Value Ref Range    WBC 11.3 (H) 4.0 - 11.0 thou/uL    RBC 5.05 3.80 - 5.40 mill/uL    Hemoglobin 13.3 12.0 - 16.0 g/dL    Hematocrit 44.5 35.0 - 47.0 %    MCV 88 80 - 100 fL    MCH 26.3 (L) 27.0 - 34.0 pg    MCHC 29.9 (L) 32.0 - 36.0 g/dL    RDW 17.4 (H) 11.0 - 14.5 %    Platelets 138 (L) 140 - 440 thou/uL    MPV 11.1 8.5 - 12.5 fL    Neutrophils % 76 (H) 50 - 70 %    Lymphocytes % 12 (L) 20 - 40 %    Monocytes % 9 2 - 10 %    Eosinophils % 3 0 - 6 %    Basophils % 0 0 - 2 %    Neutrophils Absolute 8.5 (H) 2.0 -  7.7 thou/uL    Lymphocytes Absolute 1.3 0.8 - 4.4 thou/uL    Monocytes Absolute 1.0 (H) 0.0 - 0.9 thou/uL    Eosinophils Absolute 0.4 0.0 - 0.4 thou/uL    Basophils Absolute 0.1 0.0 - 0.2 thou/uL   INR   Result Value Ref Range    INR 1.84 (H) 0.90 - 1.10         Imaging Results     Mammo Diagnostic Bilateral    Result Date: 7/16/2020  EXAM: MAMMO DIAGNOSTIC 3D BILATERAL LOCATION: [facility] DATE/TIME: 7/16/2020 1:13 PM INDICATION: 75-year-old female with history of left invasive ductal carcinoma/DCIS status post breast conservation therapy in December 2019. Patient presents to reestablish baseline following definitive therapy. No current breast symptoms. COMPARISON: Prior mammogram exams, including 8/12/2019 through 2/25/2010. MAMMOGRAPHIC FINDINGS: Bilateral full-field digital diagnostic mammograms performed. The breasts are heterogeneously dense, which may obscure small masses. Images evaluated with the assistance of CAD. Breast tomosynthesis was used in interpretation. Stable benign coarse dystrophic calcifications in both breasts. New post treatment changes in the left breast with associated skin and trabecular thickening and scarring at the lumpectomy bed. No suspicious mammographic findings in either breast.     1. No radiographic findings of malignancy. 2. Interval benign post treatment changes in the left breast. Recommend continued annual screening mammography. ACR BI-RADS Category 2: Benign. Results given to the patient who should resume annual screening mammography.          Roly Villalba MD

## 2021-06-10 NOTE — TELEPHONE ENCOUNTER
"Telephoned and spoke with Jennifer to check in and inquire about her needs.  She continues with Herceptin for breast cancer.  Jennifer recently moved from Luz's home to an independent living apartment in [place] at [facility].  She has completed her pandemic quarantine after moving there, but has rigid restrictions about visitors, and leaving the building herself.  Jennifer states she feels like a \"prisoner.\"  Although she hopes to begin participating in community events at [facility] [employer] [facility] [employer], she has not done so yet.  Emotional support and encouragement provided.      Her partner Damien continues to reside in nursing home after his stroke prior to her diagnosis of breast cancer.  She has been unable to visit him in person due to pandemic restrictions, and felt like he didn't recognize her when they did a physically-distanced \"window\" visit recently.  They do speak on the phone.  Jennifer and a daughter of Ranjits are POAs for him, and they have taken steps to sell his home.   This has been a trial for Jennifer and they have so far decided not to share the information about possible sale with him.      She reports having continued neuropathy in her fingers tips.  She is currently on gabapentin 300 mg PO at .  Appears to have been prescribed by Anca Abbott CNP on 1/3/2020 when Jennifer was at [facility].  "

## 2021-06-10 NOTE — PROGRESS NOTES
Pt here for herceptin after seeing MD. No problem with infusion as directed and port flushed and deaccessed upon completion. Pt d/c using rolling walker to lobby to meet her ride.

## 2021-06-10 NOTE — TELEPHONE ENCOUNTER
ANTICOAGULATION  MANAGEMENT    Assessment     Today's INR result of 1.8 is Subtherapeutic (goal INR of 2.0-3.0)        Warfarin taken as previously instructed    No new diet changes affecting INR    No new medication/supplements affecting INR    Continues to tolerate warfarin with no reported s/s of bleeding or thromboembolism     Previous INR was Therapeutic    Plan:     Spoke on phone with spencer Escobar,  regarding INR result and instructed:     Warfarin Dosing Instructions:  Change warfarin dose to 7.5 mg daily on mon; and 5 mg daily rest of week  (7 % change)    Instructed patient to follow up no later than: 3 weeks with cancer care    Luz verbalizes understanding and agrees to warfarin dosing plan.    Instructed to call the [facility] for any changes, questions or concerns. (#[phone])   ?   Ibrahima Trimble RN    Subjective/Objective:      Jennifer VICKEY Galvin, a 75 y.o. female is on warfarin.     Jennifer reports:     Home warfarin dose: as updated on anticoagulation calendar per template     Missed doses: No     Medication changes:  No     S/S of bleeding or thromboembolism:  No     New Injury or illness:  No     Changes in diet or alcohol consumption:  No     Upcoming surgery, procedure or cardioversion:  No    Anticoagulation Episode Summary     Current INR goal:   2.0-3.0   TTR:   69.5 % (11.4 mo)   Next INR check:   8/31/2020   INR from last check:   1.84! (8/10/2020)   Weekly max warfarin dose:      Target end date:      INR check location:      Preferred lab:      Send INR reminders to:   [facility]    Indications    Paroxysmal Atrial Fibrillation [I48.91]           Comments:            Anticoagulation Care Providers     Provider Role Specialty Phone number    Cordell Ceja DO Referring Internal Medicine [phone]

## 2021-06-11 NOTE — TELEPHONE ENCOUNTER
ANTICOAGULATION  MANAGEMENT    Assessment     Today's INR result of 1.37 is Subtherapeutic (goal INR of 2.0-3.0)        Warfarin taken as previously instructed    No new diet changes affecting INR    No new medication/supplements affecting INR    Continues to tolerate warfarin with no reported s/s of bleeding or thromboembolism     Previous INR was Subtherapeutic    Plan:     Spoke on phone with Jennifer regarding INR result and instructed:     Warfarin Dosing Instructions:  Change warfarin dose to 7.5 mg daily on mon/wed/fri; and 5 mg daily rest of week  (13 % change)    Instructed patient to follow up no later than: one week    Jennifer verbalizes understanding and agrees to warfarin dosing plan.    Instructed to call the [facility] for any changes, questions or concerns. (#[phone])   ?   Ibrahima Trimble RN    Subjective/Objective:      Jennifer Galvin, a 75 y.o. female is on warfarin.     Jennifer reports:     Home warfarin dose: as updated on anticoagulation calendar per template     Missed doses: No     Medication changes:  No     S/S of bleeding or thromboembolism:  No     New Injury or illness:  No     Changes in diet or alcohol consumption:  No     Upcoming surgery, procedure or cardioversion:  No    Anticoagulation Episode Summary     Current INR goal:   2.0-3.0   TTR:   65.7 % (11.4 mo)   Next INR check:   9/28/2020   INR from last check:   1.37! (9/21/2020)   Weekly max warfarin dose:      Target end date:      INR check location:      Preferred lab:      Send INR reminders to:   [facility]    Indications    Paroxysmal Atrial Fibrillation [I48.91]           Comments:            Anticoagulation Care Providers     Provider Role Specialty Phone number    Cordell Ceja DO Referring Internal Medicine [phone]

## 2021-06-11 NOTE — TELEPHONE ENCOUNTER
Anticoagulation Management    Unable to reach Jennifer today.    Today's INR result of 1.3 is Subtherapeutic (goal INR of 2.0-3.0).     Follow up required to confirm warfarin doses taken.    Left message to continue current dose of warfarin 7.5  mg tonight.       ACN to follow up    Ibrahima Trimble RN

## 2021-06-11 NOTE — PROGRESS NOTES
Pt arrived ambulatory to clinic for Cycle # 11 Day # 1 of her chemotherapy regimen.  Port was accessed using aseptic technique without difficulties with excellent blood return.  Labs were reviewed, pt ok for treatment.   Administered Herceptin and flu shot per MD order. Pt tolerated infusion well, no s/s of infusion reaction.  Port was flushed with NS and Heparin then de-accessed using 2x2 and papertape.  Pt verbalized understanding of plan of care and return to clinic.

## 2021-06-11 NOTE — PROGRESS NOTES
PT here ambulatory with walker for herceptin infusion. Port accessed/inr drawn then herceptin infused over 30 minutes. Txt completed / port flushed/deaccessed with 2x2 to site. Follow up reviewed and pt dc'd steady gait

## 2021-06-11 NOTE — TELEPHONE ENCOUNTER
Anticoagulation Management    Unable to reach Jennifer today.    Today's INR result of 1.12 is Subtherapeutic (goal INR of 2.0-3.0).     Follow up required to confirm doses taken and assess for changes (no template).    Left message to continue current dose of warfarin 7.5 mg tonight.     Left third message to resume usual dose 7.5 mg on mondays, 5 mg other days and check inr in 5-7 days.    Asked Jennifer to call so we can make arrangement with Viral Browne for inr labs if necessary      ACN to follow up    Ibrahima Trimble RN

## 2021-06-11 NOTE — TELEPHONE ENCOUNTER
Refill Approved    Rx renewed per Medication Renewal Policy. Medication was last renewed on 8/19/19.    Lianne Perales, [facility] Triage/Med Refill 9/14/2020     Requested Prescriptions   Pending Prescriptions Disp Refills     simvastatin (ZOCOR) 20 MG tablet [Pharmacy Med Name: SIMVASTATIN 20MG TABLETS] 90 tablet 3     Sig: TAKE 1 TABLET BY MOUTH EVERY DAY WITH SUPPER       Statins Refill Protocol (Hmg CoA Reductase Inhibitors) Passed - 9/12/2020  2:29 PM        Passed - PCP or prescribing provider visit in past 12 months      Last office visit with prescriber/PCP: 2/27/2020 Cordell Ceja DO OR same dept: 2/27/2020 Cordell Ceja DO OR same specialty: 2/27/2020 Cordell Ceja DO  Last physical: 11/22/2019 Last MTM visit: Visit date not found   Next visit within 3 mo: Visit date not found  Next physical within 3 mo: Visit date not found  Prescriber OR PCP: Cordell Ceja DO  Last diagnosis associated with med order: 1. Hyperlipidemia  - simvastatin (ZOCOR) 20 MG tablet [Pharmacy Med Name: SIMVASTATIN 20MG TABLETS]; TAKE 1 TABLET BY MOUTH EVERY DAY WITH SUPPER  Dispense: 90 tablet; Refill: 3    If protocol passes may refill for 12 months if within 3 months of last provider visit (or a total of 15 months).

## 2021-06-11 NOTE — TELEPHONE ENCOUNTER
ANTICOAGULATION  MANAGEMENT PROGRAM    Jennifer Galvin is overdue for INR check.     Left message to check INR at upcoming office visit on 9/21 with labs and chemo at oncology visit      Ibrahima Trimble RN

## 2021-06-12 NOTE — TELEPHONE ENCOUNTER
Refill Approved    Rx renewed per Medication Renewal Policy. Medication was last renewed on 9/3/2019  Last OV 6/2/2020 virtual  Aysha Benton, [facility] Triage/Med Refill 10/18/2020     Requested Prescriptions   Pending Prescriptions Disp Refills     potassium chloride (K-DUR,KLOR-CON) 10 MEQ tablet [Pharmacy Med Name: POTASSIUM CL MICRO 10MEQ ER TABS] 360 tablet 3     Sig: TAKE 2 TABLETS BY MOUTH TWICE DAILY       Potassium Supplements Refill Protocol Passed - 10/14/2020  6:20 PM        Passed - PCP or prescribing provider visit in past 12 months       Last office visit with prescriber/PCP: 2/27/2020 Cordell Ceja DO OR same dept: 2/27/2020 Cordell Ceja DO OR same specialty: 2/27/2020 Cordell Ceja DO  Last physical: 11/22/2019 Last MTM visit: Visit date not found   Next visit within 3 mo: Visit date not found  Next physical within 3 mo: Visit date not found  Prescriber OR PCP: Cordell Ceja DO  Last diagnosis associated with med order: 1. Hypopotassemia  - potassium chloride (K-DUR,KLOR-CON) 10 MEQ tablet [Pharmacy Med Name: POTASSIUM CL MICRO 10MEQ ER TABS]; TAKE 2 TABLETS BY MOUTH TWICE DAILY  Dispense: 360 tablet; Refill: 3    If protocol passes may refill for 12 months if within 3 months of last provider visit (or a total of 15 months).             Passed - Potassium level in last 12 months     Lab Results   Component Value Date    Potassium 4.4 10/12/2020

## 2021-06-12 NOTE — TELEPHONE ENCOUNTER
RN cannot approve Refill Request    RN can NOT refill this medication med is not covered by policy/route to provider. Last office visit: 2/27/2020 Cordell Ceja DO Last Physical: 11/22/2019 Last MTM visit: Visit date not found Last visit same specialty: 2/27/2020 Cordell Ceja DO.  Next visit within 3 mo: Visit date not found  Next physical within 3 mo: Visit date not found      Radha Sue, [facility] Triage/Med Refill 11/1/2020    Requested Prescriptions   Pending Prescriptions Disp Refills     warfarin ANTICOAGULANT (COUMADIN/JANTOVEN) 5 MG tablet [Pharmacy Med Name: WARFARIN SOD 5MG TABLETS (PEACH)] 90 tablet 1     Sig: TAKE 1/2 TO 1 TABLET BY MOUTH EVERY DAY AS DIRECTED. ADJUST DOSE PER INR       Warfarin Refill Protocol  Failed - 10/30/2020 11:07 AM        Failed -  Route to appropriate pool/provider     Last Anticoagulation Summary:   Anticoagulation Episode Summary     Current INR goal:  2.0-3.0   TTR:  59.0 % (10.8 mo)   Next INR check:  11/2/2020   INR from last check:     Weekly max warfarin dose:     Target end date:     INR check location:     Preferred lab:     Send INR reminders to:  [facility]    Indications    Paroxysmal Atrial Fibrillation [I48.91]           Comments:           Anticoagulation Care Providers     Provider Role Specialty Phone number    Cordell Ceja DO Referring Internal Medicine [phone]                Passed - Provider visit in last year     Last office visit with prescriber/PCP: 2/27/2020 Cordell Ceja DO OR same dept: 2/27/2020 Cordell Ceja DO OR same specialty: 2/27/2020 Cordell Ceja DO  Last physical: 11/22/2019 Last MTM visit: Visit date not found    Next appt within 3 mo: Visit date not found Next physical within 3 mo: Visit date not found  Prescriber OR PCP: Cordell Ceja DO  Last diagnosis associated with med order: 1. Paroxysmal Atrial Fibrillation  - warfarin ANTICOAGULANT (COUMADIN/JANTOVEN) 5  MG tablet [Pharmacy Med Name: WARFARIN SOD 5MG TABLETS (PEACH)]; TAKE 1/2 TO 1 TABLET BY MOUTH EVERY DAY AS DIRECTED. ADJUST DOSE PER INR  Dispense: 90 tablet; Refill: 1    If protocol passes may refill for 6 months if within 3 months of last provider visit (or a total of 9 months).

## 2021-06-12 NOTE — PROGRESS NOTES
Pt arrives to infusion center for labs and treatment.  IVAD accessed, labs drawn et sent w/results noted.  Herceptin infused as ordered without difficulty.  IVAD flushed w/NS et heparin and needle deaccessed.  Pt left clinic stable to lobby.  Plan RTC as scheduled.

## 2021-06-12 NOTE — PROGRESS NOTES
PT here for txt. Reviewed herceptin with pt and infused over 30 minutes. Pt tolerated infusion without any problems. txt completed and port flushed/deaccessed with 2x2 to site. Follow up reviewed and pt dc'd steady gait with walker

## 2021-06-12 NOTE — PROGRESS NOTES
[facility] Hematology and Oncology Progress Note    Patient: Jennifer Galvin  MRN: [id number]  Date of Service: 10/12/2020        Reason for Visit    No chief complaint on file.      Assessment and Plan  Cancer Staging  Malignant neoplasm of upper-outer quadrant of left breast in female, estrogen receptor positive (H)  Staging form: Breast, AJCC 8th Edition  - Clinical: Stage IB (cT2, cN0, cM0, G1, ER+, DC+, HER2+) - Signed by Roly Villalba MD on 8/21/2019  - Pathologic: No Stage Recommended (ypTis (DCIS), pN0(i+), cM0, ER+, DC+, HER2+) - Signed by Meg Walsh CNP on 3/10/2020    1.  Breast cancer, triple positive, stage I: Patient had neoadjuvant chemotherapy with weekly Taxol and Herceptin.  Last dose of this was on 11/27. She underwent lumpectomy at the beginning of December with Dr. Ferrera. Had 21mm of residual DCIS and isolated tumor cells in 1/3 lymph nodes. Has had a complicated post op period. Did get started back on herceptin in May. Tolerating well. Dr. Villalba did not think she could tolerate radiation. Will complete a year of herceptin. Will continue anastrozole. Will repeat MUGA to eval heart function    2.  Anterior mediastinal mass, thymoma: This likely should be excised.  Apparently is been slowly growing over 7 to 8 years.  Dr. Villalba feels that this may be causing some of her symptoms so we may refer to Dr. Romero but she did not do well with last surgery and still is not fully recovered. I think they needs to wait until she gets stronger.     3. Peripheral neuropathy: not sure why it would be getting worse. herceptin should not cause this.  Not currently affecting ADL's. No need for pain medications. Discussed acupuncture. Continue to monitor      ECOG Performance   ECOG Performance Status: 2     Distress Assessment  Distress Assessment Score: No distress    Pain  Currently in Pain: No/denies      Problem List    1. Malignant neoplasm of upper-outer quadrant of left breast in female,  estrogen receptor positive (H)  CC OFFICE VISIT LONG    Infusion Appointment    Infusion Appointment   2. Encounter for monitoring cardiotoxic drug therapy  NM Muga        ______________________________________________________________________________    History of Present Illness    Jennifer Galvin is a very pleasant 75 y.o. female with a history of newly diagnosed breast cancer.  There is breast cancer is located on her left breast measures 2.3 cm in size in the upper outer quadrant ER positive DE positive as well as HER-2/arcelia 3+ on immunohistochemistry.  It is not palpable.  Detected incidentally in late July early August 2019.  Her presentation started with shortness of breath for which she was sent to cardiology for a stress test.  The stress test showed some uptake in the mediastinum as well as in the breast.  Therefore she had a CT scan of the chest which showed that she had a 3.2 cm mass in the mediastinum located anteriorly.  This was initially detected in 2012 but had grown from 1.9 cm at the time to 3.2 cm now.  No other abnormality noted.  Differential is thymoma/lymphoma.  She also had a mammogram which showed that she had a lesion in her left breast 2.3 cm in size.  No lymph adenopathy noted in the axillary area.  She then had a ultrasound-guided biopsy of the left breast which showed this invasive ductal carcinoma.     We had her undergo CT-guided biopsy of the mediastinal mass which has come back as thymoma.  She started neoadjuvant treatment with Taxol and Herceptin.  She had 11 weeks of taxol and 12 weekly doses of Herceptin, last one on 11/27. Had lumpectomy on 12/2/19 with Dr. Ferrera that showed   ypTis pNo(i+) M0. DCIS was 21mm in size. No invasive cancer. Margins are negative. The DCIS was arising in fibroadenoma and involving areas of sclerosing adenosis.   She had a hard post op course with weakness, hypoxia, cellulitis, CHF. Was in TCU for a couple of months. Now living with sister. Still  "struggling with shortness of breath, edema in legs, poor mobility. She feels like she may be slowly getting better. Saw Dr. Villalba in May and he ordered MUGA scan. First one was inconclusive, but second one was normal she he did start maintenance Herceptin. She has been on that since May/Phoebe. States she did fine with it and had no side effects that she could decipher.  Noticing some mild numbness in finger tips and toes. Seems to be getting worse.     Pain Status  Currently in Pain: No/denies    Review of Systems    Constitutional  Constitutional (WDL): Exceptions to WDL  Fatigue: Fatigue relieved by rest  Fever: None  Chills: None  Neurosensory  Neurosensory (WDL): Exceptions to WDL  Ataxia: Asymptomatic, clinical or diagnostic observations only, intervention not indicated(uses walker)  Peripheral Sensory Neuropathy: Moderate symptoms, limiting instrumental ADL(\"getting worse\" in fingers and starting in toes)  Confusion: None  Syncope: None  Eye   Eye Disorder (WDL): All eye disorder elements are within defined limits  Ear  Ear Disorder (WDL): All ear disorder elements are within defined limits  Cardiovascular  Cardiovascular (WDL): Exceptions to WDL  Palpitations: None  Edema: Yes  Edema Limbs: Grade 2  Pulmonary  Cough: Mild symptoms, nonprescription intervention indicated  Dyspnea: Shortness of breath with moderate exertion(some ZAPATA)  Gastrointestinal  Gastrointestinal (WDL): All gastrointestinal elements are within defined limits  Genitourinary  Genitourinary (WDL): Exceptions to WDL(incontinence)  Lymphatic  Lymph (WDL): Assessment not pertinent to visit  Musculoskeletal and Connective Tissue  Musculoskeletal and Connetive Tissue Disorders (WDL): Exceptions to WDL  Arthralgia: None  Bone Pain: Mild pain(left knee)  Muscle Weakness : None  Myalgia: None  Integumentary  Integumentary (WDL): All integumentary elements are within defined limits  Patient Coping  Patient Coping: Accepting  Distress " Assessment  Distress Assessment Score: No distress  Accompanied by  Accompanied by: Alone  Oral Chemo Adherence           Past History  Past Medical History:   Diagnosis Date     Acute respiratory failure with hypoxia (H)      Adenocarcinoma In Situ Of The Uterus     Created by Conversion      Anemia due to blood loss, acute 12/15/2019     Benign Polyps Of The Large Intestine     Created by Conversion      Breast cancer (H)      Chronic kidney disease     stage 1 per H&P 11/22/2019     Coronary artery disease      Diabetes mellitus (H)     type 2     Hyperglycemia     Created by Conversion      Hyperlipidemia     Created by Conversion      Hypertension     Created by Conversion      Lesion of mediastinum      Lymphedema     Created by Conversion      Macular Degeneration     Created by Conversion [facility] Annotation: Apr 5 2011 12:22PM - Tien Guzman: Followed by  Ophthalmologist, Dr. Avilez.      Major Depression, Single Episode In Remission     Created by Conversion      Obstructive Sleep Apnea     CPAP     Osteopenia     Created by Conversion      Paroxysmal Atrial Fibrillation     Created by Conversion [employer] Annotation: Nov 28 2012 10:36PM - Shruthi Dhaliwal: Found incidential  on exam on May 7th, [id number] score of 1 for HTNsymptomatic and hospitalized  x2 in July, 2012.CV X 2 in 2012Chronic Sotalol Rx      Postmenopausal bleeding     Created by Conversion      Skin cancer      Tremor     Created by Conversion      Urge And Stress Incontinence     Created by Conversion      Urinary Frequency More Than Twice At Night (Nocturia)     Created by Conversion      Venous hypertension of lower extremity, bilateral 10/28/2015     Vitamin D Deficiency     Created by Conversion        PHYSICAL EXAM  /77   Pulse 82   Temp 97.8  F (36.6  C) (Oral)   Wt (!) 303 lb (137.4 kg)   SpO2 93%   BMI 48.91 kg/m      GENERAL: no acute distress. Cooperative in conversation. Here alone due to visitor restrictions.  Mask on. Using walker  RESP: Regular respiratory rate. No expiratory wheezes   NEURO: non focal. Alert and oriented x3. I do not see any ptosis   PSYCH: within normal limits. No depression or anxiety.  SKIN: exposed skin is dry intact.       Lab Results    Recent Results (from the past 168 hour(s))   Comprehensive Metabolic Panel   Result Value Ref Range    Sodium 141 136 - 145 mmol/L    Potassium 4.4 3.5 - 5.0 mmol/L    Chloride 102 98 - 107 mmol/L    CO2 29 22 - 31 mmol/L    Anion Gap, Calculation 10 5 - 18 mmol/L    Glucose 100 70 - 125 mg/dL    BUN 31 (H) 8 - 28 mg/dL    Creatinine 1.22 (H) 0.60 - 1.10 mg/dL    GFR MDRD Af Amer 52 (L) >60 mL/min/1.73m2    GFR MDRD Non Af Amer 43 (L) >60 mL/min/1.73m2    Bilirubin, Total 1.1 (H) 0.0 - 1.0 mg/dL    Calcium 10.3 8.5 - 10.5 mg/dL    Protein, Total 7.1 6.0 - 8.0 g/dL    Albumin 3.9 3.5 - 5.0 g/dL    Alkaline Phosphatase 82 45 - 120 U/L    AST 30 0 - 40 U/L    ALT 34 0 - 45 U/L   INR   Result Value Ref Range    INR 1.63 (H) 0.90 - 1.10   HM1 (CBC with Diff)   Result Value Ref Range    WBC 10.9 4.0 - 11.0 thou/uL    RBC 5.40 3.80 - 5.40 mill/uL    Hemoglobin 14.0 12.0 - 16.0 g/dL    Hematocrit 45.2 35.0 - 47.0 %    MCV 84 80 - 100 fL    MCH 25.9 (L) 27.0 - 34.0 pg    MCHC 31.0 (L) 32.0 - 36.0 g/dL    RDW 18.7 (H) 11.0 - 14.5 %    Platelets 142 140 - 440 thou/uL    MPV 10.8 8.5 - 12.5 fL    Neutrophils % 76 (H) 50 - 70 %    Lymphocytes % 13 (L) 20 - 40 %    Monocytes % 9 2 - 10 %    Eosinophils % 2 0 - 6 %    Basophils % 0 0 - 2 %    Immature Granulocyte % 0 <=0 %    Neutrophils Absolute 8.2 (H) 2.0 - 7.7 thou/uL    Lymphocytes Absolute 1.4 0.8 - 4.4 thou/uL    Monocytes Absolute 1.0 (H) 0.0 - 0.9 thou/uL    Eosinophils Absolute 0.2 0.0 - 0.4 thou/uL    Basophils Absolute 0.0 0.0 - 0.2 thou/uL    Immature Granulocyte Absolute 0.0 <=0.0 thou/uL       Imaging    No results found.      Signed by: Meg Walsh, CNP

## 2021-06-12 NOTE — TELEPHONE ENCOUNTER
ANTICOAGULATION  MANAGEMENT    Assessment     Today's INR result of 2.03 is Therapeutic (goal INR of 2.0-3.0)          Left message asking Jennifer to call with any changes in her warfarin dose, diet or other medicines     Continues to tolerate warfarin with no reported s/s of bleeding or thromboembolism     Previous INR was Subtherapeutic    Plan:     Left detailed message for Jennifer regarding INR result and instructed:     Warfarin Dosing Instructions:  Continue current warfarin dose 5 mg daily on tue/thu; and 7.5 mg daily rest of week      Instructed patient to follow up no later than: 2 weeks with ov    Instructed to call the [facility] for any changes, questions or concerns. (#[phone])   ?   Ibrahima Trimble RN    Subjective/Objective:      Jennifer Galvin, a 75 y.o. female is on warfarin.         Anticoagulation Episode Summary     Current INR goal:  2.0-3.0   TTR:  55.7 % (11.4 mo)   Next INR check:  11/18/2020   INR from last check:  2.03 (11/2/2020)   Weekly max warfarin dose:     Target end date:     INR check location:     Preferred lab:     Send INR reminders to:  [facility]    Indications    Paroxysmal Atrial Fibrillation [I48.91]           Comments:           Anticoagulation Care Providers     Provider Role Specialty Phone number    Cordell Ceja DO Referring Internal Medicine [phone]

## 2021-06-13 NOTE — PROGRESS NOTES
Jennifer came to chemo infusion following MD visit for labs and her next dose of Trastuzumab.  Port was accessed with good blood return.  Labs drawn.  Trastuzumab infused as ordered over 30 minutes and was well tolerated while in clinic today.  Port was flushed with ns, heparinized then de-accessed and site covered.  Jennifer left clinic ambulatory using her walker.

## 2021-06-13 NOTE — TELEPHONE ENCOUNTER
Patient was called to let her know that Dr. Villalba reviewed her labs and that they are OK.  He wants her to get tested for COVID.  He put the order in.  PATIENT STATED SHE COULD GET TESTED AT THE PLACE SHE IS LIVING IN.

## 2021-06-13 NOTE — TELEPHONE ENCOUNTER
I spoke with Jennifer explaining my role with [facility] and her SCP. I explained the contents and my process for getting it to her and that with her permission I would put it together, mail it to her via certified mail so I can track it. Once delivered, if she has questions or would like to review, the information for contact is contained in the packet. She confirmed her address and I thanked her for her time, congratulated her on her survivorship and wished her a happy thanksgiving. Brady

## 2021-06-13 NOTE — TELEPHONE ENCOUNTER
FYI - Status Update  Who is Calling: Patient  Update: Patient called and stated that they would like a call back from Ibrahima, thank you.  Okay to leave a detailed message?:  Yes

## 2021-06-13 NOTE — TELEPHONE ENCOUNTER
Anticoagulation Management    Unable to reach Jennifer today.    Today's INR result of 1.06 is Subtherapeutic (goal INR of 2.0-3.0).     Follow up required to confirm doses taken and assess for changes (no template).    No instructions provided. Unable to leave voicemail.       ACN to follow up    Ibrahima Trimble RN

## 2021-06-13 NOTE — PROGRESS NOTES
[facility] Progress Note    Patient: Jennifer Galvin  MRN: [id number]  Date of Service: 11/23/2020        Reason for visit      1. Malignant neoplasm of upper-outer quadrant of left breast in female, estrogen receptor positive (H)    2. Chronic systolic heart failure (H)    2.  Thymoma    Assessment     1.  A very complicated at the same time very pleasant 75 y.o. woman with what looks like a stage Ib CA breast left side ER positive MI positive as well as HER-2/arcelia 3+.  Currently status post 3 cycles of neoadjuvant weekly paclitaxel with Herceptin.  At the time of surgery she had very small amount of residual disease in the lymph nodes.  She needed more Herceptin however initially she was too sick and then later on her Muga scan was not reading well.  Now she is back for treatment since June 2020.  She is on anastrozole and seems to be tolerating it well.  Started that in March 2020.  2.  Anterior mediastinal mass which is growing slowly over the past 7 to 8 years.  Biopsy is consistent with thymoma.  3.  Shortness of breath of unclear etiology.  She has been seen by I think cardiology as well as pulmonology in the hospital.  She has elevated pulmonary artery pressures.  She obviously has obesity related cardiac issues as well.  Seems to be stable.  4.  Other medical issues including weight gain etc.  She has gained some more weight since last visit here.  5.  Atrial fibrillation on anticoagulation.  6.  Mild anxiety.      Plan     1.  Continue treatment with Herceptin. She will finish a full year of it in the middle of February 2021.  She is not a very good candidate for radiation therapy for variety of reasons.  She has pulmonary artery hypertension.  It is a left-sided cancer and she has a heart condition as well.  2.  We will check a BNP since she is slightly winded and has gained some weight.  She may need to go up on her Lasix.  3.  Continue anastrozole for adjuvant hormonal therapy.  She will need  to take it until March 2025.  4.  She probably also need her thymoma excision.  Not sure if it is causing some of her weakness symptoms.  5.  Follow-up with primary care for other medical issues.      Clinical stage      Cancer Staging  Malignant neoplasm of upper-outer quadrant of left breast in female, estrogen receptor positive (H)  Staging form: Breast, AJCC 8th Edition  - Clinical: Stage IB (cT2, cN0, cM0, G1, ER+, IA+, HER2+) - Signed by Roly Villalba MD on 8/21/2019  - Pathologic: No Stage Recommended (ypTis (DCIS), pN0(i+), cM0, ER+, IA+, HER2+) - Signed by Meg Walsh CNP on 3/10/2020      History     Jennifer Galvin is a very pleasant 75 y.o. old female with a history of newly diagnosed breast cancer.  There is breast cancer is located on her left breast measures 2.3 cm in size in the upper outer quadrant ER positive IA positive as well as HER-2/arcelia 3+ on immunohistochemistry.  It is not palpable.  Detected incidentally in late July early August 2019.  Her presentation started with shortness of breath for which she was sent to cardiology for a stress test.  The stress test showed some uptake in the mediastinum as well as in the breast.  Therefore she had a CT scan of the chest which showed that she had a 3.2 cm mass in the mediastinum located anteriorly.  This was initially detected in 2012 but had grown from 1.9 cm at the time to 3.2 cm now.  No other abnormality noted.  Differential is thymoma/lymphoma.  She also had a mammogram which showed that she had a lesion in her left breast 2.3 cm in size.  No lymph adenopathy noted in the axillary area.  She then had a ultrasound-guided biopsy of the left breast which showed this invasive ductal carcinoma.     She was subsequently seen by me.  We had her undergo CT-guided biopsy of the mediastinal mass which has come back as thymoma.  She is also seen pulmonology for shortness of breath.      Jennifer was started on neoadjuvant chemotherapy with  weekly paclitaxel and Herceptin on 4 September 2019.  She finished 12 weeks of that therapy.  Had surgery in December 2019.  There was small amount of residual DCIS but no invasive cancer and isolated tumor cells present in 1 of 3 lymph nodes.  This is considered a very good response.  She had a complicated postoperative course.  She was in and out of TCU's.  She had significant lower extremity edema.  She has been evaluated by cardiology.  She has elevated pulmonary artery pressures.  She has obstructive sleep apnea but has not been able to see sleep medicine yet.    We saw her in April 2020.  Recommended that she should wait a little bit and then have a MUGA scan and come back for adjuvant Herceptin treatment.  She had a MUGA scan in May but was non diagnostic. So she had it repeated and was okay to resume her Herceptin treatment.    Herceptin treatment was delivered on June 8, 2020.  She has been tolerating it well.  Comes in today for next treatment.  Mammogram in July 2020 was okay.  She has been dealing with some sores on her lower extremity.  Mostly on the right side.    Comes in today for next treatment.  She did have a MUGA scan done earlier this month and was pretty normal.  Overall medical condition is fairly stable.  Nothing much is changed.  She has gained some weight.  She still is living in an assisted living facility where her movements are limited.  She was tested 2 weeks ago for Covid and was negative.    Past Medical History     Past Medical History:   Diagnosis Date     Acute respiratory failure with hypoxia (H)      Adenocarcinoma In Situ Of The Uterus     Created by Conversion      Anemia due to blood loss, acute 12/15/2019     Benign Polyps Of The Large Intestine     Created by Conversion      Breast cancer (H)      Chronic kidney disease     stage 1 per H&P 11/22/2019     Coronary artery disease      Diabetes mellitus (H)     type 2     Hyperglycemia     Created by Conversion       Hyperlipidemia     Created by Conversion      Hypertension     Created by Conversion      Lesion of mediastinum      Lymphedema     Created by Conversion      Macular Degeneration     Created by Conversion [employer] [facility] Annotation: Apr 5 2011 12:22PM - Tein Guzman: Followed by  Ophthalmologist, Dr. Avilez.      Major Depression, Single Episode In Remission     Created by Conversion      Obstructive Sleep Apnea     CPAP     Osteopenia     Created by Conversion      Paroxysmal Atrial Fibrillation     Created by Conversion [employer] [facility] Annotation: Nov 28 2012 10:36PM - Shruthi Dhaliwal: Found incidential  on exam on May 7th, [id number] score of 1 for HTNsymptomatic and hospitalized  x2 in July, 2012.CV X 2 in 2012Chronic Sotalol Rx      Postmenopausal bleeding     Created by Conversion      Skin cancer      Tremor     Created by Conversion      Urge And Stress Incontinence     Created by Conversion      Urinary Frequency More Than Twice At Night (Nocturia)     Created by Conversion      Venous hypertension of lower extremity, bilateral 10/28/2015     Vitamin D Deficiency     Created by Conversion        Review of Systems   Constitutional  Constitutional (WDL): Exceptions to WDL  Weight Gain: 5 - <10% from baseline(up 10 lbs)  Neurosensory  Neurosensory (WDL): Exceptions to WDL  Ataxia: Asymptomatic, clinical or diagnostic observations only, intervention not indicated(using a walker)  Peripheral Sensory Neuropathy: Asymptomatic, loss of deep tendon reflexes or paresthesia(fingers cold and numb, starting in feet)  Cardiovascular  Cardiovascular (WDL): Exceptions to WDL  Edema: Yes  Edema Limbs: Grade 2(lower legs)  Pulmonary  Respiratory (WDL): Exceptions to WDL  Dyspnea: Shortness of breath with minimal exertion, limiting instrumental ADL  Hypoxia: Decreased oxygen saturation with exercise (e.g., pulse oximeter <88%), intermittent supplemental oxygen  Gastrointestinal  Gastrointestinal (WDL): All gastrointestinal elements  are within defined limits  Genitourinary  Genitourinary (WDL): All genitourinary elements are within defined limits(some incontinence)  Integumentary  Integumentary (WDL): All integumentary elements are within defined limits  Patient Coping  Patient Coping: Accepting  Accompanied by  Accompanied by: Alone    ECOG performance status and Distress Assessment      ECOG Performance:    ECOG Performance Status: 2    Distress Assessment  Distress Assessment Score: No distress:     Pain Status  Currently in Pain: Yes        Vital Signs     Vitals:    11/23/20 1339   BP: 136/84   Pulse: 82   Temp: 98.6  F (37  C)   SpO2: 90%       Physical Exam     GENERAL: Patient is morbidly obese.  No acute distress. Cooperative in conversation.   HEENT: pupils are equal, round and reactive. Oral mucosa is moist and intact.  RESP:Chest symmetric. Regular respiratory rate. No stridor.  CARDIOVASCULAR: Good distal pulses. Normal S1,S2, no S3. Soft systolic ejection murmur.  ABD: Nondistended, soft.  EXTREMITIES: Bilateral lower extremity edema 2+-3+.  NEURO: non focal. Alert and oriented x3.   PSYCH: within normal limits. No depression or anxiety.  SKIN: warm dry intact       Lab Results     Results for orders placed or performed during the hospital encounter of 11/02/20   NM Muga   Result Value Ref Range    MUGA EF 63 %    MUGA PRIOR EF 64 %         Imaging Results     Nm Muga    Result Date: 11/2/2020    The left ventricular ejection fraction is 63%.   The measured left ventricular ejection fraction on the prior study date of 5/18/2020 was 64%.          Roly Villalba MD

## 2021-06-13 NOTE — TELEPHONE ENCOUNTER
Telephoned and spoke briefly with Jennifer hoping to check in and offer support.  She did not seem to recognize me from our numerous previous interactions, and her responses were delayed, but it was unclear if we had a clear phone connection.  Writer telephoned Jennifer's sister-in-law Steff immediately after, and learned Steff has had concerns about Jennifer's welfare and just requested a well check by the staff at Jennifer's residence even prior to my call.  Requested an update from Steff if appropriate.

## 2021-06-14 NOTE — PROGRESS NOTES
[facility] Clinic Note    Jennifer Galvin   72 y.o. female    Date of Visit: 12/13/2017  Chief Complaint   Patient presents with     Labs Only     Lipid and A1C Check       ASSESSMENT/PLAN  1. Type 2 diabetes mellitus with stage 1 chronic kidney disease, without long-term current use of insulin  LDL Cholesterol, Direct    Lipid Cascade    Glycosylated Hemoglobin A1c    ALT (SGPT)    Comprehensive Metabolic Panel    Microalbumin, Random Urine   2. Hypertension       ---------------------------------------------    1.  Ms. Galvin is a 72-year-old woman here for diabetes follow-up.  She is not currently fasting, so check direct LDL cholesterol in addition to the above tests.  She is due for microalbumin screening.  She did not have a significant amount of microalbuminuria previously, but we talked about how the addition of lisinopril or losartan may be required if this rises to protect her kidney function.    2.  Acceptable blood pressure control, no changes other than if there is significant microalbuminuria.    I urged her to follow-up at least every 6 months.  She expressed agreement.    Return in about 6 months (around 6/13/2018) for Recheck.      SUBJECTIVE  Jennifer Galvin is a 72-year-old woman who presents for diabetes check.    She remains on metformin, has not had her A1c checked in nearly one year.  Her A1c typically is around the 6.0-6.2 range.    At the last time I visited her, we did a preop for bladder surgery.  She tells me she had several bladder surgeries including a bladder stimulator placed.    Unfortunately, her boyfriend suffered from some form of muscle weakness or muscle breakdown as a result of him being on simvastatin, and he urged her to get her cholesterol checked to see if she needs to be on statin or not.  We talked about statins being recommended based on risk factors, and the fact that she has diabetes puts her at high risk for coronary disease.    She has not had any issues with her  feet, does not have any symptoms of claudication.  She does have diabetic retinopathy, followed up with her retina doctor earlier this year in May.    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  She sometimes misses the job, sometimes does not.  She is nursing her boyfriend back to health at present.    Overall, she feels that she has been fine over the last year in terms of health.    ROS A comprehensive review of systems was performed and was otherwise negative    Medications, allergies, and problem list were reviewed and updated    Patient Active Problem List   Diagnosis     Macular Degeneration     Vitamin D Deficiency     Tremor     Joint Pain, Localized In The Knee     Obstructive Sleep Apnea     Hyperlipidemia     Major Depression, Single Episode In Remission     Hypertension     Lymphedema     Paroxysmal Atrial Fibrillation     Osteopenia     Urge And Stress Incontinence     Adenocarcinoma In Situ Of The Uterus     Venous stasis     Acquired lymphedema of leg     History of uterine cancer     Diabetes mellitus, type 2     Venous hypertension of lower extremity, bilateral     BMI 50.0-59.9, adult     Aspirin contraindicated     Elective surgery     Past Medical History:   Diagnosis Date     (Lower) Leg Localized Swelling     Created by Conversion      Adenocarcinoma In Situ Of The Uterus     Created by Conversion      Backache     Created by Conversion [facility] Annotation: Feb 29 2012 12:14PM - Opal Helm: Referred to  [facility] 9/11, given TENS unit.      Benign Polyps Of The Large Intestine     Created by Conversion      Coronary artery disease      Diabetes mellitus      Fatigue     Created by Conversion      Hyperglycemia     Created by Conversion      Hyperlipidemia     Created by Conversion      Hypertension     Created by Conversion      Joint Pain, Localized In The Knee     Created by Conversion      Lymphedema      Created by Conversion      Macular Degeneration     Created by Conversion [employer] [facility] Annotation: Apr 5 2011 12:22PM - Tien Guzman: Followed by  Ophthalmologist, Dr. Avilez.      Major Depression, Single Episode In Remission     Created by Conversion      Obesity     Created by Conversion      Obstructive Sleep Apnea     CPAP     Osteopenia     Created by Conversion      Paroxysmal Atrial Fibrillation     Created by Conversion [facility] Annotation: Nov 28 2012 10:36PM - Shruthi Dhaliwal: Found incidential  on exam on May 7th, [id number] score of 1 for HTNsymptomatic and hospitalized  x2 in July, 2012.CV X 2 in 2012Chronic Sotalol Rx      Postmenopausal bleeding     Created by Conversion      Tachycardia     Created by Conversion      Tremor     Created by Conversion      Urge And Stress Incontinence     Created by Conversion      Urinary Frequency More Than Twice At Night (Nocturia)     Created by Conversion      Urine Tests Nonspecific Abnormal Findings     Created by Conversion      Vitamin D Deficiency     Created by Conversion      Past Surgical History:   Procedure Laterality Date     BREAST BIOPSY Left     beningn      CATARACT EXTRACTION  10/17/13     COLONOSCOPY N/A 4/29/2015    Procedure: COLONOSCOPY WITH POLYPECTOMY;  Surgeon: Too Ureña MD;  Location: [facility];  Service:      COLONOSCOPY N/A 11/9/2016    Procedure: COLONOSCOPY;  Surgeon: Ayden Dela Cruz MD;  Location: [facility];  Service:      ENDOMETRIAL BIOPSY  4/2014     HYSTERECTOMY  May 2014     Interstem - Stage 2  09/11/2014     Interstim Stage I  08/26/2014     post urinary stimulator implantation  8/26/14     MD BIOPSY OF BREAST, INCISIONAL      Description: Incisional Breast Biopsy;  Recorded: 04/05/2011;     MD CARDIOVERSION ELECTIVE ARRHYTHMIA EXTERNAL      Description: Elective Cardioversion External;  Recorded: 07/30/2012;     MD REMOVE TONSILS/ADENOIDS,<13 Y/O      Description: Tonsillectomy With Adenoidectomy;   Recorded: 04/15/2014;     SC SLING OPER STRES INCONTINENCE N/A 11/3/2016    Procedure: PUBOVAGINAL SLING WITH CONCHITA WITH CYSTOSCOPY;  Surgeon: Bill Wilson MD;  Location: [facility];  Service: Gynecology     VITRECTOMY Right 1/15/15     Social History     Social History     Marital status: Single     Spouse name: N/A     Number of children: 0     Years of education: N/A     Occupational History     Retired RN [employer]     Social History Main Topics     Smoking status: Former Smoker     Types: Cigarettes     Quit date: 1/1/2005     Smokeless tobacco: Never Used     Alcohol use 3.0 oz/week     5 Cans of beer per week     Drug use: No     Sexual activity: Yes     Partners: Male     Birth control/ protection: Surgical, Post-menopausal     Other Topics Concern     Not on file     Social History Narrative    Lives alone single family home that she owns, no children and is retired.     Family History   Problem Relation Age of Onset     Hypertension Father      Pneumonia Father      Alzheimer's disease Brother      Stroke Mother      Esophageal cancer Brother        Current Outpatient Prescriptions   Medication Sig Dispense Refill     calcium-vitamin D (CALCIUM-VITAMIN D) 500 mg(1,250mg) -200 unit per tablet Take 1 tablet by mouth 2 (two) times a day with meals.       cholecalciferol, vitamin D3, 1,000 unit tablet Take 2,000 Units by mouth daily.        desmopressin (DDAVP) 0.1 MG tablet Take 0.1 mg by mouth bedtime.       furosemide (LASIX) 40 MG tablet TAKE 1 TABLET BY MOUTH TWICE DAILY 180 tablet 3     glucosamine-chondroitin 500-400 mg tablet Take 1 tablet by mouth 2 (two) times a day.        OMEGA-3/DHA/EPA/FISH OIL (FISH OIL-OMEGA-3 FATTY ACIDS) 300-1,000 mg capsule Take 2 g by mouth 2 times a day at 6:00 am and 4:00 pm.       potassium chloride SA (K-DUR,KLOR-CON) 10 MEQ tablet TAKE 2 TABLETS BY MOUTH TWICE DAILY 360 tablet 3     simvastatin (ZOCOR) 40 MG tablet TAKE 1 TABLET BY MOUTH DAILY  WITH DINNER 90 tablet 0     sotalol (BETAPACE) 80 MG tablet TAKE 1 TABLET BY MOUTH TWICE DAILY 180 tablet 1     VESICARE 10 mg tablet Take 1 tablet by mouth daily.  12     vitamin A-vitamin C-vit E-min (OCUVITE) Tab tablet Take 1 tablet by mouth 2 (two) times a day.       warfarin (COUMADIN) 5 MG tablet TAKE ONE-HALF TO ONE TABLET BY MOUTH DAILY AS DIRECTED. ADJUST DOSE PER INR RESULT 90 tablet 1     metFORMIN (GLUCOPHAGE) 500 MG tablet TAKE 1 TABLET BY MOUTH EVERY DAY 90 tablet 0     metoprolol tartrate (LOPRESSOR) 25 MG tablet TAKE 1 TABLET BY MOUTH EVERY MORNING 90 tablet 0     No current facility-administered medications for this visit.        Allergies   Allergen Reactions     Aspirin      Pt currently on WArfarin     Penicillins      Boils         EXAM  Vitals:    12/13/17 1436   BP: 120/70   Patient Site: Left Arm   Patient Position: Sitting   Cuff Size: Adult Large   Pulse: 72   Weight: (!) 300 lb 3.2 oz (136.2 kg)         General: alert, no distress  Psychiatric: Pleasant affect, fairly quiet today  Skin: No ulcerations on the feet, toes are cool  Cardiovascular: Trace pedal pulses bilaterally, brisk capillary refill  Neurologic: Able to identify 4/6 areas to monofilament on the right foot and 5/6 areas on the left.      RESULTS REVIEWED:     ANALYSIS AND SUMMARY OF OLD RECORDS, NOTES AND CONSULTS (2): Reviewed last note by cardiology Dr. Armando Block, Holter monitor was checked and found to have persistent atrial flutter    RECORDS REQUESTED (1): None.     OTHER HISTORY SUMMARIZED (from nursing staff, family, friends) (2):     RADIOLOGY TESTS SUMMARIZED or REQUESTED (XRAY/CT/MRI/DXA) (1):     MEDICINE TESTS SUMMARIZED or REQUESTED (EKG/ECHO/COLONOSCOPY/EGD) (1): None    INDEPENDENT REVIEW OF EKG OR X-RAY (2): None.    Lab checks today    Data points  3     Cordell Ceja DO  Internal Medicine  [facility]

## 2021-06-16 PROBLEM — Z17.0 MALIGNANT NEOPLASM OF UPPER-OUTER QUADRANT OF LEFT BREAST IN FEMALE, ESTROGEN RECEPTOR POSITIVE (H): Status: ACTIVE | Noted: 2019-08-21

## 2021-06-16 PROBLEM — C50.412 MALIGNANT NEOPLASM OF UPPER-OUTER QUADRANT OF LEFT BREAST IN FEMALE, ESTROGEN RECEPTOR POSITIVE (H): Status: ACTIVE | Noted: 2019-08-21

## 2021-06-16 PROBLEM — Z98.890 STATUS POST LEFT BREAST LUMPECTOMY: Status: ACTIVE | Noted: 2019-01-01

## 2021-06-16 PROBLEM — J98.59 LESION OF MEDIASTINUM: Status: ACTIVE | Noted: 2019-07-05

## 2021-06-16 PROBLEM — F51.02 ADJUSTMENT INSOMNIA: Status: ACTIVE | Noted: 2020-01-01

## 2021-06-16 PROBLEM — H02.401 PTOSIS OF RIGHT EYELID: Status: ACTIVE | Noted: 2020-01-01

## 2021-06-16 PROBLEM — M79.2 NEUROPATHIC PAIN: Status: ACTIVE | Noted: 2020-01-01

## 2021-06-16 NOTE — TELEPHONE ENCOUNTER
Telephone Encounter by Lazara No RN at 3/13/2020  2:09 PM     Author: Lazara No RN Service: -- Author Type: Registered Nurse    Filed: 3/13/2020  2:12 PM Encounter Date: 3/13/2020 Status: Attested    : Lazara No RN (Registered Nurse) Cosigner: Cordell Ceja DO at 3/16/2020  7:11 AM    Attestation signed by Cordell Ceja DO at 3/16/2020  7:11 AM                     ANTICOAGULATION  MANAGEMENT    Assessment     Today's INR result of 2.2 is Therapeutic (goal INR of 2.0-3.0)        Warfarin taken as previously instructed    No new diet changes affecting INR    No new medication/supplements affecting INR    Continues to tolerate warfarin with no reported s/s of bleeding or thromboembolism     Previous INR was Therapeutic    Plan:     Spoke with home care RN regarding INR result and instructed:     Warfarin Dosing Instructions:  Continue current warfarin dose 5 mg daily     Instructed patient to follow up no later than: 2 weeks    Education provided: target INR goal and significance of current INR result    RN verbalizes understanding and agrees to warfarin dosing plan.    Instructed to call the [facility] for any changes, questions or concerns. (#[phone])   ?   Lazara No RN    Subjective/Objective:      Jennifer Galvin, a 74 y.o. female is on warfarin.     Jennifer reports:     Home warfarin dose: verbally confirmed home dose with RN and updated on anticoagulation calendar     Missed doses: No     Medication changes:  No     S/S of bleeding or thromboembolism:  No     New Injury or illness:  No     Changes in diet or alcohol consumption:  No     Upcoming surgery, procedure or cardioversion:  No    Anticoagulation Episode Summary     Current INR goal:   2.0-3.0   TTR:   67.5 % (11.4 mo)   Next INR check:   3/27/2020   INR from last check:   2.20 (3/13/2020)   Weekly max warfarin dose:      Target end date:      INR check location:      Preferred lab:       Send INR reminders to:   ABHISHEK [facility]    Indications    Paroxysmal Atrial Fibrillation [I48.91]           Comments:            Anticoagulation Care Providers     Provider Role Specialty Phone number    Cordell Ceja DO Referring Internal Medicine [phone]

## 2021-06-16 NOTE — TELEPHONE ENCOUNTER
Telephone Encounter by Janie Sofia RN at 11/27/2019  3:43 PM     Author: Janie Sofia RN Service: -- Author Type: Registered Nurse    Filed: 11/27/2019  3:45 PM Encounter Date: 11/27/2019 Status: Signed    : Janie Sofia RN (Registered Nurse)       Message rec'd 11-27-19 @ 1540:    Kathy Sheets RN         Feel patient does not need nuclear perfusion study in advance.  Upon review of my note I suspect that it is intended to say follow-up pending Holter monitor results as opposed to nuclear study results since she had a nuclear test in July.  I addended the note to reflect that.  Do not feel additional cardiac work-up is needed in advance of lumpectomy.   Myron Block MD

## 2021-06-16 NOTE — TELEPHONE ENCOUNTER
Telephone Encounter by Ibrahima Trimble RN at 3/27/2020 12:00 PM     Author: Ibrahima Trimble RN Service: -- Author Type: Registered Nurse    Filed: 3/27/2020 12:05 PM Encounter Date: 3/27/2020 Status: Attested    : Ibrahima Trimble RN (Registered Nurse) Cosigner: Cordell Ceja DO at 3/27/2020 12:11 PM    Attestation signed by Cordell Ceja DO at 3/27/2020 12:11 PM                     ANTICOAGULATION  MANAGEMENT- Home Care/Care Facility Result    Assessment     Today's INR result of 2.5 is Therapeutic (goal INR of 2.0-3.0)        Warfarin taken as previously instructed    No new diet changes affecting INR    No new medication/supplements affecting INR    Continues to tolerate warfarin with no reported s/s of bleeding or thromboembolism     Previous INR was Therapeutic    Plan:     Spoke with nurse Windy discussed INR result and instructed:     Warfarin Dosing Instructions: Continue current warfarin dose 5 mg daily    Next INR to be drawn: one week with home care discharge      Windy verbalizes understanding and agrees to warfarin dosing plan.   ?   Ibrahima Trimble RN    Subjective/Objective:      Jennifer Galvin, a 74 y.o. female is established on warfarin.     Home care/care facility RN's report of Jennifer INR, recent warfarin dosing, diet changes, medication changes, and symptoms is documented below.    Additional findings: none    Anticoagulation Episode Summary     Current INR goal:   2.0-3.0   TTR:   69.0 % (11.4 mo)   Next INR check:   4/3/2020   INR from last check:   2.50 (3/27/2020)   Weekly max warfarin dose:      Target end date:      INR check location:      Preferred lab:      Send INR reminders to:   [facility]    Indications    Paroxysmal Atrial Fibrillation [I48.91]           Comments:            Anticoagulation Care Providers     Provider Role Specialty Phone number    Cordell Ceja DO Referring Internal Medicine [phone]

## 2021-06-16 NOTE — TELEPHONE ENCOUNTER
Telephone Encounter by Kathy Sheets RN at 11/15/2019  7:06 AM     Author: Kathy Sheets RN Service: -- Author Type: Registered Nurse    Filed: 11/15/2019  7:08 AM Encounter Date: 11/14/2019 Status: Signed    : Kathy Sheets RN (Registered Nurse)       Patient scheduled 11/18/19 in [facility] - this was the soonest available appointment.    ------------------------------------  Myron Block MD  You 15 hours ago (3:55 PM)      Given she has chronic atrial fibrillation, she could always be seen by one of our nurse practitioners in the atrial fibrillation clinic as well if she could get in sooner that way. Thanks!

## 2021-06-16 NOTE — TELEPHONE ENCOUNTER
Telephone Encounter by Lazara No RN at 3/10/2020 12:53 PM     Author: Lazara No RN Service: -- Author Type: Registered Nurse    Filed: 3/10/2020  1:03 PM Encounter Date: 3/10/2020 Status: Attested    : Lazara No RN (Registered Nurse) Cosigner: Cordell Ceja DO at 3/10/2020  1:20 PM    Attestation signed by Cordell Ceja DO at 3/10/2020  1:20 PM                     ANTICOAGULATION  MANAGEMENT    Assessment     Today's INR result of 2.04 is Therapeutic (goal INR of 2.0-3.0)        Warfarin taken as previously instructed    No new diet changes affecting INR    No new medication/supplements affecting INR    Continues to tolerate warfarin with no reported s/s of bleeding or thromboembolism     Previous INR was Therapeutic    Plan:     Spoke with Jennifer regarding INR result and instructed:     Warfarin Dosing Instructions:  Continue current warfarin dose 5 mg daily     Instructed patient to follow up no later than: Per previous [facility] notes home care is already instructed to check INR on Thur 3/12. This INR was done today with her other labs at an appointment    Education provided: target INR goal and significance of current INR result    Jennifer verbalizes understanding and agrees to warfarin dosing plan.    Instructed to call the [facility] for any changes, questions or concerns. (#[phone])   ?   Lazara No RN    Subjective/Objective:      Jennifer Galvin, a 74 y.o. female is on warfarin.     Jennifer reports:     Home warfarin dose: verbally confirmed home dose with Jennifer and updated on anticoagulation calendar     Missed doses: No     Medication changes:  No     S/S of bleeding or thromboembolism:  No     New Injury or illness:  No     Changes in diet or alcohol consumption:  No     Upcoming surgery, procedure or cardioversion:  No    Anticoagulation Episode Summary     Current INR goal:   2.0-3.0   TTR:   66.7 % (11.4 mo)   Next INR check:   3/12/2020    INR from last check:   2.04 (3/10/2020)   Weekly max warfarin dose:      Target end date:      INR check location:      Preferred lab:      Send INR reminders to:   [facility]    Indications    Paroxysmal Atrial Fibrillation [I48.91]           Comments:            Anticoagulation Care Providers     Provider Role Specialty Phone number    Cordell Ceja DO Referring Internal Medicine [phone]

## 2021-06-17 NOTE — TELEPHONE ENCOUNTER
Telephone Encounter by Ibrahima Trimble RN at 10/12/2020  4:51 PM     Author: Ibrahima Trimble RN Service: -- Author Type: Registered Nurse    Filed: 10/12/2020  5:22 PM Encounter Date: 10/12/2020 Status: Signed    : Ibrahima Trimble RN (Registered Nurse)       ANTICOAGULATION  MANAGEMENT    Assessment     Today's INR result of 1.6 is Subtherapeutic (goal INR of 2.0-3.0)        Warfarin taken as previously instructed    No new diet changes affecting INR    No new medication/supplements affecting INR    Continues to tolerate warfarin with no reported s/s of bleeding or thromboembolism     Previous INR was Therapeutic    Plan:     Left detailed message for Jennifer regarding INR result and instructed:     Warfarin Dosing Instructions:  Change warfarin dose to 5 mg daily on tue/thu; and 7.5 mg daily rest of week  (11 % change)    Instructed patient to follow up no later than: 2 weeks    Education provided:    Liannaeverbalizes understanding and agrees to warfarin dosing plan.    Instructed to call the [facility] for any changes, questions or concerns. (#[phone])   ?   Ibrahima Trimble RN    Subjective/Objective:      Jennifer VICKEY Kamar, a 75 y.o. female is on warfarin.     Jennifer reports:     Home warfarin dose: as updated on anticoagulation calendar per template     Missed doses: No     Medication changes:  No     S/S of bleeding or thromboembolism:  No     New Injury or illness:  No     Changes in diet or alcohol consumption:  No     Upcoming surgery, procedure or cardioversion:  No    Anticoagulation Episode Summary     Current INR goal:  2.0-3.0   TTR:  61.4 % (11.4 mo)   Next INR check:  10/26/2020   INR from last check:  1.63 (10/12/2020)   Weekly max warfarin dose:     Target end date:     INR check location:     Preferred lab:     Send INR reminders to:  [facility]    Indications    Paroxysmal Atrial Fibrillation [I48.91]           Comments:           Anticoagulation Care Providers     Provider Role  Specialty Phone number    Cordell Ceja DO Referring Internal Medicine [phone]

## 2021-06-17 NOTE — TELEPHONE ENCOUNTER
Telephone Encounter by Ibrahima Trimble RN at 11/24/2020  3:05 PM     Author: Ibrahima Trimble RN Service: -- Author Type: Registered Nurse    Filed: 11/24/2020  3:48 PM Encounter Date: 11/23/2020 Status: Signed    : Ibrahima Trimble RN (Registered Nurse)       ANTICOAGULATION  MANAGEMENT    Assessment     Today's INR result of 1.06 is Subtherapeutic (goal INR of 2.0-3.0)        Missed dose(s) may be affecting INR ran out of med and missed full week, picked up refills yesterday    No new diet changes affecting INR    No new medication/supplements affecting INR    Continues to tolerate warfarin with no reported s/s of bleeding or thromboembolism     Previous INR was Therapeutic     Will need lab done at [facility], order sent to [phone]    Plan:     Spoke on phone with Jennifer regarding INR result and instructed:     Warfarin Dosing Instructions:  Continue current warfarin dose 5 mg daily on tue/thu; and 7.5 mg daily rest of week  (0 % change)    Instructed patient to follow up no later than: one week      Jennifer verbalizes understanding and agrees to warfarin dosing plan.    Instructed to call the [facility] for any changes, questions or concerns. (#[phone])   ?   Ibrahima Trimble RN    Subjective/Objective:      Jennifer Galvin, a 75 y.o. female is on warfarin.     Jennifer reports:     Home warfarin dose: as updated on anticoagulation calendar per template     Missed doses: No     Medication changes:  No     S/S of bleeding or thromboembolism:  No     New Injury or illness:  No     Changes in diet or alcohol consumption:  No     Upcoming surgery, procedure or cardioversion:  No    Anticoagulation Episode Summary     Current INR goal:  2.0-3.0   TTR:  51.4 % (11.4 mo)   Next INR check:  11/30/2020   INR from last check:  1.06 (11/23/2020)   Weekly max warfarin dose:     Target end date:     INR check location:     Preferred lab:     Send INR reminders to:  [facility]    Indications    Paroxysmal Atrial  Fibrillation [I48.91]           Comments:           Anticoagulation Care Providers     Provider Role Specialty Phone number    Cordell Ceja DO Referring Internal Medicine [phone]

## 2021-06-17 NOTE — TELEPHONE ENCOUNTER
Telephone Encounter by Ayaan Enriquez at 7/6/2020 11:22 AM     Author: Ayaan Enriquez Service: -- Author Type: Financial Resource Guide    Filed: 7/6/2020 11:30 AM Encounter Date: 7/6/2020 Status: Signed    : Ayaan Enriquez (Financial Resource Guide)       PA approved for Mupirocin.     Medication Prior Authorization    Start Date:  7/6/20  Status:  Approved  Approval Date:  7/6/20  Authorization Effective Date:  7/6/20  Authorization Expiration Date:  12/31/20  Type:  New  Urgency:  Urgent  Med Type:  Non-Specialty  Insurance Info:  [employer] - Phone [phone] Fax [phone]  Method:  ePA  Reference #:   (Key: [id number] - [id number]  Pharmacy Notified:  Yes  Patient Notified:  Yes  ___________________________________________________________________________________________________________________:

## 2021-06-17 NOTE — TELEPHONE ENCOUNTER
Telephone Encounter by Ibrahima Trimble RN at 10/26/2020  6:04 PM     Author: Ibrahima Trimble RN Service: -- Author Type: Registered Nurse    Filed: 11/24/2020  3:28 PM Encounter Date: 10/26/2020 Status: Attested Addendum    : Ibrahima Trimble RN (Registered Nurse)    Related Notes: Original Note by Ibrahima Trimble RN (Registered Nurse) filed at 10/26/2020  6:07 PM    Cosigner: Derek Pressley MD at 11/25/2020  7:47 AM    Attestation signed by Derek Pressley MD at 11/25/2020  7:47 AM    Derek Pressley MD                Anticoagulation Annual Referral Renewal Review    Jennifer Galvin's chart reviewed for annual renewal of referral to anticoagulation monitoring.        Criteria for anticoagulation nurse and/or pharmacist renewal met   Warfarin indication: Atrial Fibrillation Yes, per indication   Current with INR monitoring/compliant Yes Yes   Date of last office visit 3/27/20    Establishing with Dr Pressley 12/17   Yes, had office visit within last year   Time in Therapeutic Range (TTR) 65.7 % Yes, TTR > 60%       Jennifer Galvin met all criteria for anticoagulation management program initiated renewal.  New INR standing orders and anticoagulation referral renewal placed.      Ibrahima Trimble RN  3:20 PM

## 2021-06-17 NOTE — TELEPHONE ENCOUNTER
Telephone Encounter by Jenny Mir RN at 4/3/2020 12:04 PM     Author: Jenny Mir RN Service: -- Author Type: Registered Nurse    Filed: 4/3/2020 12:22 PM Encounter Date: 4/3/2020 Status: Attested Addendum    : Jenny Mir RN (Registered Nurse)    Related Notes: Original Note by Jenny Mir RN (Registered Nurse) filed at 4/3/2020 12:16 PM    Cosigner: Cordell Ceja DO at 4/3/2020 12:26 PM    Attestation signed by Cordell Ceja DO at 4/3/2020 12:26 PM                     ANTICOAGULATION  MANAGEMENT- Home Care/Care Facility Result    Assessment     Today's INR result of 3.0 is Therapeutic (goal INR of 2.0-3.0)        Warfarin taken as previously instructed    No new diet changes affecting INR    No new medication/supplements affecting INR    Continues to tolerate warfarin with no reported s/s of bleeding or thromboembolism     Previous INR was Therapeutic     Home Care  is discharging the patient today. Stated that the patient will be staying at her sister's place in [place].     Plan:     Spoke with Home Care Nurse Windy discussed INR result and instructed:     Warfarin Dosing Instructions: Continue current warfarin dose 5 mg daily   (0 % change)    Next INR to be drawn: 2 weeks - instructed to call clinic for appointment.    Given information for [address] [phone] - Windy will relay information to the patient    Education provided: importance of therapeutic range    Windy verbalizes understanding and agrees to warfarin dosing plan.   ?   Jenny Mir RN    Subjective/Objective:      Jennifer Galvin, a 74 y.o. female is established on warfarin.     Home care/care facility RN's report of Jennifer INR, recent warfarin dosing, diet changes, medication changes, and symptoms is documented below.    Additional findings: see above    Anticoagulation Episode Summary     Current INR goal:   2.0-3.0   TTR:   69.0 % (11.4 mo)   Next  INR check:   4/17/2020   INR from last check:   3.00 (4/3/2020)   Weekly max warfarin dose:      Target end date:      INR check location:      Preferred lab:      [facility]    Indications    Paroxysmal Atrial Fibrillation [I48.91]           Comments:            Anticoagulation Care Providers     Provider Role Specialty Phone number    Crodell Ceja DO Referring Internal Medicine [phone]

## 2021-06-17 NOTE — PATIENT INSTRUCTIONS - HE
Patient Instructions by Roly Villalba MD at 8/21/2019 12:45 PM     Author: Roly Villalba MD Service: -- Author Type: Physician    Filed: 8/21/2019  1:52 PM Encounter Date: 8/21/2019 Status: Signed    : Roly Villalba MD (Physician)       Patient Education   Patient Education   Patient Education     Vascular Access Port Implantation   Port implantation is surgery to place (implant) a port under the skin. For vascular access, it is placed into a vein. The port allows medicines or nutrition to be sent right into your bloodstream. Blood can also be taken or given through the port. During the procedure, a long, thin tube called a catheter is threaded into one of your large veins. The tube is then attached to the port. This usually sits under the skin of your chest and causes a small bump. To use the port, a special needle is passed through your skin and into the port. The needle can stay in your skin for up to 7 days, if needed. A port can stay in place for weeks or months or longer.    Why is a vascular access port needed?  A vascular access port may allow healthcare providers to give you:    Chemotherapy or other cancer-fighting drugs    IV treatments, such as antibiotics or nutrition    Hemodialysis (for kidney failure)  The port may also be used to draw blood.  Before the procedure  Follow any instructions you are given on how to prepare.  Tell your provider about any medicines you are taking. This includes:    All prescription medicines    Over-the-counter medicines such as aspirin or ibuprofen    Herbs, vitamins, and other supplements  Also be sure your provider knows:    If you are pregnant or think you may be pregnant    If you are allergic to any medicines or substances, especially local anesthetics or iodine    Your full medical history, including why you will need the port    If you plan on doing any contact sports  During the procedure    Before the procedure, an IV may be put into a vein in  your arm or hand. This gives you fluids and medicines. You may be given medicine through the IV to help you relax during the procedure. This is called sedation. But some surgeons place ports using general anesthesia.    The chest is used most often for the port. In some cases, your belly (abdomen) or arm will be used instead.    The skin over the insertion area is numbed with local anesthetic.    Ultrasound or X-rays are used to help the healthcare provider guide the catheter into the proper location during the procedure.    A cut (incision) is made in the skin where the port will be placed. A small pocket for the port is formed under the skin.    A second small incision is made in the skin near the first incision. A tunnel under the skin is created. The catheter is put through the tunnel and into the blood vessel.    The skin is closed over the port. It is held shut with stitches (sutures) or surgical glue or tape. The second small incision is also closed.    A chest X-ray may be done to make sure the port is placed properly.  After the procedure  You may be taken to a recovery room where youll recover from the sedation. Nurses will check on you as you rest. If you have pain, nurses can give you medicine. If you are not staying in the hospital overnight, you will be sent home a few hours after the procedure is done. A healthcare provider will tell you when you can go home. An adult family member or friend will need to drive you home.  Recovering at home    Take pain medicine as directed by your healthcare provider.    Take it easy for 24 hours after the procedure. Avoid physical activity and heavy lifting until your healthcare provider says its OK.    Keep the port clean and dry. Ask when you can shower again. You will need to keep the port dry by covering it when you shower.    Care for the insertion site as you are directed.    Dont swim, bathe, or do other activities that cause water to cover the insertion  site.    To keep the port from getting blocked with blood clots, flush it as often as directed. You should be shown the proper way to flush the port before you go home. It is important to follow these directions.     Risks and possible complications of implantation    Bleeding    Infection of the insertion site    Damage to a blood vessel    Nerve injury or irritation    Collapsed lung (for chest port placements)    Skin breakdown over the port  Risks and possible complications of having a port    Blocked  port or catheter    Leakage or breakage of the port or catheter    The port moves out of position    Blood clot    Skin or bloodstream infection    Skin breakdown over the port      When to seek medical care  Call your healthcare provider right away if you have any of the following:    A fever of 100.4 F (38.0 C) or higher    You can't access or use the port properly    You can't flush the port or get a blood return    The skin near the port is red, warm, swollen, or broken    You have shoulder pain on the side where the port is located    You feel a heart flutter or racing heart     Swollen arm, if the port is placed in your arm   Date Last Reviewed: 7/1/2016 2000-2017 [employer]. [address] [place] [address]. All rights reserved. This information is not intended as a substitute for professional medical care. Always follow your healthcare professional's instructions.           Trastuzumab Solution for injection  What is this medicine?  TRASTUZUMAB (tras TOO zoo mab) is a monoclonal antibody. It targets a protein called HER2. This protein is found in some stomach and breast cancers. This medicine can stop cancer cell growth. This medicine may be used with other cancer treatments.  This medicine may be used for other purposes; ask your health care provider or pharmacist if you have questions.  What should I tell my health care provider before I take this medicine?  They need to know  if you have any of these conditions:    heart disease    heart failure    infection (especially a virus infection such as chickenpox, cold sores, or herpes)    lung or breathing disease, like asthma    recent or ongoing radiation therapy    an unusual or allergic reaction to trastuzumab, benzyl alcohol, or other medications, foods, dyes, or preservatives    pregnant or trying to get pregnant    breast-feeding  How should I use this medicine?  This drug is given as an infusion into a vein. It is administered in a hospital or clinic by a specially trained health care professional.  Talk to your pediatrician regarding the use of this medicine in children. This medicine is not approved for use in children.  Overdosage: If you think you have taken too much of this medicine contact a poison control center or emergency room at once.  NOTE: This medicine is only for you. Do not share this medicine with others.  What if I miss a dose?  It is important not to miss a dose. Call your doctor or health care professional if you are unable to keep an appointment.  What may interact with this medicine?    cyclophosphamide    doxorubicin    warfarin  This list may not describe all possible interactions. Give your health care provider a list of all the medicines, herbs, non-prescription drugs, or dietary supplements you use. Also tell them if you smoke, drink alcohol, or use illegal drugs. Some items may interact with your medicine.  What should I watch for while using this medicine?  Visit your doctor for checks on your progress. Report any side effects. Continue your course of treatment even though you feel ill unless your doctor tells you to stop.  Call your doctor or health care professional for advice if you get a fever, chills or sore throat, or other symptoms of a cold or flu. Do not treat yourself. Try to avoid being around people who are sick.  You may experience fever, chills and shaking during your first infusion. These  effects are usually mild and can be treated with other medicines. Report any side effects during the infusion to your health care professional. Fever and chills usually do not happen with later infusions.  What side effects may I notice from receiving this medicine?  Side effects that you should report to your doctor or other health care professional as soon as possible:    breathing difficulties    chest pain or palpitations    cough    dizziness or fainting    fever or chills, sore throat    skin rash, itching or hives    swelling of the legs or ankles    unusually weak or tired  Side effects that usually do not require medical attention (report to your doctor or other health care professional if they continue or are bothersome):    loss of appetite    headache    muscle aches    nausea  This list may not describe all possible side effects. Call your doctor for medical advice about side effects. You may report side effects to FDA at 7-910-FDA-1440.  Where should I keep my medicine?  This drug is given in a hospital or clinic and will not be stored at home.  NOTE:This sheet is a summary. It may not cover all possible information. If you have questions about this medicine, talk to your doctor, pharmacist, or health care provider. Copyright  2015 [employer] Standard           Paclitaxel Solution for injection  What is this medicine?  PACLITAXEL (TALISHA li TAX el) is a chemotherapy drug. It targets fast dividing cells, like cancer cells, and causes these cells to die. This medicine is used to treat ovarian cancer, breast cancer, and other cancers.  This medicine may be used for other purposes; ask your health care provider or pharmacist if you have questions.  What should I tell my health care provider before I take this medicine?  They need to know if you have any of these conditions:    blood disorders    irregular heartbeat    infection (especially a virus infection such as chickenpox, cold sores, or herpes)    liver  disease    previous or ongoing radiation therapy    an unusual or allergic reaction to paclitaxel, alcohol, polyoxyethylated castor oil, other chemotherapy agents, other medicines, foods, dyes, or preservatives    pregnant or trying to get pregnant    breast-feeding  How should I use this medicine?  This drug is given as an infusion into a vein. It is administered in a hospital or clinic by a specially trained health care professional.  Talk to your pediatrician regarding the use of this medicine in children. Special care may be needed.  Overdosage: If you think you have taken too much of this medicine contact a poison control center or emergency room at once.  NOTE: This medicine is only for you. Do not share this medicine with others.  What if I miss a dose?  It is important not to miss your dose. Call your doctor or health care professional if you are unable to keep an appointment.  What may interact with this medicine?  Do not take this medicine with any of the following medications:    disulfiram    metronidazole  This medicine may also interact with the following medications:    cyclosporine    diazepam    ketoconazole    medicines to increase blood counts like filgrastim, pegfilgrastim, sargramostim    other chemotherapy drugs like cisplatin, doxorubicin, epirubicin, etoposide, teniposide, vincristine    quinidine    testosterone    vaccines    verapamil  Talk to your doctor or health care professional before taking any of these medicines:    acetaminophen    aspirin    ibuprofen    ketoprofen    naproxen  This list may not describe all possible interactions. Give your health care provider a list of all the medicines, herbs, non-prescription drugs, or dietary supplements you use. Also tell them if you smoke, drink alcohol, or use illegal drugs. Some items may interact with your medicine.  What should I watch for while using this medicine?  Your condition will be monitored carefully while you are receiving  this medicine. You will need important blood work done while you are taking this medicine.  This drug may make you feel generally unwell. This is not uncommon, as chemotherapy can affect healthy cells as well as cancer cells. Report any side effects. Continue your course of treatment even though you feel ill unless your doctor tells you to stop.  In some cases, you may be given additional medicines to help with side effects. Follow all directions for their use.  Call your doctor or health care professional for advice if you get a fever, chills or sore throat, or other symptoms of a cold or flu. Do not treat yourself. This drug decreases your body's ability to fight infections. Try to avoid being around people who are sick.  This medicine may increase your risk to bruise or bleed. Call your doctor or health care professional if you notice any unusual bleeding.  Be careful brushing and flossing your teeth or using a toothpick because you may get an infection or bleed more easily. If you have any dental work done, tell your dentist you are receiving this medicine.  Avoid taking products that contain aspirin, acetaminophen, ibuprofen, naproxen, or ketoprofen unless instructed by your doctor. These medicines may hide a fever.  Do not become pregnant while taking this medicine. Women should inform their doctor if they wish to become pregnant or think they might be pregnant. There is a potential for serious side effects to an unborn child. Talk to your health care professional or pharmacist for more information. Do not breast-feed an infant while taking this medicine.  Men are advised not to father a child while receiving this medicine.  What side effects may I notice from receiving this medicine?  Side effects that you should report to your doctor or health care professional as soon as possible:    allergic reactions like skin rash, itching or hives, swelling of the face, lips, or tongue    low blood counts - This drug  may decrease the number of white blood cells, red blood cells and platelets. You may be at increased risk for infections and bleeding.    signs of infection - fever or chills, cough, sore throat, pain or difficulty passing urine    signs of decreased platelets or bleeding - bruising, pinpoint red spots on the skin, black, tarry stools, nosebleeds    signs of decreased red blood cells - unusually weak or tired, fainting spells, lightheadedness    breathing problems    chest pain    high or low blood pressure    mouth sores    nausea and vomiting    pain, swelling, redness or irritation at the injection site    pain, tingling, numbness in the hands or feet    slow or irregular heartbeat    swelling of the ankle, feet, hands  Side effects that usually do not require medical attention (report to your doctor or health care professional if they continue or are bothersome):    bone pain    complete hair loss including hair on your head, underarms, pubic hair, eyebrows, and eyelashes    changes in the color of fingernails    diarrhea    loosening of the fingernails    loss of appetite    muscle or joint pain    red flush to skin    sweating  This list may not describe all possible side effects. Call your doctor for medical advice about side effects. You may report side effects to FDA at 1-093-FDA-1927.  Where should I keep my medicine?  This drug is given in a hospital or clinic and will not be stored at home.  NOTE:This sheet is a summary. It may not cover all possible information. If you have questions about this medicine, talk to your doctor, pharmacist, or health care provider. Copyright  2015 Gold Standard

## 2021-06-18 NOTE — PROGRESS NOTES
[facility] Clinic Note    Jennifer Galvin   73 y.o. female    Date of Visit: 6/18/2018  Chief Complaint   Patient presents with     Follow-up     labs        ASSESSMENT/PLAN  1. Type 2 diabetes mellitus with stage 1 chronic kidney disease, without long-term current use of insulin  Glycosylated Hemoglobin A1c     ---------------------------------------------    Hemoglobin A1c remains 5.5.  Plan to stop metformin.  Encouraged more exercise.  Weight loss would be ideal, though we did not focus on this today.    Return in about 6 months (around 12/18/2018) for Recheck.      SUBJECTIVE  Jennifer Galvin is a 73-year-old woman here to follow-up on diabetes.    At the last visit in December, her A1c was 5.3.  She did not have any significant microalbuminuria.  Blood pressure was well managed at that time as well.    She has been tolerating a statin, aspirin, and the 500 mg of metformin she takes once a day.    She is a retired nurse from [employer].  She denies any chest pain or trouble breathing.  She has not been getting much exercise at all, but her boyfriend needs to do pool therapy at [facility] in [place], so she has to go on with him.  She plans to exercise hopefully that way.    In March, she saw the retina specialist and she did not have any retinopathy.  She has a lot of different doctors.    She has history of atrial fibrillation as well as hypertension, but not coronary disease or heart failure.  Chads 2 score was calculated at this visit (4 = she is on warfarin)      Medications, allergies, and problem list were reviewed and updated    Patient Active Problem List   Diagnosis     Macular Degeneration     Vitamin D Deficiency     Tremor     Joint Pain, Localized In The Knee     Obstructive Sleep Apnea     Hyperlipidemia     Major Depression, Single Episode In Remission     Hypertension     Lymphedema     Paroxysmal Atrial Fibrillation     Osteopenia     Urge And Stress Incontinence      Adenocarcinoma In Situ Of The Uterus     Venous stasis     Acquired lymphedema of leg     History of uterine cancer     Diabetes mellitus, type 2 (H)     Venous hypertension of lower extremity, bilateral     BMI 50.0-59.9, adult (H)     Aspirin contraindicated     Elective surgery     Past Medical History:   Diagnosis Date     (Lower) Leg Localized Swelling     Created by Conversion      Adenocarcinoma In Situ Of The Uterus     Created by Conversion      Backache     Created by Conversion [facility] Annotation: Feb 29 2012 12:14PM - Opal Helm: Referred to  [facility] 9/11, given TENS unit.      Benign Polyps Of The Large Intestine     Created by Conversion      Coronary artery disease      Diabetes mellitus (H)      Fatigue     Created by Conversion      Hyperglycemia     Created by Conversion      Hyperlipidemia     Created by Conversion      Hypertension     Created by Conversion      Joint Pain, Localized In The Knee     Created by Conversion      Lymphedema     Created by Conversion      Macular Degeneration     Created by Conversion [facility] Annotation: Apr 5 2011 12:22PM - Tien Guzman: Followed by  Ophthalmologist, Dr. Avilez.      Major Depression, Single Episode In Remission     Created by Conversion      Obesity     Created by Conversion      Obstructive Sleep Apnea     CPAP     Osteopenia     Created by Conversion      Paroxysmal Atrial Fibrillation     Created by Conversion [facility] Annotation: Nov 28 2012 10:36PM - Shruthi Dhaliwal: Found incidential  on exam on May 7th, [id number] score of 1 for HTNsymptomatic and hospitalized  x2 in July, 2012.CV X 2 in 2012Chronic Sotalol Rx      Postmenopausal bleeding     Created by Conversion      Tachycardia     Created by Conversion      Tremor     Created by Conversion      Urge And Stress Incontinence     Created by Conversion      Urinary Frequency More Than Twice At Night (Nocturia)     Created by Conversion      Urine Tests Nonspecific Abnormal  Findings     Created by Conversion      Vitamin D Deficiency     Created by Conversion      Current Outpatient Prescriptions   Medication Sig Dispense Refill     calcium-vitamin D (CALCIUM-VITAMIN D) 500 mg(1,250mg) -200 unit per tablet Take 1 tablet by mouth 2 (two) times a day with meals.       cholecalciferol, vitamin D3, 1,000 unit tablet Take 2,000 Units by mouth daily.        desmopressin (DDAVP) 0.1 MG tablet Take 0.1 mg by mouth bedtime.       furosemide (LASIX) 40 MG tablet TAKE 1 TABLET BY MOUTH TWICE DAILY 180 tablet 3     glucosamine-chondroitin 500-400 mg tablet Take 1 tablet by mouth 2 (two) times a day.        metFORMIN (GLUCOPHAGE) 500 MG tablet TAKE 1 TABLET BY MOUTH EVERY DAY 90 tablet 0     metoprolol tartrate (LOPRESSOR) 25 MG tablet TAKE 1 TABLET BY MOUTH EVERY MORNING 90 tablet 0     OMEGA-3/DHA/EPA/FISH OIL (FISH OIL-OMEGA-3 FATTY ACIDS) 300-1,000 mg capsule Take 2 g by mouth 2 times a day at 6:00 am and 4:00 pm.       potassium chloride (K-DUR,KLOR-CON) 10 MEQ tablet TAKE TWO TABLETS BY MOUTH TWICE DAILY 360 tablet 1     simvastatin (ZOCOR) 40 MG tablet TAKE 1 TABLET BY MOUTH DAILY WITH DINNER. 90 tablet 1     sotalol (BETAPACE) 80 MG tablet TAKE 1 TABLET BY MOUTH TWICE DAILY 180 tablet 1     VESICARE 10 mg tablet Take 1 tablet by mouth daily.  12     vitamin A-vitamin C-vit E-min (OCUVITE) Tab tablet Take 1 tablet by mouth 2 (two) times a day.       warfarin (COUMADIN) 5 MG tablet TAKE ONE-HALF TO ONE TABLET BY MOUTH DAILY AS DIRECTED. ADJUST DOSE PER INR RESULT 90 tablet 1     No current facility-administered medications for this visit.      Allergies   Allergen Reactions     Aspirin      Pt currently on WArfarin     Penicillins      Boils         EXAM  Vitals:    06/18/18 1336   BP: 116/74   Patient Site: Left Arm   Patient Position: Sitting   Cuff Size: Adult Large   Pulse: 62   SpO2: 96%   Weight: (!) 300 lb 14.4 oz (136.5 kg)     General: Alert, pleasant, no distress  Heart: Regular  rate and rhythm, no murmurs  Lungs: Clear to auscultation bilaterally    Results reviewed:     Lab Results   Component Value Date    HGBA1C 5.3 12/13/2017    HGBA1C 6.2 (H) 10/25/2016    HGBA1C 6.2 (H) 06/15/2016         Data points: 1    Cordell Ceja DO  Internal Medicine  [facility]

## 2021-06-19 NOTE — LETTER
Letter by Cordell Ceja DO at      Author: Cordell Ceja DO Service: -- Author Type: --    Filed:  Encounter Date: 9/12/2019 Status: (Other)         Jennifer Galvin  [address] [place] [address]             September 12, 2019         Dear Ms. Galvin,    Below are the results from your recent visit:    Resulted Orders   Glycosylated Hemoglobin A1C   Result Value Ref Range    Hemoglobin A1c 6.5 (H) 4.2 - 6.1 %       The A1c looks good-- diabetes well controlled.  I hope the chemotherapy treatments go smoothly.    Please call with questions or contact us using [employer].    Sincerely,        Electronically signed by Cordell Ceja DO

## 2021-06-19 NOTE — LETTER
Letter by Sara Mayes CNP at      Author: Sara Mayes CNP Service: -- Author Type: --    Filed:  Encounter Date: 12/9/2019 Status: Signed         Patient: Jennifer Galvin   MR Number: [id number]   YOB: 1945   Date of Visit: 12/9/2019     [facility]    Facility:   Formerly named [facility] [[id number]]   Code Status: FULL CODE      CHIEF COMPLAINT/REASON FOR VISIT:  Chief Complaint   Patient presents with   ? Review Of Multiple Medical Conditions       HISTORY:      HPI: Jennifer is a 74 y.o. female undergoing physical and occupational therapy at [facility] transitional care unit. , she is with history of severe obesity, stage Ib left breast cancer, I normal, vitamin D deficiency, urge stress incontinence, tremor, postmenopausal bleeding and uterine adenocarcinoma in situ status post JEROME/BSO, atrial fibrillation, osteopenia, FLAKITA, macular degeneration, chronic bilateral leg edema, hypertension, diabetes mellitus, CAD, chronic back and bilateral knee pain who presented to [facility] for scheduled left breast lumpectomy And sentinel lymph node biopsy.  Per the records she became hypoxic in the PACU and noted to be persistently hypoxic to 4 L of oxygen which is a change from her baseline.  She does have FLAKITA but does not use her CPAP machine.    Today she is seen for first routine visit to review multiple medical issues and review of chart.  She denied any chest pain or shortness of breath.  She also denied incisional pain.  She is with a left breast incision that is Steri-Stripped clean dry and intact with a large amount of bruising around it.  Labs drawn today and her hemoglobin was 11.5 BMP within normal limits.  Her INR was subtherapeutic at 1.62 and her Coumadin was adjusted.  She was noted to have a Petersen in her right upper chest that is not accessed at this time.  She is using oxygen 1 to 2 L PRN to keep sats greater than 90%.  When I met with her  today she had the oxygen off and her sats were checked and she was 92% on room air however she did appear a little short of breath and put the oxygen back on.  Her weight was reviewed she is down 5 pounds.  She is also noted to have a right great toenail that is completely lifted up and hanging on by just the cuticle along the base.  She reports she bumped it on her wheelchair just recently.  New wound care orders were for the and I will have her seen by podiatry.  I have ordered for the in-house podiatrist to see her if he is available this week otherwise she will go out for podiatry consult.  Her Pepcid was changed to omeprazole and nystatin powder was ordered twice daily for a rash under her breast.      Past Medical History:   Diagnosis Date   ? (Lower) Leg Localized Swelling     Created by Conversion    ? Adenocarcinoma In Situ Of The Uterus     Created by Conversion    ? Backache     Created by Conversion [facility] Annotation: Feb 29 2012 12:14PM - Opal Helm: Referred to  [facility] 9/11, given TENS unit.    ? Benign Polyps Of The Large Intestine     Created by Conversion    ? Breast cancer (H)    ? Cancer (H)     uterine   ? Chronic kidney disease     stage 1 per H&P 11/22/2019   ? Coronary artery disease    ? Diabetes mellitus (H)     type 2   ? Fatigue     Created by Conversion    ? Hyperglycemia     Created by Conversion    ? Hyperlipidemia     Created by Conversion    ? Hypertension     Created by Conversion    ? Joint Pain, Localized In The Knee     Created by Conversion    ? Lesion of mediastinum    ? Lymphedema     Created by Conversion    ? Macular Degeneration     Created by Conversion [facility] Annotation: Apr 5 2011 12:22PM - Tien Guzman: Followed by  Ophthalmologist, Dr. Avilez.    ? Major Depression, Single Episode In Remission     Created by Conversion    ? Obesity     Created by Conversion    ? Obstructive Sleep Apnea     CPAP   ? Osteopenia     Created by Conversion    ?  Paroxysmal Atrial Fibrillation     Created by Conversion [facility] Annotation: 2012 10:36PM - Shruthi Dhaliwal: Found incidential  on exam on May 7th, [id number] score of 1 for HTNsymptomatic and hospitalized  x2 in .CV X 2 in 2012Chronic Sotalol Rx    ? Postmenopausal bleeding     Created by Conversion    ? Skin cancer    ? Tachycardia     Created by Conversion    ? Tremor     Created by Conversion    ? Urge And Stress Incontinence     Created by Conversion    ? Urinary Frequency More Than Twice At Night (Nocturia)     Created by Conversion    ? Urine Tests Nonspecific Abnormal Findings     Created by Conversion    ? Vitamin D Deficiency     Created by Conversion              Family History   Problem Relation Age of Onset   ? Hypertension Father    ? Pneumonia Father    ? Esophageal cancer Brother    ? Cancer Brother    ? Stroke Mother    ? Alzheimer's disease Brother    ? Cancer Brother    ? Prostate cancer Brother    ? Cancer Nephew    ? Colon cancer Nephew    ? Cancer Paternal cousin      Social History     Socioeconomic History   ? Marital status: Single     Spouse name: Not on file   ? Number of children: 0   ? Years of education: Not on file   ? Highest education level: Not on file   Occupational History   ? Occupation: Retired RN     Employer: [employer]   Social Needs   ? Financial resource strain: Not on file   ? Food insecurity:     Worry: Not on file     Inability: Not on file   ? Transportation needs:     Medical: Not on file     Non-medical: Not on file   Tobacco Use   ? Smoking status: Former Smoker     Packs/day: 3.00     Years: 40.00     Pack years: 120.00     Types: Cigarettes     Last attempt to quit: 2005     Years since quittin.9   ? Smokeless tobacco: Never Used   Substance and Sexual Activity   ? Alcohol use: Not Currently   ? Drug use: No   ? Sexual activity: Never     Partners: Male     Birth control/protection: Surgical, Post-menopausal   Lifestyle   ?  Physical activity:     Days per week: Not on file     Minutes per session: Not on file   ? Stress: Not on file   Relationships   ? Social connections:     Talks on phone: Not on file     Gets together: Not on file     Attends Faith service: Not on file     Active member of club or organization: Not on file     Attends meetings of clubs or organizations: Not on file     Relationship status: Not on file   ? Intimate partner violence:     Fear of current or ex partner: Not on file     Emotionally abused: Not on file     Physically abused: Not on file     Forced sexual activity: Not on file   Other Topics Concern   ? Not on file   Social History Narrative    Lives alone single family home that she owns, no children and is retired.         Review of Systems   Constitutional: Positive for activity change. Negative for appetite change, fatigue and fever.   HENT: Negative for congestion.    Respiratory: Negative for cough, shortness of breath and wheezing.    Cardiovascular: Positive for leg swelling. Negative for chest pain.        Lymphedema   Gastrointestinal: Negative for abdominal distention, abdominal pain, constipation, diarrhea and nausea.   Genitourinary: Negative for dysuria.   Musculoskeletal: Positive for arthralgias. Negative for back pain.   Skin: Positive for wound. Negative for color change.        Bruising around left breast surgical incision  Left surgical breast incision that is Dermabond with Steri-Strips clean dry and intact.   Neurological: Negative for dizziness.   Psychiatric/Behavioral: Negative for agitation, behavioral problems and confusion.       Vitals:    12/09/19 0929   BP: (!) 122/94   Pulse: (!) 107   Resp: 18   Temp: 98  F (36.7  C)   SpO2: 92%   Weight: (!) 332 lb (150.6 kg)       Physical Exam  Constitutional:       Appearance: She is well-developed.      Comments: Pleasant woman in no acute distress   HENT:      Head: Normocephalic.   Eyes:      Conjunctiva/sclera: Conjunctivae  normal.   Neck:      Musculoskeletal: Normal range of motion.   Cardiovascular:      Rate and Rhythm: Normal rate and regular rhythm.      Heart sounds: Normal heart sounds. No murmur.   Pulmonary:      Effort: No respiratory distress.      Breath sounds: Normal breath sounds. No wheezing or rales.   Abdominal:      General: Bowel sounds are normal. There is no distension.      Palpations: Abdomen is soft.      Tenderness: There is no abdominal tenderness.   Musculoskeletal: Normal range of motion.      Right lower leg: Edema present.      Left lower leg: Edema present.      Comments: Patient with a right great toenail that has almost completely lifted off and hanging on by the cuticle.   Skin:     General: Skin is warm.      Comments: Surgical incision left breast Dermabond with Steri-Strips and large amount of bruising around the incision.   Neurological:      Mental Status: She is alert and oriented to person, place, and time.   Psychiatric:         Behavior: Behavior normal.           LABS:   Recent Results (from the past 240 hour(s))   POCT Glucose   Result Value Ref Range    Glucose 143 (H) 70 - 139 mg/dL   Surgical Pathology Exam   Result Value Ref Range    Case Report       Surgical Pathology                                Case: [id number]                                    Authorizing Provider:  Sara Ferrera MD        Collected:           12/02/2019 1058              Ordering Location:     [facility] OR     Received:            12/02/2019 1126              Pathologist:           Lucrecia Nelson MD                                                         Specimens:   A) - Breast, Lumpectomy, Left, long stitch lateral; short stitch superior; double                   stitch deep                                                                                         B) - Lymph Node(s) [place], Breast, Left                                                  Final Diagnosis       A) BREAST, LEFT, WIRE  LOCALIZATION AND LUMPECTOMY:       1) 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 (DCIS):           a) NUCLEAR rdGrdRrdArdDrdErd:rd rd3rd b) PATTERNS: SOLID AND CRIBRIFORM            c) SIZE: 21 x 17 x 15 MM           d) MARGINS: NEGATIVE; NEAREST MARGIN - 5 MM           e) REPEAT HORMONE RECEPTOR ANALYSIS              1) ESTROGEN RECEPTOR:  STRONGLY POSITIVE (>95%; 3+)              2) PROGESTERONE RECEPTOR:  POSITIVE (80%; 3+)       2) RESIDUAL INVASIVE CARCINOMA NOT IDENTIFIED        3) TUMOR ARISING IN FIBROADENOMA AND INVOLVING ADJACENT SCLEROSING ADENOSIS       4) PRIOR BIOPSY SITE PRESENT        4) PROLIFERATIVE FIBROCYSTIC CHANGES WITH STROMAL FIBROSIS, USUAL DUCTAL            HYPERPLASIA AND SCLEROSING ADENOSIS          PATHOLOGIC STAGE: ypTis (DCIS) (sn) pN0 (i+)         See staging parameters below     B) SENTINEL LYMPH NODE, LEFT BREAST, SENTINEL LYMPH NODE BIOPSY:        - ISOLATED CYTOKERATIN(+) TUMOR CELLS PRESENT IN ONE OF THREE LYMPH NODES (1 OF 3)    MCSS    Comment       See addendum to previous biopsy ([id number]) for additional staging information (pretreatment).    Immunohistochemical controls stain appropriately. Calponin and p63 staining of multiple sections confirms the presence of in-situ carcinoma. Definitive foci of residual invasive carcinoma are not identified.    Synoptic Report       INVASIVE CARCINOMA OF THE BREAST: Resection  (Breast.Invasive - All Specimens)      8th Edition - Protocol posted: 2/27/2019      CLINICAL      Clinical History:    Prior presurgical (neoadjuvant) therapy for this diagnosis of invasive carcinoma       SPECIMEN      Procedure:    Excision (less than total mastectomy)       Specimen Laterality:    Left       TUMOR      Clock Position of Tumor Site:    12 o'clock       Histologic Type:    No residual invasive carcinoma       Histologic Grade (René Histologic Score):    No residual invasive carcinoma       Tumor Size:    No residual invasive carcinoma       Ductal Carcinoma In Situ  (DCIS):    Present         :    Positive for extensive intraductal component (EIC)         Size (Extent) of DCIS:    Estimated size (extent) of DCIS is at least (Millimeters): 21 mm          Additional Dimension (Millimeters):    17 mm          Additional Dimension (Millimeters):    15 mm        Architectural Patterns:    Cribriform         Architectural Patterns:    Solid         Nuclear Grade:    Grade II (intermediate)         Necrosis:    Not identified       Tumor Extent:          Lymphovascular Invasion:    Not identified       Dermal Lymphovascular Invasion:    No skin present       Microcalcifications:    Not identified       Treatment Effect in the Breast:    No definite response to presurgical therapy in the invasive carcinoma       Treatment Effect in the Lymph Nodes:    No definite response to presurgical therapy in metastatic carcinoma       MARGINS      DCIS Margins:    Uninvolved by DCIS         Distance from Closest Margin (Millimeters):    5 mm        Closest Margin:    Superior         Distance from Other Margins:              Anterior Margin (Millimeters):    14 mm          Posterior Margin (Millimeters):    14 mm          Superior Margin (Millimeters):    5 mm          Inferior Margin (Millimeters):    21 mm          Medial Margin (Millimeters):    10 mm          Lateral Margin (Millimeters):    14 mm      LYMPH NODES      Regional Lymph Nodes:    Involved by tumor cells         Number of Lymph Nodes with Macrometastases (> 2 mm):    0         Number of Lymph Nodes with Micrometastases (> 0.2 mm to 2 mm and / or > 200 cells):    0         Number of Lymph Nodes with Isolated Tumor Cells (<= 0.2 mm or <= 200 cells):    1         Extranodal Extension:    Not identified         Number of Lymph Nodes Examined:    3         Number of [place] Nodes Examined:    3       PATHOLOGIC STAGE CLASSIFICATION (pTNM, AJCC 8th Edition)      TNM Descriptors:    y (post-treatment)       Primary Tumor (pT):    pTis  (DCIS)       Regional Lymph Nodes (pN):            Modifier:    (sn): Only sentinel node(s) evaluated.         Category (pN):    pN0 (i+)       ADDITIONAL FINDINGS      Additional Pathologic Findings:    DCIS arising in fibroadenoma and involving areas of sclerosing adenosis       Microscopic Description       Microscopic examination performed, substantiating the above diagnosis.    Clinical Information       Pre-op Diagnosis:  Malignant neoplasm of upper-outer quadrant of left breast in female, estrogen receptor positive (H) [C50.412, Z17.0]    Gross Description       A) Received in formalin labeled with the patient's name, Jennifer Galvin and left breast lumpectomy is an oriented, 61 gram, 6.5 cm from superior to inferior, 5.4 cm from anterior to posterior and 3.9 cm from medial to lateral portion of yellow-white to pink fibrofatty tissue. The specimen is received oriented with a long stitch designating the lateral margin, a short stitch designating the superior margin, and a double stitch designating the deep-posterior margin, per the surgeon. There is an anterior-laterally inserted gray metallic localizing wire. The specimen is differentially inked blue on the anterior margin, black on the posterior margin, yellow on the medial margin and green on the lateral margin. The specimen is serial sectioned from superior to inferior.    Sectioning through the mid-superior aspect of the specimen displays an oblong 2.1 x 1.7 x 1.5 cm, tan-white to pink-gray, indurated nodule. This nodule extends from superior to inferior, medial to lateral and anterior to  posterior, respectively. There is a superiorly located site of previous biopsy from which a gelatinous substance and a helical mammotome clip are removed. This nodule is 0.5 cm from the superior margin, 0.7 cm from the anterior margin, 2.4 cm from the posterior margin, 0.4 cm from the lateral margin, 1.6 cm from the medial margin and is 2.1 cm from the inferior margin. The  gross lesion is submitted in its entirety for evaluation from superior to inferior to include the previous site of biopsy.     The remaining cut surface displays predominantly unremarkable yellow lobulated fat and thin to focally dense bands of fibrous tissue. No additional suspicious lesions are identified.    RS-20C    SUMMARY OF CASSETTES: A1-3) Superior margin, shaved and perpendicularly sectioned; A4-5) Inferior margin, shaved and perpendicularly sectioned; A6-7) Representative from a single cross-section; A8-9) Representative from a single cross-section; A10-14) One full cross-section; A15-16) Representative  from a single cross-section; A17) Representative from a single cross-section; A18-20) One full cross-section of grossly unremarkable tissue between the lesion and the inferior margin.    Note: The specimen is collected and placed into formalin @1058, 12/02/19. EFRAÍN:viji   Total formalin fixation time: 11:02. KLW:[facility]     B) The specimen is received in formalin and is identified as left sentinel lymph node, breast. It consists of three fragments of adipose tissue measuring 5.5 x 4.5 x 1.4 cm in aggregate dimension. There are three palpable 0.9- to 2.2-cm lymph nodes. Cut section shows a fatty central hilum without grossly suspicious lesions. RS    SUMMARY OF SECTIONS: B1) One lymph node - bisected; B2-3) One lymph node - serially sectioned; B4-5) One lymph node - serially sectioned. KLW:klj    Special Stains       Note - Estrogen and Progesterone Receptor Immunoperoxidase Stains:  These stains were done using the following criteria:    Specimen fixative: Formalin-fixed paraffin-embedded sections    Detection system: Biotin-free multimer-based technology detection system (epicurio)    Retrieval method: CC1 pretreatment; a georgie-based buffer with a slightly basic PH used at an elevated     temperature (95+5 degrees C)    Clone:  Estrogen receptor-SP1, Rabbit monoclonal ([place])     Progesterone receptor-1E2,  Rabbit monoclonal ([place])    Scoring method: Intensity of nuclear stain, strong vs weak vs absent; % of cells stained    Charges       CPT: 92250 ×2, 31694 ×2, 47172, 11038 ×2  ICD-10: D05.12    Result Flag Malignant (!) Normal   POCT Glucose   Result Value Ref Range    Glucose 121 70 - 139 mg/dL   Platelet Count   Result Value Ref Range    Platelets 236 140 - 440 thou/uL   Basic Metabolic Panel   Result Value Ref Range    Sodium 137 136 - 145 mmol/L    Potassium 4.4 3.5 - 5.0 mmol/L    Chloride 103 98 - 107 mmol/L    CO2 25 22 - 31 mmol/L    Anion Gap, Calculation 9 5 - 18 mmol/L    Glucose 145 (H) 70 - 125 mg/dL    Calcium 9.0 8.5 - 10.5 mg/dL    BUN 14 8 - 28 mg/dL    Creatinine 0.94 0.60 - 1.10 mg/dL    GFR MDRD Af Amer >60 >60 mL/min/1.73m2    GFR MDRD Non Af Amer 58 (L) >60 mL/min/1.73m2   BNP(B-type Natriuretic Peptide)   Result Value Ref Range    BNP 45 0 - 130 pg/mL   HM2(CBC w/o Differential)   Result Value Ref Range    WBC 12.9 (H) 4.0 - 11.0 thou/uL    RBC 3.63 (L) 3.80 - 5.40 mill/uL    Hemoglobin 10.6 (L) 12.0 - 16.0 g/dL    Hematocrit 34.9 (L) 35.0 - 47.0 %    MCV 96 80 - 100 fL    MCH 29.2 27.0 - 34.0 pg    MCHC 30.4 (L) 32.0 - 36.0 g/dL    RDW 22.8 (H) 11.0 - 14.5 %    Platelets 253 140 - 440 thou/uL    MPV 10.4 8.5 - 12.5 fL   Protime-INR   Result Value Ref Range    INR 1.36 (H) 0.90 - 1.10   Lactic Acid   Result Value Ref Range    Lactic Acid 2.4 (H) 0.5 - 2.2 mmol/L   Blood culture from PERIPHERAL SITE   Result Value Ref Range    Anaerobic Blood Culture Bottle No Growth No Growth, No organisms seen, bottle returned to instrument, Specimen not received, No Growth at 24 hours, No Growth at 48 hours, No Growth at 72 hours, No Growth at 96 hours, No Growth at 120 hours    Aerobic Blood Culture Bottle No Growth No Growth, No organisms seen, bottle returned to instrument, Specimen not received, No Growth at 24 hours, No Growth at 120 hours, No Growth at 48 hours, No Growth at 72 hours, No Growth at  96 hours   Lactic Acid   Result Value Ref Range    Lactic Acid 1.6 0.5 - 2.2 mmol/L   Platelet Count - every other day x 3   Result Value Ref Range    Platelets 225 140 - 440 thou/uL   HM2(CBC w/o Differential)   Result Value Ref Range    WBC 14.8 (H) 4.0 - 11.0 thou/uL    RBC 3.44 (L) 3.80 - 5.40 mill/uL    Hemoglobin 10.2 (L) 12.0 - 16.0 g/dL    Hematocrit 33.7 (L) 35.0 - 47.0 %    MCV 98 80 - 100 fL    MCH 29.7 27.0 - 34.0 pg    MCHC 30.3 (L) 32.0 - 36.0 g/dL    RDW 23.1 (H) 11.0 - 14.5 %    Platelets 232 140 - 440 thou/uL    MPV 10.1 8.5 - 12.5 fL   Basic Metabolic Panel   Result Value Ref Range    Sodium 140 136 - 145 mmol/L    Potassium 3.9 3.5 - 5.0 mmol/L    Chloride 105 98 - 107 mmol/L    CO2 28 22 - 31 mmol/L    Anion Gap, Calculation 7 5 - 18 mmol/L    Glucose 129 (H) 70 - 125 mg/dL    Calcium 9.4 8.5 - 10.5 mg/dL    BUN 15 8 - 28 mg/dL    Creatinine 0.82 0.60 - 1.10 mg/dL    GFR MDRD Af Amer >60 >60 mL/min/1.73m2    GFR MDRD Non Af Amer >60 >60 mL/min/1.73m2   POCT Glucose   Result Value Ref Range    Glucose 188 (H) 70 - 139 mg/dL   POCT Glucose   Result Value Ref Range    Glucose 206 (H) 70 - 139 mg/dL   Basic Metabolic Panel   Result Value Ref Range    Sodium 140 136 - 145 mmol/L    Potassium 3.9 3.5 - 5.0 mmol/L    Chloride 104 98 - 107 mmol/L    CO2 29 22 - 31 mmol/L    Anion Gap, Calculation 7 5 - 18 mmol/L    Glucose 119 70 - 125 mg/dL    Calcium 9.2 8.5 - 10.5 mg/dL    BUN 17 8 - 28 mg/dL    Creatinine 0.82 0.60 - 1.10 mg/dL    GFR MDRD Af Amer >60 >60 mL/min/1.73m2    GFR MDRD Non Af Amer >60 >60 mL/min/1.73m2   INR   Result Value Ref Range    INR 1.25 (H) 0.90 - 1.10   HM2(CBC w/o Differential)   Result Value Ref Range    WBC 16.4 (H) 4.0 - 11.0 thou/uL    RBC 3.56 (L) 3.80 - 5.40 mill/uL    Hemoglobin 10.6 (L) 12.0 - 16.0 g/dL    Hematocrit 34.6 (L) 35.0 - 47.0 %    MCV 97 80 - 100 fL    MCH 29.8 27.0 - 34.0 pg    MCHC 30.6 (L) 32.0 - 36.0 g/dL    RDW 22.5 (H) 11.0 - 14.5 %    Platelets 225 140  - 440 thou/uL    MPV 10.2 8.5 - 12.5 fL   POCT Glucose   Result Value Ref Range    Glucose 121 70 - 139 mg/dL   Lactic Acid   Result Value Ref Range    Lactic Acid 0.7 0.5 - 2.2 mmol/L   POCT Glucose   Result Value Ref Range    Glucose 214 (H) 70 - 139 mg/dL   Basic Metabolic Panel   Result Value Ref Range    Sodium 141 136 - 145 mmol/L    Potassium 4.1 3.5 - 5.0 mmol/L    Chloride 104 98 - 107 mmol/L    CO2 27 22 - 31 mmol/L    Anion Gap, Calculation 10 5 - 18 mmol/L    Glucose 131 (H) 70 - 125 mg/dL    Calcium 9.3 8.5 - 10.5 mg/dL    BUN 17 8 - 28 mg/dL    Creatinine 0.80 0.60 - 1.10 mg/dL    GFR MDRD Af Amer >60 >60 mL/min/1.73m2    GFR MDRD Non Af Amer >60 >60 mL/min/1.73m2   Hemoglobin   Result Value Ref Range    Hemoglobin 11.5 (L) 12.0 - 16.0 g/dL   INR   Result Value Ref Range    INR 1.62 (H) 0.90 - 1.10     Current Outpatient Medications   Medication Sig   ? albuterol (PROVENTIL) 2.5 mg /3 mL (0.083 %) nebulizer solution Take 3 mL (2.5 mg total) by nebulization every 4 (four) hours as needed for wheezing.   ? calcium-vitamin D (CALCIUM-VITAMIN D) 500 mg(1,250mg) -200 unit per tablet Take 1 tablet by mouth 2 (two) times a day with meals.   ? cholecalciferol, vitamin D3, 1,000 unit tablet Take 2,000 Units by mouth daily.    ? diphenhydrAMINE (BENADRYL) 2 % cream Apply 1 application topically 3 (three) times a day as needed for itching.   ? diphenhydrAMINE (BENADRYL) 25 mg capsule Take 25 mg by mouth every 6 (six) hours as needed for itching.   ? fesoterodine (TOVIAZ) 8 mg Tb24 ER tablet Take 8 mg by mouth daily.    ? fluticasone propionate (FLONASE ALLERGY RELIEF) 50 mcg/actuation nasal spray One spray in each nostril daily (Patient taking differently: 1 spray into each nostril at bedtime. One spray in each nostril daily)   ? furosemide (LASIX) 40 MG tablet TAKE 1 TABLET(40 MG) BY MOUTH TWICE DAILY   ? glucosamine-chondroitin 500-400 mg tablet Take 1 tablet by mouth 2 (two) times a day.    ?  HYDROcodone-acetaminophen 5-325 mg per tablet Take 1 tablet by mouth every 4 (four) hours as needed for pain.   ? lidocaine-prilocaine (EMLA) cream Place over port 30 min. before being accessed.   ? loratadine (CLARITIN) 10 mg tablet Take 1 tablet (10 mg total) by mouth daily.   ? metoprolol tartrate (LOPRESSOR) 25 MG tablet Take 1 tablet (25 mg total) by mouth 2 (two) times a day.   ? OMEGA-3/DHA/EPA/FISH OIL (FISH OIL-OMEGA-3 FATTY ACIDS) 300-1,000 mg capsule Take 2 g by mouth 2 times a day at 6:00 am and 4:00 pm.   ? omeprazole (PRILOSEC) 20 MG capsule Take 20 mg by mouth daily before breakfast.   ? polyvinyl alcohol-povidon,PF, (REFRESH CLASSIC) 1.4-0.6 % Dpet ophthalmic dropperette Administer 2 drops to both eyes 3 (three) times a day.   ? potassium chloride (K-DUR,KLOR-CON) 10 MEQ tablet TAKE 2 TABLETS BY MOUTH TWICE DAILY (Patient taking differently: Take 20 mEq by mouth 2 (two) times a day. )   ? prochlorperazine (COMPAZINE) 10 MG tablet TAKE 1 TABLET(10 MG) BY MOUTH EVERY 6 HOURS AS NEEDED FOR NAUSEA   ? simvastatin (ZOCOR) 20 MG tablet Take 1 tablet (20 mg total) by mouth daily with supper.   ? vitamin A-vitamin C-vit E-min (OCUVITE) Tab tablet Take 1 tablet by mouth 2 (two) times a day.   ? hydrocortisone 1 % cream Apply 1 application topically as needed.   ? warfarin ANTICOAGULANT (COUMADIN/JANTOVEN) 2.5 MG tablet Take 5mg (1 tablet) daily with INR check 12/9       ASSESSMENT:      ICD-10-CM    1. Atrial fibrillation, unspecified type (H) I48.91    2. Venous stasis I87.8    3. Status post left breast lumpectomy Z98.890    4. Pain management R52        PLAN:    Atrial fibrillation-continue Coumadin, increase metoprolol to 37.5 twice daily    Venous stasis lower extremities compression to lower extremities on during the day and off at night.    Status post left breast lumpectomy monitor surgical incision for signs and symptoms of infection    Pain management Norco PRN anticoagulation    Right great toenail  injury bacitracin to right great toe twice daily, cover with a bandage and podiatry to see patient    Rash under breast nystatin powder twice daily until healed and then as needed twice daily    Shortness of breath oxygen 1 to 2 L PRN to keep sats greater than 90%    Anticoagulation management continue Coumadin and adjust per INRs    Debility PT OT    Electronically signed by: Sara Mayes CNP

## 2021-06-19 NOTE — LETTER
Letter by Luz Robles RN at      Author: Luz Robles RN Service: -- Author Type: --    Filed:  Encounter Date: 8/8/2019 Status: (Other)       Dear Jennifer aGlvin    Thank you for choosing [facility] for your care.  We are committed to providing you with the highest quality and compassionate healthcare services.  The following information pertains to your first appointment with our clinic.    Date/Time of appointment: Wednesday, August 21st, 2019 arrival of 12:45 pm please!    Note: This allows time to complete forms, possible labs and nursing assessment.     Name of your Physician: Roly Villalba MD    What to bring to your appointment:    Completed Patient History/Initial Nursing Assessment and Medication/Allergy List (these forms were sent to you).    Any paperwork or films from your physician that we have asked you to bring.    Your current insurance card(s).    Parking:    Please refer to the map included to direct you.  The [facility] is located at the [place] [place] end of [facility] in [place], [place].      After turning onto [address] from [facility], take a right turn at the first stop sign.  We have designated parking on the left, identified as parking for Cancer Care patients (Lot D).     The Code to Enter Lot D is: 0801. This code changes monthly and will always coincide with the current month followed by 01. For example August will be 0801.  The month will continue to change but the 01 will remain constant.  If lot D is full please use Parking Lot A, directly across the street.    Please enter the Cancer Care Center on the north end of the [facility].  You will see a sign on the building.        For Medical Oncology or Hematology appointments, please take the elevator to the second floor to check in.   For Radiation Oncology appointments, please go straight through the double doors and check in.     Also please note appointments can last 1.5-2 hours.      We hope  these instructions are helpful to you.  If you have any questions or concerns, please call us at [phone].  It is our pleasure to assist you.    Warm Regards,  Luz Robles  Nurse Navigator  [phone]

## 2021-06-19 NOTE — LETTER
Letter by Cordell Ceja DO at      Author: Cordell Ceja DO Service: -- Author Type: --    Filed:  Encounter Date: 8/7/2019 Status: (Other)         Jennifer Galvin  [address] [place] [address]             August 7, 2019         Dear MsLaina Galvin,    Below are the results from your recent visit:    Resulted Orders   CT Chest Without Contrast    Narrative    EXAM: CT CHEST WO CONTRAST  LOCATION: [facility]  DATE/TIME: 8/1/2019 1:52 PM    INDICATION: Evaluate for adenopathy mediastinum, abnormal stress test.  COMPARISON: Nuclear medicine cardiac stress test on 7/2/2019 and CTA chest on 7/23/2012.  TECHNIQUE: Helical images were obtained through the chest. Multiplanar reformats were obtained. Dose reduction techniques were used.  CONTRAST: None.    FINDINGS:   LUNGS AND PLEURA: Mild dependent atelectasis. There is mosaic attenuation of the lungs in the lung bases, likely representing air trapping. Lungs are otherwise clear.    MEDIASTINUM: 3.2 x 2.3 cm well-circumscribed anterior mediastinal soft tissue nodule. This corresponds with the abnormality on the nuclear medicine examination. This has increased in size from 2012 when it measured 1.9 x 1.6 cm. There are scattered small   mediastinal lymph nodes. Moderate coronary artery calcification.    LIMITED UPPER ABDOMEN: There is a 5 cm left adrenal fat-density mass consistent with a myelolipoma. This has increased in size from previous 3.3 cm. Presumed left renal cyst.    MUSCULOSKELETAL: 1.7 cm possible nodule in the upper left breast on series 4, image 68.      Impression    CONCLUSION:   1.  3.2 cm anterior mediastinal soft tissue nodule, increased in size from 1.9 cm in 2012. This corresponds to the abnormality on recent stress test. Differential diagnosis includes thymoma and lymphoma. Consider PET scan to further evaluate.  2.  Left breast nodule, indeterminant and not seen on prior mammogram. Recommend diagnostic mammogram and  ultrasound for further evaluation.       Here is a copy of the test results.  The lymph node in the chest cavity (mediastinum) needs an additional test called a PET CT scan for further evaluation as we discussed.  We want to make sure that this is not cancer.  This scan can help us sort out that question.    Also, I would recommend doing the mammogram and ultrasound since the CT showed a spot on the left breast that needs further evaluation.    Please let me know if you do not hear from us about scheduling these tests or consultation with hematology/oncology.    Please call with questions or contact us using [employer].    Sincerely,        Electronically signed by Cordell Ceja,

## 2021-06-19 NOTE — LETTER
Letter by Adalid Ennis MD at      Author: Adalid Ennis MD Service: -- Author Type: --    Filed:  Encounter Date: 9/5/2019 Status: (Other)         Roly Villalba MD  [address] [place] [address]                                  September 5, 2019    Patient: Jennifer Galvin   MR Number: [id number]   YOB: 1945   Date of Visit: 9/5/2019     Dear Humberto Villalba and Marcial:    I saw Jennifer Galvin today at the [facility]. Below are the relevant portions of my assessment and plan of care.    If you have questions, please do not hesitate to call me. I look forward to following Jennifer along with you.    Sincerely,        Adalid Ennis MD          CC  Cordell Ceja, Adalid Chahal MD  9/5/2019  5:23 PM  Sign at close encounter  Pulmonary Clinic Outpatient Consultation    Assessment and Plan:   74 year old female former smoker with a history of severe obesity, stage Ib left breast cancer (ER+/FL+/HER-2/arcelia+), thymoma, vitamin D deficiency, urge/stress incontinence, tremor, postmenopausal bleeding and uterine adenocarcinoma in situ s/p JEROME-BSO, paroxysmal AF, ssteopenia, FLAKITA on CPAP, macular degeneration, chronic bilateral leg edema, HTN, dyslipidemia, DM, CAD, colon polyps, chronic back and bilateral knee pain,  presenting for evaluation of chronic exertional dyspnea.    Chronic exertional dyspnea: Present for at least 6-12 months. High risk of deconditioning and obesity-associated restrictive respiratory physiology. No signs or symptoms suggestive of thymoma-associated myasthenia gravis. Also with high likelihood of progression of FLAKITA, possible OHS; uses CPAP consistently with oronasal mask but last sleep study was >10 years ago, with no recent follow-up or servicing of device. Has nasal and sinus congestion that occasionally interferes with CPAP.    Plan:  - pulmonary function testing  - split-night sleep study  - I will call her with results  - no indication for  serologic work-up of myasthenia gravis at this point  - nasal fluticasone one spray in each nostril daily and loratadine 10 mg daily for chronic rhinosinusitis interfering with CPAP  - ongoing follow-up with Dr. Villalba  - follow-up with me in 3 months  - encouraged her to call any time with questions or concerning symptoms    I appreciate the opportunity to participate in the care of Ms. Galvin. Please feel free to contact me at any time.    CCx: chronic exertional dyspnea, FLAKITA, rhinosinusitis    HPI: 74 year old female former smoker with a history of severe obesity, stage Ib left breast cancer (ER+/[place]+/HER-2/arcelia+), thymoma, vitamin D deficiency, urge/stress incontinence, tremor, postmenopausal bleeding and uterine adenocarcinoma in situ s/p JEROME-BSO, paroxysmal AF, ssteopenia, FLAKITA on CPAP, macular degeneration, chronic bilateral leg edema, HTN, dyslipidemia, DM, CAD, colon polyps, chronic back and bilateral knee pain,  presenting for evaluation of chronic exertional dyspnea. Present for at least 6-12 months. High risk of deconditioning and obesity-associated restrictive respiratory physiology. No signs or symptoms suggestive of thymoma-associated myasthenia gravis. Also with high likelihood of progression of FLAKITA, possible OHS; uses CPAP consistently with oronasal mask but last sleep study was >10 years ago, with no recent follow-up or servicing of device. Has nasal and sinus congestion that occasionally interferes with CPAP.    ROS:  A 12-system review was obtained and was negative with the exception of the symptoms endorsed in the history of present illness.    PMH:  Vitamin D deficiency  Thymoma  stage Ib left breast cancer (ER+/[place]+/HER-2/arcelia+)  Urge/stress incontinence  Tremor  Postmenopausal bleeding and uterine adenocarcinoma in situ s/p JEROME-BSO  PAF  Osteopenia  FLAKITA on CPAP  Obesity with BMI 54.1  Macular degeneration  Chronic bilateral leg edema  HTN  DL  DM  CAD  Colon polyps  Back pain  Bilateral knee  pain    PSH:  Past Surgical History:   Procedure Laterality Date   ? BREAST BIOPSY Left     Benign   ? CATARACT EXTRACTION  10/17/13   ? COLONOSCOPY N/A 4/29/2015    Procedure: COLONOSCOPY WITH POLYPECTOMY;  Surgeon: Too Ureña MD;  Location: [facility];  Service:    ? COLONOSCOPY N/A 11/9/2016    Procedure: COLONOSCOPY;  Surgeon: Ayden Dela Cruz MD;  Location: [facility];  Service:    ? CT BIOPSY LUNG  8/30/2019   ? ENDOMETRIAL BIOPSY  4/2014   ? HYSTERECTOMY  2014   ? Interstem - Stage 2  09/11/2014   ? Interstim Stage I  08/26/2014   ? IR PORT PLACEMENT >5 YEARS  8/28/2019   ? OOPHORECTOMY Bilateral 2014   ? post urinary stimulator implantation  8/26/14   ? NE BIOPSY OF BREAST, INCISIONAL      Description: Incisional Breast Biopsy;  Recorded: 04/05/2011;   ? NE CARDIOVERSION ELECTIVE ARRHYTHMIA EXTERNAL      Description: Elective Cardioversion External;  Recorded: 07/30/2012;   ? NE REMOVE TONSILS/ADENOIDS,<13 Y/O      Description: Tonsillectomy With Adenoidectomy;  Recorded: 04/15/2014;   ? NE SLING OPER STRES INCONTINENCE N/A 11/3/2016    Procedure: PUBOVAGINAL SLING WITH CONCHITA WITH CYSTOSCOPY;  Surgeon: Bill Wilson MD;  Location: [facility];  Service: Gynecology   ? US BREAST CORE BIOPSY LEFT Left 8/16/2019   ? VITRECTOMY Right 1/15/15       Allergies:  Allergies   Allergen Reactions   ? Aspirin      Pt currently on WArfarin   ? Penicillins      Boils         Family HX:  Family History   Problem Relation Age of Onset   ? Hypertension Father    ? Pneumonia Father    ? Esophageal cancer Brother    ? Cancer Brother    ? Stroke Mother    ? Alzheimer's disease Brother    ? Cancer Brother    ? Prostate cancer Brother    ? Cancer Nephew    ? Colon cancer Nephew    ? Cancer Paternal cousin        Social Hx:  Social History     Socioeconomic History   ? Marital status: Single     Spouse name: Not on file   ? Number of children: 0   ? Years of education: Not on file   ? Highest  education level: Not on file   Occupational History   ? Occupation: Retired RN     Employer: [employer]   Social Needs   ? Financial resource strain: Not on file   ? Food insecurity:     Worry: Not on file     Inability: Not on file   ? Transportation needs:     Medical: Not on file     Non-medical: Not on file   Tobacco Use   ? Smoking status: Former Smoker     Packs/day: 3.00     Years: 40.00     Pack years: 120.00     Types: Cigarettes     Last attempt to quit: 2005     Years since quittin.6   ? Smokeless tobacco: Never Used   Substance and Sexual Activity   ? Alcohol use: Yes     Alcohol/week: 3.6 oz     Types: 6 Cans of beer per week   ? Drug use: No   ? Sexual activity: Never     Partners: Male     Birth control/protection: Surgical, Post-menopausal   Lifestyle   ? Physical activity:     Days per week: Not on file     Minutes per session: Not on file   ? Stress: Not on file   Relationships   ? Social connections:     Talks on phone: Not on file     Gets together: Not on file     Attends [employer] service: Not on file     Active member of club or organization: Not on file     Attends meetings of clubs or organizations: Not on file     Relationship status: Not on file   ? Intimate partner violence:     Fear of current or ex partner: Not on file     Emotionally abused: Not on file     Physically abused: Not on file     Forced sexual activity: Not on file   Other Topics Concern   ? Not on file   Social History Narrative    Lives alone single family home that she owns, no children and is retired.       Current Meds:  Current Outpatient Medications   Medication Sig Dispense Refill   ? calcium-vitamin D (CALCIUM-VITAMIN D) 500 mg(1,250mg) -200 unit per tablet Take 1 tablet by mouth 2 (two) times a day with meals.     ? cholecalciferol, vitamin D3, 1,000 unit tablet Take 2,000 Units by mouth daily.      ? fesoterodine (TOVIAZ) 4 mg Tb24 ER tablet Take 4 mg by mouth daily.     ? furosemide (LASIX)  "40 MG tablet TAKE 1 TABLET(40 MG) BY MOUTH TWICE DAILY 180 tablet 3   ? glucosamine-chondroitin 500-400 mg tablet Take 1 tablet by mouth 2 (two) times a day.      ? metoprolol tartrate (LOPRESSOR) 25 MG tablet Take 1 tablet (25 mg total) by mouth daily.     ? OMEGA-3/DHA/EPA/FISH OIL (FISH OIL-OMEGA-3 FATTY ACIDS) 300-1,000 mg capsule Take 2 g by mouth 2 times a day at 6:00 am and 4:00 pm.     ? oxyCODONE-acetaminophen (PERCOCET/ENDOCET) 5-325 mg per tablet Take 1 tablet by mouth every 6 (six) hours as needed for pain. 12 tablet 0   ? potassium chloride (K-DUR,KLOR-CON) 10 MEQ tablet TAKE TWO TABLETS BY MOUTH TWICE DAILY 360 tablet 1   ? potassium chloride (K-DUR,KLOR-CON) 10 MEQ tablet TAKE 2 TABLETS BY MOUTH TWICE DAILY 360 tablet 3   ? simvastatin (ZOCOR) 20 MG tablet Take 1 tablet (20 mg total) by mouth daily with supper. 90 tablet 3   ? vitamin A-vitamin C-vit E-min (OCUVITE) Tab tablet Take 1 tablet by mouth 2 (two) times a day.     ? warfarin (COUMADIN/JANTOVEN) 5 MG tablet TAKE ONE-HALF TO ONE TABLET BY MOUTH DAILY AS DIRECTED. ADJUST DOSE PER INR RESULT (Patient taking differently: 2.5 mg on Mondays, Wednesday and Friday, 5 mg every other day of week) 90 tablet 1   ? fluticasone propionate (FLONASE ALLERGY RELIEF) 50 mcg/actuation nasal spray One spray in each nostril daily 16 g 12   ? loratadine (CLARITIN) 10 mg tablet Take 1 tablet (10 mg total) by mouth daily. 30 tablet 11     No current facility-administered medications for this visit.        Physical Exam:  /80   Pulse 77   Resp 18   Ht 5' 5\" (1.651 m)   Wt (!) 325 lb (147.4 kg)   SpO2 90%   Breastfeeding? No   BMI 54.08 kg/m     Gen: alert, oriented, no distress  HEENT: nasal turbinates are unremarkable, no oropharyngeal lesions, no cervical or supraclavicular lymphadenopathy  CV: RRR, no M/G/R  Resp: CTAB, no focal crackles or wheezes  Abd: soft, nontender, no palpable organomegaly  Skin: no apparent rashes  Ext: 1+ bilateral leg pitting " edema  Neuro: alert, nonfocal    Labs:  reviewed    Imaging studies:  CT chest (8/1/19):  - images directly reviewed, formal interpretation follows:  FINDINGS:   LUNGS AND PLEURA: Mild dependent atelectasis. There is mosaic attenuation of the lungs in the lung bases, likely representing air trapping. Lungs are otherwise clear.     MEDIASTINUM: 3.2 x 2.3 cm well-circumscribed anterior mediastinal soft tissue nodule. This corresponds with the abnormality on the nuclear medicine examination. This has increased in size from 2012 when it measured 1.9 x 1.6 cm. There are scattered small   mediastinal lymph nodes. Moderate coronary artery calcification.     LIMITED UPPER ABDOMEN: There is a 5 cm left adrenal fat-density mass consistent with a myelolipoma. This has increased in size from previous 3.3 cm. Presumed left renal cyst.     MUSCULOSKELETAL: 1.7 cm possible nodule in the upper left breast on series 4, image 68.     IMPRESSION:   CONCLUSION:   1.  3.2 cm anterior mediastinal soft tissue nodule, increased in size from 1.9 cm in 2012. This corresponds to the abnormality on recent stress test. Differential diagnosis includes thymoma and lymphoma. Consider PET scan to further evaluate.  2.  Left breast nodule, indeterminant and not seen on prior mammogram. Recommend diagnostic mammogram and ultrasound for further evaluation.    PET-CT (8/15/19):  - images directly reviewed, formal interpretation follows:  FINDINGS: Mildly FDG avid (max SUV 4.3) mass in the anterior mediastinum measuring 3.3 x 2.1 cm.     Mildly FDG avid (max SUV 3.8) lesion in the left breast correlating with the mass identified on recent mammogram and ultrasound dated 08/12/2019.     Moderate coronary artery calcium. Left adrenal myelolipoma. Right renal cyst. InterStim device with lead traversing the right S4-S5 neural foramina. Sigmoid diverticulosis.     IMPRESSION:   1. Mildly hypermetabolic anterior mediastinal mass suspicious for active  neoplasm.     2. Mildly hypermetabolic left breast lesion, which is scheduled for biopsy.    TTE (12/10/18):  1. Normal left ventricular size and systolic performance with a visually estimated ejection fraction of 55%.   2. No significant valvular heart disease is identified on this study.   3. Mild right ventricular enlargement with borderline reduced right ventricular systolic performance.  4. Mild to moderate biatrial enlargement.  5. There is mild enlargement of the proximal ascending aorta.    Pharmacologic MPI (7/3/19):    The pharmacologic nuclear stress test is negative for inducible myocardial ischemia or infarction.    The  images demonstrate breast attenuation    The left ventricular ejection fraction is 75%.    There is a small area of tracer uptake incidentally noted in the mediastinum of uncertain significance. Consider dedicated imaging to screen for breast and/or lung malignancy    The images of 1/16/2014 were unavailable for comparison review.    Adalid Ennis MD  [facility]  Cell [phone]  Office [phone]  Pager [phone]

## 2021-06-19 NOTE — LETTER
Letter by Cordell Ceja DO at      Author: Cordell Ceja DO Service: -- Author Type: --    Filed:  Encounter Date: 11/27/2019 Status: Signed         Jennifer Galvin  [address] [place] [address]             November 27, 2019         Dear Ms. Galvin,    Below are the results from your recent visit:    Resulted Orders   XR Chest 2 Views    Narrative    EXAM: XR CHEST 2 VIEWS  LOCATION: [facility]  DATE/TIME: 11/22/2019 12:14 PM    INDICATION: Shortness of breath  COMPARISON: 06/26/2019, chest CT 08/01/2019      Impression    New right IJ port. Borderline heart size with normal pulmonary vascularity. The lungs and pleural spaces are clear. No change from previous.       Here is a copy of the chest x-ray, it was normal.  I hope the surgery and recovery go smoothly for you.    Please call with questions or contact us using [employer].    Sincerely,        Electronically signed by Cordell Ceja DO

## 2021-06-19 NOTE — LETTER
Letter by Cordell Ceja DO at      Author: Cordell Ceja DO Service: -- Author Type: --    Filed:  Encounter Date: 7/23/2019 Status: (Other)         Jennifer Galvin  [address] [place] [address]             July 23, 2019         Dear MsLaina Galvin,    Below are the results from your recent visit:    Resulted Orders   Lipid Cascade   Result Value Ref Range    Cholesterol 124 <=199 mg/dL    Triglycerides 162 (H) <=149 mg/dL    HDL Cholesterol 34 (L) >=50 mg/dL    LDL Calculated 58 <=129 mg/dL    Patient Fasting > 8hrs? No        The cholesterol looks good.  I suspect you do not need to be on as high of a dose of simvastatin as you are currently on.  I think you probably should stay on a cholesterol-lowering medication at this time, since being on the medication regardless of cholesterol can help lower your risk of heart attack or stroke since you have a history of diabetes.  If anything, I might consider decreasing to simvastatin 20 mg daily, down from the 40 mg daily.  Please let me know if think, feel free to call.    If we do make an adjustment on the simvastatin, it might affect the warfarin absorption just a little bit, so I might suggest checking the INR 2 weeks instead of 4 weeks for the next check if we do make the change.    Please call with questions or contact us using [employer].    Sincerely,        Electronically signed by Cordell Ceja DO

## 2021-06-19 NOTE — LETTER
Letter by Cordell Ceja DO at      Author: Cordell Ceja DO Service: -- Author Type: --    Filed:  Encounter Date: 8/19/2019 Status: (Other)         Jennifer Galvin  [address] [place] [address]             August 19, 2019         Dear MsLaina Galvin,    Below are the results from your recent visit:    Resulted Orders   NM PET CT Skull to Mid Thigh    Narrative    EXAM: NM PET CT SKULL TO MID THIGH  LOCATION: [facility]  DATE/TIME: 8/15/2019 2:25 PM    INDICATION: Initial treatment strategy and staging of a nonspecific abnormal findings of the lung fields. Lesion of the mediastinum.  COMPARISON: Thoracic CT dated 08/01/2019  TECHNIQUE: Serum glucose level 115 mg/dL. One hour post intravenous administration of 12.9 mCi F-18 FDG, PET imaging was performed from the skull base to the mid thighs utilizing attenuation correction with concurrent axial CT and PET/CT image fusion.   Dose reduction techniques were used.    FINDINGS: Mildly FDG avid (max SUV 4.3) mass in the anterior mediastinum measuring 3.3 x 2.1 cm.    Mildly FDG avid (max SUV 3.8) lesion in the left breast correlating with the mass identified on recent mammogram and ultrasound dated 08/12/2019.    Moderate coronary artery calcium. Left adrenal myelolipoma. Right renal cyst. InterStim device with lead traversing the right S4-S5 neural foramina. Sigmoid diverticulosis.      Impression    1. Mildly hypermetabolic anterior mediastinal mass suspicious for active neoplasm.    2. Mildly hypermetabolic left breast lesion, which is scheduled for biopsy.       Here is a copy of the report as we discussed on the telephone.    Please call with questions or contact us using [employer].    Sincerely,        Electronically signed by Cordell Ceja DO

## 2021-06-19 NOTE — LETTER
"Letter by Cordell Ceja DO at      Author: Cordell Ceja DO Service: -- Author Type: --    Filed:  Encounter Date: 4/3/2019 Status: (Other)         Jennifer Galvin  [address] [place] [address]             April 3, 2019         Dear MsLaina Galvin,    Below are the results from your recent visit:    Resulted Orders   Microalbumin, Random Urine   Result Value Ref Range    Microalbumin, Random Urine <0.50 0.00 - 1.99 mg/dL    Creatinine, Urine 35.0 mg/dL    Microalbumin/Creatinine Ratio Random Urine  <=19.9 mg/g      Comment:      \"Unable to calculate: Creatinine and/or Microalbumin value below detectable level\"    Narrative    Microalbumin, Random Urine  <2.0 mg/dL . . . . . . . . Normal  3.0-30.0 mg/dL . . . . . . Microalbuminuria  >30.0 mg/dL . . . . . .  . Clinical Proteinuria    Microalbumin/Creatinine Ratio, Random Urine  <20 mg/g . . . . .. . . . Normal   mg/g . . . . . . . Microalbuminuria  >300 mg/g . . . . . . . . Clinical Proteinuria     Glycosylated Hemoglobin A1c   Result Value Ref Range    Hemoglobin A1c 5.9 3.5 - 6.0 %       The A1c remains below 7, keep up the good work.  The urine protein test is negative, no sign of kidney damage from diabetes.    Please call with questions or contact us using [employer].    Sincerely,        Electronically signed by Cordell Ceja DO       "

## 2021-06-19 NOTE — LETTER
Letter by Juliana Victoria RN at      Author: Juliana Victoria RN Service: -- Author Type: --    Filed:  Encounter Date: 7/23/2019 Status: (Other)       [facility]  [address] [place] [address]    7/23/2019    Jennifer Galvin  [address]  [facility] [id number]    Dear Ms. Galvin,    As requested, we have removed a person from your emergency contacts.  This also gave us the opportunity to review your Health Care Directive of 4/18/14.  Unfortunately, our review indicates the document is not legally valid for the following reason(s): Both witnesses are employees of the healthcare system caring for you.   In order to create a legal document you will need to have your signature re-witnessed or have it notarized. Only one of your 2 witnesses can be employed by our health care system by state law. In addition, notaries and witnesses cannot be named as your health care agents.   Or you may wish to create a new Health Care Directive.    We greatly value the opportunity to assist you in documenting your choices and to honor your   wishes. We apologize for any inconvenience. We have several options to assist you in updating   your document so it meets legal requirements.       Health Care Directives and Advance Care Planning resources can be viewed and printed   for free at our web site:[email or web address].       Free group classes on Advance Care Planning and completing a Health Care Directive are  available at multiple locations and times. These classes are led by trained staff who will   provide information and guide you through a Health Care Directive. They can also review, notarize and add your Health Care Directive to your medical record. [place] for a class at [email or web address] or by calling [employer] at [phone] or toll free [phone].      COPIES of completed Health Care Directives can be  brought or mailed to any of our   locations, including the address listed below. You can also email a copy to [email or web address] .       For additional assistance or questions you can email us at [email or web address] or call [phone]    Sincerely,     [facility]  [facility], [facility], and [facility]  [address] [place], [place] [id number]

## 2021-06-20 NOTE — LETTER
Letter by Sara Mayes CNP at      Author: Sara Mayes CNP Service: -- Author Type: --    Filed:  Encounter Date: 2/21/2020 Status: (Other)         Patient: Jennifer Galvin   MR Number: [id number]   YOB: 1945   Date of Visit: 2/21/2020     [facility]    Facility:   [facility] [[id number]]   Code Status: FULL CODE      CHIEF COMPLAINT/REASON FOR VISIT:  Chief Complaint   Patient presents with   ? Review Of Multiple Medical Conditions       HISTORY:      HPI: Jennifer is a 74 y.o. female undergoing physical and occupational therapy at [facility] transitional care unit. , she is with history of severe obesity, stage Ib left breast cancer, I normal, vitamin D deficiency, urge stress incontinence, tremor, postmenopausal bleeding and uterine adenocarcinoma in situ status post JEROME/BSO, atrial fibrillation, osteopenia, FLAKITA, macular degeneration, chronic bilateral leg edema, hypertension, diabetes mellitus, CAD, chronic back and bilateral knee pain who presented to [facility] for scheduled left breast lumpectomy And sentinel lymph node biopsy.  Per the records she became hypoxic in the PACU and noted to be persistently hypoxic to 4 L of oxygen which is a change from her baseline.  She does have FLAKITA but does not use her CPAP machine. She was recently sent back to the hospital from December 14-17 th  for left axilla pain and found to have a left axillary  hematoma related to her recent lumpectomy. She was seen by general surgery who recommended resuming warfarin and providing modest infection prophylaxis.She was also treated at this time with cefdinir for a UTI. She then returned and was  admitted to [facility] from 12/19/19-12/31/19 for pneumonia.  The patient has bilateral pleural effusions on CTA and was treated with doxycycline and Keflex for suspected pneumonia.  She completed a course of Cefdinir for UTI outpatient during her  hospital stay.  Her procalcitonin and influenza A were negative on hospital admission.  She was also noted to have severe BLE nonpitting edema and her furosemide was increased to 80 mg two times a day for diuresis.  It was recommended that her BMP be followed bi-weekly for assessment of renal function on increased diuresis.    Today she was seen for follow-up to chest x-ray and cough.  Today she was noted to have expiratory wheezing and was given a nebulizer.  Her WBC was normal at 10.2 and hemoglobin 11.5 however she was symptomatic and her chest x-ray read left basilar density compatible with pneumonia or atelectasis.  Her discharge was placed on hold over the weekend.  She was started on doxycycline.  she denies any  Increased shortness of breath beyond baseline   She denied chest pain.  She is moving her bowels and has no issues with urination.   Her left breast incision is healed.  Her BMP have been stable despite large doses of Lasix. Last A1C 9/2019 was 6.5.     Past Medical History:   Diagnosis Date   ? (Lower) Leg Localized Swelling     Created by Conversion    ? Adenocarcinoma In Situ Of The Uterus     Created by Conversion    ? Backache     Created by Conversion [facility] Annotation: Feb 29 2012 12:14PM - Opal Helm: Referred to  [facility] 9/11, given TENS unit.    ? Benign Polyps Of The Large Intestine     Created by Conversion    ? Breast cancer (H)    ? Cancer (H)     uterine   ? Chronic kidney disease     stage 1 per H&P 11/22/2019   ? Coronary artery disease    ? Diabetes mellitus (H)     type 2   ? Fatigue     Created by Conversion    ? Hyperglycemia     Created by Conversion    ? Hyperlipidemia     Created by Conversion    ? Hypertension     Created by Conversion    ? Joint Pain, Localized In The Knee     Created by Conversion    ? Lesion of mediastinum    ? Lymphedema     Created by Conversion    ? Macular Degeneration     Created by Conversion [facility] Annotation: Apr 5 2011 12:22PM -  Tien Guzman: Followed by  Ophthalmologist, Dr. Avilez.    ? Major Depression, Single Episode In Remission     Created by Conversion    ? Obesity     Created by Conversion    ? Obstructive Sleep Apnea     CPAP   ? Osteopenia     Created by Conversion    ? Paroxysmal Atrial Fibrillation     Created by Conversion [facility] Annotation: Nov 28 2012 10:36PM - Shruthi Dhaliwal: Found incidential  on exam on May 7th, [id number] score of 1 for HTNsymptomatic and hospitalized  x2 in July, 2012.CV X 2 in 2012Chronic Sotalol Rx    ? Postmenopausal bleeding     Created by Conversion    ? Skin cancer    ? Tachycardia     Created by Conversion    ? Tremor     Created by Conversion    ? Urge And Stress Incontinence     Created by Conversion    ? Urinary Frequency More Than Twice At Night (Nocturia)     Created by Conversion    ? Urine Tests Nonspecific Abnormal Findings     Created by Conversion    ? Vitamin D Deficiency     Created by Conversion              Family History   Problem Relation Age of Onset   ? Hypertension Father    ? Pneumonia Father    ? Esophageal cancer Brother    ? Cancer Brother    ? Stroke Mother    ? Alzheimer's disease Brother    ? Cancer Brother    ? Prostate cancer Brother    ? Cancer Nephew    ? Colon cancer Nephew    ? Cancer Paternal cousin      Social History     Socioeconomic History   ? Marital status: Single     Spouse name: Not on file   ? Number of children: 0   ? Years of education: Not on file   ? Highest education level: Not on file   Occupational History   ? Occupation: Retired RN     Employer: [employer]   Social Needs   ? Financial resource strain: Not on file   ? Food insecurity:     Worry: Not on file     Inability: Not on file   ? Transportation needs:     Medical: Not on file     Non-medical: Not on file   Tobacco Use   ? Smoking status: Former Smoker     Packs/day: 3.00     Years: 40.00     Pack years: 120.00     Types: Cigarettes     Last attempt to quit: 1/1/2005      Years since quitting: 15.1   ? Smokeless tobacco: Never Used   Substance and Sexual Activity   ? Alcohol use: Not Currently   ? Drug use: No   ? Sexual activity: Never     Partners: Male     Birth control/protection: Surgical, Post-menopausal   Lifestyle   ? Physical activity:     Days per week: Not on file     Minutes per session: Not on file   ? Stress: Not on file   Relationships   ? Social connections:     Talks on phone: Not on file     Gets together: Not on file     Attends Worship service: Not on file     Active member of club or organization: Not on file     Attends meetings of clubs or organizations: Not on file     Relationship status: Not on file   ? Intimate partner violence:     Fear of current or ex partner: Not on file     Emotionally abused: Not on file     Physically abused: Not on file     Forced sexual activity: Not on file   Other Topics Concern   ? Not on file   Social History Narrative    Lives alone single family home that she owns, no children and is retired.  Boyfriend used to live with her, however due to stroke he is in his own care facility.  She is currently at [facility]         Review of Systems   Constitutional: Positive for activity change. Negative for appetite change, fatigue and fever.   HENT: Negative for congestion.    Respiratory: Positive for wheezing. Negative for cough.         Shortness of breath with exertion   Cardiovascular: Positive for leg swelling. Negative for chest pain.        Lymphedema   Gastrointestinal: Negative for abdominal distention, abdominal pain, constipation, diarrhea and nausea.   Genitourinary: Negative for dysuria.   Musculoskeletal: Positive for arthralgias. Negative for back pain.   Skin: Positive for wound. Negative for color change.        Left breast incision healed   Neurological: Negative for dizziness.   Psychiatric/Behavioral: Negative for agitation, behavioral problems and confusion.       Vitals:    02/21/20 0925   BP: 137/78    Pulse: (!) 119   Resp: 18   Temp: 98.3  F (36.8  C)   SpO2: 94%   Weight: (!) 339 lb 8 oz (154 kg)       Physical Exam  Constitutional:       Appearance: She is well-developed.      Comments: Pleasant woman in no acute distress   HENT:      Head: Normocephalic.   Eyes:      Conjunctiva/sclera: Conjunctivae normal.   Neck:      Musculoskeletal: Normal range of motion.   Cardiovascular:      Rate and Rhythm: Normal rate and regular rhythm.      Heart sounds: Normal heart sounds. No murmur.   Pulmonary:      Effort: No respiratory distress.      Breath sounds: No rales.   Abdominal:      General: Bowel sounds are normal. There is no distension.      Palpations: Abdomen is soft.      Tenderness: There is no abdominal tenderness.   Musculoskeletal: Normal range of motion.      Right lower leg: Edema present.      Left lower leg: Edema present.      Comments: 3-4+ lower extremities. Lymph wraps resumed    Skin:     General: Skin is warm.      Findings: Rash present.      Comments: Healed left breast incision  Back rash almost completely resolved   Neurological:      Mental Status: She is alert and oriented to person, place, and time.   Psychiatric:         Behavior: Behavior normal.           LABS:   Recent Results (from the past 240 hour(s))   INR   Result Value Ref Range    INR 1.57 (H) 0.90 - 1.10   Basic Metabolic Panel   Result Value Ref Range    Sodium 141 136 - 145 mmol/L    Potassium 3.9 3.5 - 5.0 mmol/L    Chloride 102 98 - 107 mmol/L    CO2 29 22 - 31 mmol/L    Anion Gap, Calculation 10 5 - 18 mmol/L    Glucose 102 70 - 125 mg/dL    Calcium 9.1 8.5 - 10.5 mg/dL    BUN 14 8 - 28 mg/dL    Creatinine 0.98 0.60 - 1.10 mg/dL    GFR MDRD Af Amer >60 >60 mL/min/1.73m2    GFR MDRD Non Af Amer 56 (L) >60 mL/min/1.73m2   INR   Result Value Ref Range    INR 2.64 (H) 0.90 - 1.10   HM2(CBC w/o Differential)   Result Value Ref Range    WBC 10.2 4.0 - 11.0 thou/uL    RBC 4.60 3.80 - 5.40 mill/uL    Hemoglobin 11.5 (L) 12.0 -  16.0 g/dL    Hematocrit 39.0 35.0 - 47.0 %    MCV 85 80 - 100 fL    MCH 25.0 (L) 27.0 - 34.0 pg    MCHC 29.5 (L) 32.0 - 36.0 g/dL    RDW 18.2 (H) 11.0 - 14.5 %    Platelets 184 140 - 440 thou/uL    MPV 11.6 8.5 - 12.5 fL     Current Outpatient Medications   Medication Sig   ? acetaminophen (TYLENOL) 500 MG tablet Take 1-2 tablets (500-1,000 mg total) by mouth every 4 (four) hours as needed (PAin 1-3).   ? albuterol (PROVENTIL) 2.5 mg /3 mL (0.083 %) nebulizer solution Take 3 mL (2.5 mg total) by nebulization every 4 (four) hours as needed for wheezing.   ? calcium-vitamin D (CALCIUM-VITAMIN D) 500 mg(1,250mg) -200 unit per tablet Take 1 tablet by mouth 2 (two) times a day with meals.   ? camphor-menthoL (SARNA) lotion Apply 1 application topically as needed for itching. Daily and prn   ? cholecalciferol, vitamin D3, 1,000 unit tablet Take 2,000 Units by mouth daily.    ? fesoterodine (TOVIAZ) 8 mg Tb24 ER tablet Take 8 mg by mouth daily.    ? fluticasone propionate (FLONASE ALLERGY RELIEF) 50 mcg/actuation nasal spray One spray in each nostril daily   ? furosemide (LASIX) 80 MG tablet Take 1 tablet (80 mg total) by mouth 2 (two) times a day at 9am and 6pm.   ? gabapentin (NEURONTIN) 300 MG capsule Take 300 mg by mouth at bedtime.   ? glucosamine-chondroitin 500-400 mg tablet Take 1 tablet by mouth 2 (two) times a day with meals.    ? guaiFENesin (ROBITUSSIN) 100 mg/5 mL syrup Take 200 mg by mouth every 4 (four) hours as needed for cough.   ? HYDROcodone-acetaminophen 5-325 mg per tablet Take 1 tablet by mouth every 4 (four) hours as needed for pain.   ? lidocaine 4 % patch Place 1 patch on the skin daily. Remove and discard patch with 12 hours or as directed by MD.   ? lidocaine-prilocaine (EMLA) cream Place over port 30 min. before being accessed.   ? loratadine (CLARITIN) 10 mg tablet Take 1 tablet (10 mg total) by mouth daily.   ? metoprolol tartrate (LOPRESSOR) 50 MG tablet Take 50 mg by mouth 2 (two) times a  day. Hold for SBP < 110   ? mv-min/FA/vit K/lycop/lut/zeax (OCUVITE EYE PLUS MULTI ORAL) Take 1 tablet by mouth 2 (two) times a day with meals.   ? nystatin (MYCOSTATIN) powder Apply 1 application topically 2 (two) times a day. Under breasts for rash, until healed. Then PRN.   ? OMEGA-3/DHA/EPA/FISH OIL (FISH OIL-OMEGA-3 FATTY ACIDS) 300-1,000 mg capsule Take 2 g by mouth 2 times a day at 6:00 am and 4:00 pm.   ? omeprazole (PRILOSEC) 20 MG capsule Take 20 mg by mouth daily before breakfast.   ? polyvinyl alcohol-povidon,PF, (REFRESH CLASSIC) 1.4-0.6 % Dpet ophthalmic dropperette Administer 2 drops to both eyes 3 (three) times a day.   ? potassium chloride (K-DUR,KLOR-CON) 10 MEQ tablet TAKE 2 TABLETS BY MOUTH TWICE DAILY   ? simvastatin (ZOCOR) 20 MG tablet Take 1 tablet (20 mg total) by mouth daily with supper.   ? warfarin ANTICOAGULANT (COUMADIN/JANTOVEN) 2 MG tablet Take 2 tablets (4 mg total) by mouth daily. (Patient taking differently: Take 5 mg by mouth daily. Next INR due 2/24/2020)       ASSESSMENT:      ICD-10-CM    1. Paroxysmal atrial fibrillation (H) I48.0    2. Physical deconditioning R53.81    3. Cough R05        PLAN:     pneumonia started on doxycycline l nebs Prn, robitussin prn    Chronic right shoulder pain recently seen by orthopedics and was given a cortisone shot.    Rash triamcinolone cream to rash twice daily completed Sarna lotion ordered daily and as needed,  hydroxyzine to 50 mg every 6 hours PRN almost resolved, A few scabs from scratching on back.      Loose toenails seen by podiatry 1/15/19 and loose toenail was removed continue dressing change orders     Atrial fibrillation-continue Coumadin,  metoprolol and increased lasix, monitor BMP closely     Venous stasis lower extremities. Pt  will resume lymph wraps M-F and ace wraps on the weekends. .  Blistering lower extremities- new dressing change orders.      Status post left breast lumpectomy incision healed    Pain management Norco  PRN     Shortness of breath with exertion. Oxygen PRN    Anticoagulation management continue Coumadin and adjust per INRs    Debility PT OT      Electronically signed by: Sara Mayes CNP

## 2021-06-20 NOTE — LETTER
Letter by Sara Mayes CNP at      Author: Sara Mayes CNP Service: -- Author Type: --    Filed:  Encounter Date: 1/27/2020 Status: (Other)         Patient: Jennifer Galvin   MR Number: [id number]   YOB: 1945   Date of Visit: 1/27/2020     [facility]    Facility:   [facility] [[id number]]   Code Status: FULL CODE      CHIEF COMPLAINT/REASON FOR VISIT:  Chief Complaint   Patient presents with   ? Review Of Multiple Medical Conditions       HISTORY:      HPI: Jennifer is a 74 y.o. female undergoing physical and occupational therapy at [facility] transitional care unit. , she is with history of severe obesity, stage Ib left breast cancer, I normal, vitamin D deficiency, urge stress incontinence, tremor, postmenopausal bleeding and uterine adenocarcinoma in situ status post JEROME/BSO, atrial fibrillation, osteopenia, FLAKITA, macular degeneration, chronic bilateral leg edema, hypertension, diabetes mellitus, CAD, chronic back and bilateral knee pain who presented to [facility] for scheduled left breast lumpectomy And sentinel lymph node biopsy.  Per the records she became hypoxic in the PACU and noted to be persistently hypoxic to 4 L of oxygen which is a change from her baseline.  She does have FLAKITA but does not use her CPAP machine. She was recently sent back to the hospital from December 14-17 th  for left axilla pain and found to have a left axillary  hematoma related to her recent lumpectomy. She was seen by general surgery who recommended resuming warfarin and providing modest infection prophylaxis.She was also treated at this time with cefdinir for a UTI. She then returned and was  admitted to [facility] from 12/19/19-12/31/19 for pneumonia.  The patient has bilateral pleural effusions on CTA and was treated with doxycycline and Keflex for suspected pneumonia.  She completed a course of Cefdinir for UTI outpatient during her  hospital stay.  Her procalcitonin and influenza A were negative on hospital admission.  She was also noted to have severe BLE nonpitting edema and her furosemide was increased to 80 mg two times a day for diuresis.  It was recommended that her BMP be followed bi-weekly for assessment of renal function on increased diuresis.    Today she was seen for reports of a rash.  She was found to have a rash on her back chest abdomen and slightly on her arms.  She did just recently follow-up with orthopedics regarding a possible rotator  Cuff tear  and was given a cortisone shot.  In review of her meds this was her only recent new med.  She denies any shortness of breath related to the rash or closing of her airway.  She was given Benadryl x2 over the weekend and tells me she did not feel it worked well for her.  I did order her some triamcinolone for her rash and also switched her Benadryl to hydroxyzine.  She was able to lift that arm completely overhead for me today.  In review of her weights she is down 6 pounds over the last 3 days she continues to get lymph wraps per therapy.    She denied chest pain but does report shortness of breath with exertion..  She is off the Oxygen.  She is moving her bowels and has no issues with urination.   Her left breast incision is healed.  He BMP's have been stable despite high doses of lasix.   Her lung sounds were clear.    She denies any cough or congestion.   She was seen by podiatry  and her other toenail was removed and new dressing change orders.   .Last A1C 9/2019 was 6.5.     Past Medical History:   Diagnosis Date   ? (Lower) Leg Localized Swelling     Created by Conversion    ? Adenocarcinoma In Situ Of The Uterus     Created by Conversion    ? Backache     Created by Conversion [facility] Annotation: Feb 29 2012 12:14PM - Opal Helm: Referred to  [facility] 9/11, given TENS unit.    ? Benign Polyps Of The Large Intestine     Created by Conversion    ? Breast cancer (H)     ? Cancer (H)     uterine   ? Chronic kidney disease     stage 1 per H&P 11/22/2019   ? Coronary artery disease    ? Diabetes mellitus (H)     type 2   ? Fatigue     Created by Conversion    ? Hyperglycemia     Created by Conversion    ? Hyperlipidemia     Created by Conversion    ? Hypertension     Created by Conversion    ? Joint Pain, Localized In The Knee     Created by Conversion    ? Lesion of mediastinum    ? Lymphedema     Created by Conversion    ? Macular Degeneration     Created by Conversion [facility] Annotation: Apr 5 2011 12:22PM - Tien Guzman: Followed by  Ophthalmologist, Dr. Avilez.    ? Major Depression, Single Episode In Remission     Created by Conversion    ? Obesity     Created by Conversion    ? Obstructive Sleep Apnea     CPAP   ? Osteopenia     Created by Conversion    ? Paroxysmal Atrial Fibrillation     Created by Conversion [facility] Annotation: Nov 28 2012 10:36PM - Shruthi Dhaliwal: Found incidential  on exam on May 7th, [id number] score of 1 for HTNsymptomatic and hospitalized  x2 in July, 2012.CV X 2 in 2012Chronic Sotalol Rx    ? Postmenopausal bleeding     Created by Conversion    ? Skin cancer    ? Tachycardia     Created by Conversion    ? Tremor     Created by Conversion    ? Urge And Stress Incontinence     Created by Conversion    ? Urinary Frequency More Than Twice At Night ([place]uria)     Created by Conversion    ? Urine Tests Nonspecific Abnormal Findings     Created by Conversion    ? Vitamin D Deficiency     Created by Conversion              Family History   Problem Relation Age of Onset   ? Hypertension Father    ? Pneumonia Father    ? Esophageal cancer Brother    ? Cancer Brother    ? Stroke Mother    ? Alzheimer's disease Brother    ? Cancer Brother    ? Prostate cancer Brother    ? Cancer Nephew    ? Colon cancer Nephew    ? Cancer Paternal cousin      Social History     Socioeconomic History   ? Marital status: Single     Spouse name: Not on file   ? Number of  children: 0   ? Years of education: Not on file   ? Highest education level: Not on file   Occupational History   ? Occupation: Retired RN     Employer: [employer]   Social Needs   ? Financial resource strain: Not on file   ? Food insecurity:     Worry: Not on file     Inability: Not on file   ? Transportation needs:     Medical: Not on file     Non-medical: Not on file   Tobacco Use   ? Smoking status: Former Smoker     Packs/day: 3.00     Years: 40.00     Pack years: 120.00     Types: Cigarettes     Last attempt to quit: 1/1/2005     Years since quitting: 15.0   ? Smokeless tobacco: Never Used   Substance and Sexual Activity   ? Alcohol use: Not Currently   ? Drug use: No   ? Sexual activity: Never     Partners: Male     Birth control/protection: Surgical, Post-menopausal   Lifestyle   ? Physical activity:     Days per week: Not on file     Minutes per session: Not on file   ? Stress: Not on file   Relationships   ? Social connections:     Talks on phone: Not on file     Gets together: Not on file     Attends Oriental orthodox service: Not on file     Active member of club or organization: Not on file     Attends meetings of clubs or organizations: Not on file     Relationship status: Not on file   ? Intimate partner violence:     Fear of current or ex partner: Not on file     Emotionally abused: Not on file     Physically abused: Not on file     Forced sexual activity: Not on file   Other Topics Concern   ? Not on file   Social History Narrative    Lives alone single family home that she owns, no children and is retired.  Boyfriend used to live with her, however due to stroke he is in his own care facility.  She is currently at [facility]         Review of Systems   Constitutional: Positive for activity change. Negative for appetite change, fatigue and fever.   HENT: Negative for congestion.    Respiratory: Negative for cough and wheezing.         Shortness of breath with exertion   Cardiovascular:  Positive for leg swelling. Negative for chest pain.        Lymphedema   Gastrointestinal: Negative for abdominal distention, abdominal pain, constipation, diarrhea and nausea.   Genitourinary: Negative for dysuria.   Musculoskeletal: Positive for arthralgias. Negative for back pain.   Skin: Positive for wound. Negative for color change.        Left breast incision healed   Neurological: Negative for dizziness.   Psychiatric/Behavioral: Negative for agitation, behavioral problems and confusion.       Vitals:    01/27/20 0922   BP: 126/75   Pulse: 93   Resp: 18   Temp: 98  F (36.7  C)   SpO2: 91%   Weight: (!) 345 lb 8 oz (156.7 kg)       Physical Exam  Constitutional:       Appearance: She is well-developed.      Comments: Pleasant woman in no acute distress   HENT:      Head: Normocephalic.   Eyes:      Conjunctiva/sclera: Conjunctivae normal.   Neck:      Musculoskeletal: Normal range of motion.   Cardiovascular:      Rate and Rhythm: Normal rate and regular rhythm.      Heart sounds: Normal heart sounds. No murmur.   Pulmonary:      Effort: No respiratory distress.      Breath sounds: No rales.   Abdominal:      General: Bowel sounds are normal. There is no distension.      Palpations: Abdomen is soft.      Tenderness: There is no abdominal tenderness.   Musculoskeletal: Normal range of motion.      Right lower leg: Edema present.      Left lower leg: Edema present.      Comments: 3-4+ lower extremities. Lymph wraps resumed    Skin:     General: Skin is warm.      Findings: Rash present.      Comments: Healed left breast incision   Neurological:      Mental Status: She is alert and oriented to person, place, and time.   Psychiatric:         Behavior: Behavior normal.           LABS:   Recent Results (from the past 240 hour(s))   HM2(CBC w/o Differential)   Result Value Ref Range    WBC 10.7 4.0 - 11.0 thou/uL    RBC 4.15 3.80 - 5.40 mill/uL    Hemoglobin 11.1 (L) 12.0 - 16.0 g/dL    Hematocrit 38.3 35.0 - 47.0 %     MCV 92 80 - 100 fL    MCH 26.7 (L) 27.0 - 34.0 pg    MCHC 29.0 (L) 32.0 - 36.0 g/dL    RDW 17.9 (H) 11.0 - 14.5 %    Platelets 176 140 - 440 thou/uL    MPV 11.3 8.5 - 12.5 fL   INR   Result Value Ref Range    INR 1.70 (H) 0.90 - 1.10   Basic Metabolic Panel   Result Value Ref Range    Sodium 142 136 - 145 mmol/L    Potassium 4.0 3.5 - 5.0 mmol/L    Chloride 102 98 - 107 mmol/L    CO2 32 (H) 22 - 31 mmol/L    Anion Gap, Calculation 8 5 - 18 mmol/L    Glucose 123 70 - 125 mg/dL    Calcium 9.1 8.5 - 10.5 mg/dL    BUN 14 8 - 28 mg/dL    Creatinine 1.02 0.60 - 1.10 mg/dL    GFR MDRD Af Amer >60 >60 mL/min/1.73m2    GFR MDRD Non Af Amer 53 (L) >60 mL/min/1.73m2     Current Outpatient Medications   Medication Sig   ? acetaminophen (TYLENOL) 500 MG tablet Take 1-2 tablets (500-1,000 mg total) by mouth every 4 (four) hours as needed (PAin 1-3).   ? albuterol (PROVENTIL) 2.5 mg /3 mL (0.083 %) nebulizer solution Take 3 mL (2.5 mg total) by nebulization every 4 (four) hours as needed for wheezing.   ? bacitracin ointment Apply 1 application topically 2 (two) times a day. After epsom salt foot soak. To bilateral great toes for toenail falling off, until seen by Podiatry   ? calcium-vitamin D (CALCIUM-VITAMIN D) 500 mg(1,250mg) -200 unit per tablet Take 1 tablet by mouth 2 (two) times a day with meals.   ? cholecalciferol, vitamin D3, 1,000 unit tablet Take 2,000 Units by mouth daily.    ? diphenhydrAMINE (BENADRYL) 2 % cream Apply 1 application topically 3 (three) times a day as needed for itching.   ? diphenhydrAMINE (BENADRYL) 25 mg capsule Take 25 mg by mouth every 6 (six) hours as needed for itching.   ? fesoterodine (TOVIAZ) 8 mg Tb24 ER tablet Take 8 mg by mouth daily.    ? fluticasone propionate (FLONASE ALLERGY RELIEF) 50 mcg/actuation nasal spray One spray in each nostril daily   ? furosemide (LASIX) 80 MG tablet Take 1 tablet (80 mg total) by mouth 2 (two) times a day at 9am and 6pm.   ? gabapentin (NEURONTIN) 300  MG capsule Take 300 mg by mouth at bedtime.   ? glucosamine-chondroitin 500-400 mg tablet Take 1 tablet by mouth 2 (two) times a day with meals.    ? HYDROcodone-acetaminophen 5-325 mg per tablet Take 1 tablet by mouth every 4 (four) hours as needed for pain.   ? lidocaine 4 % patch Place 1 patch on the skin daily. Remove and discard patch with 12 hours or as directed by MD.   ? lidocaine-prilocaine (EMLA) cream Place over port 30 min. before being accessed.   ? loratadine (CLARITIN) 10 mg tablet Take 1 tablet (10 mg total) by mouth daily.   ? metoprolol tartrate (LOPRESSOR) 50 MG tablet Take 50 mg by mouth 2 (two) times a day. Hold for SBP < 110   ? mv-min/FA/vit K/lycop/lut/zeax (OCUVITE EYE PLUS MULTI ORAL) Take 1 tablet by mouth 2 (two) times a day with meals.   ? NOVOLOG U-100 INSULIN ASPART 100 unit/mL injection Check blood sugar three (3) times daily.  11.65 Type 2 with hyperglycemia   ? nystatin (MYCOSTATIN) powder Apply 1 application topically 2 (two) times a day. Under breasts for rash, until healed. Then PRN.   ? OMEGA-3/DHA/EPA/FISH OIL (FISH OIL-OMEGA-3 FATTY ACIDS) 300-1,000 mg capsule Take 2 g by mouth 2 times a day at 6:00 am and 4:00 pm.   ? omeprazole (PRILOSEC) 20 MG capsule Take 20 mg by mouth daily before breakfast.   ? polyvinyl alcohol-povidon,PF, (REFRESH CLASSIC) 1.4-0.6 % Dpet ophthalmic dropperette Administer 2 drops to both eyes 3 (three) times a day.   ? potassium chloride (K-DUR,KLOR-CON) 10 MEQ tablet TAKE 2 TABLETS BY MOUTH TWICE DAILY   ? prochlorperazine (COMPAZINE) 10 MG tablet TAKE 1 TABLET(10 MG) BY MOUTH EVERY 6 HOURS AS NEEDED FOR NAUSEA   ? simvastatin (ZOCOR) 20 MG tablet Take 1 tablet (20 mg total) by mouth daily with supper.   ? warfarin ANTICOAGULANT (COUMADIN/JANTOVEN) 2 MG tablet Take 2 tablets (4 mg total) by mouth daily.       ASSESSMENT:      ICD-10-CM    1. Atrial fibrillation, unspecified type (H) I48.91    2. Physical deconditioning R53.81    3. Bilateral leg edema  R60.0    4. Rash R21        PLAN:   Chronic right shoulder pain recently seen by orthopedics and was given a cortisone shot.    Rash triamcinolone cream to rash twice daily, change Benadryl to hydroxyzine    Loose toenails seen by podiatry 1/15/19 and loose toenail was removed and she has new dressing change orders     Atrial fibrillation-continue Coumadin,  metoprolol and increased lasix, monitor BMP closely     Venous stasis lower extremities. Pt  will resume lymph wraps M-F and ace wraps on the weekends. .  Blistering lower extremities- new dressing change orders.      Status post left breast lumpectomy incision healed    Pain management Norco PRN     Shortness of breath with exertion. Oxygen PRN    Anticoagulation management continue Coumadin and adjust per INRs    Debility PT OT      Electronically signed by: Sara Mayes CNP

## 2021-06-20 NOTE — LETTER
Letter by Sara Mayes CNP at      Author: Sara Mayes CNP Service: -- Author Type: --    Filed:  Encounter Date: 12/18/2019 Status: Signed         Patient: Jennifer Galvin   MR Number: [id number]   YOB: 1945   Date of Visit: 12/18/2019     [facility]    Facility:   [facility] [[id number]]   Code Status: FULL CODE      CHIEF COMPLAINT/REASON FOR VISIT:  Chief Complaint   Patient presents with   ? Review Of Multiple Medical Conditions       HISTORY:      HPI: Jennifer is a 74 y.o. female undergoing physical and occupational therapy at [facility] transitional care unit. , she is with history of severe obesity, stage Ib left breast cancer, I normal, vitamin D deficiency, urge stress incontinence, tremor, postmenopausal bleeding and uterine adenocarcinoma in situ status post JEROME/BSO, atrial fibrillation, osteopenia, FLAKITA, macular degeneration, chronic bilateral leg edema, hypertension, diabetes mellitus, CAD, chronic back and bilateral knee pain who presented to [facility] for scheduled left breast lumpectomy And sentinel lymph node biopsy.  Per the records she became hypoxic in the PACU and noted to be persistently hypoxic to 4 L of oxygen which is a change from her baseline.  She does have FLAKITA but does not use her CPAP machine. She was recently sent back to the hospital from December 14-17 th  for left axilla pain and found to have a left axillary  hematoma related to her recent lumpectomy. She was seen by general surgery who recommended resuming warfarin and providing modest infection prophylaxis.She was also treated at this time with cefdinir for a UTI.    Today she is seen after returning from a recent hospitalization for hematoma left axilla related to her recent lumpectomy.  Her weights were noted to be elevated 14 pounds in the last 4 days.  She tells me she does not believe this is accurate however she did have crackles bilateral  lower lobes and she is with lymphedema lower extremities.  Currently her legs are wrapped in lymph wraps that were done in the hospital and they will be removed in the morning for a podiatry appointment.  She does have 2 toenails that are fallen off on her bilateral great toes.  Lymph wraps to be resumed when she returns per therapy.  She continues set to have intermittent tachycardia and her metoprolol was increased again to 50 mg twice daily.  She was also given 3 extra days of increased Lasix due to the weight gain . .  She is maintaining her sats at 92 to 93% however she does appear short of breath when talking.  She is refusing to wear the oxygen.  She does have significant bruising around lumpectomy site and left axilla.   She does have a Petersen in her right upper chest that is not accessed at this time.     Past Medical History:   Diagnosis Date   ? (Lower) Leg Localized Swelling     Created by Conversion    ? Adenocarcinoma In Situ Of The Uterus     Created by Conversion    ? Backache     Created by Conversion [facility] Annotation: Feb 29 2012 12:14PM - Opal Helm: Referred to  [facility] 9/11, given TENS unit.    ? Benign Polyps Of The Large Intestine     Created by Conversion    ? Breast cancer (H)    ? Cancer (H)     uterine   ? Chronic kidney disease     stage 1 per H&P 11/22/2019   ? Coronary artery disease    ? Diabetes mellitus (H)     type 2   ? Fatigue     Created by Conversion    ? Hyperglycemia     Created by Conversion    ? Hyperlipidemia     Created by Conversion    ? Hypertension     Created by Conversion    ? Joint Pain, Localized In The Knee     Created by Conversion    ? Lesion of mediastinum    ? Lymphedema     Created by Conversion    ? Macular Degeneration     Created by Conversion [facility] Annotation: Apr 5 2011 12:22PM - Tien Guzman: Followed by  Ophthalmologist, Dr. Avilez.    ? Major Depression, Single Episode In Remission     Created by Conversion    ? Obesity      Created by Conversion    ? Obstructive Sleep Apnea     CPAP   ? Osteopenia     Created by Conversion    ? Paroxysmal Atrial Fibrillation     Created by Conversion [facility] Annotation: 2012 10:36PM - Shruthi Dhaliwal: Found incidential  on exam on May 7th, [id number] score of 1 for HTNsymptomatic and hospitalized  x2 in .CV X 2 in 2012Chronic Sotalol Rx    ? Postmenopausal bleeding     Created by Conversion    ? Skin cancer    ? Tachycardia     Created by Conversion    ? Tremor     Created by Conversion    ? Urge And Stress Incontinence     Created by Conversion    ? Urinary Frequency More Than Twice At Night (Nocturia)     Created by Conversion    ? Urine Tests Nonspecific Abnormal Findings     Created by Conversion    ? Vitamin D Deficiency     Created by Conversion              Family History   Problem Relation Age of Onset   ? Hypertension Father    ? Pneumonia Father    ? Esophageal cancer Brother    ? Cancer Brother    ? Stroke Mother    ? Alzheimer's disease Brother    ? Cancer Brother    ? Prostate cancer Brother    ? Cancer Nephew    ? Colon cancer Nephew    ? Cancer Paternal cousin      Social History     Socioeconomic History   ? Marital status: Single     Spouse name: Not on file   ? Number of children: 0   ? Years of education: Not on file   ? Highest education level: Not on file   Occupational History   ? Occupation: Retired RN     Employer: [employer]   Social Needs   ? Financial resource strain: Not on file   ? Food insecurity:     Worry: Not on file     Inability: Not on file   ? Transportation needs:     Medical: Not on file     Non-medical: Not on file   Tobacco Use   ? Smoking status: Former Smoker     Packs/day: 3.00     Years: 40.00     Pack years: 120.00     Types: Cigarettes     Last attempt to quit: 2005     Years since quittin.9   ? Smokeless tobacco: Never Used   Substance and Sexual Activity   ? Alcohol use: Not Currently   ? Drug use: No   ? Sexual  activity: Never     Partners: Male     Birth control/protection: Surgical, Post-menopausal   Lifestyle   ? Physical activity:     Days per week: Not on file     Minutes per session: Not on file   ? Stress: Not on file   Relationships   ? Social connections:     Talks on phone: Not on file     Gets together: Not on file     Attends Mosque service: Not on file     Active member of club or organization: Not on file     Attends meetings of clubs or organizations: Not on file     Relationship status: Not on file   ? Intimate partner violence:     Fear of current or ex partner: Not on file     Emotionally abused: Not on file     Physically abused: Not on file     Forced sexual activity: Not on file   Other Topics Concern   ? Not on file   Social History Narrative    Lives alone single family home that she owns, no children and is retired.  Boyfriend used to live with her, however due to stroke he is in his own care facility.  She is currently at [facility]         Review of Systems   Constitutional: Positive for activity change. Negative for appetite change, fatigue and fever.   HENT: Negative for congestion.    Respiratory: Positive for shortness of breath. Negative for cough and wheezing.    Cardiovascular: Positive for leg swelling. Negative for chest pain.        Lymphedema   Gastrointestinal: Negative for abdominal distention, abdominal pain, constipation, diarrhea and nausea.   Genitourinary: Negative for dysuria.   Musculoskeletal: Positive for arthralgias. Negative for back pain.   Skin: Positive for rash and wound. Negative for color change.        Bruising around left breast surgical incision  Left surgical breast incision that is Dermabond   Patient with a rash left upper thigh and medial left thigh   Neurological: Negative for dizziness.   Psychiatric/Behavioral: Negative for agitation, behavioral problems and confusion.       Vitals:    12/18/19 1200   BP: 117/78   Pulse: 80   Resp: 18   Temp:  97.4  F (36.3  C)   SpO2: 92%   Weight: (!) 353 lb (160.1 kg)       Physical Exam  Constitutional:       Appearance: She is well-developed.      Comments: Pleasant woman in no acute distress   HENT:      Head: Normocephalic.   Eyes:      Conjunctiva/sclera: Conjunctivae normal.   Neck:      Musculoskeletal: Normal range of motion.   Cardiovascular:      Rate and Rhythm: Normal rate and regular rhythm.      Heart sounds: Normal heart sounds. No murmur.   Pulmonary:      Effort: No respiratory distress.      Breath sounds: Normal breath sounds. No wheezing or rales.   Abdominal:      General: Bowel sounds are normal. There is no distension.      Palpations: Abdomen is soft.      Tenderness: There is no abdominal tenderness.   Musculoskeletal: Normal range of motion.      Right lower leg: Edema present.      Left lower leg: Edema present.      Comments: Patient with a right and left  great toenails that has almost completely lifted off and hanging on by the cuticle.podiatry consult 12/19/19   Skin:     General: Skin is warm.      Comments: Surgical incision left breast Dermabond and large amount of bruising around the incision.   Neurological:      Mental Status: She is alert and oriented to person, place, and time.   Psychiatric:         Behavior: Behavior normal.           LABS:   Recent Results (from the past 240 hour(s))   Basic Metabolic Panel   Result Value Ref Range    Sodium 141 136 - 145 mmol/L    Potassium 4.1 3.5 - 5.0 mmol/L    Chloride 104 98 - 107 mmol/L    CO2 27 22 - 31 mmol/L    Anion Gap, Calculation 10 5 - 18 mmol/L    Glucose 131 (H) 70 - 125 mg/dL    Calcium 9.3 8.5 - 10.5 mg/dL    BUN 17 8 - 28 mg/dL    Creatinine 0.80 0.60 - 1.10 mg/dL    GFR MDRD Af Amer >60 >60 mL/min/1.73m2    GFR MDRD Non Af Amer >60 >60 mL/min/1.73m2   Hemoglobin   Result Value Ref Range    Hemoglobin 11.5 (L) 12.0 - 16.0 g/dL   INR   Result Value Ref Range    INR 1.62 (H) 0.90 - 1.10   INR   Result Value Ref Range    INR  1.98 (H) 0.90 - 1.10   ECG 12 lead with MUSE   Result Value Ref Range    SYSTOLIC BLOOD PRESSURE      DIASTOLIC BLOOD PRESSURE      VENTRICULAR RATE 94 BPM    ATRIAL RATE 107 BPM    P-R INTERVAL      QRS DURATION 126 ms    Q-T INTERVAL 358 ms    QTC CALCULATION (BEZET) 447 ms    P Axis      R AXIS 39 degrees    T AXIS -25 degrees    MUSE DIAGNOSIS       Atrial fibrillation  Right bundle branch block  Abnormal ECG  When compared with ECG of 26-JUN-2019 11:38,  No significant change was found  Confirmed by SEE ED PROVIDER NOTE FOR, ECG INTERPRETATION ([id number]),  DOMINIQUE DE LUNA ([id number]) on 12/16/2019 8:02:13 AM     Comprehensive Metabolic Panel   Result Value Ref Range    Sodium 135 (L) 136 - 145 mmol/L    Potassium 4.8 3.5 - 5.0 mmol/L    Chloride 99 98 - 107 mmol/L    CO2 28 22 - 31 mmol/L    Anion Gap, Calculation 8 5 - 18 mmol/L    Glucose 274 (H) 70 - 125 mg/dL    BUN 12 8 - 28 mg/dL    Creatinine 1.08 0.60 - 1.10 mg/dL    GFR MDRD Af Amer 60 (L) >60 mL/min/1.73m2    GFR MDRD Non Af Amer 50 (L) >60 mL/min/1.73m2    Bilirubin, Total 0.7 0.0 - 1.0 mg/dL    Calcium 8.4 (L) 8.5 - 10.5 mg/dL    Protein, Total 5.6 (L) 6.0 - 8.0 g/dL    Albumin 2.4 (L) 3.5 - 5.0 g/dL    Alkaline Phosphatase 70 45 - 120 U/L    AST 13 0 - 40 U/L    ALT 24 0 - 45 U/L   INR   Result Value Ref Range    INR 2.36 (H) 0.90 - 1.10   Lactic Acid   Result Value Ref Range    Lactic Acid 1.9 0.5 - 2.2 mmol/L   Troponin I   Result Value Ref Range    Troponin I 0.01 0.00 - 0.29 ng/mL   HM1 (CBC with Diff)   Result Value Ref Range    WBC 19.7 (H) 4.0 - 11.0 thou/uL    RBC 3.21 (L) 3.80 - 5.40 mill/uL    Hemoglobin 9.5 (L) 12.0 - 16.0 g/dL    Hematocrit 31.0 (L) 35.0 - 47.0 %    MCV 97 80 - 100 fL    MCH 29.6 27.0 - 34.0 pg    MCHC 30.6 (L) 32.0 - 36.0 g/dL    RDW 21.3 (H) 11.0 - 14.5 %    Platelets 168 140 - 440 thou/uL    MPV 10.8 8.5 - 12.5 fL    Neutrophils % 86 (H) 50 - 70 %    Lymphocytes % 4 (L) 20 - 40 %    Monocytes % 8 2 - 10 %    Eosinophils  % 1 0 - 6 %    Basophils % 0 0 - 2 %    Neutrophils Absolute 16.9 (H) 2.0 - 7.7 thou/uL    Lymphocytes Absolute 0.8 0.8 - 4.4 thou/uL    Monocytes Absolute 1.5 (H) 0.0 - 0.9 thou/uL    Eosinophils Absolute 0.3 0.0 - 0.4 thou/uL    Basophils Absolute 0.0 0.0 - 0.2 thou/uL   BNP(B-type Natriuretic Peptide)   Result Value Ref Range    BNP 42 0 - 130 pg/mL   Manual Differential   Result Value Ref Range    Platelet Estimate Normal Normal    Ovalocytes 1+ (!) Negative    Polychromasia 1+ (!) Negative   Urinalysis-UC if Indicated   Result Value Ref Range    Color, UA Yellow Colorless, Yellow, Straw, Light Yellow    Clarity, UA Clear Clear    Glucose, UA Negative Negative    Bilirubin, UA Negative Negative    Ketones, UA Negative Negative, 60 mg/dL    Specific Gravity, UA 1.015 1.001 - 1.030    Blood, UA Negative Negative    pH, UA 5.0 4.5 - 8.0    Protein, UA Negative Negative mg/dL    Urobilinogen, UA <2.0 E.U./dL <2.0 E.U./dL, 2.0 E.U./dL    Nitrite, UA Negative Negative    Leukocytes, UA Moderate (!) Negative    Bacteria, UA Moderate (!) None Seen hpf    RBC, UA 0-2 None Seen, 0-2 hpf    WBC, UA 5-10 (!) None Seen, 0-5 hpf    Squam Epithel, UA 0-5 None Seen, 0-5 lpf    WBC Clumps Present (!) None Seen    Mucus, UA Moderate (!) None Seen lpf    Hyaline Casts, UA 10-25 (!) 0-5, None Seen lpf   Culture, Urine   Result Value Ref Range    Culture 50,000-100,000 col/ml Citrobacter freundii (!)        Susceptibility    Citrobacter freundii - JAYDEN     Amoxicillin + Clavulanate >16/8 Resistant      Ampicillin >16 Resistant      Cefazolin >16 Resistant      Cefepime <=1 Sensitive      Ceftriaxone <=1 Sensitive      Ciprofloxacin <=0.5 Sensitive      Gentamicin <=2 Sensitive      Levofloxacin <=1 Sensitive      Meropenem <=0.25 Sensitive      Nitrofurantoin <=16 Sensitive      Tetracycline <=2 Sensitive      Tobramycin <=2 Sensitive      Trimethoprim + Sulfamethoxazole <=0.5 Sensitive    POCT Glucose   Result Value Ref Range     Glucose 211 (H) 70 - 139 mg/dL   POCT Glucose   Result Value Ref Range    Glucose 155 (H) 70 - 139 mg/dL   POCT Glucose   Result Value Ref Range    Glucose 130 70 - 139 mg/dL   INR - early am (tomorrow am)   Result Value Ref Range    INR 2.34 (H) 0.90 - 1.10   Basic metabolic panel   Result Value Ref Range    Sodium 137 136 - 145 mmol/L    Potassium 4.5 3.5 - 5.0 mmol/L    Chloride 101 98 - 107 mmol/L    CO2 31 22 - 31 mmol/L    Anion Gap, Calculation 5 5 - 18 mmol/L    Glucose 148 (H) 70 - 125 mg/dL    Calcium 8.6 8.5 - 10.5 mg/dL    BUN 11 8 - 28 mg/dL    Creatinine 0.83 0.60 - 1.10 mg/dL    GFR MDRD Af Amer >60 >60 mL/min/1.73m2    GFR MDRD Non Af Amer >60 >60 mL/min/1.73m2   HM1 (CBC with Diff)   Result Value Ref Range    WBC 19.1 (H) 4.0 - 11.0 thou/uL    RBC 3.43 (L) 3.80 - 5.40 mill/uL    Hemoglobin 10.0 (L) 12.0 - 16.0 g/dL    Hematocrit 33.0 (L) 35.0 - 47.0 %    MCV 96 80 - 100 fL    MCH 29.2 27.0 - 34.0 pg    MCHC 30.3 (L) 32.0 - 36.0 g/dL    RDW 21.1 (H) 11.0 - 14.5 %    Platelets 177 140 - 440 thou/uL    MPV 10.4 8.5 - 12.5 fL    Neutrophils % 85 (H) 50 - 70 %    Lymphocytes % 3 (L) 20 - 40 %    Monocytes % 8 2 - 10 %    Eosinophils % 3 0 - 6 %    Basophils % 0 0 - 2 %    Neutrophils Absolute 16.2 (H) 2.0 - 7.7 thou/uL    Lymphocytes Absolute 0.6 (L) 0.8 - 4.4 thou/uL    Monocytes Absolute 1.6 (H) 0.0 - 0.9 thou/uL    Eosinophils Absolute 0.6 (H) 0.0 - 0.4 thou/uL    Basophils Absolute 0.0 0.0 - 0.2 thou/uL   Manual Differential   Result Value Ref Range    Platelet Estimate Normal Normal    Ovalocytes 1+ (!) Negative    Polychromasia 1+ (!) Negative   POCT Glucose   Result Value Ref Range    Glucose 134 70 - 139 mg/dL   POCT Glucose   Result Value Ref Range    Glucose 175 (H) 70 - 139 mg/dL   POCT Glucose   Result Value Ref Range    Glucose 201 (H) 70 - 139 mg/dL   Lactic Acid   Result Value Ref Range    Lactic Acid 1.6 0.5 - 2.2 mmol/L   INR   Result Value Ref Range    INR 1.85 (H) 0.90 - 1.10   HM2(CBC  w/o Differential)   Result Value Ref Range    WBC 14.6 (H) 4.0 - 11.0 thou/uL    RBC 2.94 (L) 3.80 - 5.40 mill/uL    Hemoglobin 8.5 (L) 12.0 - 16.0 g/dL    Hematocrit 28.9 (L) 35.0 - 47.0 %    MCV 98 80 - 100 fL    MCH 28.9 27.0 - 34.0 pg    MCHC 29.4 (L) 32.0 - 36.0 g/dL    RDW 21.2 (H) 11.0 - 14.5 %    Platelets 158 140 - 440 thou/uL    MPV 11.1 8.5 - 12.5 fL   POCT Glucose   Result Value Ref Range    Glucose 140 (H) 70 - 139 mg/dL   POCT Glucose   Result Value Ref Range    Glucose 132 70 - 139 mg/dL   Hemoglobin   Result Value Ref Range    Hemoglobin 8.4 (L) 12.0 - 16.0 g/dL   POCT Glucose   Result Value Ref Range    Glucose 125 70 - 139 mg/dL   POCT Glucose   Result Value Ref Range    Glucose 158 (H) 70 - 139 mg/dL   Lactic Acid   Result Value Ref Range    Lactic Acid 0.8 0.5 - 2.2 mmol/L   HM2(CBC w/o Differential)   Result Value Ref Range    WBC 12.4 (H) 4.0 - 11.0 thou/uL    RBC 2.78 (L) 3.80 - 5.40 mill/uL    Hemoglobin 8.2 (L) 12.0 - 16.0 g/dL    Hematocrit 27.4 (L) 35.0 - 47.0 %    MCV 99 80 - 100 fL    MCH 29.5 27.0 - 34.0 pg    MCHC 29.9 (L) 32.0 - 36.0 g/dL    RDW 20.6 (H) 11.0 - 14.5 %    Platelets 164 140 - 440 thou/uL    MPV 10.3 8.5 - 12.5 fL   INR   Result Value Ref Range    INR 1.57 (H) 0.90 - 1.10   POCT Glucose   Result Value Ref Range    Glucose 125 70 - 139 mg/dL   POCT Glucose   Result Value Ref Range    Glucose 127 70 - 139 mg/dL   INR   Result Value Ref Range    INR 1.55 (H) 0.90 - 1.10     Current Outpatient Medications   Medication Sig   ? albuterol (PROVENTIL) 2.5 mg /3 mL (0.083 %) nebulizer solution Take 3 mL (2.5 mg total) by nebulization every 4 (four) hours as needed for wheezing.   ? bacitracin ointment Apply 1 application topically 2 (two) times a day. After epsom salt foot soak. To bilateral great toes for toenail falling off, until seen by Podiatry   ? calcium-vitamin D (CALCIUM-VITAMIN D) 500 mg(1,250mg) -200 unit per tablet Take 1 tablet by mouth 2 (two) times a day with  meals.   ? cefdinir (OMNICEF) 300 MG capsule Take 1 capsule (300 mg total) by mouth 2 (two) times a day for 5 days.   ? cholecalciferol, vitamin D3, 1,000 unit tablet Take 2,000 Units by mouth daily.    ? diphenhydrAMINE (BENADRYL) 2 % cream Apply 1 application topically 3 (three) times a day as needed for itching.   ? diphenhydrAMINE (BENADRYL) 25 mg capsule Take 25 mg by mouth every 6 (six) hours as needed for itching.   ? fesoterodine (TOVIAZ) 8 mg Tb24 ER tablet Take 8 mg by mouth daily.    ? fluticasone propionate (FLONASE ALLERGY RELIEF) 50 mcg/actuation nasal spray One spray in each nostril daily   ? furosemide (LASIX) 40 MG tablet TAKE 1 TABLET(40 MG) BY MOUTH TWICE DAILY   ? glucosamine-chondroitin 500-400 mg tablet Take 1 tablet by mouth 2 (two) times a day with meals.    ? HYDROcodone-acetaminophen 5-325 mg per tablet Take 1 tablet by mouth every 4 (four) hours as needed for pain.   ? lidocaine-prilocaine (EMLA) cream Place over port 30 min. before being accessed.   ? loratadine (CLARITIN) 10 mg tablet Take 1 tablet (10 mg total) by mouth daily.   ? metoprolol tartrate (LOPRESSOR) 25 MG tablet Take 37.5 mg by mouth 2 (two) times a day. Hold for SBP < 110   ? mv-min/FA/vit K/lycop/lut/zeax (OCUVITE EYE PLUS MULTI ORAL) Take 1 tablet by mouth 2 (two) times a day with meals.   ? nystatin (MYCOSTATIN) powder Apply 1 application topically 2 (two) times a day. Under breasts for rash, until healed. Then PRN.   ? OMEGA-3/DHA/EPA/FISH OIL (FISH OIL-OMEGA-3 FATTY ACIDS) 300-1,000 mg capsule Take 2 g by mouth 2 times a day at 6:00 am and 4:00 pm.   ? omeprazole (PRILOSEC) 20 MG capsule Take 20 mg by mouth daily before breakfast.   ? polyvinyl alcohol-povidon,PF, (REFRESH CLASSIC) 1.4-0.6 % Dpet ophthalmic dropperette Administer 2 drops to both eyes 3 (three) times a day.   ? potassium chloride (K-DUR,KLOR-CON) 10 MEQ tablet TAKE 2 TABLETS BY MOUTH TWICE DAILY   ? prochlorperazine (COMPAZINE) 10 MG tablet TAKE 1  TABLET(10 MG) BY MOUTH EVERY 6 HOURS AS NEEDED FOR NAUSEA   ? simvastatin (ZOCOR) 20 MG tablet Take 1 tablet (20 mg total) by mouth daily with supper.   ? triamcinolone (KENALOG) 0.1 % cream Apply 1 application topically 2 (two) times a day. To left leg for rash, until healed   ? warfarin ANTICOAGULANT (COUMADIN/JANTOVEN) 2.5 MG tablet Take 2 tablets (5 mg total) by mouth daily.       ASSESSMENT:      ICD-10-CM    1. Weight gain R63.5    2. Edema, unspecified type R60.9    3. Hematoma T14.8XXA        PLAN:      Rash left upper thigh triamcinolone twice daily    Atrial fibrillation-continue Coumadin,  metoprolol recently increased to 50 mg  twice daily    Venous stasis lower extremities ace wraps prior to hospitalization, came back to facility with lymph wraps and Therapy to resume them.     Status post left breast lumpectomy monitor surgical incision for signs and symptoms of infection-recent left axilla hematoma.     Pain management Norco PRN anticoagulation    Right/Left  great toenail injury bacitracin to bilateral  great toe twice daily, cover with a bandage and podiatry to see patient.  Foot soaks two times a day with epsom salt prior to bacitracin  twice a day  and podiatry consult    Rash under breast nystatin powder twice daily until healed and then as needed twice daily    Shortness of breath oxygen 1 to 2 L PRN to keep sats greater than 90%, currently refusing to wear it.     Anticoagulation management continue Coumadin and adjust per INRs    Debility PT OT    Patient to receive benign fusion related to her cancer every 3 weeks.  Nurse manager to follow-up to see if this is something that needs to be done while she is in the TCU or for can resume after discharge.    Electronically signed by: Sara Mayes CNP

## 2021-06-20 NOTE — LETTER
Letter by Sara Mayes CNP at      Author: Sara Mayes CNP Service: -- Author Type: --    Filed:  Encounter Date: 12/19/2019 Status: Signed         Patient: Jennifer Galvin   MR Number: [id number]   YOB: 1945   Date of Visit: 12/19/2019     [facility]    Facility:   [facility] [[id number]]   Code Status: FULL CODE      CHIEF COMPLAINT/REASON FOR VISIT:  Chief Complaint   Patient presents with   ? Problem Visit     SOB       HISTORY:      HPI: Jennifer is a 74 y.o. female undergoing physical and occupational therapy at [facility] transitional care unit. , she is with history of severe obesity, stage Ib left breast cancer, I normal, vitamin D deficiency, urge stress incontinence, tremor, postmenopausal bleeding and uterine adenocarcinoma in situ status post JEROME/BSO, atrial fibrillation, osteopenia, FLAKITA, macular degeneration, chronic bilateral leg edema, hypertension, diabetes mellitus, CAD, chronic back and bilateral knee pain who presented to [facility] for scheduled left breast lumpectomy And sentinel lymph node biopsy.  Per the records she became hypoxic in the PACU and noted to be persistently hypoxic to 4 L of oxygen which is a change from her baseline.  She does have FLAKITA but does not use her CPAP machine. She was recently sent back to the hospital from December 14-17 th  for left axilla pain and found to have a left axillary  hematoma related to her recent lumpectomy. She was seen by general surgery who recommended resuming warfarin and providing modest infection prophylaxis.She was also treated at this time with cefdinir for a UTI.    Today she was leaving with a friend for a podiatry appointment and she made it  to the entrance out and became extremely shortness of breath. Her oxygen was increased to 3l NC and her sats were 94%. She also appeared to be hyperventilating. Her RR was between 22-30 and she was  noted to have scattered  expiratory wheezes,  She was brought back to her room and given a neb with minimal relief. She reported she was having a hard time taking a deep breath. Her weights have also increased. She showed a significant weight gain within 4 days and today she was up another 2.5 pounds despite increasing lasix. Her legs were tight and 3-4+ pitting edema. She was sent to [facility] for further evaluation.     Past Medical History:   Diagnosis Date   ? (Lower) Leg Localized Swelling     Created by Conversion    ? Adenocarcinoma In Situ Of The Uterus     Created by Conversion    ? Backache     Created by Conversion [facility] Annotation: Feb 29 2012 12:14PM - Opal Helm: Referred to  [facility] 9/11, given TENS unit.    ? Benign Polyps Of The Large Intestine     Created by Conversion    ? Breast cancer (H)    ? Cancer (H)     uterine   ? Chronic kidney disease     stage 1 per H&P 11/22/2019   ? Coronary artery disease    ? Diabetes mellitus (H)     type 2   ? Fatigue     Created by Conversion    ? Hyperglycemia     Created by Conversion    ? Hyperlipidemia     Created by Conversion    ? Hypertension     Created by Conversion    ? Joint Pain, Localized In The Knee     Created by Conversion    ? Lesion of mediastinum    ? Lymphedema     Created by Conversion    ? Macular Degeneration     Created by Conversion [facility] Annotation: Apr 5 2011 12:22PM - Tien Guzman: Followed by  Ophthalmologist, Dr. Avilez.    ? Major Depression, Single Episode In Remission     Created by Conversion    ? Obesity     Created by Conversion    ? Obstructive Sleep Apnea     CPAP   ? Osteopenia     Created by Conversion    ? Paroxysmal Atrial Fibrillation     Created by Conversion [facility] Annotation: Nov 28 2012 10:36PM - Shruthi Dhaliwal: Found incidential  on exam on May 7th, [id number] score of 1 for HTNsymptomatic and hospitalized  x2 in July, 2012.CV X 2 in 2012Chronic Sotalol Rx    ? Postmenopausal bleeding     Created by  Conversion    ? Skin cancer    ? Tachycardia     Created by Conversion    ? Tremor     Created by Conversion    ? Urge And Stress Incontinence     Created by Conversion    ? Urinary Frequency More Than Twice At Night (Nocturia)     Created by Conversion    ? Urine Tests Nonspecific Abnormal Findings     Created by Conversion    ? Vitamin D Deficiency     Created by Conversion              Family History   Problem Relation Age of Onset   ? Hypertension Father    ? Pneumonia Father    ? Esophageal cancer Brother    ? Cancer Brother    ? Stroke Mother    ? Alzheimer's disease Brother    ? Cancer Brother    ? Prostate cancer Brother    ? Cancer Nephew    ? Colon cancer Nephew    ? Cancer Paternal cousin      Social History     Socioeconomic History   ? Marital status: Single     Spouse name: Not on file   ? Number of children: 0   ? Years of education: Not on file   ? Highest education level: Not on file   Occupational History   ? Occupation: Retired RN     Employer: [employer]   Social Needs   ? Financial resource strain: Not on file   ? Food insecurity:     Worry: Not on file     Inability: Not on file   ? Transportation needs:     Medical: Not on file     Non-medical: Not on file   Tobacco Use   ? Smoking status: Former Smoker     Packs/day: 3.00     Years: 40.00     Pack years: 120.00     Types: Cigarettes     Last attempt to quit: 2005     Years since quittin.9   ? Smokeless tobacco: Never Used   Substance and Sexual Activity   ? Alcohol use: Not Currently   ? Drug use: No   ? Sexual activity: Never     Partners: Male     Birth control/protection: Surgical, Post-menopausal   Lifestyle   ? Physical activity:     Days per week: Not on file     Minutes per session: Not on file   ? Stress: Not on file   Relationships   ? Social connections:     Talks on phone: Not on file     Gets together: Not on file     Attends Rastafarian service: Not on file     Active member of club or organization: Not on file      Attends meetings of clubs or organizations: Not on file     Relationship status: Not on file   ? Intimate partner violence:     Fear of current or ex partner: Not on file     Emotionally abused: Not on file     Physically abused: Not on file     Forced sexual activity: Not on file   Other Topics Concern   ? Not on file   Social History Narrative    Lives alone single family home that she owns, no children and is retired.  Boyfriend used to live with her, however due to stroke he is in his own care facility.  She is currently at [facility]         Review of Systems   Constitutional: Positive for activity change. Negative for appetite change, fatigue and fever.   HENT: Negative for congestion.    Respiratory: Positive for shortness of breath. Negative for cough and wheezing.    Cardiovascular: Positive for leg swelling. Negative for chest pain.        Lymphedema   Gastrointestinal: Negative for abdominal distention, abdominal pain, constipation, diarrhea and nausea.   Genitourinary: Negative for dysuria.   Musculoskeletal: Positive for arthralgias. Negative for back pain.   Skin: Positive for rash and wound. Negative for color change.        Bruising around left breast surgical incision  Left surgical breast incision that is Dermabond   Patient with a rash left upper thigh and medial left thigh   Neurological: Negative for dizziness.   Psychiatric/Behavioral: Negative for agitation, behavioral problems and confusion.       Vitals:    12/19/19 1129   BP: 108/61   Pulse: (!) 116   Resp: 22   SpO2: 94%   Weight: (!) 355 lb 8 oz (161.3 kg)       Physical Exam  Constitutional:       Appearance: She is well-developed.      Comments: Pleasant woman in no acute distress   HENT:      Head: Normocephalic.   Eyes:      Conjunctiva/sclera: Conjunctivae normal.   Neck:      Musculoskeletal: Normal range of motion.   Cardiovascular:      Rate and Rhythm: Normal rate and regular rhythm.      Heart sounds: Normal heart  sounds. No murmur.   Pulmonary:      Effort: No respiratory distress.      Breath sounds: Wheezing present. No rales.   Abdominal:      General: Bowel sounds are normal. There is no distension.      Palpations: Abdomen is soft.      Tenderness: There is no abdominal tenderness.   Musculoskeletal: Normal range of motion.      Right lower leg: Edema present.      Left lower leg: Edema present.      Comments: Patient with a right and left  great toenails that has almost completely lifted off and hanging on by the cuticle.podiatry consult 12/19/19 but pt did not make the appointment due to respiratory issues.     3-4+ lower extremities.    Skin:     General: Skin is warm.      Comments: Surgical incision left breast Dermabond and large amount of bruising around the incision.   Neurological:      Mental Status: She is alert and oriented to person, place, and time.   Psychiatric:         Behavior: Behavior normal.           LABS:   Recent Results (from the past 240 hour(s))   INR   Result Value Ref Range    INR 1.98 (H) 0.90 - 1.10   ECG 12 lead with MUSE   Result Value Ref Range    SYSTOLIC BLOOD PRESSURE      DIASTOLIC BLOOD PRESSURE      VENTRICULAR RATE 94 BPM    ATRIAL RATE 107 BPM    P-R INTERVAL      QRS DURATION 126 ms    Q-T INTERVAL 358 ms    QTC CALCULATION (BEZET) 447 ms    P Axis      R AXIS 39 degrees    T AXIS -25 degrees    MUSE DIAGNOSIS       Atrial fibrillation  Right bundle branch block  Abnormal ECG  When compared with ECG of 26-JUN-2019 11:38,  No significant change was found  Confirmed by SEE ED PROVIDER NOTE FOR, ECG INTERPRETATION ([id number]),  DOMINIQUE DE LUNA ([id number]) on 12/16/2019 8:02:13 AM     Comprehensive Metabolic Panel   Result Value Ref Range    Sodium 135 (L) 136 - 145 mmol/L    Potassium 4.8 3.5 - 5.0 mmol/L    Chloride 99 98 - 107 mmol/L    CO2 28 22 - 31 mmol/L    Anion Gap, Calculation 8 5 - 18 mmol/L    Glucose 274 (H) 70 - 125 mg/dL    BUN 12 8 - 28 mg/dL    Creatinine 1.08 0.60 -  1.10 mg/dL    GFR MDRD Af Amer 60 (L) >60 mL/min/1.73m2    GFR MDRD Non Af Amer 50 (L) >60 mL/min/1.73m2    Bilirubin, Total 0.7 0.0 - 1.0 mg/dL    Calcium 8.4 (L) 8.5 - 10.5 mg/dL    Protein, Total 5.6 (L) 6.0 - 8.0 g/dL    Albumin 2.4 (L) 3.5 - 5.0 g/dL    Alkaline Phosphatase 70 45 - 120 U/L    AST 13 0 - 40 U/L    ALT 24 0 - 45 U/L   INR   Result Value Ref Range    INR 2.36 (H) 0.90 - 1.10   Lactic Acid   Result Value Ref Range    Lactic Acid 1.9 0.5 - 2.2 mmol/L   Troponin I   Result Value Ref Range    Troponin I 0.01 0.00 - 0.29 ng/mL   HM1 (CBC with Diff)   Result Value Ref Range    WBC 19.7 (H) 4.0 - 11.0 thou/uL    RBC 3.21 (L) 3.80 - 5.40 mill/uL    Hemoglobin 9.5 (L) 12.0 - 16.0 g/dL    Hematocrit 31.0 (L) 35.0 - 47.0 %    MCV 97 80 - 100 fL    MCH 29.6 27.0 - 34.0 pg    MCHC 30.6 (L) 32.0 - 36.0 g/dL    RDW 21.3 (H) 11.0 - 14.5 %    Platelets 168 140 - 440 thou/uL    MPV 10.8 8.5 - 12.5 fL    Neutrophils % 86 (H) 50 - 70 %    Lymphocytes % 4 (L) 20 - 40 %    Monocytes % 8 2 - 10 %    Eosinophils % 1 0 - 6 %    Basophils % 0 0 - 2 %    Neutrophils Absolute 16.9 (H) 2.0 - 7.7 thou/uL    Lymphocytes Absolute 0.8 0.8 - 4.4 thou/uL    Monocytes Absolute 1.5 (H) 0.0 - 0.9 thou/uL    Eosinophils Absolute 0.3 0.0 - 0.4 thou/uL    Basophils Absolute 0.0 0.0 - 0.2 thou/uL   BNP(B-type Natriuretic Peptide)   Result Value Ref Range    BNP 42 0 - 130 pg/mL   Manual Differential   Result Value Ref Range    Platelet Estimate Normal Normal    Ovalocytes 1+ (!) Negative    Polychromasia 1+ (!) Negative   Urinalysis-UC if Indicated   Result Value Ref Range    Color, UA Yellow Colorless, Yellow, Straw, Light Yellow    Clarity, UA Clear Clear    Glucose, UA Negative Negative    Bilirubin, UA Negative Negative    Ketones, UA Negative Negative, 60 mg/dL    Specific Gravity, UA 1.015 1.001 - 1.030    Blood, UA Negative Negative    pH, UA 5.0 4.5 - 8.0    Protein, UA Negative Negative mg/dL    Urobilinogen, UA <2.0 E.U./dL <2.0  E.U./dL, 2.0 E.U./dL    Nitrite, UA Negative Negative    Leukocytes, UA Moderate (!) Negative    Bacteria, UA Moderate (!) None Seen hpf    RBC, UA 0-2 None Seen, 0-2 hpf    WBC, UA 5-10 (!) None Seen, 0-5 hpf    Squam Epithel, UA 0-5 None Seen, 0-5 lpf    WBC Clumps Present (!) None Seen    Mucus, UA Moderate (!) None Seen lpf    Hyaline Casts, UA 10-25 (!) 0-5, None Seen lpf   Culture, Urine   Result Value Ref Range    Culture 50,000-100,000 col/ml Citrobacter freundii (!)        Susceptibility    Citrobacter freundii - JAYDEN     Amoxicillin + Clavulanate >16/8 Resistant      Ampicillin >16 Resistant      Cefazolin >16 Resistant      Cefepime <=1 Sensitive      Ceftriaxone <=1 Sensitive      Ciprofloxacin <=0.5 Sensitive      Gentamicin <=2 Sensitive      Levofloxacin <=1 Sensitive      Meropenem <=0.25 Sensitive      Nitrofurantoin <=16 Sensitive      Tetracycline <=2 Sensitive      Tobramycin <=2 Sensitive      Trimethoprim + Sulfamethoxazole <=0.5 Sensitive    POCT Glucose   Result Value Ref Range    Glucose 211 (H) 70 - 139 mg/dL   POCT Glucose   Result Value Ref Range    Glucose 155 (H) 70 - 139 mg/dL   POCT Glucose   Result Value Ref Range    Glucose 130 70 - 139 mg/dL   INR - early am (tomorrow am)   Result Value Ref Range    INR 2.34 (H) 0.90 - 1.10   Basic metabolic panel   Result Value Ref Range    Sodium 137 136 - 145 mmol/L    Potassium 4.5 3.5 - 5.0 mmol/L    Chloride 101 98 - 107 mmol/L    CO2 31 22 - 31 mmol/L    Anion Gap, Calculation 5 5 - 18 mmol/L    Glucose 148 (H) 70 - 125 mg/dL    Calcium 8.6 8.5 - 10.5 mg/dL    BUN 11 8 - 28 mg/dL    Creatinine 0.83 0.60 - 1.10 mg/dL    GFR MDRD Af Amer >60 >60 mL/min/1.73m2    GFR MDRD Non Af Amer >60 >60 mL/min/1.73m2   HM1 (CBC with Diff)   Result Value Ref Range    WBC 19.1 (H) 4.0 - 11.0 thou/uL    RBC 3.43 (L) 3.80 - 5.40 mill/uL    Hemoglobin 10.0 (L) 12.0 - 16.0 g/dL    Hematocrit 33.0 (L) 35.0 - 47.0 %    MCV 96 80 - 100 fL    MCH 29.2 27.0 - 34.0 pg     MCHC 30.3 (L) 32.0 - 36.0 g/dL    RDW 21.1 (H) 11.0 - 14.5 %    Platelets 177 140 - 440 thou/uL    MPV 10.4 8.5 - 12.5 fL    Neutrophils % 85 (H) 50 - 70 %    Lymphocytes % 3 (L) 20 - 40 %    Monocytes % 8 2 - 10 %    Eosinophils % 3 0 - 6 %    Basophils % 0 0 - 2 %    Neutrophils Absolute 16.2 (H) 2.0 - 7.7 thou/uL    Lymphocytes Absolute 0.6 (L) 0.8 - 4.4 thou/uL    Monocytes Absolute 1.6 (H) 0.0 - 0.9 thou/uL    Eosinophils Absolute 0.6 (H) 0.0 - 0.4 thou/uL    Basophils Absolute 0.0 0.0 - 0.2 thou/uL   Manual Differential   Result Value Ref Range    Platelet Estimate Normal Normal    Ovalocytes 1+ (!) Negative    Polychromasia 1+ (!) Negative   POCT Glucose   Result Value Ref Range    Glucose 134 70 - 139 mg/dL   POCT Glucose   Result Value Ref Range    Glucose 175 (H) 70 - 139 mg/dL   POCT Glucose   Result Value Ref Range    Glucose 201 (H) 70 - 139 mg/dL   Lactic Acid   Result Value Ref Range    Lactic Acid 1.6 0.5 - 2.2 mmol/L   INR   Result Value Ref Range    INR 1.85 (H) 0.90 - 1.10   HM2(CBC w/o Differential)   Result Value Ref Range    WBC 14.6 (H) 4.0 - 11.0 thou/uL    RBC 2.94 (L) 3.80 - 5.40 mill/uL    Hemoglobin 8.5 (L) 12.0 - 16.0 g/dL    Hematocrit 28.9 (L) 35.0 - 47.0 %    MCV 98 80 - 100 fL    MCH 28.9 27.0 - 34.0 pg    MCHC 29.4 (L) 32.0 - 36.0 g/dL    RDW 21.2 (H) 11.0 - 14.5 %    Platelets 158 140 - 440 thou/uL    MPV 11.1 8.5 - 12.5 fL   POCT Glucose   Result Value Ref Range    Glucose 140 (H) 70 - 139 mg/dL   POCT Glucose   Result Value Ref Range    Glucose 132 70 - 139 mg/dL   Hemoglobin   Result Value Ref Range    Hemoglobin 8.4 (L) 12.0 - 16.0 g/dL   POCT Glucose   Result Value Ref Range    Glucose 125 70 - 139 mg/dL   POCT Glucose   Result Value Ref Range    Glucose 158 (H) 70 - 139 mg/dL   Lactic Acid   Result Value Ref Range    Lactic Acid 0.8 0.5 - 2.2 mmol/L   HM2(CBC w/o Differential)   Result Value Ref Range    WBC 12.4 (H) 4.0 - 11.0 thou/uL    RBC 2.78 (L) 3.80 - 5.40 mill/uL     Hemoglobin 8.2 (L) 12.0 - 16.0 g/dL    Hematocrit 27.4 (L) 35.0 - 47.0 %    MCV 99 80 - 100 fL    MCH 29.5 27.0 - 34.0 pg    MCHC 29.9 (L) 32.0 - 36.0 g/dL    RDW 20.6 (H) 11.0 - 14.5 %    Platelets 164 140 - 440 thou/uL    MPV 10.3 8.5 - 12.5 fL   INR   Result Value Ref Range    INR 1.57 (H) 0.90 - 1.10   POCT Glucose   Result Value Ref Range    Glucose 125 70 - 139 mg/dL   POCT Glucose   Result Value Ref Range    Glucose 127 70 - 139 mg/dL   INR   Result Value Ref Range    INR 1.55 (H) 0.90 - 1.10     Current Outpatient Medications   Medication Sig   ? albuterol (PROVENTIL) 2.5 mg /3 mL (0.083 %) nebulizer solution Take 3 mL (2.5 mg total) by nebulization every 4 (four) hours as needed for wheezing.   ? bacitracin ointment Apply 1 application topically 2 (two) times a day. After epsom salt foot soak. To bilateral great toes for toenail falling off, until seen by Podiatry   ? calcium-vitamin D (CALCIUM-VITAMIN D) 500 mg(1,250mg) -200 unit per tablet Take 1 tablet by mouth 2 (two) times a day with meals.   ? cefdinir (OMNICEF) 300 MG capsule Take 1 capsule (300 mg total) by mouth 2 (two) times a day for 5 days.   ? cholecalciferol, vitamin D3, 1,000 unit tablet Take 2,000 Units by mouth daily.    ? diphenhydrAMINE (BENADRYL) 2 % cream Apply 1 application topically 3 (three) times a day as needed for itching.   ? diphenhydrAMINE (BENADRYL) 25 mg capsule Take 25 mg by mouth every 6 (six) hours as needed for itching.   ? fesoterodine (TOVIAZ) 8 mg Tb24 ER tablet Take 8 mg by mouth daily.    ? fluticasone propionate (FLONASE ALLERGY RELIEF) 50 mcg/actuation nasal spray One spray in each nostril daily   ? furosemide (LASIX) 40 MG tablet TAKE 1 TABLET(40 MG) BY MOUTH TWICE DAILY   ? glucosamine-chondroitin 500-400 mg tablet Take 1 tablet by mouth 2 (two) times a day with meals.    ? HYDROcodone-acetaminophen 5-325 mg per tablet Take 1 tablet by mouth every 4 (four) hours as needed for pain.   ? lidocaine-prilocaine  (EMLA) cream Place over port 30 min. before being accessed.   ? loratadine (CLARITIN) 10 mg tablet Take 1 tablet (10 mg total) by mouth daily.   ? metoprolol tartrate (LOPRESSOR) 25 MG tablet Take 37.5 mg by mouth 2 (two) times a day. Hold for SBP < 110   ? mv-min/FA/vit K/lycop/lut/zeax (OCUVITE EYE PLUS MULTI ORAL) Take 1 tablet by mouth 2 (two) times a day with meals.   ? nystatin (MYCOSTATIN) powder Apply 1 application topically 2 (two) times a day. Under breasts for rash, until healed. Then PRN.   ? OMEGA-3/DHA/EPA/FISH OIL (FISH OIL-OMEGA-3 FATTY ACIDS) 300-1,000 mg capsule Take 2 g by mouth 2 times a day at 6:00 am and 4:00 pm.   ? omeprazole (PRILOSEC) 20 MG capsule Take 20 mg by mouth daily before breakfast.   ? polyvinyl alcohol-povidon,PF, (REFRESH CLASSIC) 1.4-0.6 % Dpet ophthalmic dropperette Administer 2 drops to both eyes 3 (three) times a day.   ? potassium chloride (K-DUR,KLOR-CON) 10 MEQ tablet TAKE 2 TABLETS BY MOUTH TWICE DAILY   ? prochlorperazine (COMPAZINE) 10 MG tablet TAKE 1 TABLET(10 MG) BY MOUTH EVERY 6 HOURS AS NEEDED FOR NAUSEA   ? simvastatin (ZOCOR) 20 MG tablet Take 1 tablet (20 mg total) by mouth daily with supper.   ? triamcinolone (KENALOG) 0.1 % cream Apply 1 application topically 2 (two) times a day. To left leg for rash, until healed   ? warfarin ANTICOAGULANT (COUMADIN/JANTOVEN) 2.5 MG tablet Take 2 tablets (5 mg total) by mouth daily.       ASSESSMENT:      ICD-10-CM    1. SOB (shortness of breath) R06.02    2. Wheezing R06.2    3. Weight gain R63.5        PLAN:    Increased SOB and wheezing unrelieved with nebulizer and 3L oxygen  NC - Pt sent to [facility] for further evaluation.       Atrial fibrillation-continue Coumadin,  metoprolol recently increased to 50 mg  twice daily    Venous stasis lower extremities ace wraps prior to hospitalization, came back to facility with lymph wraps. Her wraps were removed this morning for an appointment that she  did not make it  to  .   Status post left breast lumpectomy monitor surgical incision for signs and symptoms of infection-recent left axilla hematoma.     Pain management Norco PRN anticoagulation    Right/Left  great toenail injury bacitracin to bilateral  great toe twice daily, cover with a bandage and podiatry to see patient.  Foot soaks two times a day with epsom salt prior to bacitracin  twice a day  and podiatry consult    Rash under breast nystatin powder twice daily until healed and then as needed twice daily    Shortness of breath oxygen 1 to 2 L PRN to keep sats greater than 90%, currently refusing to wear it.     Anticoagulation management continue Coumadin and adjust per INRs    Debility PT OT    Patient to receive benign fusion related to her cancer every 3 weeks.  Nurse manager to follow-up to see if this is something that needs to be done while she is in the TCU or for can resume after discharge.    Electronically signed by: Sara Mayes CNP

## 2021-06-20 NOTE — LETTER
Letter by Sara Mayes CNP at      Author: Sara Mayes CNP Service: -- Author Type: --    Filed:  Encounter Date: 12/10/2019 Status: Signed         Patient: Jennifer Galvin   MR Number: [id number]   YOB: 1945   Date of Visit: 12/10/2019     [facility]    Facility:   [facility] [[id number]]   Code Status: FULL CODE      CHIEF COMPLAINT/REASON FOR VISIT:  Chief Complaint   Patient presents with   ? Review Of Multiple Medical Conditions       HISTORY:      HPI: Jennifer is a 74 y.o. female undergoing physical and occupational therapy at [facility] transitional care unit. , she is with history of severe obesity, stage Ib left breast cancer, I normal, vitamin D deficiency, urge stress incontinence, tremor, postmenopausal bleeding and uterine adenocarcinoma in situ status post JEROME/BSO, atrial fibrillation, osteopenia, FLAKITA, macular degeneration, chronic bilateral leg edema, hypertension, diabetes mellitus, CAD, chronic back and bilateral knee pain who presented to [facility] for scheduled left breast lumpectomy And sentinel lymph node biopsy.  Per the records she became hypoxic in the PACU and noted to be persistently hypoxic to 4 L of oxygen which is a change from her baseline.  She does have FLAKITA but does not use her CPAP machine.    Today she is seen for reports of her other toenail noted to be lifting also.  New orders were given and patient will have a podiatry consult.  She denied any chest pain or shortness of breath.  She also denied incisional pain.  She is with a left breast incision that is Steri-Stripped clean dry and intact with a large amount of bruising around it.  Last hemoglobin hemoglobin was 11.5 BMP within normal limits.  She was noted to have a Petersen in her right upper chest that is not accessed at this time.  She is using oxygen 1 to 2 L PRN to keep sats greater than 90%.  Her weight is down 2.5 pounds in the last day.   She also reports she is on a bio IV medication related to her cancer every 3 weeks for the next year.  Currently this medication is on hold in the transitional care unit and the nurse manager will follow-up to see if this is something that needs to be done while she is in the TCU.    Past Medical History:   Diagnosis Date   ? (Lower) Leg Localized Swelling     Created by Conversion    ? Adenocarcinoma In Situ Of The Uterus     Created by Conversion    ? Backache     Created by Conversion [facility] Annotation: Feb 29 2012 12:14PM - Opal Helm: Referred to  [facility] 9/11, given TENS unit.    ? Benign Polyps Of The Large Intestine     Created by Conversion    ? Breast cancer (H)    ? Cancer (H)     uterine   ? Chronic kidney disease     stage 1 per H&P 11/22/2019   ? Coronary artery disease    ? Diabetes mellitus (H)     type 2   ? Fatigue     Created by Conversion    ? Hyperglycemia     Created by Conversion    ? Hyperlipidemia     Created by Conversion    ? Hypertension     Created by Conversion    ? Joint Pain, Localized In The Knee     Created by Conversion    ? Lesion of mediastinum    ? Lymphedema     Created by Conversion    ? Macular Degeneration     Created by Conversion [facility] Annotation: Apr 5 2011 12:22PM - Tien Guzman: Followed by  Ophthalmologist, Dr. Avilez.    ? Major Depression, Single Episode In Remission     Created by Conversion    ? Obesity     Created by Conversion    ? Obstructive Sleep Apnea     CPAP   ? Osteopenia     Created by Conversion    ? Paroxysmal Atrial Fibrillation     Created by Conversion [facility] Annotation: Nov 28 2012 10:36PM - Shruthi Dhaliwal: Found incidential  on exam on May 7th, [id number] score of 1 for HTNsymptomatic and hospitalized  x2 in July, 2012.CV X 2 in 2012Chronic Sotalol Rx    ? Postmenopausal bleeding     Created by Conversion    ? Skin cancer    ? Tachycardia     Created by Conversion    ? Tremor     Created by Conversion    ? Urge And Stress  Incontinence     Created by Conversion    ? Urinary Frequency More Than Twice At Night ([place]cturia)     Created by Conversion    ? Urine Tests Nonspecific Abnormal Findings     Created by Conversion    ? Vitamin D Deficiency     Created by Conversion              Family History   Problem Relation Age of Onset   ? Hypertension Father    ? Pneumonia Father    ? Esophageal cancer Brother    ? Cancer Brother    ? Stroke Mother    ? Alzheimer's disease Brother    ? Cancer Brother    ? Prostate cancer Brother    ? Cancer Nephew    ? Colon cancer Nephew    ? Cancer Paternal cousin      Social History     Socioeconomic History   ? Marital status: Single     Spouse name: Not on file   ? Number of children: 0   ? Years of education: Not on file   ? Highest education level: Not on file   Occupational History   ? Occupation: Retired RN     Employer: [employer]   Social Needs   ? Financial resource strain: Not on file   ? Food insecurity:     Worry: Not on file     Inability: Not on file   ? Transportation needs:     Medical: Not on file     Non-medical: Not on file   Tobacco Use   ? Smoking status: Former Smoker     Packs/day: 3.00     Years: 40.00     Pack years: 120.00     Types: Cigarettes     Last attempt to quit: 2005     Years since quittin.9   ? Smokeless tobacco: Never Used   Substance and Sexual Activity   ? Alcohol use: Not Currently   ? Drug use: No   ? Sexual activity: Never     Partners: Male     Birth control/protection: Surgical, Post-menopausal   Lifestyle   ? Physical activity:     Days per week: Not on file     Minutes per session: Not on file   ? Stress: Not on file   Relationships   ? Social connections:     Talks on phone: Not on file     Gets together: Not on file     Attends Tenriism service: Not on file     Active member of club or organization: Not on file     Attends meetings of clubs or organizations: Not on file     Relationship status: Not on file   ? Intimate partner violence:      Fear of current or ex partner: Not on file     Emotionally abused: Not on file     Physically abused: Not on file     Forced sexual activity: Not on file   Other Topics Concern   ? Not on file   Social History Narrative    Lives alone single family home that she owns, no children and is retired.         Review of Systems   Constitutional: Positive for activity change. Negative for appetite change, fatigue and fever.   HENT: Negative for congestion.    Respiratory: Negative for cough, shortness of breath and wheezing.    Cardiovascular: Positive for leg swelling. Negative for chest pain.        Lymphedema   Gastrointestinal: Negative for abdominal distention, abdominal pain, constipation, diarrhea and nausea.   Genitourinary: Negative for dysuria.   Musculoskeletal: Positive for arthralgias. Negative for back pain.   Skin: Positive for wound. Negative for color change.        Bruising around left breast surgical incision  Left surgical breast incision that is Dermabond with Steri-Strips clean dry and intact.   Neurological: Negative for dizziness.   Psychiatric/Behavioral: Negative for agitation, behavioral problems and confusion.       Vitals:    12/10/19 0953   BP: 104/74   Pulse: 94   Resp: 20   Temp: 97.7  F (36.5  C)   SpO2: 97%   Weight: (!) 332 lb 8 oz (150.8 kg)       Physical Exam  Constitutional:       Appearance: She is well-developed.      Comments: Pleasant woman in no acute distress   HENT:      Head: Normocephalic.   Eyes:      Conjunctiva/sclera: Conjunctivae normal.   Neck:      Musculoskeletal: Normal range of motion.   Cardiovascular:      Rate and Rhythm: Normal rate and regular rhythm.      Heart sounds: Normal heart sounds. No murmur.   Pulmonary:      Effort: No respiratory distress.      Breath sounds: Normal breath sounds. No wheezing or rales.   Abdominal:      General: Bowel sounds are normal. There is no distension.      Palpations: Abdomen is soft.      Tenderness: There is no  abdominal tenderness.   Musculoskeletal: Normal range of motion.      Right lower leg: Edema present.      Left lower leg: Edema present.      Comments: Patient with a right great toenail that has almost completely lifted off and hanging on by the cuticle.   Skin:     General: Skin is warm.      Comments: Surgical incision left breast Dermabond with Steri-Strips and large amount of bruising around the incision.   Neurological:      Mental Status: She is alert and oriented to person, place, and time.   Psychiatric:         Behavior: Behavior normal.           LABS:   Recent Results (from the past 240 hour(s))   POCT Glucose   Result Value Ref Range    Glucose 143 (H) 70 - 139 mg/dL   Surgical Pathology Exam   Result Value Ref Range    Case Report       Surgical Pathology                                Case: [id number]                                    Authorizing Provider:  Sara Ferrera MD        Collected:           12/02/2019 1058              Ordering Location:     [facility] OR     Received:            12/02/2019 1126              Pathologist:           Lucrecia Nelson MD                                                         Specimens:   A) - Breast, Lumpectomy, Left, long stitch lateral; short stitch superior; double                   stitch deep                                                                                         B) - Lymph Node(s) [place], Breast, Left                                                  Final Diagnosis       A) BREAST, LEFT, WIRE LOCALIZATION AND LUMPECTOMY:       1) 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 (DCIS):           a) NUCLEAR stGstRstAstDstEst:st st1st b) PATTERNS: SOLID AND CRIBRIFORM            c) SIZE: 21 x 17 x 15 MM           d) MARGINS: NEGATIVE; NEAREST MARGIN - 5 MM           e) REPEAT HORMONE RECEPTOR ANALYSIS              1) ESTROGEN RECEPTOR:  STRONGLY POSITIVE (>95%; 3+)              2) PROGESTERONE RECEPTOR:  POSITIVE (80%; 3+)       2) RESIDUAL INVASIVE  CARCINOMA NOT IDENTIFIED        3) TUMOR ARISING IN FIBROADENOMA AND INVOLVING ADJACENT SCLEROSING ADENOSIS       4) PRIOR BIOPSY SITE PRESENT        4) PROLIFERATIVE FIBROCYSTIC CHANGES WITH STROMAL FIBROSIS, USUAL DUCTAL            HYPERPLASIA AND SCLEROSING ADENOSIS          PATHOLOGIC STAGE: ypTis (DCIS) (sn) pN0 (i+)         See staging parameters below     B) SENTINEL LYMPH NODE, LEFT BREAST, SENTINEL LYMPH NODE BIOPSY:        - ISOLATED CYTOKERATIN(+) TUMOR CELLS PRESENT IN ONE OF THREE LYMPH NODES (1 OF 3)    MCSS    Comment       See addendum to previous biopsy ([id number]) for additional staging information (pretreatment).    Immunohistochemical controls stain appropriately. Calponin and p63 staining of multiple sections confirms the presence of in-situ carcinoma. Definitive foci of residual invasive carcinoma are not identified.    Synoptic Report       INVASIVE CARCINOMA OF THE BREAST: Resection  (Breast.Invasive - All Specimens)      8th Edition - Protocol posted: 2/27/2019      CLINICAL      Clinical History:    Prior presurgical (neoadjuvant) therapy for this diagnosis of invasive carcinoma       SPECIMEN      Procedure:    Excision (less than total mastectomy)       Specimen Laterality:    Left       TUMOR      Clock Position of Tumor Site:    12 o'clock       Histologic Type:    No residual invasive carcinoma       Histologic Grade ([place] Histologic Score):    No residual invasive carcinoma       Tumor Size:    No residual invasive carcinoma       Ductal Carcinoma In Situ (DCIS):    Present         :    Positive for extensive intraductal component (EIC)         Size (Extent) of DCIS:    Estimated size (extent) of DCIS is at least (Millimeters): 21 mm          Additional Dimension (Millimeters):    17 mm          Additional Dimension (Millimeters):    15 mm        Architectural Patterns:    Cribriform         Architectural Patterns:    Solid         Nuclear Grade:    Grade II (intermediate)          Necrosis:    Not identified       Tumor Extent:          Lymphovascular Invasion:    Not identified       Dermal Lymphovascular Invasion:    No skin present       Microcalcifications:    Not identified       Treatment Effect in the Breast:    No definite response to presurgical therapy in the invasive carcinoma       Treatment Effect in the Lymph Nodes:    No definite response to presurgical therapy in metastatic carcinoma       MARGINS      DCIS Margins:    Uninvolved by DCIS         Distance from Closest Margin (Millimeters):    5 mm        Closest Margin:    Superior         Distance from Other Margins:              Anterior Margin (Millimeters):    14 mm          Posterior Margin (Millimeters):    14 mm          Superior Margin (Millimeters):    5 mm          Inferior Margin (Millimeters):    21 mm          Medial Margin (Millimeters):    10 mm          Lateral Margin (Millimeters):    14 mm      LYMPH NODES      Regional Lymph Nodes:    Involved by tumor cells         Number of Lymph Nodes with Macrometastases (> 2 mm):    0         Number of Lymph Nodes with Micrometastases (> 0.2 mm to 2 mm and / or > 200 cells):    0         Number of Lymph Nodes with Isolated Tumor Cells (<= 0.2 mm or <= 200 cells):    1         Extranodal Extension:    Not identified         Number of Lymph Nodes Examined:    3         Number of Gleason Nodes Examined:    3       PATHOLOGIC STAGE CLASSIFICATION (pTNM, AJCC 8th Edition)      TNM Descriptors:    y (post-treatment)       Primary Tumor (pT):    pTis (DCIS)       Regional Lymph Nodes (pN):            Modifier:    (sn): Only sentinel node(s) evaluated.         Category (pN):    pN0 (i+)       ADDITIONAL FINDINGS      Additional Pathologic Findings:    DCIS arising in fibroadenoma and involving areas of sclerosing adenosis       Microscopic Description       Microscopic examination performed, substantiating the above diagnosis.    Clinical Information       Pre-op Diagnosis:   Malignant neoplasm of upper-outer quadrant of left breast in female, estrogen receptor positive (H) [C50.412, Z17.0]    Gross Description       A) Received in formalin labeled with the patient's name, Jennifer Galvin and left breast lumpectomy is an oriented, 61 gram, 6.5 cm from superior to inferior, 5.4 cm from anterior to posterior and 3.9 cm from medial to lateral portion of yellow-white to pink fibrofatty tissue. The specimen is received oriented with a long stitch designating the lateral margin, a short stitch designating the superior margin, and a double stitch designating the deep-posterior margin, per the surgeon. There is an anterior-laterally inserted gray metallic localizing wire. The specimen is differentially inked blue on the anterior margin, black on the posterior margin, yellow on the medial margin and green on the lateral margin. The specimen is serial sectioned from superior to inferior.    Sectioning through the mid-superior aspect of the specimen displays an oblong 2.1 x 1.7 x 1.5 cm, tan-white to pink-gray, indurated nodule. This nodule extends from superior to inferior, medial to lateral and anterior to  posterior, respectively. There is a superiorly located site of previous biopsy from which a gelatinous substance and a helical mammotome clip are removed. This nodule is 0.5 cm from the superior margin, 0.7 cm from the anterior margin, 2.4 cm from the posterior margin, 0.4 cm from the lateral margin, 1.6 cm from the medial margin and is 2.1 cm from the inferior margin. The gross lesion is submitted in its entirety for evaluation from superior to inferior to include the previous site of biopsy.     The remaining cut surface displays predominantly unremarkable yellow lobulated fat and thin to focally dense bands of fibrous tissue. No additional suspicious lesions are identified.    RS-20C    SUMMARY OF CASSETTES: A1-3) Superior margin, shaved and perpendicularly sectioned; A4-5) Inferior margin,  shaved and perpendicularly sectioned; A6-7) Representative from a single cross-section; A8-9) Representative from a single cross-section; A10-14) One full cross-section; A15-16) Representative  from a single cross-section; A17) Representative from a single cross-section; A18-20) One full cross-section of grossly unremarkable tissue between the lesion and the inferior margin.    Note: The specimen is collected and placed into formalin @1058, 12/02/19. JULIOR:viji   Total formalin fixation time: 11:02. KLW:anastasia     B) The specimen is received in formalin and is identified as left sentinel lymph node, breast. It consists of three fragments of adipose tissue measuring 5.5 x 4.5 x 1.4 cm in aggregate dimension. There are three palpable 0.9- to 2.2-cm lymph nodes. Cut section shows a fatty central hilum without grossly suspicious lesions. RS    SUMMARY OF SECTIONS: B1) One lymph node - bisected; B2-3) One lymph node - serially sectioned; B4-5) One lymph node - serially sectioned. KLW:murrayj    Special Stains       Note - Estrogen and Progesterone Receptor Immunoperoxidase Stains:  These stains were done using the following criteria:    Specimen fixative: Formalin-fixed paraffin-embedded sections    Detection system: Biotin-free multimer-based technology detection system ([place])    Retrieval method: CC1 pretreatment; a georgie-based buffer with a slightly basic PH used at an elevated     temperature (95+5 degrees C)    Clone:  Estrogen receptor-SP1, Rabbit monoclonal ([place])     Progesterone receptor-1E2, Rabbit monoclonal ([place])    Scoring method: Intensity of nuclear stain, strong vs weak vs absent; % of cells stained    Charges       CPT: 53612 ×2, 02870 ×2, 77889, 51689 ×2  ICD-10: D05.12    Result Flag Malignant (!) Normal   POCT Glucose   Result Value Ref Range    Glucose 121 70 - 139 mg/dL   Platelet Count   Result Value Ref Range    Platelets 236 140 - 440 thou/uL   Basic Metabolic Panel   Result Value Ref Range     Sodium 137 136 - 145 mmol/L    Potassium 4.4 3.5 - 5.0 mmol/L    Chloride 103 98 - 107 mmol/L    CO2 25 22 - 31 mmol/L    Anion Gap, Calculation 9 5 - 18 mmol/L    Glucose 145 (H) 70 - 125 mg/dL    Calcium 9.0 8.5 - 10.5 mg/dL    BUN 14 8 - 28 mg/dL    Creatinine 0.94 0.60 - 1.10 mg/dL    GFR MDRD Af Amer >60 >60 mL/min/1.73m2    GFR MDRD Non Af Amer 58 (L) >60 mL/min/1.73m2   BNP(B-type Natriuretic Peptide)   Result Value Ref Range    BNP 45 0 - 130 pg/mL   HM2(CBC w/o Differential)   Result Value Ref Range    WBC 12.9 (H) 4.0 - 11.0 thou/uL    RBC 3.63 (L) 3.80 - 5.40 mill/uL    Hemoglobin 10.6 (L) 12.0 - 16.0 g/dL    Hematocrit 34.9 (L) 35.0 - 47.0 %    MCV 96 80 - 100 fL    MCH 29.2 27.0 - 34.0 pg    MCHC 30.4 (L) 32.0 - 36.0 g/dL    RDW 22.8 (H) 11.0 - 14.5 %    Platelets 253 140 - 440 thou/uL    MPV 10.4 8.5 - 12.5 fL   Protime-INR   Result Value Ref Range    INR 1.36 (H) 0.90 - 1.10   Lactic Acid   Result Value Ref Range    Lactic Acid 2.4 (H) 0.5 - 2.2 mmol/L   Blood culture from PERIPHERAL SITE   Result Value Ref Range    Anaerobic Blood Culture Bottle No Growth No Growth, No organisms seen, bottle returned to instrument, Specimen not received, No Growth at 24 hours, No Growth at 48 hours, No Growth at 72 hours, No Growth at 96 hours, No Growth at 120 hours    Aerobic Blood Culture Bottle No Growth No Growth, No organisms seen, bottle returned to instrument, Specimen not received, No Growth at 24 hours, No Growth at 120 hours, No Growth at 48 hours, No Growth at 72 hours, No Growth at 96 hours   Lactic Acid   Result Value Ref Range    Lactic Acid 1.6 0.5 - 2.2 mmol/L   Platelet Count - every other day x 3   Result Value Ref Range    Platelets 225 140 - 440 thou/uL   HM2(CBC w/o Differential)   Result Value Ref Range    WBC 14.8 (H) 4.0 - 11.0 thou/uL    RBC 3.44 (L) 3.80 - 5.40 mill/uL    Hemoglobin 10.2 (L) 12.0 - 16.0 g/dL    Hematocrit 33.7 (L) 35.0 - 47.0 %    MCV 98 80 - 100 fL    MCH 29.7 27.0 - 34.0  pg    MCHC 30.3 (L) 32.0 - 36.0 g/dL    RDW 23.1 (H) 11.0 - 14.5 %    Platelets 232 140 - 440 thou/uL    MPV 10.1 8.5 - 12.5 fL   Basic Metabolic Panel   Result Value Ref Range    Sodium 140 136 - 145 mmol/L    Potassium 3.9 3.5 - 5.0 mmol/L    Chloride 105 98 - 107 mmol/L    CO2 28 22 - 31 mmol/L    Anion Gap, Calculation 7 5 - 18 mmol/L    Glucose 129 (H) 70 - 125 mg/dL    Calcium 9.4 8.5 - 10.5 mg/dL    BUN 15 8 - 28 mg/dL    Creatinine 0.82 0.60 - 1.10 mg/dL    GFR MDRD Af Amer >60 >60 mL/min/1.73m2    GFR MDRD Non Af Amer >60 >60 mL/min/1.73m2   POCT Glucose   Result Value Ref Range    Glucose 188 (H) 70 - 139 mg/dL   POCT Glucose   Result Value Ref Range    Glucose 206 (H) 70 - 139 mg/dL   Basic Metabolic Panel   Result Value Ref Range    Sodium 140 136 - 145 mmol/L    Potassium 3.9 3.5 - 5.0 mmol/L    Chloride 104 98 - 107 mmol/L    CO2 29 22 - 31 mmol/L    Anion Gap, Calculation 7 5 - 18 mmol/L    Glucose 119 70 - 125 mg/dL    Calcium 9.2 8.5 - 10.5 mg/dL    BUN 17 8 - 28 mg/dL    Creatinine 0.82 0.60 - 1.10 mg/dL    GFR MDRD Af Amer >60 >60 mL/min/1.73m2    GFR MDRD Non Af Amer >60 >60 mL/min/1.73m2   INR   Result Value Ref Range    INR 1.25 (H) 0.90 - 1.10   HM2(CBC w/o Differential)   Result Value Ref Range    WBC 16.4 (H) 4.0 - 11.0 thou/uL    RBC 3.56 (L) 3.80 - 5.40 mill/uL    Hemoglobin 10.6 (L) 12.0 - 16.0 g/dL    Hematocrit 34.6 (L) 35.0 - 47.0 %    MCV 97 80 - 100 fL    MCH 29.8 27.0 - 34.0 pg    MCHC 30.6 (L) 32.0 - 36.0 g/dL    RDW 22.5 (H) 11.0 - 14.5 %    Platelets 225 140 - 440 thou/uL    MPV 10.2 8.5 - 12.5 fL   POCT Glucose   Result Value Ref Range    Glucose 121 70 - 139 mg/dL   Lactic Acid   Result Value Ref Range    Lactic Acid 0.7 0.5 - 2.2 mmol/L   POCT Glucose   Result Value Ref Range    Glucose 214 (H) 70 - 139 mg/dL   Basic Metabolic Panel   Result Value Ref Range    Sodium 141 136 - 145 mmol/L    Potassium 4.1 3.5 - 5.0 mmol/L    Chloride 104 98 - 107 mmol/L    CO2 27 22 - 31 mmol/L     Anion Gap, Calculation 10 5 - 18 mmol/L    Glucose 131 (H) 70 - 125 mg/dL    Calcium 9.3 8.5 - 10.5 mg/dL    BUN 17 8 - 28 mg/dL    Creatinine 0.80 0.60 - 1.10 mg/dL    GFR MDRD Af Amer >60 >60 mL/min/1.73m2    GFR MDRD Non Af Amer >60 >60 mL/min/1.73m2   Hemoglobin   Result Value Ref Range    Hemoglobin 11.5 (L) 12.0 - 16.0 g/dL   INR   Result Value Ref Range    INR 1.62 (H) 0.90 - 1.10     Current Outpatient Medications   Medication Sig   ? albuterol (PROVENTIL) 2.5 mg /3 mL (0.083 %) nebulizer solution Take 3 mL (2.5 mg total) by nebulization every 4 (four) hours as needed for wheezing.   ? calcium-vitamin D (CALCIUM-VITAMIN D) 500 mg(1,250mg) -200 unit per tablet Take 1 tablet by mouth 2 (two) times a day with meals.   ? cholecalciferol, vitamin D3, 1,000 unit tablet Take 2,000 Units by mouth daily.    ? diphenhydrAMINE (BENADRYL) 2 % cream Apply 1 application topically 3 (three) times a day as needed for itching.   ? diphenhydrAMINE (BENADRYL) 25 mg capsule Take 25 mg by mouth every 6 (six) hours as needed for itching.   ? fesoterodine (TOVIAZ) 8 mg Tb24 ER tablet Take 8 mg by mouth daily.    ? fluticasone propionate (FLONASE ALLERGY RELIEF) 50 mcg/actuation nasal spray One spray in each nostril daily (Patient taking differently: 1 spray into each nostril at bedtime. One spray in each nostril daily)   ? furosemide (LASIX) 40 MG tablet TAKE 1 TABLET(40 MG) BY MOUTH TWICE DAILY   ? glucosamine-chondroitin 500-400 mg tablet Take 1 tablet by mouth 2 (two) times a day.    ? HYDROcodone-acetaminophen 5-325 mg per tablet Take 1 tablet by mouth every 4 (four) hours as needed for pain.   ? hydrocortisone 1 % cream Apply 1 application topically as needed.   ? lidocaine-prilocaine (EMLA) cream Place over port 30 min. before being accessed.   ? loratadine (CLARITIN) 10 mg tablet Take 1 tablet (10 mg total) by mouth daily.   ? metoprolol tartrate (LOPRESSOR) 25 MG tablet Take 1 tablet (25 mg total) by mouth 2 (two) times  a day.   ? OMEGA-3/DHA/EPA/FISH OIL (FISH OIL-OMEGA-3 FATTY ACIDS) 300-1,000 mg capsule Take 2 g by mouth 2 times a day at 6:00 am and 4:00 pm.   ? omeprazole (PRILOSEC) 20 MG capsule Take 20 mg by mouth daily before breakfast.   ? polyvinyl alcohol-povidon,PF, (REFRESH CLASSIC) 1.4-0.6 % Dpet ophthalmic dropperette Administer 2 drops to both eyes 3 (three) times a day.   ? potassium chloride (K-DUR,KLOR-CON) 10 MEQ tablet TAKE 2 TABLETS BY MOUTH TWICE DAILY (Patient taking differently: Take 20 mEq by mouth 2 (two) times a day. )   ? prochlorperazine (COMPAZINE) 10 MG tablet TAKE 1 TABLET(10 MG) BY MOUTH EVERY 6 HOURS AS NEEDED FOR NAUSEA   ? simvastatin (ZOCOR) 20 MG tablet Take 1 tablet (20 mg total) by mouth daily with supper.   ? vitamin A-vitamin C-vit E-min (OCUVITE) Tab tablet Take 1 tablet by mouth 2 (two) times a day.   ? warfarin ANTICOAGULANT (COUMADIN/JANTOVEN) 2.5 MG tablet Take 5mg (1 tablet) daily with INR check 12/9       ASSESSMENT:      ICD-10-CM    1. Atrial fibrillation, unspecified type (H) I48.91    2. Venous stasis I87.8    3. Status post left breast lumpectomy Z98.890    4. Pain management R52        PLAN:    Atrial fibrillation-continue Coumadin,  metoprolol recently increased to 37.5 twice daily    Venous stasis lower extremities compression to lower extremities on during the day and off at night.    Status post left breast lumpectomy monitor surgical incision for signs and symptoms of infection    Pain management Norco PRN anticoagulation    Right great toenail injury bacitracin to right great toe twice daily, cover with a bandage and podiatry to see patient.  She was also noted to have a left great toenail that was lifting.  New orders for foot soaks twice a day and bacitracin twice daily and podiatry consult    Rash under breast nystatin powder twice daily until healed and then as needed twice daily    Shortness of breath oxygen 1 to 2 L PRN to keep sats greater than  90%    Anticoagulation management continue Coumadin and adjust per INRs    Debility PT OT    Patient to receive benign fusion related to her cancer every 3 weeks.  Nurse manager to follow-up to see if this is something that needs to be done while she is in the TCU or for can resume after discharge.    Electronically signed by: Sara Mayes CNP

## 2021-06-20 NOTE — LETTER
Letter by Sara Mayes CNP at      Author: Sara Mayes CNP Service: -- Author Type: --    Filed:  Encounter Date: 1/6/2020 Status: Signed         Patient: Jennifer Galvin   MR Number: [id number]   YOB: 1945   Date of Visit: 1/6/2020     [facility]    Facility:   [facility] [[id number]]   Code Status: FULL CODE      CHIEF COMPLAINT/REASON FOR VISIT:  Chief Complaint   Patient presents with   ? Review Of Multiple Medical Conditions       HISTORY:      HPI: Jennifer is a 74 y.o. female undergoing physical and occupational therapy at [facility] transitional care unit. , she is with history of severe obesity, stage Ib left breast cancer, I normal, vitamin D deficiency, urge stress incontinence, tremor, postmenopausal bleeding and uterine adenocarcinoma in situ status post JEROME/BSO, atrial fibrillation, osteopenia, FLAKITA, macular degeneration, chronic bilateral leg edema, hypertension, diabetes mellitus, CAD, chronic back and bilateral knee pain who presented to [facility] for scheduled left breast lumpectomy And sentinel lymph node biopsy.  Per the records she became hypoxic in the PACU and noted to be persistently hypoxic to 4 L of oxygen which is a change from her baseline.  She does have FLAKITA but does not use her CPAP machine. She was recently sent back to the hospital from December 14-17 th  for left axilla pain and found to have a left axillary  hematoma related to her recent lumpectomy. She was seen by general surgery who recommended resuming warfarin and providing modest infection prophylaxis.She was also treated at this time with cefdinir for a UTI. She then returned and was  admitted to [facility] from 12/19/19-12/31/19 for pneumonia.  The patient has bilateral pleural effusions on CTA and was treated with doxycycline and Keflex for suspected pneumonia.  She completed a course of Cefdinir for UTI outpatient during her hospital  stay.  Her procalcitonin and influenza A were negative on hospital admission.  She was also noted to have severe BLE nonpitting edema and her furosemide was increased to 80 mg two times a day for diuresis.  It was recommended that her BMP be followed bi-weekly for assessment of renal function on increased diuresis.    Today she was seen as a first visit to review multiple medical issues after returning from her recent hospitalization.  She denied chest pain and reports her breathing is good.  She is also currently off oxygen she reports positive bowel movement and voiding well her left breast incision is healed.  Her INR was reviewed today and was therapeutic at 2.19.  Her hemoglobin is at 11.5 and GFR 49.  She does report right shoulder pain and tells me she has had it for over a month.  She does not recall whether it was x-rayed or not but tells me that it is affecting her therapy.  An x-ray was ordered.  She denies any cough or congestion.  She is with lower extremity edema and tells me she will not be resuming her lymph wraps.  She was started on high doses of Lasix in the hospital and is on frequent monitoring of BMPs.    Past Medical History:   Diagnosis Date   ? (Lower) Leg Localized Swelling     Created by Conversion    ? Adenocarcinoma In Situ Of The Uterus     Created by Conversion    ? Backache     Created by Conversion [facility] Annotation: Feb 29 2012 12:14PM - Opal Helm: Referred to  [facility] 9/11, given TENS unit.    ? Benign Polyps Of The Large Intestine     Created by Conversion    ? Breast cancer (H)    ? Cancer (H)     uterine   ? Chronic kidney disease     stage 1 per H&P 11/22/2019   ? Coronary artery disease    ? Diabetes mellitus (H)     type 2   ? Fatigue     Created by Conversion    ? Hyperglycemia     Created by Conversion    ? Hyperlipidemia     Created by Conversion    ? Hypertension     Created by Conversion    ? Joint Pain, Localized In The Knee     Created by Conversion    ?  Lesion of mediastinum    ? Lymphedema     Created by Conversion    ? Macular Degeneration     Created by Conversion [facility] Annotation: Apr 5 2011 12:22PM - Tien Guzman: Followed by  Ophthalmologist, Dr. Avilez.    ? Major Depression, Single Episode In Remission     Created by Conversion    ? Obesity     Created by Conversion    ? Obstructive Sleep Apnea     CPAP   ? Osteopenia     Created by Conversion    ? Paroxysmal Atrial Fibrillation     Created by Conversion [facility] Annotation: Nov 28 2012 10:36PM - Shruthi Dhaliwal: Found incidential  on exam on May 7th, [id number] score of 1 for HTNsymptomatic and hospitalized  x2 in July, 2012.CV X 2 in 2012Chronic Sotalol Rx    ? Postmenopausal bleeding     Created by Conversion    ? Skin cancer    ? Tachycardia     Created by Conversion    ? Tremor     Created by Conversion    ? Urge And Stress Incontinence     Created by Conversion    ? Urinary Frequency More Than Twice At Night (Nocturia)     Created by Conversion    ? Urine Tests Nonspecific Abnormal Findings     Created by Conversion    ? Vitamin D Deficiency     Created by Conversion              Family History   Problem Relation Age of Onset   ? Hypertension Father    ? Pneumonia Father    ? Esophageal cancer Brother    ? Cancer Brother    ? Stroke Mother    ? Alzheimer's disease Brother    ? Cancer Brother    ? Prostate cancer Brother    ? Cancer Nephew    ? Colon cancer Nephew    ? Cancer Paternal cousin      Social History     Socioeconomic History   ? Marital status: Single     Spouse name: Not on file   ? Number of children: 0   ? Years of education: Not on file   ? Highest education level: Not on file   Occupational History   ? Occupation: Retired RN     Employer: [employer]   Social Needs   ? Financial resource strain: Not on file   ? Food insecurity:     Worry: Not on file     Inability: Not on file   ? Transportation needs:     Medical: Not on file     Non-medical: Not on file   Tobacco  Use   ? Smoking status: Former Smoker     Packs/day: 3.00     Years: 40.00     Pack years: 120.00     Types: Cigarettes     Last attempt to quit: 1/1/2005     Years since quitting: 15.0   ? Smokeless tobacco: Never Used   Substance and Sexual Activity   ? Alcohol use: Not Currently   ? Drug use: No   ? Sexual activity: Never     Partners: Male     Birth control/protection: Surgical, Post-menopausal   Lifestyle   ? Physical activity:     Days per week: Not on file     Minutes per session: Not on file   ? Stress: Not on file   Relationships   ? Social connections:     Talks on phone: Not on file     Gets together: Not on file     Attends Cheondoism service: Not on file     Active member of club or organization: Not on file     Attends meetings of clubs or organizations: Not on file     Relationship status: Not on file   ? Intimate partner violence:     Fear of current or ex partner: Not on file     Emotionally abused: Not on file     Physically abused: Not on file     Forced sexual activity: Not on file   Other Topics Concern   ? Not on file   Social History Narrative    Lives alone single family home that she owns, no children and is retired.  Boyfriend used to live with her, however due to stroke he is in his own care facility.  She is currently at [facility]         Review of Systems   Constitutional: Positive for activity change. Negative for appetite change, fatigue and fever.   HENT: Negative for congestion.    Respiratory: Negative for cough and wheezing.         Denied shortness of breath-she is off the oxygen   Cardiovascular: Positive for leg swelling. Negative for chest pain.        Lymphedema   Gastrointestinal: Negative for abdominal distention, abdominal pain, constipation, diarrhea and nausea.   Genitourinary: Negative for dysuria.   Musculoskeletal: Positive for arthralgias. Negative for back pain.   Skin: Positive for wound. Negative for color change.        Left breast incision healed    Neurological: Negative for dizziness.   Psychiatric/Behavioral: Negative for agitation, behavioral problems and confusion.       Vitals:    01/06/20 1110   BP: 100/58   Pulse: 81   Resp: 18   Temp: 98.7  F (37.1  C)   SpO2: 94%   Weight: (!) 337 lb (152.9 kg)       Physical Exam  Constitutional:       Appearance: She is well-developed.      Comments: Pleasant woman in no acute distress   HENT:      Head: Normocephalic.   Eyes:      Conjunctiva/sclera: Conjunctivae normal.   Neck:      Musculoskeletal: Normal range of motion.   Cardiovascular:      Rate and Rhythm: Normal rate and regular rhythm.      Heart sounds: Normal heart sounds. No murmur.   Pulmonary:      Effort: No respiratory distress.      Breath sounds: No rales.   Abdominal:      General: Bowel sounds are normal. There is no distension.      Palpations: Abdomen is soft.      Tenderness: There is no abdominal tenderness.   Musculoskeletal: Normal range of motion.      Right lower leg: Edema present.      Left lower leg: Edema present.      Comments: Patient with a right and left  great toenails that has almost completely lifted off and hanging on by the cuticle.podiatry consult 12/19/19 but pt did not make the appointment due to respiratory issues.  Podiatry consult rescheduled    3-4+ lower extremities.    Skin:     General: Skin is warm.      Comments: Healed left breast incision   Neurological:      Mental Status: She is alert and oriented to person, place, and time.   Psychiatric:         Behavior: Behavior normal.           LABS:   Recent Results (from the past 240 hour(s))   Potassium   Result Value Ref Range    Potassium 3.8 3.5 - 5.0 mmol/L   POCT Glucose   Result Value Ref Range    Glucose 257 (H) 70 - 139 mg/dL   Basic Metabolic Panel   Result Value Ref Range    Sodium 138 136 - 145 mmol/L    Potassium 4.4 3.5 - 5.0 mmol/L    Chloride 104 98 - 107 mmol/L    CO2 28 22 - 31 mmol/L    Anion Gap, Calculation 6 5 - 18 mmol/L    Glucose 207 (H) 70  - 125 mg/dL    Calcium 8.8 8.5 - 10.5 mg/dL    BUN 16 8 - 28 mg/dL    Creatinine 0.83 0.60 - 1.10 mg/dL    GFR MDRD Af Amer >60 >60 mL/min/1.73m2    GFR MDRD Non Af Amer >60 >60 mL/min/1.73m2   HM1 (CBC with Diff)   Result Value Ref Range    WBC 13.2 (H) 4.0 - 11.0 thou/uL    RBC 3.30 (L) 3.80 - 5.40 mill/uL    Hemoglobin 9.6 (L) 12.0 - 16.0 g/dL    Hematocrit 32.1 (L) 35.0 - 47.0 %    MCV 97 80 - 100 fL    MCH 29.1 27.0 - 34.0 pg    MCHC 29.9 (L) 32.0 - 36.0 g/dL    RDW 19.9 (H) 11.0 - 14.5 %    Platelets 168 140 - 440 thou/uL    MPV 10.7 8.5 - 12.5 fL    Neutrophils % 85 (H) 50 - 70 %    Lymphocytes % 7 (L) 20 - 40 %    Monocytes % 7 2 - 10 %    Eosinophils % 0 0 - 6 %    Basophils % 0 0 - 2 %    Neutrophils Absolute 11.3 (H) 2.0 - 7.7 thou/uL    Lymphocytes Absolute 0.9 0.8 - 4.4 thou/uL    Monocytes Absolute 0.9 0.0 - 0.9 thou/uL    Eosinophils Absolute 0.0 0.0 - 0.4 thou/uL    Basophils Absolute 0.0 0.0 - 0.2 thou/uL   POCT Glucose   Result Value Ref Range    Glucose 208 (H) 70 - 139 mg/dL   POCT Glucose   Result Value Ref Range    Glucose 167 (H) 70 - 139 mg/dL   POCT Glucose   Result Value Ref Range    Glucose 226 (H) 70 - 139 mg/dL   POCT Glucose   Result Value Ref Range    Glucose 241 (H) 70 - 139 mg/dL   INR   Result Value Ref Range    INR 2.79 (H) 0.90 - 1.10   Basic Metabolic Panel   Result Value Ref Range    Sodium 139 136 - 145 mmol/L    Potassium 4.0 3.5 - 5.0 mmol/L    Chloride 103 98 - 107 mmol/L    CO2 27 22 - 31 mmol/L    Anion Gap, Calculation 9 5 - 18 mmol/L    Glucose 195 (H) 70 - 125 mg/dL    Calcium 9.0 8.5 - 10.5 mg/dL    BUN 20 8 - 28 mg/dL    Creatinine 0.89 0.60 - 1.10 mg/dL    GFR MDRD Af Amer >60 >60 mL/min/1.73m2    GFR MDRD Non Af Amer >60 >60 mL/min/1.73m2   HM2(CBC w/o Differential)   Result Value Ref Range    WBC 14.4 (H) 4.0 - 11.0 thou/uL    RBC 3.37 (L) 3.80 - 5.40 mill/uL    Hemoglobin 9.6 (L) 12.0 - 16.0 g/dL    Hematocrit 32.7 (L) 35.0 - 47.0 %    MCV 97 80 - 100 fL    MCH  28.5 27.0 - 34.0 pg    MCHC 29.4 (L) 32.0 - 36.0 g/dL    RDW 20.0 (H) 11.0 - 14.5 %    Platelets 197 140 - 440 thou/uL    MPV 11.0 8.5 - 12.5 fL   POCT Glucose   Result Value Ref Range    Glucose 165 (H) 70 - 139 mg/dL   POCT Glucose   Result Value Ref Range    Glucose 160 (H) 70 - 139 mg/dL   Lactic Acid   Result Value Ref Range    Lactic Acid 3.1 (H) 0.5 - 2.2 mmol/L   POCT Glucose   Result Value Ref Range    Glucose 340 (H) 70 - 139 mg/dL   POCT Glucose   Result Value Ref Range    Glucose 285 (H) 70 - 139 mg/dL   POCT Glucose   Result Value Ref Range    Glucose 267 (H) 70 - 139 mg/dL   INR   Result Value Ref Range    INR 2.95 (H) 0.90 - 1.10   Lactic Acid   Result Value Ref Range    Lactic Acid 1.4 0.5 - 2.2 mmol/L   Basic Metabolic Panel   Result Value Ref Range    Sodium 140 136 - 145 mmol/L    Potassium 3.7 3.5 - 5.0 mmol/L    Chloride 103 98 - 107 mmol/L    CO2 30 22 - 31 mmol/L    Anion Gap, Calculation 7 5 - 18 mmol/L    Glucose 174 (H) 70 - 125 mg/dL    Calcium 9.1 8.5 - 10.5 mg/dL    BUN 19 8 - 28 mg/dL    Creatinine 0.91 0.60 - 1.10 mg/dL    GFR MDRD Af Amer >60 >60 mL/min/1.73m2    GFR MDRD Non Af Amer >60 >60 mL/min/1.73m2   HM2(CBC w/o Differential)   Result Value Ref Range    WBC 13.9 (H) 4.0 - 11.0 thou/uL    RBC 3.47 (L) 3.80 - 5.40 mill/uL    Hemoglobin 9.9 (L) 12.0 - 16.0 g/dL    Hematocrit 33.9 (L) 35.0 - 47.0 %    MCV 98 80 - 100 fL    MCH 28.5 27.0 - 34.0 pg    MCHC 29.2 (L) 32.0 - 36.0 g/dL    RDW 19.9 (H) 11.0 - 14.5 %    Platelets 201 140 - 440 thou/uL    MPV 10.8 8.5 - 12.5 fL   POCT Glucose   Result Value Ref Range    Glucose 156 (H) 70 - 139 mg/dL   POCT Glucose   Result Value Ref Range    Glucose 125 70 - 139 mg/dL   POCT Glucose   Result Value Ref Range    Glucose 242 (H) 70 - 139 mg/dL   POCT Glucose   Result Value Ref Range    Glucose 299 (H) 70 - 139 mg/dL   Potassium - Next AM   Result Value Ref Range    Potassium 3.7 3.5 - 5.0 mmol/L   POCT Glucose   Result Value Ref Range     Glucose 130 70 - 139 mg/dL   INR   Result Value Ref Range    INR 3.44 (H) 0.90 - 1.10   Basic Metabolic Panel   Result Value Ref Range    Sodium 140 136 - 145 mmol/L    Potassium 3.7 3.5 - 5.0 mmol/L    Chloride 99 98 - 107 mmol/L    CO2 33 (H) 22 - 31 mmol/L    Anion Gap, Calculation 8 5 - 18 mmol/L    Glucose 149 (H) 70 - 125 mg/dL    Calcium 9.6 8.5 - 10.5 mg/dL    BUN 18 8 - 28 mg/dL    Creatinine 1.13 (H) 0.60 - 1.10 mg/dL    GFR MDRD Af Amer 57 (L) >60 mL/min/1.73m2    GFR MDRD Non Af Amer 47 (L) >60 mL/min/1.73m2   INR   Result Value Ref Range    INR 2.76 (H) 0.90 - 1.10   Basic Metabolic Panel   Result Value Ref Range    Sodium 140 136 - 145 mmol/L    Potassium 3.8 3.5 - 5.0 mmol/L    Chloride 100 98 - 107 mmol/L    CO2 33 (H) 22 - 31 mmol/L    Anion Gap, Calculation 7 5 - 18 mmol/L    Glucose 134 (H) 70 - 125 mg/dL    Calcium 8.9 8.5 - 10.5 mg/dL    BUN 13 8 - 28 mg/dL    Creatinine 1.10 0.60 - 1.10 mg/dL    GFR MDRD Af Amer 59 (L) >60 mL/min/1.73m2    GFR MDRD Non Af Amer 49 (L) >60 mL/min/1.73m2   HM2(CBC w/o Differential)   Result Value Ref Range    WBC 10.3 4.0 - 11.0 thou/uL    RBC 4.08 3.80 - 5.40 mill/uL    Hemoglobin 11.5 (L) 12.0 - 16.0 g/dL    Hematocrit 39.5 35.0 - 47.0 %    MCV 97 80 - 100 fL    MCH 28.2 27.0 - 34.0 pg    MCHC 29.1 (L) 32.0 - 36.0 g/dL    RDW 18.9 (H) 11.0 - 14.5 %    Platelets 172 140 - 440 thou/uL    MPV 11.1 8.5 - 12.5 fL   INR   Result Value Ref Range    INR 2.19 (H) 0.90 - 1.10     Current Outpatient Medications   Medication Sig   ? acetaminophen (TYLENOL) 500 MG tablet Take 1-2 tablets (500-1,000 mg total) by mouth every 4 (four) hours as needed (PAin 1-3).   ? albuterol (PROVENTIL) 2.5 mg /3 mL (0.083 %) nebulizer solution Take 3 mL (2.5 mg total) by nebulization every 4 (four) hours as needed for wheezing.   ? bacitracin ointment Apply 1 application topically 2 (two) times a day. After epsom salt foot soak. To bilateral great toes for toenail falling off, until seen by  Podiatry   ? calcium-vitamin D (CALCIUM-VITAMIN D) 500 mg(1,250mg) -200 unit per tablet Take 1 tablet by mouth 2 (two) times a day with meals.   ? cholecalciferol, vitamin D3, 1,000 unit tablet Take 2,000 Units by mouth daily.    ? diphenhydrAMINE (BENADRYL) 2 % cream Apply 1 application topically 3 (three) times a day as needed for itching.   ? diphenhydrAMINE (BENADRYL) 25 mg capsule Take 25 mg by mouth every 6 (six) hours as needed for itching.   ? fesoterodine (TOVIAZ) 8 mg Tb24 ER tablet Take 8 mg by mouth daily.    ? fluticasone propionate (FLONASE ALLERGY RELIEF) 50 mcg/actuation nasal spray One spray in each nostril daily   ? furosemide (LASIX) 80 MG tablet Take 1 tablet (80 mg total) by mouth 2 (two) times a day at 9am and 6pm.   ? gabapentin (NEURONTIN) 300 MG capsule Take 300 mg by mouth at bedtime.   ? glucosamine-chondroitin 500-400 mg tablet Take 1 tablet by mouth 2 (two) times a day with meals.    ? HYDROcodone-acetaminophen 5-325 mg per tablet Take 1 tablet by mouth every 4 (four) hours as needed for pain.   ? lidocaine 4 % patch Place 1 patch on the skin daily. Remove and discard patch with 12 hours or as directed by MD.   ? lidocaine-prilocaine (EMLA) cream Place over port 30 min. before being accessed.   ? loratadine (CLARITIN) 10 mg tablet Take 1 tablet (10 mg total) by mouth daily.   ? metoprolol tartrate (LOPRESSOR) 50 MG tablet Take 50 mg by mouth 2 (two) times a day. Hold for SBP < 110   ? mv-min/FA/vit K/lycop/lut/zeax (OCUVITE EYE PLUS MULTI ORAL) Take 1 tablet by mouth 2 (two) times a day with meals.   ? NOVOLOG U-100 INSULIN ASPART 100 unit/mL injection Check blood sugar three (3) times daily.  11.65 Type 2 with hyperglycemia   ? nystatin (MYCOSTATIN) powder Apply 1 application topically 2 (two) times a day. Under breasts for rash, until healed. Then PRN.   ? OMEGA-3/DHA/EPA/FISH OIL (FISH OIL-OMEGA-3 FATTY ACIDS) 300-1,000 mg capsule Take 2 g by mouth 2 times a day at 6:00 am and 4:00  pm.   ? omeprazole (PRILOSEC) 20 MG capsule Take 20 mg by mouth daily before breakfast.   ? polyvinyl alcohol-povidon,PF, (REFRESH CLASSIC) 1.4-0.6 % Dpet ophthalmic dropperette Administer 2 drops to both eyes 3 (three) times a day.   ? potassium chloride (K-DUR,KLOR-CON) 10 MEQ tablet TAKE 2 TABLETS BY MOUTH TWICE DAILY   ? prochlorperazine (COMPAZINE) 10 MG tablet TAKE 1 TABLET(10 MG) BY MOUTH EVERY 6 HOURS AS NEEDED FOR NAUSEA   ? simvastatin (ZOCOR) 20 MG tablet Take 1 tablet (20 mg total) by mouth daily with supper.   ? triamcinolone (KENALOG) 0.1 % cream Apply 1 application topically 2 (two) times a day. To left leg for rash, until healed   ? warfarin ANTICOAGULANT (COUMADIN/JANTOVEN) 2 MG tablet Take 2 tablets (4 mg total) by mouth daily.       ASSESSMENT:      ICD-10-CM    1. Physical deconditioning R53.81    2. Bilateral leg edema R60.0    3. Pneumonia due to infectious organism, unspecified laterality, unspecified part of lung J18.9    4. Anticoagulation management encounter Z51.81     Z79.01        PLAN:   Chronic right shoulder pain x-ray ordered    Loose toenails continue foot soaks and dressing changes per orders, podiatry consult    Atrial fibrillation-continue Coumadin,  metoprolol and increased lasix, monitor BMP   closely     Venous stasis lower extremities declined lymph wraps  .   Status post left breast lumpectomy incision healed    Pain management Norco PRN     Shortness of breath resolved off oxygen    Anticoagulation management continue Coumadin and adjust per INRs    Debility PT OT    Electronically signed by: Sara Mayes CNP

## 2021-06-20 NOTE — LETTER
Letter by Sara Mayes CNP at      Author: Sara Mayes CNP Service: -- Author Type: --    Filed:  Encounter Date: 12/12/2019 Status: Signed         Patient: Jennifer Galvin   MR Number: [id number]   YOB: 1945   Date of Visit: 12/12/2019     [facility]    Facility:   [facility] [[id number]]   Code Status: FULL CODE      CHIEF COMPLAINT/REASON FOR VISIT:  Chief Complaint   Patient presents with   ? Problem Visit     rash       HISTORY:      HPI: Jennifer is a 74 y.o. female undergoing physical and occupational therapy at [facility] transitional care unit. , she is with history of severe obesity, stage Ib left breast cancer, I normal, vitamin D deficiency, urge stress incontinence, tremor, postmenopausal bleeding and uterine adenocarcinoma in situ status post JEROME/BSO, atrial fibrillation, osteopenia, FLAKITA, macular degeneration, chronic bilateral leg edema, hypertension, diabetes mellitus, CAD, chronic back and bilateral knee pain who presented to [facility] for scheduled left breast lumpectomy And sentinel lymph node biopsy.  Per the records she became hypoxic in the PACU and noted to be persistently hypoxic to 4 L of oxygen which is a change from her baseline.  She does have FLAKITA but does not use her CPAP machine.    Today she is seen for reports left upper thigh and medial.  she denied any chest pain or shortness of breath.  She also denied incisional pain.  She is with a left breast incision that is Steri-Stripped clean dry and intact with a large amount of bruising around it.  Last hemoglobin hemoglobin was 11.5 BMP within normal limits.  She does have a Petersen in her right upper chest that is not accessed at this time.  She is using oxygen 1 to 2 L PRN to keep sats greater than 90%.    She also reports she is on a bio IV medication related to her cancer every 3 weeks for the next year.  Currently this medication is on hold in the  transitional care unit and the nurse manager will follow-up to see if this is something that needs to be done while she is in the TCU.    Past Medical History:   Diagnosis Date   ? (Lower) Leg Localized Swelling     Created by Conversion    ? Adenocarcinoma In Situ Of The Uterus     Created by Conversion    ? Backache     Created by Conversion [facility] Annotation: Feb 29 2012 12:14PM - Opal Helm: Referred to  [facility] 9/11, given TENS unit.    ? Benign Polyps Of The Large Intestine     Created by Conversion    ? Breast cancer (H)    ? Cancer (H)     uterine   ? Chronic kidney disease     stage 1 per H&P 11/22/2019   ? Coronary artery disease    ? Diabetes mellitus (H)     type 2   ? Fatigue     Created by Conversion    ? Hyperglycemia     Created by Conversion    ? Hyperlipidemia     Created by Conversion    ? Hypertension     Created by Conversion    ? Joint Pain, Localized In The Knee     Created by Conversion    ? Lesion of mediastinum    ? Lymphedema     Created by Conversion    ? Macular Degeneration     Created by Conversion [facility] Annotation: Apr 5 2011 12:22PM - Tien Guzman: Followed by  Ophthalmologist, Dr. Avielz.    ? Major Depression, Single Episode In Remission     Created by Conversion    ? Obesity     Created by Conversion    ? Obstructive Sleep Apnea     CPAP   ? Osteopenia     Created by Conversion    ? Paroxysmal Atrial Fibrillation     Created by Conversion [facility] Annotation: Nov 28 2012 10:36PM - Shruthi Dhaliwal: Found incidential  on exam on May 7th, [id number] score of 1 for HTNsymptomatic and hospitalized  x2 in July, 2012.CV X 2 in 2012Chronic Sotalol Rx    ? Postmenopausal bleeding     Created by Conversion    ? Skin cancer    ? Tachycardia     Created by Conversion    ? Tremor     Created by Conversion    ? Urge And Stress Incontinence     Created by Conversion    ? Urinary Frequency More Than Twice At Night (Nocturia)     Created by Conversion    ? Urine Tests  Nonspecific Abnormal Findings     Created by Conversion    ? Vitamin D Deficiency     Created by Conversion              Family History   Problem Relation Age of Onset   ? Hypertension Father    ? Pneumonia Father    ? Esophageal cancer Brother    ? Cancer Brother    ? Stroke Mother    ? Alzheimer's disease Brother    ? Cancer Brother    ? Prostate cancer Brother    ? Cancer Nephew    ? Colon cancer Nephew    ? Cancer Paternal cousin      Social History     Socioeconomic History   ? Marital status: Single     Spouse name: Not on file   ? Number of children: 0   ? Years of education: Not on file   ? Highest education level: Not on file   Occupational History   ? Occupation: Retired RN     Employer: [employer]   Social Needs   ? Financial resource strain: Not on file   ? Food insecurity:     Worry: Not on file     Inability: Not on file   ? Transportation needs:     Medical: Not on file     Non-medical: Not on file   Tobacco Use   ? Smoking status: Former Smoker     Packs/day: 3.00     Years: 40.00     Pack years: 120.00     Types: Cigarettes     Last attempt to quit: 2005     Years since quittin.9   ? Smokeless tobacco: Never Used   Substance and Sexual Activity   ? Alcohol use: Not Currently   ? Drug use: No   ? Sexual activity: Never     Partners: Male     Birth control/protection: Surgical, Post-menopausal   Lifestyle   ? Physical activity:     Days per week: Not on file     Minutes per session: Not on file   ? Stress: Not on file   Relationships   ? Social connections:     Talks on phone: Not on file     Gets together: Not on file     Attends Episcopal service: Not on file     Active member of club or organization: Not on file     Attends meetings of clubs or organizations: Not on file     Relationship status: Not on file   ? Intimate partner violence:     Fear of current or ex partner: Not on file     Emotionally abused: Not on file     Physically abused: Not on file     Forced sexual  activity: Not on file   Other Topics Concern   ? Not on file   Social History Narrative    Lives alone single family home that she owns, no children and is retired.         Review of Systems   Constitutional: Positive for activity change. Negative for appetite change, fatigue and fever.   HENT: Negative for congestion.    Respiratory: Negative for cough, shortness of breath and wheezing.    Cardiovascular: Positive for leg swelling. Negative for chest pain.        Lymphedema   Gastrointestinal: Negative for abdominal distention, abdominal pain, constipation, diarrhea and nausea.   Genitourinary: Negative for dysuria.   Musculoskeletal: Positive for arthralgias. Negative for back pain.   Skin: Positive for rash and wound. Negative for color change.        Bruising around left breast surgical incision  Left surgical breast incision that is Dermabond with Steri-Strips clean dry and intact.    Patient with a rash left upper thigh and medial left thigh   Neurological: Negative for dizziness.   Psychiatric/Behavioral: Negative for agitation, behavioral problems and confusion.       Vitals:    12/12/19 0945   BP: (!) 149/93   Pulse: (!) 118   Resp: 16   Temp: 97.4  F (36.3  C)   SpO2: 94%   Weight: (!) 338 lb (153.3 kg)       Physical Exam  Constitutional:       Appearance: She is well-developed.      Comments: Pleasant woman in no acute distress   HENT:      Head: Normocephalic.   Eyes:      Conjunctiva/sclera: Conjunctivae normal.   Neck:      Musculoskeletal: Normal range of motion.   Cardiovascular:      Rate and Rhythm: Normal rate and regular rhythm.      Heart sounds: Normal heart sounds. No murmur.   Pulmonary:      Effort: No respiratory distress.      Breath sounds: Normal breath sounds. No wheezing or rales.   Abdominal:      General: Bowel sounds are normal. There is no distension.      Palpations: Abdomen is soft.      Tenderness: There is no abdominal tenderness.   Musculoskeletal: Normal range of motion.       Right lower leg: Edema present.      Left lower leg: Edema present.      Comments: Patient with a right great toenail that has almost completely lifted off and hanging on by the cuticle.   Skin:     General: Skin is warm.      Comments: Surgical incision left breast Dermabond with Steri-Strips and large amount of bruising around the incision.   Neurological:      Mental Status: She is alert and oriented to person, place, and time.   Psychiatric:         Behavior: Behavior normal.           LABS:   Recent Results (from the past 240 hour(s))   Surgical Pathology Exam   Result Value Ref Range    Case Report       Surgical Pathology                                Case: [id number]                                    Authorizing Provider:  Sara Ferrera MD        Collected:           12/02/2019 1058              Ordering Location:     [facility] OR     Received:            12/02/2019 1126              Pathologist:           Lucrecia Nelson MD                                                         Specimens:   A) - Breast, Lumpectomy, Left, long stitch lateral; short stitch superior; double                   stitch deep                                                                                         B) - Lymph Node(s) [place], Breast, Left                                                  Final Diagnosis       A) BREAST, LEFT, WIRE LOCALIZATION AND LUMPECTOMY:       1) 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 (DCIS):           a) NUCLEAR stGstRstAstDstEst:st st1st b) PATTERNS: SOLID AND CRIBRIFORM            c) SIZE: 21 x 17 x 15 MM           d) MARGINS: NEGATIVE; NEAREST MARGIN - 5 MM           e) REPEAT HORMONE RECEPTOR ANALYSIS              1) ESTROGEN RECEPTOR:  STRONGLY POSITIVE (>95%; 3+)              2) PROGESTERONE RECEPTOR:  POSITIVE (80%; 3+)       2) RESIDUAL INVASIVE CARCINOMA NOT IDENTIFIED        3) TUMOR ARISING IN FIBROADENOMA AND INVOLVING ADJACENT SCLEROSING ADENOSIS       4) PRIOR BIOPSY SITE PRESENT         4) PROLIFERATIVE FIBROCYSTIC CHANGES WITH STROMAL FIBROSIS, USUAL DUCTAL            HYPERPLASIA AND SCLEROSING ADENOSIS          PATHOLOGIC STAGE: ypTis (DCIS) (sn) pN0 (i+)         See staging parameters below     B) SENTINEL LYMPH NODE, LEFT BREAST, SENTINEL LYMPH NODE BIOPSY:        - ISOLATED CYTOKERATIN(+) TUMOR CELLS PRESENT IN ONE OF THREE LYMPH NODES (1 OF 3)    MCSS    Comment       See addendum to previous biopsy ([id number]) for additional staging information (pretreatment).    Immunohistochemical controls stain appropriately. Calponin and p63 staining of multiple sections confirms the presence of in-situ carcinoma. Definitive foci of residual invasive carcinoma are not identified.    Synoptic Report       INVASIVE CARCINOMA OF THE BREAST: Resection  (Breast.Invasive - All Specimens)      8th Edition - Protocol posted: 2/27/2019      CLINICAL      Clinical History:    Prior presurgical (neoadjuvant) therapy for this diagnosis of invasive carcinoma       SPECIMEN      Procedure:    Excision (less than total mastectomy)       Specimen Laterality:    Left       TUMOR      Clock Position of Tumor Site:    12 o'clock       Histologic Type:    No residual invasive carcinoma       Histologic Grade (René Histologic Score):    No residual invasive carcinoma       Tumor Size:    No residual invasive carcinoma       Ductal Carcinoma In Situ (DCIS):    Present         :    Positive for extensive intraductal component (EIC)         Size (Extent) of DCIS:    Estimated size (extent) of DCIS is at least (Millimeters): 21 mm          Additional Dimension (Millimeters):    17 mm          Additional Dimension (Millimeters):    15 mm        Architectural Patterns:    Cribriform         Architectural Patterns:    Solid         Nuclear Grade:    Grade II (intermediate)         Necrosis:    Not identified       Tumor Extent:          Lymphovascular Invasion:    Not identified       Dermal Lymphovascular Invasion:    No skin  present       Microcalcifications:    Not identified       Treatment Effect in the Breast:    No definite response to presurgical therapy in the invasive carcinoma       Treatment Effect in the Lymph Nodes:    No definite response to presurgical therapy in metastatic carcinoma       MARGINS      DCIS Margins:    Uninvolved by DCIS         Distance from Closest Margin (Millimeters):    5 mm        Closest Margin:    Superior         Distance from Other Margins:              Anterior Margin (Millimeters):    14 mm          Posterior Margin (Millimeters):    14 mm          Superior Margin (Millimeters):    5 mm          Inferior Margin (Millimeters):    21 mm          Medial Margin (Millimeters):    10 mm          Lateral Margin (Millimeters):    14 mm      LYMPH NODES      Regional Lymph Nodes:    Involved by tumor cells         Number of Lymph Nodes with Macrometastases (> 2 mm):    0         Number of Lymph Nodes with Micrometastases (> 0.2 mm to 2 mm and / or > 200 cells):    0         Number of Lymph Nodes with Isolated Tumor Cells (<= 0.2 mm or <= 200 cells):    1         Extranodal Extension:    Not identified         Number of Lymph Nodes Examined:    3         Number of [place] Nodes Examined:    3       PATHOLOGIC STAGE CLASSIFICATION (pTNM, AJCC 8th Edition)      TNM Descriptors:    y (post-treatment)       Primary Tumor (pT):    pTis (DCIS)       Regional Lymph Nodes (pN):            Modifier:    (sn): Only sentinel node(s) evaluated.         Category (pN):    pN0 (i+)       ADDITIONAL FINDINGS      Additional Pathologic Findings:    DCIS arising in fibroadenoma and involving areas of sclerosing adenosis       Microscopic Description       Microscopic examination performed, substantiating the above diagnosis.    Clinical Information       Pre-op Diagnosis:  Malignant neoplasm of upper-outer quadrant of left breast in female, estrogen receptor positive (H) [C50.412, Z17.0]    Gross Description       A)  Received in formalin labeled with the patient's name, Jennifer Galvin and left breast lumpectomy is an oriented, 61 gram, 6.5 cm from superior to inferior, 5.4 cm from anterior to posterior and 3.9 cm from medial to lateral portion of yellow-white to pink fibrofatty tissue. The specimen is received oriented with a long stitch designating the lateral margin, a short stitch designating the superior margin, and a double stitch designating the deep-posterior margin, per the surgeon. There is an anterior-laterally inserted gray metallic localizing wire. The specimen is differentially inked blue on the anterior margin, black on the posterior margin, yellow on the medial margin and green on the lateral margin. The specimen is serial sectioned from superior to inferior.    Sectioning through the mid-superior aspect of the specimen displays an oblong 2.1 x 1.7 x 1.5 cm, tan-white to pink-gray, indurated nodule. This nodule extends from superior to inferior, medial to lateral and anterior to  posterior, respectively. There is a superiorly located site of previous biopsy from which a gelatinous substance and a helical mammotome clip are removed. This nodule is 0.5 cm from the superior margin, 0.7 cm from the anterior margin, 2.4 cm from the posterior margin, 0.4 cm from the lateral margin, 1.6 cm from the medial margin and is 2.1 cm from the inferior margin. The gross lesion is submitted in its entirety for evaluation from superior to inferior to include the previous site of biopsy.     The remaining cut surface displays predominantly unremarkable yellow lobulated fat and thin to focally dense bands of fibrous tissue. No additional suspicious lesions are identified.    RS-20C    SUMMARY OF CASSETTES: A1-3) Superior margin, shaved and perpendicularly sectioned; A4-5) Inferior margin, shaved and perpendicularly sectioned; A6-7) Representative from a single cross-section; A8-9) Representative from a single cross-section; A10-14) One  full cross-section; A15-16) Representative  from a single cross-section; A17) Representative from a single cross-section; A18-20) One full cross-section of grossly unremarkable tissue between the lesion and the inferior margin.    Note: The specimen is collected and placed into formalin @1058, 12/02/19. JULIOR:viji   Total formalin fixation time: 11:02. KLW:anastasia     B) The specimen is received in formalin and is identified as left sentinel lymph node, breast. It consists of three fragments of adipose tissue measuring 5.5 x 4.5 x 1.4 cm in aggregate dimension. There are three palpable 0.9- to 2.2-cm lymph nodes. Cut section shows a fatty central hilum without grossly suspicious lesions. RS    SUMMARY OF SECTIONS: B1) One lymph node - bisected; B2-3) One lymph node - serially sectioned; B4-5) One lymph node - serially sectioned. KLW:manoj    Special Stains       Note - Estrogen and Progesterone Receptor Immunoperoxidase Stains:  These stains were done using the following criteria:    Specimen fixative: Formalin-fixed paraffin-embedded sections    Detection system: Biotin-free multimer-based technology detection system ([email or web address])    Retrieval method: CC1 pretreatment; a georgie-based buffer with a slightly basic PH used at an elevated     temperature (95+5 degrees C)    Clone:  Estrogen receptor-SP1, Rabbit monoclonal ([place])     Progesterone receptor-1E2, Rabbit monoclonal ([place])    Scoring method: Intensity of nuclear stain, strong vs weak vs absent; % of cells stained    Charges       CPT: 13993 ×2, 59990 ×2, 73180, 73871 ×2  ICD-10: D05.12    Result Flag Malignant (!) Normal   POCT Glucose   Result Value Ref Range    Glucose 121 70 - 139 mg/dL   Platelet Count   Result Value Ref Range    Platelets 236 140 - 440 thou/uL   Basic Metabolic Panel   Result Value Ref Range    Sodium 137 136 - 145 mmol/L    Potassium 4.4 3.5 - 5.0 mmol/L    Chloride 103 98 - 107 mmol/L    CO2 25 22 - 31 mmol/L    Anion Gap, Calculation 9 5 - 18  mmol/L    Glucose 145 (H) 70 - 125 mg/dL    Calcium 9.0 8.5 - 10.5 mg/dL    BUN 14 8 - 28 mg/dL    Creatinine 0.94 0.60 - 1.10 mg/dL    GFR MDRD Af Amer >60 >60 mL/min/1.73m2    GFR MDRD Non Af Amer 58 (L) >60 mL/min/1.73m2   BNP(B-type Natriuretic Peptide)   Result Value Ref Range    BNP 45 0 - 130 pg/mL   HM2(CBC w/o Differential)   Result Value Ref Range    WBC 12.9 (H) 4.0 - 11.0 thou/uL    RBC 3.63 (L) 3.80 - 5.40 mill/uL    Hemoglobin 10.6 (L) 12.0 - 16.0 g/dL    Hematocrit 34.9 (L) 35.0 - 47.0 %    MCV 96 80 - 100 fL    MCH 29.2 27.0 - 34.0 pg    MCHC 30.4 (L) 32.0 - 36.0 g/dL    RDW 22.8 (H) 11.0 - 14.5 %    Platelets 253 140 - 440 thou/uL    MPV 10.4 8.5 - 12.5 fL   Protime-INR   Result Value Ref Range    INR 1.36 (H) 0.90 - 1.10   Lactic Acid   Result Value Ref Range    Lactic Acid 2.4 (H) 0.5 - 2.2 mmol/L   Blood culture from PERIPHERAL SITE   Result Value Ref Range    Anaerobic Blood Culture Bottle No Growth No Growth, No organisms seen, bottle returned to instrument, Specimen not received, No Growth at 24 hours, No Growth at 48 hours, No Growth at 72 hours, No Growth at 96 hours, No Growth at 120 hours    Aerobic Blood Culture Bottle No Growth No Growth, No organisms seen, bottle returned to instrument, Specimen not received, No Growth at 24 hours, No Growth at 120 hours, No Growth at 48 hours, No Growth at 72 hours, No Growth at 96 hours   Lactic Acid   Result Value Ref Range    Lactic Acid 1.6 0.5 - 2.2 mmol/L   Platelet Count - every other day x 3   Result Value Ref Range    Platelets 225 140 - 440 thou/uL   HM2(CBC w/o Differential)   Result Value Ref Range    WBC 14.8 (H) 4.0 - 11.0 thou/uL    RBC 3.44 (L) 3.80 - 5.40 mill/uL    Hemoglobin 10.2 (L) 12.0 - 16.0 g/dL    Hematocrit 33.7 (L) 35.0 - 47.0 %    MCV 98 80 - 100 fL    MCH 29.7 27.0 - 34.0 pg    MCHC 30.3 (L) 32.0 - 36.0 g/dL    RDW 23.1 (H) 11.0 - 14.5 %    Platelets 232 140 - 440 thou/uL    MPV 10.1 8.5 - 12.5 fL   Basic Metabolic Panel    Result Value Ref Range    Sodium 140 136 - 145 mmol/L    Potassium 3.9 3.5 - 5.0 mmol/L    Chloride 105 98 - 107 mmol/L    CO2 28 22 - 31 mmol/L    Anion Gap, Calculation 7 5 - 18 mmol/L    Glucose 129 (H) 70 - 125 mg/dL    Calcium 9.4 8.5 - 10.5 mg/dL    BUN 15 8 - 28 mg/dL    Creatinine 0.82 0.60 - 1.10 mg/dL    GFR MDRD Af Amer >60 >60 mL/min/1.73m2    GFR MDRD Non Af Amer >60 >60 mL/min/1.73m2   POCT Glucose   Result Value Ref Range    Glucose 188 (H) 70 - 139 mg/dL   POCT Glucose   Result Value Ref Range    Glucose 206 (H) 70 - 139 mg/dL   Basic Metabolic Panel   Result Value Ref Range    Sodium 140 136 - 145 mmol/L    Potassium 3.9 3.5 - 5.0 mmol/L    Chloride 104 98 - 107 mmol/L    CO2 29 22 - 31 mmol/L    Anion Gap, Calculation 7 5 - 18 mmol/L    Glucose 119 70 - 125 mg/dL    Calcium 9.2 8.5 - 10.5 mg/dL    BUN 17 8 - 28 mg/dL    Creatinine 0.82 0.60 - 1.10 mg/dL    GFR MDRD Af Amer >60 >60 mL/min/1.73m2    GFR MDRD Non Af Amer >60 >60 mL/min/1.73m2   INR   Result Value Ref Range    INR 1.25 (H) 0.90 - 1.10   HM2(CBC w/o Differential)   Result Value Ref Range    WBC 16.4 (H) 4.0 - 11.0 thou/uL    RBC 3.56 (L) 3.80 - 5.40 mill/uL    Hemoglobin 10.6 (L) 12.0 - 16.0 g/dL    Hematocrit 34.6 (L) 35.0 - 47.0 %    MCV 97 80 - 100 fL    MCH 29.8 27.0 - 34.0 pg    MCHC 30.6 (L) 32.0 - 36.0 g/dL    RDW 22.5 (H) 11.0 - 14.5 %    Platelets 225 140 - 440 thou/uL    MPV 10.2 8.5 - 12.5 fL   POCT Glucose   Result Value Ref Range    Glucose 121 70 - 139 mg/dL   Lactic Acid   Result Value Ref Range    Lactic Acid 0.7 0.5 - 2.2 mmol/L   POCT Glucose   Result Value Ref Range    Glucose 214 (H) 70 - 139 mg/dL   Basic Metabolic Panel   Result Value Ref Range    Sodium 141 136 - 145 mmol/L    Potassium 4.1 3.5 - 5.0 mmol/L    Chloride 104 98 - 107 mmol/L    CO2 27 22 - 31 mmol/L    Anion Gap, Calculation 10 5 - 18 mmol/L    Glucose 131 (H) 70 - 125 mg/dL    Calcium 9.3 8.5 - 10.5 mg/dL    BUN 17 8 - 28 mg/dL    Creatinine 0.80  0.60 - 1.10 mg/dL    GFR MDRD Af Amer >60 >60 mL/min/1.73m2    GFR MDRD Non Af Amer >60 >60 mL/min/1.73m2   Hemoglobin   Result Value Ref Range    Hemoglobin 11.5 (L) 12.0 - 16.0 g/dL   INR   Result Value Ref Range    INR 1.62 (H) 0.90 - 1.10   INR   Result Value Ref Range    INR 1.98 (H) 0.90 - 1.10     Current Outpatient Medications   Medication Sig   ? albuterol (PROVENTIL) 2.5 mg /3 mL (0.083 %) nebulizer solution Take 3 mL (2.5 mg total) by nebulization every 4 (four) hours as needed for wheezing.   ? calcium-vitamin D (CALCIUM-VITAMIN D) 500 mg(1,250mg) -200 unit per tablet Take 1 tablet by mouth 2 (two) times a day with meals.   ? cholecalciferol, vitamin D3, 1,000 unit tablet Take 2,000 Units by mouth daily.    ? diphenhydrAMINE (BENADRYL) 2 % cream Apply 1 application topically 3 (three) times a day as needed for itching.   ? diphenhydrAMINE (BENADRYL) 25 mg capsule Take 25 mg by mouth every 6 (six) hours as needed for itching.   ? fesoterodine (TOVIAZ) 8 mg Tb24 ER tablet Take 8 mg by mouth daily.    ? fluticasone propionate (FLONASE ALLERGY RELIEF) 50 mcg/actuation nasal spray One spray in each nostril daily (Patient taking differently: 1 spray into each nostril at bedtime. One spray in each nostril daily)   ? furosemide (LASIX) 40 MG tablet TAKE 1 TABLET(40 MG) BY MOUTH TWICE DAILY   ? glucosamine-chondroitin 500-400 mg tablet Take 1 tablet by mouth 2 (two) times a day.    ? HYDROcodone-acetaminophen 5-325 mg per tablet Take 1 tablet by mouth every 4 (four) hours as needed for pain.   ? hydrocortisone 1 % cream Apply 1 application topically as needed.   ? lidocaine-prilocaine (EMLA) cream Place over port 30 min. before being accessed.   ? loratadine (CLARITIN) 10 mg tablet Take 1 tablet (10 mg total) by mouth daily.   ? metoprolol tartrate (LOPRESSOR) 25 MG tablet Take 1 tablet (25 mg total) by mouth 2 (two) times a day.   ? OMEGA-3/DHA/EPA/FISH OIL (FISH OIL-OMEGA-3 FATTY ACIDS) 300-1,000 mg capsule  Take 2 g by mouth 2 times a day at 6:00 am and 4:00 pm.   ? omeprazole (PRILOSEC) 20 MG capsule Take 20 mg by mouth daily before breakfast.   ? polyvinyl alcohol-povidon,PF, (REFRESH CLASSIC) 1.4-0.6 % Dpet ophthalmic dropperette Administer 2 drops to both eyes 3 (three) times a day.   ? potassium chloride (K-DUR,KLOR-CON) 10 MEQ tablet TAKE 2 TABLETS BY MOUTH TWICE DAILY (Patient taking differently: Take 20 mEq by mouth 2 (two) times a day. )   ? prochlorperazine (COMPAZINE) 10 MG tablet TAKE 1 TABLET(10 MG) BY MOUTH EVERY 6 HOURS AS NEEDED FOR NAUSEA   ? simvastatin (ZOCOR) 20 MG tablet Take 1 tablet (20 mg total) by mouth daily with supper.   ? vitamin A-vitamin C-vit E-min (OCUVITE) Tab tablet Take 1 tablet by mouth 2 (two) times a day.   ? warfarin ANTICOAGULANT (COUMADIN/JANTOVEN) 2.5 MG tablet Take 5mg (1 tablet) daily with INR check 12/9       ASSESSMENT:      ICD-10-CM    1. Venous stasis I87.8    2. Rash R21        PLAN:      Rash left upper thigh triamcinolone twice daily    Atrial fibrillation-continue Coumadin,  metoprolol recently increased to 37.5 twice daily    Venous stasis lower extremities compression to lower extremities on during the day and off at night.    Status post left breast lumpectomy monitor surgical incision for signs and symptoms of infection    Pain management Norco PRN anticoagulation    Right/Left  great toenail injury bacitracin to bilateral  great toe twice daily, cover with a bandage and podiatry to see patient.  Foot soaks two times a day with epsom salt prior to bacitracin  twice a day  and podiatry consult    Rash under breast nystatin powder twice daily until healed and then as needed twice daily    Shortness of breath oxygen 1 to 2 L PRN to keep sats greater than 90%    Anticoagulation management continue Coumadin and adjust per INRs    Debility PT OT    Patient to receive benign fusion related to her cancer every 3 weeks.  Nurse manager to follow-up to see if this is  something that needs to be done while she is in the TCU or for can resume after discharge.    Electronically signed by: Sara Mayes CNP

## 2021-06-20 NOTE — LETTER
Letter by Sara Mayes CNP at      Author: Sara Mayes CNP Service: -- Author Type: --    Filed:  Encounter Date: 2/7/2020 Status: (Other)         Patient: Jennifer Galvin   MR Number: [id number]   YOB: 1945   Date of Visit: 2/7/2020     [facility]    Facility:   [facility] [[id number]]   Code Status: FULL CODE      CHIEF COMPLAINT/REASON FOR VISIT:  Chief Complaint   Patient presents with   ? Review Of Multiple Medical Conditions       HISTORY:      HPI: Jennifer is a 74 y.o. female undergoing physical and occupational therapy at [facility] transitional care unit. , she is with history of severe obesity, stage Ib left breast cancer, I normal, vitamin D deficiency, urge stress incontinence, tremor, postmenopausal bleeding and uterine adenocarcinoma in situ status post JEROME/BSO, atrial fibrillation, osteopenia, FLAKITA, macular degeneration, chronic bilateral leg edema, hypertension, diabetes mellitus, CAD, chronic back and bilateral knee pain who presented to [facility] for scheduled left breast lumpectomy And sentinel lymph node biopsy.  Per the records she became hypoxic in the PACU and noted to be persistently hypoxic to 4 L of oxygen which is a change from her baseline.  She does have FLAKITA but does not use her CPAP machine. She was recently sent back to the hospital from December 14-17 th  for left axilla pain and found to have a left axillary  hematoma related to her recent lumpectomy. She was seen by general surgery who recommended resuming warfarin and providing modest infection prophylaxis.She was also treated at this time with cefdinir for a UTI. She then returned and was  admitted to [facility] from 12/19/19-12/31/19 for pneumonia.  The patient has bilateral pleural effusions on CTA and was treated with doxycycline and Keflex for suspected pneumonia.  She completed a course of Cefdinir for UTI outpatient during her hospital  stay.  Her procalcitonin and influenza A were negative on hospital admission.  She was also noted to have severe BLE nonpitting edema and her furosemide was increased to 80 mg two times a day for diuresis.  It was recommended that her BMP be followed bi-weekly for assessment of renal function on increased diuresis.    Today she was seen for review of weight and follow-up of rash.  Her rash is almost completely resolved.  However she does still continue to have intermittent itching her triamcinolone was discontinued and I have ordered her Sarna lotion.  Her weights were reviewed and she is up 3 pounds in the last 2 days she was given an extra 20 mg of Lasix in addition to her 80 mg twice daily.  She denies any  Increased shortness of breath beyond baseline   She denied chest pain.  She is moving her bowels and has no issues with urination.   Her left breast incision is healed.  Her BMP have been stable despite large doses of Lasix.  Her lung sounds were clear.    She denies any cough or congestion.     .Last A1C 9/2019 was 6.5.     Past Medical History:   Diagnosis Date   ? (Lower) Leg Localized Swelling     Created by Conversion    ? Adenocarcinoma In Situ Of The Uterus     Created by Conversion    ? Backache     Created by Conversion [facility] Annotation: Feb 29 2012 12:14PM - Opal Helm: Referred to  [facility] 9/11, given TENS unit.    ? Benign Polyps Of The Large Intestine     Created by Conversion    ? Breast cancer (H)    ? Cancer (H)     uterine   ? Chronic kidney disease     stage 1 per H&P 11/22/2019   ? Coronary artery disease    ? Diabetes mellitus (H)     type 2   ? Fatigue     Created by Conversion    ? Hyperglycemia     Created by Conversion    ? Hyperlipidemia     Created by Conversion    ? Hypertension     Created by Conversion    ? Joint Pain, Localized In The Knee     Created by Conversion    ? Lesion of mediastinum    ? Lymphedema     Created by Conversion    ? Macular Degeneration      Created by Conversion [facility] Annotation: Apr 5 2011 12:22PM - Tien Guzman: Followed by  Ophthalmologist, Dr. Avilez.    ? Major Depression, Single Episode In Remission     Created by Conversion    ? Obesity     Created by Conversion    ? Obstructive Sleep Apnea     CPAP   ? Osteopenia     Created by Conversion    ? Paroxysmal Atrial Fibrillation     Created by Conversion [facility] Annotation: Nov 28 2012 10:36PM - MadhuriShruthi: Found incidential  on exam on May 7th, [id number] score of 1 for HTNsymptomatic and hospitalized  x2 in July, 2012.CV X 2 in 2012Chronic Sotalol Rx    ? Postmenopausal bleeding     Created by Conversion    ? Skin cancer    ? Tachycardia     Created by Conversion    ? Tremor     Created by Conversion    ? Urge And Stress Incontinence     Created by Conversion    ? Urinary Frequency More Than Twice At Night (Nocturia)     Created by Conversion    ? Urine Tests Nonspecific Abnormal Findings     Created by Conversion    ? Vitamin D Deficiency     Created by Conversion              Family History   Problem Relation Age of Onset   ? Hypertension Father    ? Pneumonia Father    ? Esophageal cancer Brother    ? Cancer Brother    ? Stroke Mother    ? Alzheimer's disease Brother    ? Cancer Brother    ? Prostate cancer Brother    ? Cancer Nephew    ? Colon cancer Nephew    ? Cancer Paternal cousin      Social History     Socioeconomic History   ? Marital status: Single     Spouse name: Not on file   ? Number of children: 0   ? Years of education: Not on file   ? Highest education level: Not on file   Occupational History   ? Occupation: Retired RN     Employer: [employer]   Social Needs   ? Financial resource strain: Not on file   ? Food insecurity:     Worry: Not on file     Inability: Not on file   ? Transportation needs:     Medical: Not on file     Non-medical: Not on file   Tobacco Use   ? Smoking status: Former Smoker     Packs/day: 3.00     Years: 40.00     Pack years:  120.00     Types: Cigarettes     Last attempt to quit: 1/1/2005     Years since quitting: 15.1   ? Smokeless tobacco: Never Used   Substance and Sexual Activity   ? Alcohol use: Not Currently   ? Drug use: No   ? Sexual activity: Never     Partners: Male     Birth control/protection: Surgical, Post-menopausal   Lifestyle   ? Physical activity:     Days per week: Not on file     Minutes per session: Not on file   ? Stress: Not on file   Relationships   ? Social connections:     Talks on phone: Not on file     Gets together: Not on file     Attends [employer] service: Not on file     Active member of club or organization: Not on file     Attends meetings of clubs or organizations: Not on file     Relationship status: Not on file   ? Intimate partner violence:     Fear of current or ex partner: Not on file     Emotionally abused: Not on file     Physically abused: Not on file     Forced sexual activity: Not on file   Other Topics Concern   ? Not on file   Social History Narrative    Lives alone single family home that she owns, no children and is retired.  Boyfriend used to live with her, however due to stroke he is in his own care facility.  She is currently at [facility]         Review of Systems   Constitutional: Positive for activity change. Negative for appetite change, fatigue and fever.   HENT: Negative for congestion.    Respiratory: Negative for cough and wheezing.         Shortness of breath with exertion   Cardiovascular: Positive for leg swelling. Negative for chest pain.        Lymphedema   Gastrointestinal: Negative for abdominal distention, abdominal pain, constipation, diarrhea and nausea.   Genitourinary: Negative for dysuria.   Musculoskeletal: Positive for arthralgias. Negative for back pain.   Skin: Positive for wound. Negative for color change.        Left breast incision healed   Neurological: Negative for dizziness.   Psychiatric/Behavioral: Negative for agitation, behavioral problems  and confusion.       Vitals:    02/07/20 1628   BP: 126/74   Pulse: 63   Resp: 18   Temp: 97.9  F (36.6  C)   SpO2: 97%   Weight: (!) 339 lb 8 oz (154 kg)       Physical Exam  Constitutional:       Appearance: She is well-developed.      Comments: Pleasant woman in no acute distress   HENT:      Head: Normocephalic.   Eyes:      Conjunctiva/sclera: Conjunctivae normal.   Neck:      Musculoskeletal: Normal range of motion.   Cardiovascular:      Rate and Rhythm: Normal rate and regular rhythm.      Heart sounds: Normal heart sounds. No murmur.   Pulmonary:      Effort: No respiratory distress.      Breath sounds: No rales.   Abdominal:      General: Bowel sounds are normal. There is no distension.      Palpations: Abdomen is soft.      Tenderness: There is no abdominal tenderness.   Musculoskeletal: Normal range of motion.      Right lower leg: Edema present.      Left lower leg: Edema present.      Comments: 3-4+ lower extremities. Lymph wraps resumed    Skin:     General: Skin is warm.      Findings: Rash present.      Comments: Healed left breast incision  Back rash almost completely resolved   Neurological:      Mental Status: She is alert and oriented to person, place, and time.   Psychiatric:         Behavior: Behavior normal.           LABS:   Recent Results (from the past 240 hour(s))   Basic Metabolic Panel   Result Value Ref Range    Sodium 141 136 - 145 mmol/L    Potassium 3.9 3.5 - 5.0 mmol/L    Chloride 101 98 - 107 mmol/L    CO2 30 22 - 31 mmol/L    Anion Gap, Calculation 10 5 - 18 mmol/L    Glucose 103 70 - 125 mg/dL    Calcium 9.0 8.5 - 10.5 mg/dL    BUN 14 8 - 28 mg/dL    Creatinine 0.93 0.60 - 1.10 mg/dL    GFR MDRD Af Amer >60 >60 mL/min/1.73m2    GFR MDRD Non Af Amer 59 (L) >60 mL/min/1.73m2   INR   Result Value Ref Range    INR 1.61 (H) 0.90 - 1.10   Basic Metabolic Panel   Result Value Ref Range    Sodium 143 136 - 145 mmol/L    Potassium 3.9 3.5 - 5.0 mmol/L    Chloride 103 98 - 107 mmol/L     CO2 30 22 - 31 mmol/L    Anion Gap, Calculation 10 5 - 18 mmol/L    Glucose 105 70 - 125 mg/dL    Calcium 9.2 8.5 - 10.5 mg/dL    BUN 13 8 - 28 mg/dL    Creatinine 0.98 0.60 - 1.10 mg/dL    GFR MDRD Af Amer >60 >60 mL/min/1.73m2    GFR MDRD Non Af Amer 56 (L) >60 mL/min/1.73m2   INR   Result Value Ref Range    INR 1.62 (H) 0.90 - 1.10     Current Outpatient Medications   Medication Sig   ? acetaminophen (TYLENOL) 500 MG tablet Take 1-2 tablets (500-1,000 mg total) by mouth every 4 (four) hours as needed (PAin 1-3).   ? albuterol (PROVENTIL) 2.5 mg /3 mL (0.083 %) nebulizer solution Take 3 mL (2.5 mg total) by nebulization every 4 (four) hours as needed for wheezing.   ? bacitracin ointment Apply 1 application topically 2 (two) times a day. After epsom salt foot soak. To bilateral great toes for toenail falling off, until seen by Podiatry   ? calcium-vitamin D (CALCIUM-VITAMIN D) 500 mg(1,250mg) -200 unit per tablet Take 1 tablet by mouth 2 (two) times a day with meals.   ? cholecalciferol, vitamin D3, 1,000 unit tablet Take 2,000 Units by mouth daily.    ? diphenhydrAMINE (BENADRYL) 2 % cream Apply 1 application topically 3 (three) times a day as needed for itching.   ? diphenhydrAMINE (BENADRYL) 25 mg capsule Take 25 mg by mouth every 6 (six) hours as needed for itching.   ? fesoterodine (TOVIAZ) 8 mg Tb24 ER tablet Take 8 mg by mouth daily.    ? fluticasone propionate (FLONASE ALLERGY RELIEF) 50 mcg/actuation nasal spray One spray in each nostril daily   ? furosemide (LASIX) 80 MG tablet Take 1 tablet (80 mg total) by mouth 2 (two) times a day at 9am and 6pm.   ? gabapentin (NEURONTIN) 300 MG capsule Take 300 mg by mouth at bedtime.   ? glucosamine-chondroitin 500-400 mg tablet Take 1 tablet by mouth 2 (two) times a day with meals.    ? HYDROcodone-acetaminophen 5-325 mg per tablet Take 1 tablet by mouth every 4 (four) hours as needed for pain.   ? lidocaine 4 % patch Place 1 patch on the skin daily. Remove and  discard patch with 12 hours or as directed by MD.   ? lidocaine-prilocaine (EMLA) cream Place over port 30 min. before being accessed.   ? loratadine (CLARITIN) 10 mg tablet Take 1 tablet (10 mg total) by mouth daily.   ? metoprolol tartrate (LOPRESSOR) 50 MG tablet Take 50 mg by mouth 2 (two) times a day. Hold for SBP < 110   ? mv-min/FA/vit K/lycop/lut/zeax (OCUVITE EYE PLUS MULTI ORAL) Take 1 tablet by mouth 2 (two) times a day with meals.   ? NOVOLOG U-100 INSULIN ASPART 100 unit/mL injection Check blood sugar three (3) times daily.  11.65 Type 2 with hyperglycemia   ? nystatin (MYCOSTATIN) powder Apply 1 application topically 2 (two) times a day. Under breasts for rash, until healed. Then PRN.   ? OMEGA-3/DHA/EPA/FISH OIL (FISH OIL-OMEGA-3 FATTY ACIDS) 300-1,000 mg capsule Take 2 g by mouth 2 times a day at 6:00 am and 4:00 pm.   ? omeprazole (PRILOSEC) 20 MG capsule Take 20 mg by mouth daily before breakfast.   ? polyvinyl alcohol-povidon,PF, (REFRESH CLASSIC) 1.4-0.6 % Dpet ophthalmic dropperette Administer 2 drops to both eyes 3 (three) times a day.   ? potassium chloride (K-DUR,KLOR-CON) 10 MEQ tablet TAKE 2 TABLETS BY MOUTH TWICE DAILY   ? prochlorperazine (COMPAZINE) 10 MG tablet TAKE 1 TABLET(10 MG) BY MOUTH EVERY 6 HOURS AS NEEDED FOR NAUSEA   ? simvastatin (ZOCOR) 20 MG tablet Take 1 tablet (20 mg total) by mouth daily with supper.   ? warfarin ANTICOAGULANT (COUMADIN/JANTOVEN) 2 MG tablet Take 2 tablets (4 mg total) by mouth daily.       ASSESSMENT:      ICD-10-CM    1. Physical deconditioning R53.81    2. Bilateral leg edema R60.0    3. CHF (congestive heart failure), NYHA class I, acute on chronic, combined (H) I50.43    4. Rash R21    5. Weight gain R63.5        PLAN:   Chronic right shoulder pain recently seen by orthopedics and was given a cortisone shot.    Rash triamcinolone cream to rash twice daily completed Sarna lotion ordered daily and as needed,  hydroxyzine to 50 mg every 6 hours PRN      Loose toenails seen by podiatry 1/15/19 and loose toenail was removed continue dressing change orders     Atrial fibrillation-continue Coumadin,  metoprolol and increased lasix, monitor BMP closely     Venous stasis lower extremities. Pt  will resume lymph wraps M-F and ace wraps on the weekends. .  Blistering lower extremities- new dressing change orders.      Status post left breast lumpectomy incision healed    Pain management Norco PRN     Shortness of breath with exertion. Oxygen PRN    Anticoagulation management continue Coumadin and adjust per INRs    Debility PT OT      Electronically signed by: Sara Mayes CNP

## 2021-06-20 NOTE — LETTER
Letter by Sara Mayes CNP at      Author: Sara Mayes CNP Service: -- Author Type: --    Filed:  Encounter Date: 2/24/2020 Status: (Other)         Patient: Jennifer Galvin   MR Number: [id number]   YOB: 1945   Date of Visit: 2/24/2020     [facility]    Facility:   [facility] [[id number]]   Code Status: FULL CODE      CHIEF COMPLAINT/REASON FOR VISIT:  Chief Complaint   Patient presents with   ? Review Of Multiple Medical Conditions       HISTORY:      HPI: Jennifer is a 74 y.o. female undergoing physical and occupational therapy at [facility] transitional care unit. , she is with history of severe obesity, stage Ib left breast cancer, I normal, vitamin D deficiency, urge stress incontinence, tremor, postmenopausal bleeding and uterine adenocarcinoma in situ status post JEROME/BSO, atrial fibrillation, osteopenia, FLAKITA, macular degeneration, chronic bilateral leg edema, hypertension, diabetes mellitus, CAD, chronic back and bilateral knee pain who presented to [facility] for scheduled left breast lumpectomy And sentinel lymph node biopsy.  Per the records she became hypoxic in the PACU and noted to be persistently hypoxic to 4 L of oxygen which is a change from her baseline.  She does have FLAKITA but does not use her CPAP machine. She was recently sent back to the hospital from December 14-17 th  for left axilla pain and found to have a left axillary  hematoma related to her recent lumpectomy. She was seen by general surgery who recommended resuming warfarin and providing modest infection prophylaxis.She was also treated at this time with cefdinir for a UTI. She then returned and was  admitted to [facility] from 12/19/19-12/31/19 for pneumonia.  The patient has bilateral pleural effusions on CTA and was treated with doxycycline and Keflex for suspected pneumonia.  She completed a course of Cefdinir for UTI outpatient during her  hospital stay.  Her procalcitonin and influenza A were negative on hospital admission.  She was also noted to have severe BLE nonpitting edema and her furosemide was increased to 80 mg two times a day for diuresis.  It was recommended that her BMP be followed bi-weekly for assessment of renal function on increased diuresis.    Today she was seen for follow-up to wheezing.  Her discharge was held over the weekend due to wheezing noted on Friday and she received a nebulizer.  Today she was seen in her room . Her  lungs were clear throughout all fields.  She is currently completing a course of doxycycline.  She reports she feels much better.  and will discharge today.  I did order her an albuterol inhaler for discharge.  Her  Last WBC was normal at 10.2 and hemoglobin 11.5.  She denies any  Increased shortness of breath beyond baseline   She denied chest pain.  She is moving her bowels and has no issues with urination.   Her left breast incision is healed.  Her BMP have been stable despite large doses of Lasix. Last A1C 9/2019 was 6.5.     Past Medical History:   Diagnosis Date   ? (Lower) Leg Localized Swelling     Created by Conversion    ? Adenocarcinoma In Situ Of The Uterus     Created by Conversion    ? Backache     Created by Conversion Health system Annotation: Feb 29 2012 12:14PM - Opal Helm: Referred to  [facility] 9/11, given TENS unit.    ? Benign Polyps Of The Large Intestine     Created by Conversion    ? Breast cancer (H)    ? Cancer (H)     uterine   ? Chronic kidney disease     stage 1 per H&P 11/22/2019   ? Coronary artery disease    ? Diabetes mellitus (H)     type 2   ? Fatigue     Created by Conversion    ? Hyperglycemia     Created by Conversion    ? Hyperlipidemia     Created by Conversion    ? Hypertension     Created by Conversion    ? Joint Pain, Localized In The Knee     Created by Conversion    ? Lesion of mediastinum    ? Lymphedema     Created by Conversion    ? Macular Degeneration      Created by Conversion [facility] Annotation: Apr 5 2011 12:22PM - Tien Guzman: Followed by  Ophthalmologist, Dr. Avilez.    ? Major Depression, Single Episode In Remission     Created by Conversion    ? Obesity     Created by Conversion    ? Obstructive Sleep Apnea     CPAP   ? Osteopenia     Created by Conversion    ? Paroxysmal Atrial Fibrillation     Created by Conversion [facility] Annotation: Nov 28 2012 10:36PM - MadhuriShruthi: Found incidential  on exam on May 7th, [id number] score of 1 for HTNsymptomatic and hospitalized  x2 in July, 2012.CV X 2 in 2012Chronic Sotalol Rx    ? Postmenopausal bleeding     Created by Conversion    ? Skin cancer    ? Tachycardia     Created by Conversion    ? Tremor     Created by Conversion    ? Urge And Stress Incontinence     Created by Conversion    ? Urinary Frequency More Than Twice At Night (Nocturia)     Created by Conversion    ? Urine Tests Nonspecific Abnormal Findings     Created by Conversion    ? Vitamin D Deficiency     Created by Conversion              Family History   Problem Relation Age of Onset   ? Hypertension Father    ? Pneumonia Father    ? Esophageal cancer Brother    ? Cancer Brother    ? Stroke Mother    ? Alzheimer's disease Brother    ? Cancer Brother    ? Prostate cancer Brother    ? Cancer Nephew    ? Colon cancer Nephew    ? Cancer Paternal cousin      Social History     Socioeconomic History   ? Marital status: Single     Spouse name: Not on file   ? Number of children: 0   ? Years of education: Not on file   ? Highest education level: Not on file   Occupational History   ? Occupation: Retired RN     Employer: [employer]   Social Needs   ? Financial resource strain: Not on file   ? Food insecurity:     Worry: Not on file     Inability: Not on file   ? Transportation needs:     Medical: Not on file     Non-medical: Not on file   Tobacco Use   ? Smoking status: Former Smoker     Packs/day: 3.00     Years: 40.00     Pack years:  120.00     Types: Cigarettes     Last attempt to quit: 1/1/2005     Years since quitting: 15.1   ? Smokeless tobacco: Never Used   Substance and Sexual Activity   ? Alcohol use: Not Currently   ? Drug use: No   ? Sexual activity: Never     Partners: Male     Birth control/protection: Surgical, Post-menopausal   Lifestyle   ? Physical activity:     Days per week: Not on file     Minutes per session: Not on file   ? Stress: Not on file   Relationships   ? Social connections:     Talks on phone: Not on file     Gets together: Not on file     Attends [employer] service: Not on file     Active member of club or organization: Not on file     Attends meetings of clubs or organizations: Not on file     Relationship status: Not on file   ? Intimate partner violence:     Fear of current or ex partner: Not on file     Emotionally abused: Not on file     Physically abused: Not on file     Forced sexual activity: Not on file   Other Topics Concern   ? Not on file   Social History Narrative    Lives alone single family home that she owns, no children and is retired.  Boyfriend used to live with her, however due to stroke he is in his own care facility.  She is currently at [facility]         Review of Systems   Constitutional: Positive for activity change. Negative for appetite change, fatigue and fever.   HENT: Negative for congestion.    Respiratory: Negative for cough.         Shortness of breath with exertion   Cardiovascular: Positive for leg swelling. Negative for chest pain.        Lymphedema   Gastrointestinal: Negative for abdominal distention, abdominal pain, constipation, diarrhea and nausea.   Genitourinary: Negative for dysuria.   Musculoskeletal: Positive for arthralgias. Negative for back pain.   Skin: Positive for wound. Negative for color change.        Left breast incision healed   Neurological: Negative for dizziness.   Psychiatric/Behavioral: Negative for agitation, behavioral problems and confusion.        Vitals:    02/24/20 0825   BP: 106/84   Pulse: 84   Resp: 19   Temp: 98  F (36.7  C)   SpO2: 93%   Weight: (!) 340 lb (154.2 kg)       Physical Exam  Constitutional:       Appearance: She is well-developed.      Comments: Pleasant woman in no acute distress   HENT:      Head: Normocephalic.   Eyes:      Conjunctiva/sclera: Conjunctivae normal.   Neck:      Musculoskeletal: Normal range of motion.   Cardiovascular:      Rate and Rhythm: Normal rate and regular rhythm.      Heart sounds: Normal heart sounds. No murmur.   Pulmonary:      Effort: No respiratory distress.      Breath sounds: No rales.   Abdominal:      General: Bowel sounds are normal. There is no distension.      Palpations: Abdomen is soft.      Tenderness: There is no abdominal tenderness.   Musculoskeletal: Normal range of motion.      Right lower leg: Edema present.      Left lower leg: Edema present.      Comments: 3-4+ lower extremities. Lymph wraps resumed    Skin:     General: Skin is warm.      Findings: Rash present.      Comments: Healed left breast incision  Back rash almost completely resolved   Neurological:      Mental Status: She is alert and oriented to person, place, and time.   Psychiatric:         Behavior: Behavior normal.           LABS:   Recent Results (from the past 240 hour(s))   Basic Metabolic Panel   Result Value Ref Range    Sodium 141 136 - 145 mmol/L    Potassium 3.9 3.5 - 5.0 mmol/L    Chloride 102 98 - 107 mmol/L    CO2 29 22 - 31 mmol/L    Anion Gap, Calculation 10 5 - 18 mmol/L    Glucose 102 70 - 125 mg/dL    Calcium 9.1 8.5 - 10.5 mg/dL    BUN 14 8 - 28 mg/dL    Creatinine 0.98 0.60 - 1.10 mg/dL    GFR MDRD Af Amer >60 >60 mL/min/1.73m2    GFR MDRD Non Af Amer 56 (L) >60 mL/min/1.73m2   INR   Result Value Ref Range    INR 2.64 (H) 0.90 - 1.10   HM2(CBC w/o Differential)   Result Value Ref Range    WBC 10.2 4.0 - 11.0 thou/uL    RBC 4.60 3.80 - 5.40 mill/uL    Hemoglobin 11.5 (L) 12.0 - 16.0 g/dL    Hematocrit  39.0 35.0 - 47.0 %    MCV 85 80 - 100 fL    MCH 25.0 (L) 27.0 - 34.0 pg    MCHC 29.5 (L) 32.0 - 36.0 g/dL    RDW 18.2 (H) 11.0 - 14.5 %    Platelets 184 140 - 440 thou/uL    MPV 11.6 8.5 - 12.5 fL     Current Outpatient Medications   Medication Sig   ? acetaminophen (TYLENOL) 500 MG tablet Take 1-2 tablets (500-1,000 mg total) by mouth every 4 (four) hours as needed (PAin 1-3).   ? albuterol (PROAIR HFA;PROVENTIL HFA;VENTOLIN HFA) 90 mcg/actuation inhaler Inhale 2 puffs every 4 (four) hours as needed for wheezing.   ? calcium-vitamin D (CALCIUM-VITAMIN D) 500 mg(1,250mg) -200 unit per tablet Take 1 tablet by mouth 2 (two) times a day with meals.   ? camphor-menthoL (SARNA) lotion Apply 1 application topically as needed for itching. Daily and prn   ? cholecalciferol, vitamin D3, 1,000 unit tablet Take 2,000 Units by mouth daily.    ? fesoterodine (TOVIAZ) 8 mg Tb24 ER tablet Take 8 mg by mouth daily.    ? fluticasone propionate (FLONASE ALLERGY RELIEF) 50 mcg/actuation nasal spray One spray in each nostril daily   ? furosemide (LASIX) 80 MG tablet Take 1 tablet (80 mg total) by mouth 2 (two) times a day at 9am and 6pm.   ? gabapentin (NEURONTIN) 300 MG capsule Take 300 mg by mouth at bedtime.   ? glucosamine-chondroitin 500-400 mg tablet Take 1 tablet by mouth 2 (two) times a day with meals.    ? guaiFENesin (ROBITUSSIN) 100 mg/5 mL syrup Take 200 mg by mouth every 4 (four) hours as needed for cough.   ? HYDROcodone-acetaminophen 5-325 mg per tablet Take 1 tablet by mouth every 4 (four) hours as needed for pain.   ? lidocaine 4 % patch Place 1 patch on the skin daily. Remove and discard patch with 12 hours or as directed by MD.   ? lidocaine-prilocaine (EMLA) cream Place over port 30 min. before being accessed.   ? loratadine (CLARITIN) 10 mg tablet Take 1 tablet (10 mg total) by mouth daily.   ? metoprolol tartrate (LOPRESSOR) 50 MG tablet Take 50 mg by mouth 2 (two) times a day. Hold for SBP < 110   ? mv-min/FA/vit  K/lycop/lut/zeax (OCUVITE EYE PLUS MULTI ORAL) Take 1 tablet by mouth 2 (two) times a day with meals.   ? nystatin (MYCOSTATIN) powder Apply 1 application topically 2 (two) times a day. Under breasts for rash, until healed. Then PRN.   ? OMEGA-3/DHA/EPA/FISH OIL (FISH OIL-OMEGA-3 FATTY ACIDS) 300-1,000 mg capsule Take 2 g by mouth 2 times a day at 6:00 am and 4:00 pm.   ? omeprazole (PRILOSEC) 20 MG capsule Take 20 mg by mouth daily before breakfast.   ? polyvinyl alcohol-povidon,PF, (REFRESH CLASSIC) 1.4-0.6 % Dpet ophthalmic dropperette Administer 2 drops to both eyes 3 (three) times a day.   ? potassium chloride (K-DUR,KLOR-CON) 10 MEQ tablet TAKE 2 TABLETS BY MOUTH TWICE DAILY   ? simvastatin (ZOCOR) 20 MG tablet Take 1 tablet (20 mg total) by mouth daily with supper.   ? warfarin ANTICOAGULANT (COUMADIN/JANTOVEN) 2 MG tablet Take 2 tablets (4 mg total) by mouth daily. (Patient taking differently: Take 5 mg by mouth daily. Next INR due 2/24/2020)       ASSESSMENT:      ICD-10-CM    1. Wheezing R06.2    2. Bilateral leg edema R60.0    3. Physical deconditioning R53.81        PLAN:     pneumonia currently completing a course of doxycycline, robitussin prn, albuterol inhaler as needed  Chronic right shoulder pain recently seen by orthopedics and was given a cortisone shot.    Rash triamcinolone cream to rash twice daily completed Sarna lotion ordered daily and as needed,  hydroxyzine to 50 mg every 6 hours PRN almost resolved, A few scabs from scratching on back.      Loose toenails seen by podiatry 1/15/19 and loose toenail was removed continue dressing change orders     Atrial fibrillation-continue Coumadin,  metoprolol and increased lasix, monitor BMP closely     Venous stasis lower extremities. Pt  will resume lymph wraps M-F and ace wraps on the weekends. .  Blistering lower extremities- new dressing change orders.      Status post left breast lumpectomy incision healed    Pain management Norco PRN      Shortness of breath with exertion. Oxygen PRN    Anticoagulation management continue Coumadin and adjust per INRs    Debility PT OT      Electronically signed by: Sara Mayes CNP

## 2021-06-20 NOTE — LETTER
Letter by Sara Mayes CNP at      Author: Sara Mayes CNP Service: -- Author Type: --    Filed:  Encounter Date: 1/20/2020 Status: Signed         Patient: Jennifer Galvin   MR Number: [id number]   YOB: 1945   Date of Visit: 1/20/2020     [facility]    Facility:   [facility] [place] [[id number]]   Code Status: FULL CODE      CHIEF COMPLAINT/REASON FOR VISIT:  Chief Complaint   Patient presents with   ? Review Of Multiple Medical Conditions       HISTORY:      HPI: Jennifer is a 74 y.o. female undergoing physical and occupational therapy at [facility] transitional care unit. , she is with history of severe obesity, stage Ib left breast cancer, I normal, vitamin D deficiency, urge stress incontinence, tremor, postmenopausal bleeding and uterine adenocarcinoma in situ status post JEROME/BSO, atrial fibrillation, osteopenia, FLAKITA, macular degeneration, chronic bilateral leg edema, hypertension, diabetes mellitus, CAD, chronic back and bilateral knee pain who presented to [facility] for scheduled left breast lumpectomy And sentinel lymph node biopsy.  Per the records she became hypoxic in the PACU and noted to be persistently hypoxic to 4 L of oxygen which is a change from her baseline.  She does have FLAKITA but does not use her CPAP machine. She was recently sent back to the hospital from December 14-17 th  for left axilla pain and found to have a left axillary  hematoma related to her recent lumpectomy. She was seen by general surgery who recommended resuming warfarin and providing modest infection prophylaxis.She was also treated at this time with cefdinir for a UTI. She then returned and was  admitted to [facility] from 12/19/19-12/31/19 for pneumonia.  The patient has bilateral pleural effusions on CTA and was treated with doxycycline and Keflex for suspected pneumonia.  She completed a course of Cefdinir for UTI outpatient during her hospital  stay.  Her procalcitonin and influenza A were negative on hospital admission.  She was also noted to have severe BLE nonpitting edema and her furosemide was increased to 80 mg two times a day for diuresis.  It was recommended that her BMP be followed bi-weekly for assessment of renal function on increased diuresis.    Today she was seen as a visit to review multiple medical issues.  She verbalized that she felt her redness was increasing on her lower extremities.  She did complete Keflex on 1/16/2020 for lower extremity cellulitis.  Her legs were a light red in color.  She had no warmth to the legs she is afebrile and last WBC was within normal limits however a CBC was ordered for the a.m. and if elevated or worsening of her legs tomorrow she will be started on doxycycline.  Her weights were reviewed and she was down 2 pounds in the last 4 days however today she is up 0.7 pounds. she denied chest pain but does report shortness of breath with exertion..  She is off the  Oxygen.  she reports positive bowel movement and voiding well.  Her left breast incision is healed.  Her BMP checked on 1/15/20 is stable despite high doses of lasix.  Her lung sounds were clear.    She denies any cough or congestion.  She is getting daily lymph wraps. She was seen by podiatry  and her other toenail was removed and new dressing change orders.  She recently completed antibx for lower extremity cellulitis. She does also have lower extremity blistering.  .Last A1C 9/2019 was 6.5.    Past Medical History:   Diagnosis Date   ? (Lower) Leg Localized Swelling     Created by Conversion    ? Adenocarcinoma In Situ Of The Uterus     Created by Conversion    ? Backache     Created by Conversion [facility] Annotation: Feb 29 2012 12:14PM - Opal Helm: Referred to  [facility] 9/11, given TENS unit.    ? Benign Polyps Of The Large Intestine     Created by Conversion    ? Breast cancer (H)    ? Cancer (H)     uterine   ? Chronic kidney disease      stage 1 per H&P 11/22/2019   ? Coronary artery disease    ? Diabetes mellitus (H)     type 2   ? Fatigue     Created by Conversion    ? Hyperglycemia     Created by Conversion    ? Hyperlipidemia     Created by Conversion    ? Hypertension     Created by Conversion    ? Joint Pain, Localized In The Knee     Created by Conversion    ? Lesion of mediastinum    ? Lymphedema     Created by Conversion    ? Macular Degeneration     Created by Conversion [facility] Annotation: Apr 5 2011 12:22PM - Tien Guzman: Followed by  Ophthalmologist, Dr. Avilez.    ? Major Depression, Single Episode In Remission     Created by Conversion    ? Obesity     Created by Conversion    ? Obstructive Sleep Apnea     CPAP   ? Osteopenia     Created by Conversion    ? Paroxysmal Atrial Fibrillation     Created by Conversion [facility] Annotation: Nov 28 2012 10:36PM - Shruthi Dhaliwal: Found incidential  on exam on May 7th, [id number] score of 1 for HTNsymptomatic and hospitalized  x2 in July, 2012.CV X 2 in 2012Chronic Sotalol Rx    ? Postmenopausal bleeding     Created by Conversion    ? Skin cancer    ? Tachycardia     Created by Conversion    ? Tremor     Created by Conversion    ? Urge And Stress Incontinence     Created by Conversion    ? Urinary Frequency More Than Twice At Night (Nocturia)     Created by Conversion    ? Urine Tests Nonspecific Abnormal Findings     Created by Conversion    ? Vitamin D Deficiency     Created by Conversion              Family History   Problem Relation Age of Onset   ? Hypertension Father    ? Pneumonia Father    ? Esophageal cancer Brother    ? Cancer Brother    ? Stroke Mother    ? Alzheimer's disease Brother    ? Cancer Brother    ? Prostate cancer Brother    ? Cancer Nephew    ? Colon cancer Nephew    ? Cancer Paternal cousin      Social History     Socioeconomic History   ? Marital status: Single     Spouse name: Not on file   ? Number of children: 0   ? Years of education: Not on file   ?  Highest education level: Not on file   Occupational History   ? Occupation: Retired RN     Employer: [employer]   Social Needs   ? Financial resource strain: Not on file   ? Food insecurity:     Worry: Not on file     Inability: Not on file   ? Transportation needs:     Medical: Not on file     Non-medical: Not on file   Tobacco Use   ? Smoking status: Former Smoker     Packs/day: 3.00     Years: 40.00     Pack years: 120.00     Types: Cigarettes     Last attempt to quit: 1/1/2005     Years since quitting: 15.0   ? Smokeless tobacco: Never Used   Substance and Sexual Activity   ? Alcohol use: Not Currently   ? Drug use: No   ? Sexual activity: Never     Partners: Male     Birth control/protection: Surgical, Post-menopausal   Lifestyle   ? Physical activity:     Days per week: Not on file     Minutes per session: Not on file   ? Stress: Not on file   Relationships   ? Social connections:     Talks on phone: Not on file     Gets together: Not on file     Attends Worship service: Not on file     Active member of club or organization: Not on file     Attends meetings of clubs or organizations: Not on file     Relationship status: Not on file   ? Intimate partner violence:     Fear of current or ex partner: Not on file     Emotionally abused: Not on file     Physically abused: Not on file     Forced sexual activity: Not on file   Other Topics Concern   ? Not on file   Social History Narrative    Lives alone single family home that she owns, no children and is retired.  Boyfriend used to live with her, however due to stroke he is in his own care facility.  She is currently at [facility]         Review of Systems   Constitutional: Positive for activity change. Negative for appetite change, fatigue and fever.   HENT: Negative for congestion.    Respiratory: Negative for cough and wheezing.         Shortness of breath with exertion   Cardiovascular: Positive for leg swelling. Negative for chest pain.         Lymphedema   Gastrointestinal: Negative for abdominal distention, abdominal pain, constipation, diarrhea and nausea.   Genitourinary: Negative for dysuria.   Musculoskeletal: Positive for arthralgias. Negative for back pain.   Skin: Positive for wound. Negative for color change.        Left breast incision healed   Neurological: Negative for dizziness.   Psychiatric/Behavioral: Negative for agitation, behavioral problems and confusion.       Vitals:    01/20/20 0857   BP: 130/86   Pulse: 95   Resp: 18   Temp: 97.4  F (36.3  C)   SpO2: 94%   Weight: (!) 348 lb 12.8 oz (158.2 kg)       Physical Exam  Constitutional:       Appearance: She is well-developed.      Comments: Pleasant woman in no acute distress   HENT:      Head: Normocephalic.   Eyes:      Conjunctiva/sclera: Conjunctivae normal.   Neck:      Musculoskeletal: Normal range of motion.   Cardiovascular:      Rate and Rhythm: Normal rate and regular rhythm.      Heart sounds: Normal heart sounds. No murmur.   Pulmonary:      Effort: No respiratory distress.      Breath sounds: No rales.   Abdominal:      General: Bowel sounds are normal. There is no distension.      Palpations: Abdomen is soft.      Tenderness: There is no abdominal tenderness.   Musculoskeletal: Normal range of motion.      Right lower leg: Edema present.      Left lower leg: Edema present.      Comments: 3-4+ lower extremities. Lymph wraps resumed    Skin:     General: Skin is warm.      Comments: Healed left breast incision   Neurological:      Mental Status: She is alert and oriented to person, place, and time.   Psychiatric:         Behavior: Behavior normal.           LABS:   Recent Results (from the past 240 hour(s))   Basic Metabolic Panel   Result Value Ref Range    Sodium 142 136 - 145 mmol/L    Potassium 4.0 3.5 - 5.0 mmol/L    Chloride 102 98 - 107 mmol/L    CO2 32 (H) 22 - 31 mmol/L    Anion Gap, Calculation 8 5 - 18 mmol/L    Glucose 125 70 - 125 mg/dL    Calcium 9.2 8.5  - 10.5 mg/dL    BUN 10 8 - 28 mg/dL    Creatinine 1.06 0.60 - 1.10 mg/dL    GFR MDRD Af Amer >60 >60 mL/min/1.73m2    GFR MDRD Non Af Amer 51 (L) >60 mL/min/1.73m2   Basic Metabolic Panel   Result Value Ref Range    Sodium 142 136 - 145 mmol/L    Potassium 3.9 3.5 - 5.0 mmol/L    Chloride 102 98 - 107 mmol/L    CO2 31 22 - 31 mmol/L    Anion Gap, Calculation 9 5 - 18 mmol/L    Glucose 124 70 - 125 mg/dL    Calcium 9.0 8.5 - 10.5 mg/dL    BUN 9 8 - 28 mg/dL    Creatinine 0.92 0.60 - 1.10 mg/dL    GFR MDRD Af Amer >60 >60 mL/min/1.73m2    GFR MDRD Non Af Amer 60 (L) >60 mL/min/1.73m2   INR   Result Value Ref Range    INR 1.71 (H) 0.90 - 1.10     Current Outpatient Medications   Medication Sig   ? acetaminophen (TYLENOL) 500 MG tablet Take 1-2 tablets (500-1,000 mg total) by mouth every 4 (four) hours as needed (PAin 1-3).   ? albuterol (PROVENTIL) 2.5 mg /3 mL (0.083 %) nebulizer solution Take 3 mL (2.5 mg total) by nebulization every 4 (four) hours as needed for wheezing.   ? bacitracin ointment Apply 1 application topically 2 (two) times a day. After epsom salt foot soak. To bilateral great toes for toenail falling off, until seen by Podiatry   ? calcium-vitamin D (CALCIUM-VITAMIN D) 500 mg(1,250mg) -200 unit per tablet Take 1 tablet by mouth 2 (two) times a day with meals.   ? cholecalciferol, vitamin D3, 1,000 unit tablet Take 2,000 Units by mouth daily.    ? diphenhydrAMINE (BENADRYL) 2 % cream Apply 1 application topically 3 (three) times a day as needed for itching.   ? diphenhydrAMINE (BENADRYL) 25 mg capsule Take 25 mg by mouth every 6 (six) hours as needed for itching.   ? fesoterodine (TOVIAZ) 8 mg Tb24 ER tablet Take 8 mg by mouth daily.    ? fluticasone propionate (FLONASE ALLERGY RELIEF) 50 mcg/actuation nasal spray One spray in each nostril daily   ? furosemide (LASIX) 80 MG tablet Take 1 tablet (80 mg total) by mouth 2 (two) times a day at 9am and 6pm.   ? gabapentin (NEURONTIN) 300 MG capsule Take 300  mg by mouth at bedtime.   ? glucosamine-chondroitin 500-400 mg tablet Take 1 tablet by mouth 2 (two) times a day with meals.    ? HYDROcodone-acetaminophen 5-325 mg per tablet Take 1 tablet by mouth every 4 (four) hours as needed for pain.   ? lidocaine 4 % patch Place 1 patch on the skin daily. Remove and discard patch with 12 hours or as directed by MD.   ? lidocaine-prilocaine (EMLA) cream Place over port 30 min. before being accessed.   ? loratadine (CLARITIN) 10 mg tablet Take 1 tablet (10 mg total) by mouth daily.   ? metoprolol tartrate (LOPRESSOR) 50 MG tablet Take 50 mg by mouth 2 (two) times a day. Hold for SBP < 110   ? mv-min/FA/vit K/lycop/lut/zeax (OCUVITE EYE PLUS MULTI ORAL) Take 1 tablet by mouth 2 (two) times a day with meals.   ? NOVOLOG U-100 INSULIN ASPART 100 unit/mL injection Check blood sugar three (3) times daily.  11.65 Type 2 with hyperglycemia   ? nystatin (MYCOSTATIN) powder Apply 1 application topically 2 (two) times a day. Under breasts for rash, until healed. Then PRN.   ? OMEGA-3/DHA/EPA/FISH OIL (FISH OIL-OMEGA-3 FATTY ACIDS) 300-1,000 mg capsule Take 2 g by mouth 2 times a day at 6:00 am and 4:00 pm.   ? omeprazole (PRILOSEC) 20 MG capsule Take 20 mg by mouth daily before breakfast.   ? polyvinyl alcohol-povidon,PF, (REFRESH CLASSIC) 1.4-0.6 % Dpet ophthalmic dropperette Administer 2 drops to both eyes 3 (three) times a day.   ? potassium chloride (K-DUR,KLOR-CON) 10 MEQ tablet TAKE 2 TABLETS BY MOUTH TWICE DAILY   ? prochlorperazine (COMPAZINE) 10 MG tablet TAKE 1 TABLET(10 MG) BY MOUTH EVERY 6 HOURS AS NEEDED FOR NAUSEA   ? simvastatin (ZOCOR) 20 MG tablet Take 1 tablet (20 mg total) by mouth daily with supper.   ? warfarin ANTICOAGULANT (COUMADIN/JANTOVEN) 2 MG tablet Take 2 tablets (4 mg total) by mouth daily.       ASSESSMENT:      ICD-10-CM    1. CHF (congestive heart failure), NYHA class I, acute on chronic, combined (H) I50.43    2. Bilateral leg edema R60.0    3. Venous  stasis I87.8        PLAN:   Chronic right shoulder pain x-ray with possible rotator cuff tear- ortho consult     Loose toenails seen by podiatry 1/15/19 and loose toenail was removed and she has new dressing change orders     Atrial fibrillation-continue Coumadin,  metoprolol and increased lasix, monitor BMP closely     Venous stasis lower extremities. Pt  will resume lymph wraps M-F and ace wraps on the weekends. .  Blistering lower extremities- new dressing change orders.      Status post left breast lumpectomy incision healed    Pain management Norco PRN     Shortness of breath with exertion. Oxygen PRN    Anticoagulation management continue Coumadin and adjust per INRs    Debility PT OT      Electronically signed by: Sara Mayes CNP

## 2021-06-20 NOTE — LETTER
Letter by Sara Mayes CNP at      Author: Saar Mayes CNP Service: -- Author Type: --    Filed:  Encounter Date: 1/9/2020 Status: Signed         Patient: Jennifer Galvin   MR Number: [id number]   YOB: 1945   Date of Visit: 1/9/2020     [facility]    Facility:   [facility] [[id number]]   Code Status: FULL CODE      CHIEF COMPLAINT/REASON FOR VISIT:  Chief Complaint   Patient presents with   ? Review Of Multiple Medical Conditions       HISTORY:      HPI: Jennifer is a 74 y.o. female undergoing physical and occupational therapy at [facility] transitional care unit. , she is with history of severe obesity, stage Ib left breast cancer, I normal, vitamin D deficiency, urge stress incontinence, tremor, postmenopausal bleeding and uterine adenocarcinoma in situ status post JEROME/BSO, atrial fibrillation, osteopenia, FLAKITA, macular degeneration, chronic bilateral leg edema, hypertension, diabetes mellitus, CAD, chronic back and bilateral knee pain who presented to [facility] for scheduled left breast lumpectomy And sentinel lymph node biopsy.  Per the records she became hypoxic in the PACU and noted to be persistently hypoxic to 4 L of oxygen which is a change from her baseline.  She does have FLAKITA but does not use her CPAP machine. She was recently sent back to the hospital from December 14-17 th  for left axilla pain and found to have a left axillary  hematoma related to her recent lumpectomy. She was seen by general surgery who recommended resuming warfarin and providing modest infection prophylaxis.She was also treated at this time with cefdinir for a UTI. She then returned and was  admitted to [facility] from 12/19/19-12/31/19 for pneumonia.  The patient has bilateral pleural effusions on CTA and was treated with doxycycline and Keflex for suspected pneumonia.  She completed a course of Cefdinir for UTI outpatient during her hospital  [facility] Pul[facility] Consult Service NotePulmonary, Critical Care, and Sleep Medicine Name: Wells Kehr MRN: [id number] : 1933 Hospital: [place] [address] Date: 10/22/2018   Hospital Day: 43 IMPRESSION:  
1. 18 cardiopulmonary arrest with  6 min of CPR then ROSC. 2. Acute and now chronic respiratory failure-on vent 3. S/p tracheostomy 10/2/18 4. Acute hemoptysis-  
5. RLL with suspected lung ca but cytology negative x 2( from bronch) 6. New worsening anemia hgb 7.8 7. Moderate right pleural effusion, s/p prior drain 8. S/P pulmonary artery embolization for hemoptysis per IR of suspected lung mass. 9. COPD moderate to severe at baseline 10. Anemia and prior coagulopathy on coumadin 11. Encehalopathy/Debility multifactorial -sluggish and slow but following commands; has hearing loss as well 12. Hx of CAD s/p PCI, afib on amio, former smoker, prior cva RECOMMENDATIONS/PLAN:  
1. Continue TC trials as tolerated 2. Trach replacement next weeek 3. nebs 4. Gentle diuresis 5. Enteral feedings 6. Glucose monitoring and SSI 7. PT/OT 8. DVT/GI prophylaxis 9. Will need LTAC - d/w Case management: Daughter will be here Wednesday 10. PT, OT 
 
I personally reviewed meds, labs and images Subjective/Initial History:  
 
 
10-22: went up to 8 hours off vent, 40% TC ; working with PT, OT. sittign up EOB with assistance 10-18: no overnight events. More alert today 10-17: still desaturating with TC trials 10-16: no events. Desaturated with TC trials yesterday. 10-15: no events. No new reported issues 10-14:  No major events. Received transfusion yesterday 10-13:  No changes. Spoke to wife who agrees to transfusion. Cytology still pending 10-12-18: Pt seems to be much more alert when seen this am. Had near 3.5-4L of right pleural fluid output. NO ROS or HPI are obtainable from pt. 10-11-18: Pt has been slow to respond. Still not waking up. Had increased secretion from trach and from his penis. Not follow any commands. Not able to get ROS or HPI from pt.  
 
 
10-10-18: No major changes or events over last 24 hrs. Not able to obtain ROS or HPI from pt. Some thick yellow secretions present around this trach. 10-9-18: Not able to get hx  Or ROS from pt. No acute changes overnight. Had some moderate secretions this am.  
 
 
10-8-18: Pt still not waking up. Has ongoing issues with blood clots in urine. Per nurses has been with recurrent issues with clotting and require frequent flushing of the almaraz. Pt has not been able to tolerate trach collar. Unable to get ROS or HPI from pt. MAR reviewed and pertinent medications noted or modified as needed Current Facility-Administered Medications Medication  sodium chloride (NS) flush  alteplase (CATHFLO) 0.5 mg in sterile water (preservative free) 0.5 mL injection  enoxaparin (LOVENOX) injection 40 mg  
 levoFLOXacin (LEVAQUIN) tablet 750 mg  
 famotidine (PEPCID) tablet 20 mg  
 influenza vaccine 2018-19 (6 mos+)(PF) (FLUARIX QUAD/FLULAVAL QUAD) injection 0.5 mL  furosemide (LASIX) injection 20 mg  
 albuterol-ipratropium (DUO-NEB) 2.5 MG-0.5 MG/3 ML  
 fentaNYL citrate (PF) injection 25 mcg  zinc oxide-cod liver oil (DESITIN) 40 % paste  acetaminophen (TYLENOL) solution 650 mg  
 sodium chloride (NS) flush 10-30 mL  sodium chloride (NS) flush 10 mL  sodium chloride (NS) flush 10-40 mL  heparin (porcine) pf 300 Units  glycopyrrolate (ROBINUL) injection 0.1 mg  
 chlorhexidine (PERIDEX) 0.12 % mouthwash 15 mL  albuterol (PROVENTIL HFA, VENTOLIN HFA, PROAIR HFA) inhaler 2 Puff  dorzolamide-timolol (COSOPT) 22.3-6.8 mg/mL ophthalmic solution 1 Drop  latanoprost (XALATAN) 0.005 % ophthalmic solution 1 Drop  sodium chloride (NS) flush 5-10 mL  sodium chloride (NS) flush 5-10 mL stay.  Her procalcitonin and influenza A were negative on hospital admission.  She was also noted to have severe BLE nonpitting edema and her furosemide was increased to 80 mg two times a day for diuresis.  It was recommended that her BMP be followed bi-weekly for assessment of renal function on increased diuresis.    Today she was seen as a visit to review multiple medical issues.   She denied chest pain and reports her breathing is good.  She is also currently off oxygen she reports positive bowel movement and voiding well her left breast incision is healed.  Her INR was reviewed today and was therapeutic at 2.04. no changes in dosing.   Her BMP is stable and being monitored closely due to high  doses of diuretics.   She denies any cough or congestion.  She is with lower extremity edema and tells me she will not be resuming her lymph wraps. Her lower extremities continue to be red and she reports it is increased on right  foot. Keflex extended.  Her BS were reviewed and are elevated. She tells me she was diet controlled.prior. She did  receive a burst of steroids during her recent hospitalization and sent back on a sliding scale. Last A1C 9/2019 was 6.5. She does have a podiatry consult pending  Due to loose great toe nails however her left nail is now off. She is with lower extremity lymphedema and unable to wear the wraps however she also refuses ace wraps.     Past Medical History:   Diagnosis Date   ? (Lower) Leg Localized Swelling     Created by Conversion    ? Adenocarcinoma In Situ Of The Uterus     Created by Conversion    ? Backache     Created by Conversion [facility] Annotation: Feb 29 2012 12:14PM - Opal Helm: Referred to  [facility] 9/11, given TENS unit.    ? Benign Polyps Of The Large Intestine     Created by Conversion    ? Breast cancer (H)    ? Cancer (H)     uterine   ? Chronic kidney disease     stage 1 per H&P 11/22/2019   ? Coronary artery disease    ? Diabetes mellitus (H)     type 2    ? Fatigue     Created by Conversion    ? Hyperglycemia     Created by Conversion    ? Hyperlipidemia     Created by Conversion    ? Hypertension     Created by Conversion    ? Joint Pain, Localized In The Knee     Created by Conversion    ? Lesion of mediastinum    ? Lymphedema     Created by Conversion    ? Macular Degeneration     Created by Conversion [facility] Annotation: Apr 5 2011 12:22PM - Tien Guzman: Followed by  Ophthalmologist, Dr. Avilez.    ? Major Depression, Single Episode In Remission     Created by Conversion    ? Obesity     Created by Conversion    ? Obstructive Sleep Apnea     CPAP   ? Osteopenia     Created by Conversion    ? Paroxysmal Atrial Fibrillation     Created by Conversion [facility] Annotation: Nov 28 2012 10:36PM - Shruthi Dhaliwal: Found incidential  on exam on May 7th, [id number] score of 1 for HTNsymptomatic and hospitalized  x2 in July, 2012.CV X 2 in 2012Chronic Sotalol Rx    ? Postmenopausal bleeding     Created by Conversion    ? Skin cancer    ? Tachycardia     Created by Conversion    ? Tremor     Created by Conversion    ? Urge And Stress Incontinence     Created by Conversion    ? Urinary Frequency More Than Twice At Night (Nocturia)     Created by Conversion    ? Urine Tests Nonspecific Abnormal Findings     Created by Conversion    ? Vitamin D Deficiency     Created by Conversion              Family History   Problem Relation Age of Onset   ? Hypertension Father    ? Pneumonia Father    ? Esophageal cancer Brother    ? Cancer Brother    ? Stroke Mother    ? Alzheimer's disease Brother    ? Cancer Brother    ? Prostate cancer Brother    ? Cancer Nephew    ? Colon cancer Nephew    ? Cancer Paternal cousin      Social History     Socioeconomic History   ? Marital status: Single     Spouse name: Not on file   ? Number of children: 0   ? Years of education: Not on file   ? Highest education level: Not on file   Occupational History   ? Occupation: Retired RN     Employer:   ondansetron (ZOFRAN) injection 4 mg ROS:A comprehensive review of systems was negative except for that written in the HPI. Objective: 
 
Vital Signs: Telemetry:    normal sinus rhythmIntake/Output:  
Visit Vitals /50 Pulse 82 Temp 98.8 °F (37.1 °C) Resp 22 Ht 6' 2\" (1.88 m) Wt 106.4 kg (234 lb 9.1 oz) SpO2 94% BMI 30.12 kg/m² Temp (24hrs), Av.5 °F (36.9 °C), Min:97.8 °F (36.6 °C), Max:99.2 °F (37.3 °C) O2 Device: Tracheal collar O2 Flow Rate (L/min): 10 l/min Wt Readings from Last 4 Encounters:  
10/20/18 106.4 kg (234 lb 9.1 oz) 16 111.1 kg (245 lb)  
16 106.6 kg (235 lb) 04/08/15 103.4 kg (228 lb) Intake/Output Summary (Last 24 hours) at 10/22/2018 1451 Last data filed at 10/22/2018 1200 Gross per 24 hour Intake 1700 ml Output 1045 ml Net 655 ml Last shift:      10/22 0701 - 10/22 1900 In: 250 Out: 545 [WZGRR:884] Last 3 shifts: 10/20 1901 - 10/22 0700 In: 9367 Out: 1420 [EXWP; Drains:50] Physical Exam:  
 General:  AAM No distress, trach and vent support. HEAD: Normocephalic, without obvious abnormality, atraumatic EYES: conjunctivae clear. anicteric sclerae NOSE: nares normal, no drainage, no nasal flaring, Neck: Supple, symmetrical, trachea midline, has trach in place, on Mechanical vent support. Chest: increased AP diameter Lungs:   clear anterioly. Trached on vent. Heart: Regular rate and rhythm nl s1,2. Abdomen: soft, non-tender, +BS, pt has almaraz in place. Obese. Musculoskeletal: no gross deformities Neuro:  Intermittently follows commands. Psych: Not able to assess; no agitation. NO anxiety or depression when seen. Data:  
            
Labs: 
Recent Labs 10/22/18 
0403 10/21/18 
8915 10/20/18 
6211 WBC 6.5 6.4 7.2 HGB 7.5* 7.8* 7.0*  
HCT 24.5* 25.7* 23.1*  
 184 176 Recent Labs 10/22/18 
0403 10/21/18 
6316 10/20/18 
0028  142 142  
K 4.0 4.6 3.8  104 104 CO2 34* 34* 33* * 110* 108* BUN 30* 28* 28* CREA 1.08 0.98 1.00  
CA 8.5 8.9 8.3*  
MG 2.2  --  2.3 PHOS 2.6 2.7 2.2* INR 1.1 1.1 1.1 Recent Labs 10/20/18 
4862 PH 7.52* PCO2 39 PO2 110* HCO3 31* FIO2 40 Imaging: 
I have personally reviewed the patients radiographs and have reviewed the reports: 
None today Total critical care time exclusive of procedures: 25 minutes Ronit Peres MD  
 [facility]   Social Needs   ? Financial resource strain: Not on file   ? Food insecurity:     Worry: Not on file     Inability: Not on file   ? Transportation needs:     Medical: Not on file     Non-medical: Not on file   Tobacco Use   ? Smoking status: Former Smoker     Packs/day: 3.00     Years: 40.00     Pack years: 120.00     Types: Cigarettes     Last attempt to quit: 1/1/2005     Years since quitting: 15.0   ? Smokeless tobacco: Never Used   Substance and Sexual Activity   ? Alcohol use: Not Currently   ? Drug use: No   ? Sexual activity: Never     Partners: Male     Birth control/protection: Surgical, Post-menopausal   Lifestyle   ? Physical activity:     Days per week: Not on file     Minutes per session: Not on file   ? Stress: Not on file   Relationships   ? Social connections:     Talks on phone: Not on file     Gets together: Not on file     Attends [employer] service: Not on file     Active member of club or organization: Not on file     Attends meetings of clubs or organizations: Not on file     Relationship status: Not on file   ? Intimate partner violence:     Fear of current or ex partner: Not on file     Emotionally abused: Not on file     Physically abused: Not on file     Forced sexual activity: Not on file   Other Topics Concern   ? Not on file   Social History Narrative    Lives alone single family home that she owns, no children and is retired.  Boyfriend used to live with her, however due to stroke he is in his own care facility.  She is currently at [facility]         Review of Systems   Constitutional: Positive for activity change. Negative for appetite change, fatigue and fever.   HENT: Negative for congestion.    Respiratory: Negative for cough and wheezing.         Denied shortness of breath-she is off the oxygen   Cardiovascular: Positive for leg swelling. Negative for chest pain.        Lymphedema   Gastrointestinal: Negative for abdominal distention, abdominal  pain, constipation, diarrhea and nausea.   Genitourinary: Negative for dysuria.   Musculoskeletal: Positive for arthralgias. Negative for back pain.   Skin: Positive for wound. Negative for color change.        Left breast incision healed   Neurological: Negative for dizziness.   Psychiatric/Behavioral: Negative for agitation, behavioral problems and confusion.       Vitals:    01/09/20 0805   BP: 110/72   Pulse: 88   Resp: 16   Temp: 97.3  F (36.3  C)   SpO2: 94%   Weight: (!) 344 lb 3.2 oz (156.1 kg)       Physical Exam  Constitutional:       Appearance: She is well-developed.      Comments: Pleasant woman in no acute distress   HENT:      Head: Normocephalic.   Eyes:      Conjunctiva/sclera: Conjunctivae normal.   Neck:      Musculoskeletal: Normal range of motion.   Cardiovascular:      Rate and Rhythm: Normal rate and regular rhythm.      Heart sounds: Normal heart sounds. No murmur.   Pulmonary:      Effort: No respiratory distress.      Breath sounds: No rales.   Abdominal:      General: Bowel sounds are normal. There is no distension.      Palpations: Abdomen is soft.      Tenderness: There is no abdominal tenderness.   Musculoskeletal: Normal range of motion.      Right lower leg: Edema present.      Left lower leg: Edema present.      Comments: Patient with a right   great toenails that is almost completely lifted off and hanging on by the cuticle. Her left toe great toenail has fallen off.  Podiatry consult rescheduled    3-4+ lower extremities. Declines ace wraps    Skin:     General: Skin is warm.      Comments: Healed left breast incision   Neurological:      Mental Status: She is alert and oriented to person, place, and time.   Psychiatric:         Behavior: Behavior normal.           LABS:   Recent Results (from the past 240 hour(s))   POCT Glucose   Result Value Ref Range    Glucose 125 70 - 139 mg/dL   POCT Glucose   Result Value Ref Range    Glucose 242 (H) 70 - 139 mg/dL   POCT Glucose   Result  Value Ref Range    Glucose 299 (H) 70 - 139 mg/dL   Potassium - Next AM   Result Value Ref Range    Potassium 3.7 3.5 - 5.0 mmol/L   POCT Glucose   Result Value Ref Range    Glucose 130 70 - 139 mg/dL   INR   Result Value Ref Range    INR 3.44 (H) 0.90 - 1.10   Basic Metabolic Panel   Result Value Ref Range    Sodium 140 136 - 145 mmol/L    Potassium 3.7 3.5 - 5.0 mmol/L    Chloride 99 98 - 107 mmol/L    CO2 33 (H) 22 - 31 mmol/L    Anion Gap, Calculation 8 5 - 18 mmol/L    Glucose 149 (H) 70 - 125 mg/dL    Calcium 9.6 8.5 - 10.5 mg/dL    BUN 18 8 - 28 mg/dL    Creatinine 1.13 (H) 0.60 - 1.10 mg/dL    GFR MDRD Af Amer 57 (L) >60 mL/min/1.73m2    GFR MDRD Non Af Amer 47 (L) >60 mL/min/1.73m2   INR   Result Value Ref Range    INR 2.76 (H) 0.90 - 1.10   Basic Metabolic Panel   Result Value Ref Range    Sodium 140 136 - 145 mmol/L    Potassium 3.8 3.5 - 5.0 mmol/L    Chloride 100 98 - 107 mmol/L    CO2 33 (H) 22 - 31 mmol/L    Anion Gap, Calculation 7 5 - 18 mmol/L    Glucose 134 (H) 70 - 125 mg/dL    Calcium 8.9 8.5 - 10.5 mg/dL    BUN 13 8 - 28 mg/dL    Creatinine 1.10 0.60 - 1.10 mg/dL    GFR MDRD Af Amer 59 (L) >60 mL/min/1.73m2    GFR MDRD Non Af Amer 49 (L) >60 mL/min/1.73m2   HM2(CBC w/o Differential)   Result Value Ref Range    WBC 10.3 4.0 - 11.0 thou/uL    RBC 4.08 3.80 - 5.40 mill/uL    Hemoglobin 11.5 (L) 12.0 - 16.0 g/dL    Hematocrit 39.5 35.0 - 47.0 %    MCV 97 80 - 100 fL    MCH 28.2 27.0 - 34.0 pg    MCHC 29.1 (L) 32.0 - 36.0 g/dL    RDW 18.9 (H) 11.0 - 14.5 %    Platelets 172 140 - 440 thou/uL    MPV 11.1 8.5 - 12.5 fL   INR   Result Value Ref Range    INR 2.19 (H) 0.90 - 1.10   INR   Result Value Ref Range    INR 2.04 (H) 0.90 - 1.10   Basic Metabolic Panel   Result Value Ref Range    Sodium 141 136 - 145 mmol/L    Potassium 4.0 3.5 - 5.0 mmol/L    Chloride 103 98 - 107 mmol/L    CO2 32 (H) 22 - 31 mmol/L    Anion Gap, Calculation 6 5 - 18 mmol/L    Glucose 142 (H) 70 - 125 mg/dL    Calcium 8.9 8.5 -  10.5 mg/dL    BUN 11 8 - 28 mg/dL    Creatinine 1.08 0.60 - 1.10 mg/dL    GFR MDRD Af Amer 60 (L) >60 mL/min/1.73m2    GFR MDRD Non Af Amer 50 (L) >60 mL/min/1.73m2     Current Outpatient Medications   Medication Sig   ? acetaminophen (TYLENOL) 500 MG tablet Take 1-2 tablets (500-1,000 mg total) by mouth every 4 (four) hours as needed (PAin 1-3).   ? albuterol (PROVENTIL) 2.5 mg /3 mL (0.083 %) nebulizer solution Take 3 mL (2.5 mg total) by nebulization every 4 (four) hours as needed for wheezing.   ? bacitracin ointment Apply 1 application topically 2 (two) times a day. After epsom salt foot soak. To bilateral great toes for toenail falling off, until seen by Podiatry   ? calcium-vitamin D (CALCIUM-VITAMIN D) 500 mg(1,250mg) -200 unit per tablet Take 1 tablet by mouth 2 (two) times a day with meals.   ? cholecalciferol, vitamin D3, 1,000 unit tablet Take 2,000 Units by mouth daily.    ? diphenhydrAMINE (BENADRYL) 2 % cream Apply 1 application topically 3 (three) times a day as needed for itching.   ? diphenhydrAMINE (BENADRYL) 25 mg capsule Take 25 mg by mouth every 6 (six) hours as needed for itching.   ? fesoterodine (TOVIAZ) 8 mg Tb24 ER tablet Take 8 mg by mouth daily.    ? fluticasone propionate (FLONASE ALLERGY RELIEF) 50 mcg/actuation nasal spray One spray in each nostril daily   ? furosemide (LASIX) 80 MG tablet Take 1 tablet (80 mg total) by mouth 2 (two) times a day at 9am and 6pm.   ? gabapentin (NEURONTIN) 300 MG capsule Take 300 mg by mouth at bedtime.   ? glucosamine-chondroitin 500-400 mg tablet Take 1 tablet by mouth 2 (two) times a day with meals.    ? HYDROcodone-acetaminophen 5-325 mg per tablet Take 1 tablet by mouth every 4 (four) hours as needed for pain.   ? lidocaine 4 % patch Place 1 patch on the skin daily. Remove and discard patch with 12 hours or as directed by MD.   ? lidocaine-prilocaine (EMLA) cream Place over port 30 min. before being accessed.   ? loratadine (CLARITIN) 10 mg tablet  Take 1 tablet (10 mg total) by mouth daily.   ? metoprolol tartrate (LOPRESSOR) 50 MG tablet Take 50 mg by mouth 2 (two) times a day. Hold for SBP < 110   ? mv-min/FA/vit K/lycop/lut/zeax (OCUVITE EYE PLUS MULTI ORAL) Take 1 tablet by mouth 2 (two) times a day with meals.   ? NOVOLOG U-100 INSULIN ASPART 100 unit/mL injection Check blood sugar three (3) times daily.  11.65 Type 2 with hyperglycemia   ? nystatin (MYCOSTATIN) powder Apply 1 application topically 2 (two) times a day. Under breasts for rash, until healed. Then PRN.   ? OMEGA-3/DHA/EPA/FISH OIL (FISH OIL-OMEGA-3 FATTY ACIDS) 300-1,000 mg capsule Take 2 g by mouth 2 times a day at 6:00 am and 4:00 pm.   ? omeprazole (PRILOSEC) 20 MG capsule Take 20 mg by mouth daily before breakfast.   ? polyvinyl alcohol-povidon,PF, (REFRESH CLASSIC) 1.4-0.6 % Dpet ophthalmic dropperette Administer 2 drops to both eyes 3 (three) times a day.   ? potassium chloride (K-DUR,KLOR-CON) 10 MEQ tablet TAKE 2 TABLETS BY MOUTH TWICE DAILY   ? prochlorperazine (COMPAZINE) 10 MG tablet TAKE 1 TABLET(10 MG) BY MOUTH EVERY 6 HOURS AS NEEDED FOR NAUSEA   ? simvastatin (ZOCOR) 20 MG tablet Take 1 tablet (20 mg total) by mouth daily with supper.   ? triamcinolone (KENALOG) 0.1 % cream Apply 1 application topically 2 (two) times a day. To left leg for rash, until healed   ? warfarin ANTICOAGULANT (COUMADIN/JANTOVEN) 2 MG tablet Take 2 tablets (4 mg total) by mouth daily.       ASSESSMENT:      ICD-10-CM    1. Physical deconditioning R53.81    2. Anticoagulation management encounter Z51.81     Z79.01    3. CHF (congestive heart failure), NYHA class I, acute on chronic, combined (H) I50.43    4. Type 2 diabetes mellitus with other specified complication, without long-term current use of insulin (H) E11.69        PLAN:   Chronic right shoulder pain x-ray with possible rotator cuff tear- ortho consult     Loose toenails continue foot soaks, bacitracin  and dressing changes per orders,  podiatry consult    Atrial fibrillation-continue Coumadin,  metoprolol and increased lasix, monitor BMP closely     Venous stasis lower extremities unable to wear lymph wraps due to cellulitis and refused ace wraps   .   Status post left breast lumpectomy incision healed    Pain management Norco PRN     Shortness of breath resolved off oxygen    Anticoagulation management continue Coumadin and adjust per INRs    Debility PT OT    Increased redness right foot- keflex x 7 days     Electronically signed by: Sraa Mayes CNP

## 2021-06-20 NOTE — LETTER
Letter by Sara Mayes CNP at      Author: Sara Mayes CNP Service: -- Author Type: --    Filed:  Encounter Date: 1/31/2020 Status: (Other)         Patient: Jennifer Galvin   MR Number: [id number]   YOB: 1945   Date of Visit: 1/31/2020     [facility]    Facility:   [facility] [[id number]]   Code Status: FULL CODE      CHIEF COMPLAINT/REASON FOR VISIT:  Chief Complaint   Patient presents with   ? Problem Visit     Rash       HISTORY:      HPI: Jennifer is a 74 y.o. female undergoing physical and occupational therapy at [facility] transitional care unit. , she is with history of severe obesity, stage Ib left breast cancer, I normal, vitamin D deficiency, urge stress incontinence, tremor, postmenopausal bleeding and uterine adenocarcinoma in situ status post JEROME/BSO, atrial fibrillation, osteopenia, FLAKITA, macular degeneration, chronic bilateral leg edema, hypertension, diabetes mellitus, CAD, chronic back and bilateral knee pain who presented to [facility] for scheduled left breast lumpectomy And sentinel lymph node biopsy.  Per the records she became hypoxic in the PACU and noted to be persistently hypoxic to 4 L of oxygen which is a change from her baseline.  She does have FLAKITA but does not use her CPAP machine. She was recently sent back to the hospital from December 14-17 th  for left axilla pain and found to have a left axillary  hematoma related to her recent lumpectomy. She was seen by general surgery who recommended resuming warfarin and providing modest infection prophylaxis.She was also treated at this time with cefdinir for a UTI. She then returned and was  admitted to [facility] from 12/19/19-12/31/19 for pneumonia.  The patient has bilateral pleural effusions on CTA and was treated with doxycycline and Keflex for suspected pneumonia.  She completed a course of Cefdinir for UTI outpatient during her hospital stay.  Her  procalcitonin and influenza A were negative on hospital admission.  She was also noted to have severe BLE nonpitting edema and her furosemide was increased to 80 mg two times a day for diuresis.  It was recommended that her BMP be followed bi-weekly for assessment of renal function on increased diuresis.    Today she was seen for follow up to rash and itching  She reports relief with the triamcinolone but the rash on her back and upper thighs but it appears to be fading on her abdomen and arms increased itching.  Her Vistaril was increased to 50 mg every 6 hours as needed.  She continued to have the rash on her back and upper thighs but it did appear to be lighter on her abdomen and arms.  She did just recently follow-up with orthopedics regarding a possible rotator cuff tear  and was given a cortisone shot.  In review of her meds this was her only recent new med.  She denies any  Increased shortness of breath beyond baseline   She denied chest pain.  She is moving her bowels and has no issues with urination.   Her left breast incision is healed.  He BMP's have been stable despite high doses of lasix.   Her lung sounds were clear.    She denies any cough or congestion.   She was seen by podiatry  and her other toenail was removed and new dressing change orders.   .Last A1C 9/2019 was 6.5.     Past Medical History:   Diagnosis Date   ? (Lower) Leg Localized Swelling     Created by Conversion    ? Adenocarcinoma In Situ Of The Uterus     Created by Conversion    ? Backache     Created by Conversion [facility] Annotation: Feb 29 2012 12:14PM - Opal Helm: Referred to  [facility] 9/11, given TENS unit.    ? Benign Polyps Of The Large Intestine     Created by Conversion    ? Breast cancer (H)    ? Cancer (H)     uterine   ? Chronic kidney disease     stage 1 per H&P 11/22/2019   ? Coronary artery disease    ? Diabetes mellitus (H)     type 2   ? Fatigue     Created by Conversion    ? Hyperglycemia     Created by  Conversion    ? Hyperlipidemia     Created by Conversion    ? Hypertension     Created by Conversion    ? Joint Pain, Localized In The Knee     Created by Conversion    ? Lesion of mediastinum    ? Lymphedema     Created by Conversion    ? Macular Degeneration     Created by Conversion [facility] Annotation: Apr 5 2011 12:22PM - Tien Guzman: Followed by  Ophthalmologist, Dr. Avilez.    ? Major Depression, Single Episode In Remission     Created by Conversion    ? Obesity     Created by Conversion    ? Obstructive Sleep Apnea     CPAP   ? Osteopenia     Created by Conversion    ? Paroxysmal Atrial Fibrillation     Created by Conversion [facility] Annotation: Nov 28 2012 10:36PM - Shruthi Dhaliwal: Found incidential  on exam on May 7th, [id number] score of 1 for HTNsymptomatic and hospitalized  x2 in July, 2012.CV X 2 in 2012Chronic Sotalol Rx    ? Postmenopausal bleeding     Created by Conversion    ? Skin cancer    ? Tachycardia     Created by Conversion    ? Tremor     Created by Conversion    ? Urge And Stress Incontinence     Created by Conversion    ? Urinary Frequency More Than Twice At Night ([place])     Created by Conversion    ? Urine Tests Nonspecific Abnormal Findings     Created by Conversion    ? Vitamin D Deficiency     Created by Conversion              Family History   Problem Relation Age of Onset   ? Hypertension Father    ? Pneumonia Father    ? Esophageal cancer Brother    ? Cancer Brother    ? Stroke Mother    ? Alzheimer's disease Brother    ? Cancer Brother    ? Prostate cancer Brother    ? Cancer Nephew    ? Colon cancer Nephew    ? Cancer Paternal cousin      Social History     Socioeconomic History   ? Marital status: Single     Spouse name: Not on file   ? Number of children: 0   ? Years of education: Not on file   ? Highest education level: Not on file   Occupational History   ? Occupation: Retired RN     Employer: [employer]   Social Needs   ? Financial resource strain:  Not on file   ? Food insecurity:     Worry: Not on file     Inability: Not on file   ? Transportation needs:     Medical: Not on file     Non-medical: Not on file   Tobacco Use   ? Smoking status: Former Smoker     Packs/day: 3.00     Years: 40.00     Pack years: 120.00     Types: Cigarettes     Last attempt to quit: 1/1/2005     Years since quitting: 15.0   ? Smokeless tobacco: Never Used   Substance and Sexual Activity   ? Alcohol use: Not Currently   ? Drug use: No   ? Sexual activity: Never     Partners: Male     Birth control/protection: Surgical, Post-menopausal   Lifestyle   ? Physical activity:     Days per week: Not on file     Minutes per session: Not on file   ? Stress: Not on file   Relationships   ? Social connections:     Talks on phone: Not on file     Gets together: Not on file     Attends [employer] service: Not on file     Active member of club or organization: Not on file     Attends meetings of clubs or organizations: Not on file     Relationship status: Not on file   ? Intimate partner violence:     Fear of current or ex partner: Not on file     Emotionally abused: Not on file     Physically abused: Not on file     Forced sexual activity: Not on file   Other Topics Concern   ? Not on file   Social History Narrative    Lives alone single family home that she owns, no children and is retired.  Boyfriend used to live with her, however due to stroke he is in his own care facility.  She is currently at [facility]         Review of Systems   Constitutional: Positive for activity change. Negative for appetite change, fatigue and fever.   HENT: Negative for congestion.    Respiratory: Negative for cough and wheezing.         Shortness of breath with exertion   Cardiovascular: Positive for leg swelling. Negative for chest pain.        Lymphedema   Gastrointestinal: Negative for abdominal distention, abdominal pain, constipation, diarrhea and nausea.   Genitourinary: Negative for dysuria.    Musculoskeletal: Positive for arthralgias. Negative for back pain.   Skin: Positive for wound. Negative for color change.        Left breast incision healed   Neurological: Negative for dizziness.   Psychiatric/Behavioral: Negative for agitation, behavioral problems and confusion.       Vitals:    01/31/20 1148   BP: 110/74   Pulse: 89   Resp: 18   Temp: 98.2  F (36.8  C)   SpO2: 97%   Weight: (!) 341 lb (154.7 kg)       Physical Exam  Constitutional:       Appearance: She is well-developed.      Comments: Pleasant woman in no acute distress   HENT:      Head: Normocephalic.   Eyes:      Conjunctiva/sclera: Conjunctivae normal.   Neck:      Musculoskeletal: Normal range of motion.   Cardiovascular:      Rate and Rhythm: Normal rate and regular rhythm.      Heart sounds: Normal heart sounds. No murmur.   Pulmonary:      Effort: No respiratory distress.      Breath sounds: No rales.   Abdominal:      General: Bowel sounds are normal. There is no distension.      Palpations: Abdomen is soft.      Tenderness: There is no abdominal tenderness.   Musculoskeletal: Normal range of motion.      Right lower leg: Edema present.      Left lower leg: Edema present.      Comments: 3-4+ lower extremities. Lymph wraps resumed    Skin:     General: Skin is warm.      Findings: Rash present.      Comments: Healed left breast incision   Neurological:      Mental Status: She is alert and oriented to person, place, and time.   Psychiatric:         Behavior: Behavior normal.           LABS:   Recent Results (from the past 240 hour(s))   INR   Result Value Ref Range    INR 1.70 (H) 0.90 - 1.10   Basic Metabolic Panel   Result Value Ref Range    Sodium 142 136 - 145 mmol/L    Potassium 4.0 3.5 - 5.0 mmol/L    Chloride 102 98 - 107 mmol/L    CO2 32 (H) 22 - 31 mmol/L    Anion Gap, Calculation 8 5 - 18 mmol/L    Glucose 123 70 - 125 mg/dL    Calcium 9.1 8.5 - 10.5 mg/dL    BUN 14 8 - 28 mg/dL    Creatinine 1.02 0.60 - 1.10 mg/dL    GFR  MDRD Af Amer >60 >60 mL/min/1.73m2    GFR MDRD Non Af Amer 53 (L) >60 mL/min/1.73m2   Basic Metabolic Panel   Result Value Ref Range    Sodium 141 136 - 145 mmol/L    Potassium 3.9 3.5 - 5.0 mmol/L    Chloride 101 98 - 107 mmol/L    CO2 30 22 - 31 mmol/L    Anion Gap, Calculation 10 5 - 18 mmol/L    Glucose 103 70 - 125 mg/dL    Calcium 9.0 8.5 - 10.5 mg/dL    BUN 14 8 - 28 mg/dL    Creatinine 0.93 0.60 - 1.10 mg/dL    GFR MDRD Af Amer >60 >60 mL/min/1.73m2    GFR MDRD Non Af Amer 59 (L) >60 mL/min/1.73m2   INR   Result Value Ref Range    INR 1.61 (H) 0.90 - 1.10     Current Outpatient Medications   Medication Sig   ? acetaminophen (TYLENOL) 500 MG tablet Take 1-2 tablets (500-1,000 mg total) by mouth every 4 (four) hours as needed (PAin 1-3).   ? albuterol (PROVENTIL) 2.5 mg /3 mL (0.083 %) nebulizer solution Take 3 mL (2.5 mg total) by nebulization every 4 (four) hours as needed for wheezing.   ? bacitracin ointment Apply 1 application topically 2 (two) times a day. After epsom salt foot soak. To bilateral great toes for toenail falling off, until seen by Podiatry   ? calcium-vitamin D (CALCIUM-VITAMIN D) 500 mg(1,250mg) -200 unit per tablet Take 1 tablet by mouth 2 (two) times a day with meals.   ? cholecalciferol, vitamin D3, 1,000 unit tablet Take 2,000 Units by mouth daily.    ? diphenhydrAMINE (BENADRYL) 2 % cream Apply 1 application topically 3 (three) times a day as needed for itching.   ? diphenhydrAMINE (BENADRYL) 25 mg capsule Take 25 mg by mouth every 6 (six) hours as needed for itching.   ? fesoterodine (TOVIAZ) 8 mg Tb24 ER tablet Take 8 mg by mouth daily.    ? fluticasone propionate (FLONASE ALLERGY RELIEF) 50 mcg/actuation nasal spray One spray in each nostril daily   ? furosemide (LASIX) 80 MG tablet Take 1 tablet (80 mg total) by mouth 2 (two) times a day at 9am and 6pm.   ? gabapentin (NEURONTIN) 300 MG capsule Take 300 mg by mouth at bedtime.   ? glucosamine-chondroitin 500-400 mg tablet Take 1  tablet by mouth 2 (two) times a day with meals.    ? HYDROcodone-acetaminophen 5-325 mg per tablet Take 1 tablet by mouth every 4 (four) hours as needed for pain.   ? lidocaine 4 % patch Place 1 patch on the skin daily. Remove and discard patch with 12 hours or as directed by MD.   ? lidocaine-prilocaine (EMLA) cream Place over port 30 min. before being accessed.   ? loratadine (CLARITIN) 10 mg tablet Take 1 tablet (10 mg total) by mouth daily.   ? metoprolol tartrate (LOPRESSOR) 50 MG tablet Take 50 mg by mouth 2 (two) times a day. Hold for SBP < 110   ? mv-min/FA/vit K/lycop/lut/zeax (OCUVITE EYE PLUS MULTI ORAL) Take 1 tablet by mouth 2 (two) times a day with meals.   ? NOVOLOG U-100 INSULIN ASPART 100 unit/mL injection Check blood sugar three (3) times daily.  11.65 Type 2 with hyperglycemia   ? nystatin (MYCOSTATIN) powder Apply 1 application topically 2 (two) times a day. Under breasts for rash, until healed. Then PRN.   ? OMEGA-3/DHA/EPA/FISH OIL (FISH OIL-OMEGA-3 FATTY ACIDS) 300-1,000 mg capsule Take 2 g by mouth 2 times a day at 6:00 am and 4:00 pm.   ? omeprazole (PRILOSEC) 20 MG capsule Take 20 mg by mouth daily before breakfast.   ? polyvinyl alcohol-povidon,PF, (REFRESH CLASSIC) 1.4-0.6 % Dpet ophthalmic dropperette Administer 2 drops to both eyes 3 (three) times a day.   ? potassium chloride (K-DUR,KLOR-CON) 10 MEQ tablet TAKE 2 TABLETS BY MOUTH TWICE DAILY   ? prochlorperazine (COMPAZINE) 10 MG tablet TAKE 1 TABLET(10 MG) BY MOUTH EVERY 6 HOURS AS NEEDED FOR NAUSEA   ? simvastatin (ZOCOR) 20 MG tablet Take 1 tablet (20 mg total) by mouth daily with supper.   ? warfarin ANTICOAGULANT (COUMADIN/JANTOVEN) 2 MG tablet Take 2 tablets (4 mg total) by mouth daily.       ASSESSMENT:      ICD-10-CM    1. Rash R21        PLAN:   Chronic right shoulder pain recently seen by orthopedics and was given a cortisone shot.    Rash triamcinolone cream to rash twice daily, increase hydroxyzine to 50 mg every 6 hours  PRN     Loose toenails seen by podiatry 1/15/19 and loose toenail was removed and she has new dressing change orders     Atrial fibrillation-continue Coumadin,  metoprolol and increased lasix, monitor BMP closely     Venous stasis lower extremities. Pt  will resume lymph wraps M-F and ace wraps on the weekends. .  Blistering lower extremities- new dressing change orders.      Status post left breast lumpectomy incision healed    Pain management Norco PRN     Shortness of breath with exertion. Oxygen PRN    Anticoagulation management continue Coumadin and adjust per INRs    Debility PT OT      Electronically signed by: Saar Mayes, CNP

## 2021-06-20 NOTE — LETTER
Letter by Sara Mayes CNP at      Author: Sara Mayes CNP Service: -- Author Type: --    Filed:  Encounter Date: 1/29/2020 Status: (Other)         Patient: Jennifer Galvin   MR Number: [id number]   YOB: 1945   Date of Visit: 1/29/2020     [facility]    Facility:   [facility] [[id number]]   Code Status: FULL CODE      CHIEF COMPLAINT/REASON FOR VISIT:  Chief Complaint   Patient presents with   ? Review Of Multiple Medical Conditions       HISTORY:      HPI: Jennifer is a 74 y.o. female undergoing physical and occupational therapy at [facility] transitional care unit. , she is with history of severe obesity, stage Ib left breast cancer, I normal, vitamin D deficiency, urge stress incontinence, tremor, postmenopausal bleeding and uterine adenocarcinoma in situ status post JEROME/BSO, atrial fibrillation, osteopenia, FLAKITA, macular degeneration, chronic bilateral leg edema, hypertension, diabetes mellitus, CAD, chronic back and bilateral knee pain who presented to [facility] for scheduled left breast lumpectomy And sentinel lymph node biopsy.  Per the records she became hypoxic in the PACU and noted to be persistently hypoxic to 4 L of oxygen which is a change from her baseline.  She does have FLAKITA but does not use her CPAP machine. She was recently sent back to the hospital from December 14-17 th  for left axilla pain and found to have a left axillary  hematoma related to her recent lumpectomy. She was seen by general surgery who recommended resuming warfarin and providing modest infection prophylaxis.She was also treated at this time with cefdinir for a UTI. She then returned and was  admitted to [facility] from 12/19/19-12/31/19 for pneumonia.  The patient has bilateral pleural effusions on CTA and was treated with doxycycline and Keflex for suspected pneumonia.  She completed a course of Cefdinir for UTI outpatient during her  hospital stay.  Her procalcitonin and influenza A were negative on hospital admission.  She was also noted to have severe BLE nonpitting edema and her furosemide was increased to 80 mg two times a day for diuresis.  It was recommended that her BMP be followed bi-weekly for assessment of renal function on increased diuresis.    Today she was seen for follow up to rash and itching  She reports relief with the triamcinolone and has taking the hydroxyzine once.  The rash did appear lighter in  color.  She did just recently follow-up with orthopedics regarding a possible rotator cuff tear  and was given a cortisone shot.  In review of her meds this was her only recent new med.  She denies any  Increased shortness of breath beyond baseline   She denied chest pain.  She is moving her bowels and has no issues with urination.   Her left breast incision is healed.  He BMP's have been stable despite high doses of lasix.   Her lung sounds were clear.    She denies any cough or congestion.   She was seen by podiatry  and her other toenail was removed and new dressing change orders.   .Last A1C 9/2019 was 6.5.     Past Medical History:   Diagnosis Date   ? (Lower) Leg Localized Swelling     Created by Conversion    ? Adenocarcinoma In Situ Of The Uterus     Created by Conversion    ? Backache     Created by Conversion [facility] Annotation: Feb 29 2012 12:14PM - Opal Helm: Referred to  [facility] 9/11, given TENS unit.    ? Benign Polyps Of The Large Intestine     Created by Conversion    ? Breast cancer (H)    ? Cancer (H)     uterine   ? Chronic kidney disease     stage 1 per H&P 11/22/2019   ? Coronary artery disease    ? Diabetes mellitus (H)     type 2   ? Fatigue     Created by Conversion    ? Hyperglycemia     Created by Conversion    ? Hyperlipidemia     Created by Conversion    ? Hypertension     Created by Conversion    ? Joint Pain, Localized In The Knee     Created by Conversion    ? Lesion of mediastinum    ?  Lymphedema     Created by Conversion    ? Macular Degeneration     Created by Conversion [facility] Annotation: Apr 5 2011 12:22PM - Tien Guzman: Followed by  Ophthalmologist, Dr. Avilez.    ? Major Depression, Single Episode In Remission     Created by Conversion    ? Obesity     Created by Conversion    ? Obstructive Sleep Apnea     CPAP   ? Osteopenia     Created by Conversion    ? Paroxysmal Atrial Fibrillation     Created by Conversion [facility] Annotation: Nov 28 2012 10:36PM - Shruthi Dhaliwal: Found incidential  on exam on May 7th, [id number] score of 1 for HTNsymptomatic and hospitalized  x2 in July, 2012.CV X 2 in 2012Chronic Sotalol Rx    ? Postmenopausal bleeding     Created by Conversion    ? Skin cancer    ? Tachycardia     Created by Conversion    ? Tremor     Created by Conversion    ? Urge And Stress Incontinence     Created by Conversion    ? Urinary Frequency More Than Twice At Night (Nocturia)     Created by Conversion    ? Urine Tests Nonspecific Abnormal Findings     Created by Conversion    ? Vitamin D Deficiency     Created by Conversion              Family History   Problem Relation Age of Onset   ? Hypertension Father    ? Pneumonia Father    ? Esophageal cancer Brother    ? Cancer Brother    ? Stroke Mother    ? Alzheimer's disease Brother    ? Cancer Brother    ? Prostate cancer Brother    ? Cancer Nephew    ? Colon cancer Nephew    ? Cancer Paternal cousin      Social History     Socioeconomic History   ? Marital status: Single     Spouse name: Not on file   ? Number of children: 0   ? Years of education: Not on file   ? Highest education level: Not on file   Occupational History   ? Occupation: Retired RN     Employer: [employer]   Social Needs   ? Financial resource strain: Not on file   ? Food insecurity:     Worry: Not on file     Inability: Not on file   ? Transportation needs:     Medical: Not on file     Non-medical: Not on file   Tobacco Use   ? Smoking status:  Former Smoker     Packs/day: 3.00     Years: 40.00     Pack years: 120.00     Types: Cigarettes     Last attempt to quit: 1/1/2005     Years since quitting: 15.0   ? Smokeless tobacco: Never Used   Substance and Sexual Activity   ? Alcohol use: Not Currently   ? Drug use: No   ? Sexual activity: Never     Partners: Male     Birth control/protection: Surgical, Post-menopausal   Lifestyle   ? Physical activity:     Days per week: Not on file     Minutes per session: Not on file   ? Stress: Not on file   Relationships   ? Social connections:     Talks on phone: Not on file     Gets together: Not on file     Attends L[employer] service: Not on file     Active member of club or organization: Not on file     Attends meetings of clubs or organizations: Not on file     Relationship status: Not on file   ? Intimate partner violence:     Fear of current or ex partner: Not on file     Emotionally abused: Not on file     Physically abused: Not on file     Forced sexual activity: Not on file   Other Topics Concern   ? Not on file   Social History Narrative    Lives alone single family home that she owns, no children and is retired.  Boyfriend used to live with her, however due to stroke he is in his own care facility.  She is currently at [facility]         Review of Systems   Constitutional: Positive for activity change. Negative for appetite change, fatigue and fever.   HENT: Negative for congestion.    Respiratory: Negative for cough and wheezing.         Shortness of breath with exertion   Cardiovascular: Positive for leg swelling. Negative for chest pain.        Lymphedema   Gastrointestinal: Negative for abdominal distention, abdominal pain, constipation, diarrhea and nausea.   Genitourinary: Negative for dysuria.   Musculoskeletal: Positive for arthralgias. Negative for back pain.   Skin: Positive for wound. Negative for color change.        Left breast incision healed   Neurological: Negative for dizziness.    Psychiatric/Behavioral: Negative for agitation, behavioral problems and confusion.       Vitals:    01/29/20 1213   BP: 108/78   Pulse: 80   Resp: 18   Temp: 96.6  F (35.9  C)   SpO2: 90%   Weight: (!) 340 lb (154.2 kg)       Physical Exam  Constitutional:       Appearance: She is well-developed.      Comments: Pleasant woman in no acute distress   HENT:      Head: Normocephalic.   Eyes:      Conjunctiva/sclera: Conjunctivae normal.   Neck:      Musculoskeletal: Normal range of motion.   Cardiovascular:      Rate and Rhythm: Normal rate and regular rhythm.      Heart sounds: Normal heart sounds. No murmur.   Pulmonary:      Effort: No respiratory distress.      Breath sounds: No rales.   Abdominal:      General: Bowel sounds are normal. There is no distension.      Palpations: Abdomen is soft.      Tenderness: There is no abdominal tenderness.   Musculoskeletal: Normal range of motion.      Right lower leg: Edema present.      Left lower leg: Edema present.      Comments: 3-4+ lower extremities. Lymph wraps resumed    Skin:     General: Skin is warm.      Findings: Rash present.      Comments: Healed left breast incision   Neurological:      Mental Status: She is alert and oriented to person, place, and time.   Psychiatric:         Behavior: Behavior normal.           LABS:   Recent Results (from the past 240 hour(s))   HM2(CBC w/o Differential)   Result Value Ref Range    WBC 10.7 4.0 - 11.0 thou/uL    RBC 4.15 3.80 - 5.40 mill/uL    Hemoglobin 11.1 (L) 12.0 - 16.0 g/dL    Hematocrit 38.3 35.0 - 47.0 %    MCV 92 80 - 100 fL    MCH 26.7 (L) 27.0 - 34.0 pg    MCHC 29.0 (L) 32.0 - 36.0 g/dL    RDW 17.9 (H) 11.0 - 14.5 %    Platelets 176 140 - 440 thou/uL    MPV 11.3 8.5 - 12.5 fL   INR   Result Value Ref Range    INR 1.70 (H) 0.90 - 1.10   Basic Metabolic Panel   Result Value Ref Range    Sodium 142 136 - 145 mmol/L    Potassium 4.0 3.5 - 5.0 mmol/L    Chloride 102 98 - 107 mmol/L    CO2 32 (H) 22 - 31 mmol/L     Anion Gap, Calculation 8 5 - 18 mmol/L    Glucose 123 70 - 125 mg/dL    Calcium 9.1 8.5 - 10.5 mg/dL    BUN 14 8 - 28 mg/dL    Creatinine 1.02 0.60 - 1.10 mg/dL    GFR MDRD Af Amer >60 >60 mL/min/1.73m2    GFR MDRD Non Af Amer 53 (L) >60 mL/min/1.73m2   Basic Metabolic Panel   Result Value Ref Range    Sodium 141 136 - 145 mmol/L    Potassium 3.9 3.5 - 5.0 mmol/L    Chloride 101 98 - 107 mmol/L    CO2 30 22 - 31 mmol/L    Anion Gap, Calculation 10 5 - 18 mmol/L    Glucose 103 70 - 125 mg/dL    Calcium 9.0 8.5 - 10.5 mg/dL    BUN 14 8 - 28 mg/dL    Creatinine 0.93 0.60 - 1.10 mg/dL    GFR MDRD Af Amer >60 >60 mL/min/1.73m2    GFR MDRD Non Af Amer 59 (L) >60 mL/min/1.73m2     Current Outpatient Medications   Medication Sig   ? acetaminophen (TYLENOL) 500 MG tablet Take 1-2 tablets (500-1,000 mg total) by mouth every 4 (four) hours as needed (PAin 1-3).   ? albuterol (PROVENTIL) 2.5 mg /3 mL (0.083 %) nebulizer solution Take 3 mL (2.5 mg total) by nebulization every 4 (four) hours as needed for wheezing.   ? bacitracin ointment Apply 1 application topically 2 (two) times a day. After epsom salt foot soak. To bilateral great toes for toenail falling off, until seen by Podiatry   ? calcium-vitamin D (CALCIUM-VITAMIN D) 500 mg(1,250mg) -200 unit per tablet Take 1 tablet by mouth 2 (two) times a day with meals.   ? cholecalciferol, vitamin D3, 1,000 unit tablet Take 2,000 Units by mouth daily.    ? diphenhydrAMINE (BENADRYL) 2 % cream Apply 1 application topically 3 (three) times a day as needed for itching.   ? diphenhydrAMINE (BENADRYL) 25 mg capsule Take 25 mg by mouth every 6 (six) hours as needed for itching.   ? fesoterodine (TOVIAZ) 8 mg Tb24 ER tablet Take 8 mg by mouth daily.    ? fluticasone propionate (FLONASE ALLERGY RELIEF) 50 mcg/actuation nasal spray One spray in each nostril daily   ? furosemide (LASIX) 80 MG tablet Take 1 tablet (80 mg total) by mouth 2 (two) times a day at 9am and 6pm.   ? gabapentin  (NEURONTIN) 300 MG capsule Take 300 mg by mouth at bedtime.   ? glucosamine-chondroitin 500-400 mg tablet Take 1 tablet by mouth 2 (two) times a day with meals.    ? HYDROcodone-acetaminophen 5-325 mg per tablet Take 1 tablet by mouth every 4 (four) hours as needed for pain.   ? lidocaine 4 % patch Place 1 patch on the skin daily. Remove and discard patch with 12 hours or as directed by MD.   ? lidocaine-prilocaine (EMLA) cream Place over port 30 min. before being accessed.   ? loratadine (CLARITIN) 10 mg tablet Take 1 tablet (10 mg total) by mouth daily.   ? metoprolol tartrate (LOPRESSOR) 50 MG tablet Take 50 mg by mouth 2 (two) times a day. Hold for SBP < 110   ? mv-min/FA/vit K/lycop/lut/zeax (OCUVITE EYE PLUS MULTI ORAL) Take 1 tablet by mouth 2 (two) times a day with meals.   ? NOVOLOG U-100 INSULIN ASPART 100 unit/mL injection Check blood sugar three (3) times daily.  11.65 Type 2 with hyperglycemia   ? nystatin (MYCOSTATIN) powder Apply 1 application topically 2 (two) times a day. Under breasts for rash, until healed. Then PRN.   ? OMEGA-3/DHA/EPA/FISH OIL (FISH OIL-OMEGA-3 FATTY ACIDS) 300-1,000 mg capsule Take 2 g by mouth 2 times a day at 6:00 am and 4:00 pm.   ? omeprazole (PRILOSEC) 20 MG capsule Take 20 mg by mouth daily before breakfast.   ? polyvinyl alcohol-povidon,PF, (REFRESH CLASSIC) 1.4-0.6 % Dpet ophthalmic dropperette Administer 2 drops to both eyes 3 (three) times a day.   ? potassium chloride (K-DUR,KLOR-CON) 10 MEQ tablet TAKE 2 TABLETS BY MOUTH TWICE DAILY   ? prochlorperazine (COMPAZINE) 10 MG tablet TAKE 1 TABLET(10 MG) BY MOUTH EVERY 6 HOURS AS NEEDED FOR NAUSEA   ? simvastatin (ZOCOR) 20 MG tablet Take 1 tablet (20 mg total) by mouth daily with supper.   ? warfarin ANTICOAGULANT (COUMADIN/JANTOVEN) 2 MG tablet Take 2 tablets (4 mg total) by mouth daily.       ASSESSMENT:      ICD-10-CM    1. Rash R21    2. Itching L29.9        PLAN:   Chronic right shoulder pain recently seen by  orthopedics and was given a cortisone shot.    Rash triamcinolone cream to rash twice daily,  Hydroxyzine PRN     Loose toenails seen by podiatry 1/15/19 and loose toenail was removed and she has new dressing change orders     Atrial fibrillation-continue Coumadin,  metoprolol and increased lasix, monitor BMP closely     Venous stasis lower extremities. Pt  will resume lymph wraps M-F and ace wraps on the weekends. .  Blistering lower extremities- new dressing change orders.      Status post left breast lumpectomy incision healed    Pain management Norco PRN     Shortness of breath with exertion. Oxygen PRN    Anticoagulation management continue Coumadin and adjust per INRs    Debility PT OT      Electronically signed by: Sara Mayes CNP

## 2021-06-20 NOTE — LETTER
Letter by Anca Abbott CNP at      Author: Anca Abbott CNP Service: -- Author Type: --    Filed:  Encounter Date: 1/3/2020 Status: Signed         Patient: Jennifer Galvin   MR Number: [id number]   YOB: 1945   Date of Visit: 1/3/2020     [facility]    Facility:   [facility] [[id number]]   Code Status: POLST AVAILABLE      CHIEF COMPLAINT/REASON FOR VISIT:  Chief Complaint   Patient presents with   ? Review Of Multiple Medical Conditions       HISTORY:      HPI: Jennifer is a 74 y.o. female who was admitted to [facility] from 12/19/19-12/31/19 for pneumonia.  The patient has bilateral pleural effusions on CTA and was treated with doxycycline and Keflex for suspected pneumonia.  She completed a course of Cefdinir for UTI outpatient during her hospital stay.  Her procalcitonin and influenza A were negative on hospital admission.  She was also noted to have severe BLE nonpitting edema and her furosemide was increased to 80 mg two times a day for diuresis.  It was recommended that her BMP be followed bi-weekly for assessment of renal function on increased diuresis.  She has a thymoma and left-sided breast cancer following with Dr. Villalba for treatment.  She recently developed a left axillary hematoma on chronic anticoagulation with warfarin therapy for atrial fibrillation and it was evaluated with keyla to continue anticoagulation.  Her blood glucose was elevated during her hospitalization due to steroid burst and this was managed with sliding scale insulin with diet-controlled diabetes at home.  Jennifer  has a past medical history of (Lower) Leg Localized Swelling, Adenocarcinoma In Situ Of The Uterus, Backache, Benign Polyps Of The Large Intestine, Breast cancer (H), Cancer (H), Chronic kidney disease, Coronary artery disease, Diabetes mellitus (H), Fatigue, Hyperglycemia, Hyperlipidemia, Hypertension, Joint Pain, Localized In The Knee,  Lesion of mediastinum, Lymphedema, Macular Degeneration, Major Depression, Single Episode In Remission, Obesity, Obstructive Sleep Apnea, Osteopenia, Paroxysmal Atrial Fibrillation, Postmenopausal bleeding, Skin cancer, Tachycardia, Tremor, Urge And Stress Incontinence, Urinary Frequency More Than Twice At Night (Nocturia), Urine Tests Nonspecific Abnormal Findings, and Vitamin D Deficiency.  She is currently a TCU patient at [facility] s/ hospitalization for acute rehabilitation.      Today Ms. Galvin is being evaluated for a review of multiple medical conditions.  Jennifer reported that her dyspnea at rest, coughing, and wheezing have improved since her hospitalization with increase in her diuresis and treatment for pneumonia with doxycycline and Keflex.  She said that her BLE severe lymphedema and moderate erythema has improved with diuresis as well.  She noted that she has had intermittent, shooting nerve pain in her right foot since starting chemotherapy rated 3-7/10 that is not alleviated with Norco PRN available to her.  She has not been taking anything for neuropathic pain at this time.  She said that she has had trouble falling and staying asleep since her hospital admission.  We discussed starting gabapentin at bedtime to alleviate her neuropathic pain and insomnia and she was agreeable to this treatment plan.  Jennifer is on chronic anticoagulation with warfarin therapy for management of atrial fibrillation with INR goal 2-3.  Her INR today was therapeutic at 2.76 with her last dose held yesterday due to elevated INR at 3.44.  She denies any uncontrolled bleeding, any active bleeding, any bruising, hematuria, epistaxis, blood in stools or black/dark/tarry stools.  The patient denied lightheadedness, dizziness, breathing difficulty, chest pain, palpitations, constipation, urinary symptoms, numbness or tingling in extremities, and pain.  Nursing staff denied any other concerns for the patient at  this time.    Past Medical History:   Diagnosis Date   ? (Lower) Leg Localized Swelling     Created by Conversion    ? Adenocarcinoma In Situ Of The Uterus     Created by Conversion    ? Backache     Created by Conversion [facility] Annotation: Feb 29 2012 12:14PM - Opal Helm: Referred to  [facility] 9/11, given TENS unit.    ? Benign Polyps Of The Large Intestine     Created by Conversion    ? Breast cancer (H)    ? Cancer (H)     uterine   ? Chronic kidney disease     stage 1 per H&P 11/22/2019   ? Coronary artery disease    ? Diabetes mellitus (H)     type 2   ? Fatigue     Created by Conversion    ? Hyperglycemia     Created by Conversion    ? Hyperlipidemia     Created by Conversion    ? Hypertension     Created by Conversion    ? Joint Pain, Localized In The Knee     Created by Conversion    ? Lesion of mediastinum    ? Lymphedema     Created by Conversion    ? Macular Degeneration     Created by Conversion [facility] Annotation: Apr 5 2011 12:22PM - Tien Guzman: Followed by  Ophthalmologist, Dr. Avilze.    ? Major Depression, Single Episode In Remission     Created by Conversion    ? Obesity     Created by Conversion    ? Obstructive Sleep Apnea     CPAP   ? Osteopenia     Created by Conversion    ? Paroxysmal Atrial Fibrillation     Created by Conversion [facility] Annotation: Nov 28 2012 10:36PM - Shruthi Dhaliwal: Found incidential  on exam on May 7th, [id number] score of 1 for HTNsymptomatic and hospitalized  x2 in July, 2012.CV X 2 in 2012Chronic Sotalol Rx    ? Postmenopausal bleeding     Created by Conversion    ? Skin cancer    ? Tachycardia     Created by Conversion    ? Tremor     Created by Conversion    ? Urge And Stress Incontinence     Created by Conversion    ? Urinary Frequency More Than Twice At Night (Nocturia)     Created by Conversion    ? Urine Tests Nonspecific Abnormal Findings     Created by Conversion    ? Vitamin D Deficiency     Created by Conversion              Family  History   Problem Relation Age of Onset   ? Hypertension Father    ? Pneumonia Father    ? Esophageal cancer Brother    ? Cancer Brother    ? Stroke Mother    ? Alzheimer's disease Brother    ? Cancer Brother    ? Prostate cancer Brother    ? Cancer Nephew    ? Colon cancer Nephew    ? Cancer Paternal cousin      Social History     Socioeconomic History   ? Marital status: Single     Spouse name: None   ? Number of children: 0   ? Years of education: None   ? Highest education level: None   Occupational History   ? Occupation: Retired RN     Employer: [employer]   Social Needs   ? Financial resource strain: None   ? Food insecurity:     Worry: None     Inability: None   ? Transportation needs:     Medical: None     Non-medical: None   Tobacco Use   ? Smoking status: Former Smoker     Packs/day: 3.00     Years: 40.00     Pack years: 120.00     Types: Cigarettes     Last attempt to quit: 1/1/2005     Years since quitting: 15.0   ? Smokeless tobacco: Never Used   Substance and Sexual Activity   ? Alcohol use: Not Currently   ? Drug use: No   ? Sexual activity: Never     Partners: Male     Birth control/protection: Surgical, Post-menopausal   Lifestyle   ? Physical activity:     Days per week: None     Minutes per session: None   ? Stress: None   Relationships   ? Social connections:     Talks on phone: None     Gets together: None     Attends Baptism service: None     Active member of club or organization: None     Attends meetings of clubs or organizations: None     Relationship status: None   ? Intimate partner violence:     Fear of current or ex partner: None     Emotionally abused: None     Physically abused: None     Forced sexual activity: None   Other Topics Concern   ? None   Social History Narrative    Lives alone single family home that she owns, no children and is retired.  Boyfriend used to live with her, however due to stroke he is in his own care facility.  She is currently at [facility]  center       REVIEW OF SYSTEM:  Pertinent items are noted in HPI.  A 12 point comprehensive review of systems was negative except as noted.    PHYSICAL EXAM:     General Appearance:    Alert, cooperative, no distress, appears stated age; morbidly obese   Head:    Normocephalic, without obvious abnormality, atraumatic   Eyes:    PERRL, conjunctiva/corneas clear, both eyes   Ears:    Normal external ear canals, both ears   Nose:   Nares normal, septum midline, mucosa normal, no drainage    or sinus tenderness   Throat:   Lips, mucosa, and tongue normal; teeth and gums normal   Neck:   Supple, symmetrical, trachea midline, no adenopathy;     thyroid:  no enlargement/tenderness/nodules; no carotid    bruit or JVD   Back:     Symmetric, no curvature, ROM normal, no CVA tenderness   Lungs:     Clear to auscultation bilaterally, respirations unlabored   Chest Wall:    No tenderness or deformity    Heart:    Regular rate and rhythm, S1 and S2 normal, no murmur, rub   or gallop   Abdomen:     Soft, non-tender, bowel sounds active all four quadrants,     no masses, no organomegaly   Extremities:   Extremities normal, atraumatic, no cyanosis; severe lymphedema in BLE   Pulses:   2+ and symmetric all extremities   Skin:   Skin color, texture, turgor normal, no rashes or lesions; moderate erythema on BLE   Neurologic:   Oriented to person, place, time, and situation; exhibits logical thought processes; generalized weakness         LABS:   Lab Results   Component Value Date    WBC 13.9 (H) 12/30/2019    HGB 9.9 (L) 12/30/2019    HCT 33.9 (L) 12/30/2019     12/30/2019    CHOL 124 07/22/2019    TRIG 162 (H) 07/22/2019    HDL 34 (L) 07/22/2019    ALT 24 12/14/2019    AST 13 12/14/2019     01/03/2020    K 3.7 01/03/2020    CL 99 01/03/2020    CREATININE 1.13 (H) 01/03/2020    BUN 18 01/03/2020    CO2 33 (H) 01/03/2020    TSH 2.37 06/26/2019    INR 2.76 (H) 01/03/2020    HGBA1C 6.5 (H) 09/11/2019    MICROALBUR <0.50  04/03/2019        ASSESSMENT:      ICD-10-CM    1. Physical deconditioning R53.81    2. Bilateral leg edema R60.0    3. Pneumonia due to infectious organism, unspecified laterality, unspecified part of lung J18.9    4. Atrial fibrillation, unspecified type (H) I48.91    5. CHF (congestive heart failure), NYHA class I, acute on chronic, combined (H) I50.43    6. Adjustment insomnia F51.02    7. Neuropathic pain M79.2    8. Anticoagulation management encounter Z51.81     Z79.01    9. On warfarin therapy Z79.01        PLAN:      Physical deconditioning  -PT/OT as directed  -Discharge from facility per therapy recommendations     Pneumonia  -Continue doxycycline and Keflex as prescribed  -Continue oxygen therapy and wean as tolerated  -Notify provider if patient has s/s of systemic infection including fever, chills, night sweats   -Notify provider if patient has new or worsening dyspnea, wheezing, or cough    Anticoagulation management/A fib  Lab Results   Component Value Date    INR 2.76 (H) 01/03/2020    INR 3.44 (H) 01/02/2020    INR 2.95 (H) 12/30/2019   -START warfarin 3 mg daily  -Check INR on 1/6/20  -Notify provider if patient has s/s bleeding     CHF/Peripheral edema  -Encouraged cardiac diet, low sodium diet, exercise and stress reduction techniques.   -Emphasized importance of blood pressure control.   -Continue furosemide 80 mg two times a day   -Notify provider if pt's SBP<90 or >180 and DBP <50 or >100   -Notify provider if patient has weight gain of >2 lb in one day or > 5 lb in one week     Neuropathic pain  -START gabapentin 300 mg at HS  -Encouraged patient to utilize integrative therapies such as distraction and deep breathing for pain management  -Notify provider if patient has new or worsening pain     Insomnia  -Notify provider if patient has s/s worsening depression, anxiety, insomnia, changes in appetite, or suicidal ideation      Otherwise continue current care plan for all other chronic medical  conditions, as they are stable. Encouraged patient to engage in healthy lifestyle behaviors such as engaging in social activities, exercising (PT/OT), eating well, and following care plan. Follow up for routine check-up, or sooner if needed. Will continue to monitor patient and work with nursing staff collaboratively to work toward positive patient outcomes.     Electronically signed by: Anca Abbott CNP   Total floor/unit time was 60 minutes and 40 minutes was spent in counseling patient on pain management, sleep management, anticoagulation management, edema management, and acute rehabilitation and coordination of care with nursing staff.

## 2021-06-20 NOTE — LETTER
Letter by Yolanda Ly MD at      Author: Yolanda Ly MD Service: -- Author Type: --    Filed:  Encounter Date: 1/30/2020 Status: (Other)         Patient: Jennifer Galvin   MR Number: [id number]   YOB: 1945   Date of Visit: 1/30/2020     [facility]      Facility:    [facility] [[id number]]  Code Status: FULL CODE       Chief Complaint/Reason for Visit:  Chief Complaint   Patient presents with   ? H & P     MD note TCU visit.       HPI:   Jennifer is a 74 y.o. female with hx of left breast cancer, afib on warfarin, lymphedema and venous stasis, anemia, obesity, HTN seen in TCU at [facility]. Original hospitalization 12/2/2019 for left breast lumpectomy and sentinel node biopsy, discharged to TCU, re hospitalized 12/14/19-12/17/19 with left axillary hematoma, ABLA and UTI. Returned to TCU and then re hospitalized 12/19/19-12/31/19 as noted below for pneumonia, LE cellulitis.      74-year-old female with history of recent lumpectomy and sentinel lymph node biopsy, chronic atrial fibrillation on Coumadin, recent left axillary hematoma after lumpectomy presents with acute hypoxemic respiratory failure likely secondary to pneumonia versus bronchitis.     Acute hypoxemic respiratory failure: Patient with noted increasing wheezing with production of green sputum.  Noted is a recent hospitalization and the patient is already on Cefdinir.  CTA of the chest is negative for obvious infiltrate but does show some mild bilateral pleural effusions.  Upon further review of CT chest with pulmonary medicine there could be possible infiltrate seen on imaging.  Procalcitonin and influenza negative.     -- continue course of doxycycline and other nebs/pulmonary cares   -- completed steroid burst  -- continue increased dose of Lasix 80 mg twice daily after discharge.  Patient will need close follow up of her renal function at least weekly while at  TCU.     Concern bilateral for LE cellulitis  --Continue current course of Keflex and doxycycline.  I feel currently the patient most likely has venous stasis changes bilaterally and some superficial skin irritation related to lymphedema wraps.     Thymoma: follow up Dr. Villalba     Left axillary hematoma: In the setting of chronic anticoagulation for A. fib and recent lumpectomy with sentinel lymph node biopsy.    --Okay to continue Coumadin     UTI:  Present on admission  -- Completed cefdinir     Chronic A. fib: Continue home cardiovascular medications.  Reduce home dose warfarin to 4 mg daily for now and adjust based on future INR values.     Left-sided breast cancer: Follows with Dr. Villalba.  Plan outpatient follow-up     FLAKITA: continue CPAP    DM2: We will send out on sliding scale insulin given recent hyperglycemia while inpatient however I feel this is most likely steroid induced.  Patient has had diet controlled diabetes historically    Today:  She has been progressing slowly with therapy, ambulating with walker. Some fatigue and short of breath with exertion though not at rest. No need for oxygen. No chest pain. Appetite is pretty good. No nausea, vomiting or diarrhea. No dizziness. Has a rash on body, unclear etiology, itchy. No new meds, improving with symptomatic cares. She lives alone, not ready to discharge yet. No new vision or hearing problems.        Past Medical History:  Past Medical History:   Diagnosis Date   ? (Lower) Leg Localized Swelling     Created by Conversion    ? Adenocarcinoma In Situ Of The Uterus     Created by Conversion    ? Backache     Created by Conversion [facility] Annotation: Feb 29 2012 12:14PM - Opal Helm: Referred to  [facility] 9/11, given TENS unit.    ? Benign Polyps Of The Large Intestine     Created by Conversion    ? Breast cancer (H)    ? Cancer (H)     uterine   ? Chronic kidney disease     stage 1 per H&P 11/22/2019   ? Coronary artery disease    ? Diabetes  mellitus (H)     type 2   ? Fatigue     Created by Conversion    ? Hyperglycemia     Created by Conversion    ? Hyperlipidemia     Created by Conversion    ? Hypertension     Created by Conversion    ? Joint Pain, Localized In The Knee     Created by Conversion    ? Lesion of mediastinum    ? Lymphedema     Created by Conversion    ? Macular Degeneration     Created by Conversion [facility] Annotation: Apr 5 2011 12:22PM - [place] sie[place]: Followed by  Ophthalmologist, Dr. Avilez.    ? Major Depression, Single Episode In Remission     Created by Conversion    ? Obesity     Created by Conversion    ? Obstructive Sleep Apnea     CPAP   ? Osteopenia     Created by Conversion    ? Paroxysmal Atrial Fibrillation     Created by Conversion [facility] Annotation: Nov 28 2012 10:36PM - Shruthi Dhaliwal: Found incidential  on exam on May 7th, [id number] score of 1 for HTNsymptomatic and hospitalized  x2 in July, 2012.CV X 2 in 2012Chronic Sotalol Rx    ? Postmenopausal bleeding     Created by Conversion    ? Skin cancer    ? Tachycardia     Created by Conversion    ? Tremor     Created by Conversion    ? Urge And Stress Incontinence     Created by Conversion    ? Urinary Frequency More Than Twice At Night (Nocturia)     Created by Conversion    ? Urine Tests Nonspecific Abnormal Findings     Created by Conversion    ? Vitamin D Deficiency     Created by Conversion            Surgical History:  Past Surgical History:   Procedure Laterality Date   ? BREAST BIOPSY Left     Benign   ? BREAST LUMPECTOMY Left 12/2/2019    Procedure: Left lumpectomy after wire localization with sentinel lymph node biopsy;  Surgeon: Sara Ferrera MD;  Location: [facility] [place], [place];  Service: General   ? CATARACT EXTRACTION  10/17/13   ? COLONOSCOPY N/A 4/29/2015    Procedure: COLONOSCOPY WITH POLYPECTOMY;  Surgeon: Too Ureña MD;  Location: [facility] OR;  Service:    ? COLONOSCOPY N/A 11/9/2016    Procedure: COLONOSCOPY;  Surgeon: Ayden  MARTELL Dela Cruz MD;  Location: [facility] OR;  Service:    ? CT BIOPSY LUNG  8/30/2019   ? ENDOMETRIAL BIOPSY  4/2014   ? EYE SURGERY     ? HYSTERECTOMY  2014   ? Interstem - Stage 2  09/11/2014   ? Interstim Stage I  08/26/2014   ? IR PORT PLACEMENT >5 YEARS  8/28/2019   ? OOPHORECTOMY Bilateral 2014   ? post urinary stimulator implantation  8/26/14   ? MD BIOPSY OF BREAST, INCISIONAL      Description: Incisional Breast Biopsy;  Recorded: 04/05/2011;   ? MD CARDIOVERSION ELECTIVE ARRHYTHMIA EXTERNAL      Description: Elective Cardioversion External;  Recorded: 07/30/2012;   ? MD REMOVE TONSILS/ADENOIDS,<11 Y/O      Description: Tonsillectomy With Adenoidectomy;  Recorded: 04/15/2014;   ? MD SLING OPER STRES INCONTINENCE N/A 11/3/2016    Procedure: PUBOVAGINAL SLING WITH CONCHITA WITH CYSTOSCOPY;  Surgeon: Bill Wilson MD;  Location: [facility];  Service: Gynecology   ? TONSILLECTOMY     ? US BREAST CORE BIOPSY LEFT Left 8/16/2019   ? US BREAST LOC W SENT NODE INJ LEFT Left 12/2/2019   ? VITRECTOMY Right 1/15/15       Family History:   Family History   Problem Relation Age of Onset   ? Hypertension Father    ? Pneumonia Father    ? Esophageal cancer Brother    ? Cancer Brother    ? Stroke Mother    ? Alzheimer's disease Brother    ? Cancer Brother    ? Prostate cancer Brother    ? Cancer Nephew    ? Colon cancer Nephew    ? Cancer Paternal cousin        Social History:    Social History     Socioeconomic History   ? Marital status: Single     Spouse name: Not on file   ? Number of children: 0   ? Years of education: Not on file   ? Highest education level: Not on file   Occupational History   ? Occupation: Retired RN     Employer: [employer]   Social Needs   ? Financial resource strain: Not on file   ? Food insecurity:     Worry: Not on file     Inability: Not on file   ? Transportation needs:     Medical: Not on file     Non-medical: Not on file   Tobacco Use   ? Smoking status: Former Smoker      Packs/day: 3.00     Years: 40.00     Pack years: 120.00     Types: Cigarettes     Last attempt to quit: 1/1/2005     Years since quitting: 15.0   ? Smokeless tobacco: Never Used   Substance and Sexual Activity   ? Alcohol use: Not Currently   ? Drug use: No   ? Sexual activity: Never     Partners: Male     Birth control/protection: Surgical, Post-menopausal   Lifestyle   ? Physical activity:     Days per week: Not on file     Minutes per session: Not on file   ? Stress: Not on file   Relationships   ? Social connections:     Talks on phone: Not on file     Gets together: Not on file     Attends Anabaptism service: Not on file     Active member of club or organization: Not on file     Attends meetings of clubs or organizations: Not on file     Relationship status: Not on file   ? Intimate partner violence:     Fear of current or ex partner: Not on file     Emotionally abused: Not on file     Physically abused: Not on file     Forced sexual activity: Not on file   Other Topics Concern   ? Not on file   Social History Narrative    Lives alone single family home that she owns, no children and is retired.  Boyfriend used to live with her, however due to stroke he is in his own care facility.  She is currently at [facility]        Medications:  Current Outpatient Medications   Medication Sig   ? acetaminophen (TYLENOL) 500 MG tablet Take 1-2 tablets (500-1,000 mg total) by mouth every 4 (four) hours as needed (PAin 1-3).   ? albuterol (PROVENTIL) 2.5 mg /3 mL (0.083 %) nebulizer solution Take 3 mL (2.5 mg total) by nebulization every 4 (four) hours as needed for wheezing.   ? bacitracin ointment Apply 1 application topically 2 (two) times a day. After epsom salt foot soak. To bilateral great toes for toenail falling off, until seen by Podiatry   ? calcium-vitamin D (CALCIUM-VITAMIN D) 500 mg(1,250mg) -200 unit per tablet Take 1 tablet by mouth 2 (two) times a day with meals.   ? cholecalciferol, vitamin D3,  1,000 unit tablet Take 2,000 Units by mouth daily.    ? diphenhydrAMINE (BENADRYL) 2 % cream Apply 1 application topically 3 (three) times a day as needed for itching.   ? diphenhydrAMINE (BENADRYL) 25 mg capsule Take 25 mg by mouth every 6 (six) hours as needed for itching.   ? fesoterodine (TOVIAZ) 8 mg Tb24 ER tablet Take 8 mg by mouth daily.    ? fluticasone propionate (FLONASE ALLERGY RELIEF) 50 mcg/actuation nasal spray One spray in each nostril daily   ? furosemide (LASIX) 80 MG tablet Take 1 tablet (80 mg total) by mouth 2 (two) times a day at 9am and 6pm.   ? gabapentin (NEURONTIN) 300 MG capsule Take 300 mg by mouth at bedtime.   ? glucosamine-chondroitin 500-400 mg tablet Take 1 tablet by mouth 2 (two) times a day with meals.    ? HYDROcodone-acetaminophen 5-325 mg per tablet Take 1 tablet by mouth every 4 (four) hours as needed for pain.   ? lidocaine 4 % patch Place 1 patch on the skin daily. Remove and discard patch with 12 hours or as directed by MD.   ? lidocaine-prilocaine (EMLA) cream Place over port 30 min. before being accessed.   ? loratadine (CLARITIN) 10 mg tablet Take 1 tablet (10 mg total) by mouth daily.   ? metoprolol tartrate (LOPRESSOR) 50 MG tablet Take 50 mg by mouth 2 (two) times a day. Hold for SBP < 110   ? mv-min/FA/vit K/lycop/lut/zeax (OCUVITE EYE PLUS MULTI ORAL) Take 1 tablet by mouth 2 (two) times a day with meals.   ? NOVOLOG U-100 INSULIN ASPART 100 unit/mL injection Check blood sugar three (3) times daily.  11.65 Type 2 with hyperglycemia   ? nystatin (MYCOSTATIN) powder Apply 1 application topically 2 (two) times a day. Under breasts for rash, until healed. Then PRN.   ? OMEGA-3/DHA/EPA/FISH OIL (FISH OIL-OMEGA-3 FATTY ACIDS) 300-1,000 mg capsule Take 2 g by mouth 2 times a day at 6:00 am and 4:00 pm.   ? omeprazole (PRILOSEC) 20 MG capsule Take 20 mg by mouth daily before breakfast.   ? polyvinyl alcohol-povidon,PF, (REFRESH CLASSIC) 1.4-0.6 % Dpet ophthalmic dropperette  Administer 2 drops to both eyes 3 (three) times a day.   ? potassium chloride (K-DUR,KLOR-CON) 10 MEQ tablet TAKE 2 TABLETS BY MOUTH TWICE DAILY   ? prochlorperazine (COMPAZINE) 10 MG tablet TAKE 1 TABLET(10 MG) BY MOUTH EVERY 6 HOURS AS NEEDED FOR NAUSEA   ? simvastatin (ZOCOR) 20 MG tablet Take 1 tablet (20 mg total) by mouth daily with supper.   ? warfarin ANTICOAGULANT (COUMADIN/JANTOVEN) 2 MG tablet Take 2 tablets (4 mg total) by mouth daily.       Allergies:  Allergies   Allergen Reactions   ? Aspirin      Pt currently on WArfarin   ? Penicillins      Boils  -- 12/17/19 tolerating ceftriaxone inpt and discharged on cefdinir        Review of Systems:  Pertinent items are noted in HPI.      Physical Exam:   General: Patient is alert pale and tired appearing female, no distress.   Vitals: /85, Temp 98.2, Pulse 92, RR 18, O2 sat 95%RA.  HEENT: Head is NCAT. Eyes show no injection or icterus. Nares negative. Oropharynx well hydrated.  Neck: Supple. No tenderness or adenopathy. No JVD.  Lungs: Clear bilaterally. No wheezes.  Cardiovascular: Regular rate and rhythm, normal S1, S2.  Back: No spinal or CVA tenderness.  Abdomen: Obese, soft, no tenderness on exam. Bowel sounds present. No guarding rebound or rigidity.  : Deferred.  Extremities: Signif LE swelling with lymphedema wraps.  Musculoskeletal: Mild degen changes.   Skin: Maculopapular pinkish rash torso and extremities.  Psych: Mood appears pretty good.      Labs:  Lab Results   Component Value Date    WBC 10.7 01/21/2020    HGB 11.1 (L) 01/21/2020    HCT 38.3 01/21/2020    MCV 92 01/21/2020     01/21/2020     Results for orders placed or performed in visit on 01/29/20   Basic Metabolic Panel   Result Value Ref Range    Sodium 141 136 - 145 mmol/L    Potassium 3.9 3.5 - 5.0 mmol/L    Chloride 101 98 - 107 mmol/L    CO2 30 22 - 31 mmol/L    Anion Gap, Calculation 10 5 - 18 mmol/L    Glucose 103 70 - 125 mg/dL    Calcium 9.0 8.5 - 10.5 mg/dL     BUN 14 8 - 28 mg/dL    Creatinine 0.93 0.60 - 1.10 mg/dL    GFR MDRD Af Amer >60 >60 mL/min/1.73m2    GFR MDRD Non Af Amer 59 (L) >60 mL/min/1.73m2       Assessment/Plan:  1. Breast cancer, left lumpectomy 12/2/19. Follow up with heme/onc.  2. Lymphedema. With venous stasis, recent cellulitis, no current abx. Has lymphedema wraps per OT. Continue lasix.   3. HTN. BPs stable on metoprolol, lasix. Continue to monitor.   4. Afib. Anticoagulated with warfarin. Monitor and adjust per INR.   5. Anemia. ABLA. Had post op left axillary hematoma. Last hgb at 11.1.  6. Recent pneumonia. No current respiratory concerns,  7. HLD. on simvastatin.   8. Urge and stress incontinence. Continue fesoterodine.   9. Thymoma. Follow up with Dr. Villalba heme/onc.  10. FLAKITA. Uses CPAP.  11. Code status is full code.          Electronically signed by: Yolanda Ly MD

## 2021-06-20 NOTE — LETTER
Letter by Sara Mayes CNP at      Author: Sara Mayes CNP Service: -- Author Type: --    Filed:  Encounter Date: 2/11/2020 Status: (Other)         Patient: Jennifer Galvin   MR Number: [id number]   YOB: 1945   Date of Visit: 2/11/2020     [facility]    Facility:   [facility] [[id number]]   Code Status: FULL CODE      CHIEF COMPLAINT/REASON FOR VISIT:  Chief Complaint   Patient presents with   ? Review Of Multiple Medical Conditions       HISTORY:      HPI: Jennifer is a 74 y.o. female undergoing physical and occupational therapy at [facility] transitional care unit. , she is with history of severe obesity, stage Ib left breast cancer, I normal, vitamin D deficiency, urge stress incontinence, tremor, postmenopausal bleeding and uterine adenocarcinoma in situ status post JEROME/BSO, atrial fibrillation, osteopenia, FLAKITA, macular degeneration, chronic bilateral leg edema, hypertension, diabetes mellitus, CAD, chronic back and bilateral knee pain who presented to [facility] for scheduled left breast lumpectomy And sentinel lymph node biopsy.  Per the records she became hypoxic in the PACU and noted to be persistently hypoxic to 4 L of oxygen which is a change from her baseline.  She does have FLAKITA but does not use her CPAP machine. She was recently sent back to the hospital from December 14-17 th  for left axilla pain and found to have a left axillary  hematoma related to her recent lumpectomy. She was seen by general surgery who recommended resuming warfarin and providing modest infection prophylaxis.She was also treated at this time with cefdinir for a UTI. She then returned and was  admitted to [facility] from 12/19/19-12/31/19 for pneumonia.  The patient has bilateral pleural effusions on CTA and was treated with doxycycline and Keflex for suspected pneumonia.  She completed a course of Cefdinir for UTI outpatient during her  hospital stay.  Her procalcitonin and influenza A were negative on hospital admission.  She was also noted to have severe BLE nonpitting edema and her furosemide was increased to 80 mg two times a day for diuresis.  It was recommended that her BMP be followed bi-weekly for assessment of renal function on increased diuresis.    Today she was seen for review of weight and follow-up of rash.  Her rash is almost completely resolved with a few scabs on her back from scratching She reports the itch is much better and she gets relief with the Sarna lotion. Her weights were reviewed and she is down 3.5 pounds in the last 2 days  She denies any  Increased shortness of breath beyond baseline   She denied chest pain.  She is moving her bowels and has no issues with urination.   Her left breast incision is healed.  Her BMP have been stable despite large doses of Lasix.  Her lung sounds were clear.    She denies any cough or congestion.     .Last A1C 9/2019 was 6.5.     Past Medical History:   Diagnosis Date   ? (Lower) Leg Localized Swelling     Created by Conversion    ? Adenocarcinoma In Situ Of The Uterus     Created by Conversion    ? Backache     Created by Conversion [facility] Annotation: Feb 29 2012 12:14PM - Opal Helm: Referred to  [facility] 9/11, given TENS unit.    ? Benign Polyps Of The Large Intestine     Created by Conversion    ? Breast cancer (H)    ? Cancer (H)     uterine   ? Chronic kidney disease     stage 1 per H&P 11/22/2019   ? Coronary artery disease    ? Diabetes mellitus (H)     type 2   ? Fatigue     Created by Conversion    ? Hyperglycemia     Created by Conversion    ? Hyperlipidemia     Created by Conversion    ? Hypertension     Created by Conversion    ? Joint Pain, Localized In The Knee     Created by Conversion    ? Lesion of mediastinum    ? Lymphedema     Created by Conversion    ? Macular Degeneration     Created by Conversion [facility] Annotation: Apr 5 2011 12:22PM - Thomas  Tien: Followed by  Ophthalmologist, Dr. Avilez.    ? Major Depression, Single Episode In Remission     Created by Conversion    ? Obesity     Created by Conversion    ? Obstructive Sleep Apnea     CPAP   ? Osteopenia     Created by Conversion    ? Paroxysmal Atrial Fibrillation     Created by Conversion [facility] Annotation: Nov 28 2012 10:36PM - Shruthi Dhaliwal: Found incidential  on exam on May 7th, [id number] score of 1 for HTNsymptomatic and hospitalized  x2 in July, 2012.CV X 2 in 2012Chronic Sotalol Rx    ? Postmenopausal bleeding     Created by Conversion    ? Skin cancer    ? Tachycardia     Created by Conversion    ? Tremor     Created by Conversion    ? Urge And Stress Incontinence     Created by Conversion    ? Urinary Frequency More Than Twice At Night (Nocturia)     Created by Conversion    ? Urine Tests Nonspecific Abnormal Findings     Created by Conversion    ? Vitamin D Deficiency     Created by Conversion              Family History   Problem Relation Age of Onset   ? Hypertension Father    ? Pneumonia Father    ? Esophageal cancer Brother    ? Cancer Brother    ? Stroke Mother    ? Alzheimer's disease Brother    ? Cancer Brother    ? Prostate cancer Brother    ? Cancer Nephew    ? Colon cancer Nephew    ? Cancer Paternal cousin      Social History     Socioeconomic History   ? Marital status: Single     Spouse name: Not on file   ? Number of children: 0   ? Years of education: Not on file   ? Highest education level: Not on file   Occupational History   ? Occupation: Retired RN     Employer: [employer]   Social Needs   ? Financial resource strain: Not on file   ? Food insecurity:     Worry: Not on file     Inability: Not on file   ? Transportation needs:     Medical: Not on file     Non-medical: Not on file   Tobacco Use   ? Smoking status: Former Smoker     Packs/day: 3.00     Years: 40.00     Pack years: 120.00     Types: Cigarettes     Last attempt to quit: 1/1/2005     Years since  quitting: 15.1   ? Smokeless tobacco: Never Used   Substance and Sexual Activity   ? Alcohol use: Not Currently   ? Drug use: No   ? Sexual activity: Never     Partners: Male     Birth control/protection: Surgical, Post-menopausal   Lifestyle   ? Physical activity:     Days per week: Not on file     Minutes per session: Not on file   ? Stress: Not on file   Relationships   ? Social connections:     Talks on phone: Not on file     Gets together: Not on file     Attends Gnosticism service: Not on file     Active member of club or organization: Not on file     Attends meetings of clubs or organizations: Not on file     Relationship status: Not on file   ? Intimate partner violence:     Fear of current or ex partner: Not on file     Emotionally abused: Not on file     Physically abused: Not on file     Forced sexual activity: Not on file   Other Topics Concern   ? Not on file   Social History Narrative    Lives alone single family home that she owns, no children and is retired.  Boyfriend used to live with her, however due to stroke he is in his own care facility.  She is currently at [facility]         Review of Systems   Constitutional: Positive for activity change. Negative for appetite change, fatigue and fever.   HENT: Negative for congestion.    Respiratory: Negative for cough and wheezing.         Shortness of breath with exertion   Cardiovascular: Positive for leg swelling. Negative for chest pain.        Lymphedema   Gastrointestinal: Negative for abdominal distention, abdominal pain, constipation, diarrhea and nausea.   Genitourinary: Negative for dysuria.   Musculoskeletal: Positive for arthralgias. Negative for back pain.   Skin: Positive for wound. Negative for color change.        Left breast incision healed   Neurological: Negative for dizziness.   Psychiatric/Behavioral: Negative for agitation, behavioral problems and confusion.       Vitals:    02/11/20 0848   BP: 110/58   Pulse: 88   Resp: 20    Temp: 98.3  F (36.8  C)   SpO2: 91%   Weight: (!) 337 lb 8 oz (153.1 kg)       Physical Exam  Constitutional:       Appearance: She is well-developed.      Comments: Pleasant woman in no acute distress   HENT:      Head: Normocephalic.   Eyes:      Conjunctiva/sclera: Conjunctivae normal.   Neck:      Musculoskeletal: Normal range of motion.   Cardiovascular:      Rate and Rhythm: Normal rate and regular rhythm.      Heart sounds: Normal heart sounds. No murmur.   Pulmonary:      Effort: No respiratory distress.      Breath sounds: No rales.   Abdominal:      General: Bowel sounds are normal. There is no distension.      Palpations: Abdomen is soft.      Tenderness: There is no abdominal tenderness.   Musculoskeletal: Normal range of motion.      Right lower leg: Edema present.      Left lower leg: Edema present.      Comments: 3-4+ lower extremities. Lymph wraps resumed    Skin:     General: Skin is warm.      Findings: Rash present.      Comments: Healed left breast incision  Back rash almost completely resolved   Neurological:      Mental Status: She is alert and oriented to person, place, and time.   Psychiatric:         Behavior: Behavior normal.           LABS:   Recent Results (from the past 240 hour(s))   Basic Metabolic Panel   Result Value Ref Range    Sodium 143 136 - 145 mmol/L    Potassium 3.9 3.5 - 5.0 mmol/L    Chloride 103 98 - 107 mmol/L    CO2 30 22 - 31 mmol/L    Anion Gap, Calculation 10 5 - 18 mmol/L    Glucose 105 70 - 125 mg/dL    Calcium 9.2 8.5 - 10.5 mg/dL    BUN 13 8 - 28 mg/dL    Creatinine 0.98 0.60 - 1.10 mg/dL    GFR MDRD Af Amer >60 >60 mL/min/1.73m2    GFR MDRD Non Af Amer 56 (L) >60 mL/min/1.73m2   INR   Result Value Ref Range    INR 1.62 (H) 0.90 - 1.10   Basic Metabolic Panel   Result Value Ref Range    Sodium 143 136 - 145 mmol/L    Potassium 4.0 3.5 - 5.0 mmol/L    Chloride 101 98 - 107 mmol/L    CO2 32 (H) 22 - 31 mmol/L    Anion Gap, Calculation 10 5 - 18 mmol/L    Glucose  105 70 - 125 mg/dL    Calcium 9.5 8.5 - 10.5 mg/dL    BUN 13 8 - 28 mg/dL    Creatinine 1.04 0.60 - 1.10 mg/dL    GFR MDRD Af Amer >60 >60 mL/min/1.73m2    GFR MDRD Non Af Amer 52 (L) >60 mL/min/1.73m2     Current Outpatient Medications   Medication Sig   ? acetaminophen (TYLENOL) 500 MG tablet Take 1-2 tablets (500-1,000 mg total) by mouth every 4 (four) hours as needed (PAin 1-3).   ? albuterol (PROVENTIL) 2.5 mg /3 mL (0.083 %) nebulizer solution Take 3 mL (2.5 mg total) by nebulization every 4 (four) hours as needed for wheezing.   ? bacitracin ointment Apply 1 application topically 2 (two) times a day. After epsom salt foot soak. To bilateral great toes for toenail falling off, until seen by Podiatry   ? calcium-vitamin D (CALCIUM-VITAMIN D) 500 mg(1,250mg) -200 unit per tablet Take 1 tablet by mouth 2 (two) times a day with meals.   ? cholecalciferol, vitamin D3, 1,000 unit tablet Take 2,000 Units by mouth daily.    ? diphenhydrAMINE (BENADRYL) 2 % cream Apply 1 application topically 3 (three) times a day as needed for itching.   ? diphenhydrAMINE (BENADRYL) 25 mg capsule Take 25 mg by mouth every 6 (six) hours as needed for itching.   ? fesoterodine (TOVIAZ) 8 mg Tb24 ER tablet Take 8 mg by mouth daily.    ? fluticasone propionate (FLONASE ALLERGY RELIEF) 50 mcg/actuation nasal spray One spray in each nostril daily   ? furosemide (LASIX) 80 MG tablet Take 1 tablet (80 mg total) by mouth 2 (two) times a day at 9am and 6pm.   ? gabapentin (NEURONTIN) 300 MG capsule Take 300 mg by mouth at bedtime.   ? glucosamine-chondroitin 500-400 mg tablet Take 1 tablet by mouth 2 (two) times a day with meals.    ? HYDROcodone-acetaminophen 5-325 mg per tablet Take 1 tablet by mouth every 4 (four) hours as needed for pain.   ? lidocaine 4 % patch Place 1 patch on the skin daily. Remove and discard patch with 12 hours or as directed by MD.   ? lidocaine-prilocaine (EMLA) cream Place over port 30 min. before being accessed.    ? loratadine (CLARITIN) 10 mg tablet Take 1 tablet (10 mg total) by mouth daily.   ? metoprolol tartrate (LOPRESSOR) 50 MG tablet Take 50 mg by mouth 2 (two) times a day. Hold for SBP < 110   ? mv-min/FA/vit K/lycop/lut/zeax (OCUVITE EYE PLUS MULTI ORAL) Take 1 tablet by mouth 2 (two) times a day with meals.   ? NOVOLOG U-100 INSULIN ASPART 100 unit/mL injection Check blood sugar three (3) times daily.  11.65 Type 2 with hyperglycemia   ? nystatin (MYCOSTATIN) powder Apply 1 application topically 2 (two) times a day. Under breasts for rash, until healed. Then PRN.   ? OMEGA-3/DHA/EPA/FISH OIL (FISH OIL-OMEGA-3 FATTY ACIDS) 300-1,000 mg capsule Take 2 g by mouth 2 times a day at 6:00 am and 4:00 pm.   ? omeprazole (PRILOSEC) 20 MG capsule Take 20 mg by mouth daily before breakfast.   ? polyvinyl alcohol-povidon,PF, (REFRESH CLASSIC) 1.4-0.6 % Dpet ophthalmic dropperette Administer 2 drops to both eyes 3 (three) times a day.   ? potassium chloride (K-DUR,KLOR-CON) 10 MEQ tablet TAKE 2 TABLETS BY MOUTH TWICE DAILY   ? prochlorperazine (COMPAZINE) 10 MG tablet TAKE 1 TABLET(10 MG) BY MOUTH EVERY 6 HOURS AS NEEDED FOR NAUSEA   ? simvastatin (ZOCOR) 20 MG tablet Take 1 tablet (20 mg total) by mouth daily with supper.   ? warfarin ANTICOAGULANT (COUMADIN/JANTOVEN) 2 MG tablet Take 2 tablets (4 mg total) by mouth daily.       ASSESSMENT:      ICD-10-CM    1. CHF (congestive heart failure), NYHA class I, acute on chronic, combined (H) I50.43    2. Physical deconditioning R53.81    3. Bilateral leg edema R60.0    4. Rash R21        PLAN:   Chronic right shoulder pain recently seen by orthopedics and was given a cortisone shot.    Rash triamcinolone cream to rash twice daily completed Sarna lotion ordered daily and as needed,  hydroxyzine to 50 mg every 6 hours PRN almost resolved, A few scabs from scratching on back.      Loose toenails seen by podiatry 1/15/19 and loose toenail was removed continue dressing change orders      Atrial fibrillation-continue Coumadin,  metoprolol and increased lasix, monitor BMP closely     Venous stasis lower extremities. Pt  will resume lymph wraps M-F and ace wraps on the weekends. .  Blistering lower extremities- new dressing change orders.      Status post left breast lumpectomy incision healed    Pain management Norco PRN     Shortness of breath with exertion. Oxygen PRN    Anticoagulation management continue Coumadin and adjust per INRs    Debility PT OT      Electronically signed by: Sara Mayes CNP

## 2021-06-20 NOTE — LETTER
Letter by Sara Mayes CNP at      Author: Sara Mayes CNP Service: -- Author Type: --    Filed:  Encounter Date: 2/20/2020 Status: (Other)         Patient: Jennifer Galvin   MR Number: [id number]   YOB: 1945   Date of Visit: 2/20/2020     [facility]    Facility:   [facility] [[id number]]   Code Status: FULL CODE  PCP: Cordell Ceja DO   Phone: [phone]   Fax: [phone]      CHIEF COMPLAINT/REASON FOR VISIT:  Chief Complaint   Patient presents with   ? Discharge Summary       HISTORY COURSE:  Jennifer is a 74 y.o. female undergoing physical and occupational therapy at [facility] transitional care unit. , she is with history of severe obesity, stage Ib left breast cancer, I normal, vitamin D deficiency, urge stress incontinence, tremor, postmenopausal bleeding and uterine adenocarcinoma in situ status post JEROME/BSO, atrial fibrillation, osteopenia, FLAKITA, macular degeneration, chronic bilateral leg edema, hypertension, diabetes mellitus, CAD, chronic back and bilateral knee pain who presented to [facility] for scheduled left breast lumpectomy And sentinel lymph node biopsy.  Per the records she became hypoxic in the PACU and noted to be persistently hypoxic to 4 L of oxygen which is a change from her baseline.  She does have FLAKITA but does not use her CPAP machine. She was recently sent back to the hospital from December 14-17 th  for left axilla pain and found to have a left axillary  hematoma related to her recent lumpectomy. She was seen by general surgery who recommended resuming warfarin and providing modest infection prophylaxis.She was also treated at this time with cefdinir for a UTI. She then returned and was  admitted to [facility] from 12/19/19-12/31/19 for pneumonia.  The patient has bilateral pleural effusions on CTA and was treated with doxycycline and Keflex for suspected pneumonia.  She completed a  course of Cefdinir for UTI outpatient during her hospital stay.  Her procalcitonin and influenza A were negative on hospital admission.  She was also noted to have severe BLE nonpitting edema and her furosemide was increased to 80 mg two times a day for diuresis.  It was recommended that her BMP be followed bi-weekly for assessment of renal function on increased diuresis.     Today she was seen for a face-to-face for discharge.  Patient will discharge to home on  Monday 2/20/2020.  She reports she will be staying with her sister temporarily.  She will discharge with current medications and treatments.  She will have [facility] home care services PT OT home health aide and an RN.  Her INR was reviewed today and she was therapeutic at 2.64.  she will continue on 5 mg of Coumadin daily and will need an INR check done on 2/24/2020.  She was noted to have some upper airway congestion today.    I will check a chest x-ray prior to her discharge due to her being immunocompromised. Her weights were reviewed and she is    up 3 pounds over the last week.. She denies any  Increased shortness of breath beyond baseline    She denied chest pain.  She is moving her bowels and has no issues with urination.   Her left breast incision is healed.  Her BMP have been stable despite large doses of Lasix.     Her rash has completely healed and she reports relief with the Sarna lotion and her hydroxyzine has been discontinued.   .Last A1C 9/2019 was 6.5.     Review of Systems  Constitutional: Positive for activity change. Negative for appetite change, fatigue and fever.   HENT: Negative for congestion.    Respiratory: Negative for cough and wheezing.         Shortness of breath with exertion   Cardiovascular: Positive for leg swelling. Negative for chest pain.        Lymphedema   Gastrointestinal: Negative for abdominal distention, abdominal pain, constipation, diarrhea and nausea.   Genitourinary: Negative for dysuria.   Musculoskeletal: Positive  for arthralgias. Negative for back pain.   Skin: Positive for wound. Negative for color change.        Left breast incision healed   Neurological: Negative for dizziness.   Psychiatric/Behavioral: Negative for agitation, behavioral problems and confusion.   Vitals:    02/20/20 0724   BP: 102/60   Pulse: 93   Resp: 18   Temp: 98.9  F (37.2  C)   SpO2: 94%   Weight: (!) 338 lb 9.6 oz (153.6 kg)       Physical Exam  Constitutional:       Appearance: She is well-developed.      Comments: Pleasant woman in no acute distress   HENT:      Head: Normocephalic.   Eyes:      Conjunctiva/sclera: Conjunctivae normal.   Neck:      Musculoskeletal: Normal range of motion.   Cardiovascular:      Rate and Rhythm: Normal rate and regular rhythm.      Heart sounds: Normal heart sounds. No murmur.   Pulmonary:      Effort: No respiratory distress.      Breath sounds: No rales.   Abdominal:      General: Bowel sounds are normal. There is no distension.      Palpations: Abdomen is soft.      Tenderness: There is no abdominal tenderness.   Musculoskeletal: Normal range of motion.      Right lower leg: Edema present.      Left lower leg: Edema present.      Comments: 3-4+ lower extremities. Lymph wraps resumed    Skin:     General: Skin is warm.      Findings: Rash present.      Comments: Healed left breast incision  Back rash healed  Neurological:      Mental Status: She is alert and oriented to person, place, and time.   Psychiatric:         Behavior: Behavior normal.    MEDICATION LIST:  Current Outpatient Medications   Medication Sig   ? acetaminophen (TYLENOL) 500 MG tablet Take 1-2 tablets (500-1,000 mg total) by mouth every 4 (four) hours as needed (PAin 1-3).   ? albuterol (PROVENTIL) 2.5 mg /3 mL (0.083 %) nebulizer solution Take 3 mL (2.5 mg total) by nebulization every 4 (four) hours as needed for wheezing.   ? calcium-vitamin D (CALCIUM-VITAMIN D) 500 mg(1,250mg) -200 unit per tablet Take 1 tablet by mouth 2 (two) times a day  with meals.   ? camphor-menthoL (SARNA) lotion Apply 1 application topically as needed for itching. Daily and prn   ? cholecalciferol, vitamin D3, 1,000 unit tablet Take 2,000 Units by mouth daily.    ? fesoterodine (TOVIAZ) 8 mg Tb24 ER tablet Take 8 mg by mouth daily.    ? fluticasone propionate (FLONASE ALLERGY RELIEF) 50 mcg/actuation nasal spray One spray in each nostril daily   ? furosemide (LASIX) 80 MG tablet Take 1 tablet (80 mg total) by mouth 2 (two) times a day at 9am and 6pm.   ? gabapentin (NEURONTIN) 300 MG capsule Take 300 mg by mouth at bedtime.   ? glucosamine-chondroitin 500-400 mg tablet Take 1 tablet by mouth 2 (two) times a day with meals.    ? guaiFENesin (ROBITUSSIN) 100 mg/5 mL syrup Take 200 mg by mouth every 4 (four) hours as needed for cough.   ? HYDROcodone-acetaminophen 5-325 mg per tablet Take 1 tablet by mouth every 4 (four) hours as needed for pain.   ? lidocaine 4 % patch Place 1 patch on the skin daily. Remove and discard patch with 12 hours or as directed by MD.   ? lidocaine-prilocaine (EMLA) cream Place over port 30 min. before being accessed.   ? loratadine (CLARITIN) 10 mg tablet Take 1 tablet (10 mg total) by mouth daily.   ? metoprolol tartrate (LOPRESSOR) 50 MG tablet Take 50 mg by mouth 2 (two) times a day. Hold for SBP < 110   ? mv-min/FA/vit K/lycop/lut/zeax (OCUVITE EYE PLUS MULTI ORAL) Take 1 tablet by mouth 2 (two) times a day with meals.   ? nystatin (MYCOSTATIN) powder Apply 1 application topically 2 (two) times a day. Under breasts for rash, until healed. Then PRN.   ? OMEGA-3/DHA/EPA/FISH OIL (FISH OIL-OMEGA-3 FATTY ACIDS) 300-1,000 mg capsule Take 2 g by mouth 2 times a day at 6:00 am and 4:00 pm.   ? omeprazole (PRILOSEC) 20 MG capsule Take 20 mg by mouth daily before breakfast.   ? polyvinyl alcohol-povidon,PF, (REFRESH CLASSIC) 1.4-0.6 % Dpet ophthalmic dropperette Administer 2 drops to both eyes 3 (three) times a day.   ? potassium chloride (K-DUR,KLOR-CON)  10 MEQ tablet TAKE 2 TABLETS BY MOUTH TWICE DAILY   ? simvastatin (ZOCOR) 20 MG tablet Take 1 tablet (20 mg total) by mouth daily with supper.   ? warfarin ANTICOAGULANT (COUMADIN/JANTOVEN) 2 MG tablet Take 2 tablets (4 mg total) by mouth daily. (Patient taking differently: Take 5 mg by mouth daily. Next INR due 2/24/2020)       DISCHARGE DIAGNOSIS:    ICD-10-CM    1. Bilateral leg edema R60.0    2. Status post left breast lumpectomy Z98.890    3. CHF (congestive heart failure), NYHA class I, acute on chronic, combined (H) I50.43        MEDICAL EQUIPMENT NEEDS:  None    DISCHARGE PLAN/FACE TO FACE:  I certify that services are/were furnished while this patient was under the care of a physician and that a physician or an allowed non-physician practitioner (NPP), had a face-to-face encounter that meets the physician face-to-face encounter requirements. The encounter was in whole, or in part, related to the primary reason for home health. The patient is confined to his/her home and needs intermittent skilled nursing, physical therapy, speech-language pathology, or the continued need for occupational therapy. A plan of care has been established by a physician and is periodically reviewed by a physician.  Date of Face-to-Face Encounter: 2/20/20    I certify that, based on my findings, the following services are medically necessary home health services: [facility] PT/OT/HHA/RN     My clinical findings support the need for the above skilled services because: PT OT for continued strength and endurance and for lymph wraps therapy, home health aide to assist with activities of daily living, RN for vital signs, medication management, and INR checks    This patient is homebound because: She is deconditioned and easily fatigued following an  extensive TCU stay.  She is with recent history of a left breast lumpectomy.  She also requires the use of adaptive equipment.      The patient is, or has been, under my care and I have initiated  the establishment of the plan of care. This patient will be followed by a physician who will periodically review the plan of care.    Schedule follow up visit with primary care provider within 7 days to reestablish care.    Electronically signed by: Sara Mayes CNP

## 2021-06-20 NOTE — LETTER
Letter by Sara Mayes CNP at      Author: Sara Mayes CNP Service: -- Author Type: --    Filed:  Encounter Date: 1/13/2020 Status: Signed         Patient: Jennifer Galvin   MR Number: [id number]   YOB: 1945   Date of Visit: 1/13/2020     [facility]    Facility:   [facility] [[id number]]   Code Status: FULL CODE      CHIEF COMPLAINT/REASON FOR VISIT:  Chief Complaint   Patient presents with   ? Review Of Multiple Medical Conditions       HISTORY:      HPI: Jennifer is a 74 y.o. female undergoing physical and occupational therapy at [facility] transitional care unit. , she is with history of severe obesity, stage Ib left breast cancer, I normal, vitamin D deficiency, urge stress incontinence, tremor, postmenopausal bleeding and uterine adenocarcinoma in situ status post JEROME/BSO, atrial fibrillation, osteopenia, FLAKITA, macular degeneration, chronic bilateral leg edema, hypertension, diabetes mellitus, CAD, chronic back and bilateral knee pain who presented to [facility] for scheduled left breast lumpectomy And sentinel lymph node biopsy.  Per the records she became hypoxic in the PACU and noted to be persistently hypoxic to 4 L of oxygen which is a change from her baseline.  She does have FLAKITA but does not use her CPAP machine. She was recently sent back to the hospital from December 14-17 th  for left axilla pain and found to have a left axillary  hematoma related to her recent lumpectomy. She was seen by general surgery who recommended resuming warfarin and providing modest infection prophylaxis.She was also treated at this time with cefdinir for a UTI. She then returned and was  admitted to [facility] from 12/19/19-12/31/19 for pneumonia.  The patient has bilateral pleural effusions on CTA and was treated with doxycycline and Keflex for suspected pneumonia.  She completed a course of Cefdinir for UTI outpatient during her hospital  stay.  Her procalcitonin and influenza A were negative on hospital admission.  She was also noted to have severe BLE nonpitting edema and her furosemide was increased to 80 mg two times a day for diuresis.  It was recommended that her BMP be followed bi-weekly for assessment of renal function on increased diuresis.    Today she was seen as a visit to review multiple medical issues.   She denied chest pain but does report shortness of breath with exertion..  She is off the  Oxygen.  she reports positive bowel movement and voiding well.  Her left breast incision is healed.  Her BMP checked on 1/10/20 is stable despite high doses of lasix. Her weights were reviewed and she is up 8.5 pounds over the last week. Her lung sounds were clear.    She denies any cough or congestion.  She is with lower extremity edema and has not been wearing her lymph wraps or aces. She recently completed antibx for lower extremity cellulitis. Her legs continue to be red but pt and nursing feel they are looking better. She does also have lower extremity blistering. Writer spoke with therapy today and she will go back to lymph wraps daily Monday through Friday and ace wraps on the weekends.Last A1C 9/2019 was 6.5. She does have a podiatry consult pending  Due to loose great toe nails however her left nail is now off.     Past Medical History:   Diagnosis Date   ? (Lower) Leg Localized Swelling     Created by Conversion    ? Adenocarcinoma In Situ Of The Uterus     Created by Conversion    ? Backache     Created by Conversion [facility] Annotation: Feb 29 2012 12:14PM - Opal Helm: Referred to  [facility] 9/11, given TENS unit.    ? Benign Polyps Of The Large Intestine     Created by Conversion    ? Breast cancer (H)    ? Cancer (H)     uterine   ? Chronic kidney disease     stage 1 per H&P 11/22/2019   ? Coronary artery disease    ? Diabetes mellitus (H)     type 2   ? Fatigue     Created by Conversion    ? Hyperglycemia     Created by  Conversion    ? Hyperlipidemia     Created by Conversion    ? Hypertension     Created by Conversion    ? Joint Pain, Localized In The Knee     Created by Conversion    ? Lesion of mediastinum    ? Lymphedema     Created by Conversion    ? Macular Degeneration     Created by Conversion [facility] Annotation: Apr 5 2011 12:22PM - Tien Guzman: Followed by  Ophthalmologist, Dr. Avilez.    ? Major Depression, Single Episode In Remission     Created by Conversion    ? Obesity     Created by Conversion    ? Obstructive Sleep Apnea     CPAP   ? Osteopenia     Created by Conversion    ? Paroxysmal Atrial Fibrillation     Created by Conversion [facility] Annotation: Nov 28 2012 10:36PM - Shruthi Dhaliwal: Found incidential  on exam on May 7th, [id number] score of 1 for HTNsymptomatic and hospitalized  x2 in July, 2012.CV X 2 in 2012Chronic Sotalol Rx    ? Postmenopausal bleeding     Created by Conversion    ? Skin cancer    ? Tachycardia     Created by Conversion    ? Tremor     Created by Conversion    ? Urge And Stress Incontinence     Created by Conversion    ? Urinary Frequency More Than Twice At Night (Nocturia)     Created by Conversion    ? Urine Tests Nonspecific Abnormal Findings     Created by Conversion    ? Vitamin D Deficiency     Created by Conversion              Family History   Problem Relation Age of Onset   ? Hypertension Father    ? Pneumonia Father    ? Esophageal cancer Brother    ? Cancer Brother    ? Stroke Mother    ? Alzheimer's disease Brother    ? Cancer Brother    ? Prostate cancer Brother    ? Cancer Nephew    ? Colon cancer Nephew    ? Cancer Paternal cousin      Social History     Socioeconomic History   ? Marital status: Single     Spouse name: Not on file   ? Number of children: 0   ? Years of education: Not on file   ? Highest education level: Not on file   Occupational History   ? Occupation: Retired RN     Employer: [employer]   Social Needs   ? Financial resource strain:  Not on file   ? Food insecurity:     Worry: Not on file     Inability: Not on file   ? Transportation needs:     Medical: Not on file     Non-medical: Not on file   Tobacco Use   ? Smoking status: Former Smoker     Packs/day: 3.00     Years: 40.00     Pack years: 120.00     Types: Cigarettes     Last attempt to quit: 1/1/2005     Years since quitting: 15.0   ? Smokeless tobacco: Never Used   Substance and Sexual Activity   ? Alcohol use: Not Currently   ? Drug use: No   ? Sexual activity: Never     Partners: Male     Birth control/protection: Surgical, Post-menopausal   Lifestyle   ? Physical activity:     Days per week: Not on file     Minutes per session: Not on file   ? Stress: Not on file   Relationships   ? Social connections:     Talks on phone: Not on file     Gets together: Not on file     Attends [employer] service: Not on file     Active member of club or organization: Not on file     Attends meetings of clubs or organizations: Not on file     Relationship status: Not on file   ? Intimate partner violence:     Fear of current or ex partner: Not on file     Emotionally abused: Not on file     Physically abused: Not on file     Forced sexual activity: Not on file   Other Topics Concern   ? Not on file   Social History Narrative    Lives alone single family home that she owns, no children and is retired.  Boyfriend used to live with her, however due to stroke he is in his own care facility.  She is currently at [facility]         Review of Systems   Constitutional: Positive for activity change. Negative for appetite change, fatigue and fever.   HENT: Negative for congestion.    Respiratory: Negative for cough and wheezing.         Shortness of breath with exertion   Cardiovascular: Positive for leg swelling. Negative for chest pain.        Lymphedema   Gastrointestinal: Negative for abdominal distention, abdominal pain, constipation, diarrhea and nausea.   Genitourinary: Negative for dysuria.    Musculoskeletal: Positive for arthralgias. Negative for back pain.   Skin: Positive for wound. Negative for color change.        Left breast incision healed   Neurological: Negative for dizziness.   Psychiatric/Behavioral: Negative for agitation, behavioral problems and confusion.       Vitals:    01/13/20 0915   BP: 119/71   Pulse: 91   Resp: 24   Temp: 98.5  F (36.9  C)   SpO2: 94%   Weight: (!) 349 lb (158.3 kg)       Physical Exam  Constitutional:       Appearance: She is well-developed.      Comments: Pleasant woman in no acute distress   HENT:      Head: Normocephalic.   Eyes:      Conjunctiva/sclera: Conjunctivae normal.   Neck:      Musculoskeletal: Normal range of motion.   Cardiovascular:      Rate and Rhythm: Normal rate and regular rhythm.      Heart sounds: Normal heart sounds. No murmur.   Pulmonary:      Effort: No respiratory distress.      Breath sounds: No rales.   Abdominal:      General: Bowel sounds are normal. There is no distension.      Palpations: Abdomen is soft.      Tenderness: There is no abdominal tenderness.   Musculoskeletal: Normal range of motion.      Right lower leg: Edema present.      Left lower leg: Edema present.      Comments: Patient with a right   great toenails that is almost completely lifted off and hanging on by the cuticle. Her left toe great toenail has fallen off.  Podiatry consult rescheduled    3-4+ lower extremities. Will resume lymph wraps.    Skin:     General: Skin is warm.      Comments: Healed left breast incision   Neurological:      Mental Status: She is alert and oriented to person, place, and time.   Psychiatric:         Behavior: Behavior normal.           LABS:   Recent Results (from the past 240 hour(s))   Basic Metabolic Panel   Result Value Ref Range    Sodium 140 136 - 145 mmol/L    Potassium 3.8 3.5 - 5.0 mmol/L    Chloride 100 98 - 107 mmol/L    CO2 33 (H) 22 - 31 mmol/L    Anion Gap, Calculation 7 5 - 18 mmol/L    Glucose 134 (H) 70 - 125 mg/dL     Calcium 8.9 8.5 - 10.5 mg/dL    BUN 13 8 - 28 mg/dL    Creatinine 1.10 0.60 - 1.10 mg/dL    GFR MDRD Af Amer 59 (L) >60 mL/min/1.73m2    GFR MDRD Non Af Amer 49 (L) >60 mL/min/1.73m2   HM2(CBC w/o Differential)   Result Value Ref Range    WBC 10.3 4.0 - 11.0 thou/uL    RBC 4.08 3.80 - 5.40 mill/uL    Hemoglobin 11.5 (L) 12.0 - 16.0 g/dL    Hematocrit 39.5 35.0 - 47.0 %    MCV 97 80 - 100 fL    MCH 28.2 27.0 - 34.0 pg    MCHC 29.1 (L) 32.0 - 36.0 g/dL    RDW 18.9 (H) 11.0 - 14.5 %    Platelets 172 140 - 440 thou/uL    MPV 11.1 8.5 - 12.5 fL   INR   Result Value Ref Range    INR 2.19 (H) 0.90 - 1.10   INR   Result Value Ref Range    INR 2.04 (H) 0.90 - 1.10   Basic Metabolic Panel   Result Value Ref Range    Sodium 141 136 - 145 mmol/L    Potassium 4.0 3.5 - 5.0 mmol/L    Chloride 103 98 - 107 mmol/L    CO2 32 (H) 22 - 31 mmol/L    Anion Gap, Calculation 6 5 - 18 mmol/L    Glucose 142 (H) 70 - 125 mg/dL    Calcium 8.9 8.5 - 10.5 mg/dL    BUN 11 8 - 28 mg/dL    Creatinine 1.08 0.60 - 1.10 mg/dL    GFR MDRD Af Amer 60 (L) >60 mL/min/1.73m2    GFR MDRD Non Af Amer 50 (L) >60 mL/min/1.73m2   Basic Metabolic Panel   Result Value Ref Range    Sodium 139 136 - 145 mmol/L    Potassium 4.7 3.5 - 5.0 mmol/L    Chloride 101 98 - 107 mmol/L    CO2 30 22 - 31 mmol/L    Anion Gap, Calculation 8 5 - 18 mmol/L    Glucose 125 70 - 125 mg/dL    Calcium 9.6 8.5 - 10.5 mg/dL    BUN 10 8 - 28 mg/dL    Creatinine 1.02 0.60 - 1.10 mg/dL    GFR MDRD Af Amer >60 >60 mL/min/1.73m2    GFR MDRD Non Af Amer 53 (L) >60 mL/min/1.73m2   Glycosylated Hemoglobin A1C   Result Value Ref Range    Hemoglobin A1c 5.6 4.2 - 6.1 %     Current Outpatient Medications   Medication Sig   ? acetaminophen (TYLENOL) 500 MG tablet Take 1-2 tablets (500-1,000 mg total) by mouth every 4 (four) hours as needed (PAin 1-3).   ? albuterol (PROVENTIL) 2.5 mg /3 mL (0.083 %) nebulizer solution Take 3 mL (2.5 mg total) by nebulization every 4 (four) hours as needed for  wheezing.   ? bacitracin ointment Apply 1 application topically 2 (two) times a day. After epsom salt foot soak. To bilateral great toes for toenail falling off, until seen by Podiatry   ? calcium-vitamin D (CALCIUM-VITAMIN D) 500 mg(1,250mg) -200 unit per tablet Take 1 tablet by mouth 2 (two) times a day with meals.   ? cholecalciferol, vitamin D3, 1,000 unit tablet Take 2,000 Units by mouth daily.    ? diphenhydrAMINE (BENADRYL) 2 % cream Apply 1 application topically 3 (three) times a day as needed for itching.   ? diphenhydrAMINE (BENADRYL) 25 mg capsule Take 25 mg by mouth every 6 (six) hours as needed for itching.   ? fesoterodine (TOVIAZ) 8 mg Tb24 ER tablet Take 8 mg by mouth daily.    ? fluticasone propionate (FLONASE ALLERGY RELIEF) 50 mcg/actuation nasal spray One spray in each nostril daily   ? furosemide (LASIX) 80 MG tablet Take 1 tablet (80 mg total) by mouth 2 (two) times a day at 9am and 6pm.   ? gabapentin (NEURONTIN) 300 MG capsule Take 300 mg by mouth at bedtime.   ? glucosamine-chondroitin 500-400 mg tablet Take 1 tablet by mouth 2 (two) times a day with meals.    ? HYDROcodone-acetaminophen 5-325 mg per tablet Take 1 tablet by mouth every 4 (four) hours as needed for pain.   ? lidocaine 4 % patch Place 1 patch on the skin daily. Remove and discard patch with 12 hours or as directed by MD.   ? lidocaine-prilocaine (EMLA) cream Place over port 30 min. before being accessed.   ? loratadine (CLARITIN) 10 mg tablet Take 1 tablet (10 mg total) by mouth daily.   ? metoprolol tartrate (LOPRESSOR) 50 MG tablet Take 50 mg by mouth 2 (two) times a day. Hold for SBP < 110   ? mv-min/FA/vit K/lycop/lut/zeax (OCUVITE EYE PLUS MULTI ORAL) Take 1 tablet by mouth 2 (two) times a day with meals.   ? NOVOLOG U-100 INSULIN ASPART 100 unit/mL injection Check blood sugar three (3) times daily.  11.65 Type 2 with hyperglycemia   ? nystatin (MYCOSTATIN) powder Apply 1 application topically 2 (two) times a day. Under  breasts for rash, until healed. Then PRN.   ? OMEGA-3/DHA/EPA/FISH OIL (FISH OIL-OMEGA-3 FATTY ACIDS) 300-1,000 mg capsule Take 2 g by mouth 2 times a day at 6:00 am and 4:00 pm.   ? omeprazole (PRILOSEC) 20 MG capsule Take 20 mg by mouth daily before breakfast.   ? polyvinyl alcohol-povidon,PF, (REFRESH CLASSIC) 1.4-0.6 % Dpet ophthalmic dropperette Administer 2 drops to both eyes 3 (three) times a day.   ? potassium chloride (K-DUR,KLOR-CON) 10 MEQ tablet TAKE 2 TABLETS BY MOUTH TWICE DAILY   ? prochlorperazine (COMPAZINE) 10 MG tablet TAKE 1 TABLET(10 MG) BY MOUTH EVERY 6 HOURS AS NEEDED FOR NAUSEA   ? simvastatin (ZOCOR) 20 MG tablet Take 1 tablet (20 mg total) by mouth daily with supper.   ? warfarin ANTICOAGULANT (COUMADIN/JANTOVEN) 2 MG tablet Take 2 tablets (4 mg total) by mouth daily.       ASSESSMENT:      ICD-10-CM    1. Physical deconditioning R53.81    2. Bilateral leg edema R60.0    3. CHF (congestive heart failure), NYHA class I, acute on chronic, combined (H) I50.43    4. On warfarin therapy Z79.01        PLAN:   Chronic right shoulder pain x-ray with possible rotator cuff tear- ortho consult     Loose toenails continue foot soaks, bacitracin  and dressing changes per orders, podiatry consult    Atrial fibrillation-continue Coumadin,  metoprolol and increased lasix, monitor BMP closely     Venous stasis lower extremities. Pt  will resume lymph wraps M-F and ace wraps on the weekends. .  Blistering lower extremities- new dressing change orders.      Status post left breast lumpectomy incision healed    Pain management Norco PRN     Shortness of breath with exertion. Oxygen PRN    Anticoagulation management continue Coumadin and adjust per INRs    Debility PT OT      Electronically signed by: Sara Mayes CNP

## 2021-06-20 NOTE — LETTER
Letter by Sara Mayes CNP at      Author: Sara Mayes CNP Service: -- Author Type: --    Filed:  Encounter Date: 2/3/2020 Status: (Other)         Patient: Jennifer Galvin   MR Number: [id number]   YOB: 1945   Date of Visit: 2/3/2020     [facility]    Facility:   [facility] [[id number]]   Code Status: FULL CODE      CHIEF COMPLAINT/REASON FOR VISIT:  Chief Complaint   Patient presents with   ? Review Of Multiple Medical Conditions       HISTORY:      HPI: Jennifer is a 74 y.o. female undergoing physical and occupational therapy at [facility] transitional care unit. , she is with history of severe obesity, stage Ib left breast cancer, I normal, vitamin D deficiency, urge stress incontinence, tremor, postmenopausal bleeding and uterine adenocarcinoma in situ status post JEROME/BSO, atrial fibrillation, osteopenia, FLAKITA, macular degeneration, chronic bilateral leg edema, hypertension, diabetes mellitus, CAD, chronic back and bilateral knee pain who presented to [facility] for scheduled left breast lumpectomy And sentinel lymph node biopsy.  Per the records she became hypoxic in the PACU and noted to be persistently hypoxic to 4 L of oxygen which is a change from her baseline.  She does have FLAKITA but does not use her CPAP machine. She was recently sent back to the hospital from December 14-17 th  for left axilla pain and found to have a left axillary  hematoma related to her recent lumpectomy. She was seen by general surgery who recommended resuming warfarin and providing modest infection prophylaxis.She was also treated at this time with cefdinir for a UTI. She then returned and was  admitted to [facility] from 12/19/19-12/31/19 for pneumonia.  The patient has bilateral pleural effusions on CTA and was treated with doxycycline and Keflex for suspected pneumonia.  She completed a course of Cefdinir for UTI outpatient during her hospital  stay.  Her procalcitonin and influenza A were negative on hospital admission.  She was also noted to have severe BLE nonpitting edema and her furosemide was increased to 80 mg two times a day for diuresis.  It was recommended that her BMP be followed bi-weekly for assessment of renal function on increased diuresis.    Today she was seen for reports of a funny feeling left armpit.  She reports funny feeling started approximately 3 days ago.  She denies any pain numbness or tingling.  There was no redness sores bumps noted in this area she denies having any numbness or tingling in the armpit or down the arm.  She tells me she cannot explain the feeling and at this time we will monitor it for any changes.  She denies any  Increased shortness of breath beyond baseline   She denied chest pain.  She is moving her bowels and has no issues with urination.   Her left breast incision is healed.  Her BMP was within normal limits today.  Her BMP checks were changed to weekly.  Her lung sounds were clear.    She denies any cough or congestion.   She was seen by the in-house podiatrist and her other toenail was removed and new dressing change orders.   .Last A1C 9/2019 was 6.5.     Past Medical History:   Diagnosis Date   ? (Lower) Leg Localized Swelling     Created by Conversion    ? Adenocarcinoma In Situ Of The Uterus     Created by Conversion    ? Backache     Created by Conversion [facility] Annotation: Feb 29 2012 12:14PM - Opal Helm: Referred to  [facility] 9/11, given TENS unit.    ? Benign Polyps Of The Large Intestine     Created by Conversion    ? Breast cancer (H)    ? Cancer (H)     uterine   ? Chronic kidney disease     stage 1 per H&P 11/22/2019   ? Coronary artery disease    ? Diabetes mellitus (H)     type 2   ? Fatigue     Created by Conversion    ? Hyperglycemia     Created by Conversion    ? Hyperlipidemia     Created by Conversion    ? Hypertension     Created by Conversion    ? Joint Pain, Localized  In The Knee     Created by Conversion    ? Lesion of mediastinum    ? Lymphedema     Created by Conversion    ? Macular Degeneration     Created by Conversion [facility] Annotation: Apr 5 2011 12:22PM - Tien Guzman: Followed by  Ophthalmologist, Dr. Avilez.    ? Major Depression, Single Episode In Remission     Created by Conversion    ? Obesity     Created by Conversion    ? Obstructive Sleep Apnea     CPAP   ? Osteopenia     Created by Conversion    ? Paroxysmal Atrial Fibrillation     Created by Conversion [facility] Annotation: Nov 28 2012 10:36PM - Shruthi Dhaliwal: Found incidential  on exam on May 7th, [id number] score of 1 for HTNsymptomatic and hospitalized  x2 in July, 2012.CV X 2 in 2012Chronic Sotalol Rx    ? Postmenopausal bleeding     Created by Conversion    ? Skin cancer    ? Tachycardia     Created by Conversion    ? Tremor     Created by Conversion    ? Urge And Stress Incontinence     Created by Conversion    ? Urinary Frequency More Than Twice At Night (Nocturia)     Created by Conversion    ? Urine Tests Nonspecific Abnormal Findings     Created by Conversion    ? Vitamin D Deficiency     Created by Conversion              Family History   Problem Relation Age of Onset   ? Hypertension Father    ? Pneumonia Father    ? Esophageal cancer Brother    ? Cancer Brother    ? Stroke Mother    ? Alzheimer's disease Brother    ? Cancer Brother    ? Prostate cancer Brother    ? Cancer Nephew    ? Colon cancer Nephew    ? Cancer Paternal cousin      Social History     Socioeconomic History   ? Marital status: Single     Spouse name: Not on file   ? Number of children: 0   ? Years of education: Not on file   ? Highest education level: Not on file   Occupational History   ? Occupation: Retired RN     Employer: [employer]   Social Needs   ? Financial resource strain: Not on file   ? Food insecurity:     Worry: Not on file     Inability: Not on file   ? Transportation needs:     Medical: Not on  file     Non-medical: Not on file   Tobacco Use   ? Smoking status: Former Smoker     Packs/day: 3.00     Years: 40.00     Pack years: 120.00     Types: Cigarettes     Last attempt to quit: 1/1/2005     Years since quitting: 15.0   ? Smokeless tobacco: Never Used   Substance and Sexual Activity   ? Alcohol use: Not Currently   ? Drug use: No   ? Sexual activity: Never     Partners: Male     Birth control/protection: Surgical, Post-menopausal   Lifestyle   ? Physical activity:     Days per week: Not on file     Minutes per session: Not on file   ? Stress: Not on file   Relationships   ? Social connections:     Talks on phone: Not on file     Gets together: Not on file     Attends Hoahaoism service: Not on file     Active member of club or organization: Not on file     Attends meetings of clubs or organizations: Not on file     Relationship status: Not on file   ? Intimate partner violence:     Fear of current or ex partner: Not on file     Emotionally abused: Not on file     Physically abused: Not on file     Forced sexual activity: Not on file   Other Topics Concern   ? Not on file   Social History Narrative    Lives alone single family home that she owns, no children and is retired.  Boyfriend used to live with her, however due to stroke he is in his own care facility.  She is currently at [facility]         Review of Systems   Constitutional: Positive for activity change. Negative for appetite change, fatigue and fever.   HENT: Negative for congestion.    Respiratory: Negative for cough and wheezing.         Shortness of breath with exertion   Cardiovascular: Positive for leg swelling. Negative for chest pain.        Lymphedema   Gastrointestinal: Negative for abdominal distention, abdominal pain, constipation, diarrhea and nausea.   Genitourinary: Negative for dysuria.   Musculoskeletal: Positive for arthralgias. Negative for back pain.   Skin: Positive for wound. Negative for color change.        Left  breast incision healed   Neurological: Negative for dizziness.   Psychiatric/Behavioral: Negative for agitation, behavioral problems and confusion.       Vitals:    02/03/20 0838   BP: 113/67   Pulse: (!) 113   Resp: 18   Temp: 98  F (36.7  C)   SpO2: 92%   Weight: (!) 338 lb (153.3 kg)       Physical Exam  Constitutional:       Appearance: She is well-developed.      Comments: Pleasant woman in no acute distress   HENT:      Head: Normocephalic.   Eyes:      Conjunctiva/sclera: Conjunctivae normal.   Neck:      Musculoskeletal: Normal range of motion.   Cardiovascular:      Rate and Rhythm: Normal rate and regular rhythm.      Heart sounds: Normal heart sounds. No murmur.   Pulmonary:      Effort: No respiratory distress.      Breath sounds: No rales.   Abdominal:      General: Bowel sounds are normal. There is no distension.      Palpations: Abdomen is soft.      Tenderness: There is no abdominal tenderness.   Musculoskeletal: Normal range of motion.      Right lower leg: Edema present.      Left lower leg: Edema present.      Comments: 3-4+ lower extremities. Lymph wraps resumed    Skin:     General: Skin is warm.      Findings: Rash present.      Comments: Healed left breast incision   Neurological:      Mental Status: She is alert and oriented to person, place, and time.   Psychiatric:         Behavior: Behavior normal.           LABS:   Recent Results (from the past 240 hour(s))   Basic Metabolic Panel   Result Value Ref Range    Sodium 141 136 - 145 mmol/L    Potassium 3.9 3.5 - 5.0 mmol/L    Chloride 101 98 - 107 mmol/L    CO2 30 22 - 31 mmol/L    Anion Gap, Calculation 10 5 - 18 mmol/L    Glucose 103 70 - 125 mg/dL    Calcium 9.0 8.5 - 10.5 mg/dL    BUN 14 8 - 28 mg/dL    Creatinine 0.93 0.60 - 1.10 mg/dL    GFR MDRD Af Amer >60 >60 mL/min/1.73m2    GFR MDRD Non Af Amer 59 (L) >60 mL/min/1.73m2   INR   Result Value Ref Range    INR 1.61 (H) 0.90 - 1.10     Current Outpatient Medications   Medication Sig   ?  acetaminophen (TYLENOL) 500 MG tablet Take 1-2 tablets (500-1,000 mg total) by mouth every 4 (four) hours as needed (PAin 1-3).   ? albuterol (PROVENTIL) 2.5 mg /3 mL (0.083 %) nebulizer solution Take 3 mL (2.5 mg total) by nebulization every 4 (four) hours as needed for wheezing.   ? bacitracin ointment Apply 1 application topically 2 (two) times a day. After epsom salt foot soak. To bilateral great toes for toenail falling off, until seen by Podiatry   ? calcium-vitamin D (CALCIUM-VITAMIN D) 500 mg(1,250mg) -200 unit per tablet Take 1 tablet by mouth 2 (two) times a day with meals.   ? cholecalciferol, vitamin D3, 1,000 unit tablet Take 2,000 Units by mouth daily.    ? diphenhydrAMINE (BENADRYL) 2 % cream Apply 1 application topically 3 (three) times a day as needed for itching.   ? diphenhydrAMINE (BENADRYL) 25 mg capsule Take 25 mg by mouth every 6 (six) hours as needed for itching.   ? fesoterodine (TOVIAZ) 8 mg Tb24 ER tablet Take 8 mg by mouth daily.    ? fluticasone propionate (FLONASE ALLERGY RELIEF) 50 mcg/actuation nasal spray One spray in each nostril daily   ? furosemide (LASIX) 80 MG tablet Take 1 tablet (80 mg total) by mouth 2 (two) times a day at 9am and 6pm.   ? gabapentin (NEURONTIN) 300 MG capsule Take 300 mg by mouth at bedtime.   ? glucosamine-chondroitin 500-400 mg tablet Take 1 tablet by mouth 2 (two) times a day with meals.    ? HYDROcodone-acetaminophen 5-325 mg per tablet Take 1 tablet by mouth every 4 (four) hours as needed for pain.   ? lidocaine 4 % patch Place 1 patch on the skin daily. Remove and discard patch with 12 hours or as directed by MD.   ? lidocaine-prilocaine (EMLA) cream Place over port 30 min. before being accessed.   ? loratadine (CLARITIN) 10 mg tablet Take 1 tablet (10 mg total) by mouth daily.   ? metoprolol tartrate (LOPRESSOR) 50 MG tablet Take 50 mg by mouth 2 (two) times a day. Hold for SBP < 110   ? mv-min/FA/vit K/lycop/lut/zeax (OCUVITE EYE PLUS MULTI ORAL)  Take 1 tablet by mouth 2 (two) times a day with meals.   ? NOVOLOG U-100 INSULIN ASPART 100 unit/mL injection Check blood sugar three (3) times daily.  11.65 Type 2 with hyperglycemia   ? nystatin (MYCOSTATIN) powder Apply 1 application topically 2 (two) times a day. Under breasts for rash, until healed. Then PRN.   ? OMEGA-3/DHA/EPA/FISH OIL (FISH OIL-OMEGA-3 FATTY ACIDS) 300-1,000 mg capsule Take 2 g by mouth 2 times a day at 6:00 am and 4:00 pm.   ? omeprazole (PRILOSEC) 20 MG capsule Take 20 mg by mouth daily before breakfast.   ? polyvinyl alcohol-povidon,PF, (REFRESH CLASSIC) 1.4-0.6 % Dpet ophthalmic dropperette Administer 2 drops to both eyes 3 (three) times a day.   ? potassium chloride (K-DUR,KLOR-CON) 10 MEQ tablet TAKE 2 TABLETS BY MOUTH TWICE DAILY   ? prochlorperazine (COMPAZINE) 10 MG tablet TAKE 1 TABLET(10 MG) BY MOUTH EVERY 6 HOURS AS NEEDED FOR NAUSEA   ? simvastatin (ZOCOR) 20 MG tablet Take 1 tablet (20 mg total) by mouth daily with supper.   ? warfarin ANTICOAGULANT (COUMADIN/JANTOVEN) 2 MG tablet Take 2 tablets (4 mg total) by mouth daily.       ASSESSMENT:      ICD-10-CM    1. CHF (congestive heart failure), NYHA class I, acute on chronic, combined (H) I50.43    2. Physical deconditioning R53.81    3. Hypertension I10    4. Abnormal sensation of upper extremity R20.9        PLAN:   Chronic right shoulder pain recently seen by orthopedics and was given a cortisone shot.    Rash triamcinolone cream to rash twice daily,  hydroxyzine to 50 mg every 6 hours PRN     Loose toenails seen by podiatry 1/15/19 and loose toenail was removed continue dressing change orders     Atrial fibrillation-continue Coumadin,  metoprolol and increased lasix, monitor BMP closely     Venous stasis lower extremities. Pt  will resume lymph wraps M-F and ace wraps on the weekends. .  Blistering lower extremities- new dressing change orders.      Status post left breast lumpectomy incision healed    Pain management Norco  PRN     Shortness of breath with exertion. Oxygen PRN    Anticoagulation management continue Coumadin and adjust per INRs    Debility PT OT      Electronically signed by: Sara Mayes CNP

## 2021-06-20 NOTE — LETTER
Letter by Cordell Ceja DO at      Author: Cordell Ceja DO Service: -- Author Type: --    Filed:  Encounter Date: 6/9/2020 Status: (Other)         Jennifer Galvin  [address] [place] [address]             June 9, 2020         Dear Ms. Galvin,    Below are the results from your recent visit:    Resulted Orders   HM2(CBC w/o Differential)   Result Value Ref Range    WBC 10.9 4.0 - 11.0 thou/uL    RBC 4.90 3.80 - 5.40 mill/uL    Hemoglobin 12.5 12.0 - 16.0 g/dL    Hematocrit 40.2 35.0 - 47.0 %    MCV 82 80 - 100 fL    MCH 25.5 (L) 27.0 - 34.0 pg    MCHC 31.1 (L) 32.0 - 36.0 g/dL    RDW 21.9 (H) 11.0 - 14.5 %    Platelets 139 (L) 140 - 440 thou/uL    MPV 11.1 8.5 - 12.5 fL   Comprehensive Metabolic Panel   Result Value Ref Range    Sodium 143 136 - 145 mmol/L    Potassium 4.0 3.5 - 5.0 mmol/L    Chloride 106 98 - 107 mmol/L    CO2 29 22 - 31 mmol/L    Anion Gap, Calculation 8 5 - 18 mmol/L    Glucose 123 70 - 125 mg/dL    BUN 24 8 - 28 mg/dL    Creatinine 1.05 0.60 - 1.10 mg/dL    GFR MDRD Af Amer >60 >60 mL/min/1.73m2    GFR MDRD Non Af Amer 51 (L) >60 mL/min/1.73m2    Bilirubin, Total 0.6 0.0 - 1.0 mg/dL    Calcium 9.3 8.5 - 10.5 mg/dL    Protein, Total 6.5 6.0 - 8.0 g/dL    Albumin 3.5 3.5 - 5.0 g/dL    Alkaline Phosphatase 85 45 - 120 U/L    AST 20 0 - 40 U/L    ALT 21 0 - 45 U/L    Narrative    Fasting Glucose reference range is 70-99 mg/dL per  American Diabetes Association (ADA) guidelines.   Glycosylated Hemoglobin A1c   Result Value Ref Range    Hemoglobin A1c 5.6 <=5.6 %      Comment:      Prediabetes:   HBA1c       5.7 to 6.4%        Diabetes:        HBA1c        >=6.5%   Patients with Hgb F >5%, total bilirubin >10.0 mg/dL, abnormal red cell turnover, severe renal or hepatic disease or malignancy should not have this A1C method used to diagnose or monitor diabetes.           The blood sugar control and kidney function are still normal and on track.  Keep up the good work    Please call  with questions or contact us using Rdio.    Sincerely,        Electronically signed by Cordell Ceja, DO

## 2021-06-20 NOTE — LETTER
Letter by Sara Mayes CNP at      Author: Sara Mayes CNP Service: -- Author Type: --    Filed:  Encounter Date: 1/16/2020 Status: Signed         Patient: Jennifer Galvin   MR Number: [id number]   YOB: 1945   Date of Visit: 1/16/2020     [facility]    Facility:   [facility] [[id number]]   Code Status: FULL CODE      CHIEF COMPLAINT/REASON FOR VISIT:  Chief Complaint   Patient presents with   ? Problem Visit     weight and INR review       HISTORY:      HPI: Jennifer is a 74 y.o. female undergoing physical and occupational therapy at [facility] transitional care unit. , she is with history of severe obesity, stage Ib left breast cancer, I normal, vitamin D deficiency, urge stress incontinence, tremor, postmenopausal bleeding and uterine adenocarcinoma in situ status post JEROME/BSO, atrial fibrillation, osteopenia, FLAKITA, macular degeneration, chronic bilateral leg edema, hypertension, diabetes mellitus, CAD, chronic back and bilateral knee pain who presented to [facility] for scheduled left breast lumpectomy And sentinel lymph node biopsy.  Per the records she became hypoxic in the PACU and noted to be persistently hypoxic to 4 L of oxygen which is a change from her baseline.  She does have FLAKITA but does not use her CPAP machine. She was recently sent back to the hospital from December 14-17 th  for left axilla pain and found to have a left axillary  hematoma related to her recent lumpectomy. She was seen by general surgery who recommended resuming warfarin and providing modest infection prophylaxis.She was also treated at this time with cefdinir for a UTI. She then returned and was  admitted to [facility] from 12/19/19-12/31/19 for pneumonia.  The patient has bilateral pleural effusions on CTA and was treated with doxycycline and Keflex for suspected pneumonia.  She completed a course of Cefdinir for UTI outpatient during her  hospital stay.  Her procalcitonin and influenza A were negative on hospital admission.  She was also noted to have severe BLE nonpitting edema and her furosemide was increased to 80 mg two times a day for diuresis.  It was recommended that her BMP be followed bi-weekly for assessment of renal function on increased diuresis.    Today she was seen as a visit to review multiple medical issues.   She denied chest pain but does report shortness of breath with exertion..  She is off the  Oxygen.  she reports positive bowel movement and voiding well.  Her left breast incision is healed.  Her BMP checked on 1/15/20 is stable despite high doses of lasix. Her weights were reviewed and Her weights have been labile all week however she is down 7 pounds over the last day.  Her lung sounds were clear.    She denies any cough or congestion.  She is getting daily lymph wraps. She was seen by podiatry yesterday and her other toenail was removed and new dressing change orders.  She recently completed antibx for lower extremity cellulitis. Her legs continue to be red but pt and nursing feel they are looking better. She does also have lower extremity blistering.  .Last A1C 9/2019 was 6.5. Her INR was reviewed today and she was subtherapeutic at 1.71, adjustments made to coumadin     Past Medical History:   Diagnosis Date   ? (Lower) Leg Localized Swelling     Created by Conversion    ? Adenocarcinoma In Situ Of The Uterus     Created by Conversion    ? Backache     Created by Conversion [facility] Annotation: Feb 29 2012 12:14PM - Opal Helm: Referred to  [facility] 9/11, given TENS unit.    ? Benign Polyps Of The Large Intestine     Created by Conversion    ? Breast cancer (H)    ? Cancer (H)     uterine   ? Chronic kidney disease     stage 1 per H&P 11/22/2019   ? Coronary artery disease    ? Diabetes mellitus (H)     type 2   ? Fatigue     Created by Conversion    ? Hyperglycemia     Created by Conversion    ? Hyperlipidemia      Created by Conversion    ? Hypertension     Created by Conversion    ? Joint Pain, Localized In The Knee     Created by Conversion    ? Lesion of mediastinum    ? Lymphedema     Created by Conversion    ? Macular Degeneration     Created by Conversion [facility] Annotation: Apr 5 2011 12:22PM - Tien Guzman: Followed by  Ophthalmologist, Dr. Avilez.    ? Major Depression, Single Episode In Remission     Created by Conversion    ? Obesity     Created by Conversion    ? Obstructive Sleep Apnea     CPAP   ? Osteopenia     Created by Conversion    ? Paroxysmal Atrial Fibrillation     Created by Conversion [facility] Annotation: Nov 28 2012 10:36PM - Shruthi Dhaliwal: Found incidential  on exam on May 7th, [id number] score of 1 for HTNsymptomatic and hospitalized  x2 in July, 2012.CV X 2 in 2012Chronic Sotalol Rx    ? Postmenopausal bleeding     Created by Conversion    ? Skin cancer    ? Tachycardia     Created by Conversion    ? Tremor     Created by Conversion    ? Urge And Stress Incontinence     Created by Conversion    ? Urinary Frequency More Than Twice At Night (Nocturia)     Created by Conversion    ? Urine Tests Nonspecific Abnormal Findings     Created by Conversion    ? Vitamin D Deficiency     Created by Conversion              Family History   Problem Relation Age of Onset   ? Hypertension Father    ? Pneumonia Father    ? Esophageal cancer Brother    ? Cancer Brother    ? Stroke Mother    ? Alzheimer's disease Brother    ? Cancer Brother    ? Prostate cancer Brother    ? Cancer Nephew    ? Colon cancer Nephew    ? Cancer Paternal cousin      Social History     Socioeconomic History   ? Marital status: Single     Spouse name: Not on file   ? Number of children: 0   ? Years of education: Not on file   ? Highest education level: Not on file   Occupational History   ? Occupation: Retired RN     Employer: [employer]   Social Needs   ? Financial resource strain: Not on file   ? Food insecurity:      Worry: Not on file     Inability: Not on file   ? Transportation needs:     Medical: Not on file     Non-medical: Not on file   Tobacco Use   ? Smoking status: Former Smoker     Packs/day: 3.00     Years: 40.00     Pack years: 120.00     Types: Cigarettes     Last attempt to quit: 1/1/2005     Years since quitting: 15.0   ? Smokeless tobacco: Never Used   Substance and Sexual Activity   ? Alcohol use: Not Currently   ? Drug use: No   ? Sexual activity: Never     Partners: Male     Birth control/protection: Surgical, Post-menopausal   Lifestyle   ? Physical activity:     Days per week: Not on file     Minutes per session: Not on file   ? Stress: Not on file   Relationships   ? Social connections:     Talks on phone: Not on file     Gets together: Not on file     Attends Scientologist service: Not on file     Active member of club or organization: Not on file     Attends meetings of clubs or organizations: Not on file     Relationship status: Not on file   ? Intimate partner violence:     Fear of current or ex partner: Not on file     Emotionally abused: Not on file     Physically abused: Not on file     Forced sexual activity: Not on file   Other Topics Concern   ? Not on file   Social History Narrative    Lives alone single family home that she owns, no children and is retired.  Boyfriend used to live with her, however due to stroke he is in his own care facility.  She is currently at [facility]         Review of Systems   Constitutional: Positive for activity change. Negative for appetite change, fatigue and fever.   HENT: Negative for congestion.    Respiratory: Negative for cough and wheezing.         Shortness of breath with exertion   Cardiovascular: Positive for leg swelling. Negative for chest pain.        Lymphedema   Gastrointestinal: Negative for abdominal distention, abdominal pain, constipation, diarrhea and nausea.   Genitourinary: Negative for dysuria.   Musculoskeletal: Positive for  arthralgias. Negative for back pain.   Skin: Positive for wound. Negative for color change.        Left breast incision healed   Neurological: Negative for dizziness.   Psychiatric/Behavioral: Negative for agitation, behavioral problems and confusion.       Vitals:    01/16/20 1044   BP: 142/80   Pulse: 99   Resp: 20   Temp: 98.5  F (36.9  C)   SpO2: 92%   Weight: (!) 342 lb 14.4 oz (155.5 kg)       Physical Exam  Constitutional:       Appearance: She is well-developed.      Comments: Pleasant woman in no acute distress   HENT:      Head: Normocephalic.   Eyes:      Conjunctiva/sclera: Conjunctivae normal.   Neck:      Musculoskeletal: Normal range of motion.   Cardiovascular:      Rate and Rhythm: Normal rate and regular rhythm.      Heart sounds: Normal heart sounds. No murmur.   Pulmonary:      Effort: No respiratory distress.      Breath sounds: No rales.   Abdominal:      General: Bowel sounds are normal. There is no distension.      Palpations: Abdomen is soft.      Tenderness: There is no abdominal tenderness.   Musculoskeletal: Normal range of motion.      Right lower leg: Edema present.      Left lower leg: Edema present.      Comments: 3-4+ lower extremities. Lymph wraps resumed    Skin:     General: Skin is warm.      Comments: Healed left breast incision   Neurological:      Mental Status: She is alert and oriented to person, place, and time.   Psychiatric:         Behavior: Behavior normal.           LABS:   Recent Results (from the past 240 hour(s))   INR   Result Value Ref Range    INR 2.04 (H) 0.90 - 1.10   Basic Metabolic Panel   Result Value Ref Range    Sodium 141 136 - 145 mmol/L    Potassium 4.0 3.5 - 5.0 mmol/L    Chloride 103 98 - 107 mmol/L    CO2 32 (H) 22 - 31 mmol/L    Anion Gap, Calculation 6 5 - 18 mmol/L    Glucose 142 (H) 70 - 125 mg/dL    Calcium 8.9 8.5 - 10.5 mg/dL    BUN 11 8 - 28 mg/dL    Creatinine 1.08 0.60 - 1.10 mg/dL    GFR MDRD Af Amer 60 (L) >60 mL/min/1.73m2    GFR MDRD  Non Af Amer 50 (L) >60 mL/min/1.73m2   Basic Metabolic Panel   Result Value Ref Range    Sodium 139 136 - 145 mmol/L    Potassium 4.7 3.5 - 5.0 mmol/L    Chloride 101 98 - 107 mmol/L    CO2 30 22 - 31 mmol/L    Anion Gap, Calculation 8 5 - 18 mmol/L    Glucose 125 70 - 125 mg/dL    Calcium 9.6 8.5 - 10.5 mg/dL    BUN 10 8 - 28 mg/dL    Creatinine 1.02 0.60 - 1.10 mg/dL    GFR MDRD Af Amer >60 >60 mL/min/1.73m2    GFR MDRD Non Af Amer 53 (L) >60 mL/min/1.73m2   Glycosylated Hemoglobin A1C   Result Value Ref Range    Hemoglobin A1c 5.6 4.2 - 6.1 %   Basic Metabolic Panel   Result Value Ref Range    Sodium 142 136 - 145 mmol/L    Potassium 4.0 3.5 - 5.0 mmol/L    Chloride 102 98 - 107 mmol/L    CO2 32 (H) 22 - 31 mmol/L    Anion Gap, Calculation 8 5 - 18 mmol/L    Glucose 125 70 - 125 mg/dL    Calcium 9.2 8.5 - 10.5 mg/dL    BUN 10 8 - 28 mg/dL    Creatinine 1.06 0.60 - 1.10 mg/dL    GFR MDRD Af Amer >60 >60 mL/min/1.73m2    GFR MDRD Non Af Amer 51 (L) >60 mL/min/1.73m2   Basic Metabolic Panel   Result Value Ref Range    Sodium 142 136 - 145 mmol/L    Potassium 3.9 3.5 - 5.0 mmol/L    Chloride 102 98 - 107 mmol/L    CO2 31 22 - 31 mmol/L    Anion Gap, Calculation 9 5 - 18 mmol/L    Glucose 124 70 - 125 mg/dL    Calcium 9.0 8.5 - 10.5 mg/dL    BUN 9 8 - 28 mg/dL    Creatinine 0.92 0.60 - 1.10 mg/dL    GFR MDRD Af Amer >60 >60 mL/min/1.73m2    GFR MDRD Non Af Amer 60 (L) >60 mL/min/1.73m2   INR   Result Value Ref Range    INR 1.71 (H) 0.90 - 1.10     Current Outpatient Medications   Medication Sig   ? acetaminophen (TYLENOL) 500 MG tablet Take 1-2 tablets (500-1,000 mg total) by mouth every 4 (four) hours as needed (PAin 1-3).   ? albuterol (PROVENTIL) 2.5 mg /3 mL (0.083 %) nebulizer solution Take 3 mL (2.5 mg total) by nebulization every 4 (four) hours as needed for wheezing.   ? bacitracin ointment Apply 1 application topically 2 (two) times a day. After epsom salt foot soak. To bilateral great toes for toenail  falling off, until seen by Podiatry   ? calcium-vitamin D (CALCIUM-VITAMIN D) 500 mg(1,250mg) -200 unit per tablet Take 1 tablet by mouth 2 (two) times a day with meals.   ? cholecalciferol, vitamin D3, 1,000 unit tablet Take 2,000 Units by mouth daily.    ? diphenhydrAMINE (BENADRYL) 2 % cream Apply 1 application topically 3 (three) times a day as needed for itching.   ? diphenhydrAMINE (BENADRYL) 25 mg capsule Take 25 mg by mouth every 6 (six) hours as needed for itching.   ? fesoterodine (TOVIAZ) 8 mg Tb24 ER tablet Take 8 mg by mouth daily.    ? fluticasone propionate (FLONASE ALLERGY RELIEF) 50 mcg/actuation nasal spray One spray in each nostril daily   ? furosemide (LASIX) 80 MG tablet Take 1 tablet (80 mg total) by mouth 2 (two) times a day at 9am and 6pm.   ? gabapentin (NEURONTIN) 300 MG capsule Take 300 mg by mouth at bedtime.   ? glucosamine-chondroitin 500-400 mg tablet Take 1 tablet by mouth 2 (two) times a day with meals.    ? HYDROcodone-acetaminophen 5-325 mg per tablet Take 1 tablet by mouth every 4 (four) hours as needed for pain.   ? lidocaine 4 % patch Place 1 patch on the skin daily. Remove and discard patch with 12 hours or as directed by MD.   ? lidocaine-prilocaine (EMLA) cream Place over port 30 min. before being accessed.   ? loratadine (CLARITIN) 10 mg tablet Take 1 tablet (10 mg total) by mouth daily.   ? metoprolol tartrate (LOPRESSOR) 50 MG tablet Take 50 mg by mouth 2 (two) times a day. Hold for SBP < 110   ? mv-min/FA/vit K/lycop/lut/zeax (OCUVITE EYE PLUS MULTI ORAL) Take 1 tablet by mouth 2 (two) times a day with meals.   ? NOVOLOG U-100 INSULIN ASPART 100 unit/mL injection Check blood sugar three (3) times daily.  11.65 Type 2 with hyperglycemia   ? nystatin (MYCOSTATIN) powder Apply 1 application topically 2 (two) times a day. Under breasts for rash, until healed. Then PRN.   ? OMEGA-3/DHA/EPA/FISH OIL (FISH OIL-OMEGA-3 FATTY ACIDS) 300-1,000 mg capsule Take 2 g by mouth 2 times a  day at 6:00 am and 4:00 pm.   ? omeprazole (PRILOSEC) 20 MG capsule Take 20 mg by mouth daily before breakfast.   ? polyvinyl alcohol-povidon,PF, (REFRESH CLASSIC) 1.4-0.6 % Dpet ophthalmic dropperette Administer 2 drops to both eyes 3 (three) times a day.   ? potassium chloride (K-DUR,KLOR-CON) 10 MEQ tablet TAKE 2 TABLETS BY MOUTH TWICE DAILY   ? prochlorperazine (COMPAZINE) 10 MG tablet TAKE 1 TABLET(10 MG) BY MOUTH EVERY 6 HOURS AS NEEDED FOR NAUSEA   ? simvastatin (ZOCOR) 20 MG tablet Take 1 tablet (20 mg total) by mouth daily with supper.   ? warfarin ANTICOAGULANT (COUMADIN/JANTOVEN) 2 MG tablet Take 2 tablets (4 mg total) by mouth daily.       ASSESSMENT:      ICD-10-CM    1. Weight gain with edema R63.5     R60.9    2. CHF (congestive heart failure), NYHA class I, acute on chronic, combined (H) I50.43    3. Physical deconditioning R53.81    4. Encounter for therapeutic drug monitoring Z51.81        PLAN:   Chronic right shoulder pain x-ray with possible rotator cuff tear- ortho consult     Loose toenails seen by podiatry 1/15/19 and loose toenail was removed and she has new dressing change orders     Atrial fibrillation-continue Coumadin,  metoprolol and increased lasix, monitor BMP closely     Venous stasis lower extremities. Pt  will resume lymph wraps M-F and ace wraps on the weekends. .  Blistering lower extremities- new dressing change orders.      Status post left breast lumpectomy incision healed    Pain management Norco PRN     Shortness of breath with exertion. Oxygen PRN    Anticoagulation management continue Coumadin and adjust per INRs    Debility PT OT      Electronically signed by: Sara Mayes, CNP

## 2021-06-20 NOTE — LETTER
Letter by Yolanda Ly MD at      Author: Yolanda Ly MD Service: -- Author Type: --    Filed:  Encounter Date: 2/18/2020 Status: (Other)         Patient: Jennifer Galvin   MR Number: [id number]   YOB: 1945   Date of Visit: 2/18/2020     [facility]    Facility:   [facility] [[id number]]   Code Status: FULL CODE       Chief Complaint   Patient presents with   ? Follow Up     TCU 2/18/20.       HPI:   Jennifer is a 74 y.o. female with hx of left breast cancer, afib on warfarin, lymphedema and venous stasis, anemia, obesity, HTN seen in TCU at [facility]. Original hospitalization 12/2/2019 for left breast lumpectomy and sentinel node biopsy, discharged to TCU, re hospitalized 12/14/19-12/17/19 with left axillary hematoma, ABLA and UTI. Returned to TCU and then re hospitalized 12/19/19-12/31/19 as noted below for pneumonia, LE cellulitis.      74-year-old female with history of recent lumpectomy and sentinel lymph node biopsy, chronic atrial fibrillation on Coumadin, recent left axillary hematoma after lumpectomy presents with acute hypoxemic respiratory failure likely secondary to pneumonia versus bronchitis.     Acute hypoxemic respiratory failure: Patient with noted increasing wheezing with production of green sputum.  Noted is a recent hospitalization and the patient is already on Cefdinir.  CTA of the chest is negative for obvious infiltrate but does show some mild bilateral pleural effusions.  Upon further review of CT chest with pulmonary medicine there could be possible infiltrate seen on imaging.  Procalcitonin and influenza negative.     -- continue course of doxycycline and other nebs/pulmonary cares   -- completed steroid burst  -- continue increased dose of Lasix 80 mg twice daily after discharge.  Patient will need close follow up of her renal function at least weekly while at TCU.     Concern bilateral for LE  cellulitis  --Continue current course of Keflex and doxycycline.  I feel currently the patient most likely has venous stasis changes bilaterally and some superficial skin irritation related to lymphedema wraps.     Thymoma: follow up Dr. Villalba     Left axillary hematoma: In the setting of chronic anticoagulation for A. fib and recent lumpectomy with sentinel lymph node biopsy.    --Okay to continue Coumadin     UTI:  Present on admission  -- Completed cefdinir     Chronic A. fib: Continue home cardiovascular medications.  Reduce home dose warfarin to 4 mg daily for now and adjust based on future INR values.     Left-sided breast cancer: Follows with Dr. Villalba.  Plan outpatient follow-up     FLAKITA: continue CPAP    DM2: We will send out on sliding scale insulin given recent hyperglycemia while inpatient however I feel this is most likely steroid induced.  Patient has had diet controlled diabetes historically    Today:  She has been doing fairly well, progressing with therapy. She is planning to discharge later this week, reports she will be moving in with her sister in law temporarily. She does tire with ambulation but has been walking with walker, No falls. Denies dizziness. No chest pain or shortness of breath. No cough or fever. No need for oxygen. Appetite is pretty good. No nausea, vomiting or diarrhea. She reports her LE edema is improved with lymphedema wraps. She is a diabetic, controlled with diet previously, has SSI here but not needing much coverage and accuchecks as well as SSI coverage can be discontinued when she goes home.       Past Medical History:  Past Medical History:   Diagnosis Date   ? (Lower) Leg Localized Swelling     Created by Conversion    ? Adenocarcinoma In Situ Of The Uterus     Created by Conversion    ? Backache     Created by Conversion [facility] Annotation: Feb 29 2012 12:14PM - Opal Helm: Referred to  [facility] 9/11, given TENS unit.    ? Benign Polyps Of The Large  Intestine     Created by Conversion    ? Breast cancer (H)    ? Cancer (H)     uterine   ? Chronic kidney disease     stage 1 per H&P 11/22/2019   ? Coronary artery disease    ? Diabetes mellitus (H)     type 2   ? Fatigue     Created by Conversion    ? Hyperglycemia     Created by Conversion    ? Hyperlipidemia     Created by Conversion    ? Hypertension     Created by Conversion    ? Joint Pain, Localized In The Knee     Created by Conversion    ? Lesion of mediastinum    ? Lymphedema     Created by Conversion    ? Macular Degeneration     Created by Conversion [facility] Annotation: Apr 5 2011 12:22PM - Tien Guzman: Followed by  Ophthalmologist, Dr. Avilez.    ? Major Depression, Single Episode In Remission     Created by Conversion    ? Obesity     Created by Conversion    ? Obstructive Sleep Apnea     CPAP   ? Osteopenia     Created by Conversion    ? Paroxysmal Atrial Fibrillation     Created by Conversion [facility] Annotation: Nov 28 2012 10:36PM - Shruthi Dhaliwal: Found incidential  on exam on May 7th, [id number] score of 1 for HTNsymptomatic and hospitalized  x2 in July, 2012.CV X 2 in 2012Chronic Sotalol Rx    ? Postmenopausal bleeding     Created by Conversion    ? Skin cancer    ? Tachycardia     Created by Conversion    ? Tremor     Created by Conversion    ? Urge And Stress Incontinence     Created by Conversion    ? Urinary Frequency More Than Twice At Night (Nocturia)     Created by Conversion    ? Urine Tests Nonspecific Abnormal Findings     Created by Conversion    ? Vitamin D Deficiency     Created by Conversion         Medications:  Current Outpatient Medications   Medication Sig   ? acetaminophen (TYLENOL) 500 MG tablet Take 1-2 tablets (500-1,000 mg total) by mouth every 4 (four) hours as needed (PAin 1-3).   ? albuterol (PROVENTIL) 2.5 mg /3 mL (0.083 %) nebulizer solution Take 3 mL (2.5 mg total) by nebulization every 4 (four) hours as needed for wheezing.   ? calcium-vitamin D  (CALCIUM-VITAMIN D) 500 mg(1,250mg) -200 unit per tablet Take 1 tablet by mouth 2 (two) times a day with meals.   ? camphor-menthoL (SARNA) lotion Apply 1 application topically as needed for itching. Daily and prn   ? cholecalciferol, vitamin D3, 1,000 unit tablet Take 2,000 Units by mouth daily.    ? fesoterodine (TOVIAZ) 8 mg Tb24 ER tablet Take 8 mg by mouth daily.    ? fluticasone propionate (FLONASE ALLERGY RELIEF) 50 mcg/actuation nasal spray One spray in each nostril daily   ? furosemide (LASIX) 80 MG tablet Take 1 tablet (80 mg total) by mouth 2 (two) times a day at 9am and 6pm.   ? gabapentin (NEURONTIN) 300 MG capsule Take 300 mg by mouth at bedtime.   ? glucosamine-chondroitin 500-400 mg tablet Take 1 tablet by mouth 2 (two) times a day with meals.    ? guaiFENesin (ROBITUSSIN) 100 mg/5 mL syrup Take 200 mg by mouth every 4 (four) hours as needed for cough.   ? HYDROcodone-acetaminophen 5-325 mg per tablet Take 1 tablet by mouth every 4 (four) hours as needed for pain.   ? lidocaine 4 % patch Place 1 patch on the skin daily. Remove and discard patch with 12 hours or as directed by MD.   ? lidocaine-prilocaine (EMLA) cream Place over port 30 min. before being accessed.   ? loratadine (CLARITIN) 10 mg tablet Take 1 tablet (10 mg total) by mouth daily.   ? metoprolol tartrate (LOPRESSOR) 50 MG tablet Take 50 mg by mouth 2 (two) times a day. Hold for SBP < 110   ? mv-min/FA/vit K/lycop/lut/zeax (OCUVITE EYE PLUS MULTI ORAL) Take 1 tablet by mouth 2 (two) times a day with meals.   ? nystatin (MYCOSTATIN) powder Apply 1 application topically 2 (two) times a day. Under breasts for rash, until healed. Then PRN.   ? OMEGA-3/DHA/EPA/FISH OIL (FISH OIL-OMEGA-3 FATTY ACIDS) 300-1,000 mg capsule Take 2 g by mouth 2 times a day at 6:00 am and 4:00 pm.   ? omeprazole (PRILOSEC) 20 MG capsule Take 20 mg by mouth daily before breakfast.   ? polyvinyl alcohol-povidon,PF, (REFRESH CLASSIC) 1.4-0.6 % Dpet ophthalmic  dropperette Administer 2 drops to both eyes 3 (three) times a day.   ? potassium chloride (K-DUR,KLOR-CON) 10 MEQ tablet TAKE 2 TABLETS BY MOUTH TWICE DAILY   ? simvastatin (ZOCOR) 20 MG tablet Take 1 tablet (20 mg total) by mouth daily with supper.   ? warfarin ANTICOAGULANT (COUMADIN/JANTOVEN) 2 MG tablet Take 2 tablets (4 mg total) by mouth daily. (Patient taking differently: Take 5 mg by mouth daily. Next INR due 2/24/2020)       Physical Exam:   General: Patient is alert pale and tired appearing female, no distress.   Vitals: /86, Temp 97.2, Pulse 80, RR 18, O2 sat 93%RA.  HEENT: Head is NCAT. Eyes show no injection or icterus. Nares negative. Oropharynx well hydrated.  Neck: Supple. No tenderness or adenopathy. No JVD.  Lungs: Clear bilaterally. No wheezes.  Cardiovascular: Regular rate and rhythm, normal S1, S2.  Back: No spinal or CVA tenderness.  Abdomen: Obese, soft, no tenderness on exam. Bowel sounds present. No guarding rebound or rigidity.  : Deferred.  Extremities: Signif LE swelling with lymphedema wraps.  Musculoskeletal: Mild degen changes.   Psych: Mood appears good.      Labs:  Lab Results   Component Value Date    WBC 10.7 01/21/2020    HGB 11.1 (L) 01/21/2020    HCT 38.3 01/21/2020    MCV 92 01/21/2020     01/21/2020     Results for orders placed or performed in visit on 01/29/20   Basic Metabolic Panel   Result Value Ref Range    Sodium 141 136 - 145 mmol/L    Potassium 3.9 3.5 - 5.0 mmol/L    Chloride 101 98 - 107 mmol/L    CO2 30 22 - 31 mmol/L    Anion Gap, Calculation 10 5 - 18 mmol/L    Glucose 103 70 - 125 mg/dL    Calcium 9.0 8.5 - 10.5 mg/dL    BUN 14 8 - 28 mg/dL    Creatinine 0.93 0.60 - 1.10 mg/dL    GFR MDRD Af Amer >60 >60 mL/min/1.73m2    GFR MDRD Non Af Amer 59 (L) >60 mL/min/1.73m2       Assessment/Plan:  1. Breast cancer, left lumpectomy and sentinel node biopsy on 12/2/19. Follow up with heme/onc.  2. Lymphedema. With venous stasis. Has lymphedema wraps per OT.  Continue lasix.   3. HTN. BPs stable on metoprolol, lasix. Continue to monitor.   4. Afib. Anticoagulated with warfarin. Monitor and adjust per INR.   5. Anemia. ABLA. Had post op left axillary hematoma. Last hgb at 11.1.  6. Diabetes. Diet controlled at home, covered with SSI during hospitalization and TCU stay, will be discontinued when she goes home.             Electronically signed by: Yolanda Ly MD

## 2021-06-20 NOTE — LETTER
Letter by Sara Mayes CNP at      Author: Sara Mayes CNP Service: -- Author Type: --    Filed:  Encounter Date: 1/22/2020 Status: (Other)         Patient: Jennifer Galvin   MR Number: [id number]   YOB: 1945   Date of Visit: 1/22/2020     [facility]    Facility:   [facility] [[id number]]   Code Status: FULL CODE      CHIEF COMPLAINT/REASON FOR VISIT:  Chief Complaint   Patient presents with   ? Review Of Multiple Medical Conditions       HISTORY:      HPI: Jennifer is a 74 y.o. female undergoing physical and occupational therapy at [facility] transitional care unit. , she is with history of severe obesity, stage Ib left breast cancer, I normal, vitamin D deficiency, urge stress incontinence, tremor, postmenopausal bleeding and uterine adenocarcinoma in situ status post JEROME/BSO, atrial fibrillation, osteopenia, FLAKITA, macular degeneration, chronic bilateral leg edema, hypertension, diabetes mellitus, CAD, chronic back and bilateral knee pain who presented to [facility] for scheduled left breast lumpectomy And sentinel lymph node biopsy.  Per the records she became hypoxic in the PACU and noted to be persistently hypoxic to 4 L of oxygen which is a change from her baseline.  She does have FLAKITA but does not use her CPAP machine. She was recently sent back to the hospital from December 14-17 th  for left axilla pain and found to have a left axillary  hematoma related to her recent lumpectomy. She was seen by general surgery who recommended resuming warfarin and providing modest infection prophylaxis.She was also treated at this time with cefdinir for a UTI. She then returned and was  admitted to [facility] from 12/19/19-12/31/19 for pneumonia.  The patient has bilateral pleural effusions on CTA and was treated with doxycycline and Keflex for suspected pneumonia.  She completed a course of Cefdinir for UTI outpatient during her  hospital stay.  Her procalcitonin and influenza A were negative on hospital admission.  She was also noted to have severe BLE nonpitting edema and her furosemide was increased to 80 mg two times a day for diuresis.  It was recommended that her BMP be followed bi-weekly for assessment of renal function on increased diuresis.    Today she was seen as a visit to review multiple medical issues. She  Is also being seen for follow- up of lower extremity redness. Her redness was unchanged and her WBC is within normal limits. She is also afebrile.   She did complete Keflex on 1/16/2020 for lower extremity cellulitis.  Her legs were a light red in color.  She had no warmth to the legs.  Her weights were reviewed and have been stable over the last week.  She denied chest pain but does report shortness of breath with exertion..  She is off the Oxygen.  She is moving her bowels and has no issues with urination.   Her left breast incision is healed.  He BMP's have been stable despite high doses of lasix.   Her lung sounds were clear.    She denies any cough or congestion.  She is getting daily lymph wraps. She was seen by podiatry  and her other toenail was removed and new dressing change orders.   .Last A1C 9/2019 was 6.5. She will follow up with Orthopedics today for possible RUE rotator cuff tear    Past Medical History:   Diagnosis Date   ? (Lower) Leg Localized Swelling     Created by Conversion    ? Adenocarcinoma In Situ Of The Uterus     Created by Conversion    ? Backache     Created by Conversion [facility] Annotation: Feb 29 2012 12:14PM - Opal Helm: Referred to  [facility] 9/11, given TENS unit.    ? Benign Polyps Of The Large Intestine     Created by Conversion    ? Breast cancer (H)    ? Cancer (H)     uterine   ? Chronic kidney disease     stage 1 per H&P 11/22/2019   ? Coronary artery disease    ? Diabetes mellitus (H)     type 2   ? Fatigue     Created by Conversion    ? Hyperglycemia     Created by  Conversion    ? Hyperlipidemia     Created by Conversion    ? Hypertension     Created by Conversion    ? Joint Pain, Localized In The Knee     Created by Conversion    ? Lesion of mediastinum    ? Lymphedema     Created by Conversion    ? Macular Degeneration     Created by Conversion [facility] Annotation: Apr 5 2011 12:22PM - Tien Guzman: Followed by  Ophthalmologist, Dr. Avilez.    ? Major Depression, Single Episode In Remission     Created by Conversion    ? Obesity     Created by Conversion    ? Obstructive Sleep Apnea     CPAP   ? Osteopenia     Created by Conversion    ? Paroxysmal Atrial Fibrillation     Created by Conversion [facility] Annotation: Nov 28 2012 10:36PM - Shruthi Dhaliwal: Found incidential  on exam on May 7th, [id number] score of 1 for HTNsymptomatic and hospitalized  x2 in July, 2012.CV X 2 in 2012Chronic Sotalol Rx    ? Postmenopausal bleeding     Created by Conversion    ? Skin cancer    ? Tachycardia     Created by Conversion    ? Tremor     Created by Conversion    ? Urge And Stress Incontinence     Created by Conversion    ? Urinary Frequency More Than Twice At Night (Nocturia)     Created by Conversion    ? Urine Tests Nonspecific Abnormal Findings     Created by Conversion    ? Vitamin D Deficiency     Created by Conversion              Family History   Problem Relation Age of Onset   ? Hypertension Father    ? Pneumonia Father    ? Esophageal cancer Brother    ? Cancer Brother    ? Stroke Mother    ? Alzheimer's disease Brother    ? Cancer Brother    ? Prostate cancer Brother    ? Cancer Nephew    ? Colon cancer Nephew    ? Cancer Paternal cousin      Social History     Socioeconomic History   ? Marital status: Single     Spouse name: Not on file   ? Number of children: 0   ? Years of education: Not on file   ? Highest education level: Not on file   Occupational History   ? Occupation: Retired RN     Employer: [employer]   Social Needs   ? Financial resource strain:  Not on file   ? Food insecurity:     Worry: Not on file     Inability: Not on file   ? Transportation needs:     Medical: Not on file     Non-medical: Not on file   Tobacco Use   ? Smoking status: Former Smoker     Packs/day: 3.00     Years: 40.00     Pack years: 120.00     Types: Cigarettes     Last attempt to quit: 1/1/2005     Years since quitting: 15.0   ? Smokeless tobacco: Never Used   Substance and Sexual Activity   ? Alcohol use: Not Currently   ? Drug use: No   ? Sexual activity: Never     Partners: Male     Birth control/protection: Surgical, Post-menopausal   Lifestyle   ? Physical activity:     Days per week: Not on file     Minutes per session: Not on file   ? Stress: Not on file   Relationships   ? Social connections:     Talks on phone: Not on file     Gets together: Not on file     Attends Nondenominational service: Not on file     Active member of club or organization: Not on file     Attends meetings of clubs or organizations: Not on file     Relationship status: Not on file   ? Intimate partner violence:     Fear of current or ex partner: Not on file     Emotionally abused: Not on file     Physically abused: Not on file     Forced sexual activity: Not on file   Other Topics Concern   ? Not on file   Social History Narrative    Lives alone single family home that she owns, no children and is retired.  Boyfriend used to live with her, however due to stroke he is in his own care facility.  She is currently at [facility]         Review of Systems   Constitutional: Positive for activity change. Negative for appetite change, fatigue and fever.   HENT: Negative for congestion.    Respiratory: Negative for cough and wheezing.         Shortness of breath with exertion   Cardiovascular: Positive for leg swelling. Negative for chest pain.        Lymphedema   Gastrointestinal: Negative for abdominal distention, abdominal pain, constipation, diarrhea and nausea.   Genitourinary: Negative for dysuria.    Musculoskeletal: Positive for arthralgias. Negative for back pain.   Skin: Positive for wound. Negative for color change.        Left breast incision healed   Neurological: Negative for dizziness.   Psychiatric/Behavioral: Negative for agitation, behavioral problems and confusion.       Vitals:    01/22/20 2046   BP: 119/89   Pulse: 98   Resp: 22   Temp: 97.5  F (36.4  C)   SpO2: 91%   Weight: (!) 349 lb (158.3 kg)       Physical Exam  Constitutional:       Appearance: She is well-developed.      Comments: Pleasant woman in no acute distress   HENT:      Head: Normocephalic.   Eyes:      Conjunctiva/sclera: Conjunctivae normal.   Neck:      Musculoskeletal: Normal range of motion.   Cardiovascular:      Rate and Rhythm: Normal rate and regular rhythm.      Heart sounds: Normal heart sounds. No murmur.   Pulmonary:      Effort: No respiratory distress.      Breath sounds: No rales.   Abdominal:      General: Bowel sounds are normal. There is no distension.      Palpations: Abdomen is soft.      Tenderness: There is no abdominal tenderness.   Musculoskeletal: Normal range of motion.      Right lower leg: Edema present.      Left lower leg: Edema present.      Comments: 3-4+ lower extremities. Lymph wraps resumed    Skin:     General: Skin is warm.      Comments: Healed left breast incision   Neurological:      Mental Status: She is alert and oriented to person, place, and time.   Psychiatric:         Behavior: Behavior normal.           LABS:   Recent Results (from the past 240 hour(s))   Basic Metabolic Panel   Result Value Ref Range    Sodium 142 136 - 145 mmol/L    Potassium 4.0 3.5 - 5.0 mmol/L    Chloride 102 98 - 107 mmol/L    CO2 32 (H) 22 - 31 mmol/L    Anion Gap, Calculation 8 5 - 18 mmol/L    Glucose 125 70 - 125 mg/dL    Calcium 9.2 8.5 - 10.5 mg/dL    BUN 10 8 - 28 mg/dL    Creatinine 1.06 0.60 - 1.10 mg/dL    GFR MDRD Af Amer >60 >60 mL/min/1.73m2    GFR MDRD Non Af Amer 51 (L) >60 mL/min/1.73m2   Basic  Metabolic Panel   Result Value Ref Range    Sodium 142 136 - 145 mmol/L    Potassium 3.9 3.5 - 5.0 mmol/L    Chloride 102 98 - 107 mmol/L    CO2 31 22 - 31 mmol/L    Anion Gap, Calculation 9 5 - 18 mmol/L    Glucose 124 70 - 125 mg/dL    Calcium 9.0 8.5 - 10.5 mg/dL    BUN 9 8 - 28 mg/dL    Creatinine 0.92 0.60 - 1.10 mg/dL    GFR MDRD Af Amer >60 >60 mL/min/1.73m2    GFR MDRD Non Af Amer 60 (L) >60 mL/min/1.73m2   INR   Result Value Ref Range    INR 1.71 (H) 0.90 - 1.10   HM2(CBC w/o Differential)   Result Value Ref Range    WBC 10.7 4.0 - 11.0 thou/uL    RBC 4.15 3.80 - 5.40 mill/uL    Hemoglobin 11.1 (L) 12.0 - 16.0 g/dL    Hematocrit 38.3 35.0 - 47.0 %    MCV 92 80 - 100 fL    MCH 26.7 (L) 27.0 - 34.0 pg    MCHC 29.0 (L) 32.0 - 36.0 g/dL    RDW 17.9 (H) 11.0 - 14.5 %    Platelets 176 140 - 440 thou/uL    MPV 11.3 8.5 - 12.5 fL     Current Outpatient Medications   Medication Sig   ? acetaminophen (TYLENOL) 500 MG tablet Take 1-2 tablets (500-1,000 mg total) by mouth every 4 (four) hours as needed (PAin 1-3).   ? albuterol (PROVENTIL) 2.5 mg /3 mL (0.083 %) nebulizer solution Take 3 mL (2.5 mg total) by nebulization every 4 (four) hours as needed for wheezing.   ? bacitracin ointment Apply 1 application topically 2 (two) times a day. After epsom salt foot soak. To bilateral great toes for toenail falling off, until seen by Podiatry   ? calcium-vitamin D (CALCIUM-VITAMIN D) 500 mg(1,250mg) -200 unit per tablet Take 1 tablet by mouth 2 (two) times a day with meals.   ? cholecalciferol, vitamin D3, 1,000 unit tablet Take 2,000 Units by mouth daily.    ? diphenhydrAMINE (BENADRYL) 2 % cream Apply 1 application topically 3 (three) times a day as needed for itching.   ? diphenhydrAMINE (BENADRYL) 25 mg capsule Take 25 mg by mouth every 6 (six) hours as needed for itching.   ? fesoterodine (TOVIAZ) 8 mg Tb24 ER tablet Take 8 mg by mouth daily.    ? fluticasone propionate (FLONASE ALLERGY RELIEF) 50 mcg/actuation nasal  spray One spray in each nostril daily   ? furosemide (LASIX) 80 MG tablet Take 1 tablet (80 mg total) by mouth 2 (two) times a day at 9am and 6pm.   ? gabapentin (NEURONTIN) 300 MG capsule Take 300 mg by mouth at bedtime.   ? glucosamine-chondroitin 500-400 mg tablet Take 1 tablet by mouth 2 (two) times a day with meals.    ? HYDROcodone-acetaminophen 5-325 mg per tablet Take 1 tablet by mouth every 4 (four) hours as needed for pain.   ? lidocaine 4 % patch Place 1 patch on the skin daily. Remove and discard patch with 12 hours or as directed by MD.   ? lidocaine-prilocaine (EMLA) cream Place over port 30 min. before being accessed.   ? loratadine (CLARITIN) 10 mg tablet Take 1 tablet (10 mg total) by mouth daily.   ? metoprolol tartrate (LOPRESSOR) 50 MG tablet Take 50 mg by mouth 2 (two) times a day. Hold for SBP < 110   ? mv-min/FA/vit K/lycop/lut/zeax (OCUVITE EYE PLUS MULTI ORAL) Take 1 tablet by mouth 2 (two) times a day with meals.   ? NOVOLOG U-100 INSULIN ASPART 100 unit/mL injection Check blood sugar three (3) times daily.  11.65 Type 2 with hyperglycemia   ? nystatin (MYCOSTATIN) powder Apply 1 application topically 2 (two) times a day. Under breasts for rash, until healed. Then PRN.   ? OMEGA-3/DHA/EPA/FISH OIL (FISH OIL-OMEGA-3 FATTY ACIDS) 300-1,000 mg capsule Take 2 g by mouth 2 times a day at 6:00 am and 4:00 pm.   ? omeprazole (PRILOSEC) 20 MG capsule Take 20 mg by mouth daily before breakfast.   ? polyvinyl alcohol-povidon,PF, (REFRESH CLASSIC) 1.4-0.6 % Dpet ophthalmic dropperette Administer 2 drops to both eyes 3 (three) times a day.   ? potassium chloride (K-DUR,KLOR-CON) 10 MEQ tablet TAKE 2 TABLETS BY MOUTH TWICE DAILY   ? prochlorperazine (COMPAZINE) 10 MG tablet TAKE 1 TABLET(10 MG) BY MOUTH EVERY 6 HOURS AS NEEDED FOR NAUSEA   ? simvastatin (ZOCOR) 20 MG tablet Take 1 tablet (20 mg total) by mouth daily with supper.   ? warfarin ANTICOAGULANT (COUMADIN/JANTOVEN) 2 MG tablet Take 2 tablets  (4 mg total) by mouth daily.       ASSESSMENT:      ICD-10-CM    1. CHF (congestive heart failure), NYHA class I, acute on chronic, combined (H) I50.43    2. Physical deconditioning R53.81    3. Bilateral leg edema R60.0    4. Venous stasis I87.8        PLAN:   Chronic right shoulder pain x-ray with possible rotator cuff tear- ortho consult today.      Loose toenails seen by podiatry 1/15/19 and loose toenail was removed and she has new dressing change orders     Atrial fibrillation-continue Coumadin,  metoprolol and increased lasix, monitor BMP closely     Venous stasis lower extremities. Pt  will resume lymph wraps M-F and ace wraps on the weekends. .  Blistering lower extremities- new dressing change orders.      Status post left breast lumpectomy incision healed    Pain management Norco PRN     Shortness of breath with exertion. Oxygen PRN    Anticoagulation management continue Coumadin and adjust per INRs    Debility PT OT      Electronically signed by: Sara Mayes CNP

## 2021-06-22 NOTE — TELEPHONE ENCOUNTER
ANTICOAGULATION  MANAGEMENT    Assessment     Today's INR result of 2.1 is Therapeutic (goal INR of 2.0-3.0)        Warfarin taken as previously instructed    No new diet changes affecting INR    No new medication/supplements affecting INR    Continues to tolerate warfarin with no reported s/s of bleeding or thromboembolism     Previous INR was Therapeutic    Plan:     Spoke with Jennifer regarding INR result and instructed:     Warfarin Dosing Instructions:  Continue current warfarin dose 2.5 mg daily on mon/wed/fri; and 5 mg daily rest of week  (0 % change)    Instructed patient to follow up no later than: one month      Jennifer verbalizes understanding and agrees to warfarin dosing plan.    Instructed to call the [facility] for any changes, questions or concerns. (#[phone])   ?   Ibrahima Trimble RN    Subjective/Objective:      Jennifer VICKEY Kamar, a 73 y.o. female is on warfarin.     Jennifer reports:     Home warfarin dose: verbally confirmed home dose with Jennifer and updated on anticoagulation calendar     Missed doses: No     Medication changes:  No     S/S of bleeding or thromboembolism:  No     New Injury or illness:  No     Changes in diet or alcohol consumption:  No     Upcoming surgery, procedure or cardioversion:  No    Anticoagulation Episode Summary     Current INR goal:   2.0-3.0   TTR:   64.5 % (4.1 y)   Next INR check:   2/6/2019   INR from last check:   2.10 (1/9/2019)   Weekly max warfarin dose:      Target end date:      INR check location:      Preferred lab:      Send INR reminders to:   ANTICOAGULATION POOL A (WBY,WBE,MID,RSC)    Indications    Paroxysmal Atrial Fibrillation [I48.91]           Comments:            Anticoagulation Care Providers     Provider Role Specialty Phone number    Cordell Ceja DO Referring Internal Medicine [phone]

## 2021-06-23 NOTE — TELEPHONE ENCOUNTER
ANTICOAGULATION  MANAGEMENT    Assessment     Today's INR result of 2.0 is Therapeutic (goal INR of 2.0-3.0)        Warfarin taken as previously instructed    No new diet changes affecting INR    No new medication/supplements affecting INR    Continues to tolerate warfarin with no reported s/s of bleeding or thromboembolism     Previous INR was Therapeutic    Plan:     Spoke with Jennifer regarding INR result and instructed:     Warfarin Dosing Instructions:  Continue current warfarin dose 2.5 mg daily on mon/wed/fri; and 5 mg daily rest of week  (0 % change)    Instructed patient to follow up no later than: one month      Jennifer verbalizes understanding and agrees to warfarin dosing plan.    Instructed to call the [facility] for any changes, questions or concerns. (#[phone])   ?   Ibrahima Trimble RN    Subjective/Objective:      Jennifer Galvin, a 73 y.o. female is on warfarin.     Jennifer reports:     Home warfarin dose: as updated on anticoagulation calendar per template     Missed doses: No     Medication changes:  No     S/S of bleeding or thromboembolism:  No     New Injury or illness:  No     Changes in diet or alcohol consumption:  No     Upcoming surgery, procedure or cardioversion:  No    Anticoagulation Episode Summary     Current INR goal:   2.0-3.0   TTR:   65.2 % (4.1 y)   Next INR check:   3/6/2019   INR from last check:   2.00 (2/6/2019)   Weekly max warfarin dose:      Target end date:      INR check location:      Preferred lab:      Send INR reminders to:   ANTICOAGULATION POOL A (WBY,WBE,MID,RSC)    Indications    Paroxysmal Atrial Fibrillation [I48.91]           Comments:            Anticoagulation Care Providers     Provider Role Specialty Phone number    Cordell Ceja DO Referring Internal Medicine [phone]

## 2021-06-24 NOTE — TELEPHONE ENCOUNTER
Refill Approved    Rx renewed per Medication Renewal Policy. Medication was last renewed on 10/1/2018    >> Per 6/18/2018 visit note, Dr Dela Cruz would like to re-see pt for follow-up <<        Boby Ibarra, [facility] Connection Triage/Med Refill 2/12/2019     Requested Prescriptions   Pending Prescriptions Disp Refills     metoprolol tartrate (LOPRESSOR) 25 MG tablet [Pharmacy Med Name: METOPROLOL TARTRATE 25MG TABLETS] 90 tablet 0     Sig: TAKE 1 TABLET BY MOUTH EVERY MORNING    Beta-Blockers Refill Protocol Passed - 2/10/2019  5:02 AM       Passed - PCP or prescribing provider visit in past 12 months or next 3 months    Last office visit with prescriber/PCP: 6/18/2018 Cordell Ceja DO OR same dept: 6/18/2018 Cordell Ceja DO OR same specialty: 6/18/2018 Cordell Ceja DO  Last physical: 10/25/2016 Last MTM visit: Visit date not found   Next visit within 3 mo: Visit date not found  Next physical within 3 mo: Visit date not found  Prescriber OR PCP: Cordell Ceja DO  Last diagnosis associated with med order: 1. Disorder of bone and cartilage  - metoprolol tartrate (LOPRESSOR) 25 MG tablet [Pharmacy Med Name: METOPROLOL TARTRATE 25MG TABLETS]; TAKE 1 TABLET BY MOUTH EVERY MORNING  Dispense: 90 tablet; Refill: 0    If protocol passes may refill for 12 months if within 3 months of last provider visit (or a total of 15 months).            Passed - Blood pressure filed in past 12 months    BP Readings from Last 1 Encounters:   12/10/18 133/83

## 2021-06-24 NOTE — TELEPHONE ENCOUNTER
RN cannot approve Refill Request    RN can NOT refill this medication Protocol failed and NO refill given.   Med ordered 10/1/18, 90 tabs, 2 refills by Dr. Ceja and ordered on 2/12/19, 30 tabs by Dr. Ceja.  Refill request too soon.    Last office visit: 6/18/2018 Cordell Ceja DO Last Physical: 10/25/2016 Last MTM visit: Visit date not found Last visit same specialty: 6/18/2018 Cordell Ceja DO.  Next visit within 3 mo: Visit date not found  Next physical within 3 mo: Visit date not found      Kisha Sinha, [facility] Connection Triage/Med Refill 2/17/2019    Requested Prescriptions   Pending Prescriptions Disp Refills     metoprolol tartrate (LOPRESSOR) 25 MG tablet [Pharmacy Med Name: METOPROLOL TARTRATE 25MG TABLETS] 90 tablet 0     Sig: TAKE 1 TABLET BY MOUTH EVERY MORNING    Beta-Blockers Refill Protocol Passed - 2/16/2019 11:22 PM       Passed - PCP or prescribing provider visit in past 12 months or next 3 months    Last office visit with prescriber/PCP: 6/18/2018 Cordell Ceja DO OR same dept: 6/18/2018 Cordell Ceja DO OR same specialty: 6/18/2018 Cordell Ceja DO  Last physical: 10/25/2016 Last MTM visit: Visit date not found   Next visit within 3 mo: Visit date not found  Next physical within 3 mo: Visit date not found  Prescriber OR PCP: Cordell Ceja DO  Last diagnosis associated with med order: 1. Disorder of bone and cartilage  - metoprolol tartrate (LOPRESSOR) 25 MG tablet [Pharmacy Med Name: METOPROLOL TARTRATE 25MG TABLETS]; TAKE 1 TABLET BY MOUTH EVERY MORNING  Dispense: 90 tablet; Refill: 0    If protocol passes may refill for 12 months if within 3 months of last provider visit (or a total of 15 months).            Passed - Blood pressure filed in past 12 months    BP Readings from Last 1 Encounters:   12/10/18 133/83

## 2021-06-24 NOTE — TELEPHONE ENCOUNTER
ANTICOAGULATION  MANAGEMENT    Assessment     Today's INR result of 3.9 is Supratherapeutic (goal INR of 2.0-3.0)        Warfarin taken as previously instructed    No new diet changes affecting INR    No new medication/supplements affecting INR    Continues to tolerate warfarin with no reported s/s of bleeding or thromboembolism     Previous INR was Therapeutic    Plan:     Spoke with Jennifer regarding INR result and instructed:     Warfarin Dosing Instructions:  skip today then continue current warfarin dose 2.5 mg daily on mon/wed/fri; and 5 mg daily rest of week  (0 % change)    Instructed patient to follow up no later than: two weeks      Jennifer verbalizes understanding and agrees to warfarin dosing plan.    Instructed to call the [facility] for any changes, questions or concerns. (#[phone])   ?   Ibrahima Trimble RN    Subjective/Objective:      Jenniferlakisha Galvin, a 73 y.o. female is on warfarin.     Jennifer reports:     Home warfarin dose: verbally confirmed home dose with Jennifer and updated on anticoagulation calendar     Missed doses: No     Medication changes:  No     S/S of bleeding or thromboembolism:  No     New Injury or illness:  No     Changes in diet or alcohol consumption:  No     Upcoming surgery, procedure or cardioversion:  No    Anticoagulation Episode Summary     Current INR goal:   2.0-3.0   TTR:   65.0 % (4.2 y)   Next INR check:   3/20/2019   INR from last check:   3.90! (3/6/2019)   Weekly max warfarin dose:      Target end date:      INR check location:      Preferred lab:      Send INR reminders to:   ANTICOAGULATION POOL A (WBY,WBE,MID,RSC)    Indications    Paroxysmal Atrial Fibrillation [I48.91]           Comments:            Anticoagulation Care Providers     Provider Role Specialty Phone number    Cordell Ceja DO Referring Internal Medicine [phone]

## 2021-06-25 NOTE — TELEPHONE ENCOUNTER
ANTICOAGULATION  MANAGEMENT    Assessment     Today's INR result of 2.9 is Therapeutic (goal INR of 2.0-3.0)        Warfarin taken as previously instructed    No new diet changes affecting INR    No new medication/supplements affecting INR    Continues to tolerate warfarin with no reported s/s of bleeding or thromboembolism     Previous INR was Supratherapeutic    Plan:     Spoke with Constantin regarding INR result and instructed:     Warfarin Dosing Instructions:  Continue current warfarin dose 2.5 mg daily on mon/wed/fri; and 5 mg daily rest of week  (0 % change)    Instructed patient to follow up no later than: two weeks      Constantin verbalizes understanding and agrees to warfarin dosing plan.    Instructed to call the [facility] for any changes, questions or concerns. (#[phone])   ?   Ibrahima Trimble RN    Subjective/Objective:      Constantin HURD Kamar, a 73 y.o. female is on warfarin.     Constantin reports:     Home warfarin dose: verbally confirmed home dose with constantin and updated on anticoagulation calendar     Missed doses: No     Medication changes:  No     S/S of bleeding or thromboembolism:  No     New Injury or illness:  No     Changes in diet or alcohol consumption:  No     Upcoming surgery, procedure or cardioversion:  No    Anticoagulation Episode Summary     Current INR goal:   2.0-3.0   TTR:   64.5 % (4.3 y)   Next INR check:   4/3/2019   INR from last check:   2.90 (3/20/2019)   Weekly max warfarin dose:      Target end date:      INR check location:      Preferred lab:      Send INR reminders to:   ANTICOAGULATION POOL A (WBY,WBE,MID,RSC)    Indications    Paroxysmal Atrial Fibrillation [I48.91]           Comments:            Anticoagulation Care Providers     Provider Role Specialty Phone number    Cordell Ceja DO Referring Internal Medicine [phone]

## 2021-06-25 NOTE — PROGRESS NOTES
Progress Notes by Myron Block MD at 8/4/2017  4:30 PM     Author: Myron Block MD Service: -- Author Type: Physician    Filed: 8/4/2017  5:08 PM Encounter Date: 8/4/2017 Status: Signed    : Myron Block MD (Physician)                  [email or web address]           Thank you Dr. Ceja for asking the [facility] Heart Care team to participate in the care of your patient, Jennifer Galvin.     Impression and Plan     1. Atrial fibrillation. This is been subjectively and objectively quiescent,  However, on todays ECG she is noted to be in atrial fibrillation.  Ventricular response would appear to be reasonable at 73 bpm.  Patient is essentially asymptomatic with the rhythm disturbance.  She was unaware of being back in atrial fibrillation.  It is possible that she is going in and out of atrial fibrillation and consequently would simply recommend continuing with the current therapy.. QTc on todays 12-lead ECG is favorable at 440 ms.  Historically, the patient has been fairly unaware of the rhythm disturbance. For this reason she has been maintained on warfarin therapy for CVA prophylaxis.  Plan:    24-hour Holter monitor to ensure the ventricular response is well controlled.     2. Hypertension. Blood pressure is fairly reasonable in the office today at 112/70 mmHg.     Plan on follow-up in one year.         History of Present Illness    Once again I would like to thank you again for asking me to participate in the care of your patient, Jennifer Galvin.  As you know, but to reiterate for my own records, Jennifer Galvin is a 72 y.o. female with history of atrial fibrillation. This, however, has been subjectively and objectively quiescent on sotalol therapy.     In follow-up, patient denies any subjective palpitations. She reports no lightheadedness. She denies any chest pain. She reports no worsening shortness of breath.    Further review of systems is otherwise  "negative/noncontributory (medical record and 13 point review of systems reviewed as well and pertinent positives noted).         Cardiac Diagnostics      Regadenoson nuclear perfusion study 16 January 2014:  1. No evidence of infarct or ischemia.  2. Normal resting regional wall motion and global systolic performance with ejection fraction of 68%.     Echocardiogram December 11, 2013:  1. Normal LV size and function with ejection fraction of 60%.   2. No significant valvular heart disease.   3. No change from May 17, 2012.     12-lead ECG (personally reviewed) for August 2017: Atrial fibrillation with right bundle branch block.   ms.    12-lead ECG for May 2016 (personally reviewed): Sinus rhythm with right bundle branch block.  QTc 414 ms.     12-lead ECG (personally reviewed) 12 January 2015 reveals sinus rhythm with heart rate of 54 bpm. Occasional PACs. QTc 482 ms.         Physical Examination       /70 (Patient Site: Left Arm, Patient Position: Sitting, Cuff Size: Adult Large)  Pulse 76  Resp 20  Ht 5' 6\" (1.676 m)  Wt (!) 305 lb 12.8 oz (138.7 kg)  BMI 49.36 kg/m2        Wt Readings from Last 3 Encounters:   08/04/17 (!) 305 lb 12.8 oz (138.7 kg)   11/08/16 (!) 319 lb (144.7 kg)   11/04/16 (!) 327 lb 9.6 oz (148.6 kg)     The patient is alert and oriented times three. Sclerae are anicteric. Mucosal membranes are moist. Jugular venous pressure is normal. No significant adenopathy/thyromegally appreciated. Lungs are clear with good expansion. On cardiovascular exam, the patient has an irregular S1 and S2. Abdomen is soft and non-tender. Extremities reveal no clubbing, cyanosis, or edema.         Family History/Social History/Risk Factors   Patient does not smoke.  Family history of hypertension.         Medications  Allergies   Current Outpatient Prescriptions   Medication Sig Dispense Refill   ? calcium-vitamin D (CALCIUM-VITAMIN D) 500 mg(1,250mg) -200 unit per tablet Take 1 tablet by mouth 2 " (two) times a day with meals.     ? cholecalciferol, vitamin D3, 1,000 unit tablet Take 2,000 Units by mouth daily.      ? desmopressin (DDAVP) 0.1 MG tablet Take 0.1 mg by mouth bedtime.     ? furosemide (LASIX) 40 MG tablet TAKE 1 TABLET BY MOUTH TWICE DAILY 180 tablet 3   ? glucosamine-chondroitin 500-400 mg tablet Take 1 tablet by mouth 2 (two) times a day.      ? metFORMIN (GLUCOPHAGE) 500 MG tablet Take 1 tablet (500 mg total) by mouth bedtime. 90 tablet 0   ? metoprolol tartrate (LOPRESSOR) 25 MG tablet Take 1 tablet (25 mg total) by mouth every morning. Per pt and [employer] record, she takes tartrate form just once daily 90 tablet 3   ? OMEGA-3/DHA/EPA/FISH OIL (FISH OIL-OMEGA-3 FATTY ACIDS) 300-1,000 mg capsule Take 2 g by mouth 2 times a day at 6:00 am and 4:00 pm.     ? potassium chloride SA (K-DUR,KLOR-CON) 10 MEQ tablet TAKE 2 TABLETS BY MOUTH TWICE DAILY 360 tablet 3   ? simvastatin (ZOCOR) 40 MG tablet TAKE 1 TABLET BY MOUTH EVERY DAY WITH DINNER 90 tablet 0   ? sotalol (BETAPACE) 80 MG tablet TAKE 1 TABLET BY MOUTH TWICE DAILY 180 tablet 2   ? VESICARE 10 mg tablet Take 1 tablet by mouth daily.  12   ? vitamin A-vitamin C-vit E-min (OCUVITE) Tab tablet Take 1 tablet by mouth 2 (two) times a day.     ? warfarin (COUMADIN) 5 MG tablet TAKE ONE-HALF TO ONE TABLET BY MOUTH DAILY AS DIRECTED. ADJUST DOSE PER INR RESULT 90 tablet 1   ? furosemide (LASIX) 40 MG tablet Take 40 mg by mouth 2 (two) times a day at 9am and 6pm.     ? metFORMIN (GLUCOPHAGE) 500 MG tablet TAKE 1 TABLET BY MOUTH EVERY DAY 90 tablet 0   ? metoprolol tartrate (LOPRESSOR) 25 MG tablet TAKE 1 TABLET BY MOUTH EVERY MORNING 90 tablet 0   ? metoprolol tartrate (LOPRESSOR) 25 MG tablet TAKE 1 TABLET BY MOUTH EVERY MORNING 90 tablet 0   ? potassium chloride SA (K-DUR,KLOR-CON) 10 MEQ tablet Take 20 mEq by mouth 2 (two) times a day.     ? simvastatin (ZOCOR) 40 MG tablet Take 40 mg by mouth bedtime.     ? sotalol (BETAPACE) 80 MG tablet  Take 80 mg by mouth 2 (two) times a day.       No current facility-administered medications for this visit.       Allergies   Allergen Reactions   ? Aspirin      Pt currently on WArfarin   ? Penicillins      Boils            Lab Results   Lab Results   Component Value Date     11/04/2016    K 4.5 11/04/2016     11/04/2016    CO2 30 11/04/2016    BUN 10 11/04/2016    CREATININE 0.82 11/04/2016    CALCIUM 8.4 (L) 11/04/2016     Lab Results   Component Value Date    WBC 10.3 07/22/2015    HGB 12.7 11/04/2016    HCT 46.0 07/22/2015    MCV 88 07/22/2015     07/22/2015     Lab Results   Component Value Date    CHOL 152 03/18/2015    TRIG 225 (H) 03/18/2015    HDL 44 03/18/2015    LDLCALC 63 03/18/2015     Lab Results   Component Value Date    INR 2.80 (H) 07/14/2017     Lab Results   Component Value Date     (H) 07/23/2012     Lab Results   Component Value Date    CKMB <1 07/23/2012    TROPONINI <0.01 07/24/2012    TROPONINI 0.02 07/24/2012    TROPONINI <0.01 07/23/2012     Lab Results   Component Value Date    TSH 2.41 12/17/2015

## 2021-06-26 NOTE — PROGRESS NOTES
Progress Notes by Myron Block MD at 12/4/2018 11:30 AM     Author: Myron Block MD Service: -- Author Type: Physician    Filed: 12/4/2018 11:29 AM Encounter Date: 12/4/2018 Status: Signed    : Myron Block MD (Physician)                  [email or web address]  [phone]         Thank you Dr. Ceja for asking the [facility] Heart Care team to participate in the care of your patient, Jennifer Galvin.     Impression and Plan     1. Atrial fibrillation. This is been subjectively and objectively quiescent,  However, on todays ECG she is noted to be in atrial fibrillation.  Ventricular response would appear to be reasonable at 73 bpm.  Patient is essentially asymptomatic with the rhythm disturbance.  She was unaware of being back in atrial fibrillation.  It is possible that she is going in and out of atrial fibrillation and consequently would simply recommend continuing with the current therapy. QTc on todays 12-lead ECG is favorable at 460 ms.  Historically, the patient has been fairly unaware of the rhythm disturbance. For this reason she has been maintained on warfarin therapy for CVA prophylaxis.  It is been approximately 4-5 years since we have performed an echocardiogram.  Plan:    Echocardiogram to evaluate for any development/progression of cardiac pathology significance.    2. Hypertension. Blood pressure is fairly reasonable in the office today at 124/88 mmHg.     Plan on follow-up in one year.         History of Present Illness    Once again I would like to thank you again for asking me to participate in the care of your patient, Jennifer Galvin.  As you know, but to reiterate for my own records, Jennifer Galvin is a 73 y.o. female with history of atrial fibrillation. This, however, has been subjectively and objectively quiescent on sotalol therapy.     In follow-up, patient denies any subjective palpitations. She reports no lightheadedness. She denies any chest pain.  "She reports no worsening shortness of breath.    Further review of systems is otherwise negative/noncontributory (medical record and 13 point review of systems reviewed as well and pertinent positives noted).         Cardiac Diagnostics      Regadenoson nuclear perfusion study 16 January 2014:  1. No evidence of infarct or ischemia.  2. Normal resting regional wall motion and global systolic performance with ejection fraction of 68%.     Echocardiogram 11 December 2013:  1. Normal LV size and function with ejection fraction of 60%.   2. No significant valvular heart disease.   3. No change from 17 May 2012.    Holter monitor 10 August 2017:  1. Abnormal Holter monitor tracing by virtue of the persistent atrial flutter demonstrated throughout the recording interval.  2. The patient ventricular response appears to be reasonably well controlled.  3. Conduction system disease was present and manifest as a intraventricular conduction delay.  4. No profound bradycardia or pauses  5. No significant ventricular arrhythmia  6. No symptoms were reported therefore correlation with the patient's clinical events cannot be made    12-lead ECG (personally reviewed) 4 August 2017: Atrial fibrillation with right bundle branch block.   ms.     12-lead ECG for May 2016 (personally reviewed): Sinus rhythm with right bundle branch block.  QTc 414 ms.     12-lead ECG (personally reviewed) 12 January 2015 reveals sinus rhythm with heart rate of 54 bpm. Occasional PACs. QTc 482 ms.              Physical Examination       /88 (Patient Site: Left Arm, Patient Position: Sitting, Cuff Size: Adult Large)   Pulse 68   Resp 16   Ht 5' 6\" (1.676 m)   Wt (!) 310 lb (140.6 kg)   BMI 50.04 kg/m          Wt Readings from Last 3 Encounters:   12/04/18 (!) 310 lb (140.6 kg)   06/18/18 (!) 300 lb 14.4 oz (136.5 kg)   12/13/17 (!) 300 lb 3.2 oz (136.2 kg)       The patient is alert and oriented times three. Sclerae are anicteric. Mucosal " membranes are moist. Jugular venous pressure is normal. No significant adenopathy/thyromegally appreciated. Lungs are clear with good expansion. On cardiovascular exam, the patient has a regular S1 and S2.  Heart sounds are distant.  Abdomen is soft and non-tender. Extremities reveal no clubbing, cyanosis, or edema.            Family History/Social History/Risk Factors   Patient does not smoke.  Family history of hypertension.         Medications  Allergies   Current Outpatient Medications   Medication Sig Dispense Refill   ? calcium-vitamin D (CALCIUM-VITAMIN D) 500 mg(1,250mg) -200 unit per tablet Take 1 tablet by mouth 2 (two) times a day with meals.     ? cholecalciferol, vitamin D3, 1,000 unit tablet Take 2,000 Units by mouth daily.      ? desmopressin (DDAVP) 0.1 MG tablet Take 0.1 mg by mouth bedtime.     ? furosemide (LASIX) 40 MG tablet Take 1 tablet (40 mg total) by mouth 2 (two) times a day. 180 tablet 2   ? glucosamine-chondroitin 500-400 mg tablet Take 1 tablet by mouth 2 (two) times a day.      ? metoprolol tartrate (LOPRESSOR) 25 MG tablet TAKE 1 TABLET BY MOUTH EVERY MORNING 90 tablet 0   ? metoprolol tartrate (LOPRESSOR) 25 MG tablet TAKE 1 TABLET BY MOUTH EVERY MORNING 90 tablet 2   ? OMEGA-3/DHA/EPA/FISH OIL (FISH OIL-OMEGA-3 FATTY ACIDS) 300-1,000 mg capsule Take 2 g by mouth 2 times a day at 6:00 am and 4:00 pm.     ? potassium chloride (K-DUR,KLOR-CON) 10 MEQ tablet TAKE TWO TABLETS BY MOUTH TWICE DAILY 360 tablet 1   ? simvastatin (ZOCOR) 40 MG tablet Take 1 tablet (40 mg total) by mouth daily with supper. 90 tablet 2   ? sotalol (BETAPACE) 80 MG tablet TAKE 1 TABLET BY MOUTH TWICE DAILY 180 tablet 0   ? VESICARE 10 mg tablet Take 1 tablet by mouth daily.  12   ? vitamin A-vitamin C-vit E-min (OCUVITE) Tab tablet Take 1 tablet by mouth 2 (two) times a day.     ? warfarin (COUMADIN) 5 MG tablet TAKE ONE-HALF TO ONE TABLET BY MOUTH DAILY AS DIRECTED. ADJUST DOSE PER INR RESULT 90 tablet 1   ?  warfarin (COUMADIN/JANTOVEN) 5 MG tablet TAKE ONE-HALF TO ONE TABLET BY MOUTH DAILY AS DIRECTED. ADJUST DOSE PER INR RESULT 90 tablet 1     No current facility-administered medications for this visit.       Allergies   Allergen Reactions   ? Aspirin      Pt currently on WArfarin   ? Penicillins      Boils            Lab Results   Lab Results   Component Value Date     12/13/2017    K 4.6 12/13/2017     12/13/2017    CO2 30 12/13/2017    BUN 19 12/13/2017    CREATININE 0.91 12/13/2017    CALCIUM 9.6 12/13/2017     Lab Results   Component Value Date    WBC 10.3 07/22/2015    HGB 12.7 11/04/2016    HCT 46.0 07/22/2015    MCV 88 07/22/2015     07/22/2015     Lab Results   Component Value Date    CHOL 125 12/13/2017    TRIG 193 (H) 12/13/2017    HDL 30 (L) 12/13/2017    LDLCALC 56 12/13/2017     Lab Results   Component Value Date    INR 2.60 (H) 11/28/2018     Lab Results   Component Value Date     (H) 07/23/2012     Lab Results   Component Value Date    CKMB <1 07/23/2012    TROPONINI <0.01 07/24/2012    TROPONINI 0.02 07/24/2012    TROPONINI <0.01 07/23/2012     Lab Results   Component Value Date    TSH 2.41 12/17/2015

## 2021-06-27 NOTE — PROGRESS NOTES
Progress Notes by Lidya Desai CNP at 8/28/2019 10:18 AM     Author: Giselle, Lidya J, CNP Service: Interventional Radiology Author Type: Nurse Practitioner    Filed: 8/28/2019 10:36 AM Date of Service: 8/28/2019 10:18 AM Status: Signed    : Lidya Desai CNP (Nurse Practitioner)         Interventional Radiology - Pre-Procedure Note:  8/28/2019    Procedure Requested: Port placement   Requested by: Dr. Villalba    History and Physical Reviewed: H&P documented within 30 days (by Dr. Villalba on 8/21/19).  I have personally reviewed the patient's medical history and have updated the medical record as necessary.    Brief HPI: Jennifer Galvin is a 74 y.o. old female with history of multiple medical issues incluiding A fib (on Warfarin) and newly diagnosed LEFT breast cancer, mediastinal mass and shortness of breath. Patient will begin neoadjuvant chemotherapy soon with surgery and possible radiation to follow. Presents today for port placement     NPO: MN  ANTICOAGULANTS: Warfarin - last dose 8/24  ANTIBIOTICS: Clindamycin for procedure     ALLERGIES  Aspirin and Penicillins    LABS:  INR (no units)   Date Value   08/07/2019 2.60 (H)     EXAM:  There were no vitals taken for this visit.  General: Stable. In no acute distress.  Neuro: A&O x 3.   Resp: Lungs clear to auscultation bilaterally.  Cardio: S1S2 and reg, without murmur, clicks or rubs  Skin: Without excoriations, ecchymosis, erythema, lesions or open sores on bilateral chest/neck.    Pre-Sedation Assessment:  Mallampati Airway Classification: Class 2: upper half of tonsil fossa visible  Previous reaction to anesthesia/sedation: no  Sedation plan based on assessment: Moderate  ASA Classification: ASA 3 - Patient with moderate systemic disease with functional limitations    ASSESSMENT/PLAN:   INR pending   RIGHT port placement with sedation     Procedure, risk/benefits, and sedation reviewed with pt/family. All questions answered. Consent obtained. OK to proceed  with above radiology procedure.     Lidya Desai, CNP  Interventional Radiology

## 2021-06-27 NOTE — PROGRESS NOTES
"Progress Notes by Myron Block MD at 6/27/2019 10:10 AM     Author: Myron Block MD Service: -- Author Type: Physician    Filed: 6/27/2019 11:03 AM Encounter Date: 6/27/2019 Status: Signed    : Myron Block MD (Physician)                  [email or web address]  [phone]         Thank you Dr. Silveira for asking the [facility] team to participate in the care of your patient, Jennifer Galvin.     Impression and Plan     1. Atrial fibrillation (chronic/persistent).  Patient has historically been asymptomatic with the rhythm disturbance.  She would now appear to have more chronic/persistent atrial fibrillation.  I am:    Discontinue sotalol.    Continue metoprolol, but increased to 50 mg twice daily.    Plan to obtain Holter monitor in approximately 2 weeks to ensure ventricular response is well controlled with the aforementioned change in medication.      Parenthetically, patient is being considered for bladder stimulator replacement.  Feel patient can temporarily hold warfarin at surgical discretion without \"bridging\".  Warfarin to be resumed post surgery per surgical discretion.     2. Hypertension. Blood pressure is fairly reasonable in the office today at 110/80 mmHg.    3. Shortness of breath.  Patient does report some shortness of breath since my last visit with her.  She did have an echocardiogram in December 2018 which revealed normal LV function and no significant valvular heart disease.  She does have a diagnosis of COPD and this may be a contributor.  Cannot rule out possible ischemic contribution.  Plan:    Nuclear perfusion imaging study to exclude possible ischemia as a contributor to patient's shortness of breath.    Follow-up and further recommendations pending nuclear perfusion study results.            History of Present Illness    Once again I would like to thank you again for asking me to participate in the care of your patient, Jennifer Galvin.  " As you know, but to reiterate for my own records, Jennifer Galvin is a 74 y.o. female with history of tonic/persistent atrial fibrillation.      In follow-up, patient denies any subjective palpitations spite her atrial fibrillation.  She denies any lightheadedness.  She does, however, report to me that she feels as though she has been having increasing shortness of breath as well as some mild decrease in exercise tolerance since my last visit with her.  She denies chest pain, however.  She reports no shortness of breath at night to suggest PND/orthopnea.    Further review of systems is otherwise negative/noncontributory (medical record and 13 point review of systems reviewed as well and pertinent positives noted).         Cardiac Diagnostics      Echocardiogram 10 December 2018 (personally reviewed)   1. Normal left ventricular size and systolic performance with a visually estimated ejection fraction of 55%.   2. No significant valvular heart disease is identified on this study.   3. Mild right ventricular enlargement with borderline reduced right ventricular systolic performance.  4. Mild to moderate bi-atrial enlargement.  5. There is mild enlargement of the proximal ascending aorta.    Echocardiogram 11 December 2013:  1. Normal LV size and function with ejection fraction of 60%.   2. No significant valvular heart disease.   3. No change from 17 May 2012.    Regadenoson nuclear perfusion study 16 January 2014:  1. No evidence of infarct or ischemia.  2. Normal resting regional wall motion and global systolic performance with ejection fraction of 68%.    Holter monitor 10 August 2017:  1. Abnormal Holter monitor tracing by virtue of the persistent atrial flutter demonstrated throughout the recording interval.  2. The patient ventricular response appears to be reasonably well controlled.  3. Conduction system disease was present and manifest as a intraventricular conduction delay.  4. No profound bradycardia or  "pauses  5. No significant ventricular arrhythmia  6. No symptoms were reported therefore correlation with the patient's clinical events cannot be made     12-lead ECG (personally reviewed) 4 August 2017: Atrial fibrillation with right bundle branch block.  QTc 469 ms.     12-lead ECG for May 2016 (personally reviewed): Sinus rhythm with right bundle branch block.  QTc 414 ms.     12-lead ECG (personally reviewed) 12 January 2015 reveals sinus rhythm with heart rate of 54 bpm. Occasional PACs. QTc 482 ms.         Physical Examination       /80 (Patient Site: Right Arm, Patient Position: Sitting, Cuff Size: Adult Large)   Pulse 64   Resp 16   Ht 5' 6\" (1.676 m)   Wt (!) 325 lb (147.4 kg)   BMI 52.46 kg/m          Wt Readings from Last 3 Encounters:   06/27/19 (!) 325 lb (147.4 kg)   06/26/19 (!) 328 lb 8 oz (149 kg)   12/10/18 (!) 310 lb (140.6 kg)       The patient is alert and oriented times three. Sclerae are anicteric. Mucosal membranes are moist. Jugular venous pressure is normal. No significant adenopathy/thyromegally appreciated. Lungs are clear with good expansion. On cardiovascular exam, the patient has an irregular S1 and S2.  Heart sounds are distant.  Abdomen is soft and non-tender. Extremities reveal no clubbing, cyanosis.         Family History/Social History/Risk Factors   Patient does not smoke.  Family history of hypertension.         Medications  Allergies   Current Outpatient Medications   Medication Sig Dispense Refill   ? calcium-vitamin D (CALCIUM-VITAMIN D) 500 mg(1,250mg) -200 unit per tablet Take 1 tablet by mouth 2 (two) times a day with meals.     ? cholecalciferol, vitamin D3, 1,000 unit tablet Take 2,000 Units by mouth daily.      ? furosemide (LASIX) 40 MG tablet Take 1 tablet (40 mg total) by mouth 2 (two) times a day. 180 tablet 2   ? glucosamine-chondroitin 500-400 mg tablet Take 1 tablet by mouth 2 (two) times a day.      ? OMEGA-3/DHA/EPA/FISH OIL (FISH OIL-OMEGA-3 FATTY " ACIDS) 300-1,000 mg capsule Take 2 g by mouth 2 times a day at 6:00 am and 4:00 pm.     ? potassium chloride (K-DUR,KLOR-CON) 10 MEQ tablet TAKE TWO TABLETS BY MOUTH TWICE DAILY 360 tablet 1   ? potassium chloride (K-DUR,KLOR-CON) 10 MEQ tablet TAKE 2 TABLETS BY MOUTH TWICE DAILY 360 tablet 3   ? simvastatin (ZOCOR) 40 MG tablet Take 1 tablet (40 mg total) by mouth daily with supper. 90 tablet 2   ? VESICARE 10 mg tablet Take 1 tablet by mouth daily.  12   ? vitamin A-vitamin C-vit E-min (OCUVITE) Tab tablet Take 1 tablet by mouth 2 (two) times a day.     ? warfarin (COUMADIN/JANTOVEN) 5 MG tablet TAKE ONE-HALF TO ONE TABLET BY MOUTH DAILY AS DIRECTED. ADJUST DOSE PER INR RESULT 90 tablet 1   ? metoprolol tartrate (LOPRESSOR) 50 MG tablet Take 1 tablet (50 mg total) by mouth 2 (two) times a day. 90 tablet 3     No current facility-administered medications for this visit.       Allergies   Allergen Reactions   ? Aspirin      Pt currently on WArfarin   ? Penicillins      Boils            Lab Results   Lab Results   Component Value Date     06/26/2019    K 4.6 06/26/2019     06/26/2019    CO2 27 06/26/2019    BUN 15 06/26/2019    CREATININE 0.93 06/26/2019    CALCIUM 9.9 06/26/2019     Lab Results   Component Value Date    WBC 11.6 (H) 06/26/2019    WBC 10.3 07/22/2015    HGB 15.1 06/26/2019    HCT 46.5 06/26/2019    MCV 87 06/26/2019     06/26/2019     Lab Results   Component Value Date    CHOL 125 12/13/2017    TRIG 193 (H) 12/13/2017    HDL 30 (L) 12/13/2017    LDLCALC 56 12/13/2017     Lab Results   Component Value Date    INR 2.80 (H) 06/26/2019     Lab Results   Component Value Date    BNP 88 06/26/2019     Lab Results   Component Value Date    CKMB <1 07/23/2012    TROPONINI <0.01 07/24/2012    TROPONINI 0.02 07/24/2012    TROPONINI <0.01 07/23/2012     Lab Results   Component Value Date    TSH 2.37 06/26/2019

## 2021-06-28 NOTE — PROGRESS NOTES
Progress Notes by Myron Block MD at 11/19/2019  8:30 AM     Author: Myron Block MD Service: -- Author Type: Physician    Filed: 11/27/2019  3:38 PM Encounter Date: 11/19/2019 Status: Addendum    : Myron Block MD (Physician)    Related Notes: Original Note by Myron Block MD (Physician) filed at 11/19/2019  8:52 AM                                       Thank you Dr. Ceja for asking the [facility] team to participate in the care of your patient, Jennifer Galvin.     Impression and Plan     1. Atrial fibrillation (chronic/persistent).  Ventricular response is somewhat increased.  When we discontinued the sotalol, I initially had placed her on metoprolol tartrate 50 mg twice daily.  Subsequent Holter monitor 10 August 2017 did reveal well-controlled ventricular response on this regimen.  She presents today, however, on only 25 metoprolol tartrate once daily.  I am uncertain as to why she is now on a lower dose and patient also is unaware of why her dose may have been reduced.  I reviewed the medical record and still is unclear to me.  Perhaps she had some tendency toward lower blood pressure though last couple of documented blood pressures in the medical record have been reasonable if not slightly elevated.    Continue metoprolol tartrate, but increase to at least 25 mg twice daily.    Will obtain repeat Holter monitor to assess efficacy and consider further increase if needed.    Continue warfarin for CVA prophylaxis.    2. Hypertension. Blood pressure is reasonable in the office today.     3. Shortness of breath.  I suspect that this is multifactorial in nature.  She did have an echocardiogram in December 2018 which revealed normal LV function and no significant valvular heart disease.  She did undergo a nuclear perfusion imaging study 3 July 2019 which was favorable as well.  She does have a diagnosis of COPD and this may be a contributor.  Cannot  rule out possible ischemic contribution.  Plan:    Try to optimize ventricular response with atrial fibrillation as per problem #1.     Follow-up and further recommendations pending Holter monitor results.         History of Present Illness    Once again I would like to thank you again for asking me to participate in the care of your patient, Jennifer Galvin.  As you know, but to reiterate for my own records, Jennifer Galvin is a 74 y.o. female with history of chronic/persistent atrial fibrillation.      In follow-up, patient denies any subjective palpitations spite her atrial fibrillation.  She denies any lightheadedness.  She does, however, have some chronic shortness of breath.  She denies shortness of breath at night to suggest PND/orthopnea.  She reports no lightheadedness or chest pain.    Further review of systems is otherwise negative/noncontributory (medical record and 13 point review of systems reviewed as well and pertinent positives noted).         Cardiac Diagnostics      Echocardiogram 10 December 2018 (personally reviewed)   1. Normal left ventricular size and systolic performance with a visually estimated ejection fraction of 55%.   2. No significant valvular heart disease is identified on this study.   3. Mild right ventricular enlargement with borderline reduced right ventricular systolic performance.  4. Mild to moderate bi-atrial enlargement.  5. There is mild enlargement of the proximal ascending aorta.    Echocardiogram 11 December 2013:  1. Normal LV size and function with ejection fraction of 60%.   2. No significant valvular heart disease.   3. No change from 17 May 2012.    Regadenoson nuclear perfusion study 3 July 2019:  1. No evidence of infarct or ischemia.  2. Normal left ventricular systolic performance with ejection fraction of 75%.    Regadenoson nuclear perfusion study 16 January 2014:  1. No evidence of infarct or ischemia.  2. Normal resting regional wall motion and global systolic  "performance with ejection fraction of 68%.    Holter monitor 10 August 2017:  1. Abnormal Holter monitor tracing by virtue of the persistent atrial flutter demonstrated throughout the recording interval.  2. The patient ventricular response appears to be reasonably well controlled.  3. Conduction system disease was present and manifest as a intraventricular conduction delay.  4. No profound bradycardia or pauses  5. No significant ventricular arrhythmia  6. No symptoms were reported therefore correlation with the patient's clinical events cannot be made    12-lead ECG (personally reviewed) 4 August 2017: Atrial fibrillation with right bundle branch block.  QTc 469 ms.     12-lead ECG for May 2016 (personally reviewed): Sinus rhythm with right bundle branch block.  QTc 414 ms.     12-lead ECG (personally reviewed) 12 January 2015 reveals sinus rhythm with heart rate of 54 bpm. Occasional PACs. QTc 482 ms.            Physical Examination       /80 (Patient Site: Left Arm, Patient Position: Sitting, Cuff Size: Adult Regular)   Pulse (!) 115   Resp 24   Ht 5' 5\" (1.651 m)   Wt (!) 311 lb (141.1 kg)   BMI 51.75 kg/m          Wt Readings from Last 3 Encounters:   11/19/19 (!) 311 lb (141.1 kg)   11/13/19 (!) 319 lb (144.7 kg)   10/23/19 (!) 318 lb 8 oz (144.5 kg)     The patient is alert and oriented times three. Sclerae are anicteric. Mucosal membranes are moist. Jugular venous pressure is normal. No significant adenopathy/thyromegally appreciated. Lungs are somewhat diminished, but fairly clear. On cardiovascular exam, the patient has irregular S1 and S2. Abdomen is soft and non-tender. Extremities reveal no clubbing, cyanosis.           Family History/Social History/Risk Factors   Patient does not smoke.  Family history reviewed, and family history includes Alzheimer's disease in her brother; Cancer in her brother, brother, nephew, and paternal cousin; Colon cancer in her nephew; Esophageal cancer in her " brother; Hypertension in her father; Pneumonia in her father; Prostate cancer in her brother; Stroke in her mother.          Medications  Allergies   Current Outpatient Medications   Medication Sig Dispense Refill   ? calcium-vitamin D (CALCIUM-VITAMIN D) 500 mg(1,250mg) -200 unit per tablet Take 1 tablet by mouth 2 (two) times a day with meals.     ? cholecalciferol, vitamin D3, 1,000 unit tablet Take 2,000 Units by mouth daily.      ? diphenhydrAMINE (BENADRYL) 2 % cream Apply 1 application topically 3 (three) times a day as needed for itching.     ? diphenhydrAMINE (BENADRYL) 25 mg capsule Take 25 mg by mouth every 6 (six) hours as needed for itching.     ? famotidine (PEPCID) 20 MG tablet Take 20 mg by mouth daily.     ? fesoterodine (TOVIAZ) 4 mg Tb24 ER tablet Take 4 mg by mouth daily.     ? fluticasone propionate (FLONASE ALLERGY RELIEF) 50 mcg/actuation nasal spray One spray in each nostril daily 16 g 12   ? furosemide (LASIX) 40 MG tablet TAKE 1 TABLET(40 MG) BY MOUTH TWICE DAILY 180 tablet 3   ? glucosamine-chondroitin 500-400 mg tablet Take 1 tablet by mouth 2 (two) times a day.      ? hydrocortisone 1 % cream Apply 1 application topically as needed.     ? lidocaine-prilocaine (EMLA) cream Place over port 30 min. before being accessed. 30 g 1   ? loratadine (CLARITIN) 10 mg tablet Take 1 tablet (10 mg total) by mouth daily. 30 tablet 11   ? OMEGA-3/DHA/EPA/FISH OIL (FISH OIL-OMEGA-3 FATTY ACIDS) 300-1,000 mg capsule Take 2 g by mouth 2 times a day at 6:00 am and 4:00 pm.     ? oxyCODONE-acetaminophen (PERCOCET/ENDOCET) 5-325 mg per tablet Take 1 tablet by mouth every 6 (six) hours as needed for pain. 12 tablet 0   ? potassium chloride (K-DUR,KLOR-CON) 10 MEQ tablet TAKE 2 TABLETS BY MOUTH TWICE DAILY 360 tablet 3   ? prochlorperazine (COMPAZINE) 10 MG tablet TAKE 1 TABLET(10 MG) BY MOUTH EVERY 6 HOURS AS NEEDED FOR NAUSEA 90 tablet 3   ? simvastatin (ZOCOR) 20 MG tablet Take 1 tablet (20 mg total) by  mouth daily with supper. 90 tablet 3   ? vitamin A-vitamin C-vit E-min (OCUVITE) Tab tablet Take 1 tablet by mouth 2 (two) times a day.     ? warfarin (COUMADIN/JANTOVEN) 5 MG tablet TAKE ONE-HALF TO ONE TABLET BY MOUTH DAILY AS DIRECTED. ADJUST DOSE PER INR RESULT (Patient taking differently: 2.5 mg on Mondays, Wednesday and Friday, 5 mg every other day of week) 90 tablet 1   ? metoprolol tartrate (LOPRESSOR) 25 MG tablet Take 1 tablet (25 mg total) by mouth 2 (two) times a day.  0     No current facility-administered medications for this visit.       Allergies   Allergen Reactions   ? Aspirin      Pt currently on WArfarin   ? Penicillins      Boils            Lab Results   Lab Results   Component Value Date     11/12/2019    K 3.9 11/12/2019     (H) 11/12/2019    CO2 26 11/12/2019    BUN 17 11/12/2019    CREATININE 0.96 11/12/2019    CALCIUM 9.5 11/12/2019     Lab Results   Component Value Date    WBC 6.7 11/12/2019    WBC 10.3 07/22/2015    HGB 11.6 (L) 11/12/2019    HCT 36.5 11/12/2019    MCV 94 11/12/2019     11/12/2019     Lab Results   Component Value Date    CHOL 124 07/22/2019    TRIG 162 (H) 07/22/2019    HDL 34 (L) 07/22/2019    LDLCALC 58 07/22/2019     Lab Results   Component Value Date    INR 1.72 (H) 11/12/2019     Lab Results   Component Value Date    BNP 88 06/26/2019     Lab Results   Component Value Date    CKMB <1 07/23/2012    TROPONINI <0.01 07/24/2012    TROPONINI 0.02 07/24/2012    TROPONINI <0.01 07/23/2012     Lab Results   Component Value Date    TSH 2.37 06/26/2019

## 2021-07-03 NOTE — ADDENDUM NOTE
Addendum Note by Ibrahima Trimble RN at 11/24/2020  3:28 PM     Author: Ibrahima Trimble RN Service: -- Author Type: Registered Nurse    Filed: 11/24/2020  3:28 PM Encounter Date: 10/26/2020 Status: Signed    : Ibrahima Trimble RN (Registered Nurse)    Addended by: IBRAHIMA TRIMBLE on: 11/24/2020 03:28 PM        Modules accepted: Orders

## 2021-07-03 NOTE — ADDENDUM NOTE
Addendum Note by Lombardi, Susan L, RN at 10/1/2019  1:20 PM     Author: Lombardi, Susan L, RN Service: -- Author Type: RN, Care Manager    Filed: 10/1/2019  4:37 PM Date of Service: 10/1/2019  1:20 PM Status: Signed    : Lombardi, Susan L, RN (RN, Care Manager)    Encounter addended by: Lombardi, Susan L, RN on: 10/1/2019  4:37 PM      Actions taken: Clinical Note Signed, Charge Capture section accepted

## 2021-07-03 NOTE — ADDENDUM NOTE
Addendum Note by Horace Silveira MD at 6/26/2019 11:05 AM     Author: Horace Silveira MD Service: -- Author Type: Physician    Filed: 6/26/2019 11:59 AM Encounter Date: 6/26/2019 Status: Signed    : Horace Silveira MD (Physician)    Addended by: HORACE SILVEIRA on: 6/26/2019 11:59 AM        Modules accepted: Orders

## 2021-07-03 NOTE — ADDENDUM NOTE
Addendum Note by Sarah Rockwell MLT at 9/10/2019  2:25 PM     Author: Sarah Rockwell MLT Service: -- Author Type:     Filed: 9/10/2019  3:36 PM Encounter Date: 9/10/2019 Status: Signed    : Sarah Rockwell MLT ()    Addended by: SARAH ROCKWELL on: 9/10/2019 03:36 PM        Modules accepted: Orders

## 2021-07-14 PROBLEM — I50.43 CHF (CONGESTIVE HEART FAILURE), NYHA CLASS I, ACUTE ON CHRONIC, COMBINED (H): Status: RESOLVED | Noted: 2019-01-01 | Resolved: 2020-01-01

## 2021-08-03 PROBLEM — J95.821 POSTOPERATIVE RESPIRATORY FAILURE (H): Status: RESOLVED | Noted: 2019-12-03 | Resolved: 2020-01-01

## 2023-08-15 NOTE — LETTER
Letter by Fabi Morton RN at      Author: Fabi Morton RN Service: -- Author Type: --    Filed:  Encounter Date: 10/23/2019 Status: Signed         Jennifer Galvin  [address] [place] [address]                 October 23, 2019       Dear Jennifer:  I know you don't need these resources right now, but please consider the enclosed programs.  [employer] helps cancer patients get much needed assistance.  It can take a bit to actually get on the list, so please consider applying now.  You can always decline later if you feel you don't need the help!    Also enclosed is an application for [employer].  This free program is available to qualified patients during active treatment (surgery, chemo, radiation) for cancer.  It can take a few weeks to get signed up.  I know your family is providing, but here's another option if you like.  I've highlighted the multiple areas where you need to sign or initial the form once you've completed it.    Warm regards,      Fabi Morton RN, BSN, OCN, CBCN  Cancer Care Navigator  [phone]         
PAST MEDICAL HISTORY:  CVA (cerebrovascular accident)     Hyperlipidemia     Hypertension     Hypothyroidism